# Patient Record
Sex: FEMALE | Race: OTHER | HISPANIC OR LATINO | URBAN - METROPOLITAN AREA
[De-identification: names, ages, dates, MRNs, and addresses within clinical notes are randomized per-mention and may not be internally consistent; named-entity substitution may affect disease eponyms.]

---

## 2017-01-08 ENCOUNTER — EMERGENCY (EMERGENCY)
Facility: HOSPITAL | Age: 72
LOS: 1 days | Discharge: HOME | End: 2017-01-08
Admitting: INTERNAL MEDICINE
Payer: MEDICARE

## 2017-01-08 PROCEDURE — 70450 CT HEAD/BRAIN W/O DYE: CPT | Mod: 26

## 2017-01-08 PROCEDURE — 93010 ELECTROCARDIOGRAM REPORT: CPT

## 2017-01-08 PROCEDURE — 71250 CT THORAX DX C-: CPT | Mod: 26

## 2017-01-08 PROCEDURE — 99285 EMERGENCY DEPT VISIT HI MDM: CPT

## 2017-01-08 PROCEDURE — 71010: CPT | Mod: 26

## 2017-01-09 ENCOUNTER — INPATIENT (INPATIENT)
Facility: HOSPITAL | Age: 72
LOS: 5 days | Discharge: LEFT BEFORE DISCHARGE | DRG: 811 | End: 2017-01-15
Attending: FAMILY MEDICINE | Admitting: FAMILY MEDICINE
Payer: MEDICARE

## 2017-01-09 ENCOUNTER — OUTPATIENT (OUTPATIENT)
Dept: OUTPATIENT SERVICES | Facility: HOSPITAL | Age: 72
LOS: 1 days | End: 2017-01-09

## 2017-01-09 ENCOUNTER — APPOINTMENT (OUTPATIENT)
Dept: FAMILY MEDICINE | Facility: HOSPITAL | Age: 72
End: 2017-01-09

## 2017-01-09 VITALS
SYSTOLIC BLOOD PRESSURE: 142 MMHG | WEIGHT: 113 LBS | RESPIRATION RATE: 14 BRPM | DIASTOLIC BLOOD PRESSURE: 50 MMHG | BODY MASS INDEX: 34.48 KG/M2 | TEMPERATURE: 206.96 F | HEART RATE: 52 BPM | OXYGEN SATURATION: 98 %

## 2017-01-09 DIAGNOSIS — D64.89 OTHER SPECIFIED ANEMIAS: ICD-10-CM

## 2017-01-09 DIAGNOSIS — F05 DELIRIUM DUE TO KNOWN PHYSIOLOGICAL CONDITION: ICD-10-CM

## 2017-01-09 DIAGNOSIS — E87.6 HYPOKALEMIA: ICD-10-CM

## 2017-01-09 DIAGNOSIS — E03.9 HYPOTHYROIDISM, UNSPECIFIED: ICD-10-CM

## 2017-01-09 DIAGNOSIS — R19.7 DIARRHEA, UNSPECIFIED: ICD-10-CM

## 2017-01-09 DIAGNOSIS — Z79.82 LONG TERM (CURRENT) USE OF ASPIRIN: ICD-10-CM

## 2017-01-09 DIAGNOSIS — I48.91 UNSPECIFIED ATRIAL FIBRILLATION: ICD-10-CM

## 2017-01-09 DIAGNOSIS — G93.41 METABOLIC ENCEPHALOPATHY: ICD-10-CM

## 2017-01-09 DIAGNOSIS — I25.10 ATHEROSCLEROTIC HEART DISEASE OF NATIVE CORONARY ARTERY WITHOUT ANGINA PECTORIS: ICD-10-CM

## 2017-01-09 DIAGNOSIS — E78.5 HYPERLIPIDEMIA, UNSPECIFIED: ICD-10-CM

## 2017-01-09 DIAGNOSIS — I50.32 CHRONIC DIASTOLIC (CONGESTIVE) HEART FAILURE: ICD-10-CM

## 2017-01-09 DIAGNOSIS — I13.2 HYPERTENSIVE HEART AND CHRONIC KIDNEY DISEASE WITH HEART FAILURE AND WITH STAGE 5 CHRONIC KIDNEY DISEASE, OR END STAGE RENAL DISEASE: ICD-10-CM

## 2017-01-09 DIAGNOSIS — E11.22 TYPE 2 DIABETES MELLITUS WITH DIABETIC CHRONIC KIDNEY DISEASE: ICD-10-CM

## 2017-01-09 DIAGNOSIS — D72.829 ELEVATED WHITE BLOOD CELL COUNT, UNSPECIFIED: ICD-10-CM

## 2017-01-09 DIAGNOSIS — F32.9 MAJOR DEPRESSIVE DISORDER, SINGLE EPISODE, UNSPECIFIED: ICD-10-CM

## 2017-01-09 DIAGNOSIS — N18.6 END STAGE RENAL DISEASE: ICD-10-CM

## 2017-01-09 DIAGNOSIS — D72.825 BANDEMIA: ICD-10-CM

## 2017-01-09 DIAGNOSIS — F41.9 ANXIETY DISORDER, UNSPECIFIED: ICD-10-CM

## 2017-01-09 DIAGNOSIS — Z91.013 ALLERGY TO SEAFOOD: ICD-10-CM

## 2017-01-09 DIAGNOSIS — Z79.01 LONG TERM (CURRENT) USE OF ANTICOAGULANTS: ICD-10-CM

## 2017-01-09 DIAGNOSIS — Z79.4 LONG TERM (CURRENT) USE OF INSULIN: ICD-10-CM

## 2017-01-09 DIAGNOSIS — R53.1 WEAKNESS: ICD-10-CM

## 2017-01-09 DIAGNOSIS — Z99.2 DEPENDENCE ON RENAL DIALYSIS: ICD-10-CM

## 2017-01-09 PROCEDURE — 99223 1ST HOSP IP/OBS HIGH 75: CPT | Mod: GC

## 2017-01-09 PROCEDURE — 93010 ELECTROCARDIOGRAM REPORT: CPT

## 2017-01-09 PROCEDURE — 74176 CT ABD & PELVIS W/O CONTRAST: CPT | Mod: 26

## 2017-01-10 PROCEDURE — 93931 UPPER EXTREMITY STUDY: CPT | Mod: 26,LT

## 2017-01-10 PROCEDURE — 99232 SBSQ HOSP IP/OBS MODERATE 35: CPT | Mod: GC

## 2017-01-10 PROCEDURE — 71010: CPT | Mod: 26

## 2017-01-11 ENCOUNTER — APPOINTMENT (OUTPATIENT)
Dept: OPHTHALMOLOGY | Facility: CLINIC | Age: 72
End: 2017-01-11

## 2017-01-11 PROCEDURE — 99232 SBSQ HOSP IP/OBS MODERATE 35: CPT | Mod: GC

## 2017-01-11 PROCEDURE — 49083 ABD PARACENTESIS W/IMAGING: CPT

## 2017-01-12 PROCEDURE — 99232 SBSQ HOSP IP/OBS MODERATE 35: CPT

## 2017-01-12 PROCEDURE — 93010 ELECTROCARDIOGRAM REPORT: CPT

## 2017-01-13 PROCEDURE — 99232 SBSQ HOSP IP/OBS MODERATE 35: CPT | Mod: GC

## 2017-01-13 PROCEDURE — 78226 HEPATOBILIARY SYSTEM IMAGING: CPT | Mod: 26

## 2017-01-13 PROCEDURE — 99232 SBSQ HOSP IP/OBS MODERATE 35: CPT

## 2017-01-13 PROCEDURE — 93306 TTE W/DOPPLER COMPLETE: CPT | Mod: 26

## 2017-01-15 ENCOUNTER — RX RENEWAL (OUTPATIENT)
Age: 72
End: 2017-01-15

## 2017-01-15 PROCEDURE — 80048 BASIC METABOLIC PNL TOTAL CA: CPT

## 2017-01-15 PROCEDURE — 93005 ELECTROCARDIOGRAM TRACING: CPT

## 2017-01-15 PROCEDURE — 84165 PROTEIN E-PHORESIS SERUM: CPT

## 2017-01-15 PROCEDURE — 99284 EMERGENCY DEPT VISIT MOD MDM: CPT | Mod: 25

## 2017-01-15 PROCEDURE — 80202 ASSAY OF VANCOMYCIN: CPT

## 2017-01-15 PROCEDURE — 83690 ASSAY OF LIPASE: CPT

## 2017-01-15 PROCEDURE — 85730 THROMBOPLASTIN TIME PARTIAL: CPT

## 2017-01-15 PROCEDURE — 87633 RESP VIRUS 12-25 TARGETS: CPT

## 2017-01-15 PROCEDURE — 87207 SMEAR SPECIAL STAIN: CPT

## 2017-01-15 PROCEDURE — 87798 DETECT AGENT NOS DNA AMP: CPT

## 2017-01-15 PROCEDURE — 83605 ASSAY OF LACTIC ACID: CPT

## 2017-01-15 PROCEDURE — P9016: CPT

## 2017-01-15 PROCEDURE — 36600 WITHDRAWAL OF ARTERIAL BLOOD: CPT

## 2017-01-15 PROCEDURE — 86850 RBC ANTIBODY SCREEN: CPT

## 2017-01-15 PROCEDURE — 87400 INFLUENZA A/B EACH AG IA: CPT

## 2017-01-15 PROCEDURE — 86900 BLOOD TYPING SEROLOGIC ABO: CPT

## 2017-01-15 PROCEDURE — 83735 ASSAY OF MAGNESIUM: CPT

## 2017-01-15 PROCEDURE — 87040 BLOOD CULTURE FOR BACTERIA: CPT

## 2017-01-15 PROCEDURE — 84443 ASSAY THYROID STIM HORMONE: CPT

## 2017-01-15 PROCEDURE — 82803 BLOOD GASES ANY COMBINATION: CPT

## 2017-01-15 PROCEDURE — 80069 RENAL FUNCTION PANEL: CPT

## 2017-01-15 PROCEDURE — 87581 M.PNEUMON DNA AMP PROBE: CPT

## 2017-01-15 PROCEDURE — 70450 CT HEAD/BRAIN W/O DYE: CPT

## 2017-01-15 PROCEDURE — 87486 CHLMYD PNEUM DNA AMP PROBE: CPT

## 2017-01-15 PROCEDURE — 82977 ASSAY OF GGT: CPT

## 2017-01-15 PROCEDURE — 83540 ASSAY OF IRON: CPT

## 2017-01-15 PROCEDURE — 85610 PROTHROMBIN TIME: CPT

## 2017-01-15 PROCEDURE — 99285 EMERGENCY DEPT VISIT HI MDM: CPT | Mod: 25

## 2017-01-15 PROCEDURE — 49083 ABD PARACENTESIS W/IMAGING: CPT

## 2017-01-15 PROCEDURE — 84100 ASSAY OF PHOSPHORUS: CPT

## 2017-01-15 PROCEDURE — 74176 CT ABD & PELVIS W/O CONTRAST: CPT

## 2017-01-15 PROCEDURE — 99261: CPT

## 2017-01-15 PROCEDURE — 85018 HEMOGLOBIN: CPT

## 2017-01-15 PROCEDURE — 93306 TTE W/DOPPLER COMPLETE: CPT

## 2017-01-15 PROCEDURE — 96365 THER/PROPH/DIAG IV INF INIT: CPT

## 2017-01-15 PROCEDURE — 93931 UPPER EXTREMITY STUDY: CPT

## 2017-01-15 PROCEDURE — 80053 COMPREHEN METABOLIC PANEL: CPT

## 2017-01-15 PROCEDURE — 82150 ASSAY OF AMYLASE: CPT

## 2017-01-15 PROCEDURE — 78226 HEPATOBILIARY SYSTEM IMAGING: CPT

## 2017-01-15 PROCEDURE — 82550 ASSAY OF CK (CPK): CPT

## 2017-01-15 PROCEDURE — 82553 CREATINE MB FRACTION: CPT

## 2017-01-15 PROCEDURE — 96374 THER/PROPH/DIAG INJ IV PUSH: CPT

## 2017-01-15 PROCEDURE — 83550 IRON BINDING TEST: CPT

## 2017-01-15 PROCEDURE — 84484 ASSAY OF TROPONIN QUANT: CPT

## 2017-01-15 PROCEDURE — 87493 C DIFF AMPLIFIED PROBE: CPT

## 2017-01-15 PROCEDURE — 87177 OVA AND PARASITES SMEARS: CPT

## 2017-01-15 PROCEDURE — 36430 TRANSFUSION BLD/BLD COMPNT: CPT

## 2017-01-15 PROCEDURE — 71250 CT THORAX DX C-: CPT

## 2017-01-15 PROCEDURE — 85014 HEMATOCRIT: CPT

## 2017-01-15 PROCEDURE — 71045 X-RAY EXAM CHEST 1 VIEW: CPT

## 2017-01-15 PROCEDURE — 85027 COMPLETE CBC AUTOMATED: CPT

## 2017-01-15 PROCEDURE — 86922 COMPATIBILITY TEST ANTIGLOB: CPT

## 2017-01-15 PROCEDURE — 82607 VITAMIN B-12: CPT

## 2017-01-15 PROCEDURE — 85025 COMPLETE CBC W/AUTO DIFF WBC: CPT

## 2017-01-15 PROCEDURE — 82728 ASSAY OF FERRITIN: CPT

## 2017-01-26 ENCOUNTER — FORM ENCOUNTER (OUTPATIENT)
Age: 72
End: 2017-01-26

## 2017-01-27 ENCOUNTER — OUTPATIENT (OUTPATIENT)
Dept: OUTPATIENT SERVICES | Facility: HOSPITAL | Age: 72
LOS: 1 days | End: 2017-01-27
Payer: MEDICARE

## 2017-01-27 ENCOUNTER — APPOINTMENT (OUTPATIENT)
Dept: FAMILY MEDICINE | Facility: HOSPITAL | Age: 72
End: 2017-01-27

## 2017-01-27 VITALS
DIASTOLIC BLOOD PRESSURE: 52 MMHG | SYSTOLIC BLOOD PRESSURE: 149 MMHG | HEART RATE: 57 BPM | RESPIRATION RATE: 16 BRPM | TEMPERATURE: 206.78 F | WEIGHT: 106 LBS | OXYGEN SATURATION: 100 % | BODY MASS INDEX: 32.35 KG/M2

## 2017-01-27 DIAGNOSIS — M79.89 OTHER SPECIFIED SOFT TISSUE DISORDERS: ICD-10-CM

## 2017-01-27 PROCEDURE — 93970 EXTREMITY STUDY: CPT

## 2017-01-27 PROCEDURE — G0463: CPT

## 2017-02-01 ENCOUNTER — LABORATORY RESULT (OUTPATIENT)
Age: 72
End: 2017-02-01

## 2017-02-01 ENCOUNTER — OUTPATIENT (OUTPATIENT)
Dept: OUTPATIENT SERVICES | Facility: HOSPITAL | Age: 72
LOS: 1 days | End: 2017-02-01
Payer: COMMERCIAL

## 2017-02-01 ENCOUNTER — APPOINTMENT (OUTPATIENT)
Dept: NEPHROLOGY | Facility: CLINIC | Age: 72
End: 2017-02-01

## 2017-02-01 ENCOUNTER — APPOINTMENT (OUTPATIENT)
Dept: TRANSPLANT | Facility: CLINIC | Age: 72
End: 2017-02-01

## 2017-02-01 ENCOUNTER — OUTPATIENT (OUTPATIENT)
Dept: OUTPATIENT SERVICES | Facility: HOSPITAL | Age: 72
LOS: 1 days | End: 2017-02-01

## 2017-02-01 VITALS
OXYGEN SATURATION: 99 % | BODY MASS INDEX: 22 KG/M2 | SYSTOLIC BLOOD PRESSURE: 132 MMHG | HEIGHT: 58.75 IN | DIASTOLIC BLOOD PRESSURE: 60 MMHG | TEMPERATURE: 208.4 F | WEIGHT: 107.7 LBS | RESPIRATION RATE: 16 BRPM | HEART RATE: 62 BPM

## 2017-02-01 DIAGNOSIS — N18.6 END STAGE RENAL DISEASE: ICD-10-CM

## 2017-02-01 LAB
ALBUMIN SERPL ELPH-MCNC: 3 G/DL — LOW (ref 3.3–5)
ALP SERPL-CCNC: 344 U/L — HIGH (ref 40–120)
ALT FLD-CCNC: 21 U/L — SIGNIFICANT CHANGE UP (ref 10–45)
ANION GAP SERPL CALC-SCNC: 12 MMOL/L — SIGNIFICANT CHANGE UP (ref 5–17)
ANISOCYTOSIS BLD QL: SIGNIFICANT CHANGE UP
AST SERPL-CCNC: 36 U/L — SIGNIFICANT CHANGE UP (ref 10–40)
BASOPHILS # BLD AUTO: 0.01 K/UL — SIGNIFICANT CHANGE UP (ref 0–0.2)
BASOPHILS NFR BLD AUTO: 0.2 % — SIGNIFICANT CHANGE UP (ref 0–2)
BILIRUB SERPL-MCNC: 0.6 MG/DL — SIGNIFICANT CHANGE UP (ref 0.2–1.2)
BLD GP AB SCN SERPL QL: NEGATIVE — SIGNIFICANT CHANGE UP
BUN SERPL-MCNC: 18 MG/DL — SIGNIFICANT CHANGE UP (ref 7–23)
CALCIUM SERPL-MCNC: 8.4 MG/DL — SIGNIFICANT CHANGE UP (ref 8.4–10.5)
CHLORIDE SERPL-SCNC: 99 MMOL/L — SIGNIFICANT CHANGE UP (ref 96–108)
CO2 SERPL-SCNC: 29 MMOL/L — SIGNIFICANT CHANGE UP (ref 22–31)
CREAT SERPL-MCNC: 2.38 MG/DL — HIGH (ref 0.5–1.3)
ELLIPTOCYTES BLD QL SMEAR: SLIGHT — SIGNIFICANT CHANGE UP
EOSINOPHIL # BLD AUTO: 0.15 K/UL — SIGNIFICANT CHANGE UP (ref 0–0.5)
EOSINOPHIL NFR BLD AUTO: 2.4 % — SIGNIFICANT CHANGE UP (ref 0–6)
GLUCOSE SERPL-MCNC: 164 MG/DL — HIGH (ref 70–99)
HAV IGG+IGM SER QL: REACTIVE
HBA1C BLD-MCNC: 6.4 % — HIGH (ref 4–5.6)
HBV CORE AB SER-ACNC: SIGNIFICANT CHANGE UP
HCT VFR BLD CALC: 33.9 % — LOW (ref 34.5–45)
HCV AB S/CO SERPL IA: 0.21 S/CO — SIGNIFICANT CHANGE UP
HCV AB SERPL-IMP: SIGNIFICANT CHANGE UP
HGB BLD-MCNC: 10 G/DL — LOW (ref 11.5–15.5)
HIV 1+2 AB+HIV1 P24 AG SERPL QL IA: SIGNIFICANT CHANGE UP
HYPOCHROMIA BLD QL: SLIGHT — SIGNIFICANT CHANGE UP
IMM GRANULOCYTES NFR BLD AUTO: 0.5 % — SIGNIFICANT CHANGE UP (ref 0–1.5)
LDH SERPL L TO P-CCNC: 308 U/L — HIGH (ref 50–242)
LYMPHOCYTES # BLD AUTO: 0.66 K/UL — LOW (ref 1–3.3)
LYMPHOCYTES # BLD AUTO: 10.4 % — LOW (ref 13–44)
MAGNESIUM SERPL-MCNC: 1.7 MG/DL — SIGNIFICANT CHANGE UP (ref 1.6–2.6)
MANUAL SMEAR VERIFICATION: SIGNIFICANT CHANGE UP
MCHC RBC-ENTMCNC: 26.6 PG — LOW (ref 27–34)
MCHC RBC-ENTMCNC: 29.5 GM/DL — LOW (ref 32–36)
MCV RBC AUTO: 90.2 FL — SIGNIFICANT CHANGE UP (ref 80–100)
MONOCYTES # BLD AUTO: 0.44 K/UL — SIGNIFICANT CHANGE UP (ref 0–0.9)
MONOCYTES NFR BLD AUTO: 6.9 % — SIGNIFICANT CHANGE UP (ref 2–14)
NEUTROPHILS # BLD AUTO: 5.08 K/UL — SIGNIFICANT CHANGE UP (ref 1.8–7.4)
NEUTROPHILS NFR BLD AUTO: 79.6 % — HIGH (ref 43–77)
PHOSPHATE SERPL-MCNC: 1.6 MG/DL — LOW (ref 2.5–4.5)
PLAT MORPH BLD: NORMAL — SIGNIFICANT CHANGE UP
PLATELET # BLD AUTO: 327 K/UL — SIGNIFICANT CHANGE UP (ref 150–400)
POIKILOCYTOSIS BLD QL AUTO: SLIGHT — SIGNIFICANT CHANGE UP
POTASSIUM SERPL-MCNC: 4.1 MMOL/L — SIGNIFICANT CHANGE UP (ref 3.5–5.3)
POTASSIUM SERPL-SCNC: 4.1 MMOL/L — SIGNIFICANT CHANGE UP (ref 3.5–5.3)
PROT SERPL-MCNC: 6.2 G/DL — SIGNIFICANT CHANGE UP (ref 6–8.3)
RBC # BLD: 3.76 M/UL — LOW (ref 3.8–5.2)
RBC # FLD: 22.5 % — HIGH (ref 10.3–14.5)
RBC BLD AUTO: ABNORMAL
RH IG SCN BLD-IMP: POSITIVE — SIGNIFICANT CHANGE UP
RPR SERPL-ACNC: SIGNIFICANT CHANGE UP
SCHISTOCYTES BLD QL AUTO: SLIGHT — SIGNIFICANT CHANGE UP
SODIUM SERPL-SCNC: 140 MMOL/L — SIGNIFICANT CHANGE UP (ref 135–145)
TARGETS BLD QL SMEAR: SLIGHT — SIGNIFICANT CHANGE UP
WBC # BLD: 6.37 K/UL — SIGNIFICANT CHANGE UP (ref 3.8–10.5)
WBC # FLD AUTO: 6.37 K/UL — SIGNIFICANT CHANGE UP (ref 3.8–10.5)

## 2017-02-01 PROCEDURE — 86762 RUBELLA ANTIBODY: CPT

## 2017-02-01 PROCEDURE — 86803 HEPATITIS C AB TEST: CPT

## 2017-02-01 PROCEDURE — 86706 HEP B SURFACE ANTIBODY: CPT

## 2017-02-01 PROCEDURE — 80053 COMPREHEN METABOLIC PANEL: CPT

## 2017-02-01 PROCEDURE — 86480 TB TEST CELL IMMUN MEASURE: CPT

## 2017-02-01 PROCEDURE — 86665 EPSTEIN-BARR CAPSID VCA: CPT

## 2017-02-01 PROCEDURE — 86696 HERPES SIMPLEX TYPE 2 TEST: CPT

## 2017-02-01 PROCEDURE — 86663 EPSTEIN-BARR ANTIBODY: CPT

## 2017-02-01 PROCEDURE — 86901 BLOOD TYPING SEROLOGIC RH(D): CPT

## 2017-02-01 PROCEDURE — 87389 HIV-1 AG W/HIV-1&-2 AB AG IA: CPT

## 2017-02-01 PROCEDURE — 84100 ASSAY OF PHOSPHORUS: CPT

## 2017-02-01 PROCEDURE — 86644 CMV ANTIBODY: CPT

## 2017-02-01 PROCEDURE — 85027 COMPLETE CBC AUTOMATED: CPT

## 2017-02-01 PROCEDURE — 86777 TOXOPLASMA ANTIBODY: CPT

## 2017-02-01 PROCEDURE — 86592 SYPHILIS TEST NON-TREP QUAL: CPT

## 2017-02-01 PROCEDURE — 86900 BLOOD TYPING SEROLOGIC ABO: CPT

## 2017-02-01 PROCEDURE — 86787 VARICELLA-ZOSTER ANTIBODY: CPT

## 2017-02-01 PROCEDURE — 86704 HEP B CORE ANTIBODY TOTAL: CPT

## 2017-02-01 PROCEDURE — 87340 HEPATITIS B SURFACE AG IA: CPT

## 2017-02-01 PROCEDURE — 86708 HEPATITIS A ANTIBODY: CPT

## 2017-02-01 PROCEDURE — 83036 HEMOGLOBIN GLYCOSYLATED A1C: CPT

## 2017-02-01 PROCEDURE — 86850 RBC ANTIBODY SCREEN: CPT

## 2017-02-01 PROCEDURE — 83615 LACTATE (LD) (LDH) ENZYME: CPT

## 2017-02-01 PROCEDURE — 86664 EPSTEIN-BARR NUCLEAR ANTIGEN: CPT

## 2017-02-01 PROCEDURE — 83735 ASSAY OF MAGNESIUM: CPT

## 2017-02-01 PROCEDURE — 86695 HERPES SIMPLEX TYPE 1 TEST: CPT

## 2017-02-01 RX ORDER — POLYETHYLENE GLYCOL 3350 17 G/17G
17 POWDER, FOR SOLUTION ORAL DAILY
Qty: 1 | Refills: 3 | Status: DISCONTINUED | COMMUNITY
Start: 2017-01-27 | End: 2017-02-01

## 2017-02-02 LAB
CMV IGG FLD QL: 8.8 U/ML — HIGH
CMV IGG SERPL-IMP: POSITIVE
EBV EA AB SER IA-ACNC: <5 U/ML — SIGNIFICANT CHANGE UP
EBV EA AB TITR SER IF: POSITIVE
EBV EA IGG SER-ACNC: NEGATIVE — SIGNIFICANT CHANGE UP
EBV NA IGG SER IA-ACNC: 237 U/ML — HIGH
EBV PATRN SPEC IB-IMP: SIGNIFICANT CHANGE UP
EBV VCA IGG AVIDITY SER QL IA: POSITIVE
EBV VCA IGM SER IA-ACNC: <10 U/ML — SIGNIFICANT CHANGE UP
EBV VCA IGM SER IA-ACNC: >750 U/ML — HIGH
EBV VCA IGM TITR FLD: NEGATIVE — SIGNIFICANT CHANGE UP
HBV SURFACE AB SER-ACNC: <3 MIU/ML — LOW
HBV SURFACE AB SER-ACNC: SIGNIFICANT CHANGE UP
HBV SURFACE AG SER-ACNC: SIGNIFICANT CHANGE UP
HSV1 IGG SER-ACNC: 5.02 INDEX — HIGH
HSV1 IGG SERPL QL IA: POSITIVE
HSV2 IGG FLD-ACNC: 0.53 INDEX — SIGNIFICANT CHANGE UP
HSV2 IGG SERPL QL IA: NEGATIVE — SIGNIFICANT CHANGE UP
RUBV IGG SER-ACNC: 19.6 INDEX — SIGNIFICANT CHANGE UP
RUBV IGG SER-IMP: POSITIVE — SIGNIFICANT CHANGE UP
T GONDII IGG SER QL: 5.3 IU/ML — SIGNIFICANT CHANGE UP
T GONDII IGG SER QL: NEGATIVE — SIGNIFICANT CHANGE UP
VZV IGG FLD QL IA: 963.7 INDEX — SIGNIFICANT CHANGE UP
VZV IGG FLD QL IA: POSITIVE — SIGNIFICANT CHANGE UP

## 2017-02-03 LAB
M TB TUBERC IFN-G BLD QL: 0 IU/ML — SIGNIFICANT CHANGE UP
M TB TUBERC IFN-G BLD QL: 0.04 IU/ML — SIGNIFICANT CHANGE UP
M TB TUBERC IFN-G BLD QL: ABNORMAL
MITOGEN IGNF BCKGRD COR BLD-ACNC: 0.06 IU/ML — SIGNIFICANT CHANGE UP

## 2017-02-09 ENCOUNTER — APPOINTMENT (OUTPATIENT)
Dept: FAMILY MEDICINE | Facility: HOSPITAL | Age: 72
End: 2017-02-09

## 2017-02-09 ENCOUNTER — FORM ENCOUNTER (OUTPATIENT)
Age: 72
End: 2017-02-09

## 2017-02-10 ENCOUNTER — APPOINTMENT (OUTPATIENT)
Dept: ULTRASOUND IMAGING | Facility: HOSPITAL | Age: 72
End: 2017-02-10

## 2017-02-10 ENCOUNTER — OUTPATIENT (OUTPATIENT)
Dept: OUTPATIENT SERVICES | Facility: HOSPITAL | Age: 72
LOS: 1 days | End: 2017-02-10
Payer: COMMERCIAL

## 2017-02-10 DIAGNOSIS — Z01.818 ENCOUNTER FOR OTHER PREPROCEDURAL EXAMINATION: ICD-10-CM

## 2017-02-10 PROCEDURE — 76700 US EXAM ABDOM COMPLETE: CPT

## 2017-02-10 PROCEDURE — 71250 CT THORAX DX C-: CPT

## 2017-02-10 PROCEDURE — 93975 VASCULAR STUDY: CPT

## 2017-02-10 PROCEDURE — 93880 EXTRACRANIAL BILAT STUDY: CPT

## 2017-02-22 ENCOUNTER — APPOINTMENT (OUTPATIENT)
Dept: CARDIOLOGY | Facility: CLINIC | Age: 72
End: 2017-02-22

## 2017-02-22 VITALS
HEIGHT: 59 IN | RESPIRATION RATE: 16 BRPM | WEIGHT: 112 LBS | SYSTOLIC BLOOD PRESSURE: 187 MMHG | BODY MASS INDEX: 22.58 KG/M2 | OXYGEN SATURATION: 98 % | HEART RATE: 77 BPM | DIASTOLIC BLOOD PRESSURE: 66 MMHG

## 2017-02-22 DIAGNOSIS — J18.9 PNEUMONIA, UNSPECIFIED ORGANISM: ICD-10-CM

## 2017-02-22 DIAGNOSIS — Z87.01 PERSONAL HISTORY OF PNEUMONIA (RECURRENT): ICD-10-CM

## 2017-02-22 DIAGNOSIS — R09.02 HYPOXEMIA: ICD-10-CM

## 2017-02-22 DIAGNOSIS — Z01.818 ENCOUNTER FOR OTHER PREPROCEDURAL EXAMINATION: ICD-10-CM

## 2017-02-22 DIAGNOSIS — Z87.09 PERSONAL HISTORY OF OTHER DISEASES OF THE RESPIRATORY SYSTEM: ICD-10-CM

## 2017-02-22 DIAGNOSIS — K30 FUNCTIONAL DYSPEPSIA: ICD-10-CM

## 2017-02-22 DIAGNOSIS — I34.0 NONRHEUMATIC MITRAL (VALVE) INSUFFICIENCY: ICD-10-CM

## 2017-03-02 ENCOUNTER — NON-APPOINTMENT (OUTPATIENT)
Age: 72
End: 2017-03-02

## 2017-03-02 PROBLEM — K30 INDIGESTION: Status: RESOLVED | Noted: 2017-01-15 | Resolved: 2017-03-02

## 2017-03-03 ENCOUNTER — APPOINTMENT (OUTPATIENT)
Dept: OBGYN | Facility: CLINIC | Age: 72
End: 2017-03-03

## 2017-03-03 VITALS
SYSTOLIC BLOOD PRESSURE: 195 MMHG | WEIGHT: 104 LBS | BODY MASS INDEX: 20.96 KG/M2 | HEIGHT: 59 IN | DIASTOLIC BLOOD PRESSURE: 75 MMHG

## 2017-03-03 DIAGNOSIS — Z01.818 ENCOUNTER FOR OTHER PREPROCEDURAL EXAMINATION: ICD-10-CM

## 2017-03-09 LAB — CYTOLOGY CVX/VAG DOC THIN PREP: NORMAL

## 2017-03-13 ENCOUNTER — RX RENEWAL (OUTPATIENT)
Age: 72
End: 2017-03-13

## 2017-03-20 ENCOUNTER — APPOINTMENT (OUTPATIENT)
Dept: MAMMOGRAPHY | Facility: HOSPITAL | Age: 72
End: 2017-03-20

## 2017-03-20 ENCOUNTER — OUTPATIENT (OUTPATIENT)
Dept: OUTPATIENT SERVICES | Facility: HOSPITAL | Age: 72
LOS: 1 days | End: 2017-03-20
Payer: MEDICARE

## 2017-03-20 DIAGNOSIS — Z12.31 ENCOUNTER FOR SCREENING MAMMOGRAM FOR MALIGNANT NEOPLASM OF BREAST: ICD-10-CM

## 2017-03-20 DIAGNOSIS — R92.8 OTHER ABNORMAL AND INCONCLUSIVE FINDINGS ON DIAGNOSTIC IMAGING OF BREAST: ICD-10-CM

## 2017-03-20 PROCEDURE — 77067 SCR MAMMO BI INCL CAD: CPT

## 2017-03-20 PROCEDURE — 77065 DX MAMMO INCL CAD UNI: CPT

## 2017-03-20 PROCEDURE — 77063 BREAST TOMOSYNTHESIS BI: CPT

## 2017-03-27 ENCOUNTER — APPOINTMENT (OUTPATIENT)
Dept: CARDIOLOGY | Facility: CLINIC | Age: 72
End: 2017-03-27

## 2017-03-27 ENCOUNTER — APPOINTMENT (OUTPATIENT)
Dept: CV DIAGNOSTICS | Facility: HOSPITAL | Age: 72
End: 2017-03-27

## 2017-03-28 ENCOUNTER — APPOINTMENT (OUTPATIENT)
Dept: FAMILY MEDICINE | Facility: HOSPITAL | Age: 72
End: 2017-03-28

## 2017-03-28 ENCOUNTER — OUTPATIENT (OUTPATIENT)
Dept: OUTPATIENT SERVICES | Facility: HOSPITAL | Age: 72
LOS: 1 days | End: 2017-03-28
Payer: MEDICARE

## 2017-03-28 VITALS
DIASTOLIC BLOOD PRESSURE: 60 MMHG | RESPIRATION RATE: 14 BRPM | WEIGHT: 110 LBS | OXYGEN SATURATION: 98 % | TEMPERATURE: 206.6 F | BODY MASS INDEX: 22.22 KG/M2 | HEART RATE: 73 BPM | SYSTOLIC BLOOD PRESSURE: 176 MMHG

## 2017-03-28 DIAGNOSIS — E13.65 OTHER SPECIFIED DIABETES MELLITUS WITH HYPERGLYCEMIA: ICD-10-CM

## 2017-03-28 PROCEDURE — G0463: CPT

## 2017-03-30 DIAGNOSIS — R94.39 ABNORMAL RESULT OF OTHER CARDIOVASCULAR FUNCTION STUDY: ICD-10-CM

## 2017-04-05 ENCOUNTER — OUTPATIENT (OUTPATIENT)
Dept: OUTPATIENT SERVICES | Facility: HOSPITAL | Age: 72
LOS: 1 days | Discharge: HOME | End: 2017-04-05
Payer: MEDICARE

## 2017-04-05 VITALS
RESPIRATION RATE: 16 BRPM | SYSTOLIC BLOOD PRESSURE: 167 MMHG | TEMPERATURE: 99 F | HEART RATE: 67 BPM | OXYGEN SATURATION: 100 % | DIASTOLIC BLOOD PRESSURE: 47 MMHG

## 2017-04-05 DIAGNOSIS — R94.39 ABNORMAL RESULT OF OTHER CARDIOVASCULAR FUNCTION STUDY: ICD-10-CM

## 2017-04-05 LAB
BUN SERPL-MCNC: 23 MG/DL — SIGNIFICANT CHANGE UP (ref 7–23)
CALCIUM SERPL-MCNC: 8.5 MG/DL — SIGNIFICANT CHANGE UP (ref 8.4–10.5)
CHLORIDE SERPL-SCNC: 97 MMOL/L — LOW (ref 98–107)
CO2 SERPL-SCNC: 27 MMOL/L — SIGNIFICANT CHANGE UP (ref 22–31)
CREAT SERPL-MCNC: 2.73 MG/DL — HIGH (ref 0.5–1.3)
GLUCOSE SERPL-MCNC: 96 MG/DL — SIGNIFICANT CHANGE UP (ref 70–99)
HCT VFR BLD CALC: 29.4 % — LOW (ref 34.5–45)
HGB BLD-MCNC: 8.8 G/DL — LOW (ref 11.5–15.5)
MCHC RBC-ENTMCNC: 26.3 PG — LOW (ref 27–34)
MCHC RBC-ENTMCNC: 29.9 % — LOW (ref 32–36)
MCV RBC AUTO: 87.8 FL — SIGNIFICANT CHANGE UP (ref 80–100)
PLATELET # BLD AUTO: 374 K/UL — SIGNIFICANT CHANGE UP (ref 150–400)
PMV BLD: 9.4 FL — SIGNIFICANT CHANGE UP (ref 7–13)
POTASSIUM SERPL-MCNC: 4.6 MMOL/L — SIGNIFICANT CHANGE UP (ref 3.5–5.3)
POTASSIUM SERPL-SCNC: 4.6 MMOL/L — SIGNIFICANT CHANGE UP (ref 3.5–5.3)
RBC # BLD: 3.35 M/UL — LOW (ref 3.8–5.2)
RBC # FLD: 19.4 % — HIGH (ref 10.3–14.5)
SODIUM SERPL-SCNC: 137 MMOL/L — SIGNIFICANT CHANGE UP (ref 135–145)
WBC # BLD: 7.39 K/UL — SIGNIFICANT CHANGE UP (ref 3.8–10.5)
WBC # FLD AUTO: 7.39 K/UL — SIGNIFICANT CHANGE UP (ref 3.8–10.5)

## 2017-04-05 PROCEDURE — 93010 ELECTROCARDIOGRAM REPORT: CPT

## 2017-04-05 PROCEDURE — 93458 L HRT ARTERY/VENTRICLE ANGIO: CPT | Mod: 26

## 2017-04-05 RX ORDER — HYDRALAZINE HCL 50 MG
10 TABLET ORAL ONCE
Qty: 0 | Refills: 0 | Status: COMPLETED | OUTPATIENT
Start: 2017-04-05 | End: 2017-04-05

## 2017-04-05 RX ORDER — LABETALOL HCL 100 MG
200 TABLET ORAL ONCE
Qty: 0 | Refills: 0 | Status: COMPLETED | OUTPATIENT
Start: 2017-04-05 | End: 2017-04-05

## 2017-04-05 RX ORDER — SODIUM CHLORIDE 9 MG/ML
3 INJECTION INTRAMUSCULAR; INTRAVENOUS; SUBCUTANEOUS EVERY 8 HOURS
Qty: 0 | Refills: 0 | Status: ACTIVE | OUTPATIENT
Start: 2017-04-05 | End: 2018-03-04

## 2017-04-05 RX ORDER — AMLODIPINE BESYLATE 2.5 MG/1
10 TABLET ORAL ONCE
Qty: 0 | Refills: 0 | Status: COMPLETED | OUTPATIENT
Start: 2017-04-05 | End: 2017-04-05

## 2017-04-05 RX ORDER — HYDRALAZINE HCL 50 MG
50 TABLET ORAL ONCE
Qty: 0 | Refills: 0 | Status: COMPLETED | OUTPATIENT
Start: 2017-04-05 | End: 2017-04-05

## 2017-04-05 RX ADMIN — Medication 10 MILLIGRAM(S): at 12:04

## 2017-04-05 RX ADMIN — Medication 200 MILLIGRAM(S): at 13:12

## 2017-04-05 RX ADMIN — Medication 50 MILLIGRAM(S): at 14:34

## 2017-04-05 RX ADMIN — AMLODIPINE BESYLATE 10 MILLIGRAM(S): 2.5 TABLET ORAL at 14:33

## 2017-04-05 NOTE — H&P CARDIOLOGY - PSH
AV fistula  left upper 8-2014 attempted 12-16-14  S/P myomectomy  abdominal age 52  Thoracotomy scar of left chest  4-2014 ? pneumonia/ scarring

## 2017-04-05 NOTE — H&P CARDIOLOGY - EXTREMITIES COMMENTS
b/l lower extremities edema +1, pitting. Increased warmth and erythema b/l pretibial area. No calf tenderness, Negative Erica's sign. NO ulcers.

## 2017-04-05 NOTE — H&P CARDIOLOGY - REVIEW OF SYSTEMS
NO chest pain, palpitations, diaphoresis, lightheadedness, dizziness, syncope,  fever chills, malaise, myalgias, anorexia, weight changes ( loss or gain), night sweats, generalized fatigue abdominal pain, N/V/C/D BRBPR, melena, urinary symptoms, cough, and wheezing.

## 2017-04-05 NOTE — H&P CARDIOLOGY - HISTORY OF PRESENT ILLNESS
71 y.o female presents today for elective cardiac catheterization. The patient is s/p fall x 2 in 1/2017 (2 weeks apart), after micturition. Denies LOC Denies chest pain. Admits to SOB with exertion (walking 3 blocks). The patient was found to have abnormal Stress test. The patient was recommended to have cardiac cath. 71 y.o female presents today for elective cardiac catheterization. The patient needs medical clearance for renal transplant list. The patient is s/p fall x 2 in 1/2017 (2 weeks apart), after micturition. Denies LOC. Last episode, hit her head and still has resolving ecchymosis L side of the face. The patient claims that was evaluated by her physician, had normal CT of the head.  Denies chest pain. Admits to SOB with exertion (walking 3 blocks). Denies palpitations, dizziness, fever. Admits to b/l lower extremities edema. As per patient, she was found to have abnormal Stress test - report requested. Due to patient's symptoms and abnormal non invasive tests, the patient was recommended to have cardiac cath. The patient denies any complaints at present.     Echo 1/2016 Conclusions:  1. Mildly dilated left atrium.  LA volume index = 37 cc/m2.  2. Moderate concentric left ventricular hypertrophy.  3. Normal left ventricular systolic function. No segmental  wall motion abnormalities.  4. Moderate diastolic dysfunction (Stage II).  5. Right ventricular enlargement with normal right  ventricular systolic function.  6. Estimated pulmonary artery systolic pressure equals 60  mm Hg, assuming right atrial pressure equals 8 mm Hg,  consistent with moderate pulmonary pressures.  7. Normal pericardium with trace pericardial effusion.    PS: the patient was noted to have b/l lower extremities edema, increased 71 y.o female presents today for elective cardiac catheterization. The patient needs medical clearance for renal transplant list. The patient is s/p fall x 2 in 1/2017 (2 weeks apart), after micturition. Denies LOC. Last episode, hit her head and now has resolving ecchymosis L side of the face. The patient claims that was evaluated by her physician, had normal CT of the head.  Denies chest pain. Admits to SOB with exertion (walking 3 blocks). Denies palpitations, dizziness, fever. Admits to b/l lower extremities edema. As per patient, she was found to have abnormal Stress test - report requested. Due to patient's symptoms and abnormal non invasive tests, the patient was recommended to have cardiac cath. The patient denies any complaints at present.     Echo 1/2016 Conclusions:  1. Mildly dilated left atrium.  LA volume index = 37 cc/m2.  2. Moderate concentric left ventricular hypertrophy.  3. Normal left ventricular systolic function. No segmental  wall motion abnormalities.  4. Moderate diastolic dysfunction (Stage II).  5. Right ventricular enlargement with normal right  ventricular systolic function.  6. Estimated pulmonary artery systolic pressure equals 60  mm Hg, assuming right atrial pressure equals 8 mm Hg,  consistent with moderate pulmonary pressures.  7. Normal pericardium with trace pericardial effusion.    PS: the patient was noted to have b/l lower extremities edema, increased warmth and erythema. The patient claims that has swelling in both legs for some time, was evaluated in [facility], had ? injections - unsure of diagnosis. Dr. Andrea was made aware, recommended to f/u with CBC for further decision.

## 2017-04-05 NOTE — H&P CARDIOLOGY - PMH
Anemia    Anxiety    Cataracts, bilateral  left worse than right  CHF (congestive heart failure)  diagnosed 2-2014  CHF (congestive heart failure)    CKD (chronic kidney disease)    Diabetes  diagnosed 2004  no medications in 5 months since hemodialysis  Diabetes  DM 2  ESRD (end stage renal disease)    Hip pain  left  [place] (hard of hearing)    HTN (hypertension)    HTN (hypertension)    Hyperlipidemia    Hypothyroid    Hypothyroidism    LBP radiating to left leg    MI (myocardial infarction)  2-2014   cardiac cath done [facility]  MI (myocardial infarction)  may 2016  Pleural effusion  Left side - several times since March 2014  Pleural thickening  s/p pleurodisis left vats 2014

## 2017-04-19 ENCOUNTER — OUTPATIENT (OUTPATIENT)
Dept: OUTPATIENT SERVICES | Facility: HOSPITAL | Age: 72
LOS: 1 days | End: 2017-04-19
Payer: MEDICARE

## 2017-04-19 ENCOUNTER — APPOINTMENT (OUTPATIENT)
Dept: FAMILY MEDICINE | Facility: HOSPITAL | Age: 72
End: 2017-04-19

## 2017-04-19 VITALS
WEIGHT: 112 LBS | OXYGEN SATURATION: 99 % | TEMPERATURE: 208.04 F | HEIGHT: 59 IN | HEART RATE: 64 BPM | DIASTOLIC BLOOD PRESSURE: 78 MMHG | BODY MASS INDEX: 22.58 KG/M2 | SYSTOLIC BLOOD PRESSURE: 189 MMHG | RESPIRATION RATE: 14 BRPM

## 2017-04-19 DIAGNOSIS — M75.110 INCOMPLETE ROTATOR CUFF TEAR OR RUPTURE OF UNSPECIFIED SHOULDER, NOT SPECIFIED AS TRAUMATIC: ICD-10-CM

## 2017-04-19 DIAGNOSIS — I79.0 ANEURYSM OF AORTA IN DISEASES CLASSIFIED ELSEWHERE: ICD-10-CM

## 2017-04-19 PROCEDURE — G0463: CPT

## 2017-05-01 ENCOUNTER — RX RENEWAL (OUTPATIENT)
Age: 72
End: 2017-05-01

## 2017-05-08 ENCOUNTER — RX RENEWAL (OUTPATIENT)
Age: 72
End: 2017-05-08

## 2017-05-26 ENCOUNTER — APPOINTMENT (OUTPATIENT)
Dept: FAMILY MEDICINE | Facility: HOSPITAL | Age: 72
End: 2017-05-26

## 2017-05-26 ENCOUNTER — OUTPATIENT (OUTPATIENT)
Dept: OUTPATIENT SERVICES | Facility: HOSPITAL | Age: 72
LOS: 1 days | End: 2017-05-26
Payer: MEDICARE

## 2017-05-26 VITALS
RESPIRATION RATE: 16 BRPM | TEMPERATURE: 97.6 F | HEART RATE: 62 BPM | OXYGEN SATURATION: 98 % | DIASTOLIC BLOOD PRESSURE: 63 MMHG | BODY MASS INDEX: 22.62 KG/M2 | WEIGHT: 112 LBS | SYSTOLIC BLOOD PRESSURE: 177 MMHG

## 2017-05-26 DIAGNOSIS — E11.65 TYPE 2 DIABETES MELLITUS WITH HYPERGLYCEMIA: ICD-10-CM

## 2017-05-26 PROCEDURE — G0463: CPT

## 2017-05-26 RX ORDER — DOCUSATE SODIUM 100 MG/1
100 CAPSULE ORAL
Qty: 1 | Refills: 2 | Status: DISCONTINUED | COMMUNITY
Start: 2017-04-19 | End: 2017-05-26

## 2017-05-26 RX ORDER — PANTOPRAZOLE 40 MG/1
40 TABLET, DELAYED RELEASE ORAL DAILY
Qty: 30 | Refills: 0 | Status: DISCONTINUED | COMMUNITY
Start: 2017-01-15 | End: 2017-05-26

## 2017-05-26 RX ORDER — ASPIRIN 81 MG
6.5 TABLET, DELAYED RELEASE (ENTERIC COATED) ORAL
Qty: 1 | Refills: 2 | Status: DISCONTINUED | COMMUNITY
Start: 2017-04-19 | End: 2017-05-26

## 2017-06-07 ENCOUNTER — OUTPATIENT (OUTPATIENT)
Dept: OUTPATIENT SERVICES | Facility: HOSPITAL | Age: 72
LOS: 1 days | End: 2017-06-07
Payer: MEDICARE

## 2017-06-07 DIAGNOSIS — N18.6 END STAGE RENAL DISEASE: ICD-10-CM

## 2017-06-07 DIAGNOSIS — E11.65 TYPE 2 DIABETES MELLITUS WITH HYPERGLYCEMIA: ICD-10-CM

## 2017-06-07 PROCEDURE — 80061 LIPID PANEL: CPT

## 2017-06-07 PROCEDURE — 80048 BASIC METABOLIC PNL TOTAL CA: CPT

## 2017-06-07 PROCEDURE — 83036 HEMOGLOBIN GLYCOSYLATED A1C: CPT

## 2017-06-07 PROCEDURE — 36415 COLL VENOUS BLD VENIPUNCTURE: CPT

## 2017-06-07 PROCEDURE — 84100 ASSAY OF PHOSPHORUS: CPT

## 2017-06-08 LAB — HBA1C MFR BLD HPLC: 8.5

## 2017-06-28 ENCOUNTER — APPOINTMENT (OUTPATIENT)
Dept: FAMILY MEDICINE | Facility: HOSPITAL | Age: 72
End: 2017-06-28

## 2017-06-28 ENCOUNTER — OUTPATIENT (OUTPATIENT)
Dept: OUTPATIENT SERVICES | Facility: HOSPITAL | Age: 72
LOS: 1 days | End: 2017-06-28
Payer: MEDICARE

## 2017-06-28 VITALS
HEIGHT: 59 IN | HEART RATE: 66 BPM | SYSTOLIC BLOOD PRESSURE: 190 MMHG | OXYGEN SATURATION: 95 % | RESPIRATION RATE: 14 BRPM | TEMPERATURE: 97.9 F | BODY MASS INDEX: 23.39 KG/M2 | DIASTOLIC BLOOD PRESSURE: 68 MMHG | WEIGHT: 116 LBS
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
## 2017-06-28 DIAGNOSIS — Z87.898 PERSONAL HISTORY OF OTHER SPECIFIED CONDITIONS: ICD-10-CM

## 2017-06-28 DIAGNOSIS — K59.00 CONSTIPATION, UNSPECIFIED: ICD-10-CM

## 2017-06-28 DIAGNOSIS — M25.572 PAIN IN LEFT ANKLE AND JOINTS OF LEFT FOOT: ICD-10-CM

## 2017-06-28 DIAGNOSIS — Z87.2 PERSONAL HISTORY OF DISEASES OF THE SKIN AND SUBCUTANEOUS TISSUE: ICD-10-CM

## 2017-06-28 DIAGNOSIS — Z86.69 PERSONAL HISTORY OF OTHER DISEASES OF THE NERVOUS SYSTEM AND SENSE ORGANS: ICD-10-CM

## 2017-06-28 DIAGNOSIS — Z87.441 PERSONAL HISTORY OF NEPHROTIC SYNDROME: ICD-10-CM

## 2017-06-28 DIAGNOSIS — Z86.79 PERSONAL HISTORY OF OTHER DISEASES OF THE CIRCULATORY SYSTEM: ICD-10-CM

## 2017-06-28 DIAGNOSIS — Z01.818 ENCOUNTER FOR OTHER PREPROCEDURAL EXAMINATION: ICD-10-CM

## 2017-06-28 DIAGNOSIS — M79.89 OTHER SPECIFIED SOFT TISSUE DISORDERS: ICD-10-CM
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
## 2017-06-28 PROCEDURE — G0463: CPT

## 2017-07-29 ENCOUNTER — RX RENEWAL (OUTPATIENT)
Age: 72
End: 2017-07-29

## 2017-07-31 ENCOUNTER — APPOINTMENT (OUTPATIENT)
Dept: VASCULAR SURGERY | Facility: CLINIC | Age: 72
End: 2017-07-31
Payer: MEDICARE

## 2017-07-31 DIAGNOSIS — T82.898A OTHER SPECIFIED COMPLICATION OF VASCULAR PROSTHETIC DEVICES, IMPLANTS AND GRAFTS, INITIAL ENCOUNTER: ICD-10-CM

## 2017-07-31 PROCEDURE — 99214 OFFICE O/P EST MOD 30 MIN: CPT | Mod: 25

## 2017-07-31 PROCEDURE — 93990 DOPPLER FLOW TESTING: CPT

## 2017-08-11 ENCOUNTER — OUTPATIENT (OUTPATIENT)
Dept: OUTPATIENT SERVICES | Facility: HOSPITAL | Age: 72
LOS: 1 days | End: 2017-08-11
Payer: MEDICARE

## 2017-08-11 ENCOUNTER — APPOINTMENT (OUTPATIENT)
Dept: FAMILY MEDICINE | Facility: HOSPITAL | Age: 72
End: 2017-08-11

## 2017-08-11 VITALS
HEART RATE: 63 BPM | HEIGHT: 59 IN | BODY MASS INDEX: 22.98 KG/M2 | WEIGHT: 114 LBS | DIASTOLIC BLOOD PRESSURE: 62 MMHG | RESPIRATION RATE: 14 BRPM | OXYGEN SATURATION: 98 % | TEMPERATURE: 98.1 F | SYSTOLIC BLOOD PRESSURE: 181 MMHG

## 2017-08-11 DIAGNOSIS — F41.9 ANXIETY DISORDER, UNSPECIFIED: ICD-10-CM

## 2017-08-11 DIAGNOSIS — H54.0 BLINDNESS, BOTH EYES: ICD-10-CM

## 2017-08-11 DIAGNOSIS — I10 ESSENTIAL (PRIMARY) HYPERTENSION: ICD-10-CM

## 2017-08-11 DIAGNOSIS — M25.551 PAIN IN RIGHT HIP: ICD-10-CM

## 2017-08-11 PROCEDURE — G0463: CPT

## 2017-08-15 ENCOUNTER — FORM ENCOUNTER (OUTPATIENT)
Age: 72
End: 2017-08-15

## 2017-08-16 ENCOUNTER — APPOINTMENT (OUTPATIENT)
Age: 72
End: 2017-08-16
Payer: MEDICARE

## 2017-08-16 PROCEDURE — 36902Z: CUSTOM

## 2017-08-19 ENCOUNTER — CHART COPY (OUTPATIENT)
Age: 72
End: 2017-08-19

## 2017-08-20 ENCOUNTER — FORM ENCOUNTER (OUTPATIENT)
Age: 72
End: 2017-08-20

## 2017-08-21 ENCOUNTER — OUTPATIENT (OUTPATIENT)
Dept: OUTPATIENT SERVICES | Facility: HOSPITAL | Age: 72
LOS: 1 days | End: 2017-08-21
Payer: MEDICARE
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
## 2017-08-21 PROCEDURE — 73502 X-RAY EXAM HIP UNI 2-3 VIEWS: CPT

## 2017-08-21 PROCEDURE — 73502 X-RAY EXAM HIP UNI 2-3 VIEWS: CPT | Mod: 26,RT

## 2017-08-23 ENCOUNTER — APPOINTMENT (OUTPATIENT)
Dept: FAMILY MEDICINE | Facility: HOSPITAL | Age: 72
End: 2017-08-23

## 2017-09-06 ENCOUNTER — APPOINTMENT (OUTPATIENT)
Dept: FAMILY MEDICINE | Facility: HOSPITAL | Age: 72
End: 2017-09-06

## 2017-09-20 ENCOUNTER — APPOINTMENT (OUTPATIENT)
Dept: FAMILY MEDICINE | Facility: HOSPITAL | Age: 72
End: 2017-09-20

## 2017-09-27 ENCOUNTER — APPOINTMENT (OUTPATIENT)
Dept: FAMILY MEDICINE | Facility: HOSPITAL | Age: 72
End: 2017-09-27

## 2017-09-27 ENCOUNTER — OUTPATIENT (OUTPATIENT)
Dept: OUTPATIENT SERVICES | Facility: HOSPITAL | Age: 72
LOS: 1 days | End: 2017-09-27
Payer: MEDICARE

## 2017-09-27 VITALS
SYSTOLIC BLOOD PRESSURE: 225 MMHG | DIASTOLIC BLOOD PRESSURE: 70 MMHG | OXYGEN SATURATION: 96 % | HEART RATE: 65 BPM | TEMPERATURE: 98.8 F | RESPIRATION RATE: 16 BRPM | WEIGHT: 114 LBS | BODY MASS INDEX: 23.03 KG/M2

## 2017-09-27 VITALS — DIASTOLIC BLOOD PRESSURE: 70 MMHG | SYSTOLIC BLOOD PRESSURE: 220 MMHG

## 2017-09-27 DIAGNOSIS — I10 ESSENTIAL (PRIMARY) HYPERTENSION: ICD-10-CM

## 2017-09-27 DIAGNOSIS — N18.6 END STAGE RENAL DISEASE: ICD-10-CM

## 2017-09-27 DIAGNOSIS — Z99.2 END STAGE RENAL DISEASE: ICD-10-CM

## 2017-09-27 DIAGNOSIS — H66.93 UNSPECIFIED PERFORATION OF TYMPANIC MEMBRANE, BILATERAL: ICD-10-CM

## 2017-09-27 DIAGNOSIS — H72.93 UNSPECIFIED PERFORATION OF TYMPANIC MEMBRANE, BILATERAL: ICD-10-CM

## 2017-09-27 DIAGNOSIS — E11.65 TYPE 2 DIABETES MELLITUS WITH HYPERGLYCEMIA: ICD-10-CM

## 2017-09-29 PROCEDURE — 36415 COLL VENOUS BLD VENIPUNCTURE: CPT

## 2017-09-29 PROCEDURE — 83036 HEMOGLOBIN GLYCOSYLATED A1C: CPT

## 2017-09-29 PROCEDURE — 80053 COMPREHEN METABOLIC PANEL: CPT

## 2017-09-29 PROCEDURE — G0463: CPT

## 2017-09-29 PROCEDURE — 85025 COMPLETE CBC W/AUTO DIFF WBC: CPT

## 2017-10-04 ENCOUNTER — APPOINTMENT (OUTPATIENT)
Dept: OPHTHALMOLOGY | Facility: CLINIC | Age: 72
End: 2017-10-04

## 2017-10-10 ENCOUNTER — FORM ENCOUNTER (OUTPATIENT)
Age: 72
End: 2017-10-10

## 2017-10-10 ENCOUNTER — APPOINTMENT (OUTPATIENT)
Dept: OPHTHALMOLOGY | Facility: CLINIC | Age: 72
End: 2017-10-10

## 2017-10-11 ENCOUNTER — APPOINTMENT (OUTPATIENT)
Dept: FAMILY MEDICINE | Facility: HOSPITAL | Age: 72
End: 2017-10-11

## 2017-10-11 ENCOUNTER — OUTPATIENT (OUTPATIENT)
Dept: OUTPATIENT SERVICES | Facility: HOSPITAL | Age: 72
LOS: 1 days | End: 2017-10-11
Payer: MEDICARE

## 2017-10-11 DIAGNOSIS — N18.6 END STAGE RENAL DISEASE: ICD-10-CM

## 2017-10-11 PROCEDURE — 76775 US EXAM ABDO BACK WALL LIM: CPT

## 2017-10-11 PROCEDURE — 76775 US EXAM ABDO BACK WALL LIM: CPT | Mod: 26

## 2017-10-16 ENCOUNTER — APPOINTMENT (OUTPATIENT)
Dept: VASCULAR SURGERY | Facility: CLINIC | Age: 72
End: 2017-10-16
Payer: MEDICARE

## 2017-10-16 VITALS
RESPIRATION RATE: 17 BRPM | DIASTOLIC BLOOD PRESSURE: 62 MMHG | TEMPERATURE: 98.6 F | HEART RATE: 51 BPM | SYSTOLIC BLOOD PRESSURE: 161 MMHG

## 2017-10-16 PROCEDURE — 99214 OFFICE O/P EST MOD 30 MIN: CPT

## 2017-10-16 PROCEDURE — 93990 DOPPLER FLOW TESTING: CPT

## 2017-11-02 LAB — HBA1C MFR BLD HPLC: 8.8

## 2017-11-06 ENCOUNTER — OUTPATIENT (OUTPATIENT)
Dept: OUTPATIENT SERVICES | Facility: HOSPITAL | Age: 72
LOS: 1 days | End: 2017-11-06
Payer: MEDICARE

## 2017-11-06 PROCEDURE — 36415 COLL VENOUS BLD VENIPUNCTURE: CPT

## 2017-11-06 PROCEDURE — 93010 ELECTROCARDIOGRAM REPORT: CPT

## 2017-11-06 PROCEDURE — 93005 ELECTROCARDIOGRAM TRACING: CPT

## 2017-11-06 PROCEDURE — 82962 GLUCOSE BLOOD TEST: CPT

## 2017-11-06 PROCEDURE — 80048 BASIC METABOLIC PNL TOTAL CA: CPT

## 2017-11-07 ENCOUNTER — MEDICATION RENEWAL (OUTPATIENT)
Age: 72
End: 2017-11-07

## 2017-11-08 ENCOUNTER — APPOINTMENT (OUTPATIENT)
Dept: FAMILY MEDICINE | Facility: HOSPITAL | Age: 72
End: 2017-11-08

## 2017-11-08 ENCOUNTER — OUTPATIENT (OUTPATIENT)
Dept: OUTPATIENT SERVICES | Facility: HOSPITAL | Age: 72
LOS: 1 days | End: 2017-11-08
Payer: MEDICARE

## 2017-11-08 VITALS
OXYGEN SATURATION: 98 % | HEART RATE: 61 BPM | DIASTOLIC BLOOD PRESSURE: 70 MMHG | TEMPERATURE: 98.5 F | BODY MASS INDEX: 21.81 KG/M2 | WEIGHT: 108 LBS | SYSTOLIC BLOOD PRESSURE: 197 MMHG | RESPIRATION RATE: 16 BRPM

## 2017-11-08 DIAGNOSIS — N18.9 CHRONIC KIDNEY DISEASE, UNSPECIFIED: ICD-10-CM

## 2017-11-08 PROCEDURE — G0463: CPT

## 2017-11-14 ENCOUNTER — APPOINTMENT (OUTPATIENT)
Dept: RADIOLOGY | Facility: HOSPITAL | Age: 72
End: 2017-11-14
Payer: MEDICARE

## 2017-11-14 ENCOUNTER — OUTPATIENT (OUTPATIENT)
Dept: OUTPATIENT SERVICES | Facility: HOSPITAL | Age: 72
LOS: 1 days | End: 2017-11-14
Payer: MEDICARE
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
## 2017-11-14 DIAGNOSIS — Z00.8 ENCOUNTER FOR OTHER GENERAL EXAMINATION: ICD-10-CM

## 2017-11-14 DIAGNOSIS — R10.2 PELVIC AND PERINEAL PAIN: ICD-10-CM

## 2017-11-14 DIAGNOSIS — S39.92XA UNSPECIFIED INJURY OF LOWER BACK, INITIAL ENCOUNTER: ICD-10-CM
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
## 2017-11-14 DIAGNOSIS — S79.911A UNSPECIFIED INJURY OF RIGHT HIP, INITIAL ENCOUNTER: ICD-10-CM

## 2017-11-14 PROCEDURE — 72220 X-RAY EXAM SACRUM TAILBONE: CPT

## 2017-11-14 PROCEDURE — 72220 X-RAY EXAM SACRUM TAILBONE: CPT | Mod: 26

## 2017-11-14 PROCEDURE — 73502 X-RAY EXAM HIP UNI 2-3 VIEWS: CPT | Mod: 26,RT

## 2017-11-14 PROCEDURE — 73502 X-RAY EXAM HIP UNI 2-3 VIEWS: CPT

## 2017-11-14 PROCEDURE — 72170 X-RAY EXAM OF PELVIS: CPT

## 2017-11-16 ENCOUNTER — OUTPATIENT (OUTPATIENT)
Dept: OUTPATIENT SERVICES | Facility: HOSPITAL | Age: 72
LOS: 1 days | End: 2017-11-16
Payer: MEDICARE

## 2017-11-16 ENCOUNTER — APPOINTMENT (OUTPATIENT)
Dept: MRI IMAGING | Facility: HOSPITAL | Age: 72
End: 2017-11-16
Payer: MEDICARE

## 2017-11-16 DIAGNOSIS — M47.816 SPONDYLOSIS WITHOUT MYELOPATHY OR RADICULOPATHY, LUMBAR REGION: ICD-10-CM

## 2017-11-16 DIAGNOSIS — M48.061 SPINAL STENOSIS, LUMBAR REGION WITHOUT NEUROGENIC CLAUDICATION: ICD-10-CM

## 2017-11-16 PROCEDURE — 72148 MRI LUMBAR SPINE W/O DYE: CPT | Mod: 26

## 2017-11-16 PROCEDURE — 72148 MRI LUMBAR SPINE W/O DYE: CPT

## 2017-11-20 DIAGNOSIS — H25.812 COMBINED FORMS OF AGE-RELATED CATARACT, LEFT EYE: ICD-10-CM

## 2017-11-29 DIAGNOSIS — Z00.8 ENCOUNTER FOR OTHER GENERAL EXAMINATION: ICD-10-CM

## 2017-12-09 ENCOUNTER — CHART COPY (OUTPATIENT)
Age: 72
End: 2017-12-09

## 2017-12-13 ENCOUNTER — OUTPATIENT (OUTPATIENT)
Dept: OUTPATIENT SERVICES | Facility: HOSPITAL | Age: 72
LOS: 1 days | End: 2017-12-13
Payer: MEDICARE

## 2017-12-13 ENCOUNTER — APPOINTMENT (OUTPATIENT)
Dept: FAMILY MEDICINE | Facility: HOSPITAL | Age: 72
End: 2017-12-13

## 2017-12-13 VITALS
OXYGEN SATURATION: 97 % | DIASTOLIC BLOOD PRESSURE: 63 MMHG | HEART RATE: 56 BPM | HEIGHT: 59 IN | TEMPERATURE: 97.2 F | SYSTOLIC BLOOD PRESSURE: 175 MMHG | WEIGHT: 106 LBS | BODY MASS INDEX: 21.37 KG/M2 | RESPIRATION RATE: 14 BRPM
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
## 2017-12-13 DIAGNOSIS — Z00.00 ENCOUNTER FOR GENERAL ADULT MEDICAL EXAMINATION WITHOUT ABNORMAL FINDINGS: ICD-10-CM

## 2017-12-13 DIAGNOSIS — M25.551 PAIN IN RIGHT HIP: ICD-10-CM
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
## 2017-12-13 PROCEDURE — G0463: CPT
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
## 2017-12-15 ENCOUNTER — CHART COPY (OUTPATIENT)
Age: 72
End: 2017-12-15

## 2017-12-15 ENCOUNTER — MOBILE ON CALL (OUTPATIENT)
Age: 72
End: 2017-12-15

## 2018-01-03 ENCOUNTER — APPOINTMENT (OUTPATIENT)
Age: 73
End: 2018-01-03

## 2018-01-13 ENCOUNTER — INPATIENT (INPATIENT)
Facility: HOSPITAL | Age: 73
LOS: 2 days | Discharge: HOME | DRG: 391 | End: 2018-01-16
Attending: FAMILY MEDICINE | Admitting: INTERNAL MEDICINE
Payer: MEDICARE

## 2018-01-13 DIAGNOSIS — I13.2 HYPERTENSIVE HEART AND CHRONIC KIDNEY DISEASE WITH HEART FAILURE AND WITH STAGE 5 CHRONIC KIDNEY DISEASE, OR END STAGE RENAL DISEASE: ICD-10-CM

## 2018-01-13 DIAGNOSIS — J10.1 INFLUENZA DUE TO OTHER IDENTIFIED INFLUENZA VIRUS WITH OTHER RESPIRATORY MANIFESTATIONS: ICD-10-CM

## 2018-01-13 DIAGNOSIS — I25.10 ATHEROSCLEROTIC HEART DISEASE OF NATIVE CORONARY ARTERY WITHOUT ANGINA PECTORIS: ICD-10-CM

## 2018-01-14 DIAGNOSIS — R11.2 NAUSEA WITH VOMITING, UNSPECIFIED: ICD-10-CM

## 2018-01-14 PROCEDURE — 74176 CT ABD & PELVIS W/O CONTRAST: CPT | Mod: 26

## 2018-01-14 PROCEDURE — 93010 ELECTROCARDIOGRAM REPORT: CPT

## 2018-01-14 PROCEDURE — 71045 X-RAY EXAM CHEST 1 VIEW: CPT | Mod: 26

## 2018-01-15 PROCEDURE — 99232 SBSQ HOSP IP/OBS MODERATE 35: CPT | Mod: GC

## 2018-01-16 ENCOUNTER — MEDICATION RENEWAL (OUTPATIENT)
Age: 73
End: 2018-01-16

## 2018-01-16 ENCOUNTER — MOBILE ON CALL (OUTPATIENT)
Age: 73
End: 2018-01-16

## 2018-01-16 PROCEDURE — 82607 VITAMIN B-12: CPT

## 2018-01-16 PROCEDURE — 82962 GLUCOSE BLOOD TEST: CPT

## 2018-01-16 PROCEDURE — 80048 BASIC METABOLIC PNL TOTAL CA: CPT

## 2018-01-16 PROCEDURE — 87633 RESP VIRUS 12-25 TARGETS: CPT

## 2018-01-16 PROCEDURE — 87581 M.PNEUMON DNA AMP PROBE: CPT

## 2018-01-16 PROCEDURE — 84100 ASSAY OF PHOSPHORUS: CPT

## 2018-01-16 PROCEDURE — 96366 THER/PROPH/DIAG IV INF ADDON: CPT

## 2018-01-16 PROCEDURE — 93005 ELECTROCARDIOGRAM TRACING: CPT

## 2018-01-16 PROCEDURE — 83690 ASSAY OF LIPASE: CPT

## 2018-01-16 PROCEDURE — 82150 ASSAY OF AMYLASE: CPT

## 2018-01-16 PROCEDURE — 99285 EMERGENCY DEPT VISIT HI MDM: CPT | Mod: 25

## 2018-01-16 PROCEDURE — 74176 CT ABD & PELVIS W/O CONTRAST: CPT

## 2018-01-16 PROCEDURE — 83036 HEMOGLOBIN GLYCOSYLATED A1C: CPT

## 2018-01-16 PROCEDURE — 85027 COMPLETE CBC AUTOMATED: CPT

## 2018-01-16 PROCEDURE — 84132 ASSAY OF SERUM POTASSIUM: CPT

## 2018-01-16 PROCEDURE — 87400 INFLUENZA A/B EACH AG IA: CPT

## 2018-01-16 PROCEDURE — 94640 AIRWAY INHALATION TREATMENT: CPT

## 2018-01-16 PROCEDURE — 71045 X-RAY EXAM CHEST 1 VIEW: CPT

## 2018-01-16 PROCEDURE — 96367 TX/PROPH/DG ADDL SEQ IV INF: CPT

## 2018-01-16 PROCEDURE — 93010 ELECTROCARDIOGRAM REPORT: CPT

## 2018-01-16 PROCEDURE — 87486 CHLMYD PNEUM DNA AMP PROBE: CPT

## 2018-01-16 PROCEDURE — 96375 TX/PRO/DX INJ NEW DRUG ADDON: CPT

## 2018-01-16 PROCEDURE — 80053 COMPREHEN METABOLIC PANEL: CPT

## 2018-01-16 PROCEDURE — 99261: CPT

## 2018-01-16 PROCEDURE — 85045 AUTOMATED RETICULOCYTE COUNT: CPT

## 2018-01-16 PROCEDURE — 83735 ASSAY OF MAGNESIUM: CPT

## 2018-01-16 PROCEDURE — 99238 HOSP IP/OBS DSCHRG MGMT 30/<: CPT

## 2018-01-16 PROCEDURE — 96365 THER/PROPH/DIAG IV INF INIT: CPT

## 2018-01-17 ENCOUNTER — OUTPATIENT (OUTPATIENT)
Dept: OUTPATIENT SERVICES | Facility: HOSPITAL | Age: 73
LOS: 1 days | End: 2018-01-17
Payer: MEDICARE

## 2018-01-17 ENCOUNTER — APPOINTMENT (OUTPATIENT)
Dept: FAMILY MEDICINE | Facility: HOSPITAL | Age: 73
End: 2018-01-17

## 2018-01-17 VITALS
SYSTOLIC BLOOD PRESSURE: 179 MMHG | OXYGEN SATURATION: 97 % | RESPIRATION RATE: 14 BRPM | DIASTOLIC BLOOD PRESSURE: 76 MMHG | TEMPERATURE: 98 F | HEART RATE: 50 BPM

## 2018-01-17 DIAGNOSIS — R19.7 DIARRHEA, UNSPECIFIED: ICD-10-CM

## 2018-01-17 DIAGNOSIS — Z00.00 ENCOUNTER FOR GENERAL ADULT MEDICAL EXAMINATION WITHOUT ABNORMAL FINDINGS: ICD-10-CM

## 2018-01-17 DIAGNOSIS — F41.9 ANXIETY DISORDER, UNSPECIFIED: ICD-10-CM

## 2018-01-17 DIAGNOSIS — L22 DIAPER DERMATITIS: ICD-10-CM

## 2018-01-17 LAB — HBA1C MFR BLD HPLC: 6.9

## 2018-01-17 PROCEDURE — G0463: CPT

## 2018-01-18 DIAGNOSIS — F41.9 ANXIETY DISORDER, UNSPECIFIED: ICD-10-CM

## 2018-01-18 DIAGNOSIS — R19.7 DIARRHEA, UNSPECIFIED: ICD-10-CM

## 2018-01-18 DIAGNOSIS — L22 DIAPER DERMATITIS: ICD-10-CM

## 2018-01-19 ENCOUNTER — OUTPATIENT (OUTPATIENT)
Dept: OUTPATIENT SERVICES | Facility: HOSPITAL | Age: 73
LOS: 1 days | End: 2018-01-19
Payer: MEDICARE

## 2018-01-19 ENCOUNTER — APPOINTMENT (OUTPATIENT)
Dept: FAMILY MEDICINE | Facility: HOSPITAL | Age: 73
End: 2018-01-19

## 2018-01-19 VITALS
RESPIRATION RATE: 14 BRPM | OXYGEN SATURATION: 98 % | TEMPERATURE: 97.9 F | BODY MASS INDEX: 20.16 KG/M2 | DIASTOLIC BLOOD PRESSURE: 71 MMHG | HEART RATE: 57 BPM | HEIGHT: 59 IN | SYSTOLIC BLOOD PRESSURE: 189 MMHG | WEIGHT: 100 LBS

## 2018-01-19 DIAGNOSIS — Z87.09 PERSONAL HISTORY OF OTHER DISEASES OF THE RESPIRATORY SYSTEM: ICD-10-CM

## 2018-01-19 DIAGNOSIS — Z00.00 ENCOUNTER FOR GENERAL ADULT MEDICAL EXAMINATION WITHOUT ABNORMAL FINDINGS: ICD-10-CM

## 2018-01-19 DIAGNOSIS — J11.1 INFLUENZA DUE TO UNIDENTIFIED INFLUENZA VIRUS WITH OTHER RESPIRATORY MANIFESTATIONS: ICD-10-CM

## 2018-01-19 PROCEDURE — G0463: CPT

## 2018-01-19 RX ORDER — METRONIDAZOLE 500 MG/1
500 TABLET ORAL 3 TIMES DAILY
Qty: 30 | Refills: 0 | Status: DISCONTINUED | COMMUNITY
Start: 2018-01-17 | End: 2018-01-19

## 2018-01-22 DIAGNOSIS — J11.1 INFLUENZA DUE TO UNIDENTIFIED INFLUENZA VIRUS WITH OTHER RESPIRATORY MANIFESTATIONS: ICD-10-CM

## 2018-01-25 DIAGNOSIS — I13.2 HYPERTENSIVE HEART AND CHRONIC KIDNEY DISEASE WITH HEART FAILURE AND WITH STAGE 5 CHRONIC KIDNEY DISEASE, OR END STAGE RENAL DISEASE: ICD-10-CM

## 2018-01-25 DIAGNOSIS — E03.9 HYPOTHYROIDISM, UNSPECIFIED: ICD-10-CM

## 2018-01-25 DIAGNOSIS — Z79.4 LONG TERM (CURRENT) USE OF INSULIN: ICD-10-CM

## 2018-01-25 DIAGNOSIS — I50.9 HEART FAILURE, UNSPECIFIED: ICD-10-CM

## 2018-01-25 DIAGNOSIS — E86.0 DEHYDRATION: ICD-10-CM

## 2018-01-25 DIAGNOSIS — Z91.013 ALLERGY TO SEAFOOD: ICD-10-CM

## 2018-01-25 DIAGNOSIS — E78.5 HYPERLIPIDEMIA, UNSPECIFIED: ICD-10-CM

## 2018-01-25 DIAGNOSIS — J10.1 INFLUENZA DUE TO OTHER IDENTIFIED INFLUENZA VIRUS WITH OTHER RESPIRATORY MANIFESTATIONS: ICD-10-CM

## 2018-01-25 DIAGNOSIS — Z79.82 LONG TERM (CURRENT) USE OF ASPIRIN: ICD-10-CM

## 2018-01-25 DIAGNOSIS — E87.6 HYPOKALEMIA: ICD-10-CM

## 2018-01-25 DIAGNOSIS — D64.9 ANEMIA, UNSPECIFIED: ICD-10-CM

## 2018-01-25 DIAGNOSIS — I25.2 OLD MYOCARDIAL INFARCTION: ICD-10-CM

## 2018-01-25 DIAGNOSIS — I25.10 ATHEROSCLEROTIC HEART DISEASE OF NATIVE CORONARY ARTERY WITHOUT ANGINA PECTORIS: ICD-10-CM

## 2018-01-25 DIAGNOSIS — F41.9 ANXIETY DISORDER, UNSPECIFIED: ICD-10-CM

## 2018-01-25 DIAGNOSIS — K52.9 NONINFECTIVE GASTROENTERITIS AND COLITIS, UNSPECIFIED: ICD-10-CM

## 2018-01-25 DIAGNOSIS — Z99.2 DEPENDENCE ON RENAL DIALYSIS: ICD-10-CM

## 2018-01-25 DIAGNOSIS — N18.6 END STAGE RENAL DISEASE: ICD-10-CM

## 2018-01-25 DIAGNOSIS — E11.22 TYPE 2 DIABETES MELLITUS WITH DIABETIC CHRONIC KIDNEY DISEASE: ICD-10-CM

## 2018-01-25 DIAGNOSIS — I48.91 UNSPECIFIED ATRIAL FIBRILLATION: ICD-10-CM

## 2018-02-16 ENCOUNTER — APPOINTMENT (OUTPATIENT)
Dept: FAMILY MEDICINE | Facility: HOSPITAL | Age: 73
End: 2018-02-16

## 2018-02-16 ENCOUNTER — OUTPATIENT (OUTPATIENT)
Dept: OUTPATIENT SERVICES | Facility: HOSPITAL | Age: 73
LOS: 1 days | End: 2018-02-16
Payer: MEDICARE

## 2018-02-16 VITALS
RESPIRATION RATE: 14 BRPM | WEIGHT: 104 LBS | SYSTOLIC BLOOD PRESSURE: 166 MMHG | OXYGEN SATURATION: 98 % | BODY MASS INDEX: 21.01 KG/M2 | TEMPERATURE: 97.6 F | HEART RATE: 58 BPM | DIASTOLIC BLOOD PRESSURE: 61 MMHG

## 2018-02-16 DIAGNOSIS — Z00.00 ENCOUNTER FOR GENERAL ADULT MEDICAL EXAMINATION WITHOUT ABNORMAL FINDINGS: ICD-10-CM

## 2018-02-16 DIAGNOSIS — M54.5 LOW BACK PAIN: ICD-10-CM

## 2018-02-16 DIAGNOSIS — K62.5 HEMORRHAGE OF ANUS AND RECTUM: ICD-10-CM

## 2018-02-16 PROCEDURE — G0463: CPT

## 2018-02-16 RX ORDER — OXYCODONE AND ACETAMINOPHEN 2.5; 325 MG/1; MG/1
2.5-325 TABLET ORAL
Qty: 12 | Refills: 0 | Status: DISCONTINUED | COMMUNITY
Start: 2017-08-11 | End: 2018-02-16

## 2018-02-16 RX ORDER — OSELTAMIVIR PHOSPHATE 30 MG/1
30 CAPSULE ORAL ONCE
Qty: 1 | Refills: 0 | Status: DISCONTINUED | COMMUNITY
Start: 2018-01-16 | End: 2018-02-16

## 2018-02-21 DIAGNOSIS — M54.5 LOW BACK PAIN: ICD-10-CM

## 2018-02-22 ENCOUNTER — MEDICATION RENEWAL (OUTPATIENT)
Age: 73
End: 2018-02-22

## 2018-03-02 LAB — LDLC SERPL DIRECT ASSAY-MCNC: 39

## 2018-03-28 ENCOUNTER — CHART COPY (OUTPATIENT)
Age: 73
End: 2018-03-28

## 2018-03-29 ENCOUNTER — CHART COPY (OUTPATIENT)
Age: 73
End: 2018-03-29

## 2018-04-03 ENCOUNTER — RX RENEWAL (OUTPATIENT)
Age: 73
End: 2018-04-03

## 2018-04-03 ENCOUNTER — APPOINTMENT (OUTPATIENT)
Dept: GASTROENTEROLOGY | Facility: HOSPITAL | Age: 73
End: 2018-04-03

## 2018-04-06 ENCOUNTER — APPOINTMENT (OUTPATIENT)
Dept: CARDIOLOGY | Facility: CLINIC | Age: 73
End: 2018-04-06
Payer: MEDICARE

## 2018-04-06 ENCOUNTER — NON-APPOINTMENT (OUTPATIENT)
Age: 73
End: 2018-04-06

## 2018-04-06 VITALS
WEIGHT: 106 LBS | OXYGEN SATURATION: 98 % | HEART RATE: 60 BPM | HEIGHT: 59 IN | RESPIRATION RATE: 15 BRPM | SYSTOLIC BLOOD PRESSURE: 126 MMHG | DIASTOLIC BLOOD PRESSURE: 68 MMHG | BODY MASS INDEX: 21.37 KG/M2

## 2018-04-06 DIAGNOSIS — I25.10 ATHEROSCLEROTIC HEART DISEASE OF NATIVE CORONARY ARTERY W/OUT ANGINA PECTORIS: ICD-10-CM

## 2018-04-06 DIAGNOSIS — R09.89 OTHER SPECIFIED SYMPTOMS AND SIGNS INVOLVING THE CIRCULATORY AND RESPIRATORY SYSTEMS: ICD-10-CM

## 2018-04-06 PROCEDURE — 93000 ELECTROCARDIOGRAM COMPLETE: CPT

## 2018-04-06 PROCEDURE — 93306 TTE W/DOPPLER COMPLETE: CPT

## 2018-04-06 PROCEDURE — 99215 OFFICE O/P EST HI 40 MIN: CPT

## 2018-04-16 ENCOUNTER — OUTPATIENT (OUTPATIENT)
Dept: OUTPATIENT SERVICES | Facility: HOSPITAL | Age: 73
LOS: 1 days | End: 2018-04-16

## 2018-04-16 ENCOUNTER — EMERGENCY (EMERGENCY)
Facility: HOSPITAL | Age: 73
LOS: 1 days | Discharge: HOME | End: 2018-04-16
Admitting: EMERGENCY MEDICINE
Payer: MEDICARE

## 2018-04-16 ENCOUNTER — APPOINTMENT (OUTPATIENT)
Dept: FAMILY MEDICINE | Facility: HOSPITAL | Age: 73
End: 2018-04-16

## 2018-04-16 VITALS
OXYGEN SATURATION: 98 % | SYSTOLIC BLOOD PRESSURE: 164 MMHG | WEIGHT: 109 LBS | TEMPERATURE: 98.4 F | HEART RATE: 60 BPM | RESPIRATION RATE: 15 BRPM | BODY MASS INDEX: 22.02 KG/M2 | DIASTOLIC BLOOD PRESSURE: 66 MMHG

## 2018-04-16 DIAGNOSIS — I25.2 OLD MYOCARDIAL INFARCTION: ICD-10-CM

## 2018-04-16 DIAGNOSIS — Z99.2 DEPENDENCE ON RENAL DIALYSIS: ICD-10-CM
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
## 2018-04-16 DIAGNOSIS — R29.6 REPEATED FALLS: ICD-10-CM

## 2018-04-16 DIAGNOSIS — M54.9 DORSALGIA, UNSPECIFIED: ICD-10-CM

## 2018-04-16 DIAGNOSIS — M79.606 PAIN IN LEG, UNSPECIFIED: ICD-10-CM

## 2018-04-16 DIAGNOSIS — Z09 ENCOUNTER FOR FOLLOW-UP EXAMINATION AFTER COMPLETED TREATMENT FOR CONDITIONS OTHER THAN MALIGNANT NEOPLASM: ICD-10-CM

## 2018-04-16 DIAGNOSIS — N18.6 END STAGE RENAL DISEASE: ICD-10-CM
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
## 2018-04-16 DIAGNOSIS — M54.5 LOW BACK PAIN: ICD-10-CM

## 2018-04-16 DIAGNOSIS — I13.2 HYPERTENSIVE HEART AND CHRONIC KIDNEY DISEASE WITH HEART FAILURE AND WITH STAGE 5 CHRONIC KIDNEY DISEASE, OR END STAGE RENAL DISEASE: ICD-10-CM

## 2018-04-16 DIAGNOSIS — S09.90XA UNSPECIFIED INJURY OF HEAD, INITIAL ENCOUNTER: ICD-10-CM
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
## 2018-04-16 DIAGNOSIS — I50.9 HEART FAILURE, UNSPECIFIED: ICD-10-CM

## 2018-04-16 DIAGNOSIS — R53.1 WEAKNESS: ICD-10-CM

## 2018-04-16 DIAGNOSIS — S00.03XA CONTUSION OF SCALP, INITIAL ENCOUNTER: ICD-10-CM

## 2018-04-16 DIAGNOSIS — Z00.00 ENCOUNTER FOR GENERAL ADULT MEDICAL EXAMINATION WITHOUT ABNORMAL FINDINGS: ICD-10-CM

## 2018-04-16 PROCEDURE — 73502 X-RAY EXAM HIP UNI 2-3 VIEWS: CPT | Mod: 26,LT

## 2018-04-16 PROCEDURE — 99285 EMERGENCY DEPT VISIT HI MDM: CPT

## 2018-04-16 PROCEDURE — 85610 PROTHROMBIN TIME: CPT

## 2018-04-16 PROCEDURE — 82553 CREATINE MB FRACTION: CPT

## 2018-04-16 PROCEDURE — 85730 THROMBOPLASTIN TIME PARTIAL: CPT

## 2018-04-16 PROCEDURE — 82009 KETONE BODYS QUAL: CPT

## 2018-04-16 PROCEDURE — G0463: CPT

## 2018-04-16 PROCEDURE — 82962 GLUCOSE BLOOD TEST: CPT

## 2018-04-16 PROCEDURE — 70450 CT HEAD/BRAIN W/O DYE: CPT

## 2018-04-16 PROCEDURE — 71045 X-RAY EXAM CHEST 1 VIEW: CPT

## 2018-04-16 PROCEDURE — 93005 ELECTROCARDIOGRAM TRACING: CPT

## 2018-04-16 PROCEDURE — 82550 ASSAY OF CK (CPK): CPT

## 2018-04-16 PROCEDURE — 71045 X-RAY EXAM CHEST 1 VIEW: CPT | Mod: 26

## 2018-04-16 PROCEDURE — 80053 COMPREHEN METABOLIC PANEL: CPT

## 2018-04-16 PROCEDURE — 85027 COMPLETE CBC AUTOMATED: CPT

## 2018-04-16 PROCEDURE — 84484 ASSAY OF TROPONIN QUANT: CPT

## 2018-04-16 PROCEDURE — 73502 X-RAY EXAM HIP UNI 2-3 VIEWS: CPT

## 2018-04-16 PROCEDURE — 70450 CT HEAD/BRAIN W/O DYE: CPT | Mod: 26

## 2018-04-16 PROCEDURE — 99284 EMERGENCY DEPT VISIT MOD MDM: CPT | Mod: 25

## 2018-04-16 PROCEDURE — 96374 THER/PROPH/DIAG INJ IV PUSH: CPT

## 2018-04-16 PROCEDURE — 93010 ELECTROCARDIOGRAM REPORT: CPT

## 2018-04-16 PROCEDURE — 36600 WITHDRAWAL OF ARTERIAL BLOOD: CPT

## 2018-04-17 DIAGNOSIS — M54.9 DORSALGIA, UNSPECIFIED: ICD-10-CM

## 2018-04-17 DIAGNOSIS — R29.6 REPEATED FALLS: ICD-10-CM

## 2018-05-01 ENCOUNTER — RX RENEWAL (OUTPATIENT)
Age: 73
End: 2018-05-01

## 2018-05-12 ENCOUNTER — RX RENEWAL (OUTPATIENT)
Age: 73
End: 2018-05-12

## 2018-05-12 RX ORDER — LISINOPRIL 10 MG/1
10 TABLET ORAL DAILY
Qty: 1 | Refills: 0 | Status: ACTIVE | COMMUNITY
Start: 2017-05-26 | End: 1900-01-01

## 2018-05-15 ENCOUNTER — APPOINTMENT (OUTPATIENT)
Dept: GASTROENTEROLOGY | Facility: HOSPITAL | Age: 73
End: 2018-05-15

## 2018-05-21 ENCOUNTER — APPOINTMENT (OUTPATIENT)
Dept: CARDIOLOGY | Facility: CLINIC | Age: 73
End: 2018-05-21
Payer: MEDICARE

## 2018-05-21 PROCEDURE — A9500: CPT

## 2018-05-21 PROCEDURE — 93015 CV STRESS TEST SUPVJ I&R: CPT

## 2018-05-21 PROCEDURE — 78452 HT MUSCLE IMAGE SPECT MULT: CPT

## 2018-06-05 ENCOUNTER — EMERGENCY (EMERGENCY)
Facility: HOSPITAL | Age: 73
LOS: 1 days | Discharge: HOME | End: 2018-06-05
Admitting: EMERGENCY MEDICINE
Payer: MEDICARE

## 2018-06-05 DIAGNOSIS — I25.2 OLD MYOCARDIAL INFARCTION: ICD-10-CM

## 2018-06-05 DIAGNOSIS — S09.90XA UNSPECIFIED INJURY OF HEAD, INITIAL ENCOUNTER: ICD-10-CM

## 2018-06-05 DIAGNOSIS — E03.9 HYPOTHYROIDISM, UNSPECIFIED: ICD-10-CM
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
## 2018-06-05 DIAGNOSIS — S01.01XA LACERATION WITHOUT FOREIGN BODY OF SCALP, INITIAL ENCOUNTER: ICD-10-CM

## 2018-06-05 DIAGNOSIS — Z99.2 DEPENDENCE ON RENAL DIALYSIS: ICD-10-CM
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
## 2018-06-05 DIAGNOSIS — I13.2 HYPERTENSIVE HEART AND CHRONIC KIDNEY DISEASE WITH HEART FAILURE AND WITH STAGE 5 CHRONIC KIDNEY DISEASE, OR END STAGE RENAL DISEASE: ICD-10-CM
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
## 2018-06-05 DIAGNOSIS — I50.9 HEART FAILURE, UNSPECIFIED: ICD-10-CM

## 2018-06-05 DIAGNOSIS — N18.6 END STAGE RENAL DISEASE: ICD-10-CM
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
## 2018-06-05 PROCEDURE — 85730 THROMBOPLASTIN TIME PARTIAL: CPT

## 2018-06-05 PROCEDURE — 70450 CT HEAD/BRAIN W/O DYE: CPT

## 2018-06-05 PROCEDURE — 70450 CT HEAD/BRAIN W/O DYE: CPT | Mod: 26

## 2018-06-05 PROCEDURE — 12001 RPR S/N/AX/GEN/TRNK 2.5CM/<: CPT

## 2018-06-05 PROCEDURE — 80048 BASIC METABOLIC PNL TOTAL CA: CPT

## 2018-06-05 PROCEDURE — 96374 THER/PROPH/DIAG INJ IV PUSH: CPT | Mod: XU

## 2018-06-05 PROCEDURE — 85027 COMPLETE CBC AUTOMATED: CPT

## 2018-06-05 PROCEDURE — 99285 EMERGENCY DEPT VISIT HI MDM: CPT | Mod: 25

## 2018-06-05 PROCEDURE — 85610 PROTHROMBIN TIME: CPT

## 2018-06-05 PROCEDURE — 82962 GLUCOSE BLOOD TEST: CPT

## 2018-06-05 PROCEDURE — 99284 EMERGENCY DEPT VISIT MOD MDM: CPT | Mod: 25

## 2018-06-06 ENCOUNTER — OUTPATIENT (OUTPATIENT)
Dept: OUTPATIENT SERVICES | Facility: HOSPITAL | Age: 73
LOS: 1 days | End: 2018-06-06
Payer: MEDICARE

## 2018-06-06 ENCOUNTER — MEDICATION RENEWAL (OUTPATIENT)
Age: 73
End: 2018-06-06

## 2018-06-06 ENCOUNTER — APPOINTMENT (OUTPATIENT)
Dept: FAMILY MEDICINE | Facility: HOSPITAL | Age: 73
End: 2018-06-06

## 2018-06-06 VITALS
HEIGHT: 59 IN | RESPIRATION RATE: 14 BRPM | WEIGHT: 104 LBS | TEMPERATURE: 97.6 F | BODY MASS INDEX: 20.96 KG/M2 | SYSTOLIC BLOOD PRESSURE: 219 MMHG | HEART RATE: 59 BPM | DIASTOLIC BLOOD PRESSURE: 70 MMHG | OXYGEN SATURATION: 100 %

## 2018-06-06 DIAGNOSIS — G44.309 POST-TRAUMATIC HEADACHE, UNSPECIFIED, NOT INTRACTABLE: ICD-10-CM

## 2018-06-06 DIAGNOSIS — Z00.00 ENCOUNTER FOR GENERAL ADULT MEDICAL EXAMINATION WITHOUT ABNORMAL FINDINGS: ICD-10-CM

## 2018-06-06 PROCEDURE — G0463: CPT

## 2018-06-06 RX ORDER — BISACODYL 5 MG/1
5 TABLET ORAL
Qty: 30 | Refills: 2 | Status: COMPLETED | COMMUNITY
Start: 2017-06-28 | End: 2018-06-06

## 2018-06-06 RX ORDER — CLOTRIMAZOLE 10 MG/G
1 CREAM TOPICAL TWICE DAILY
Qty: 1 | Refills: 0 | Status: COMPLETED | COMMUNITY
Start: 2018-01-17 | End: 2018-06-06

## 2018-06-06 RX ORDER — TRAMADOL HYDROCHLORIDE 50 MG/1
50 TABLET, COATED ORAL TWICE DAILY
Qty: 30 | Refills: 0 | Status: ACTIVE | COMMUNITY
Start: 2018-06-06 | End: 1900-01-01

## 2018-06-06 RX ORDER — DOCUSATE SODIUM 100 MG/1
100 CAPSULE ORAL
Qty: 1 | Refills: 2 | Status: COMPLETED | COMMUNITY
Start: 2017-06-28 | End: 2018-06-06

## 2018-06-06 RX ORDER — LIDOCAINE 50 MG/G
5 PATCH CUTANEOUS
Qty: 1 | Refills: 2 | Status: COMPLETED | COMMUNITY
Start: 2017-09-29 | End: 2018-06-06

## 2018-06-07 DIAGNOSIS — G44.309 POST-TRAUMATIC HEADACHE, UNSPECIFIED, NOT INTRACTABLE: ICD-10-CM

## 2018-06-11 PROBLEM — Z09 HOSPITAL DISCHARGE FOLLOW-UP: Status: ACTIVE | Noted: 2018-06-11

## 2018-06-11 NOTE — HEALTH RISK ASSESSMENT
[Two or more falls in past year] : Patient reported two or more falls in the past year [0] : 2) Feeling down, depressed, or hopeless: Not at all (0) [[id number]] : 0

## 2018-06-11 NOTE — PLAN
[FreeTextEntry1] : 72 year old female with history of ESRD on HD (thu/ thurs/sat), Anemia of Chronic Disease, Paroxysmal Atrial Fibrillation not on anticoagulation due to history of nose bleeds and recurrent falls, CAD, Depression, HTN, DM, HFpEF, Hypothyroidism, decreased vision in both eyes, recurrent falls is here s/p fall yesterday 6/5/2018 and ED visit with negative CT of the head and 5 sutures placed on posterior head. Pt is here for f/u ED visit.\par \par #headache s/p traumatic event at home\par - ED visit: CT of the head: negative, no acute findings.\par - 24 hours later visit - no focal neurological deficits.\par - take tramadol 50mg BID for 15 days for pain\par - f/u in clinic for suture removal (posterior head) 5 sutures\par \par #recurrent falls and f/u from ED Visit\par - f/u physical therapy consult\par \par f/u appointment 6/15/18\par \par case discussed with Dr Sutton

## 2018-06-11 NOTE — PHYSICAL EXAM
[No Acute Distress] : no acute distress [Well Nourished] : well nourished [Well Developed] : well developed [Normal Sclera/Conjunctiva] : normal sclera/conjunctiva [Normal Outer Ear/Nose] : the outer ears and nose were normal in appearance [No JVD] : no jugular venous distention [Supple] : supple [No Lymphadenopathy] : no lymphadenopathy [No Respiratory Distress] : no respiratory distress  [Clear to Auscultation] : lungs were clear to auscultation bilaterally [No Accessory Muscle Use] : no accessory muscle use [Normal Rate] : normal rate  [Regular Rhythm] : with a regular rhythm [Normal S1, S2] : normal S1 and S2 [Soft] : abdomen soft [Non Tender] : non-tender [Normal Bowel Sounds] : normal bowel sounds [No CVA Tenderness] : no CVA  tenderness [Grossly Normal Strength/Tone] : grossly normal strength/tone [No Rash] : no rash [No Focal Deficits] : no focal deficits [Normal Affect] : the affect was normal [Alert and Oriented x3] : oriented to person, place, and time [de-identified] : decreased vision in both eyes, [de-identified] : posterior head  linear laceration 3cm in length with 5 sutures - minor tenderness on palpation, + healing well, little sanguineous discharge and edema [de-identified] : gait not tested due to patient refusing to walk, due to history of gait instability

## 2018-06-11 NOTE — COUNSELING
[Weight management counseling provided] : Weight management [Healthy eating counseling provided] : healthy eating [de-identified] : physical therapy consult recommended for recurrent falls and instability

## 2018-06-11 NOTE — HISTORY OF PRESENT ILLNESS
[Spouse] : spouse [FreeTextEntry8] : 72 year old female with history of ESRD on HD (thu/ thurs/sat), Anemia of Chronic Disease, Paroxysmal Atrial Fibrillation not on anticoagulation due to history of nose bleeds and recurrent falls, CAD, Depression, HTN, DM, HFpEF, Hypothyroidism, decreased vision in both eyes, recurrent falls is here s/p fall yesterday 6/5/2018 at her home where she was walking without her walker in her kitchen and fell and hit her head. Pt was taken to [facility] ED where she was found to have a negative CT of the head and a laceration located on her posterior head with 5 sutures placed. Pt is here for f/u ED appointment and  c/o of dull intermittent 4/5 headache located at the site of where she had 5 sutures placed, which is not aggravated or alleviated by anything.  Pt  speaks English and reports patient is not using her walker at home and at times is found walking at home without her walker and has falls due to her instability.  Pt denies any loss of consciousness, headache, neck pain, vision changes, weakness, fever, body aches, chills, sob, wheezing, chest pain, palpitations, nausea, vomiting, abdominal pain, diarrhea or constipation. Pt has no other complaints. \par \par

## 2018-06-11 NOTE — REVIEW OF SYSTEMS
[Headache] : headache [Negative] : Psychiatric [Dizziness] : no dizziness [Fainting] : no fainting [Confusion] : no confusion [Memory Loss] : no memory loss [FreeTextEntry3] : history of vision problems in b/l eyes [de-identified] : dull intermittent headache located posterior of head s/p laceration on 6/5/18 with 5 sutures

## 2018-06-14 ENCOUNTER — INPATIENT (INPATIENT)
Facility: HOSPITAL | Age: 73
LOS: 10 days | Discharge: HOME | DRG: 260 | End: 2018-06-25
Attending: INTERNAL MEDICINE | Admitting: INTERNAL MEDICINE
Payer: MEDICARE

## 2018-06-14 VITALS
HEART RATE: 56 BPM | DIASTOLIC BLOOD PRESSURE: 67 MMHG | SYSTOLIC BLOOD PRESSURE: 181 MMHG | OXYGEN SATURATION: 99 % | RESPIRATION RATE: 18 BRPM

## 2018-06-14 DIAGNOSIS — R55 SYNCOPE AND COLLAPSE: ICD-10-CM

## 2018-06-14 DIAGNOSIS — R29.6 REPEATED FALLS: ICD-10-CM

## 2018-06-14 DIAGNOSIS — E11.9 TYPE 2 DIABETES MELLITUS WITHOUT COMPLICATIONS: ICD-10-CM

## 2018-06-14 DIAGNOSIS — I10 ESSENTIAL (PRIMARY) HYPERTENSION: ICD-10-CM

## 2018-06-14 DIAGNOSIS — N18.6 END STAGE RENAL DISEASE: ICD-10-CM

## 2018-06-14 LAB
ALBUMIN SERPL ELPH-MCNC: 3.6 G/DL — SIGNIFICANT CHANGE UP (ref 3.3–5)
ALP SERPL-CCNC: 160 U/L — HIGH (ref 40–120)
ALT FLD-CCNC: 16 U/L — SIGNIFICANT CHANGE UP (ref 10–45)
ANION GAP SERPL CALC-SCNC: 21 MMOL/L — HIGH (ref 5–17)
APTT BLD: 30.8 SEC — SIGNIFICANT CHANGE UP (ref 27.5–37.4)
AST SERPL-CCNC: 24 U/L — SIGNIFICANT CHANGE UP (ref 10–40)
BASOPHILS # BLD AUTO: 0 K/UL — SIGNIFICANT CHANGE UP (ref 0–0.2)
BASOPHILS NFR BLD AUTO: 0.3 % — SIGNIFICANT CHANGE UP (ref 0–2)
BILIRUB SERPL-MCNC: 0.2 MG/DL — SIGNIFICANT CHANGE UP (ref 0.2–1.2)
BUN SERPL-MCNC: 61 MG/DL — HIGH (ref 7–23)
CALCIUM SERPL-MCNC: 8.7 MG/DL — SIGNIFICANT CHANGE UP (ref 8.4–10.5)
CHLORIDE SERPL-SCNC: 92 MMOL/L — LOW (ref 96–108)
CK MB BLD-MCNC: 9.2 % — HIGH (ref 0–3.5)
CK MB CFR SERPL CALC: 14.6 NG/ML — HIGH (ref 0–3.8)
CK SERPL-CCNC: 158 U/L — SIGNIFICANT CHANGE UP (ref 25–170)
CO2 SERPL-SCNC: 21 MMOL/L — LOW (ref 22–31)
CREAT SERPL-MCNC: 5.03 MG/DL — HIGH (ref 0.5–1.3)
EOSINOPHIL # BLD AUTO: 0.2 K/UL — SIGNIFICANT CHANGE UP (ref 0–0.5)
EOSINOPHIL NFR BLD AUTO: 3 % — SIGNIFICANT CHANGE UP (ref 0–6)
GLUCOSE BLDC GLUCOMTR-MCNC: 117 MG/DL — HIGH (ref 70–99)
GLUCOSE BLDC GLUCOMTR-MCNC: 77 MG/DL — SIGNIFICANT CHANGE UP (ref 70–99)
GLUCOSE BLDC GLUCOMTR-MCNC: 99 MG/DL — SIGNIFICANT CHANGE UP (ref 70–99)
GLUCOSE SERPL-MCNC: 101 MG/DL — HIGH (ref 70–99)
HCT VFR BLD CALC: 32.4 % — LOW (ref 34.5–45)
HGB BLD-MCNC: 10.3 G/DL — LOW (ref 11.5–15.5)
INR BLD: 0.98 RATIO — SIGNIFICANT CHANGE UP (ref 0.88–1.16)
LYMPHOCYTES # BLD AUTO: 0.7 K/UL — LOW (ref 1–3.3)
LYMPHOCYTES # BLD AUTO: 8.2 % — LOW (ref 13–44)
MCHC RBC-ENTMCNC: 30.5 PG — SIGNIFICANT CHANGE UP (ref 27–34)
MCHC RBC-ENTMCNC: 31.6 GM/DL — LOW (ref 32–36)
MCV RBC AUTO: 96.4 FL — SIGNIFICANT CHANGE UP (ref 80–100)
MONOCYTES # BLD AUTO: 0.9 K/UL — SIGNIFICANT CHANGE UP (ref 0–0.9)
MONOCYTES NFR BLD AUTO: 10.7 % — SIGNIFICANT CHANGE UP (ref 2–14)
NEUTROPHILS # BLD AUTO: 6.6 K/UL — SIGNIFICANT CHANGE UP (ref 1.8–7.4)
NEUTROPHILS NFR BLD AUTO: 77.7 % — HIGH (ref 43–77)
PLATELET # BLD AUTO: 278 K/UL — SIGNIFICANT CHANGE UP (ref 150–400)
POTASSIUM SERPL-MCNC: 4.9 MMOL/L — SIGNIFICANT CHANGE UP (ref 3.5–5.3)
POTASSIUM SERPL-SCNC: 4.9 MMOL/L — SIGNIFICANT CHANGE UP (ref 3.5–5.3)
PROT SERPL-MCNC: 6.4 G/DL — SIGNIFICANT CHANGE UP (ref 6–8.3)
PROTHROM AB SERPL-ACNC: 10.6 SEC — SIGNIFICANT CHANGE UP (ref 9.8–12.7)
RBC # BLD: 3.36 M/UL — LOW (ref 3.8–5.2)
RBC # FLD: 17.9 % — HIGH (ref 10.3–14.5)
SODIUM SERPL-SCNC: 134 MMOL/L — LOW (ref 135–145)
TROPONIN T, HIGH SENSITIVITY RESULT: 516 NG/L — HIGH (ref 0–51)
TROPONIN T, HIGH SENSITIVITY RESULT: 517 NG/L — HIGH (ref 0–51)
WBC # BLD: 8.5 K/UL — SIGNIFICANT CHANGE UP (ref 3.8–10.5)
WBC # FLD AUTO: 8.5 K/UL — SIGNIFICANT CHANGE UP (ref 3.8–10.5)

## 2018-06-14 PROCEDURE — 71045 X-RAY EXAM CHEST 1 VIEW: CPT | Mod: 26

## 2018-06-14 PROCEDURE — 93010 ELECTROCARDIOGRAM REPORT: CPT

## 2018-06-14 PROCEDURE — 72125 CT NECK SPINE W/O DYE: CPT | Mod: 26

## 2018-06-14 PROCEDURE — 70450 CT HEAD/BRAIN W/O DYE: CPT | Mod: 26

## 2018-06-14 PROCEDURE — 99221 1ST HOSP IP/OBS SF/LOW 40: CPT

## 2018-06-14 PROCEDURE — 73523 X-RAY EXAM HIPS BI 5/> VIEWS: CPT | Mod: 26

## 2018-06-14 PROCEDURE — 70486 CT MAXILLOFACIAL W/O DYE: CPT | Mod: 26

## 2018-06-14 PROCEDURE — 73030 X-RAY EXAM OF SHOULDER: CPT | Mod: 26,RT

## 2018-06-14 PROCEDURE — 99285 EMERGENCY DEPT VISIT HI MDM: CPT | Mod: 25,GC

## 2018-06-14 RX ORDER — LIDOCAINE 4 G/100G
1 CREAM TOPICAL ONCE
Qty: 0 | Refills: 0 | Status: COMPLETED | OUTPATIENT
Start: 2018-06-14 | End: 2018-06-14

## 2018-06-14 RX ORDER — CALCIUM ACETATE 667 MG
667 TABLET ORAL
Qty: 0 | Refills: 0 | Status: DISCONTINUED | OUTPATIENT
Start: 2018-06-14 | End: 2018-06-25

## 2018-06-14 RX ORDER — POLYETHYLENE GLYCOL 3350 17 G/17G
0 POWDER, FOR SOLUTION ORAL
Qty: 0 | Refills: 0 | COMMUNITY

## 2018-06-14 RX ORDER — GLUCAGON INJECTION, SOLUTION 0.5 MG/.1ML
1 INJECTION, SOLUTION SUBCUTANEOUS ONCE
Qty: 0 | Refills: 0 | Status: DISCONTINUED | OUTPATIENT
Start: 2018-06-14 | End: 2018-06-25

## 2018-06-14 RX ORDER — SODIUM CHLORIDE 9 MG/ML
1000 INJECTION, SOLUTION INTRAVENOUS
Qty: 0 | Refills: 0 | Status: DISCONTINUED | OUTPATIENT
Start: 2018-06-14 | End: 2018-06-25

## 2018-06-14 RX ORDER — INSULIN ASPART 100 [IU]/ML
5 INJECTION, SOLUTION SUBCUTANEOUS
Qty: 0 | Refills: 0 | COMMUNITY

## 2018-06-14 RX ORDER — ASPIRIN/CALCIUM CARB/MAGNESIUM 324 MG
81 TABLET ORAL DAILY
Qty: 0 | Refills: 0 | Status: DISCONTINUED | OUTPATIENT
Start: 2018-06-14 | End: 2018-06-25

## 2018-06-14 RX ORDER — DEXTROSE 50 % IN WATER 50 %
25 SYRINGE (ML) INTRAVENOUS ONCE
Qty: 0 | Refills: 0 | Status: DISCONTINUED | OUTPATIENT
Start: 2018-06-14 | End: 2018-06-25

## 2018-06-14 RX ORDER — LABETALOL HCL 100 MG
200 TABLET ORAL
Qty: 0 | Refills: 0 | Status: DISCONTINUED | OUTPATIENT
Start: 2018-06-14 | End: 2018-06-20

## 2018-06-14 RX ORDER — TRAMADOL HYDROCHLORIDE 50 MG/1
50 TABLET ORAL DAILY
Qty: 0 | Refills: 0 | Status: DISCONTINUED | OUTPATIENT
Start: 2018-06-14 | End: 2018-06-21

## 2018-06-14 RX ORDER — ACETAMINOPHEN 500 MG
500 TABLET ORAL ONCE
Qty: 0 | Refills: 0 | Status: DISCONTINUED | OUTPATIENT
Start: 2018-06-14 | End: 2018-06-25

## 2018-06-14 RX ORDER — AMLODIPINE BESYLATE 2.5 MG/1
10 TABLET ORAL DAILY
Qty: 0 | Refills: 0 | Status: DISCONTINUED | OUTPATIENT
Start: 2018-06-14 | End: 2018-06-25

## 2018-06-14 RX ORDER — DEXTROSE 50 % IN WATER 50 %
15 SYRINGE (ML) INTRAVENOUS ONCE
Qty: 0 | Refills: 0 | Status: DISCONTINUED | OUTPATIENT
Start: 2018-06-14 | End: 2018-06-25

## 2018-06-14 RX ORDER — HYDRALAZINE HCL 50 MG
50 TABLET ORAL THREE TIMES A DAY
Qty: 0 | Refills: 0 | Status: DISCONTINUED | OUTPATIENT
Start: 2018-06-14 | End: 2018-06-15

## 2018-06-14 RX ORDER — ACETAMINOPHEN 500 MG
500 TABLET ORAL EVERY 6 HOURS
Qty: 0 | Refills: 0 | Status: DISCONTINUED | OUTPATIENT
Start: 2018-06-14 | End: 2018-06-25

## 2018-06-14 RX ORDER — INSULIN LISPRO 100/ML
VIAL (ML) SUBCUTANEOUS
Qty: 0 | Refills: 0 | Status: DISCONTINUED | OUTPATIENT
Start: 2018-06-14 | End: 2018-06-25

## 2018-06-14 RX ORDER — SERTRALINE 25 MG/1
50 TABLET, FILM COATED ORAL DAILY
Qty: 0 | Refills: 0 | Status: DISCONTINUED | OUTPATIENT
Start: 2018-06-14 | End: 2018-06-25

## 2018-06-14 RX ORDER — HEPARIN SODIUM 5000 [USP'U]/ML
5000 INJECTION INTRAVENOUS; SUBCUTANEOUS EVERY 12 HOURS
Qty: 0 | Refills: 0 | Status: DISCONTINUED | OUTPATIENT
Start: 2018-06-14 | End: 2018-06-25

## 2018-06-14 RX ORDER — LISINOPRIL 2.5 MG/1
10 TABLET ORAL DAILY
Qty: 0 | Refills: 0 | Status: DISCONTINUED | OUTPATIENT
Start: 2018-06-14 | End: 2018-06-14

## 2018-06-14 RX ORDER — GABAPENTIN 400 MG/1
100 CAPSULE ORAL THREE TIMES A DAY
Qty: 0 | Refills: 0 | Status: DISCONTINUED | OUTPATIENT
Start: 2018-06-14 | End: 2018-06-25

## 2018-06-14 RX ORDER — LEVOTHYROXINE SODIUM 125 MCG
100 TABLET ORAL DAILY
Qty: 0 | Refills: 0 | Status: DISCONTINUED | OUTPATIENT
Start: 2018-06-14 | End: 2018-06-25

## 2018-06-14 RX ORDER — SERTRALINE 25 MG/1
50 TABLET, FILM COATED ORAL DAILY
Qty: 0 | Refills: 0 | Status: DISCONTINUED | OUTPATIENT
Start: 2018-06-14 | End: 2018-06-14

## 2018-06-14 RX ORDER — ATORVASTATIN CALCIUM 80 MG/1
20 TABLET, FILM COATED ORAL AT BEDTIME
Qty: 0 | Refills: 0 | Status: DISCONTINUED | OUTPATIENT
Start: 2018-06-14 | End: 2018-06-25

## 2018-06-14 RX ORDER — CLONAZEPAM 1 MG
0.5 TABLET ORAL AT BEDTIME
Qty: 0 | Refills: 0 | Status: DISCONTINUED | OUTPATIENT
Start: 2018-06-14 | End: 2018-06-21

## 2018-06-14 RX ORDER — INSULIN LISPRO 100/ML
VIAL (ML) SUBCUTANEOUS AT BEDTIME
Qty: 0 | Refills: 0 | Status: DISCONTINUED | OUTPATIENT
Start: 2018-06-14 | End: 2018-06-25

## 2018-06-14 RX ORDER — PANTOPRAZOLE SODIUM 20 MG/1
1 TABLET, DELAYED RELEASE ORAL
Qty: 0 | Refills: 0 | COMMUNITY

## 2018-06-14 RX ORDER — ACETAMINOPHEN 500 MG
650 TABLET ORAL ONCE
Qty: 0 | Refills: 0 | Status: COMPLETED | OUTPATIENT
Start: 2018-06-14 | End: 2018-06-14

## 2018-06-14 RX ORDER — DEXTROSE 50 % IN WATER 50 %
12.5 SYRINGE (ML) INTRAVENOUS ONCE
Qty: 0 | Refills: 0 | Status: DISCONTINUED | OUTPATIENT
Start: 2018-06-14 | End: 2018-06-25

## 2018-06-14 RX ADMIN — AMLODIPINE BESYLATE 10 MILLIGRAM(S): 2.5 TABLET ORAL at 19:36

## 2018-06-14 RX ADMIN — Medication 0.2 MILLIGRAM(S): at 21:02

## 2018-06-14 RX ADMIN — Medication 500 MILLIGRAM(S): at 20:30

## 2018-06-14 RX ADMIN — ATORVASTATIN CALCIUM 20 MILLIGRAM(S): 80 TABLET, FILM COATED ORAL at 21:15

## 2018-06-14 RX ADMIN — Medication 50 MILLIGRAM(S): at 21:02

## 2018-06-14 RX ADMIN — Medication 200 MILLIGRAM(S): at 19:34

## 2018-06-14 RX ADMIN — LIDOCAINE 1 PATCH: 4 CREAM TOPICAL at 22:43

## 2018-06-14 RX ADMIN — Medication 0.5 MILLIGRAM(S): at 21:15

## 2018-06-14 RX ADMIN — HEPARIN SODIUM 5000 UNIT(S): 5000 INJECTION INTRAVENOUS; SUBCUTANEOUS at 22:05

## 2018-06-14 RX ADMIN — TRAMADOL HYDROCHLORIDE 50 MILLIGRAM(S): 50 TABLET ORAL at 21:39

## 2018-06-14 RX ADMIN — Medication 650 MILLIGRAM(S): at 15:50

## 2018-06-14 RX ADMIN — SERTRALINE 50 MILLIGRAM(S): 25 TABLET, FILM COATED ORAL at 22:27

## 2018-06-14 RX ADMIN — GABAPENTIN 100 MILLIGRAM(S): 400 CAPSULE ORAL at 21:15

## 2018-06-14 RX ADMIN — Medication 500 MILLIGRAM(S): at 20:00

## 2018-06-14 RX ADMIN — TRAMADOL HYDROCHLORIDE 50 MILLIGRAM(S): 50 TABLET ORAL at 22:40

## 2018-06-14 NOTE — H&P ADULT - PMH
Anemia    Anxiety    Cataracts, bilateral  left worse than right  CHF (congestive heart failure)    CHF (congestive heart failure)  diagnosed 2-2014  CKD (chronic kidney disease)    Diabetes  DM 2  Diabetes  diagnosed 2004  no medications in 5 months since hemodialysis  ESRD (end stage renal disease)    Hip pain  left  [place] (hard of hearing)    HTN (hypertension)    HTN (hypertension)    Hyperlipidemia    Hypothyroid    Hypothyroidism    LBP radiating to left leg    MI (myocardial infarction)  may 2016  MI (myocardial infarction)  2-2014   cardiac cath done [facility]  Pleural effusion  Left side - several times since March 2014  Pleural thickening  s/p pleurodisis left vats 2014

## 2018-06-14 NOTE — CONSULT NOTE ADULT - ATTENDING COMMENTS
I have interviewed and examined the patient and reviewed the residents note including the history, exam, assessment, and plan.  I agree with the residents assessment and plan.    72 yof w/ L eye trauma s/p fall  -No clinical signs of entrapment on exam  -No signs of step off w/ palpation OS, neg for V2 hypoesthesia  -Poor vision OU likely 2/2 proliferative diabetic retinopathy w/ possible macular edema. Also pt has significant CAT OS  -Erythromycin ointment to facial lacs TID  -Ice packs to eye for first 48 hours  - ENT to address nasal fractures  - RD warnings given  - findings and plan discussed with primary team  - no blood thinners unless medically indicated    Follow-Up:  Patient should follow up his/her ophthalmologist or in the [facility] within 1 week of discharge, sooner if symptoms worsen or change.    Kenya Padilla MD

## 2018-06-14 NOTE — ED PROVIDER NOTE - MEDICAL DECISION MAKING DETAILS
Patient with mechanical trip and fall yesterday, facial bruising and concern for facial/nasal fractures, conjunctival hematomas but no evidence of globe rupture or massive occular trauma.  Will obtain imaging for traumatic injuries.  Also will require dialysis today - labs, EKG - discuss with her nephrologist RE outpatient versus inpatient.  Will also likely require facial trauma and ophthalmology consultations.

## 2018-06-14 NOTE — H&P ADULT - ASSESSMENT
72 f with  s/p fall- telemetry, cardiac enzymes, cardiology evaluation Dr. Tomas called  Echo  Hypothyroidism-follow TSH  PT  ESRD- HD, Nephrology evaluation Dr. Fernandez  Diabetes control  Nasal fracture- Plastic surgery evaluation called by ER  Periorbital ecchymosis, Hematoma- Opthalmology evaluation called  Further action as per clinical course   Rajendra Carter MD pager [phone]

## 2018-06-14 NOTE — ED PROVIDER NOTE - EYES, MLM
Clear bilaterally, pupils equal, round and reactive to light.  EOMI,  L eye conjuctival hemorrhage, no hyphema.  Patient unable to open eye but can be opened for exam

## 2018-06-14 NOTE — H&P ADULT - NSHPPHYSICALEXAM_GEN_ALL_CORE
PHYSICAL EXAMINATION:  Vital Signs Last 24 Hrs  T(C): 35 (14 Jun 2018 07:26), Max: 35 (14 Jun 2018 07:26)  T(F): 95 (14 Jun 2018 07:26), Max: 95 (14 Jun 2018 07:26)  HR: 56 (14 Jun 2018 10:13) (56 - 57)  BP: 174/69 (14 Jun 2018 10:13) (166/54 - 181/67)  BP(mean): --  RR: 12 (14 Jun 2018 10:13) (12 - 20)  SpO2: 96% (14 Jun 2018 10:13) (96% - 100%)  CAPILLARY BLOOD GLUCOSE      POCT Blood Glucose.: 101 mg/dL (14 Jun 2018 07:22)      GENERAL: NAD,   HEAD:  L periorbital echymosis, nasal edema,, normocephalic  EYES: sclera anicteric  ENMT: mucous membranes moist  NECK: supple, No JVD  CHEST/LUNG: clear to auscultation bilaterally; no rales, rhonchi, or wheezing b/l  HEART: normal S1, S2  ABDOMEN: BS+, soft, ND, NT   EXTREMITIES:  pulses palpable; no clubbing, cyanosis, or edema b/l LEs  NEURO: awake, alert, interactive; moves all extremities  SKIN: no rashes or lesions

## 2018-06-14 NOTE — ED ADULT NURSE NOTE - OBJECTIVE STATEMENT
72 y f came to the ed after a mechanical fall yesterday. states having weakness in her right leg which made her trip and fall. states hitting her head but denies loc. patient has hematoma over her left eye and ecchymosis over her left hip. fall was yesterday. patient went to dialysis today which refused to see/treat her and sent her to the ed for exam. patient is a/ox3. c/o some eye pain when she moves her left eye. denies any pain at rest. denies any fevers, chills, chest pain, sob. perrl. skin is warm and dry.

## 2018-06-14 NOTE — H&P ADULT - NSHPLABSRESULTS_GEN_ALL_CORE
10.3   8.5   )-----------( 278      ( 14 Jun 2018 07:38 )             32.4       06-14    134<L>  |  92<L>  |  61<H>  ----------------------------<  101<H>  4.9   |  21<L>  |  5.03<H>    Ca    8.7      14 Jun 2018 07:38    TPro  6.4  /  Alb  3.6  /  TBili  0.2  /  DBili  x   /  AST  24  /  ALT  16  /  AlkPhos  160<H>  06-14                  PT/INR - ( 14 Jun 2018 07:38 )   PT: 10.6 sec;   INR: 0.98 ratio         PTT - ( 14 Jun 2018 07:38 )  PTT:30.8 sec    Lactate Trend            CAPILLARY BLOOD GLUCOSE      POCT Blood Glucose.: 101 mg/dL (14 Jun 2018 07:22)      < from: CT Maxillofacial No Cont (06.14.18 @ 10:00) >    IMPRESSION:    1. On the head CT, there is no evidence for calvarial fracture or acute   intracranial hemorrhage. If symptoms persist, consider short interval   follow-up head CT or brain MRI follow-up if there are no MRI   contraindications.  2. On the cervical spine CT, there is no evidence for acute cervical   spine fracture. Degenerative changes and grade 1 anterior subluxation of   C3 on C4 is present with associated moderate to high-grade spinal canal   stenosis.   3. On the maxillofacial CT, age-indeterminate bilateral nasal fractures   are present. A left periorbital preseptal hematoma is noted without   associated orbital fracture.        < end of copied text >    EKG SB NSST/T

## 2018-06-14 NOTE — ED PROVIDER NOTE - PHYSICAL EXAMINATION
MSK:  tenderness to palpation R shoulder/R hip but no visible deformity, normal ROM, bruising to L hip but normal ROM  Skin:  Healing hematoma to R occipital scalp with sutures present

## 2018-06-14 NOTE — ED PROVIDER NOTE - PMH
Anemia    Anxiety    Cataracts, bilateral  left worse than right  CHF (congestive heart failure)    CHF (congestive heart failure)  diagnosed 2-2014  CKD (chronic kidney disease)    Diabetes  DM 2  Diabetes  diagnosed 2004  no medications in 5 months since hemodialysis  ESRD (end stage renal disease)    Hip pain  left  [place] (hard of hearing)    HTN (hypertension)    HTN (hypertension)    Hyperlipidemia    Hypothyroid    Hypothyroidism    LBP radiating to left leg    MI (myocardial infarction)  may 2016  MI (myocardial infarction)  2-2014   cardiac cath done [facility]  Pleural effusion  Left side - several times since March 2014  Pleural thickening  s/p pleurodisis left vats 2014

## 2018-06-14 NOTE — ED PROVIDER NOTE - ATTENDING CONTRIBUTION TO CARE
Patient seen and evaluated with resident/NP/PA, however HPI, ROS, PE and MDM as documented authored by myself - Stepan Parsons MD

## 2018-06-14 NOTE — CONSULT NOTE ADULT - SUBJECTIVE AND OBJECTIVE BOX
72 yof PMHx DM, CKD on HD, HTN, CAD p/w L eye trauma s/p trip and fall yesterday, no LOC. Pt denies acute change in vision OU, reports vision OS poor at baseline 2/2 cataract. Denies flashes, floaters, diplopia, N/V.    PMH: as above  Meds: see med rec  POcHx (including surgeries/lasers/trauma):  CAT sx OD  Drops: None  FamHx: None  Social Hx: None  Allergies: fish    ROS:  General (neg), Vision (per HPI), Head and Neck (neg), Pulm (neg), CV (neg), GI (neg),  (neg), Musculoskeletal (neg), Skin/Integ (neg), Neuro (neg), Endocrine (neg), Heme (neg), All/Immuno (neg)    Mood and Affect Appropriate ( x ),  Oriented to Time, Place, and Person x 3 ( x )    Ophthalmology Exam    Visual acuity (cc): 20/400 OU PHNI  Pupils: PERRL OU, no APD  Ttono: 16, 19  Extraocular movements (EOMs): Full OU, no pain, no diplopia   Confrontational Visual Field (CVF):  Full OU  Color Plates: 8/12. 6/12    Pen Light Exam (PLE)  External:  Flat OD, OS periorbital ecchymosis w/ moderate hematoma above L brow. Mod periorbital edema OS.  Lids/Lashes/Lacrimal Ducts: Flat OD, OS upper and lower lid edema w/ ecchymosis, lacs above upper lid  Sclera/Conjunctiva:  W+Q OD, OS focal subconj heme inf, nasal, and temporal  Cornea: Cl OU, neg epi defects, neg shadia  Anterior Chamber: D+F OU, neg hyphema OU  Iris:  Flat OU  Lens:  PCIOL OD, OS 3+ NS w/ moderate PSC    Fundus Exam: dilated with 1% tropicamide and 2.5% phenylephrine  Approval obtained from primary team for dilation  Patient aware that pupils can remained dilated for at least 4-6 hours  Exam performed with 20D lens    Vitreous: wnl OU  Disc, cup/disc: sharp and pink, 0.4 OU  Macula:  scattered MAs w/ exudates OU. OD foveal pigmentary changes.  Vessels:  wnl OU  Periphery: laser 360 OU    Diagnostic Testing: CT head: A left periorbital preseptal hematoma is present without associated orbital   fracture or globe rupture. There is no retrobulbar hematoma. Bilateral nasal fractures are noted, age indeterminate.     >>72 yof w/ L eye trauma s/p fall  -No clinical signs of entrapment on exam  -No signs of step off w/ palpation OS, neg for V2 hypoesthesia  -Poor vision OU likely 2/2 proliferative diabetic retinopathy w/ possible macular edema. Also pt has significant CAT OS  -Erythromycin ointment to facial lacs TID  -Ice packs to eye for 24 hrs    Follow-Up:  Patient should follow up his/her ophthalmologist or in the [facility] Op[facility] Practice within 1 week of discharge.  [address] [place] [address]  [phone]    S/D/W Dr Padilla (attending) 72 yof PMHx DM, CKD on HD, HTN, CAD p/w L eye trauma s/p trip and fall yesterday, no LOC. Pt denies acute change in vision OU, reports vision OS poor at baseline 2/2 cataract. Denies flashes, floaters, diplopia, N/V.    PMH: as above  Meds: see med rec  POcHx (including surgeries/lasers/trauma):  CAT sx OD  Drops: None  FamHx: None  Social Hx: None  Allergies: fish    ROS:  General (neg), Vision (per HPI), Head and Neck (neg), Pulm (neg), CV (neg), GI (neg),  (neg), Musculoskeletal (neg), Skin/Integ (neg), Neuro (neg), Endocrine (neg), Heme (neg), All/Immuno (neg)    Mood and Affect Appropriate ( x ),  Oriented to Time, Place, and Person x 3 ( x )    Ophthalmology Exam    Visual acuity (cc): 20/400 OU PHNI  Pupils: PERRL OU, no APD  Ttono: 16, 19  Extraocular movements (EOMs): Full OU, no pain, no diplopia   Confrontational Visual Field (CVF):  Full OU  Color Plates: 8/12. 6/12    Pen Light Exam (PLE)  External:  Flat OD, OS periorbital ecchymosis w/ moderate hematoma above L brow. Mod periorbital edema OS.  Lids/Lashes/Lacrimal Ducts: Flat OD, OS upper and lower lid edema w/ ecchymosis, lacs above upper lid  Sclera/Conjunctiva:  W+Q OD, OS focal subconj heme inf, nasal, and temporal  Cornea: Cl OU, neg epi defects, neg shadia  Anterior Chamber: D+F OU, neg hyphema OU  Iris:  Flat OU  Lens:  PCIOL OD, OS 3+ NS w/ moderate PSC    Fundus Exam: dilated with 1% tropicamide and 2.5% phenylephrine  Approval obtained from primary team for dilation  Patient aware that pupils can remained dilated for at least 4-6 hours  Exam performed with 20D lens    Vitreous: wnl OU  Disc, cup/disc: sharp and pink, 0.4 OU  Macula:  scattered MAs w/ exudates OU. OD foveal pigmentary changes.  Vessels:  wnl OU  Periphery: laser 360 OU    Diagnostic Testing: CT head: A left periorbital preseptal hematoma is present without associated orbital   fracture or globe rupture. There is no retrobulbar hematoma. Bilateral nasal fractures are noted, age indeterminate.     >>72 yof w/ L eye trauma s/p fall  -No clinical signs of entrapment on exam  -No signs of step off w/ palpation OS, neg for V2 hypoesthesia  -Poor vision OU likely 2/2 proliferative diabetic retinopathy w/ possible macular edema. Also pt has significant CAT OS  -Erythromycin ointment to facial lacs TID  -Ice packs to eye for first 48 hours  - ENT to address nasal fractures  - RD warnings given  - findings and plan discussed with primary team  - no blood thinners unless medically indicated    Follow-Up:  Patient should follow up his/her ophthalmologist or in the [facility] Practice within 1 week of discharge, sooner if symptoms worsen or change.  [address] [place] [address].  [place] [address] [phone]    S/D/W Dr Padilla (attending)

## 2018-06-14 NOTE — CONSULT NOTE ADULT - SUBJECTIVE AND OBJECTIVE BOX
Patient is 71yo W w/DM, HTN, ASHD, ESRD on HD TTS, s/p trip and fall yesterday at home (mechanical), landed on R side and struck her face. Denies LOC. Significant swelling to L eye, ecchymosis.  On ASA, no other blood thinners.  Seen recently at [facility] also for fall. Denies CP, SOB, N/V, D/C, F/C. Due for HD today.	     Review of Systems:  as above	      Allergies and Intolerances:   	fish: Food, Hives, Rash  	Avelox: Drug, Unknown, pt denies allergy to any medications  	shellfish: Food, Unknown, Originally Entered as [Unknown] reaction to [SHELLFISH]    Home Medications:   * Patient Currently Takes Medications as of 05-Apr-2017 09:45 documented in Structured Notes  · 	hydrALAZINE 25 mg oral tablet: 1.5 tab(s) orally 3 times a day  · 	isosorbide dinitrate 20 mg oral tablet: 1 tab(s) orally 3 times a day Home  · 	gabapentin 100 mg oral capsule: 1 cap(s) orally 3 times a day Home  · 	aspirin 81 mg oral delayed release tablet: 1 tab(s) orally once a day Home/hosp  · 	Synthroid 125 mcg (0.125 mg) oral tablet: 1 tab(s) orally once a day  · 	amlodipine 10 mg oral tablet: 1 tab(s) orally once a day [facility]/hosp  · 	clonazePAM 0.5 mg oral tablet: 1 tab(s) orally once a day (at bedtime) Home  · 	PhosLo 667 mg oral capsule: 1 cap(s) orally 3 times a day  · 	cloNIDine 0.2 mg oral tablet: 1 tab(s) orally 3 times a day  · 	sertraline 25 mg oral tablet: 1 tab(s) orally once a day (at bedtime), As Needed  · 	labetalol 200 mg oral tablet: 1 tab(s) orally 2 times a day  · 	pantoprazole 40 mg oral delayed release tablet: 1 tab(s) orally once a day  · 	atorvastatin 20 mg oral tablet: 1 tab(s) orally once a day  · 	polyethylene glycol 3350 oral powder for reconstitution:  orally   · 	NovoLOG FlexPen 100 units/mL subcutaneous solution: 5 unit(s) subcutaneous once a day (at bedtime)     Past Medical History:  Anemia    Anxiety    Cataracts, bilateral  left worse than right  CHF (congestive heart failure)  diagnosed 2-2014   Diabetes  DM 2  ESRD (end stage renal disease)    [place] (hard of hearing)    HTN (hypertension)    Hyperlipidemia    Hypothyroidism    LBP radiating to left leg    MI (myocardial infarction)  may 2016  MI (myocardial infarction)  2-2014   cardiac cath done [facility]  Pleural effusion  Left side - several times since March 2014  Pleural thickening  s/p pleurodisis left vats 2014.     Past Surgical History:  AV fistula  left upper   S/P myomectomy  abdominal age 52  Thoracotomy scar of left chest  4-2014      Social History:  negative    Family History:  NC     Physical Exam:  Vital Signs Last 24 Hrs T(C): 36.4 (06-14-18 @ 15:05), Max: 36.5 (06-14-18 @ 12:10) T(F): 97.5 (06-14-18 @ 15:05), Max: 97.7 (06-14-18 @ 12:10) HR: 60 (06-14-18 @ 15:05) (56 - 60) BP: 188/77 (06-14-18 @ 15:05) (162/95 - 195/63) RR: 18 (06-14-18 @ 15:05) (12 - 20) SpO2: 100% (06-14-18 @ 15:05) (96% - 100%)   GENERAL: NAD,   HEAD:  L periorbital ecchymosis   EYES: sclera anicteric  NECK: supple, No JVD  CHEST/LUNG: clear to auscultation bilaterally; no rales, rhonchi, or wheezing b/l  HEART: normal S1, S2  ABDOMEN: BS+, soft, ND, NT   EXTREMITIES: no clubbing, cyanosis, or edema b/l LEs, LUE AVF + thrill, bruit  NEURO: awake, alert	       Labs and Results:                       10.3  8.5   )-----------( 278      ( 14 Jun 2018 07:38 )            32.4   14 Jun 2018 07:38  134    |  92     |  61    ----------------------------<  101   4.9     |  21     |  5.03   Ca    8.7        14 Jun 2018 07:38  TPro  6.4    /  Alb  3.6    /  TBili  0.2    /  DBili  x      /  AST  24     /  ALT  16     /  AlkPhos  160    14 Jun 2018 07:38  LIVER FUNCTIONS - ( 14 Jun 2018 07:38 ) Alb: 3.6 g/dL / Pro: 6.4 g/dL / ALK PHOS: 160 U/L / ALT: 16 U/L / AST: 24 U/L / GGT: x         PT/INR - ( 14 Jun 2018 07:38 )   PT: 10.6 sec;   INR: 0.98 ratio      PTT - ( 14 Jun 2018 07:38 )  PTT:30.8 sec CAPILLARY BLOOD GLUCOSE   POCT Blood Glucose.: 117 mg/dL (14 Jun 2018 19:44) POCT Blood Glucose.: 77 mg/dL (14 Jun 2018 12:57) POCT Blood Glucose.: 101 mg/dL (14 Jun 2018 07:22)  CARDIAC MARKERS ( 14 Jun 2018 13:51 ) x     / x     / 158 U/L / x     / 14.6 ng/mL     < from: CT Maxillofacial No Cont (06.14.18 @ 10:00) >   IMPRESSION:   1. On the head CT, there is no evidence for calvarial fracture or acute   intracranial hemorrhage. If symptoms persist, consider short interval   follow-up head CT or brain MRI follow-up if there are no MRI   contraindications.  2. On the cervical spine CT, there is no evidence for acute cervical   spine fracture. Degenerative changes and grade 1 anterior subluxation of   C3 on C4 is present with associated moderate to high-grade spinal canal   stenosis.   3. On the maxillofacial CT, age-indeterminate bilateral nasal fractures   are present. A left periorbital preseptal hematoma is noted without   associated orbital fracture.   < end of copied text >   EKG SB NSST/T	    acetaminophen   Tablet. 500 milliGRAM(s) Oral every 6 hours PRN  amLODIPine   Tablet 10 milliGRAM(s) Oral daily  aspirin enteric coated 81 milliGRAM(s) Oral daily  atorvastatin 20 milliGRAM(s) Oral at bedtime  calcium acetate 667 milliGRAM(s) Oral three times a day with meals  clonazePAM Tablet 0.5 milliGRAM(s) Oral at bedtime  cloNIDine 0.2 milliGRAM(s) Oral three times a day  dextrose 40% Gel 15 Gram(s) Oral once PRN  dextrose 5%. 1000 milliLiter(s) IV Continuous <Continuous>  dextrose 50% Injectable 12.5 Gram(s) IV Push once  dextrose 50% Injectable 25 Gram(s) IV Push once  dextrose 50% Injectable 25 Gram(s) IV Push once  gabapentin 100 milliGRAM(s) Oral three times a day  glucagon  Injectable 1 milliGRAM(s) IntraMuscular once PRN  heparin  Injectable 5000 Unit(s) SubCutaneous every 12 hours  hydrALAZINE 50 milliGRAM(s) Oral three times a day  insulin lispro (HumaLOG) corrective regimen sliding scale   SubCutaneous three times a day before meals  insulin lispro (HumaLOG) corrective regimen sliding scale   SubCutaneous at bedtime  labetalol 200 milliGRAM(s) Oral two times a day  levothyroxine 100 MICROGram(s) Oral daily  lisinopril 10 milliGRAM(s) Oral daily  sertraline 50 milliGRAM(s) Oral daily PRN  traMADol 50 milliGRAM(s) Oral daily PRN    Assessment and Plan:     Patient is 71yo W w/DM, HTN, ASHD, ESRD on HD TTS, s/p trip and fall yesterday at home  ESRD, HD today  Heparin free  TMP as able  Diabetes control  Nasal fracture, Plastic surgery eval  Periorbital ecchymosis, Opthalmology eval  Renal diet  D/w

## 2018-06-14 NOTE — CONSULT NOTE ADULT - SUBJECTIVE AND OBJECTIVE BOX
CHIEF COMPLAINT:    HISTORY OF PRESENT ILLNESS:    PAST MEDICAL & SURGICAL HISTORY:  [place] (hard of hearing)  Cataracts, bilateral: left worse than right  Pleural thickening: s/p pleurodisis left vats 2014  Hip pain: left  LBP radiating to left leg  CHF (congestive heart failure): diagnosed 2-2014  MI (myocardial infarction): 2-2014   cardiac cath done [facility]  Diabetes: diagnosed 2004  no medications in 5 months since hemodialysis  Hypothyroidism  ESRD (end stage renal disease)  HTN (hypertension)  MI (myocardial infarction): may 2016  Pleural effusion: Left side - several times since March 2014  Hypothyroid  Anxiety  HTN (hypertension)  Hyperlipidemia  CHF (congestive heart failure)  CKD (chronic kidney disease)  Anemia  Diabetes: DM 2  Thoracotomy scar of left chest: 4-2014 ? pneumonia/ scarring  S/P myomectomy: abdominal age 52  AV fistula: left upper 8-2014 attempted 12-16-14          MEDICATIONS:                  FAMILY HISTORY:  No significant family history      SOCIAL HISTORY:    [ ] Non-smoker  [ ] Smoker  [ ] Alcohol    Allergies    Avelox (Unknown)  fish (Hives; Rash)  shellfish (Unknown)    Intolerances    	    REVIEW OF SYSTEMS:  CONSTITUTIONAL: No fever, weight loss, or fatigue  EYES: No eye pain, visual disturbances, or discharge  ENMT:  No difficulty hearing, tinnitus, vertigo; No sinus or throat pain  NECK: No pain or stiffness  RESPIRATORY: No cough, wheezing, chills or hemoptysis; No Shortness of Breath  CARDIOVASCULAR: No chest pain, palpitations, passing out, dizziness, or leg swelling  GASTROINTESTINAL: No abdominal or epigastric pain. No nausea, vomiting, or hematemesis; No diarrhea or constipation. No melena or hematochezia.  GENITOURINARY: No dysuria, frequency, hematuria, or incontinence  NEUROLOGICAL: No headaches, memory loss, loss of strength, numbness, or tremors  SKIN: No itching, burning, rashes, or lesions   LYMPH Nodes: No enlarged glands  ENDOCRINE: No heat or cold intolerance; No hair loss  MUSCULOSKELETAL: No joint pain or swelling; No muscle, back, or extremity pain  PSYCHIATRIC: No depression, anxiety, mood swings, or difficulty sleeping  HEME/LYMPH: No easy bruising, or bleeding gums  ALLERY AND IMMUNOLOGIC: No hives or eczema	    [ ] All others negative	  [ ] Unable to obtain    PHYSICAL EXAM:  T(C): 36.5 (06-14-18 @ 12:10), Max: 36.5 (06-14-18 @ 12:10)  HR: 58 (06-14-18 @ 12:10) (56 - 58)  BP: 162/95 (06-14-18 @ 12:10) (162/95 - 181/67)  RR: 18 (06-14-18 @ 12:10) (12 - 20)  SpO2: 100% (06-14-18 @ 12:10) (96% - 100%)  Wt(kg): --  I&O's Summary      Appearance: Normal	  HEENT:   Normal oral mucosa, PERRL, EOMI	  Lymphatic: No lymphadenopathy  Cardiovascular: Normal S1 S2, No JVD, No murmurs, No edema  Respiratory: Lungs clear to auscultation	  Psychiatry: A & O x 3, Mood & affect appropriate  Gastrointestinal:  Soft, Non-tender, + BS	  Skin: No rashes, No ecchymoses, No cyanosis	  Neurologic: Non-focal  Extremities: Normal range of motion, No clubbing, cyanosis or edema  Vascular: Peripheral pulses palpable 2+ bilaterally    TELEMETRY: 	    ECG:  	  RADIOLOGY:  < from: Xray Chest 1 View AP/PA (06.14.18 @ 08:52) >    EXAM:  XR CHEST AP OR PA 1V                            PROCEDURE DATE:  06/14/2018            INTERPRETATION:  A single chest x-ray was obtained on June 14, 2018.    Indication: Trauma.    Impression:    The heart is enlarged. Increased pulmonary vascularity. No acute   fractures could be identified. However if clinically warranted dedicated   rib study should be obtained. A metallic Wallstent is seen in the left   subclavian.                    EDWIN YEAGER M.D., ATTENDING RADIOLOGIST  This document has been electronically signed. Jun 14 2018 10:51AM                < end of copied text >    OTHER: 	  	  LABS:	 	    CARDIAC MARKERS:                                  10.3   8.5   )-----------( 278      ( 14 Jun 2018 07:38 )             32.4     06-14    134<L>  |  92<L>  |  61<H>  ----------------------------<  101<H>  4.9   |  21<L>  |  5.03<H>    Ca    8.7      14 Jun 2018 07:38    TPro  6.4  /  Alb  3.6  /  TBili  0.2  /  DBili  x   /  AST  24  /  ALT  16  /  AlkPhos  160<H>  06-14    proBNP:   Lipid Profile:   HgA1c:   TSH: CHIEF COMPLAINT: Syncope     HISTORY OF PRESENT ILLNESS: The patient is a 72y Female admitted with apparent syncopal episode. It was previously charted that the patient had a mechanical fall but now tells me that she passed out. She does not recall what happened. This is the second such episode in the past month.   Denies chest pain or shortness of breath. Complains of Left hip pain       PAST MEDICAL & SURGICAL HISTORY:  [place] (hard of hearing)  Cataracts, bilateral: left worse than right  Pleural thickening: s/p pleurodisis left vats 2014  Hip pain: left  LBP radiating to left leg  CHF (congestive heart failure): diagnosed 2-2014  MI (myocardial infarction): 2-2014   cardiac cath done [facility]  Diabetes: diagnosed 2004  no medications in 5 months since hemodialysis  Hypothyroidism  ESRD (end stage renal disease)  HTN (hypertension)  MI (myocardial infarction): may 2016  Pleural effusion: Left side - several times since March 2014  Hypothyroid  Anxiety  HTN (hypertension)  Hyperlipidemia  CHF (congestive heart failure)  CKD (chronic kidney disease)  Anemia  Diabetes: DM 2  Thoracotomy scar of left chest: 4-2014 ? pneumonia/ scarring  S/P myomectomy: abdominal age 52  AV fistula: left upper 8-2014 attempted 12-16-14          MEDICATIONS:                  FAMILY HISTORY:  No significant family history      SOCIAL HISTORY:    [ ] Non-smoker  [ ] Smoker  [ ] Alcohol    Allergies    Avelox (Unknown)  fish (Hives; Rash)  shellfish (Unknown)    Intolerances    	    REVIEW OF SYSTEMS:  CONSTITUTIONAL: No fever, weight loss, + fatigue  EYES: No eye pain, visual disturbances, or discharge  ENMT:  No difficulty hearing, tinnitus, vertigo; No sinus or throat pain  NECK: No pain or stiffness  RESPIRATORY: No cough, wheezing, chills or hemoptysis; No Shortness of Breath  CARDIOVASCULAR: No chest pain, palpitations, passing out, dizziness, or leg swelling  GASTROINTESTINAL: No abdominal or epigastric pain. No nausea, vomiting, or hematemesis; No diarrhea or constipation. No melena or hematochezia.  GENITOURINARY: No dysuria, frequency, hematuria, or incontinence  NEUROLOGICAL: No headaches, memory loss, loss of strength, numbness, or tremors  SKIN: No itching, burning, rashes, or lesions   LYMPH Nodes: No enlarged glands  ENDOCRINE: No heat or cold intolerance; No hair loss  MUSCULOSKELETAL: + joint pain or swelling; No muscle, back, or extremity pain  PSYCHIATRIC: No depression, anxiety, mood swings, or difficulty sleeping  HEME/LYMPH: No easy bruising, or bleeding gums  ALLERGY AND IMMUNOLOGIC: No hives or eczema	    [ ] All others negative	  [ ] Unable to obtain    PHYSICAL EXAM:  T(C): 36.5 (06-14-18 @ 12:10), Max: 36.5 (06-14-18 @ 12:10)  HR: 58 (06-14-18 @ 12:10) (56 - 58)  BP: 162/95 (06-14-18 @ 12:10) (162/95 - 181/67)  RR: 18 (06-14-18 @ 12:10) (12 - 20)  SpO2: 100% (06-14-18 @ 12:10) (96% - 100%)  Wt(kg): --  I&O's Summary      Appearance: NAD	  HEENT:   Normal oral mucosa, Left eye boni-orbital ecchymosis	  Lymphatic: No lymphadenopathy  Cardiovascular: Normal S1 S2, No JVD, No murmurs, No edema  Respiratory: Lungs clear to auscultation	  Psychiatry: A & O x 3, Mood & affect appropriate  Gastrointestinal:  Soft, Non-tender, + BS	  Skin: No rashes, No ecchymoses, No cyanosis	  Neurologic: Non-focal  Extremities: decreased  range of motion+ Left hip ecchymosis   Vascular: Peripheral pulses palpable 2+ bilaterally    TELEMETRY: 	    ECG:  	  RADIOLOGY:      < from: CT Maxillofacial No Cont (06.14.18 @ 10:00) >    EXAM:  CT MAXILLOFACIAL                          EXAM:  CT BRAIN                          EXAM:  CT CERVICAL SPINE                            PROCEDURE DATE:  06/14/2018            INTERPRETATION:  HISTORY: Status post fall. Head trauma, facial trauma,   spine trauma. Left eye hematoma. Facial fractures. Head, facial, neck   pain..    Description: A noncontrast head CT, noncontrast maxillofacial CT, and a   noncontrast cervical spine CT were performed. The head CT was performed   with 5 mm axial images. The cervical spine CT study was performed with   axial thin section images with sagittal and coronal reformatted series.   The maxillofacial CT was performed axial thin section images with coronal   reformatted series.    Head CT:    A rightparietal scalp hematoma and left frontal scalp hematoma are   noted. A left periorbital preseptal hematoma is present. Bilateral nasal   fractures are noted. Please see separate maxillofacial CT report. There   is no associated calvarial fracture or acute intracranial hemorrhage.    There is no evidence for calvarial fracture, acute hemorrhage, acute   cortical infarct, mass effect, or hydrocephalus. Mild chronic white   matter changes and cerebral volume loss are noted.    The gray matter white matter junction is preserved.     The visualized portions of the paranasal sinuses and mastoid air cells   are clear.    Cervical spine CT:    Grade 1 anterior subluxation of C3 on C4 is present, secondary to   advanced facet degenerative changes. Moderate disc space narrowing with   cystic endplate degenerative changes are also noted at this level. The   disc bulge, subluxation, facet degenerative changes, and ligamentum   flavum is perched result in moderate to severe central spinal canal   stenosis at the C3-C4 level and high-grade bilateral neural foraminal   narrowing.     Disc bulges, facet degenerative changes, and ligamentum flavum   hypertrophy are noted throughout the remainder of the cervical spine,   with less extensive spinal canal stenosis compared to the C3-C4 level.    There is no evidence for acute fracture or prevertebral hematoma.    Maxillofacial CT:    A left periorbital preseptal hematoma is present without associated   orbital fracture or globe rupture. There is no retrobulbar hematoma.    Bilateral nasal fractures are noted, age indeterminate.    A retention cyst or polyp involves the floor of the right maxillary   sinus. Mild mucosal thickening involves paranasal sinuses without   associated air-fluid levels.    The left mastoid air cells are partially opacified. Soft tissue   thickening involves the left external auditory canal. Correlate with   history and physical exam findings. No displaced fracture is appreciated   through the temporal bone.    C3-C4 grade 1 subluxation is noted. Please see separate cervical spine CT   report.    IMPRESSION:    1. On the head CT, there is no evidence for calvarial fracture or acute   intracranial hemorrhage. If symptoms persist, consider short interval   follow-up head CT or brain MRI follow-up if there are no MRI   contraindications.  2. On the cervical spine CT, there is no evidence for acute cervical   spine fracture. Degenerative changes and grade 1 anterior subluxation of   C3 on C4 is present with associated moderate to high-grade spinal canal   stenosis.   3. On the maxillofacial CT, age-indeterminate bilateral nasal fractures   are present. A left periorbital preseptal hematoma is noted without   associated orbital fracture.                    ANANDA CORTES M.D., ATTENDING RADIOLOGIST  This document has been electronically signed. Jun 14 2018  9:38AM              < from: Xray Chest 1 View AP/PA (06.14.18 @ 08:52) >    EXAM:  XR CHEST AP OR PA 1V                            PROCEDURE DATE:  06/14/2018            INTERPRETATION:  A single chest x-ray was obtained on June 14, 2018.    Indication: Trauma.    Impression:    The heart is enlarged. Increased pulmonary vascularity. No acute   fractures could be identified. However if clinically warranted dedicated   rib study should be obtained. A metallic Wallstent is seen in the left   subclavian.                    EDWIN YEAGER M.D., ATTENDING RADIOLOGIST  This document has been electronically signed. Jun 14 2018 10:51AM                < end of copied text >    OTHER: 	  	  LABS:	 	    CARDIAC MARKERS:                                  10.3   8.5   )-----------( 278      ( 14 Jun 2018 07:38 )             32.4     06-14    134<L>  |  92<L>  |  61<H>  ----------------------------<  101<H>  4.9   |  21<L>  |  5.03<H>    Ca    8.7      14 Jun 2018 07:38    TPro  6.4  /  Alb  3.6  /  TBili  0.2  /  DBili  x   /  AST  24  /  ALT  16  /  AlkPhos  160<H>  06-14    proBNP:   Lipid Profile:   HgA1c:   TSH:

## 2018-06-14 NOTE — ED PROVIDER NOTE - ENMT, MLM
Airway patent, L eye periorbital hematoma, bruising over nasal bridge with some deformity, no nasal septal hematoma.

## 2018-06-14 NOTE — ED PROVIDER NOTE - PROGRESS NOTE DETAILS
Ganesh SALAMANCA - per , patient had mechanical fall, second one since last week. no other injuries, she was ambulatory this morning but sent from dialysis clinic as they would not take her with the eye trauma. Discussed case with JAMIN Fernandez who would like patient admitted given frequent falls as well as to arrange dialysis today, discussed with Dr. Carter who will accept patient.

## 2018-06-14 NOTE — H&P ADULT - HISTORY OF PRESENT ILLNESS
The patient is a 72y Female complaining of fall.	  Patient with multiple medical problems, trip and fall yesterday at home (mechanical), landed on R side and struck her face.  No LOC.  Significant swelling to L eye (but can still see out of it reportedly).  On ASA, no other blood thinners.  Seen recently at [facility] also for fall.  T/R/S dialysis, presented there for dialysis this morning and was sent to the Emergency Department for dialysis.

## 2018-06-14 NOTE — ED PROVIDER NOTE - OBJECTIVE STATEMENT
Patient with multiple medical problems, trip and fall yesterday at home (mechanical), landed on R side and struck her face.  No LOC.  Significant swelling to L eye (but can still see out of it reportedly).  On ASA, no other blood thinners.  Seen recently at [facility] also for fall.  T/R/S dialysis, presented there for dialysis this morning and was sent to the Emergency Department for dialysis. Patient with multiple medical problems, trip and fall yesterday at home (mechanical), landed on R side and struck her face.  No LOC.  Significant swelling to L eye (but can still see out of it reportedly).  On ASA, no other blood thinners.  Seen recently at [facility] also for fall.  T/R/S dialysis, presented there for dialysis this morning and was sent to the Emergency Department for dialysis.    Dr. Mari Fernandez, nephrology [place]

## 2018-06-14 NOTE — H&P ADULT - NSHPSOCIALHISTORY_GEN_ALL_CORE
Social History:    Marital Status:  ( x  )    (   ) Single    (   )    (  )   Occupation:   Lives with: (  ) alone  (  ) children   ( x ) spouse   (  ) parents  (  ) other    Substance Use (street drugs): ( x ) never used  (  ) other:  Tobacco Usage:  (  x ) never smoked   (   ) former smoker   (   ) current smoker  (     ) pack years  (        ) last cigarette date  Alcohol Usage: denies    (     ) Advanced Directives: (     ) None    (      ) DNR    (     ) DNI    (     ) Health Care Proxy:

## 2018-06-14 NOTE — PROGRESS NOTE ADULT - SUBJECTIVE AND OBJECTIVE BOX
Pt seen  Chart reviewed  Full consult dictation to follow    Closed nasal fx's  ? old vs new  No current indication for acute surgical intervention  f/u in office post d/c

## 2018-06-15 LAB
ANION GAP SERPL CALC-SCNC: 14 MMOL/L — SIGNIFICANT CHANGE UP (ref 5–17)
BUN SERPL-MCNC: 28 MG/DL — HIGH (ref 7–23)
CALCIUM SERPL-MCNC: 8.5 MG/DL — SIGNIFICANT CHANGE UP (ref 8.4–10.5)
CHLORIDE SERPL-SCNC: 96 MMOL/L — SIGNIFICANT CHANGE UP (ref 96–108)
CO2 SERPL-SCNC: 27 MMOL/L — SIGNIFICANT CHANGE UP (ref 22–31)
CREAT SERPL-MCNC: 3.15 MG/DL — HIGH (ref 0.5–1.3)
GLUCOSE BLDC GLUCOMTR-MCNC: 133 MG/DL — HIGH (ref 70–99)
GLUCOSE BLDC GLUCOMTR-MCNC: 183 MG/DL — HIGH (ref 70–99)
GLUCOSE BLDC GLUCOMTR-MCNC: 339 MG/DL — HIGH (ref 70–99)
GLUCOSE BLDC GLUCOMTR-MCNC: 88 MG/DL — SIGNIFICANT CHANGE UP (ref 70–99)
GLUCOSE SERPL-MCNC: 89 MG/DL — SIGNIFICANT CHANGE UP (ref 70–99)
HBA1C BLD-MCNC: 8.9 % — HIGH (ref 4–5.6)
HBV CORE AB SER-ACNC: SIGNIFICANT CHANGE UP
HBV SURFACE AB SER-ACNC: 7.5 MIU/ML — LOW
HBV SURFACE AG SER-ACNC: SIGNIFICANT CHANGE UP
HCT VFR BLD CALC: 31.1 % — LOW (ref 34.5–45)
HCV AB S/CO SERPL IA: 0.1 S/CO — SIGNIFICANT CHANGE UP
HCV AB SERPL-IMP: SIGNIFICANT CHANGE UP
HGB BLD-MCNC: 9.8 G/DL — LOW (ref 11.5–15.5)
MCHC RBC-ENTMCNC: 29.6 PG — SIGNIFICANT CHANGE UP (ref 27–34)
MCHC RBC-ENTMCNC: 31.5 GM/DL — LOW (ref 32–36)
MCV RBC AUTO: 94 FL — SIGNIFICANT CHANGE UP (ref 80–100)
PLATELET # BLD AUTO: 312 K/UL — SIGNIFICANT CHANGE UP (ref 150–400)
POTASSIUM SERPL-MCNC: 4.3 MMOL/L — SIGNIFICANT CHANGE UP (ref 3.5–5.3)
POTASSIUM SERPL-SCNC: 4.3 MMOL/L — SIGNIFICANT CHANGE UP (ref 3.5–5.3)
RBC # BLD: 3.31 M/UL — LOW (ref 3.8–5.2)
RBC # FLD: 18.3 % — HIGH (ref 10.3–14.5)
SODIUM SERPL-SCNC: 137 MMOL/L — SIGNIFICANT CHANGE UP (ref 135–145)
TSH SERPL-MCNC: 4.44 UIU/ML — HIGH (ref 0.27–4.2)
WBC # BLD: 6.28 K/UL — SIGNIFICANT CHANGE UP (ref 3.8–10.5)
WBC # FLD AUTO: 6.28 K/UL — SIGNIFICANT CHANGE UP (ref 3.8–10.5)

## 2018-06-15 PROCEDURE — 93306 TTE W/DOPPLER COMPLETE: CPT | Mod: 26

## 2018-06-15 RX ORDER — BACITRACIN ZINC 500 UNIT/G
1 OINTMENT IN PACKET (EA) TOPICAL
Qty: 0 | Refills: 0 | Status: DISCONTINUED | OUTPATIENT
Start: 2018-06-15 | End: 2018-06-25

## 2018-06-15 RX ORDER — ONDANSETRON 8 MG/1
4 TABLET, FILM COATED ORAL ONCE
Qty: 0 | Refills: 0 | Status: COMPLETED | OUTPATIENT
Start: 2018-06-15 | End: 2018-06-15

## 2018-06-15 RX ORDER — HYDRALAZINE HCL 50 MG
100 TABLET ORAL EVERY 8 HOURS
Qty: 0 | Refills: 0 | Status: DISCONTINUED | OUTPATIENT
Start: 2018-06-15 | End: 2018-06-25

## 2018-06-15 RX ADMIN — Medication 667 MILLIGRAM(S): at 08:21

## 2018-06-15 RX ADMIN — Medication 667 MILLIGRAM(S): at 13:21

## 2018-06-15 RX ADMIN — Medication 667 MILLIGRAM(S): at 19:10

## 2018-06-15 RX ADMIN — Medication 100 MILLIGRAM(S): at 21:03

## 2018-06-15 RX ADMIN — HEPARIN SODIUM 5000 UNIT(S): 5000 INJECTION INTRAVENOUS; SUBCUTANEOUS at 18:55

## 2018-06-15 RX ADMIN — Medication 200 MILLIGRAM(S): at 21:03

## 2018-06-15 RX ADMIN — TRAMADOL HYDROCHLORIDE 50 MILLIGRAM(S): 50 TABLET ORAL at 13:50

## 2018-06-15 RX ADMIN — GABAPENTIN 100 MILLIGRAM(S): 400 CAPSULE ORAL at 05:17

## 2018-06-15 RX ADMIN — GABAPENTIN 100 MILLIGRAM(S): 400 CAPSULE ORAL at 21:03

## 2018-06-15 RX ADMIN — LIDOCAINE 1 PATCH: 4 CREAM TOPICAL at 19:30

## 2018-06-15 RX ADMIN — Medication 0.2 MILLIGRAM(S): at 05:17

## 2018-06-15 RX ADMIN — ATORVASTATIN CALCIUM 20 MILLIGRAM(S): 80 TABLET, FILM COATED ORAL at 21:03

## 2018-06-15 RX ADMIN — Medication 50 MILLIGRAM(S): at 05:17

## 2018-06-15 RX ADMIN — Medication 2: at 12:00

## 2018-06-15 RX ADMIN — Medication 4: at 21:30

## 2018-06-15 RX ADMIN — Medication 81 MILLIGRAM(S): at 13:21

## 2018-06-15 RX ADMIN — SERTRALINE 50 MILLIGRAM(S): 25 TABLET, FILM COATED ORAL at 13:22

## 2018-06-15 RX ADMIN — Medication 200 MILLIGRAM(S): at 05:17

## 2018-06-15 RX ADMIN — ONDANSETRON 4 MILLIGRAM(S): 8 TABLET, FILM COATED ORAL at 14:35

## 2018-06-15 RX ADMIN — AMLODIPINE BESYLATE 10 MILLIGRAM(S): 2.5 TABLET ORAL at 05:17

## 2018-06-15 RX ADMIN — HEPARIN SODIUM 5000 UNIT(S): 5000 INJECTION INTRAVENOUS; SUBCUTANEOUS at 05:18

## 2018-06-15 RX ADMIN — Medication 100 MICROGRAM(S): at 05:17

## 2018-06-15 RX ADMIN — Medication 1 APPLICATION(S): at 18:55

## 2018-06-15 RX ADMIN — Medication 0.2 MILLIGRAM(S): at 21:03

## 2018-06-15 RX ADMIN — Medication 0.5 MILLIGRAM(S): at 21:03

## 2018-06-15 RX ADMIN — Medication 100 MILLIGRAM(S): at 13:22

## 2018-06-15 RX ADMIN — TRAMADOL HYDROCHLORIDE 50 MILLIGRAM(S): 50 TABLET ORAL at 13:21

## 2018-06-15 RX ADMIN — Medication 0.2 MILLIGRAM(S): at 13:21

## 2018-06-15 RX ADMIN — GABAPENTIN 100 MILLIGRAM(S): 400 CAPSULE ORAL at 13:22

## 2018-06-15 NOTE — PROGRESS NOTE ADULT - SUBJECTIVE AND OBJECTIVE BOX
No CP, SOB    Vital Signs Last 24 Hrs T(C): 36.5 (06-15-18 @ 11:28), Max: 36.8 (06-15-18 @ 04:11) T(F): 97.7 (06-15-18 @ 11:28), Max: 98.2 (06-15-18 @ 04:11) HR: 60 (06-15-18 @ 11:28) (57 - 68) BP: 165/68 (06-15-18 @ 11:28) (163/71 - 219/88) RR: 18 (06-15-18 @ 11:28) (18 - 18) SpO2: 100% (06-15-18 @ 11:28) (96% - 100%)   GENERAL: NAD,   HEAD:  L periorbital ecchymosis   EYES: sclera anicteric  NECK: supple, No JVD  CHEST/LUNG: clear to auscultation bilaterally; no rales, rhonchi, or wheezing b/l  HEART: normal S1, S2  ABDOMEN: BS+, soft, ND, NT   EXTREMITIES: no clubbing, cyanosis, or edema b/l LEs, LUE AVF + thrill, bruit  NEURO: awake, alert	       Labs and Results:                             9.8   6.28  )-----------( 312      ( 15 Alan 2018 07:56 )            31.1   15 Alan 2018 05:54  137    |  96     |  28    ----------------------------<  89    4.3     |  27     |  3.15   Ca    8.5        15 Alan 2018 05:54  TPro  6.4    /  Alb  3.6    /  TBili  0.2    /  DBili  x      /  AST  24     /  ALT  16     /  AlkPhos  160    14 Jun 2018 07:38  LIVER FUNCTIONS - ( 14 Jun 2018 07:38 ) Alb: 3.6 g/dL / Pro: 6.4 g/dL / ALK PHOS: 160 U/L / ALT: 16 U/L / AST: 24 U/L / GGT: x         PT/INR - ( 14 Jun 2018 07:38 )   PT: 10.6 sec;   INR: 0.98 ratio  	    acetaminophen   Tablet. 500 milliGRAM(s) Oral every 6 hours PRN  acetaminophen  IVPB. 500 milliGRAM(s) IV Intermittent once  amLODIPine   Tablet 10 milliGRAM(s) Oral daily  aspirin enteric coated 81 milliGRAM(s) Oral daily  atorvastatin 20 milliGRAM(s) Oral at bedtime  BACItracin   Ointment 1 Application(s) Topical two times a day  calcium acetate 667 milliGRAM(s) Oral three times a day with meals  clonazePAM Tablet 0.5 milliGRAM(s) Oral at bedtime  cloNIDine 0.2 milliGRAM(s) Oral three times a day  dextrose 40% Gel 15 Gram(s) Oral once PRN  dextrose 5%. 1000 milliLiter(s) IV Continuous <Continuous>  dextrose 50% Injectable 12.5 Gram(s) IV Push once  dextrose 50% Injectable 25 Gram(s) IV Push once  dextrose 50% Injectable 25 Gram(s) IV Push once  gabapentin 100 milliGRAM(s) Oral three times a day  glucagon  Injectable 1 milliGRAM(s) IntraMuscular once PRN  heparin  Injectable 5000 Unit(s) SubCutaneous every 12 hours  hydrALAZINE 100 milliGRAM(s) Oral every 8 hours  insulin lispro (HumaLOG) corrective regimen sliding scale   SubCutaneous three times a day before meals  insulin lispro (HumaLOG) corrective regimen sliding scale   SubCutaneous at bedtime  labetalol 200 milliGRAM(s) Oral two times a day  levothyroxine 100 MICROGram(s) Oral daily  sertraline 50 milliGRAM(s) Oral daily  traMADol 50 milliGRAM(s) Oral daily PRN    Assessment and Plan:     Patient is 71yo W w/DM, HTN, ASHD, ESRD on HD TTS, s/p trip and fall yesterday at home  ESRD, HD TTS  Heparin free  TMP as able  Diabetes control  Renal diet  Will f/u Echo

## 2018-06-15 NOTE — PROVIDER CONTACT NOTE (OTHER) - BACKGROUND
Pt. admitted to hospital S/P Fall- with repeated falls x 5 within the last month. PT. pmh of MI, HTN, ESRD, DM2 on HD T/R/S schedule Left AV Fistula.

## 2018-06-15 NOTE — PROGRESS NOTE ADULT - SUBJECTIVE AND OBJECTIVE BOX
Subjective: Patient seen and examined. No new events except as noted.   Resting comfortably in bed   no cp or sob     REVIEW OF SYSTEMS:    CONSTITUTIONAL: + weakness, fevers or chills  EYES/ENT: No visual changes;  No vertigo or throat pain   NECK: No pain or stiffness  RESPIRATORY: No cough, wheezing, hemoptysis; No shortness of breath  CARDIOVASCULAR: No chest pain or palpitations  GASTROINTESTINAL: No abdominal or epigastric pain. No nausea, vomiting, or hematemesis; No diarrhea or constipation. No melena or hematochezia.  GENITOURINARY: No dysuria, frequency or hematuria  NEUROLOGICAL: No numbness or weakness  SKIN: No itching, burning, rashes, or lesions   All other review of systems is negative unless indicated above.    MEDICATIONS:  MEDICATIONS  (STANDING):  acetaminophen  IVPB. 500 milliGRAM(s) IV Intermittent once  amLODIPine   Tablet 10 milliGRAM(s) Oral daily  aspirin enteric coated 81 milliGRAM(s) Oral daily  atorvastatin 20 milliGRAM(s) Oral at bedtime  BACItracin   Ointment 1 Application(s) Topical two times a day  calcium acetate 667 milliGRAM(s) Oral three times a day with meals  clonazePAM Tablet 0.5 milliGRAM(s) Oral at bedtime  cloNIDine 0.2 milliGRAM(s) Oral three times a day  dextrose 5%. 1000 milliLiter(s) (50 mL/Hr) IV Continuous <Continuous>  dextrose 50% Injectable 12.5 Gram(s) IV Push once  dextrose 50% Injectable 25 Gram(s) IV Push once  dextrose 50% Injectable 25 Gram(s) IV Push once  gabapentin 100 milliGRAM(s) Oral three times a day  heparin  Injectable 5000 Unit(s) SubCutaneous every 12 hours  hydrALAZINE 100 milliGRAM(s) Oral every 8 hours  insulin lispro (HumaLOG) corrective regimen sliding scale   SubCutaneous three times a day before meals  insulin lispro (HumaLOG) corrective regimen sliding scale   SubCutaneous at bedtime  labetalol 200 milliGRAM(s) Oral two times a day  levothyroxine 100 MICROGram(s) Oral daily  sertraline 50 milliGRAM(s) Oral daily      PHYSICAL EXAM:  T(C): 36.5 (06-15-18 @ 11:28), Max: 36.8 (06-15-18 @ 04:11)  HR: 60 (06-15-18 @ 11:28) (57 - 68)  BP: 165/68 (06-15-18 @ 11:28) (163/71 - 219/88)  RR: 18 (06-15-18 @ 11:28) (18 - 18)  SpO2: 100% (06-15-18 @ 11:28) (96% - 100%)  Wt(kg): --  I&O's Summary    14 Jun 2018 07:01  -  15 Alan 2018 07:00  --------------------------------------------------------  IN: 940 mL / OUT: 2900 mL / NET: -1960 mL    15 Alan 2018 07:01  -  15 Alan 2018 13:00  --------------------------------------------------------  IN: 360 mL / OUT: 0 mL / NET: 360 mL      Height (cm): 160.02 (06-14 @ 21:02)  Weight (kg): 44.8 (06-14 @ 21:02)  BMI (kg/m2): 17.5 (06-14 @ 21:02)  BSA (m2): 1.43 (06-14 @ 21:02)    Appearance: Normal	  HEENT:   Normal oral mucosa, Left periorbital ecchymosis	  Lymphatic: No lymphadenopathy , no edema  Cardiovascular: Normal S1 S2, No JVD, No murmurs , Peripheral pulses palpable 2+ bilaterally  Respiratory: Lungs clear to auscultation, normal effort 	  Gastrointestinal:  Soft, Non-tender, + BS	  Skin: No rashes, + ecchymoses, No cyanosis, warm to touch  Musculoskeletal:  decreased  range of motion and  strength  Psychiatry:  Mood & affect appropriate  Ext: No edema      LABS:    CARDIAC MARKERS:  CARDIAC MARKERS ( 14 Jun 2018 13:51 )  x     / x     / 158 U/L / x     / 14.6 ng/mL                                9.8    6.28  )-----------( 312      ( 15 Alan 2018 07:56 )             31.1     06-15    137  |  96  |  28<H>  ----------------------------<  89  4.3   |  27  |  3.15<H>    Ca    8.5      15 Alan 2018 05:54    TPro  6.4  /  Alb  3.6  /  TBili  0.2  /  DBili  x   /  AST  24  /  ALT  16  /  AlkPhos  160<H>  06-14    proBNP:   Lipid Profile:   HgA1c: Hemoglobin A1C, Whole Blood: 8.9 % (06-15 @ 07:56)    TSH: Thyroid Stimulating Hormone, Serum: 4.44 uIU/mL (06-15 @ 09:33)              TELEMETRY: SR	    ECG:  	  RADIOLOGY:   DIAGNOSTIC TESTING:  [ ] Echocardiogram:  [ ]  Catheterization:  [ ] Stress Test:    OTHER:

## 2018-06-15 NOTE — PROGRESS NOTE ADULT - ASSESSMENT
72 f with  s/p fall- telemetry, cardiac enzymes, cardiology evaluation noted  Hypothyroidism-follow TSH  PT  ESRD- HD, Nephrology evaluation Dr. Fernandez  Diabetes control  Nasal fracture- Plastic surgery evaluation noted. No intervention  Periorbital ecchymosis, Hematoma- Opthalmology evaluation noted. Local care.  Nausea/ Diarrhea- GI evaluation Dr. Watson.   Rajendra Carter MD pager [phone]

## 2018-06-15 NOTE — PROVIDER CONTACT NOTE (OTHER) - ACTION/TREATMENT ORDERED:
As per NP administer standing BP meds now. Pt. received Clonodine 0.2 mg and Hydralazine 50 mg. Pt. given tramadol 50 mg for pain and Zoloft 50 mg for anxiety.

## 2018-06-15 NOTE — PROVIDER CONTACT NOTE (OTHER) - SITUATION
Pt. received from HD RN Trevino following 3:30 hours of HD at which 2.2L were removed. Upon admission to unit, pt. hypertensive /78- asymptomatic of any chest pain, palpitations or resp. distress.

## 2018-06-15 NOTE — PROVIDER CONTACT NOTE (OTHER) - ASSESSMENT
Pt. denies any chest pain or respiratory distress. Pt. has expressed pain in her Left LE and appears to be anxious. /78 manually, HR 65, RR 18. O2 98%

## 2018-06-15 NOTE — PROGRESS NOTE ADULT - SUBJECTIVE AND OBJECTIVE BOX
Patient is a 72y old  Female who presents with a chief complaint of falls (14 Jun 2018 12:00)      SUBJECTIVE / OVERNIGHT EVENTS: c/o nausea, diarrhea.  Review of Systems  chest pain no  palpitations no  sob no  nausea no  headache no    MEDICATIONS  (STANDING):  acetaminophen  IVPB. 500 milliGRAM(s) IV Intermittent once  amLODIPine   Tablet 10 milliGRAM(s) Oral daily  aspirin enteric coated 81 milliGRAM(s) Oral daily  atorvastatin 20 milliGRAM(s) Oral at bedtime  BACItracin   Ointment 1 Application(s) Topical two times a day  calcium acetate 667 milliGRAM(s) Oral three times a day with meals  clonazePAM Tablet 0.5 milliGRAM(s) Oral at bedtime  cloNIDine 0.2 milliGRAM(s) Oral three times a day  dextrose 5%. 1000 milliLiter(s) (50 mL/Hr) IV Continuous <Continuous>  dextrose 50% Injectable 12.5 Gram(s) IV Push once  dextrose 50% Injectable 25 Gram(s) IV Push once  dextrose 50% Injectable 25 Gram(s) IV Push once  gabapentin 100 milliGRAM(s) Oral three times a day  heparin  Injectable 5000 Unit(s) SubCutaneous every 12 hours  hydrALAZINE 100 milliGRAM(s) Oral every 8 hours  insulin lispro (HumaLOG) corrective regimen sliding scale   SubCutaneous three times a day before meals  insulin lispro (HumaLOG) corrective regimen sliding scale   SubCutaneous at bedtime  labetalol 200 milliGRAM(s) Oral two times a day  levothyroxine 100 MICROGram(s) Oral daily  sertraline 50 milliGRAM(s) Oral daily    MEDICATIONS  (PRN):  acetaminophen   Tablet. 500 milliGRAM(s) Oral every 6 hours PRN Mild Pain (1 - 3)  dextrose 40% Gel 15 Gram(s) Oral once PRN Blood Glucose LESS THAN 70 milliGRAM(s)/deciliter  glucagon  Injectable 1 milliGRAM(s) IntraMuscular once PRN Glucose LESS THAN 70 milligrams/deciliter  traMADol 50 milliGRAM(s) Oral daily PRN Moderate Pain (4 - 6)          PHYSICAL EXAM:  GENERAL: NAD, well-developed  HEAD:  Atraumatic, Normocephalic  EYES: EOMI, PERRLA, conjunctiva and sclera clear  NECK: Supple, No JVD  CHEST/LUNG: Clear to auscultation bilaterally; No wheeze  HEART: Regular rate and rhythm; No murmurs, rubs, or gallops  ABDOMEN: Soft, Nontender, Nondistended; Bowel sounds present  EXTREMITIES:  2+ Peripheral Pulses, No clubbing, cyanosis, or edema  PSYCH: AAOx3  NEUROLOGY: non-focal  SKIN: No rashes or lesions    LABS:                        9.8    6.28  )-----------( 312      ( 15 Alan 2018 07:56 )             31.1     06-15    137  |  96  |  28<H>  ----------------------------<  89  4.3   |  27  |  3.15<H>    Ca    8.5      15 Alan 2018 05:54    TPro  6.4  /  Alb  3.6  /  TBili  0.2  /  DBili  x   /  AST  24  /  ALT  16  /  AlkPhos  160<H>  06-14    PT/INR - ( 14 Jun 2018 07:38 )   PT: 10.6 sec;   INR: 0.98 ratio         PTT - ( 14 Jun 2018 07:38 )  PTT:30.8 sec  CARDIAC MARKERS ( 14 Jun 2018 13:51 )  x     / x     / 158 U/L / x     / 14.6 ng/mL      Telemetry SR 60    RADIOLOGY & ADDITIONAL TESTS:    Imaging Personally Reviewed:    Consultant(s) Notes Reviewed:      Care Discussed with Consultants/Other Providers:

## 2018-06-16 LAB
ANION GAP SERPL CALC-SCNC: 15 MMOL/L — SIGNIFICANT CHANGE UP (ref 5–17)
BUN SERPL-MCNC: 48 MG/DL — HIGH (ref 7–23)
CALCIUM SERPL-MCNC: 8.5 MG/DL — SIGNIFICANT CHANGE UP (ref 8.4–10.5)
CHLORIDE SERPL-SCNC: 92 MMOL/L — LOW (ref 96–108)
CO2 SERPL-SCNC: 26 MMOL/L — SIGNIFICANT CHANGE UP (ref 22–31)
CREAT SERPL-MCNC: 4.65 MG/DL — HIGH (ref 0.5–1.3)
GLUCOSE BLDC GLUCOMTR-MCNC: 113 MG/DL — HIGH (ref 70–99)
GLUCOSE BLDC GLUCOMTR-MCNC: 152 MG/DL — HIGH (ref 70–99)
GLUCOSE BLDC GLUCOMTR-MCNC: 181 MG/DL — HIGH (ref 70–99)
GLUCOSE BLDC GLUCOMTR-MCNC: 344 MG/DL — HIGH (ref 70–99)
GLUCOSE SERPL-MCNC: 138 MG/DL — HIGH (ref 70–99)
POTASSIUM SERPL-MCNC: 5.7 MMOL/L — HIGH (ref 3.5–5.3)
POTASSIUM SERPL-SCNC: 5.7 MMOL/L — HIGH (ref 3.5–5.3)
SODIUM SERPL-SCNC: 133 MMOL/L — LOW (ref 135–145)

## 2018-06-16 RX ORDER — LACTOBACILLUS ACIDOPHILUS 100MM CELL
1 CAPSULE ORAL
Qty: 0 | Refills: 0 | Status: DISCONTINUED | OUTPATIENT
Start: 2018-06-16 | End: 2018-06-25

## 2018-06-16 RX ORDER — SODIUM POLYSTYRENE SULFONATE 4.1 MEQ/G
30 POWDER, FOR SUSPENSION ORAL ONCE
Qty: 0 | Refills: 0 | Status: DISCONTINUED | OUTPATIENT
Start: 2018-06-16 | End: 2018-06-16

## 2018-06-16 RX ADMIN — Medication 81 MILLIGRAM(S): at 11:57

## 2018-06-16 RX ADMIN — TRAMADOL HYDROCHLORIDE 50 MILLIGRAM(S): 50 TABLET ORAL at 22:38

## 2018-06-16 RX ADMIN — Medication 100 MILLIGRAM(S): at 18:05

## 2018-06-16 RX ADMIN — Medication 1 TABLET(S): at 18:05

## 2018-06-16 RX ADMIN — Medication 2: at 08:08

## 2018-06-16 RX ADMIN — Medication 500 MILLIGRAM(S): at 22:10

## 2018-06-16 RX ADMIN — Medication 4: at 22:09

## 2018-06-16 RX ADMIN — Medication 0.2 MILLIGRAM(S): at 21:17

## 2018-06-16 RX ADMIN — Medication 100 MILLIGRAM(S): at 06:01

## 2018-06-16 RX ADMIN — Medication 500 MILLIGRAM(S): at 20:47

## 2018-06-16 RX ADMIN — Medication 0.5 MILLIGRAM(S): at 21:17

## 2018-06-16 RX ADMIN — Medication 200 MILLIGRAM(S): at 06:00

## 2018-06-16 RX ADMIN — Medication 667 MILLIGRAM(S): at 08:07

## 2018-06-16 RX ADMIN — GABAPENTIN 100 MILLIGRAM(S): 400 CAPSULE ORAL at 21:17

## 2018-06-16 RX ADMIN — Medication 1 APPLICATION(S): at 18:06

## 2018-06-16 RX ADMIN — Medication 0.2 MILLIGRAM(S): at 06:00

## 2018-06-16 RX ADMIN — Medication 200 MILLIGRAM(S): at 18:05

## 2018-06-16 RX ADMIN — Medication 667 MILLIGRAM(S): at 18:05

## 2018-06-16 RX ADMIN — ATORVASTATIN CALCIUM 20 MILLIGRAM(S): 80 TABLET, FILM COATED ORAL at 21:17

## 2018-06-16 RX ADMIN — Medication 667 MILLIGRAM(S): at 11:57

## 2018-06-16 RX ADMIN — AMLODIPINE BESYLATE 10 MILLIGRAM(S): 2.5 TABLET ORAL at 06:00

## 2018-06-16 RX ADMIN — Medication 2: at 11:57

## 2018-06-16 RX ADMIN — TRAMADOL HYDROCHLORIDE 50 MILLIGRAM(S): 50 TABLET ORAL at 06:51

## 2018-06-16 RX ADMIN — Medication 100 MICROGRAM(S): at 06:00

## 2018-06-16 RX ADMIN — Medication 0.2 MILLIGRAM(S): at 16:41

## 2018-06-16 RX ADMIN — GABAPENTIN 100 MILLIGRAM(S): 400 CAPSULE ORAL at 06:00

## 2018-06-16 RX ADMIN — HEPARIN SODIUM 5000 UNIT(S): 5000 INJECTION INTRAVENOUS; SUBCUTANEOUS at 06:00

## 2018-06-16 RX ADMIN — GABAPENTIN 100 MILLIGRAM(S): 400 CAPSULE ORAL at 14:07

## 2018-06-16 RX ADMIN — SERTRALINE 50 MILLIGRAM(S): 25 TABLET, FILM COATED ORAL at 11:57

## 2018-06-16 RX ADMIN — Medication 500 MILLIGRAM(S): at 08:06

## 2018-06-16 RX ADMIN — Medication 500 MILLIGRAM(S): at 09:05

## 2018-06-16 RX ADMIN — Medication 100 MILLIGRAM(S): at 21:17

## 2018-06-16 RX ADMIN — HEPARIN SODIUM 5000 UNIT(S): 5000 INJECTION INTRAVENOUS; SUBCUTANEOUS at 18:05

## 2018-06-16 RX ADMIN — TRAMADOL HYDROCHLORIDE 50 MILLIGRAM(S): 50 TABLET ORAL at 08:05

## 2018-06-16 NOTE — PHYSICAL THERAPY INITIAL EVALUATION ADULT - CRITERIA FOR SKILLED THERAPEUTIC INTERVENTIONS
impairments found/therapy frequency/anticipated equipment needs at discharge/anticipated discharge recommendation/functional limitations in following categories/predicted duration of therapy intervention/risk reduction/prevention/rehab potential

## 2018-06-16 NOTE — PHYSICAL THERAPY INITIAL EVALUATION ADULT - IMPAIRMENTS FOUND, PT EVAL
posture/aerobic capacity/endurance/gait, locomotion, and balance/joint integrity and mobility/muscle strength

## 2018-06-16 NOTE — PHYSICAL THERAPY INITIAL EVALUATION ADULT - PRECAUTIONS/LIMITATIONS, REHAB EVAL
head ct - 1. On the head CT, there is no evidence for calvarial fracture or acute ntracranial hemorrhage. If symptoms persist, consider short interval On the maxillofacial CT, age-indeterminate bilateral nasal fractures are present. A left periorbital preseptal hematoma is noted without associated orbital fracture., all x rays of hip's and pelvis negative for Fx/fall precautions

## 2018-06-16 NOTE — PHYSICAL THERAPY INITIAL EVALUATION ADULT - PERTINENT HX OF CURRENT PROBLEM, REHAB EVAL
72 yrs old female , trip and fall yesterday at home (mechanical), landed on R side and struck her face.  No LOC.  Significant swelling to L eye (but can still see out of it reportedly).  On ASA, no other blood thinners.  Seen recently at [facility] also for fall.

## 2018-06-16 NOTE — PHYSICAL THERAPY INITIAL EVALUATION ADULT - GENERAL OBSERVATIONS, REHAB EVAL
Pt encountered supine in bed, + Tele, + O2 via NC, + IV lock Citizen of [place] speaking. L AV fistula for HD, + Face swelling and ecchymosis

## 2018-06-16 NOTE — PHYSICAL THERAPY INITIAL EVALUATION ADULT - ADDITIONAL COMMENTS
72 year old female who PTA was living in an apartment with no stairs with , ( works during the day)./ PTA pt was ambulating with a walker and also owns a wheelchair,. pt states she was able to get up on her own and walk household distances with walker. Pt has an aide that comes by 6 hours 2 days a weeks with IADL's.

## 2018-06-16 NOTE — PROGRESS NOTE ADULT - SUBJECTIVE AND OBJECTIVE BOX
Subjective: Patient seen and examined. No new events except as noted.   resting comfortably in bed   No cp or sob     REVIEW OF SYSTEMS:    CONSTITUTIONAL: + weakness, fevers or chills  EYES/ENT: No visual changes;  No vertigo or throat pain   NECK: No pain or stiffness  RESPIRATORY: No cough, wheezing, hemoptysis; No shortness of breath  CARDIOVASCULAR: No chest pain or palpitations  GASTROINTESTINAL: No abdominal or epigastric pain. No nausea, vomiting, or hematemesis; No diarrhea or constipation. No melena or hematochezia.  GENITOURINARY: No dysuria, frequency or hematuria  NEUROLOGICAL: No numbness or weakness  SKIN: No itching, burning, rashes, or lesions   All other review of systems is negative unless indicated above.    MEDICATIONS:  MEDICATIONS  (STANDING):  acetaminophen  IVPB. 500 milliGRAM(s) IV Intermittent once  amLODIPine   Tablet 10 milliGRAM(s) Oral daily  aspirin enteric coated 81 milliGRAM(s) Oral daily  atorvastatin 20 milliGRAM(s) Oral at bedtime  BACItracin   Ointment 1 Application(s) Topical two times a day  calcium acetate 667 milliGRAM(s) Oral three times a day with meals  clonazePAM Tablet 0.5 milliGRAM(s) Oral at bedtime  cloNIDine 0.2 milliGRAM(s) Oral three times a day  dextrose 5%. 1000 milliLiter(s) (50 mL/Hr) IV Continuous <Continuous>  dextrose 50% Injectable 12.5 Gram(s) IV Push once  dextrose 50% Injectable 25 Gram(s) IV Push once  dextrose 50% Injectable 25 Gram(s) IV Push once  gabapentin 100 milliGRAM(s) Oral three times a day  heparin  Injectable 5000 Unit(s) SubCutaneous every 12 hours  hydrALAZINE 100 milliGRAM(s) Oral every 8 hours  insulin lispro (HumaLOG) corrective regimen sliding scale   SubCutaneous three times a day before meals  insulin lispro (HumaLOG) corrective regimen sliding scale   SubCutaneous at bedtime  labetalol 200 milliGRAM(s) Oral two times a day  lactobacillus acidophilus 1 Tablet(s) Oral two times a day with meals  levothyroxine 100 MICROGram(s) Oral daily  sertraline 50 milliGRAM(s) Oral daily      PHYSICAL EXAM:  T(C): 36.8 (06-16-18 @ 20:46), Max: 36.9 (06-16-18 @ 14:25)  HR: 67 (06-16-18 @ 22:12) (55 - 68)  BP: 181/68 (06-16-18 @ 22:12) (159/74 - 194/61)  RR: 18 (06-16-18 @ 20:46) (18 - 18)  SpO2: 100% (06-16-18 @ 20:46) (97% - 100%)  Wt(kg): --  I&O's Summary    15 Alan 2018 07:01  -  16 Jun 2018 07:00  --------------------------------------------------------  IN: 600 mL / OUT: 100 mL / NET: 500 mL    16 Jun 2018 07:01  -  16 Jun 2018 23:17  --------------------------------------------------------  IN: 585 mL / OUT: 2000 mL / NET: -1415 mL          Appearance: NAD  HEENT:   Normal oral mucosa, left periorbital ecchymosis   Lymphatic: No lymphadenopathy , no edema  Cardiovascular: Normal S1 S2, No JVD, No murmurs , Peripheral pulses palpable 2+ bilaterally  Respiratory: Lungs clear to auscultation, normal effort 	  Gastrointestinal:  Soft, Non-tender, + BS	  Skin: No rashes, No ecchymoses, No cyanosis, warm to touch  Musculoskeletal: decreased  range of motion and  strength, left hip ecchymosis   Psychiatry:  lethargic   Ext: No edema      LABS:    CARDIAC MARKERS:  CARDIAC MARKERS ( 14 Jun 2018 13:51 )  x     / x     / 158 U/L / x     / 14.6 ng/mL                                9.8    6.28  )-----------( 312      ( 15 Alan 2018 07:56 )             31.1     06-16    133<L>  |  92<L>  |  48<H>  ----------------------------<  138<H>  5.7<H>   |  26  |  4.65<H>    Ca    8.5      16 Jun 2018 06:07      proBNP:   Lipid Profile:   HgA1c:   TSH:             TELEMETRY: SR	    ECG:  	  RADIOLOGY:   DIAGNOSTIC TESTING:  [ ] Echocardiogram:  [ ]  Catheterization:  [ ] Stress Test:    OTHER:

## 2018-06-16 NOTE — PHYSICAL THERAPY INITIAL EVALUATION ADULT - ASR WT BEARING STATUS EVAL
6/15pt c/o n/v/d, zofran x1 given, GI consult called per Dr. Carter. Per attending SBP<180 acceptable as long as pt asymptomatic, 6/16 K 5.7, plan for HD today, seen by GI, check stool studies if recurrent diarrhea

## 2018-06-16 NOTE — PROGRESS NOTE ADULT - SUBJECTIVE AND OBJECTIVE BOX
patient with no distress;     MEDICATIONS  (STANDING):  acetaminophen  IVPB. 500 milliGRAM(s) IV Intermittent once  amLODIPine   Tablet 10 milliGRAM(s) Oral daily  aspirin enteric coated 81 milliGRAM(s) Oral daily  atorvastatin 20 milliGRAM(s) Oral at bedtime  BACItracin   Ointment 1 Application(s) Topical two times a day  calcium acetate 667 milliGRAM(s) Oral three times a day with meals  clonazePAM Tablet 0.5 milliGRAM(s) Oral at bedtime  cloNIDine 0.2 milliGRAM(s) Oral three times a day  dextrose 5%. 1000 milliLiter(s) (50 mL/Hr) IV Continuous <Continuous>  dextrose 50% Injectable 12.5 Gram(s) IV Push once  dextrose 50% Injectable 25 Gram(s) IV Push once  dextrose 50% Injectable 25 Gram(s) IV Push once  gabapentin 100 milliGRAM(s) Oral three times a day  heparin  Injectable 5000 Unit(s) SubCutaneous every 12 hours  hydrALAZINE 100 milliGRAM(s) Oral every 8 hours  insulin lispro (HumaLOG) corrective regimen sliding scale   SubCutaneous three times a day before meals  insulin lispro (HumaLOG) corrective regimen sliding scale   SubCutaneous at bedtime  labetalol 200 milliGRAM(s) Oral two times a day  lactobacillus acidophilus 1 Tablet(s) Oral two times a day with meals  levothyroxine 100 MICROGram(s) Oral daily  sertraline 50 milliGRAM(s) Oral daily    MEDICATIONS  (PRN):  acetaminophen   Tablet. 500 milliGRAM(s) Oral every 6 hours PRN Mild Pain (1 - 3)  dextrose 40% Gel 15 Gram(s) Oral once PRN Blood Glucose LESS THAN 70 milliGRAM(s)/deciliter  glucagon  Injectable 1 milliGRAM(s) IntraMuscular once PRN Glucose LESS THAN 70 milligrams/deciliter  traMADol 50 milliGRAM(s) Oral daily PRN Moderate Pain (4 - 6)      06-15-18 @ 07:01  -  06-16-18 @ 07:00  --------------------------------------------------------  IN: 600 mL / OUT: 100 mL / NET: 500 mL    06-16-18 @ 07:01  -  06-16-18 @ 12:29  --------------------------------------------------------  IN: 285 mL / OUT: 0 mL / NET: 285 mL      PHYSICAL EXAM:      T(C): 36.7 (06-16-18 @ 11:31), Max: 36.8 (06-16-18 @ 04:13)  HR: 58 (06-16-18 @ 11:31) (58 - 62)  BP: 172/60 (06-16-18 @ 11:31) (159/74 - 186/71)  RR: 18 (06-16-18 @ 11:31) (18 - 18)  SpO2: 100% (06-16-18 @ 11:31) (97% - 100%)  Wt(kg): --  Respiratory: clear anteriorly, decreased BS at bases  Cardiovascular: S1 S2  Gastrointestinal:  NT ND + BS  Extremities:  tr edema                                    9.8    6.28  )-----------( 312      ( 15 Alan 2018 07:56 )             31.1     06-16    133<L>  |  92<L>  |  48<H>  ----------------------------<  138<H>  5.7<H>   |  26  |  4.65<H>    Ca    8.5      16 Jun 2018 06:07            Assessment and Plan:  Maintenance HD today  Hyperkalemia will resolve with treatment, defer kayexelate;   will follow.

## 2018-06-16 NOTE — PROGRESS NOTE ADULT - SUBJECTIVE AND OBJECTIVE BOX
Patient is a 72y old  Female who presents with a chief complaint of falls (14 Jun 2018 12:00)      SUBJECTIVE / OVERNIGHT EVENTS: Comfortable without new complaints. Less nausea. No diarrhea.  Review of Systems  chest pain no  palpitations no  sob no  nausea no  headache no    MEDICATIONS  (STANDING):  acetaminophen  IVPB. 500 milliGRAM(s) IV Intermittent once  amLODIPine   Tablet 10 milliGRAM(s) Oral daily  aspirin enteric coated 81 milliGRAM(s) Oral daily  atorvastatin 20 milliGRAM(s) Oral at bedtime  BACItracin   Ointment 1 Application(s) Topical two times a day  calcium acetate 667 milliGRAM(s) Oral three times a day with meals  clonazePAM Tablet 0.5 milliGRAM(s) Oral at bedtime  cloNIDine 0.2 milliGRAM(s) Oral three times a day  dextrose 5%. 1000 milliLiter(s) (50 mL/Hr) IV Continuous <Continuous>  dextrose 50% Injectable 12.5 Gram(s) IV Push once  dextrose 50% Injectable 25 Gram(s) IV Push once  dextrose 50% Injectable 25 Gram(s) IV Push once  gabapentin 100 milliGRAM(s) Oral three times a day  heparin  Injectable 5000 Unit(s) SubCutaneous every 12 hours  hydrALAZINE 100 milliGRAM(s) Oral every 8 hours  insulin lispro (HumaLOG) corrective regimen sliding scale   SubCutaneous three times a day before meals  insulin lispro (HumaLOG) corrective regimen sliding scale   SubCutaneous at bedtime  labetalol 200 milliGRAM(s) Oral two times a day  lactobacillus acidophilus 1 Tablet(s) Oral two times a day with meals  levothyroxine 100 MICROGram(s) Oral daily  sertraline 50 milliGRAM(s) Oral daily    MEDICATIONS  (PRN):  acetaminophen   Tablet. 500 milliGRAM(s) Oral every 6 hours PRN Mild Pain (1 - 3)  dextrose 40% Gel 15 Gram(s) Oral once PRN Blood Glucose LESS THAN 70 milliGRAM(s)/deciliter  glucagon  Injectable 1 milliGRAM(s) IntraMuscular once PRN Glucose LESS THAN 70 milligrams/deciliter  traMADol 50 milliGRAM(s) Oral daily PRN Moderate Pain (4 - 6)          PHYSICAL EXAM:  GENERAL: NAD, well-developed  HEAD:  L periorbital echymosis, Normocephalic  EYES: EOMI, PERRLA, conjunctiva and sclera clear  NECK: Supple, No JVD  CHEST/LUNG: Clear to auscultation bilaterally; No wheeze  HEART: Regular rate and rhythm; No murmurs, rubs, or gallops  ABDOMEN: Soft, Nontender, Nondistended; Bowel sounds present  EXTREMITIES:  2+ Peripheral Pulses, No clubbing, cyanosis, or edema  PSYCH: AAOx3  NEUROLOGY: non-focal  SKIN: No rashes or lesions    LABS:                        9.8    6.28  )-----------( 312      ( 15 Alan 2018 07:56 )             31.1     06-16    133<L>  |  92<L>  |  48<H>  ----------------------------<  138<H>  5.7<H>   |  26  |  4.65<H>    Ca    8.5      16 Jun 2018 06:07        CARDIAC MARKERS ( 14 Jun 2018 13:51 )  x     / x     / 158 U/L / x     / 14.6 ng/mL          RADIOLOGY & ADDITIONAL TESTS:    Imaging Personally Reviewed:    Consultant(s) Notes Reviewed:      Care Discussed with Consultants/Other Providers:

## 2018-06-16 NOTE — CONSULT NOTE ADULT - SUBJECTIVE AND OBJECTIVE BOX
Patient is a 72y old  Female who presents with a chief complaint of falls (14 Jun 2018 12:00)      HPI:  The patient is a 72y Female complaining of fall.	  Patient with multiple medical problems, trip and fall yesterday at home (mechanical), landed on R side and struck her face.  No LOC.  Significant swelling to L eye (but can still see out of it reportedly).  On ASA, no other blood thinners.  Seen recently at [facility] also for fall.  T/R/S dialysis, presented there for dialysis this morning and was sent to the Emergency Department for dialysis. (14 Jun 2018 12:00).  Pt noted to have nausea and vomiting, diarrhea yesterday.  None since.  No melena or BRBPR.  Pt tolerating Po.      PAST MEDICAL & SURGICAL HISTORY:  [place] (hard of hearing)  Cataracts, bilateral: left worse than right  Pleural thickening: s/p pleurodisis left vats 2014  Hip pain: left  LBP radiating to left leg  CHF (congestive heart failure): diagnosed 2-2014  MI (myocardial infarction): 2-2014   cardiac cath done [facility]  Diabetes: diagnosed 2004  no medications in 5 months since hemodialysis  Hypothyroidism  ESRD (end stage renal disease)  HTN (hypertension)  MI (myocardial infarction): may 2016  Pleural effusion: Left side - several times since March 2014  Hypothyroid  Anxiety  HTN (hypertension)  Hyperlipidemia  CHF (congestive heart failure)  CKD (chronic kidney disease)  Anemia  Diabetes: DM 2  Thoracotomy scar of left chest: 4-2014 ? pneumonia/ scarring  S/P myomectomy: abdominal age 52  AV fistula: left upper 8-2014 attempted 12-16-14      Allergies    Avelox (Unknown)  fish (Hives; Rash)  shellfish (Unknown)    Intolerances        MEDICATIONS  (STANDING):  acetaminophen  IVPB. 500 milliGRAM(s) IV Intermittent once  amLODIPine   Tablet 10 milliGRAM(s) Oral daily  aspirin enteric coated 81 milliGRAM(s) Oral daily  atorvastatin 20 milliGRAM(s) Oral at bedtime  BACItracin   Ointment 1 Application(s) Topical two times a day  calcium acetate 667 milliGRAM(s) Oral three times a day with meals  clonazePAM Tablet 0.5 milliGRAM(s) Oral at bedtime  cloNIDine 0.2 milliGRAM(s) Oral three times a day  dextrose 5%. 1000 milliLiter(s) (50 mL/Hr) IV Continuous <Continuous>  dextrose 50% Injectable 12.5 Gram(s) IV Push once  dextrose 50% Injectable 25 Gram(s) IV Push once  dextrose 50% Injectable 25 Gram(s) IV Push once  gabapentin 100 milliGRAM(s) Oral three times a day  heparin  Injectable 5000 Unit(s) SubCutaneous every 12 hours  hydrALAZINE 100 milliGRAM(s) Oral every 8 hours  insulin lispro (HumaLOG) corrective regimen sliding scale   SubCutaneous three times a day before meals  insulin lispro (HumaLOG) corrective regimen sliding scale   SubCutaneous at bedtime  labetalol 200 milliGRAM(s) Oral two times a day  levothyroxine 100 MICROGram(s) Oral daily  sertraline 50 milliGRAM(s) Oral daily    MEDICATIONS  (PRN):  acetaminophen   Tablet. 500 milliGRAM(s) Oral every 6 hours PRN Mild Pain (1 - 3)  dextrose 40% Gel 15 Gram(s) Oral once PRN Blood Glucose LESS THAN 70 milliGRAM(s)/deciliter  glucagon  Injectable 1 milliGRAM(s) IntraMuscular once PRN Glucose LESS THAN 70 milligrams/deciliter  traMADol 50 milliGRAM(s) Oral daily PRN Moderate Pain (4 - 6)      SOCIAL HX:  Substance Use (street drugs): (  ) never used  (  ) other:  Tobacco Usage:  (   ) never smoked   (   ) former smoker   (   ) current smoker    Alcohol Usage:    FAMILY HISTORY:  No significant family history      Review of Systems:    General:  No wt loss, fevers, chills, night sweats,fatigue,   CV:  No pain, palpitatioins, hypo/hypertension  Resp:  No dyspnea, cough, tachypnea, wheezing  GI:  No pain, No nausea, No vomiting, No diarrhea, No constipatiion, No weight loss, No fever, No pruritis, No rectal bleeding, No tarry stools, No dysphagia,  :  No pain, bleeding, incontinence, nocturia  Muscle:  No pain, weakness  Neuro:  No weakness, tingling, memory problems  Heme:  No petechiae, ecchymosis, easy bruisability  Skin:  No rash, tattoos, scars, edema    PHYSICAL EXAM:  Vital Signs Last 24 Hrs  T(C): 36.7 (16 Jun 2018 08:37), Max: 36.8 (16 Jun 2018 04:13)  T(F): 98 (16 Jun 2018 08:37), Max: 98.2 (16 Jun 2018 04:13)  HR: 60 (16 Jun 2018 08:37) (58 - 62)  BP: 159/74 (16 Jun 2018 08:37) (159/74 - 186/71)  BP(mean): --  RR: 18 (16 Jun 2018 08:37) (18 - 18)  SpO2: 100% (16 Jun 2018 08:37) (97% - 100%)    Constitutional: NAD, well-developed  Neck: No LAD, supple  Respiratory: CTA and P  Cardiovascular: S1 and S2, RRR, no M  Gastrointestinal: BS+, soft, NT/ND, neg HSM,  Extremities: No peripheral edema, neg clubing, cyanosis  Psychiatric: Normal mood, normal affect  Skin: No rashes      LABS:                        9.8    6.28  )-----------( 312      ( 15 Alan 2018 07:56 )             31.1     06-16    133<L>  |  92<L>  |  48<H>  ----------------------------<  138<H>  5.7<H>   |  26  |  4.65<H>    Ca    8.5      16 Jun 2018 06:07          LIVER FUNCTIONS - ( 14 Jun 2018 07:38 )  Alb: 3.6 g/dL / Pro: 6.4 g/dL / ALK PHOS: 160 U/L / ALT: 16 U/L / AST: 24 U/L / GGT: x             RADIOLOGY & ADDITIONAL TESTS:

## 2018-06-16 NOTE — PROGRESS NOTE ADULT - ASSESSMENT
72 f with  s/p fall- telemetry, cardiology follow  Hypothyroidism-follow TSH  PT  ESRD- HD, Nephrology evaluation Dr. Fernandez  Hyperkalemia- follow.  Diabetes control  Nasal fracture- Plastic surgery evaluation noted. No intervention  Periorbital ecchymosis, Hematoma- Opthalmology evaluation noted. Local care.  Nausea/ Diarrhea- GI evaluation noted. Probable gastroenteritis resolved.   Rajendra Carter MD pager [phone]

## 2018-06-17 LAB
ANION GAP SERPL CALC-SCNC: 11 MMOL/L — SIGNIFICANT CHANGE UP (ref 5–17)
BUN SERPL-MCNC: 25 MG/DL — HIGH (ref 7–23)
CALCIUM SERPL-MCNC: 8.6 MG/DL — SIGNIFICANT CHANGE UP (ref 8.4–10.5)
CHLORIDE SERPL-SCNC: 95 MMOL/L — LOW (ref 96–108)
CO2 SERPL-SCNC: 29 MMOL/L — SIGNIFICANT CHANGE UP (ref 22–31)
CREAT SERPL-MCNC: 2.99 MG/DL — HIGH (ref 0.5–1.3)
GLUCOSE BLDC GLUCOMTR-MCNC: 148 MG/DL — HIGH (ref 70–99)
GLUCOSE BLDC GLUCOMTR-MCNC: 188 MG/DL — HIGH (ref 70–99)
GLUCOSE BLDC GLUCOMTR-MCNC: 199 MG/DL — HIGH (ref 70–99)
GLUCOSE BLDC GLUCOMTR-MCNC: 312 MG/DL — HIGH (ref 70–99)
GLUCOSE SERPL-MCNC: 175 MG/DL — HIGH (ref 70–99)
HCT VFR BLD CALC: 28.4 % — LOW (ref 34.5–45)
HGB BLD-MCNC: 8.5 G/DL — LOW (ref 11.5–15.5)
MAGNESIUM SERPL-MCNC: 2 MG/DL — SIGNIFICANT CHANGE UP (ref 1.6–2.6)
MCHC RBC-ENTMCNC: 28.5 PG — SIGNIFICANT CHANGE UP (ref 27–34)
MCHC RBC-ENTMCNC: 29.9 GM/DL — LOW (ref 32–36)
MCV RBC AUTO: 95.3 FL — SIGNIFICANT CHANGE UP (ref 80–100)
PHOSPHATE SERPL-MCNC: 3.2 MG/DL — SIGNIFICANT CHANGE UP (ref 2.5–4.5)
PLATELET # BLD AUTO: 273 K/UL — SIGNIFICANT CHANGE UP (ref 150–400)
POTASSIUM SERPL-MCNC: 5 MMOL/L — SIGNIFICANT CHANGE UP (ref 3.5–5.3)
POTASSIUM SERPL-SCNC: 5 MMOL/L — SIGNIFICANT CHANGE UP (ref 3.5–5.3)
RBC # BLD: 2.98 M/UL — LOW (ref 3.8–5.2)
RBC # FLD: 18.1 % — HIGH (ref 10.3–14.5)
SODIUM SERPL-SCNC: 135 MMOL/L — SIGNIFICANT CHANGE UP (ref 135–145)
WBC # BLD: 5.66 K/UL — SIGNIFICANT CHANGE UP (ref 3.8–10.5)
WBC # FLD AUTO: 5.66 K/UL — SIGNIFICANT CHANGE UP (ref 3.8–10.5)

## 2018-06-17 RX ORDER — POLYETHYLENE GLYCOL 3350 17 G/17G
17 POWDER, FOR SOLUTION ORAL DAILY
Qty: 0 | Refills: 0 | Status: DISCONTINUED | OUTPATIENT
Start: 2018-06-17 | End: 2018-06-17

## 2018-06-17 RX ORDER — DOCUSATE SODIUM 100 MG
100 CAPSULE ORAL THREE TIMES A DAY
Qty: 0 | Refills: 0 | Status: DISCONTINUED | OUTPATIENT
Start: 2018-06-17 | End: 2018-06-25

## 2018-06-17 RX ORDER — SENNA PLUS 8.6 MG/1
1 TABLET ORAL AT BEDTIME
Qty: 0 | Refills: 0 | Status: DISCONTINUED | OUTPATIENT
Start: 2018-06-17 | End: 2018-06-19

## 2018-06-17 RX ADMIN — Medication 500 MILLIGRAM(S): at 06:28

## 2018-06-17 RX ADMIN — TRAMADOL HYDROCHLORIDE 50 MILLIGRAM(S): 50 TABLET ORAL at 13:13

## 2018-06-17 RX ADMIN — Medication 200 MILLIGRAM(S): at 05:05

## 2018-06-17 RX ADMIN — Medication 0.2 MILLIGRAM(S): at 21:29

## 2018-06-17 RX ADMIN — Medication 2: at 16:50

## 2018-06-17 RX ADMIN — Medication 0.2 MILLIGRAM(S): at 13:13

## 2018-06-17 RX ADMIN — Medication 667 MILLIGRAM(S): at 08:28

## 2018-06-17 RX ADMIN — TRAMADOL HYDROCHLORIDE 50 MILLIGRAM(S): 50 TABLET ORAL at 00:00

## 2018-06-17 RX ADMIN — HEPARIN SODIUM 5000 UNIT(S): 5000 INJECTION INTRAVENOUS; SUBCUTANEOUS at 16:50

## 2018-06-17 RX ADMIN — Medication 100 MILLIGRAM(S): at 21:29

## 2018-06-17 RX ADMIN — Medication 500 MILLIGRAM(S): at 21:28

## 2018-06-17 RX ADMIN — TRAMADOL HYDROCHLORIDE 50 MILLIGRAM(S): 50 TABLET ORAL at 14:10

## 2018-06-17 RX ADMIN — Medication 100 MILLIGRAM(S): at 13:13

## 2018-06-17 RX ADMIN — Medication 0.2 MILLIGRAM(S): at 05:05

## 2018-06-17 RX ADMIN — Medication 100 MICROGRAM(S): at 05:05

## 2018-06-17 RX ADMIN — Medication 0.5 MILLIGRAM(S): at 21:28

## 2018-06-17 RX ADMIN — ATORVASTATIN CALCIUM 20 MILLIGRAM(S): 80 TABLET, FILM COATED ORAL at 21:29

## 2018-06-17 RX ADMIN — GABAPENTIN 100 MILLIGRAM(S): 400 CAPSULE ORAL at 05:05

## 2018-06-17 RX ADMIN — Medication 1 TABLET(S): at 16:50

## 2018-06-17 RX ADMIN — Medication 500 MILLIGRAM(S): at 12:45

## 2018-06-17 RX ADMIN — Medication 667 MILLIGRAM(S): at 16:50

## 2018-06-17 RX ADMIN — Medication 500 MILLIGRAM(S): at 05:05

## 2018-06-17 RX ADMIN — Medication 8: at 11:43

## 2018-06-17 RX ADMIN — GABAPENTIN 100 MILLIGRAM(S): 400 CAPSULE ORAL at 13:13

## 2018-06-17 RX ADMIN — Medication 1 APPLICATION(S): at 05:06

## 2018-06-17 RX ADMIN — Medication 500 MILLIGRAM(S): at 11:44

## 2018-06-17 RX ADMIN — Medication 500 MILLIGRAM(S): at 22:15

## 2018-06-17 RX ADMIN — Medication 200 MILLIGRAM(S): at 18:16

## 2018-06-17 RX ADMIN — Medication 100 MILLIGRAM(S): at 05:05

## 2018-06-17 RX ADMIN — AMLODIPINE BESYLATE 10 MILLIGRAM(S): 2.5 TABLET ORAL at 05:05

## 2018-06-17 RX ADMIN — Medication 100 MILLIGRAM(S): at 21:31

## 2018-06-17 RX ADMIN — GABAPENTIN 100 MILLIGRAM(S): 400 CAPSULE ORAL at 21:29

## 2018-06-17 RX ADMIN — Medication 1 APPLICATION(S): at 16:50

## 2018-06-17 RX ADMIN — Medication 1 TABLET(S): at 08:27

## 2018-06-17 RX ADMIN — SENNA PLUS 1 TABLET(S): 8.6 TABLET ORAL at 21:30

## 2018-06-17 RX ADMIN — Medication 81 MILLIGRAM(S): at 11:44

## 2018-06-17 RX ADMIN — Medication 667 MILLIGRAM(S): at 11:44

## 2018-06-17 RX ADMIN — HEPARIN SODIUM 5000 UNIT(S): 5000 INJECTION INTRAVENOUS; SUBCUTANEOUS at 05:06

## 2018-06-17 RX ADMIN — SERTRALINE 50 MILLIGRAM(S): 25 TABLET, FILM COATED ORAL at 11:46

## 2018-06-17 NOTE — PROGRESS NOTE ADULT - SUBJECTIVE AND OBJECTIVE BOX
Subjective: Patient seen and examined. No new events except as noted.   feels weak   Hip and arm pain   Son at bedside     REVIEW OF SYSTEMS:    CONSTITUTIONAL: + weakness, fevers or chills  EYES/ENT: No visual changes;  No vertigo or throat pain   NECK: No pain or stiffness  RESPIRATORY: No cough, wheezing, hemoptysis; No shortness of breath  CARDIOVASCULAR: No chest pain or palpitations  GASTROINTESTINAL: No abdominal or epigastric pain. No nausea, vomiting, or hematemesis; No diarrhea or constipation. No melena or hematochezia.  GENITOURINARY: No dysuria, frequency or hematuria  NEUROLOGICAL: No numbness or weakness  SKIN: No itching, burning, rashes, or lesions   All other review of systems is negative unless indicated above.    MEDICATIONS:  MEDICATIONS  (STANDING):  acetaminophen  IVPB. 500 milliGRAM(s) IV Intermittent once  amLODIPine   Tablet 10 milliGRAM(s) Oral daily  aspirin enteric coated 81 milliGRAM(s) Oral daily  atorvastatin 20 milliGRAM(s) Oral at bedtime  BACItracin   Ointment 1 Application(s) Topical two times a day  calcium acetate 667 milliGRAM(s) Oral three times a day with meals  clonazePAM Tablet 0.5 milliGRAM(s) Oral at bedtime  cloNIDine 0.2 milliGRAM(s) Oral three times a day  dextrose 5%. 1000 milliLiter(s) (50 mL/Hr) IV Continuous <Continuous>  dextrose 50% Injectable 12.5 Gram(s) IV Push once  dextrose 50% Injectable 25 Gram(s) IV Push once  dextrose 50% Injectable 25 Gram(s) IV Push once  gabapentin 100 milliGRAM(s) Oral three times a day  heparin  Injectable 5000 Unit(s) SubCutaneous every 12 hours  hydrALAZINE 100 milliGRAM(s) Oral every 8 hours  insulin lispro (HumaLOG) corrective regimen sliding scale   SubCutaneous three times a day before meals  insulin lispro (HumaLOG) corrective regimen sliding scale   SubCutaneous at bedtime  labetalol 200 milliGRAM(s) Oral two times a day  lactobacillus acidophilus 1 Tablet(s) Oral two times a day with meals  levothyroxine 100 MICROGram(s) Oral daily  sertraline 50 milliGRAM(s) Oral daily      PHYSICAL EXAM:  T(C): 36.7 (06-17-18 @ 11:27), Max: 37.3 (06-17-18 @ 04:00)  HR: 62 (06-17-18 @ 11:27) (55 - 69)  BP: 170/66 (06-17-18 @ 11:27) (160/69 - 194/61)  RR: 18 (06-17-18 @ 11:27) (18 - 18)  SpO2: 93% (06-17-18 @ 11:27) (93% - 100%)  Wt(kg): --  I&O's Summary    16 Jun 2018 07:01  -  17 Jun 2018 07:00  --------------------------------------------------------  IN: 1065 mL / OUT: 2000 mL / NET: -935 mL    17 Jun 2018 07:01  -  17 Jun 2018 12:43  --------------------------------------------------------  IN: 580 mL / OUT: 300 mL / NET: 280 mL          Appearance: NAD  HEENT:   Normal oral mucosa, left periorbital ecchymosis 	  Lymphatic: No lymphadenopathy , no edema  Cardiovascular: Normal S1 S2, No JVD, No murmurs , Peripheral pulses palpable 2+ bilaterally  Respiratory: Lungs clear to auscultation, normal effort 	  Gastrointestinal:  Soft, Non-tender, + BS	  Skin: No rashes, No ecchymoses, No cyanosis, warm to touch  Musculoskeletal: Decreased range of motion and strength, left hip ecchymosis   Psychiatry:  Mood & affect appropriate  Ext: No edema      LABS:    CARDIAC MARKERS:  CARDIAC MARKERS ( 14 Jun 2018 13:51 )  x     / x     / 158 U/L / x     / 14.6 ng/mL                                8.5    5.66  )-----------( 273      ( 17 Jun 2018 10:06 )             28.4     06-17    135  |  95<L>  |  25<H>  ----------------------------<  175<H>  5.0   |  29  |  2.99<H>    Ca    8.6      17 Jun 2018 07:33  Phos  3.2     06-17  Mg     2.0     06-17      proBNP:   Lipid Profile:   HgA1c:   TSH:             TELEMETRY: SR	    ECG:  	  RADIOLOGY:   DIAGNOSTIC TESTING:  [ ] Echocardiogram:  [ ]  Catheterization:  [ ] Stress Test:    OTHER:

## 2018-06-17 NOTE — PROGRESS NOTE ADULT - ASSESSMENT
72 f with  s/p fall- telemetry, cardiology follow For ILR insertion.   Hypothyroidism-follow TSH  PT  ESRD- HD, Nephrology evaluation Dr. Fernandez  Hyperkalemia- follow.  Diabetes control  Nasal fracture- Plastic surgery evaluation noted. No intervention  Periorbital ecchymosis, Hematoma- Opthalmology evaluation noted. Local care.  Nausea/ Diarrhea- GI evaluation noted. Probable gastroenteritis resolved.   Rajendra Carter MD pager [phone]

## 2018-06-17 NOTE — PROGRESS NOTE ADULT - SUBJECTIVE AND OBJECTIVE BOX
Patient is a 72y old  Female who presents with a chief complaint of falls (14 Jun 2018 12:00)      SUBJECTIVE / OVERNIGHT EVENTS: Comfortable without new complaints. Family at bedside.   Review of Systems  chest pain no  palpitations no  sob no  nausea no  headache no    MEDICATIONS  (STANDING):  acetaminophen  IVPB. 500 milliGRAM(s) IV Intermittent once  amLODIPine   Tablet 10 milliGRAM(s) Oral daily  aspirin enteric coated 81 milliGRAM(s) Oral daily  atorvastatin 20 milliGRAM(s) Oral at bedtime  BACItracin   Ointment 1 Application(s) Topical two times a day  calcium acetate 667 milliGRAM(s) Oral three times a day with meals  clonazePAM Tablet 0.5 milliGRAM(s) Oral at bedtime  cloNIDine 0.2 milliGRAM(s) Oral three times a day  dextrose 5%. 1000 milliLiter(s) (50 mL/Hr) IV Continuous <Continuous>  dextrose 50% Injectable 12.5 Gram(s) IV Push once  dextrose 50% Injectable 25 Gram(s) IV Push once  dextrose 50% Injectable 25 Gram(s) IV Push once  gabapentin 100 milliGRAM(s) Oral three times a day  heparin  Injectable 5000 Unit(s) SubCutaneous every 12 hours  hydrALAZINE 100 milliGRAM(s) Oral every 8 hours  insulin lispro (HumaLOG) corrective regimen sliding scale   SubCutaneous three times a day before meals  insulin lispro (HumaLOG) corrective regimen sliding scale   SubCutaneous at bedtime  labetalol 200 milliGRAM(s) Oral two times a day  lactobacillus acidophilus 1 Tablet(s) Oral two times a day with meals  levothyroxine 100 MICROGram(s) Oral daily  sertraline 50 milliGRAM(s) Oral daily    MEDICATIONS  (PRN):  acetaminophen   Tablet. 500 milliGRAM(s) Oral every 6 hours PRN Mild Pain (1 - 3)  dextrose 40% Gel 15 Gram(s) Oral once PRN Blood Glucose LESS THAN 70 milliGRAM(s)/deciliter  glucagon  Injectable 1 milliGRAM(s) IntraMuscular once PRN Glucose LESS THAN 70 milligrams/deciliter  traMADol 50 milliGRAM(s) Oral daily PRN Moderate Pain (4 - 6)          PHYSICAL EXAM:  GENERAL: NAD, well-developed  HEAD:  L periorbital echymosis improving   EYES: EOMI, PERRLA, conjunctiva and sclera clear  NECK: Supple, No JVD  CHEST/LUNG: Clear to auscultation bilaterally; No wheeze  HEART: Regular rate and rhythm; No murmurs, rubs, or gallops  ABDOMEN: Soft, Nontender, Nondistended; Bowel sounds present  EXTREMITIES:  2+ Peripheral Pulses, No clubbing, cyanosis, or edema  PSYCH: AAOx3  NEUROLOGY: non-focal  SKIN: No rashes or lesions    LABS:                        8.5    5.66  )-----------( 273      ( 17 Jun 2018 10:06 )             28.4     06-17    135  |  95<L>  |  25<H>  ----------------------------<  175<H>  5.0   |  29  |  2.99<H>    Ca    8.6      17 Jun 2018 07:33  Phos  3.2     06-17  Mg     2.0     06-17                RADIOLOGY & ADDITIONAL TESTS:    Imaging Personally Reviewed:    Consultant(s) Notes Reviewed:      Care Discussed with Consultants/Other Providers:

## 2018-06-18 DIAGNOSIS — R29.6 REPEATED FALLS: ICD-10-CM

## 2018-06-18 LAB
ANION GAP SERPL CALC-SCNC: 17 MMOL/L — SIGNIFICANT CHANGE UP (ref 5–17)
BUN SERPL-MCNC: 43 MG/DL — HIGH (ref 7–23)
CALCIUM SERPL-MCNC: 8.8 MG/DL — SIGNIFICANT CHANGE UP (ref 8.4–10.5)
CHLORIDE SERPL-SCNC: 91 MMOL/L — LOW (ref 96–108)
CO2 SERPL-SCNC: 24 MMOL/L — SIGNIFICANT CHANGE UP (ref 22–31)
CREAT SERPL-MCNC: 4.24 MG/DL — HIGH (ref 0.5–1.3)
GLUCOSE BLDC GLUCOMTR-MCNC: 127 MG/DL — HIGH (ref 70–99)
GLUCOSE BLDC GLUCOMTR-MCNC: 153 MG/DL — HIGH (ref 70–99)
GLUCOSE BLDC GLUCOMTR-MCNC: 180 MG/DL — HIGH (ref 70–99)
GLUCOSE BLDC GLUCOMTR-MCNC: 182 MG/DL — HIGH (ref 70–99)
GLUCOSE BLDC GLUCOMTR-MCNC: 196 MG/DL — HIGH (ref 70–99)
GLUCOSE BLDC GLUCOMTR-MCNC: 258 MG/DL — HIGH (ref 70–99)
GLUCOSE BLDC GLUCOMTR-MCNC: 529 MG/DL — CRITICAL HIGH (ref 70–99)
GLUCOSE SERPL-MCNC: 151 MG/DL — HIGH (ref 70–99)
HCT VFR BLD CALC: 28.8 % — LOW (ref 34.5–45)
HGB BLD-MCNC: 8.7 G/DL — LOW (ref 11.5–15.5)
MCHC RBC-ENTMCNC: 28.7 PG — SIGNIFICANT CHANGE UP (ref 27–34)
MCHC RBC-ENTMCNC: 30.2 GM/DL — LOW (ref 32–36)
MCV RBC AUTO: 95 FL — SIGNIFICANT CHANGE UP (ref 80–100)
PLATELET # BLD AUTO: 246 K/UL — SIGNIFICANT CHANGE UP (ref 150–400)
POTASSIUM SERPL-MCNC: 5.9 MMOL/L — HIGH (ref 3.5–5.3)
POTASSIUM SERPL-SCNC: 5.9 MMOL/L — HIGH (ref 3.5–5.3)
RBC # BLD: 3.03 M/UL — LOW (ref 3.8–5.2)
RBC # FLD: 17.8 % — HIGH (ref 10.3–14.5)
SODIUM SERPL-SCNC: 132 MMOL/L — LOW (ref 135–145)
WBC # BLD: 5.05 K/UL — SIGNIFICANT CHANGE UP (ref 3.8–10.5)
WBC # FLD AUTO: 5.05 K/UL — SIGNIFICANT CHANGE UP (ref 3.8–10.5)

## 2018-06-18 PROCEDURE — 33282: CPT

## 2018-06-18 PROCEDURE — 99231 SBSQ HOSP IP/OBS SF/LOW 25: CPT

## 2018-06-18 RX ORDER — SODIUM POLYSTYRENE SULFONATE 4.1 MEQ/G
30 POWDER, FOR SUSPENSION ORAL ONCE
Qty: 0 | Refills: 0 | Status: COMPLETED | OUTPATIENT
Start: 2018-06-18 | End: 2018-06-18

## 2018-06-18 RX ORDER — ACETAMINOPHEN 500 MG
1000 TABLET ORAL ONCE
Qty: 0 | Refills: 0 | Status: DISCONTINUED | OUTPATIENT
Start: 2018-06-18 | End: 2018-06-18

## 2018-06-18 RX ORDER — ACETAMINOPHEN 500 MG
750 TABLET ORAL ONCE
Qty: 0 | Refills: 0 | Status: COMPLETED | OUTPATIENT
Start: 2018-06-18 | End: 2018-06-18

## 2018-06-18 RX ADMIN — Medication 6: at 11:57

## 2018-06-18 RX ADMIN — Medication 81 MILLIGRAM(S): at 11:53

## 2018-06-18 RX ADMIN — Medication 200 MILLIGRAM(S): at 05:02

## 2018-06-18 RX ADMIN — Medication 100 MILLIGRAM(S): at 05:02

## 2018-06-18 RX ADMIN — Medication 1 APPLICATION(S): at 17:41

## 2018-06-18 RX ADMIN — TRAMADOL HYDROCHLORIDE 50 MILLIGRAM(S): 50 TABLET ORAL at 08:01

## 2018-06-18 RX ADMIN — SODIUM POLYSTYRENE SULFONATE 30 GRAM(S): 4.1 POWDER, FOR SUSPENSION ORAL at 14:11

## 2018-06-18 RX ADMIN — TRAMADOL HYDROCHLORIDE 50 MILLIGRAM(S): 50 TABLET ORAL at 09:00

## 2018-06-18 RX ADMIN — Medication 200 MILLIGRAM(S): at 17:41

## 2018-06-18 RX ADMIN — Medication 0.2 MILLIGRAM(S): at 14:11

## 2018-06-18 RX ADMIN — Medication 500 MILLIGRAM(S): at 11:53

## 2018-06-18 RX ADMIN — HEPARIN SODIUM 5000 UNIT(S): 5000 INJECTION INTRAVENOUS; SUBCUTANEOUS at 17:42

## 2018-06-18 RX ADMIN — Medication 500 MILLIGRAM(S): at 12:50

## 2018-06-18 RX ADMIN — AMLODIPINE BESYLATE 10 MILLIGRAM(S): 2.5 TABLET ORAL at 05:02

## 2018-06-18 RX ADMIN — Medication 0.2 MILLIGRAM(S): at 21:43

## 2018-06-18 RX ADMIN — Medication 667 MILLIGRAM(S): at 08:01

## 2018-06-18 RX ADMIN — GABAPENTIN 100 MILLIGRAM(S): 400 CAPSULE ORAL at 21:43

## 2018-06-18 RX ADMIN — Medication 1 TABLET(S): at 17:41

## 2018-06-18 RX ADMIN — Medication 750 MILLIGRAM(S): at 19:10

## 2018-06-18 RX ADMIN — HEPARIN SODIUM 5000 UNIT(S): 5000 INJECTION INTRAVENOUS; SUBCUTANEOUS at 05:02

## 2018-06-18 RX ADMIN — Medication 1 TABLET(S): at 08:01

## 2018-06-18 RX ADMIN — Medication 667 MILLIGRAM(S): at 17:41

## 2018-06-18 RX ADMIN — Medication 0.2 MILLIGRAM(S): at 05:02

## 2018-06-18 RX ADMIN — ATORVASTATIN CALCIUM 20 MILLIGRAM(S): 80 TABLET, FILM COATED ORAL at 21:43

## 2018-06-18 RX ADMIN — SERTRALINE 50 MILLIGRAM(S): 25 TABLET, FILM COATED ORAL at 11:52

## 2018-06-18 RX ADMIN — Medication 667 MILLIGRAM(S): at 11:53

## 2018-06-18 RX ADMIN — Medication 1 APPLICATION(S): at 05:02

## 2018-06-18 RX ADMIN — Medication 100 MILLIGRAM(S): at 14:11

## 2018-06-18 RX ADMIN — GABAPENTIN 100 MILLIGRAM(S): 400 CAPSULE ORAL at 05:02

## 2018-06-18 RX ADMIN — Medication 300 MILLIGRAM(S): at 18:56

## 2018-06-18 RX ADMIN — Medication 2: at 17:41

## 2018-06-18 RX ADMIN — Medication 100 MICROGRAM(S): at 05:02

## 2018-06-18 RX ADMIN — Medication 100 MILLIGRAM(S): at 20:53

## 2018-06-18 RX ADMIN — Medication 0.5 MILLIGRAM(S): at 21:53

## 2018-06-18 RX ADMIN — Medication 2: at 08:01

## 2018-06-18 RX ADMIN — GABAPENTIN 100 MILLIGRAM(S): 400 CAPSULE ORAL at 14:11

## 2018-06-18 NOTE — PROVIDER CONTACT NOTE (OTHER) - SITUATION
Patient with bloody, bright-red, small amount of formed stool. Large protrusion of the rectal wall noted.

## 2018-06-18 NOTE — PROGRESS NOTE ADULT - SUBJECTIVE AND OBJECTIVE BOX
Patient is a 72y old  Female who presents with a chief complaint of falls (14 Jun 2018 12:00)      SUBJECTIVE / OVERNIGHT EVENTS: Comfortable without new complaints.   Review of Systems  chest pain no  palpitations no  sob no  nausea no  headache no    MEDICATIONS  (STANDING):  acetaminophen  IVPB. 500 milliGRAM(s) IV Intermittent once  acetaminophen  IVPB. 750 milliGRAM(s) IV Intermittent once  amLODIPine   Tablet 10 milliGRAM(s) Oral daily  aspirin enteric coated 81 milliGRAM(s) Oral daily  atorvastatin 20 milliGRAM(s) Oral at bedtime  BACItracin   Ointment 1 Application(s) Topical two times a day  calcium acetate 667 milliGRAM(s) Oral three times a day with meals  clonazePAM Tablet 0.5 milliGRAM(s) Oral at bedtime  cloNIDine 0.2 milliGRAM(s) Oral three times a day  dextrose 5%. 1000 milliLiter(s) (50 mL/Hr) IV Continuous <Continuous>  dextrose 50% Injectable 12.5 Gram(s) IV Push once  dextrose 50% Injectable 25 Gram(s) IV Push once  dextrose 50% Injectable 25 Gram(s) IV Push once  docusate sodium 100 milliGRAM(s) Oral three times a day  gabapentin 100 milliGRAM(s) Oral three times a day  heparin  Injectable 5000 Unit(s) SubCutaneous every 12 hours  hydrALAZINE 100 milliGRAM(s) Oral every 8 hours  insulin lispro (HumaLOG) corrective regimen sliding scale   SubCutaneous three times a day before meals  insulin lispro (HumaLOG) corrective regimen sliding scale   SubCutaneous at bedtime  labetalol 200 milliGRAM(s) Oral two times a day  lactobacillus acidophilus 1 Tablet(s) Oral two times a day with meals  levothyroxine 100 MICROGram(s) Oral daily  senna 1 Tablet(s) Oral at bedtime  sertraline 50 milliGRAM(s) Oral daily    MEDICATIONS  (PRN):  acetaminophen   Tablet. 500 milliGRAM(s) Oral every 6 hours PRN Mild Pain (1 - 3)  dextrose 40% Gel 15 Gram(s) Oral once PRN Blood Glucose LESS THAN 70 milliGRAM(s)/deciliter  glucagon  Injectable 1 milliGRAM(s) IntraMuscular once PRN Glucose LESS THAN 70 milligrams/deciliter  traMADol 50 milliGRAM(s) Oral daily PRN Moderate Pain (4 - 6)          PHYSICAL EXAM:  GENERAL: NAD, well-developed  HEAD: L periorbital echymosis improving , Normocephalic  EYES: EOMI, PERRLA, conjunctiva and sclera clear  NECK: Supple, No JVD  CHEST/LUNG: Clear to auscultation bilaterally; No wheeze  HEART: Regular rate and rhythm; No murmurs, rubs, or gallops  ABDOMEN: Soft, Nontender, Nondistended; Bowel sounds present  EXTREMITIES:  2+ Peripheral Pulses, No clubbing, cyanosis, or edema  PSYCH: AAOx3  NEUROLOGY: non-focal  SKIN: No rashes or lesions    LABS:                        8.7    5.05  )-----------( 246      ( 18 Jun 2018 07:54 )             28.8     06-18    132<L>  |  91<L>  |  43<H>  ----------------------------<  151<H>  5.9<H>   |  24  |  4.24<H>    Ca    8.8      18 Jun 2018 06:06  Phos  3.2     06-17  Mg     2.0     06-17                RADIOLOGY & ADDITIONAL TESTS:    Imaging Personally Reviewed:    Consultant(s) Notes Reviewed:      Care Discussed with Consultants/Other Providers:

## 2018-06-18 NOTE — CONSULT NOTE ADULT - SUBJECTIVE AND OBJECTIVE BOX
p ([id number])     HPI:  The patient is a 72y Female complaining of fall.	  Patient with multiple medical problems, trip and fall yesterday at home after synocpal fall, landed on R side and struck her face.  No LOC.  Significant swelling to L eye (but can still see out of it reportedly).  On ASA, no other blood thinners.  Seen recently at [facility] also for fall.  T/R/S dialysis, presented there for dialysis this morning and was sent to the Emergency Department for dialysis. CT c spine showedgrade 1 anterolisthesis C3 on C4 with stenosis.    PAST MEDICAL HISTORY   [place] hearing)  Cataracts, bilateral  Pleural thickening  Hip pain  LBP radiating to left leg  CHF (congestive heart failure)  MI (myocardial infarction)  Diabetes  Hypothyroidism  ESRD (end stage renal disease)  HTN (hypertension)  MI (myocardial infarction)  Pleural effusion  Hypothyroid  Anxiety  HTN (hypertension)  Hyperlipidemia  CHF (congestive heart failure)  CKD (chronic kidney disease)  Anemia  Diabetes    PAST SURGICAL HISTORY   Thoracotomy scar of left chest  S/P myomectomy  AV fistula    Avelox (Unknown)  fish (Hives; Rash)  shellfish (Unknown)      MEDICATIONS:  Antibiotics:    Neuro:  acetaminophen   Tablet. 500 milliGRAM(s) Oral every 6 hours PRN  acetaminophen  IVPB. 500 milliGRAM(s) IV Intermittent once  acetaminophen  IVPB. 1000 milliGRAM(s) IV Intermittent once  clonazePAM Tablet 0.5 milliGRAM(s) Oral at bedtime  gabapentin 100 milliGRAM(s) Oral three times a day  sertraline 50 milliGRAM(s) Oral daily  traMADol 50 milliGRAM(s) Oral daily PRN    Anticoagulation:  aspirin enteric coated 81 milliGRAM(s) Oral daily  heparin  Injectable 5000 Unit(s) SubCutaneous every 12 hours    Other:  amLODIPine   Tablet 10 milliGRAM(s) Oral daily  atorvastatin 20 milliGRAM(s) Oral at bedtime  calcium acetate 667 milliGRAM(s) Oral three times a day with meals  cloNIDine 0.2 milliGRAM(s) Oral three times a day  dextrose 40% Gel 15 Gram(s) Oral once PRN  dextrose 5%. 1000 milliLiter(s) IV Continuous <Continuous>  dextrose 50% Injectable 12.5 Gram(s) IV Push once  dextrose 50% Injectable 25 Gram(s) IV Push once  dextrose 50% Injectable 25 Gram(s) IV Push once  docusate sodium 100 milliGRAM(s) Oral three times a day  glucagon  Injectable 1 milliGRAM(s) IntraMuscular once PRN  hydrALAZINE 100 milliGRAM(s) Oral every 8 hours  insulin lispro (HumaLOG) corrective regimen sliding scale   SubCutaneous three times a day before meals  insulin lispro (HumaLOG) corrective regimen sliding scale   SubCutaneous at bedtime  labetalol 200 milliGRAM(s) Oral two times a day  levothyroxine 100 MICROGram(s) Oral daily  senna 1 Tablet(s) Oral at bedtime      SOCIAL HISTORY:   Occupation:   Marital Status:     FAMILY HISTORY:  No significant family history      REVIEW OF SYSTEMS:  Check here if all are normal other than Neurological/HPI [x]  General:  Eyes:  ENT:  Cardiac:  Respiratory:  GI:  Musculoskeletal:   Skin:  Neurologic:   Psychiatric:     PHYSICAL EXAMINATION:   T(C): 37 (06-18-18 @ 13:23), Max: 37 (06-18-18 @ 00:00)  HR: 64 (06-18-18 @ 13:23) (57 - 64)  BP: 155/63 (06-18-18 @ 13:23) (155/63 - 185/56)  RR: 18 (06-18-18 @ 13:23) (18 - 19)  SpO2: 98% (06-18-18 @ 13:23) (96% - 99%)  Wt(kg): --    General Examination:     Neurologic Examination:           PHYSICAL EXAM:    Constitutional: No Acute Distress     Neurological: AOx3, Following Commands    Motor exam:          Upper extremity                         Delt     Bicep     Tricep    HG                                                 R         5/5        5/5        5/5       5/5                                               L          5/5        5/5        5/5       5/5          Lower extremity                        HF         KF        KE       DF         PF                                                  R        5/5        5/5        5/5       5/5         5/5                                               L         5/5        5/5       5/5       5/5          5/5                                                 Sensation: [x] intact to light touch  [] decreased:     No clonus    Incision:     LABS:                        8.7    5.05  )-----------( 246      ( 18 Jun 2018 07:54 )             28.8     06-18    132<L>  |  91<L>  |  43<H>  ----------------------------<  151<H>  5.9<H>   |  24  |  4.24<H>    Ca    8.8      18 Jun 2018 06:06  Phos  3.2     06-17  Mg     2.0     06-17            RADIOLOGY & ADDITIONAL STUDIES:

## 2018-06-18 NOTE — PROGRESS NOTE ADULT - ASSESSMENT
71 yo female s/p fall noted to have a self limited episode of nausea, vomiting and diarrhea.  ?gastroenteritis vs food poisoning.  No further symptoms.   Tolerating Po.    - Continue to monitor symptoms.  If develops recurrent diarrhea, will check stool studies  - Continue current regimen  - Add probiotic    Further management as per primary team  Matteo Fraga PA-C    [facility]  [phone]

## 2018-06-18 NOTE — PROGRESS NOTE ADULT - ASSESSMENT
72 f with  s/p fall- telemetry, cardiology follow For ILR insertion.   Neurology evaluation Dr. Hernandez group called  Hypothyroidism-follow TSH  Cervical spine subluxation- Neurosurgery evaluation - MRI C Spine.  PT  ESRD- HD, Nephrology evaluation Dr. Fernandez  Hyperkalemia- follow.  Diabetes control  Nasal fracture- Plastic surgery evaluation noted. No intervention  Periorbital ecchymosis, Hematoma- Opthalmology evaluation noted. Local care.  Nausea/ Diarrhea- GI evaluation noted. Probable gastroenteritis resolved.   Rajendra Carter MD pager [phone]

## 2018-06-18 NOTE — PROGRESS NOTE ADULT - SUBJECTIVE AND OBJECTIVE BOX
Patient is a 72y old  Female who presented with a chief complaint of falls (14 Jun 2018 12:00)      INTERVAL HPI/OVERNIGHT EVENTS:  no reported nausea or vomiting  no abdominal pain  denies CP  has been tolerating PO  no diarrhea    MEDICATIONS  (STANDING):  acetaminophen  IVPB. 500 milliGRAM(s) IV Intermittent once  amLODIPine   Tablet 10 milliGRAM(s) Oral daily  aspirin enteric coated 81 milliGRAM(s) Oral daily  atorvastatin 20 milliGRAM(s) Oral at bedtime  BACItracin   Ointment 1 Application(s) Topical two times a day  calcium acetate 667 milliGRAM(s) Oral three times a day with meals  clonazePAM Tablet 0.5 milliGRAM(s) Oral at bedtime  cloNIDine 0.2 milliGRAM(s) Oral three times a day  dextrose 5%. 1000 milliLiter(s) (50 mL/Hr) IV Continuous <Continuous>  dextrose 50% Injectable 12.5 Gram(s) IV Push once  dextrose 50% Injectable 25 Gram(s) IV Push once  dextrose 50% Injectable 25 Gram(s) IV Push once  docusate sodium 100 milliGRAM(s) Oral three times a day  gabapentin 100 milliGRAM(s) Oral three times a day  heparin  Injectable 5000 Unit(s) SubCutaneous every 12 hours  hydrALAZINE 100 milliGRAM(s) Oral every 8 hours  insulin lispro (HumaLOG) corrective regimen sliding scale   SubCutaneous three times a day before meals  insulin lispro (HumaLOG) corrective regimen sliding scale   SubCutaneous at bedtime  labetalol 200 milliGRAM(s) Oral two times a day  lactobacillus acidophilus 1 Tablet(s) Oral two times a day with meals  levothyroxine 100 MICROGram(s) Oral daily  senna 1 Tablet(s) Oral at bedtime  sertraline 50 milliGRAM(s) Oral daily    MEDICATIONS  (PRN):  acetaminophen   Tablet. 500 milliGRAM(s) Oral every 6 hours PRN Mild Pain (1 - 3)  dextrose 40% Gel 15 Gram(s) Oral once PRN Blood Glucose LESS THAN 70 milliGRAM(s)/deciliter  glucagon  Injectable 1 milliGRAM(s) IntraMuscular once PRN Glucose LESS THAN 70 milligrams/deciliter  traMADol 50 milliGRAM(s) Oral daily PRN Moderate Pain (4 - 6)      Allergies  Avelox (Unknown)  fish (Hives; Rash)  shellfish (Unknown)      Review of Systems:  General:  No wt loss, fevers, chills, night sweats,fatigue,   CV:  No pain, palpitatioins, hypo/hypertension  Resp:  No dyspnea, cough, tachypnea, wheezing  :  No pain, bleeding, incontinence, nocturia  Muscle:  +LE weakness  Heme:  No petechiae, easy bruisability      Vital Signs Last 24 Hrs  T(C): 36.6 (18 Jun 2018 08:47), Max: 37 (18 Jun 2018 00:00)  T(F): 97.8 (18 Jun 2018 08:47), Max: 98.6 (18 Jun 2018 00:00)  HR: 60 (18 Jun 2018 08:47) (57 - 62)  BP: 165/60 (18 Jun 2018 08:47) (161/66 - 185/56)  BP(mean): --  RR: 18 (18 Jun 2018 08:47) (18 - 19)  SpO2: 96% (18 Jun 2018 08:47) (93% - 99%)    PHYSICAL EXAM:  Constitutional: NAD, sitting up in bed +laceration above left eye with boni-orbital healing ecchymosis and some  left subconjunctival hemorrhage  Neck: No LAD, supple, no JVD  Respiratory: decreased BS bases  Cardiovascular: S1 and S2 RRR  Gastrointestinal: BS+, soft, NT/ND, neg HSM,  Extremities: No peripheral edema, neg clubbing, cyanosis +LUE AV fistula  Skin: anicteric, see above    LABS:                        8.7    5.05  )-----------( 246      ( 18 Jun 2018 07:54 )             28.8     06-18    132<L>  |  91<L>  |  43<H>  ----------------------------<  151<H>  5.9<H>   |  24  |  4.24<H>    Ca    8.8      18 Jun 2018 06:06  Phos  3.2     06-17  Mg     2.0     06-17            RADIOLOGY & ADDITIONAL TESTS:

## 2018-06-18 NOTE — PROVIDER CONTACT NOTE (OTHER) - BACKGROUND
Patient admitted with repeated falls. Hx of diarrhea on admission. PMH: ESRD, diabetes, HTN, CHF. Patient endorses history of hemorrhoids.

## 2018-06-18 NOTE — PROVIDER CONTACT NOTE (OTHER) - ASSESSMENT
Patient A&O x4. No c/o pain or discomfort. Reports being constipated for two days. As per patient, has had episodes of rectal prolapse in the past, and patient had manually resolved the issue by herself during these occurrences.

## 2018-06-18 NOTE — PROVIDER CONTACT NOTE (OTHER) - ASSESSMENT
Patient is A&Ox4, Kyrgyz speaking. Son at bedside able to communicate in english. Patient c/o severe headache and generalized pain 10 out of 10 on pain scale. Denies chest pain, palpitations, SOB. Patient Temp: 98.4F oral, HR 58, RR 18, O2 100% on 2L NC, orthostatic  laying, 182 sitting, 192 standing.

## 2018-06-18 NOTE — PROGRESS NOTE ADULT - SUBJECTIVE AND OBJECTIVE BOX
No CP, SOB,     Vital Signs Last 24 Hrs T(C): 37 (06-18-18 @ 13:23), Max: 37 (06-18-18 @ 00:00) T(F): 98.6 (06-18-18 @ 13:23), Max: 98.6 (06-18-18 @ 00:00) HR: 64 (06-18-18 @ 13:23) (57 - 64) BP: 155/63 (06-18-18 @ 13:23) (155/63 - 185/56) RR: 18 (06-18-18 @ 13:23) (18 - 19) SpO2: 98% (06-18-18 @ 13:23) (96% - 99%)   GENERAL: NAD,   HEAD:  L periorbital ecchymosis   EYES: sclera anicteric  NECK: supple, No JVD  CHEST/LUNG: clear to auscultation bilaterally; no rales, rhonchi, or wheezing b/l  HEART: normal S1, S2  ABDOMEN: BS+, soft, ND, NT   EXTREMITIES: no clubbing, cyanosis, or edema b/l LEs, LUE AVF + thrill, bruit  NEURO: awake, alert	       Labs and Results:                       8.7   5.05  )-----------( 246      ( 18 Jun 2018 07:54 )            28.8   18 Jun 2018 06:06  132    |  91     |  43    ----------------------------<  151   5.9     |  24     |  4.24   Ca    8.8        18 Jun 2018 06:06 Phos  3.2       17 Jun 2018 07:33 Mg     2.0       17 Jun 2018 07:33	    acetaminophen   Tablet. 500 milliGRAM(s) Oral every 6 hours PRN  acetaminophen  IVPB. 500 milliGRAM(s) IV Intermittent once  acetaminophen  IVPB. 1000 milliGRAM(s) IV Intermittent once  amLODIPine   Tablet 10 milliGRAM(s) Oral daily  aspirin enteric coated 81 milliGRAM(s) Oral daily  atorvastatin 20 milliGRAM(s) Oral at bedtime  BACItracin   Ointment 1 Application(s) Topical two times a day  calcium acetate 667 milliGRAM(s) Oral three times a day with meals  clonazePAM Tablet 0.5 milliGRAM(s) Oral at bedtime  cloNIDine 0.2 milliGRAM(s) Oral three times a day  dextrose 40% Gel 15 Gram(s) Oral once PRN  dextrose 5%. 1000 milliLiter(s) IV Continuous <Continuous>  dextrose 50% Injectable 12.5 Gram(s) IV Push once  dextrose 50% Injectable 25 Gram(s) IV Push once  dextrose 50% Injectable 25 Gram(s) IV Push once  docusate sodium 100 milliGRAM(s) Oral three times a day  gabapentin 100 milliGRAM(s) Oral three times a day  glucagon  Injectable 1 milliGRAM(s) IntraMuscular once PRN  heparin  Injectable 5000 Unit(s) SubCutaneous every 12 hours  hydrALAZINE 100 milliGRAM(s) Oral every 8 hours  insulin lispro (HumaLOG) corrective regimen sliding scale   SubCutaneous three times a day before meals  insulin lispro (HumaLOG) corrective regimen sliding scale   SubCutaneous at bedtime  labetalol 200 milliGRAM(s) Oral two times a day  lactobacillus acidophilus 1 Tablet(s) Oral two times a day with meals  levothyroxine 100 MICROGram(s) Oral daily  senna 1 Tablet(s) Oral at bedtime  sertraline 50 milliGRAM(s) Oral daily  traMADol 50 milliGRAM(s) Oral daily PRN    Assessment and Plan:     Patient is 71yo W w/DM, HTN, ASHD, ESRD on HD TTS, adm s/p trip and fall at home  ESRD, HD TTS  Heparin free  TMP as able  Diabetes control  Renal diet  Chronic LBP, frequent falls, consider neuro input  Will f/u BP

## 2018-06-18 NOTE — PROVIDER CONTACT NOTE (OTHER) - ACTION/TREATMENT ORDERED:
Assessed by KATH Mccann and KATH Nicole at bedside. Rectal tissue manually pushed back up the anus, gauze and saline utilized. Started on colace and senna. Will continue to monitor.

## 2018-06-18 NOTE — PROVIDER CONTACT NOTE (OTHER) - SITUATION
Patient c/o severe headache and generalized pain 10 out of 10 on pain scale. Patient given Tylenol 750mg IVPB at 18:56pm.

## 2018-06-18 NOTE — PROVIDER CONTACT NOTE (OTHER) - ASSESSMENT
Patient states that she is feeling better after she vomited, see vitals flow sheet for details on vital signs

## 2018-06-18 NOTE — PROGRESS NOTE ADULT - SUBJECTIVE AND OBJECTIVE BOX
Subjective: Patient seen and examined. No new events except as noted.   +hip pain   +weakness   no cp or sob     REVIEW OF SYSTEMS:    CONSTITUTIONAL: + weakness, fevers or chills  EYES/ENT: No visual changes;  No vertigo or throat pain   NECK: No pain or stiffness  RESPIRATORY: No cough, wheezing, hemoptysis; No shortness of breath  CARDIOVASCULAR: No chest pain or palpitations  GASTROINTESTINAL: No abdominal or epigastric pain. No nausea, vomiting, or hematemesis; No diarrhea or constipation. No melena or hematochezia.  GENITOURINARY: No dysuria, frequency or hematuria  NEUROLOGICAL: No numbness or weakness  SKIN: No itching, burning, rashes, or lesions   All other review of systems is negative unless indicated above.    MEDICATIONS:  MEDICATIONS  (STANDING):  acetaminophen  IVPB. 500 milliGRAM(s) IV Intermittent once  amLODIPine   Tablet 10 milliGRAM(s) Oral daily  aspirin enteric coated 81 milliGRAM(s) Oral daily  atorvastatin 20 milliGRAM(s) Oral at bedtime  BACItracin   Ointment 1 Application(s) Topical two times a day  calcium acetate 667 milliGRAM(s) Oral three times a day with meals  clonazePAM Tablet 0.5 milliGRAM(s) Oral at bedtime  cloNIDine 0.2 milliGRAM(s) Oral three times a day  dextrose 5%. 1000 milliLiter(s) (50 mL/Hr) IV Continuous <Continuous>  dextrose 50% Injectable 12.5 Gram(s) IV Push once  dextrose 50% Injectable 25 Gram(s) IV Push once  dextrose 50% Injectable 25 Gram(s) IV Push once  docusate sodium 100 milliGRAM(s) Oral three times a day  gabapentin 100 milliGRAM(s) Oral three times a day  heparin  Injectable 5000 Unit(s) SubCutaneous every 12 hours  hydrALAZINE 100 milliGRAM(s) Oral every 8 hours  insulin lispro (HumaLOG) corrective regimen sliding scale   SubCutaneous three times a day before meals  insulin lispro (HumaLOG) corrective regimen sliding scale   SubCutaneous at bedtime  labetalol 200 milliGRAM(s) Oral two times a day  lactobacillus acidophilus 1 Tablet(s) Oral two times a day with meals  levothyroxine 100 MICROGram(s) Oral daily  senna 1 Tablet(s) Oral at bedtime  sertraline 50 milliGRAM(s) Oral daily      PHYSICAL EXAM:  T(C): 36.6 (06-18-18 @ 08:47), Max: 37 (06-18-18 @ 00:00)  HR: 60 (06-18-18 @ 08:47) (57 - 62)  BP: 165/60 (06-18-18 @ 08:47) (161/66 - 185/56)  RR: 18 (06-18-18 @ 08:47) (18 - 19)  SpO2: 96% (06-18-18 @ 08:47) (93% - 99%)  Wt(kg): --  I&O's Summary    17 Jun 2018 07:01  -  18 Jun 2018 07:00  --------------------------------------------------------  IN: 1300 mL / OUT: 300 mL / NET: 1000 mL          Appearance: NAD	  HEENT:   Normal oral mucosa, left periorbital ecchymosis   Lymphatic: No lymphadenopathy , no edema  Cardiovascular: Normal S1 S2, No JVD, No murmurs , Peripheral pulses palpable 2+ bilaterally  Respiratory: Lungs clear to auscultation, normal effort 	  Gastrointestinal:  Soft, Non-tender, + BS	  Skin: No rashes, + ecchymoses,  Musculoskeletal: decreased range of motion and strength, L hip ecchymosis   Psychiatry:  Mood & affect appropriate  Ext: No edema      LABS:    CARDIAC MARKERS:                                8.7    5.05  )-----------( 246      ( 18 Jun 2018 07:54 )             28.8     06-18    132<L>  |  91<L>  |  43<H>  ----------------------------<  151<H>  5.9<H>   |  24  |  4.24<H>    Ca    8.8      18 Jun 2018 06:06  Phos  3.2     06-17  Mg     2.0     06-17      proBNP:   Lipid Profile:   HgA1c:   TSH:             TELEMETRY: Sr	    ECG:  	  RADIOLOGY:   DIAGNOSTIC TESTING:  [ ] Echocardiogram:  [ ]  Catheterization:  [ ] Stress Test:    OTHER:

## 2018-06-18 NOTE — PROVIDER CONTACT NOTE (OTHER) - ACTION/TREATMENT ORDERED:
Wanda PERES aware and acknowledged, give hydralazine 100mg PO now. Will continue to monitor and maintain safety.

## 2018-06-18 NOTE — CONSULT NOTE ADULT - SUBJECTIVE AND OBJECTIVE BOX
CHIEF COMPLAINT:  s/p Mechanical Fall, for loop recorder implant    HISTORY OF PRESENT ILLNESS:  This is a 71 y/o female with PMH of Anemia secondary to CKD, HFpEF, DM Type 2, ESRD on HD 3x/week, HTN, HLD, Hypothyroidism, MI 2014/2016, Left pericardial effusion, s/p Pleurodysis 2014, Anxiety, B/L Cataracts, Myomectomy, presented to ER s/p tripping with mechanical fall. Had CT without ICH or fracture, left periorbital preseptal hematoma. EPS consulted for loop recorder implant to r/o arrhythmic etiology.       Allergies    Avelox (Unknown)  fish (Hives; Rash)  shellfish (Unknown)    Intolerances    	    MEDICATIONS:  amLODIPine   Tablet 10 milliGRAM(s) Oral daily  aspirin enteric coated 81 milliGRAM(s) Oral daily  cloNIDine 0.2 milliGRAM(s) Oral three times a day  heparin  Injectable 5000 Unit(s) SubCutaneous every 12 hours  hydrALAZINE 100 milliGRAM(s) Oral every 8 hours  labetalol 200 milliGRAM(s) Oral two times a day  acetaminophen   Tablet. 500 milliGRAM(s) Oral every 6 hours PRN  acetaminophen  IVPB. 500 milliGRAM(s) IV Intermittent once  clonazePAM Tablet 0.5 milliGRAM(s) Oral at bedtime  gabapentin 100 milliGRAM(s) Oral three times a day  sertraline 50 milliGRAM(s) Oral daily  traMADol 50 milliGRAM(s) Oral daily PRN  docusate sodium 100 milliGRAM(s) Oral three times a day  senna 1 Tablet(s) Oral at bedtime  atorvastatin 20 milliGRAM(s) Oral at bedtime  dextrose 40% Gel 15 Gram(s) Oral once PRN  dextrose 50% Injectable 12.5 Gram(s) IV Push once  dextrose 50% Injectable 25 Gram(s) IV Push once  dextrose 50% Injectable 25 Gram(s) IV Push once  glucagon  Injectable 1 milliGRAM(s) IntraMuscular once PRN  insulin lispro (HumaLOG) corrective regimen sliding scale   SubCutaneous three times a day before meals  insulin lispro (HumaLOG) corrective regimen sliding scale   SubCutaneous at bedtime  levothyroxine 100 MICROGram(s) Oral daily  BACItracin   Ointment 1 Application(s) Topical two times a day  calcium acetate 667 milliGRAM(s) Oral three times a day with meals  dextrose 5%. 1000 milliLiter(s) IV Continuous <Continuous>      PAST MEDICAL & SURGICAL HISTORY:  [place] (hard of hearing)  Cataracts, bilateral: left worse than right  Pleural thickening: s/p pleurodisis left vats 2014  Hip pain: left  LBP radiating to left leg  CHF (congestive heart failure): diagnosed 2-2014  MI (myocardial infarction): 2-2014   cardiac cath done [facility]  Diabetes: diagnosed 2004  no medications in 5 months since hemodialysis  Hypothyroidism  ESRD (end stage renal disease)  HTN (hypertension)  MI (myocardial infarction): may 2016  Pleural effusion: Left side - several times since March 2014  Hypothyroid  Anxiety  HTN (hypertension)  Hyperlipidemia  CHF (congestive heart failure)  CKD (chronic kidney disease)  Anemia  Diabetes: DM 2  Thoracotomy scar of left chest: 4-2014 ? pneumonia/ scarring  S/P myomectomy: abdominal age 52  AV fistula: left upper 8-2014 attempted 12-16-14      FAMILY HISTORY:  No significant family history      SOCIAL HISTORY:    [ ] Non-smoker  [ ] Smoker  [ ] Alcohol      REVIEW OF SYSTEMS:  General: in NAD  Neuro: c/o of occipital headache; left vision decreased 2/2 periorbital edema  Respiratory: denies sob or cough  Cardiac: denies chest pain or palpitations  GI: denies N/V/abdominal pain  : denies dysuria or hematoma  Ext:  denies tingling or numbness    PHYSICAL EXAM:  T(C): 36.6 (06-18-18 @ 08:47), Max: 37 (06-18-18 @ 00:00)  HR: 60 (06-18-18 @ 08:47) (57 - 62)  BP: 165/60 (06-18-18 @ 08:47) (161/66 - 185/56)  RR: 18 (06-18-18 @ 08:47) (18 - 19)  SpO2: 96% (06-18-18 @ 08:47) (93% - 99%)  Wt(kg): --  I&O's Summary    17 Jun 2018 07:01  -  18 Jun 2018 07:00  --------------------------------------------------------  IN: 1300 mL / OUT: 300 mL / NET: 1000 mL        Appearance: Normal	  HEENT:  Normocephalic, atraumatic, normal oral mucosa	  Lymphatic: No lymphadenopathy  Cardiovascular: Normal S1 S2, No JVD, No murmurs, No edema  Respiratory: Lungs clear to auscultation	  Psychiatry: A & O x 3, Mood & affect appropriate  Gastrointestinal:  Soft, Non-tender, + BS	  Skin: No rashes, no cyanosis; left facial and left periorbital ecchymosis and swelling	  Neurologic: Non-focal  Extremities: Normal range of motion, No clubbing, cyanosis or edema; left AV fistula with (+) bruit and thrill  Vascular: Peripheral pulses palpable 2+ bilaterally        LABS:	 	    CBC Full  -  ( 18 Jun 2018 07:54 )  WBC Count : 5.05 K/uL  Hemoglobin : 8.7 g/dL  Hematocrit : 28.8 %  Platelet Count - Automated : 246 K/uL  Mean Cell Volume : 95.0 fl  Mean Cell Hemoglobin : 28.7 pg  Mean Cell Hemoglobin Concentration : 30.2 gm/dL  Auto Neutrophil # : x  Auto Lymphocyte # : x  Auto Monocyte # : x  Auto Eosinophil # : x  Auto Basophil # : x  Auto Neutrophil % : x  Auto Lymphocyte % : x  Auto Monocyte % : x  Auto Eosinophil % : x  Auto Basophil % : x    06-18    132<L>  |  91<L>  |  43<H>  ----------------------------<  151<H>  5.9<H>   |  24  |  4.24<H>  06-17    135  |  95<L>  |  25<H>  ----------------------------<  175<H>  5.0   |  29  |  2.99<H>    Ca    8.8      18 Jun 2018 06:06  Ca    8.6      17 Jun 2018 07:33  Phos  3.2     06-17  Mg     2.0     06-17      TELEMETRY: Sinus bradycardia/NSR/1st degree AV Block 50-60's, PAT @ 140 for 3 secs last night    ECG:  Sinus Bradycardia/1st Degree AV Block @ 56 bpm, LAD    Study Date: 6/15/2018    PROCEDURE: Transthoracic echocardiogram with 2-D, M-Mode  and complete spectral and color flow Doppler.  INDICATION: Syncope and collapse (R55)  ------------------------------------------------------------------------  Dimensions:    Normal Values:  LA:     3.8    2.0 - 4.0 cm  Ao:     2.5    2.0 - 3.8 cm  SEPTUM: 1.0    0.6 - 1.2 cm  PWT:    1.2    0.6 - 1.1 cm  LVIDd:  4.0    3.0 - 5.6 cm  LVIDs:  2.8    1.8 - 4.0 cm  Derived variables:  LVMI: 101 g/m2  RWT: 0.60  Fractional short: 30 %  EF (Teicholtz): 58 %  Doppler Peak Velocity (m/sec): AoV=1.8  ------------------------------------------------------------------------  Observations:  Mitral Valve: Thickened mitral valve. Mild mitral  regurgitation.  Aortic Valve/Aorta: Calcified trileaflet aortic valve with  decreased opening. Peak transaortic valve gradient equals  13 mm Hg, mean transaortic valve gradient equals 7 mm Hg,  estimated aortic valve area equals 1.9 sqcm (by continuity  equation), aortic valve velocity time integral equals 47  cm, consistent with mild aortic stenosis. Mild aortic  regurgitation.  Peak left ventricular outflow tract  gradient equals 3 mm Hg, mean gradient is equal to 2 mm Hg,  LVOT velocity time integral equals 24 cm.  Aortic Root: 2.5 cm.  LVOT diameter: 1.9 cm.  Left Atrium: Moderately dilated left atrium.  LA volume  index = 47 cc/m2.  Left Ventricle: Normal left ventricular systolic function.  Septal motion consistent with right ventricular overload.  Mild concentric left ventricular hypertrophy. Moderate  diastolic dysfunction (Stage II).  Right Heart: Moderate right atrial enlargement. Right  ventricular enlargement with normal right ventricular  systolic function. Tricuspid valve prolapse. Moderate  tricuspid regurgitation. Normal pulmonic valve. Mild  pulmonic regurgitation.  Pericardium/Pleura: Normal pericardium with no pericardial  effusion.  Hemodynamic: Estimated right atrial pressure is 8 mm Hg.  Estimated right ventricular systolic pressure equals 60 mm  Hg, assuming right atrial pressure equals 8 mm Hg,  consistent with moderate pulmonary hypertension.  ------------------------------------------------------------------------  Conclusions:  1. Calcified trileaflet aortic valve with decreased  opening. Peak transaortic valve gradient equals 13 mm Hg,  mean transaortic valve gradient equals 7 mm Hg, estimated  aortic valve area equals 1.9 sqcm (by continuity equation),  aortic valve velocity time integral equals 47 cm,  consistent with mild aortic stenosis.  2. Moderately dilated left atrium.  LA volume index = 47  cc/m2.  3. Mild concentric left ventricular hypertrophy.  4. Normal left ventricular systolic function. Septal motion  consistent with right ventricular overload.  5. Moderate diastolic dysfunction (Stage II).  6. Moderate right atrial enlargement.  7. Right ventricular enlargement with normal right  ventricular systolic function.  8. Tricuspid valve prolapse. Moderate tricuspid  regurgitation.  9. Estimated pulmonary artery systolic pressure equals 60  mm Hg, assuming right atrial pressure equals 8 mm Hg,  consistent with moderate pulmonary pressures.  *** Compared with echocardiogram of 3/21/2016, no  significant changes noted.  ------------------------------------------------------------------------  Confirmed on  6/15/2018 - 10:08:17 by Ezra Nicholas M.D.  ------------------------------------------------------------------------      EXAM:  CT MAXILLOFACIAL                          EXAM:  CT BRAIN                          EXAM:  CT CERVICAL SPINE                            PROCEDURE DATE:  06/14/2018            INTERPRETATION:  HISTORY: Status post fall. Head trauma, facial trauma,   spine trauma. Left eye hematoma. Facial fractures. Head, facial, neck   pain..    Description: A noncontrast head CT, noncontrast maxillofacial CT, and a   noncontrast cervical spine CT were performed. The head CT was performed   with 5 mm axial images. The cervical spine CT study was performed with   axial thin section images with sagittal and coronal reformatted series.   The maxillofacial CT was performed axial thin section images with coronal   reformatted series.    Head CT:    A rightparietal scalp hematoma and left frontal scalp hematoma are   noted. A left periorbital preseptal hematoma is present. Bilateral nasal   fractures are noted. Please see separate maxillofacial CT report. There   is no associated calvarial fracture or acute intracranial hemorrhage.    There is no evidence for calvarial fracture, acute hemorrhage, acute   cortical infarct, mass effect, or hydrocephalus. Mild chronic white   matter changes and cerebral volume loss are noted.    The gray matter white matter junction is preserved.     The visualized portions of the paranasal sinuses and mastoid air cells   are clear.    Cervical spine CT:    Grade 1 anterior subluxation of C3 on C4 is present, secondary to   advanced facet degenerative changes. Moderate disc space narrowing with   cystic endplate degenerative changes are also noted at this level. The   disc bulge, subluxation, facet degenerative changes, and ligamentum   flavum is perched result in moderate to severe central spinal canal   stenosis at the C3-C4 level and high-grade bilateral neural foraminal   narrowing.     Disc bulges, facet degenerative changes, and ligamentum flavum   hypertrophy are noted throughout the remainder of the cervical spine,   with less extensive spinal canal stenosis compared to the C3-C4 level.    There is no evidence for acute fracture or prevertebral hematoma.    Maxillofacial CT:    A left periorbital preseptal hematoma is present without associated   orbital fracture or globe rupture. There is no retrobulbar hematoma.    Bilateral nasal fractures are noted, age indeterminate.    A retention cyst or polyp involves the floor of the right maxillary   sinus. Mild mucosal thickening involves paranasal sinuses without   associated air-fluid levels.    The left mastoid air cells are partially opacified. Soft tissue   thickening involves the left external auditory canal. Correlate with   history and physical exam findings. No displaced fracture is appreciated   through the temporal bone.    C3-C4 grade 1 subluxation is noted. Please see separate cervical spine CT   report.    IMPRESSION:    1. On the head CT, there is no evidence for calvarial fracture or acute   intracranial hemorrhage. If symptoms persist, consider short interval   follow-up head CT or brain MRI follow-up if there are no MRI   contraindications.  2. On the cervical spine CT, there is no evidence for acute cervical   spine fracture. Degenerative changes and grade 1 anterior subluxation of   C3 on C4 is present with associated moderate to high-grade spinal canal   stenosis.   3. On the maxillofacial CT, age-indeterminate bilateral nasal fractures   are present. A left periorbital preseptal hematoma is noted without   associated orbital fracture.

## 2018-06-18 NOTE — CONSULT NOTE ADULT - PROBLEM SELECTOR RECOMMENDATION 9
For loop recorder implant this afternoon  Patient can have lunch  Procedure d/w patient, questions answered  Continue current medications  Continue telemetry monitoring  Monitor BMP levels, supplement as needed
Unclear etiology   Admit to Tele  Check orthostatics and TTE   Thyroid panel

## 2018-06-19 LAB
ANION GAP SERPL CALC-SCNC: 20 MMOL/L — HIGH (ref 5–17)
BUN SERPL-MCNC: 62 MG/DL — HIGH (ref 7–23)
CALCIUM SERPL-MCNC: 8 MG/DL — LOW (ref 8.4–10.5)
CHLORIDE SERPL-SCNC: 86 MMOL/L — LOW (ref 96–108)
CO2 SERPL-SCNC: 24 MMOL/L — SIGNIFICANT CHANGE UP (ref 22–31)
CREAT SERPL-MCNC: 5.17 MG/DL — HIGH (ref 0.5–1.3)
CRP SERPL-MCNC: 6.4 MG/DL — HIGH (ref 0–0.4)
ERYTHROCYTE [SEDIMENTATION RATE] IN BLOOD: 51 MM/HR — HIGH (ref 0–20)
FOLATE SERPL-MCNC: 8.9 NG/ML — SIGNIFICANT CHANGE UP
GLUCOSE BLDC GLUCOMTR-MCNC: 101 MG/DL — HIGH (ref 70–99)
GLUCOSE BLDC GLUCOMTR-MCNC: 121 MG/DL — HIGH (ref 70–99)
GLUCOSE BLDC GLUCOMTR-MCNC: 151 MG/DL — HIGH (ref 70–99)
GLUCOSE BLDC GLUCOMTR-MCNC: 264 MG/DL — HIGH (ref 70–99)
GLUCOSE SERPL-MCNC: 218 MG/DL — HIGH (ref 70–99)
HCT VFR BLD CALC: 24.9 % — LOW (ref 34.5–45)
HGB BLD-MCNC: 7.8 G/DL — LOW (ref 11.5–15.5)
MCHC RBC-ENTMCNC: 29.2 PG — SIGNIFICANT CHANGE UP (ref 27–34)
MCHC RBC-ENTMCNC: 31.3 GM/DL — LOW (ref 32–36)
MCV RBC AUTO: 93.3 FL — SIGNIFICANT CHANGE UP (ref 80–100)
PLATELET # BLD AUTO: 216 K/UL — SIGNIFICANT CHANGE UP (ref 150–400)
POTASSIUM SERPL-MCNC: 6.2 MMOL/L — CRITICAL HIGH (ref 3.5–5.3)
POTASSIUM SERPL-SCNC: 6.2 MMOL/L — CRITICAL HIGH (ref 3.5–5.3)
RBC # BLD: 2.67 M/UL — LOW (ref 3.8–5.2)
RBC # FLD: 17.5 % — HIGH (ref 10.3–14.5)
SODIUM SERPL-SCNC: 130 MMOL/L — LOW (ref 135–145)
VIT B12 SERPL-MCNC: 490 PG/ML — SIGNIFICANT CHANGE UP (ref 232–1245)
WBC # BLD: 8.22 K/UL — SIGNIFICANT CHANGE UP (ref 3.8–10.5)
WBC # FLD AUTO: 8.22 K/UL — SIGNIFICANT CHANGE UP (ref 3.8–10.5)

## 2018-06-19 PROCEDURE — 70547 MR ANGIOGRAPHY NECK W/O DYE: CPT | Mod: 26

## 2018-06-19 PROCEDURE — 70551 MRI BRAIN STEM W/O DYE: CPT | Mod: 26

## 2018-06-19 PROCEDURE — 72141 MRI NECK SPINE W/O DYE: CPT | Mod: 26

## 2018-06-19 PROCEDURE — 70450 CT HEAD/BRAIN W/O DYE: CPT | Mod: 26

## 2018-06-19 RX ORDER — AMLODIPINE BESYLATE 2.5 MG/1
5 TABLET ORAL ONCE
Qty: 0 | Refills: 0 | Status: COMPLETED | OUTPATIENT
Start: 2018-06-19 | End: 2018-06-19

## 2018-06-19 RX ORDER — HYDROMORPHONE HYDROCHLORIDE 2 MG/ML
0.25 INJECTION INTRAMUSCULAR; INTRAVENOUS; SUBCUTANEOUS ONCE
Qty: 0 | Refills: 0 | Status: DISCONTINUED | OUTPATIENT
Start: 2018-06-19 | End: 2018-06-19

## 2018-06-19 RX ORDER — ACETAMINOPHEN 500 MG
750 TABLET ORAL ONCE
Qty: 0 | Refills: 0 | Status: COMPLETED | OUTPATIENT
Start: 2018-06-19 | End: 2018-06-19

## 2018-06-19 RX ORDER — ONDANSETRON 8 MG/1
8 TABLET, FILM COATED ORAL EVERY 8 HOURS
Qty: 0 | Refills: 0 | Status: DISCONTINUED | OUTPATIENT
Start: 2018-06-19 | End: 2018-06-19

## 2018-06-19 RX ORDER — ONDANSETRON 8 MG/1
8 TABLET, FILM COATED ORAL EVERY 8 HOURS
Qty: 0 | Refills: 0 | Status: DISCONTINUED | OUTPATIENT
Start: 2018-06-19 | End: 2018-06-25

## 2018-06-19 RX ORDER — LIDOCAINE 4 G/100G
1 CREAM TOPICAL DAILY
Qty: 0 | Refills: 0 | Status: DISCONTINUED | OUTPATIENT
Start: 2018-06-19 | End: 2018-06-25

## 2018-06-19 RX ADMIN — HEPARIN SODIUM 5000 UNIT(S): 5000 INJECTION INTRAVENOUS; SUBCUTANEOUS at 18:32

## 2018-06-19 RX ADMIN — Medication 100 MILLIGRAM(S): at 21:28

## 2018-06-19 RX ADMIN — TRAMADOL HYDROCHLORIDE 50 MILLIGRAM(S): 50 TABLET ORAL at 00:30

## 2018-06-19 RX ADMIN — Medication 500 MILLIGRAM(S): at 23:30

## 2018-06-19 RX ADMIN — Medication 2: at 07:54

## 2018-06-19 RX ADMIN — Medication 500 MILLIGRAM(S): at 16:55

## 2018-06-19 RX ADMIN — Medication 500 MILLIGRAM(S): at 07:00

## 2018-06-19 RX ADMIN — TRAMADOL HYDROCHLORIDE 50 MILLIGRAM(S): 50 TABLET ORAL at 00:00

## 2018-06-19 RX ADMIN — Medication 1 TABLET(S): at 07:54

## 2018-06-19 RX ADMIN — Medication 667 MILLIGRAM(S): at 13:05

## 2018-06-19 RX ADMIN — Medication 1 APPLICATION(S): at 05:18

## 2018-06-19 RX ADMIN — ATORVASTATIN CALCIUM 20 MILLIGRAM(S): 80 TABLET, FILM COATED ORAL at 21:28

## 2018-06-19 RX ADMIN — Medication 1 APPLICATION(S): at 18:32

## 2018-06-19 RX ADMIN — Medication 100 MILLIGRAM(S): at 16:09

## 2018-06-19 RX ADMIN — HYDROMORPHONE HYDROCHLORIDE 0.25 MILLIGRAM(S): 2 INJECTION INTRAMUSCULAR; INTRAVENOUS; SUBCUTANEOUS at 08:25

## 2018-06-19 RX ADMIN — Medication 0.2 MILLIGRAM(S): at 11:54

## 2018-06-19 RX ADMIN — Medication 200 MILLIGRAM(S): at 07:53

## 2018-06-19 RX ADMIN — Medication 667 MILLIGRAM(S): at 17:01

## 2018-06-19 RX ADMIN — HYDROMORPHONE HYDROCHLORIDE 0.25 MILLIGRAM(S): 2 INJECTION INTRAMUSCULAR; INTRAVENOUS; SUBCUTANEOUS at 08:45

## 2018-06-19 RX ADMIN — Medication 500 MILLIGRAM(S): at 16:09

## 2018-06-19 RX ADMIN — AMLODIPINE BESYLATE 10 MILLIGRAM(S): 2.5 TABLET ORAL at 07:54

## 2018-06-19 RX ADMIN — Medication 500 MILLIGRAM(S): at 22:42

## 2018-06-19 RX ADMIN — Medication 0.3 MILLIGRAM(S): at 21:28

## 2018-06-19 RX ADMIN — AMLODIPINE BESYLATE 5 MILLIGRAM(S): 2.5 TABLET ORAL at 11:07

## 2018-06-19 RX ADMIN — Medication 0.5 MILLIGRAM(S): at 21:30

## 2018-06-19 RX ADMIN — GABAPENTIN 100 MILLIGRAM(S): 400 CAPSULE ORAL at 21:28

## 2018-06-19 RX ADMIN — SERTRALINE 50 MILLIGRAM(S): 25 TABLET, FILM COATED ORAL at 13:05

## 2018-06-19 RX ADMIN — HEPARIN SODIUM 5000 UNIT(S): 5000 INJECTION INTRAVENOUS; SUBCUTANEOUS at 05:19

## 2018-06-19 RX ADMIN — Medication 300 MILLIGRAM(S): at 00:32

## 2018-06-19 RX ADMIN — Medication 1 TABLET(S): at 17:01

## 2018-06-19 RX ADMIN — Medication 100 MICROGRAM(S): at 05:18

## 2018-06-19 RX ADMIN — Medication 100 MILLIGRAM(S): at 10:51

## 2018-06-19 RX ADMIN — Medication 0.3 MILLIGRAM(S): at 16:09

## 2018-06-19 RX ADMIN — Medication 81 MILLIGRAM(S): at 13:05

## 2018-06-19 RX ADMIN — Medication 750 MILLIGRAM(S): at 01:15

## 2018-06-19 RX ADMIN — Medication 100 MILLIGRAM(S): at 16:08

## 2018-06-19 RX ADMIN — Medication 200 MILLIGRAM(S): at 21:28

## 2018-06-19 RX ADMIN — GABAPENTIN 100 MILLIGRAM(S): 400 CAPSULE ORAL at 05:18

## 2018-06-19 RX ADMIN — Medication 6: at 17:01

## 2018-06-19 RX ADMIN — Medication 667 MILLIGRAM(S): at 07:54

## 2018-06-19 RX ADMIN — Medication 0.2 MILLIGRAM(S): at 05:18

## 2018-06-19 RX ADMIN — Medication 100 MILLIGRAM(S): at 05:18

## 2018-06-19 RX ADMIN — Medication 500 MILLIGRAM(S): at 05:18

## 2018-06-19 RX ADMIN — GABAPENTIN 100 MILLIGRAM(S): 400 CAPSULE ORAL at 16:09

## 2018-06-19 NOTE — CONSULT NOTE ADULT - SUBJECTIVE AND OBJECTIVE BOX
Patient is a 72y old  Female who presents with a chief complaint of falls and ha    HPI:  71 yo F presenst with falls. she states that her first fall occured 2 weeks ago where she tripped, the last fall occured one week ago, again tripping ( she does not use her walker as per family). She denies any LOC with these episodes. she has been complaining of a ha since her last fall last week, it is holocephalic, throbbing, severe, constant and progressing since yesterday. pain is not relieved with tramadol, and not exacebrated with any particular activity. No associated visual loss. no fever    PAST MEDICAL & SURGICAL HISTORY:  [place] (hard of hearing)  Cataracts, bilateral: left worse than right  Pleural thickening: s/p pleurodisis left vats 2014  Hip pain: left  LBP radiating to left leg  CHF (congestive heart failure): diagnosed 2-2014  MI (myocardial infarction): 2-2014   cardiac cath done [facility]  Diabetes: diagnosed 2004  no medications in 5 months since hemodialysis  Hypothyroidism  ESRD (end stage renal disease)  HTN (hypertension)  MI (myocardial infarction): may 2016  Pleural effusion: Left side - several times since March 2014  Hypothyroid  Anxiety  HTN (hypertension)  Hyperlipidemia  CHF (congestive heart failure)  CKD (chronic kidney disease)  Anemia  Diabetes: DM 2  Thoracotomy scar of left chest: 4-2014 ? pneumonia/ scarring  S/P myomectomy: abdominal age 52  AV fistula: left upper 8-2014 attempted 12-16-14    FAMILY HISTORY:  No hx aneursym in family history    Social Hx:  Nonsmoker, no drug or alcohol use  MEDICATIONS  (STANDING):  acetaminophen  IVPB. 500 milliGRAM(s) IV Intermittent once  amLODIPine   Tablet 10 milliGRAM(s) Oral daily  aspirin enteric coated 81 milliGRAM(s) Oral daily  atorvastatin 20 milliGRAM(s) Oral at bedtime  BACItracin   Ointment 1 Application(s) Topical two times a day  calcium acetate 667 milliGRAM(s) Oral three times a day with meals  clonazePAM Tablet 0.5 milliGRAM(s) Oral at bedtime  cloNIDine 0.2 milliGRAM(s) Oral three times a day  dextrose 5%. 1000 milliLiter(s) (50 mL/Hr) IV Continuous <Continuous>  dextrose 50% Injectable 12.5 Gram(s) IV Push once  dextrose 50% Injectable 25 Gram(s) IV Push once  dextrose 50% Injectable 25 Gram(s) IV Push once  docusate sodium 100 milliGRAM(s) Oral three times a day  gabapentin 100 milliGRAM(s) Oral three times a day  heparin  Injectable 5000 Unit(s) SubCutaneous every 12 hours  hydrALAZINE 100 milliGRAM(s) Oral every 8 hours  insulin lispro (HumaLOG) corrective regimen sliding scale   SubCutaneous three times a day before meals  insulin lispro (HumaLOG) corrective regimen sliding scale   SubCutaneous at bedtime  labetalol 200 milliGRAM(s) Oral two times a day  lactobacillus acidophilus 1 Tablet(s) Oral two times a day with meals  levothyroxine 100 MICROGram(s) Oral daily  senna 1 Tablet(s) Oral at bedtime  sertraline 50 milliGRAM(s) Oral daily    MEDICATIONS  (PRN):  acetaminophen   Tablet. 500 milliGRAM(s) Oral every 6 hours PRN Mild Pain (1 - 3)  dextrose 40% Gel 15 Gram(s) Oral once PRN Blood Glucose LESS THAN 70 milliGRAM(s)/deciliter  glucagon  Injectable 1 milliGRAM(s) IntraMuscular once PRN Glucose LESS THAN 70 milligrams/deciliter  traMADol 50 milliGRAM(s) Oral daily PRN Moderate Pain (4 - 6)    Allergies    Avelox (Unknown)  fish (Hives; Rash)  shellfish (Unknown)    Intolerances      ROS: all other ROS were reviewed and are negative.    Vital Signs Last 24 Hrs  T(C): 36.8 (19 Jun 2018 07:49), Max: 37 (18 Jun 2018 13:23)  T(F): 98.2 (19 Jun 2018 07:49), Max: 98.6 (18 Jun 2018 13:23)  HR: 62 (19 Jun 2018 08:24) (61 - 65)  BP: 174/65 (19 Jun 2018 08:24) (155/63 - 200/80)  BP(mean): --  RR: 18 (19 Jun 2018 07:49) (18 - 20)  SpO2: 97% (19 Jun 2018 07:49) (93% - 100%)  GENERAL EXAM:  Constitutional: awake and alert. NAD  HEENT: echymosis L periorbiatl region  Neck: Supple  Respiratory: Breath sounds are clear bilaterally  Cardiovascular: S1 and S2, regular  rhythm  Gastrointestinal: soft, nontender  Extremities: no edema, no cyanosis  Vascular: no carotid bruits  Musculoskeletal: no joint swelling/tenderness, no abnormal movements  Skin: no rashes  NEUROLOGICAL EXAM:  MS: AAOX3, speech fluent, no dysarthriaattends b/l; recent and remote memory intact; normal concnentration, attention, language and fund of knowledge.   CN: VFF, EOMI, Pupil surgical b/L,   V1-3 intact, no facial asymmetry,hearing intact, tongue/palate midline, SCM/trap intact.  Fundi: poorly visualized optic disk  Motor: Strength: 5/5 4x. Tone: normal. Bulk: normal. DTR 1+ symm.  Plantar flex b/l. Sensation: intact to LT/PP/Vibration/Position/Temperature 4x.   Coordination: intact 4x.   Gait:  unsteady    Labs:                         8.7    5.05  )-----------( 246      ( 18 Jun 2018 07:54 )             28.8     06-18    132<L>  |  91<L>  |  43<H>  ----------------------------<  151<H>  5.9<H>   |  24  |  4.24<H>    Ca    8.8      18 Jun 2018 06:06          06-15 [id number] 8.9        CAPILLARY BLOOD GLUCOSE      POCT Blood Glucose.: 151 mg/dL (19 Jun 2018 07:18)        Radiology:  -CT Head:- no acute pathology, I also personally reviewed the film  -MRI brain  -MRA brain/Carotids  -EEG

## 2018-06-19 NOTE — PROGRESS NOTE ADULT - SUBJECTIVE AND OBJECTIVE BOX
Patient is a 72y old  Female who presented with a chief complaint of falls (14 Jun 2018 12:00)      INTERVAL HPI/OVERNIGHT EVENTS:  no nausea or vomiting  received KAyexelate yesterday followed by 3 loose stools    MEDICATIONS  (STANDING):  acetaminophen  IVPB. 500 milliGRAM(s) IV Intermittent once  amLODIPine   Tablet 10 milliGRAM(s) Oral daily  aspirin enteric coated 81 milliGRAM(s) Oral daily  atorvastatin 20 milliGRAM(s) Oral at bedtime  BACItracin   Ointment 1 Application(s) Topical two times a day  calcium acetate 667 milliGRAM(s) Oral three times a day with meals  clonazePAM Tablet 0.5 milliGRAM(s) Oral at bedtime  cloNIDine 0.2 milliGRAM(s) Oral three times a day  dextrose 5%. 1000 milliLiter(s) (50 mL/Hr) IV Continuous <Continuous>  dextrose 50% Injectable 12.5 Gram(s) IV Push once  dextrose 50% Injectable 25 Gram(s) IV Push once  dextrose 50% Injectable 25 Gram(s) IV Push once  docusate sodium 100 milliGRAM(s) Oral three times a day  gabapentin 100 milliGRAM(s) Oral three times a day  heparin  Injectable 5000 Unit(s) SubCutaneous every 12 hours  hydrALAZINE 100 milliGRAM(s) Oral every 8 hours  insulin lispro (HumaLOG) corrective regimen sliding scale   SubCutaneous three times a day before meals  insulin lispro (HumaLOG) corrective regimen sliding scale   SubCutaneous at bedtime  labetalol 200 milliGRAM(s) Oral two times a day  lactobacillus acidophilus 1 Tablet(s) Oral two times a day with meals  levothyroxine 100 MICROGram(s) Oral daily  senna 1 Tablet(s) Oral at bedtime  sertraline 50 milliGRAM(s) Oral daily    MEDICATIONS  (PRN):  acetaminophen   Tablet. 500 milliGRAM(s) Oral every 6 hours PRN Mild Pain (1 - 3)  dextrose 40% Gel 15 Gram(s) Oral once PRN Blood Glucose LESS THAN 70 milliGRAM(s)/deciliter  glucagon  Injectable 1 milliGRAM(s) IntraMuscular once PRN Glucose LESS THAN 70 milligrams/deciliter  traMADol 50 milliGRAM(s) Oral daily PRN Moderate Pain (4 - 6)      Allergies    Avelox (Unknown)  fish (Hives; Rash)  shellfish (Unknown)      Review of Systems:  General:  No wt loss, fevers, chills, night sweats,fatigue,   CV:  No pain, palpitatioins, hypo/hypertension  Resp:  No dyspnea, cough, tachypnea, wheezing  :  No pain, bleeding, incontinence, nocturia  Muscle:  +LE weakness  Heme:  No petechiae, easy bruisability    Vital Signs Last 24 Hrs  T(C): 36.8 (19 Jun 2018 07:49), Max: 37 (18 Jun 2018 13:23)  T(F): 98.2 (19 Jun 2018 07:49), Max: 98.6 (18 Jun 2018 13:23)  HR: 62 (19 Jun 2018 08:24) (61 - 65)  BP: 174/65 (19 Jun 2018 08:24) (155/63 - 200/80)  BP(mean): --  RR: 18 (19 Jun 2018 07:49) (18 - 20)  SpO2: 97% (19 Jun 2018 07:49) (93% - 100%)    PHYSICAL EXAM:  Constitutional: NAD, sitting up in bed +laceration above left eye with boni-orbital healing ecchymosis and some  left subconjunctival hemorrhage  Neck: No LAD, supple, no JVD  Respiratory: decreased BS bases  Cardiovascular: S1 and S2 RRR  Gastrointestinal: BS+, soft, NT/ND, neg HSM,  Extremities: No peripheral edema, neg clubbing, cyanosis +LUE AV fistula  Skin: anicteric, see above    LABS:                        8.7    5.05  )-----------( 246      ( 18 Jun 2018 07:54 )             28.8     06-18    132<L>  |  91<L>  |  43<H>  ----------------------------<  151<H>  5.9<H>   |  24  |  4.24<H>    Ca    8.8      18 Jun 2018 06:06      RADIOLOGY & ADDITIONAL TESTS: Patient is a 72y old  Female who presented with a chief complaint of falls (14 Jun 2018 12:00)    INTERVAL HPI/OVERNIGHT EVENTS:  no nausea or vomiting  received KAyexelate yesterday followed by 3 loose stools    MEDICATIONS  (STANDING):  acetaminophen  IVPB. 500 milliGRAM(s) IV Intermittent once  amLODIPine   Tablet 10 milliGRAM(s) Oral daily  aspirin enteric coated 81 milliGRAM(s) Oral daily  atorvastatin 20 milliGRAM(s) Oral at bedtime  BACItracin   Ointment 1 Application(s) Topical two times a day  calcium acetate 667 milliGRAM(s) Oral three times a day with meals  clonazePAM Tablet 0.5 milliGRAM(s) Oral at bedtime  cloNIDine 0.2 milliGRAM(s) Oral three times a day  dextrose 5%. 1000 milliLiter(s) (50 mL/Hr) IV Continuous <Continuous>  dextrose 50% Injectable 12.5 Gram(s) IV Push once  dextrose 50% Injectable 25 Gram(s) IV Push once  dextrose 50% Injectable 25 Gram(s) IV Push once  docusate sodium 100 milliGRAM(s) Oral three times a day  gabapentin 100 milliGRAM(s) Oral three times a day  heparin  Injectable 5000 Unit(s) SubCutaneous every 12 hours  hydrALAZINE 100 milliGRAM(s) Oral every 8 hours  insulin lispro (HumaLOG) corrective regimen sliding scale   SubCutaneous three times a day before meals  insulin lispro (HumaLOG) corrective regimen sliding scale   SubCutaneous at bedtime  labetalol 200 milliGRAM(s) Oral two times a day  lactobacillus acidophilus 1 Tablet(s) Oral two times a day with meals  levothyroxine 100 MICROGram(s) Oral daily  senna 1 Tablet(s) Oral at bedtime  sertraline 50 milliGRAM(s) Oral daily    MEDICATIONS  (PRN):  acetaminophen   Tablet. 500 milliGRAM(s) Oral every 6 hours PRN Mild Pain (1 - 3)  dextrose 40% Gel 15 Gram(s) Oral once PRN Blood Glucose LESS THAN 70 milliGRAM(s)/deciliter  glucagon  Injectable 1 milliGRAM(s) IntraMuscular once PRN Glucose LESS THAN 70 milligrams/deciliter  traMADol 50 milliGRAM(s) Oral daily PRN Moderate Pain (4 - 6)    Allergies  Avelox (Unknown)  fish (Hives; Rash)  shellfish (Unknown)    Review of Systems:  General:  No wt loss, fevers, chills, night sweats,fatigue,   CV:  No pain, palpitatioins, hypo/hypertension  Resp:  No dyspnea, cough, tachypnea, wheezing  :  No pain, bleeding, incontinence, nocturia  Muscle:  +LE weakness  Heme:  No petechiae, easy bruisability    Vital Signs Last 24 Hrs  T(C): 36.8 (19 Jun 2018 07:49), Max: 37 (18 Jun 2018 13:23)  T(F): 98.2 (19 Jun 2018 07:49), Max: 98.6 (18 Jun 2018 13:23)  HR: 62 (19 Jun 2018 08:24) (61 - 65)  BP: 174/65 (19 Jun 2018 08:24) (155/63 - 200/80)  BP(mean): --  RR: 18 (19 Jun 2018 07:49) (18 - 20)  SpO2: 97% (19 Jun 2018 07:49) (93% - 100%)    PHYSICAL EXAM:  Constitutional: NAD, sitting up in bed +laceration above left eye with boni-orbital healing ecchymosis and some  left subconjunctival hemorrhage  Neck: No LAD, supple, no JVD  Respiratory: decreased BS bases  Cardiovascular: S1 and S2 RRR  Gastrointestinal: BS+, soft, NT/ND, neg HSM,  Extremities: No peripheral edema, neg clubbing, cyanosis +LUE AV fistula  Skin: anicteric, see above    LABS:                        8.7    5.05  )-----------( 246      ( 18 Jun 2018 07:54 )             28.8     06-18    132<L>  |  91<L>  |  43<H>  ----------------------------<  151<H>  5.9<H>   |  24  |  4.24<H>    Ca    8.8      18 Jun 2018 06:06    RADIOLOGY & ADDITIONAL TESTS:

## 2018-06-19 NOTE — PROGRESS NOTE ADULT - SUBJECTIVE AND OBJECTIVE BOX
Patient is a 72y old  Female who presents with a chief complaint of falls (14 Jun 2018 12:00)      SUBJECTIVE / OVERNIGHT EVENTS: c/o neck pain.  Review of Systems  chest pain no  palpitations no  sob no  nausea no  headache no    MEDICATIONS  (STANDING):  acetaminophen  IVPB. 500 milliGRAM(s) IV Intermittent once  amLODIPine   Tablet 10 milliGRAM(s) Oral daily  aspirin enteric coated 81 milliGRAM(s) Oral daily  atorvastatin 20 milliGRAM(s) Oral at bedtime  BACItracin   Ointment 1 Application(s) Topical two times a day  calcium acetate 667 milliGRAM(s) Oral three times a day with meals  clonazePAM Tablet 0.5 milliGRAM(s) Oral at bedtime  cloNIDine 0.3 milliGRAM(s) Oral every 8 hours  dextrose 5%. 1000 milliLiter(s) (50 mL/Hr) IV Continuous <Continuous>  dextrose 50% Injectable 12.5 Gram(s) IV Push once  dextrose 50% Injectable 25 Gram(s) IV Push once  dextrose 50% Injectable 25 Gram(s) IV Push once  docusate sodium 100 milliGRAM(s) Oral three times a day  gabapentin 100 milliGRAM(s) Oral three times a day  heparin  Injectable 5000 Unit(s) SubCutaneous every 12 hours  hydrALAZINE 100 milliGRAM(s) Oral every 8 hours  insulin lispro (HumaLOG) corrective regimen sliding scale   SubCutaneous three times a day before meals  insulin lispro (HumaLOG) corrective regimen sliding scale   SubCutaneous at bedtime  labetalol 200 milliGRAM(s) Oral two times a day  lactobacillus acidophilus 1 Tablet(s) Oral two times a day with meals  levothyroxine 100 MICROGram(s) Oral daily  sertraline 50 milliGRAM(s) Oral daily    MEDICATIONS  (PRN):  acetaminophen   Tablet. 500 milliGRAM(s) Oral every 6 hours PRN Mild Pain (1 - 3)  dextrose 40% Gel 15 Gram(s) Oral once PRN Blood Glucose LESS THAN 70 milliGRAM(s)/deciliter  glucagon  Injectable 1 milliGRAM(s) IntraMuscular once PRN Glucose LESS THAN 70 milligrams/deciliter  traMADol 50 milliGRAM(s) Oral daily PRN Moderate Pain (4 - 6)          PHYSICAL EXAM:  GENERAL: NAD, well-developed  HEAD:  L periorbital echymosis improving , Normocephalic  EYES: EOMI, PERRLA, conjunctiva and sclera clear  NECK: Supple, No JVD  CHEST/LUNG: Clear to auscultation bilaterally; No wheeze  HEART: Regular rate and rhythm; No murmurs, rubs, or gallops  ABDOMEN: Soft, Nontender, Nondistended; Bowel sounds present  EXTREMITIES:  2+ Peripheral Pulses, No clubbing, cyanosis, or edema  PSYCH: AAOx3  NEUROLOGY: non-focal  SKIN: No rashes or lesions    LABS:                        7.8    8.22  )-----------( 216      ( 19 Jun 2018 11:13 )             24.9     06-19    130<L>  |  86<L>  |  62<H>  ----------------------------<  218<H>  6.2<HH>   |  24  |  5.17<H>    Ca    8.0<L>      19 Jun 2018 09:55                RADIOLOGY & ADDITIONAL TESTS:    Imaging Personally Reviewed:    Consultant(s) Notes Reviewed:      Care Discussed with Consultants/Other Providers:

## 2018-06-19 NOTE — PROGRESS NOTE ADULT - ASSESSMENT
71 yo female s/p fall noted to have a self limited episode of nausea, vomiting and diarrhea.  ?gastroenteritis vs food poisoning.  No further symptoms.   Tolerating Po.    - Continue to monitor symptoms.  Loose stools yesterday likely due to PO Kayexelate  - will discontinue Senna, continue Colace  - Continue probiotic  - HD per renal    Further management as per primary team    Matteo Fraga PA-C    [facility]  [phone] 71 yo female s/p fall noted to have a self limited episode of nausea, vomiting and diarrhea.  ?gastroenteritis vs food poisoning.  No further symptoms.   Tolerating Po.    Rec  - Continue to monitor symptoms.  Loose stools yesterday likely due to PO Kayexelate  - will discontinue Senna, continue Colace  - Continue probiotic  - HD per renal    Further management as per primary team    Matteo Fraga PA-C    [facility]  [phone]

## 2018-06-19 NOTE — PROGRESS NOTE ADULT - SUBJECTIVE AND OBJECTIVE BOX
No CP, SOB, HA, high BP during HD, now improved    Vital Signs Last 24 Hrs T(C): 36.8 (06-19-18 @ 12:45), Max: 37.2 (06-19-18 @ 08:45) T(F): 98.2 (06-19-18 @ 12:45), Max: 99 (06-19-18 @ 08:45) HR: 62 (06-19-18 @ 12:45) (61 - 65) BP: 162/85 (06-19-18 @ 12:45) (162/85 - 200/80) RR: 18 (06-19-18 @ 12:45) (18 - 20) SpO2: 98% (06-19-18 @ 12:45) (93% - 100%)   GENERAL: NAD  HEAD:  L periorbital ecchymosis   EYES: sclera anicteric  NECK: supple, No JVD  CHEST/LUNG: clear to auscultation bilaterally; no rales, rhonchi, or wheezing b/l  HEART: normal S1, S2  ABDOMEN: BS+, soft, ND, NT   EXTREMITIES: no clubbing, cyanosis, or edema b/l LEs, LUE AVF + thrill, bruit  NEURO: awake, alert	       Labs and Results:                               7.8   8.22  )-----------( 216      ( 19 Jun 2018 11:13 )            24.9   19 Jun 2018 09:55  130    |  86     |  62    ----------------------------<  218   6.2     |  24     |  5.17   Ca    8.0        19 Jun 2018 09:55 	    Assessment and Plan:     Patient is 73yo W w/DM, HTN, ASHD, ESRD on HD TTS, adm s/p trip and fall at home  ESRD, HD TTS  Heparin free  2 kg removed w/HD today  Diabetes control  Renal, DM diet  Will f/u K in am  Chronic LBP, frequent falls, HA  For MRI   Increased Clonidine to 0.3 mg TID  Will f/u BP

## 2018-06-19 NOTE — PROVIDER CONTACT NOTE (CRITICAL VALUE NOTIFICATION) - ACTION/TREATMENT ORDERED:
Dialysis notified of K 6.2 and stated patient already on appropriate bath to lower K+. Will continue to monitor.

## 2018-06-19 NOTE — PROGRESS NOTE ADULT - SUBJECTIVE AND OBJECTIVE BOX
Subjective: Patient seen and examined. No new events except as noted.   s/p ILR yesterday   feels weak   REVIEW OF SYSTEMS:    CONSTITUTIONAL: + weakness, fevers or chills  EYES/ENT: No visual changes;  No vertigo or throat pain   NECK: No pain or stiffness  RESPIRATORY: No cough, wheezing, hemoptysis; No shortness of breath  CARDIOVASCULAR: No chest pain or palpitations  GASTROINTESTINAL: No abdominal or epigastric pain. No nausea, vomiting, or hematemesis; No diarrhea or constipation. No melena or hematochezia.  GENITOURINARY: No dysuria, frequency or hematuria  NEUROLOGICAL: No numbness or weakness  SKIN: No itching, burning, rashes, or lesions   All other review of systems is negative unless indicated above.    MEDICATIONS:  MEDICATIONS  (STANDING):  acetaminophen  IVPB. 500 milliGRAM(s) IV Intermittent once  amLODIPine   Tablet 10 milliGRAM(s) Oral daily  aspirin enteric coated 81 milliGRAM(s) Oral daily  atorvastatin 20 milliGRAM(s) Oral at bedtime  BACItracin   Ointment 1 Application(s) Topical two times a day  calcium acetate 667 milliGRAM(s) Oral three times a day with meals  clonazePAM Tablet 0.5 milliGRAM(s) Oral at bedtime  cloNIDine 0.3 milliGRAM(s) Oral every 8 hours  dextrose 5%. 1000 milliLiter(s) (50 mL/Hr) IV Continuous <Continuous>  dextrose 50% Injectable 12.5 Gram(s) IV Push once  dextrose 50% Injectable 25 Gram(s) IV Push once  dextrose 50% Injectable 25 Gram(s) IV Push once  docusate sodium 100 milliGRAM(s) Oral three times a day  gabapentin 100 milliGRAM(s) Oral three times a day  heparin  Injectable 5000 Unit(s) SubCutaneous every 12 hours  hydrALAZINE 100 milliGRAM(s) Oral every 8 hours  insulin lispro (HumaLOG) corrective regimen sliding scale   SubCutaneous three times a day before meals  insulin lispro (HumaLOG) corrective regimen sliding scale   SubCutaneous at bedtime  labetalol 200 milliGRAM(s) Oral two times a day  lactobacillus acidophilus 1 Tablet(s) Oral two times a day with meals  levothyroxine 100 MICROGram(s) Oral daily  lidocaine   Patch 1 Patch Transdermal daily  sertraline 50 milliGRAM(s) Oral daily      PHYSICAL EXAM:  T(C): 36.8 (06-19-18 @ 12:45), Max: 37.2 (06-19-18 @ 08:45)  HR: 71 (06-19-18 @ 16:06) (61 - 71)  BP: 176/60 (06-19-18 @ 16:06) (162/85 - 200/80)  RR: 18 (06-19-18 @ 16:06) (18 - 20)  SpO2: 99% (06-19-18 @ 16:06) (93% - 100%)  Wt(kg): --  I&O's Summary    18 Jun 2018 07:01  -  19 Jun 2018 07:00  --------------------------------------------------------  IN: 460 mL / OUT: 200 mL / NET: 260 mL    19 Jun 2018 07:01  -  19 Jun 2018 19:56  --------------------------------------------------------  IN: 725 mL / OUT: 2250 mL / NET: -1525 mL          Appearance: Normal	  HEENT:   Normal oral mucosa, Left boni-orbital ecchymosis 	  Lymphatic: No lymphadenopathy , no edema  Cardiovascular: Normal S1 S2, No JVD, No murmurs , Peripheral pulses palpable 2+ bilaterally, ILR +ecchymosis   Respiratory: Lungs clear to auscultation, normal effort 	  Gastrointestinal:  Soft, Non-tender, + BS	  Skin: No rashes, No ecchymoses, No cyanosis, warm to touch  Musculoskeletal: decreased  range of motion and  strength  Psychiatry:  Mood & affect appropriate  Ext: No edema      LABS:    CARDIAC MARKERS:                                7.8    8.22  )-----------( 216      ( 19 Jun 2018 11:13 )             24.9     06-19    130<L>  |  86<L>  |  62<H>  ----------------------------<  218<H>  6.2<HH>   |  24  |  5.17<H>    Ca    8.0<L>      19 Jun 2018 09:55      proBNP:   Lipid Profile:   HgA1c:   TSH:             TELEMETRY: 	    ECG:  	  RADIOLOGY:   DIAGNOSTIC TESTING:  [ ] Echocardiogram:  < from: Transthoracic Echocardiogram (06.15.18 @ 08:54) >    Patient name: ANGEL LECHUGA  YOB: 1945   Age: 72 (F)   MR#: [id number]  Study Date: 6/15/2018  [facility]  Study quality: Technically good  Referring Physician: Rajendra Carter MD  Blood Pressure: 178/66 mmHg  Height: 152 cm  Weight: 49 kg  BSA: 1.4 m2  ------------------------------------------------------------------------  PROCEDURE: Transthoracic echocardiogram with 2-D, M-Mode  and complete spectral and color flow Doppler.  INDICATION: Syncope and collapse (R55)  ------------------------------------------------------------------------  Dimensions:    Normal Values:  LA:     3.8    2.0 - 4.0 cm  Ao:     2.5    2.0 - 3.8 cm  SEPTUM: 1.0    0.6 - 1.2 cm  PWT:    1.2    0.6 - 1.1 cm  LVIDd:  4.0    3.0 - 5.6 cm  LVIDs:  2.8    1.8 - 4.0 cm  Derived variables:  LVMI: 101 g/m2  RWT: 0.60  Fractional short: 30 %  EF (Edisonicholtz): 58 %  Doppler Peak Velocity (m/sec): AoV=1.8  ------------------------------------------------------------------------  Observations:  Mitral Valve: Thickened mitral valve. Mild mitral  regurgitation.  Aortic Valve/Aorta: Calcified trileaflet aortic valve with  decreased opening. Peak transaortic valve gradient equals  13 mm Hg, mean transaortic valve gradient equals 7 mm Hg,  estimated aortic valve area equals 1.9 sqcm (by continuity  equation), aortic valve velocity time integral equals 47  cm, consistent with mild aortic stenosis. Mild aortic  regurgitation.  Peak left ventricular outflow tract  gradient equals 3 mm Hg, mean gradient is equal to 2 mm Hg,  LVOT velocity time integral equals 24 cm.  Aortic Root: 2.5 cm.  LVOT diameter: 1.9 cm.  Left Atrium: Moderately dilated left atrium.  LA volume  index = 47 cc/m2.  Left Ventricle: Normal left ventricular systolic function.  Septal motion consistent with right ventricular overload.  Mild concentric left ventricular hypertrophy. Moderate  diastolic dysfunction (Stage II).  Right Heart: Moderate right atrial enlargement. Right  ventricular enlargement with normal right ventricular  systolic function. Tricuspid valve prolapse. Moderate  tricuspid regurgitation. Normal pulmonic valve. Mild  pulmonic regurgitation.  Pericardium/Pleura: Normal pericardium with no pericardial  effusion.  Hemodynamic: Estimated right atrial pressure is 8 mm Hg.  Estimated right ventricular systolic pressure equals 60 mm  Hg, assuming right atrial pressure equals 8 mm Hg,  consistent with moderate pulmonary hypertension.  ------------------------------------------------------------------------  Conclusions:  1. Calcified trileaflet aortic valve with decreased  opening. Peak transaortic valve gradient equals 13 mm Hg,  mean transaortic valve gradient equals 7 mm Hg, estimated  aortic valve area equals 1.9 sqcm (by continuity equation),  aortic valve velocity time integral equals 47 cm,  consistent with mild aortic stenosis.  2. Moderately dilated left atrium.  LA volume index = 47  cc/m2.  3. Mild concentric left ventricular hypertrophy.  4. Normal left ventricular systolic function. Septal motion  consistent with right ventricular overload.  5. Moderate diastolic dysfunction (Stage II).  6. Moderate right atrial enlargement.  7. Right ventricular enlargement with normal right  ventricular systolic function.  8. Tricuspid valve prolapse. Moderate tricuspid  regurgitation.  9. Estimated pulmonary artery systolic pressure equals 60  mm Hg, assuming right atrial pressure equals 8 mm Hg,  consistent with moderate pulmonary pressures.  *** Compared with echocardiogram of 3/21/2016, no  significant changes noted.  ------------------------------------------------------------------------  Confirmed on  6/15/2018 - 10:08:17 by Ezra Nicholas M.D.  ------------------------------------------------------------------------    < end of copied text >    [ ]  Catheterization:  [ ] Stress Test:    OTHER:

## 2018-06-19 NOTE — PROGRESS NOTE ADULT - ASSESSMENT
72 f with  s/p fall- telemetry, cardiology follow s/p ILR insertion.   Neurology evaluation noted. MRI brain pending   Hypothyroidism-follow TSH  Cervical spine subluxation- Neurosurgery evaluation - MRI C Spine pending .  PT  ESRD- HD, Nephrology evaluation Dr. Fernandez  Hyperkalemia- follow.  Diabetes control  Nasal fracture- Plastic surgery evaluation noted. No intervention  Periorbital ecchymosis, Hematoma- Opthalmology evaluation noted. Local care.  Nausea/ Diarrhea- GI evaluation noted. Probable gastroenteritis resolved.   Anemia- follow  Rajendra Carter MD pager [phone]

## 2018-06-20 ENCOUNTER — TRANSCRIPTION ENCOUNTER (OUTPATIENT)
Age: 73
End: 2018-06-20

## 2018-06-20 ENCOUNTER — RX RENEWAL (OUTPATIENT)
Age: 73
End: 2018-06-20

## 2018-06-20 ENCOUNTER — APPOINTMENT (OUTPATIENT)
Dept: FAMILY MEDICINE | Facility: HOSPITAL | Age: 73
End: 2018-06-20

## 2018-06-20 LAB
ANION GAP SERPL CALC-SCNC: 15 MMOL/L — SIGNIFICANT CHANGE UP (ref 5–17)
BUN SERPL-MCNC: 22 MG/DL — SIGNIFICANT CHANGE UP (ref 7–23)
CALCIUM SERPL-MCNC: 8.6 MG/DL — SIGNIFICANT CHANGE UP (ref 8.4–10.5)
CHLORIDE SERPL-SCNC: 91 MMOL/L — LOW (ref 96–108)
CO2 SERPL-SCNC: 29 MMOL/L — SIGNIFICANT CHANGE UP (ref 22–31)
CREAT SERPL-MCNC: 3.06 MG/DL — HIGH (ref 0.5–1.3)
GLUCOSE BLDC GLUCOMTR-MCNC: 134 MG/DL — HIGH (ref 70–99)
GLUCOSE BLDC GLUCOMTR-MCNC: 182 MG/DL — HIGH (ref 70–99)
GLUCOSE BLDC GLUCOMTR-MCNC: 247 MG/DL — HIGH (ref 70–99)
GLUCOSE BLDC GLUCOMTR-MCNC: 280 MG/DL — HIGH (ref 70–99)
GLUCOSE SERPL-MCNC: 128 MG/DL — HIGH (ref 70–99)
HCT VFR BLD CALC: 27.4 % — LOW (ref 34.5–45)
HGB BLD-MCNC: 8.4 G/DL — LOW (ref 11.5–15.5)
MCHC RBC-ENTMCNC: 29 PG — SIGNIFICANT CHANGE UP (ref 27–34)
MCHC RBC-ENTMCNC: 30.7 GM/DL — LOW (ref 32–36)
MCV RBC AUTO: 94.5 FL — SIGNIFICANT CHANGE UP (ref 80–100)
PLATELET # BLD AUTO: 224 K/UL — SIGNIFICANT CHANGE UP (ref 150–400)
POTASSIUM SERPL-MCNC: 4.1 MMOL/L — SIGNIFICANT CHANGE UP (ref 3.5–5.3)
POTASSIUM SERPL-SCNC: 4.1 MMOL/L — SIGNIFICANT CHANGE UP (ref 3.5–5.3)
RBC # BLD: 2.9 M/UL — LOW (ref 3.8–5.2)
RBC # FLD: 17.5 % — HIGH (ref 10.3–14.5)
SODIUM SERPL-SCNC: 135 MMOL/L — SIGNIFICANT CHANGE UP (ref 135–145)
WBC # BLD: 5.13 K/UL — SIGNIFICANT CHANGE UP (ref 3.8–10.5)
WBC # FLD AUTO: 5.13 K/UL — SIGNIFICANT CHANGE UP (ref 3.8–10.5)

## 2018-06-20 RX ORDER — LABETALOL HCL 100 MG
300 TABLET ORAL EVERY 8 HOURS
Qty: 0 | Refills: 0 | Status: DISCONTINUED | OUTPATIENT
Start: 2018-06-20 | End: 2018-06-25

## 2018-06-20 RX ORDER — LIDOCAINE 4 G/100G
1 CREAM TOPICAL
Qty: 0 | Refills: 0 | COMMUNITY
Start: 2018-06-20

## 2018-06-20 RX ORDER — BACITRACIN ZINC 500 UNIT/G
1 OINTMENT IN PACKET (EA) TOPICAL
Qty: 0 | Refills: 0 | COMMUNITY
Start: 2018-06-20

## 2018-06-20 RX ORDER — CALCIUM ACETATE 667 MG
1 TABLET ORAL
Qty: 0 | Refills: 0 | COMMUNITY
Start: 2018-06-20

## 2018-06-20 RX ORDER — SERTRALINE 25 MG/1
1 TABLET, FILM COATED ORAL
Qty: 0 | Refills: 0 | COMMUNITY
Start: 2018-06-20

## 2018-06-20 RX ORDER — HYDRALAZINE HCL 50 MG
1 TABLET ORAL
Qty: 0 | Refills: 0 | COMMUNITY
Start: 2018-06-20

## 2018-06-20 RX ORDER — LEVOTHYROXINE SODIUM 125 MCG
1 TABLET ORAL
Qty: 0 | Refills: 0 | COMMUNITY
Start: 2018-06-20

## 2018-06-20 RX ORDER — LACTOBACILLUS ACIDOPHILUS 100MM CELL
1 CAPSULE ORAL
Qty: 0 | Refills: 0 | COMMUNITY
Start: 2018-06-20

## 2018-06-20 RX ORDER — DOCUSATE SODIUM 100 MG
1 CAPSULE ORAL
Qty: 0 | Refills: 0 | COMMUNITY
Start: 2018-06-20

## 2018-06-20 RX ADMIN — Medication 2: at 21:11

## 2018-06-20 RX ADMIN — AMLODIPINE BESYLATE 10 MILLIGRAM(S): 2.5 TABLET ORAL at 05:01

## 2018-06-20 RX ADMIN — Medication 0.3 MILLIGRAM(S): at 05:01

## 2018-06-20 RX ADMIN — SERTRALINE 50 MILLIGRAM(S): 25 TABLET, FILM COATED ORAL at 11:56

## 2018-06-20 RX ADMIN — Medication 0.3 MILLIGRAM(S): at 21:10

## 2018-06-20 RX ADMIN — Medication 667 MILLIGRAM(S): at 11:55

## 2018-06-20 RX ADMIN — GABAPENTIN 100 MILLIGRAM(S): 400 CAPSULE ORAL at 05:01

## 2018-06-20 RX ADMIN — ATORVASTATIN CALCIUM 20 MILLIGRAM(S): 80 TABLET, FILM COATED ORAL at 21:10

## 2018-06-20 RX ADMIN — HEPARIN SODIUM 5000 UNIT(S): 5000 INJECTION INTRAVENOUS; SUBCUTANEOUS at 17:24

## 2018-06-20 RX ADMIN — Medication 1 APPLICATION(S): at 05:02

## 2018-06-20 RX ADMIN — Medication 1 TABLET(S): at 07:59

## 2018-06-20 RX ADMIN — Medication 100 MILLIGRAM(S): at 21:10

## 2018-06-20 RX ADMIN — Medication 100 MILLIGRAM(S): at 13:51

## 2018-06-20 RX ADMIN — GABAPENTIN 100 MILLIGRAM(S): 400 CAPSULE ORAL at 13:50

## 2018-06-20 RX ADMIN — Medication 200 MILLIGRAM(S): at 05:01

## 2018-06-20 RX ADMIN — Medication 100 MILLIGRAM(S): at 05:02

## 2018-06-20 RX ADMIN — Medication 500 MILLIGRAM(S): at 21:10

## 2018-06-20 RX ADMIN — Medication 667 MILLIGRAM(S): at 17:24

## 2018-06-20 RX ADMIN — Medication 0.3 MILLIGRAM(S): at 13:50

## 2018-06-20 RX ADMIN — Medication 667 MILLIGRAM(S): at 07:59

## 2018-06-20 RX ADMIN — GABAPENTIN 100 MILLIGRAM(S): 400 CAPSULE ORAL at 21:10

## 2018-06-20 RX ADMIN — Medication 100 MILLIGRAM(S): at 13:50

## 2018-06-20 RX ADMIN — Medication 1 TABLET(S): at 17:25

## 2018-06-20 RX ADMIN — Medication 100 MILLIGRAM(S): at 21:11

## 2018-06-20 RX ADMIN — Medication 2: at 07:56

## 2018-06-20 RX ADMIN — Medication 81 MILLIGRAM(S): at 11:55

## 2018-06-20 RX ADMIN — Medication 500 MILLIGRAM(S): at 23:31

## 2018-06-20 RX ADMIN — Medication 0.5 MILLIGRAM(S): at 21:13

## 2018-06-20 RX ADMIN — Medication 4: at 11:57

## 2018-06-20 RX ADMIN — Medication 1 APPLICATION(S): at 17:46

## 2018-06-20 RX ADMIN — Medication 300 MILLIGRAM(S): at 21:11

## 2018-06-20 RX ADMIN — Medication 300 MILLIGRAM(S): at 13:50

## 2018-06-20 RX ADMIN — Medication 100 MICROGRAM(S): at 05:01

## 2018-06-20 NOTE — PROGRESS NOTE ADULT - SUBJECTIVE AND OBJECTIVE BOX
Feels better today    Vital Signs Last 24 Hrs T(C): 37 (06-20-18 @ 12:24), Max: 37 (06-20-18 @ 12:24) T(F): 98.6 (06-20-18 @ 12:24), Max: 98.6 (06-20-18 @ 12:24) HR: 60 (06-20-18 @ 13:45) (57 - 71) BP: 171/62 (06-20-18 @ 13:45) (170/58 - 200/62) RR: 18 (06-20-18 @ 13:45) (18 - 18) SpO2: 98% (06-20-18 @ 13:45) (97% - 100%)   GENERAL: NAD  HEAD:  L periorbital ecchymosis   EYES: sclera anicteric  NECK: supple, No JVD  CHEST/LUNG: clear to auscultation bilaterally; no rales, rhonchi, or wheezing b/l  HEART: normal S1, S2  ABDOMEN: BS+, soft, ND, NT   EXTREMITIES: no edema b/l LEs, LUE AVF + thrill, bruit  NEURO: awake, alert	       Labs and Results:                                         8.4   5.13  )-----------( 224      ( 20 Jun 2018 08:21 )            27.4   20 Jun 2018 05:34  135    |  91     |  22    ----------------------------<  128   4.1     |  29     |  3.06   Ca    8.6        20 Jun 2018 05:34  	  acetaminophen   Tablet. 500 milliGRAM(s) Oral every 6 hours PRN  acetaminophen  IVPB. 500 milliGRAM(s) IV Intermittent once  amLODIPine   Tablet 10 milliGRAM(s) Oral daily  aspirin enteric coated 81 milliGRAM(s) Oral daily  atorvastatin 20 milliGRAM(s) Oral at bedtime  BACItracin   Ointment 1 Application(s) Topical two times a day  calcium acetate 667 milliGRAM(s) Oral three times a day with meals  clonazePAM Tablet 0.5 milliGRAM(s) Oral at bedtime  cloNIDine 0.3 milliGRAM(s) Oral every 8 hours  dextrose 40% Gel 15 Gram(s) Oral once PRN  dextrose 5%. 1000 milliLiter(s) IV Continuous <Continuous>  dextrose 50% Injectable 12.5 Gram(s) IV Push once  dextrose 50% Injectable 25 Gram(s) IV Push once  dextrose 50% Injectable 25 Gram(s) IV Push once  docusate sodium 100 milliGRAM(s) Oral three times a day  gabapentin 100 milliGRAM(s) Oral three times a day  glucagon  Injectable 1 milliGRAM(s) IntraMuscular once PRN  heparin  Injectable 5000 Unit(s) SubCutaneous every 12 hours  hydrALAZINE 100 milliGRAM(s) Oral every 8 hours  insulin lispro (HumaLOG) corrective regimen sliding scale   SubCutaneous three times a day before meals  insulin lispro (HumaLOG) corrective regimen sliding scale   SubCutaneous at bedtime  labetalol 300 milliGRAM(s) Oral every 8 hours  lactobacillus acidophilus 1 Tablet(s) Oral two times a day with meals  levothyroxine 100 MICROGram(s) Oral daily  lidocaine   Patch 1 Patch Transdermal daily  ondansetron Injectable 8 milliGRAM(s) IV Push every 8 hours PRN  sertraline 50 milliGRAM(s) Oral daily  traMADol 50 milliGRAM(s) Oral daily PRN      Assessment and Plan:     Patient is 71yo W w/DM, HTN, ASHD, ESRD adm s/p trip and fall at home  ESRD, HD TTS  Heparin free  Diabetes control  Renal, DM diet  Chronic LBP, frequent falls, HA  S/p MRI, neuro following  Increased Clonidine to 0.3 mg TID  Will f/u BP  HD tomorrow

## 2018-06-20 NOTE — DIETITIAN INITIAL EVALUATION ADULT. - ORAL INTAKE PTA
Pt's  reports pt was eating well at home PTA. Typical intake: cereal with milk or a little yogurt sometimes for breakfast; chicken or meat with rice and vegetables for lunch and something similar for dinner. No micronutrient supplementation PTA. Pt was taking Phoslo PTA./good

## 2018-06-20 NOTE — PROGRESS NOTE ADULT - SUBJECTIVE AND OBJECTIVE BOX
Patient is a 72y old  Female who presents with a chief complaint of falls (20 Jun 2018 08:25)      SUBJECTIVE / OVERNIGHT EVENTS: No new complaints.   Review of Systems  chest pain no  palpitations no  sob no  nausea no  headache no    MEDICATIONS  (STANDING):  acetaminophen  IVPB. 500 milliGRAM(s) IV Intermittent once  amLODIPine   Tablet 10 milliGRAM(s) Oral daily  aspirin enteric coated 81 milliGRAM(s) Oral daily  atorvastatin 20 milliGRAM(s) Oral at bedtime  BACItracin   Ointment 1 Application(s) Topical two times a day  calcium acetate 667 milliGRAM(s) Oral three times a day with meals  clonazePAM Tablet 0.5 milliGRAM(s) Oral at bedtime  cloNIDine 0.3 milliGRAM(s) Oral every 8 hours  dextrose 5%. 1000 milliLiter(s) (50 mL/Hr) IV Continuous <Continuous>  dextrose 50% Injectable 12.5 Gram(s) IV Push once  dextrose 50% Injectable 25 Gram(s) IV Push once  dextrose 50% Injectable 25 Gram(s) IV Push once  docusate sodium 100 milliGRAM(s) Oral three times a day  gabapentin 100 milliGRAM(s) Oral three times a day  heparin  Injectable 5000 Unit(s) SubCutaneous every 12 hours  hydrALAZINE 100 milliGRAM(s) Oral every 8 hours  insulin lispro (HumaLOG) corrective regimen sliding scale   SubCutaneous three times a day before meals  insulin lispro (HumaLOG) corrective regimen sliding scale   SubCutaneous at bedtime  labetalol 300 milliGRAM(s) Oral every 8 hours  lactobacillus acidophilus 1 Tablet(s) Oral two times a day with meals  levothyroxine 100 MICROGram(s) Oral daily  lidocaine   Patch 1 Patch Transdermal daily  sertraline 50 milliGRAM(s) Oral daily    MEDICATIONS  (PRN):  acetaminophen   Tablet. 500 milliGRAM(s) Oral every 6 hours PRN Mild Pain (1 - 3)  dextrose 40% Gel 15 Gram(s) Oral once PRN Blood Glucose LESS THAN 70 milliGRAM(s)/deciliter  glucagon  Injectable 1 milliGRAM(s) IntraMuscular once PRN Glucose LESS THAN 70 milligrams/deciliter  ondansetron Injectable 8 milliGRAM(s) IV Push every 8 hours PRN Nausea  traMADol 50 milliGRAM(s) Oral daily PRN Moderate Pain (4 - 6)          PHYSICAL EXAM:  GENERAL: NAD, well-developed  HEAD:  L periorbital echymosis, Normocephalic  EYES: EOMI, PERRLA, conjunctiva and sclera clear  NECK: Supple, No JVD  CHEST/LUNG: Clear to auscultation bilaterally; No wheeze  HEART: Regular rate and rhythm; No murmurs, rubs, or gallops  ABDOMEN: Soft, Nontender, Nondistended; Bowel sounds present  EXTREMITIES:  2+ Peripheral Pulses, No clubbing, cyanosis, or edema  PSYCH: AAOx3  NEUROLOGY: non-focal  SKIN: No rashes or lesions    LABS:                        8.4    5.13  )-----------( 224      ( 20 Jun 2018 08:21 )             27.4     06-20    135  |  91<L>  |  22  ----------------------------<  128<H>  4.1   |  29  |  3.06<H>    Ca    8.6      20 Jun 2018 05:34                RADIOLOGY & ADDITIONAL TESTS:    Imaging Personally Reviewed:  < from: MR Cervical Spine No Cont (06.19.18 @ 20:54) >  IMPRESSION:    MRI BRAIN:    Multiple nasal fractures, with evolving hematomas in the left frontal   periorbital preseptal and right parietal regions.    Volume loss and white matter T2 hyperintensities likely  microvascular   disease without territorial infarction, hemorrhage or midline shift shift.    MRA BRAIN AND NECK:    ICA cavernous and super clinoid stenosis with calcification and stenoses   of the anterior, middle, and posterior cerebral arteries and intradural   left vertebral artery.    Motion limited study, with approximately 50% origin stenosis of the   bilateral ICAs.    Dominant patent right vertebral artery, and hypoplastic poorly delineated   left vertebral artery this may reflect motion and hypoplasia, dissection   not excluded.    MRI CERVICAL SPINE:    There is 4-mm anterior subluxation of C3 on C4, prevertebral softtissue   swelling from C3 - C5 with disruption of the anterior longitudinal   ligament, and high STIR signal concerning for fracture deformities at the   C3-4  endplates and facets.    < end of copied text >    Consultant(s) Notes Reviewed:      Care Discussed with Consultants/Other Providers:

## 2018-06-20 NOTE — DISCHARGE NOTE ADULT - CARE PROVIDER_API CALL
Geraldo Tilley (DO), Plastic Surgery  [address] [place] [address]  Phone: [phone]  Fax: [phone]    Kenya Padilla (MD), Ophthalmology  [address] [facility] [address] [facility]  [address]  [place] [address]  Phone: [phone]  Fax: [phone]    Valorie Fernandez), Nephrology  [address] [facility] [address]  [place], [place] [id number]  Phone: [phone]  Fax: [phone]    Anoop Tomas (DO), Cardiology; Internal Medicine  [address] [facility]  [address]  [place] [address]  Phone: [phone]  Fax: [phone]

## 2018-06-20 NOTE — PROGRESS NOTE ADULT - SUBJECTIVE AND OBJECTIVE BOX
Patient is a 72y old  Female who presented with a chief complaint of falls (20 Jun 2018 08:25)      INTERVAL HPI/OVERNIGHT EVENTS:  no abdominal pain, nausea or vomiting  1 BM yesterday  appetite good    MEDICATIONS  (STANDING):  acetaminophen  IVPB. 500 milliGRAM(s) IV Intermittent once  amLODIPine   Tablet 10 milliGRAM(s) Oral daily  aspirin enteric coated 81 milliGRAM(s) Oral daily  atorvastatin 20 milliGRAM(s) Oral at bedtime  BACItracin   Ointment 1 Application(s) Topical two times a day  calcium acetate 667 milliGRAM(s) Oral three times a day with meals  clonazePAM Tablet 0.5 milliGRAM(s) Oral at bedtime  cloNIDine 0.3 milliGRAM(s) Oral every 8 hours  dextrose 5%. 1000 milliLiter(s) (50 mL/Hr) IV Continuous <Continuous>  dextrose 50% Injectable 12.5 Gram(s) IV Push once  dextrose 50% Injectable 25 Gram(s) IV Push once  dextrose 50% Injectable 25 Gram(s) IV Push once  docusate sodium 100 milliGRAM(s) Oral three times a day  gabapentin 100 milliGRAM(s) Oral three times a day  heparin  Injectable 5000 Unit(s) SubCutaneous every 12 hours  hydrALAZINE 100 milliGRAM(s) Oral every 8 hours  insulin lispro (HumaLOG) corrective regimen sliding scale   SubCutaneous three times a day before meals  insulin lispro (HumaLOG) corrective regimen sliding scale   SubCutaneous at bedtime  labetalol 300 milliGRAM(s) Oral every 8 hours  lactobacillus acidophilus 1 Tablet(s) Oral two times a day with meals  levothyroxine 100 MICROGram(s) Oral daily  lidocaine   Patch 1 Patch Transdermal daily  sertraline 50 milliGRAM(s) Oral daily    MEDICATIONS  (PRN):  acetaminophen   Tablet. 500 milliGRAM(s) Oral every 6 hours PRN Mild Pain (1 - 3)  dextrose 40% Gel 15 Gram(s) Oral once PRN Blood Glucose LESS THAN 70 milliGRAM(s)/deciliter  glucagon  Injectable 1 milliGRAM(s) IntraMuscular once PRN Glucose LESS THAN 70 milligrams/deciliter  ondansetron Injectable 8 milliGRAM(s) IV Push every 8 hours PRN Nausea  traMADol 50 milliGRAM(s) Oral daily PRN Moderate Pain (4 - 6)      Allergies  Avelox (Unknown)  fish (Hives; Rash)  shellfish (Unknown)      Review of Systems:  General:  No wt loss, fevers, chills, night sweats,fatigue,   CV:  No pain, palpitatioins, hypo/hypertension  Resp:  No dyspnea, cough, tachypnea, wheezing  :  No pain, bleeding, incontinence, nocturia  Muscle:  +LE weakness  Heme:  No petechiae, easy bruisability    Vital Signs Last 24 Hrs  T(C): 36.9 (20 Jun 2018 08:27), Max: 36.9 (19 Jun 2018 21:21)  T(F): 98.4 (20 Jun 2018 08:27), Max: 98.4 (19 Jun 2018 21:21)  HR: 59 (20 Jun 2018 08:27) (59 - 71)  BP: 173/51 (20 Jun 2018 08:27) (162/85 - 200/62)  BP(mean): --  RR: 18 (20 Jun 2018 08:27) (18 - 18)  SpO2: 97% (20 Jun 2018 08:27) (97% - 100%)    PHYSICAL EXAM:  Constitutional: NAD, sitting up in bed +laceration above left eye with boni-orbital healing ecchymosis and some  left subconjunctival hemorrhage  Neck: No LAD, supple, no JVD  Respiratory: decreased BS bases  Cardiovascular: S1 and S2 RRR  Gastrointestinal: BS+, soft, NT/ND, neg HSM,  Extremities: No peripheral edema, neg clubbing, cyanosis +LUE AV fistula  Skin: anicteric, see above    LABS:                        8.4    5.13  )-----------( 224      ( 20 Jun 2018 08:21 )             27.4     06-20    135  |  91<L>  |  22  ----------------------------<  128<H>  4.1   |  29  |  3.06<H>    Ca    8.6      20 Jun 2018 05:34        RADIOLOGY & ADDITIONAL TESTS:

## 2018-06-20 NOTE — PROGRESS NOTE ADULT - SUBJECTIVE AND OBJECTIVE BOX
Patient is a 72y old  Female who presents with a chief complaint of ha    HPI:  pt reports ha have improved, only mildy present, no neck pain at present, no change in vision    PAST MEDICAL & SURGICAL HISTORY:  [place] (hard of hearing)  Cataracts, bilateral: left worse than right  Pleural thickening: s/p pleurodisis left vats 2014  Hip pain: left  LBP radiating to left leg  CHF (congestive heart failure): diagnosed 2-2014  MI (myocardial infarction): 2-2014   cardiac cath done [facility]  Diabetes: diagnosed 2004  no medications in 5 months since hemodialysis  Hypothyroidism  ESRD (end stage renal disease)  HTN (hypertension)  MI (myocardial infarction): may 2016  Pleural effusion: Left side - several times since March 2014  Hypothyroid  Anxiety  HTN (hypertension)  Hyperlipidemia  CHF (congestive heart failure)  CKD (chronic kidney disease)  Anemia  Diabetes: DM 2  Thoracotomy scar of left chest: 4-2014 ? pneumonia/ scarring  S/P myomectomy: abdominal age 52  AV fistula: left upper 8-2014 attempted 12-16-14    FAMILY HISTORY:  No significant  family history    Social Hx:  Nonsmoker, no drug or alcohol use  MEDICATIONS  (STANDING):  acetaminophen  IVPB. 500 milliGRAM(s) IV Intermittent once  amLODIPine   Tablet 10 milliGRAM(s) Oral daily  aspirin enteric coated 81 milliGRAM(s) Oral daily  atorvastatin 20 milliGRAM(s) Oral at bedtime  BACItracin   Ointment 1 Application(s) Topical two times a day  calcium acetate 667 milliGRAM(s) Oral three times a day with meals  clonazePAM Tablet 0.5 milliGRAM(s) Oral at bedtime  cloNIDine 0.3 milliGRAM(s) Oral every 8 hours  dextrose 5%. 1000 milliLiter(s) (50 mL/Hr) IV Continuous <Continuous>  dextrose 50% Injectable 12.5 Gram(s) IV Push once  dextrose 50% Injectable 25 Gram(s) IV Push once  dextrose 50% Injectable 25 Gram(s) IV Push once  docusate sodium 100 milliGRAM(s) Oral three times a day  gabapentin 100 milliGRAM(s) Oral three times a day  heparin  Injectable 5000 Unit(s) SubCutaneous every 12 hours  hydrALAZINE 100 milliGRAM(s) Oral every 8 hours  insulin lispro (HumaLOG) corrective regimen sliding scale   SubCutaneous three times a day before meals  insulin lispro (HumaLOG) corrective regimen sliding scale   SubCutaneous at bedtime  labetalol 200 milliGRAM(s) Oral two times a day  lactobacillus acidophilus 1 Tablet(s) Oral two times a day with meals  levothyroxine 100 MICROGram(s) Oral daily  lidocaine   Patch 1 Patch Transdermal daily  sertraline 50 milliGRAM(s) Oral daily    MEDICATIONS  (PRN):  acetaminophen   Tablet. 500 milliGRAM(s) Oral every 6 hours PRN Mild Pain (1 - 3)  dextrose 40% Gel 15 Gram(s) Oral once PRN Blood Glucose LESS THAN 70 milliGRAM(s)/deciliter  glucagon  Injectable 1 milliGRAM(s) IntraMuscular once PRN Glucose LESS THAN 70 milligrams/deciliter  ondansetron Injectable 8 milliGRAM(s) IV Push every 8 hours PRN Nausea  traMADol 50 milliGRAM(s) Oral daily PRN Moderate Pain (4 - 6)    Allergies    Avelox (Unknown)  fish (Hives; Rash)  shellfish (Unknown)    Intolerances      ROS:  all other ROS were reviewed and are negative.    Vital Signs Last 24 Hrs  T(C): 36.9 (20 Jun 2018 08:27), Max: 36.9 (19 Jun 2018 21:21)  T(F): 98.4 (20 Jun 2018 08:27), Max: 98.4 (19 Jun 2018 21:21)  HR: 59 (20 Jun 2018 08:27) (59 - 71)  BP: 173/51 (20 Jun 2018 08:27) (162/85 - 200/62)  BP(mean): --  RR: 18 (20 Jun 2018 08:27) (18 - 18)  SpO2: 97% (20 Jun 2018 08:27) (97% - 100%)  GENERAL EXAM:  Constitutional: awake and alert. NAD  HEENT: echymosis L periorbital  Neck: Supple  Respiratory: Breath sounds are clear bilaterally    NEUROLOGICAL EXAM:  MS: AAOX3, fluent, attends b/l; recent and remote memory intact; normal attention, language and fund of knowledge.   CN: VFF, EOMI, PERRL, no ALONDRA, no APD,  V1-3 intact, Right facial asymmetry, t/p midline, SCM/trap intact.  Fundi: no papilledema.  Motor: Strength: 5/5 4x. Tone: normal. Bulk: normal. DTR 1+ symm.  Plantar flex b/l. Sensation: intact to LT/PP/Vibration/Position/Temperature 4x.   Coordination: intact 4x.     Labs:              esr-51, crp- 6.4      CAPILLARY BLOOD GLUCOSE      POCT Blood Glucose.: 182 mg/dL (20 Jun 2018 07:42)        Radiology:  MRI brain- I reviewed images, no acute pathology to my eye, official results pending

## 2018-06-20 NOTE — DISCHARGE NOTE ADULT - PLAN OF CARE
to remain without reoccurring falls, loop recorder placed 6/18/18 HOME CARE INSTRUCTIONS  Have someone stay with you until you feel stable.  Do not drive, operate machinery, or play sports until your caregiver says it is okay.  Keep all follow-up appointments as directed by your caregiver.   Lie down right away if you start feeling like you might faint. Breathe deeply and steadily. Wait until all the symptoms have passed.Drink enough fluids to keep your urine clear or pale yellow.  If you are taking blood pressure or heart medicine, get up slowly, taking several minutes to sit and then stand. This can reduce dizziness.  SEEK IMMEDIATE MEDICAL CARE IF:  You have a severe headache.  You have unusual pain in the chest, abdomen, or back.  You are bleeding from the mouth or rectum, or you have black or tarry stool.  You have an irregular or very fast heartbeat.  You have pain with breathing.  You have repeated fainting or seizure-like jerking during an episode.  You faint when sitting or lying down.  You have confusion.  You have difficulty walking.  You have severe weakness.  You have vision problems.  Follow up with cardiology for management and assessment of loop recorder information no indication for surgery.    Patient should follow up his/her ophthalmologist or in the [facility] within 1 week of discharge, sooner if symptoms worsen or change.  [address] [place] [address] [phone] Avoid taking (NSAIDs) - (ex: Ibuprofen, Advil, Celebrex, Naprosyn)  Avoid taking any nephrotoxic agents (can harm kidneys) - Intravenous contrast for diagnostic testing, combination cold medications.  Have all medications adjusted for your renal function by your Health Care Provider.  Blood pressure control is important.  Take all medication as prescribed. Weigh yourself daily.  If you gain 3lbs in 3 days, or 5lbs in a week call your Health Care Provider.  Do not eat or drink foods containing more than 2000mg of salt (sodium) in your diet every day.  Call your Health Care Provider if you have any swelling or increased swelling in your feet, ankles, and/or stomach.  Take all of your medication as directed.  If you become dizzy call your Health Care Provider. HgA1C this admission.  Make sure you get your HgA1c checked every three months.  If you take oral diabetes medications, check your blood glucose two times a day.  If you take insulin, check your blood glucose before meals and at bedtime.  It's important not to skip any meals.  Keep a log of your blood glucose results and always take it with you to your doctor appointments.  Keep a list of your current medications including injectables and over the counter medications and bring this medication list with you to all your doctor appointments.  If you have not seen your ophthalmologist this year call for appointment.  Check your feet daily for redness, sores, or openings. Do not self treat. If no improvement in two days call your primary care physician for an appointment.  Low blood sugar (hypoglycemia) is a blood sugar below 70mg/dl. Check your blood sugar if you feel signs/symptoms of hypoglycemia. If your blood sugar is below 70 take 15 grams of carbohydrates (ex 4 oz of apple juice, 3-4 glucose tablets, or 4-6 oz of regular soda) wait 15 minutes and repeat blood sugar to make sure it comes up above 70.  If your blood sugar is above 70 and you are due for a meal, have a meal.  If you are not due for a meal have a snack.  This snack helps keeps your blood sugar at a safe range. Follow up with neurology and neurosurgery for management Follow up with pMD and nephrology HgA1C this admission was 8.9.  Make sure you get your HgA1c checked every three months.  If you take oral diabetes medications, check your blood glucose two times a day.  If you take insulin, check your blood glucose before meals and at bedtime.  It's important not to skip any meals.  Keep a log of your blood glucose results and always take it with you to your doctor appointments.  Keep a list of your current medications including injectables and over the counter medications and bring this medication list with you to all your doctor appointments.  If you have not seen your ophthalmologist this year call for appointment.  Check your feet daily for redness, sores, or openings. Do not self treat. If no improvement in two days call your primary care physician for an appointment.  Low blood sugar (hypoglycemia) is a blood sugar below 70mg/dl. Check your blood sugar if you feel signs/symptoms of hypoglycemia. If your blood sugar is below 70 take 15 grams of carbohydrates (ex 4 oz of apple juice, 3-4 glucose tablets, or 4-6 oz of regular soda) wait 15 minutes and repeat blood sugar to make sure it comes up above 70.  If your blood sugar is above 70 and you are due for a meal, have a meal.  If you are not due for a meal have a snack.  This snack helps keeps your blood sugar at a safe range. Avoid taking (NSAIDs) - (ex: Ibuprofen, Advil, Celebrex, Naprosyn)  Avoid taking any nephrotoxic agents (can harm kidneys) - Intravenous contrast for diagnostic testing, combination cold medications.  Have all medications adjusted for your renal function by your Health Care Provider.  Blood pressure control is important.  Take all medication as prescribed.  HD is schedule on Tues/Thurs/Sat Follow up with PMD and nephrology  continue with Epogen - to be scheduled by nephrology

## 2018-06-20 NOTE — CONSULT NOTE ADULT - SUBJECTIVE AND OBJECTIVE BOX
ANGEL CALDERONR  [id number]    HISTORY OF PRESENT ILLNESS:    History obtained via [employer] service - pt speaks Mexican only  71 yo F with hx of CHF, ESRD on HD now admitted for mechanical fall - sustaining nasal fracture, L periorbital swelling with conjunctival hematoma.  Pt developed HA after the fall and part of labwork sent for pt - inflammatory markers were elevated   thus rheumatology consulted for autoimmune etiology of HA.   Pt states her HA started after her fall - prior to her fall she never had HA, jaw claudication, vision change (except caratract), proximal muscle pain or weakness, or inflammatory arthritis.  States the HA to be occpital- non radiating with no other areas involved (ie temporal area)  Pt currently has no HA, vision change - denies any chest pain, SOB, abd pain, n/v, or any other complaints.  States since being hospitalized and being bed bound, she has been feeling overall weaker     PAST MEDICAL & SURGICAL HISTORY:  [place] (hard of hearing)  Cataracts, bilateral: left worse than right  Pleural thickening: s/p pleurodisis left vats   Hip pain: left  LBP radiating to left leg  CHF (congestive heart failure): diagnosed   MI (myocardial infarction):    cardiac cath done [facility]  Diabetes: diagnosed   no medications in 5 months since hemodialysis  Hypothyroidism  ESRD (end stage renal disease)  HTN (hypertension)  MI (myocardial infarction): may 2016  Pleural effusion: Left side - several times since 2014  Hypothyroid  Anxiety  HTN (hypertension)  Hyperlipidemia  CHF (congestive heart failure)  CKD (chronic kidney disease)  Anemia  Diabetes: DM 2  Thoracotomy scar of left chest:  ? pneumonia/ scarring  S/P myomectomy: abdominal age 52  AV fistula: left upper  attempted 16      Review of Systems: as above  Gen:  No fevers/chills, weight loss  HEENT: No blurry vision, no difficulty swallowing  CVS: No chest pain/palpitations  Resp: No SOB/wheezing  GI: No N/V/C/D/abdominal pain  MSK: no joint pain, swelling or am stiffness  Skin: No new rashes  Neuro: No headaches    MEDICATIONS  (STANDING):  acetaminophen  IVPB. 500 milliGRAM(s) IV Intermittent once  amLODIPine   Tablet 10 milliGRAM(s) Oral daily  aspirin enteric coated 81 milliGRAM(s) Oral daily  atorvastatin 20 milliGRAM(s) Oral at bedtime  BACItracin   Ointment 1 Application(s) Topical two times a day  calcium acetate 667 milliGRAM(s) Oral three times a day with meals  clonazePAM Tablet 0.5 milliGRAM(s) Oral at bedtime  cloNIDine 0.3 milliGRAM(s) Oral every 8 hours  dextrose 5%. 1000 milliLiter(s) (50 mL/Hr) IV Continuous <Continuous>  dextrose 50% Injectable 12.5 Gram(s) IV Push once  dextrose 50% Injectable 25 Gram(s) IV Push once  dextrose 50% Injectable 25 Gram(s) IV Push once  docusate sodium 100 milliGRAM(s) Oral three times a day  gabapentin 100 milliGRAM(s) Oral three times a day  heparin  Injectable 5000 Unit(s) SubCutaneous every 12 hours  hydrALAZINE 100 milliGRAM(s) Oral every 8 hours  insulin lispro (HumaLOG) corrective regimen sliding scale   SubCutaneous three times a day before meals  insulin lispro (HumaLOG) corrective regimen sliding scale   SubCutaneous at bedtime  labetalol 300 milliGRAM(s) Oral every 8 hours  lactobacillus acidophilus 1 Tablet(s) Oral two times a day with meals  levothyroxine 100 MICROGram(s) Oral daily  lidocaine   Patch 1 Patch Transdermal daily  sertraline 50 milliGRAM(s) Oral daily    MEDICATIONS  (PRN):  acetaminophen   Tablet. 500 milliGRAM(s) Oral every 6 hours PRN Mild Pain (1 - 3)  dextrose 40% Gel 15 Gram(s) Oral once PRN Blood Glucose LESS THAN 70 milliGRAM(s)/deciliter  glucagon  Injectable 1 milliGRAM(s) IntraMuscular once PRN Glucose LESS THAN 70 milligrams/deciliter  ondansetron Injectable 8 milliGRAM(s) IV Push every 8 hours PRN Nausea  traMADol 50 milliGRAM(s) Oral daily PRN Moderate Pain (4 - 6)      Allergies    Avelox (Unknown)  fish (Hives; Rash)  shellfish (Unknown)    Intolerances      FAMILY HISTORY:  No significant family history      Vital Signs Last 24 Hrs  T(C): 37 (2018 12:24), Max: 37 (2018 12:24)  T(F): 98.6 (2018 12:24), Max: 98.6 (2018 12:24)  HR: 60 (2018 13:45) (57 - 71)  BP: 171/62 (2018 13:45) (170/58 - 200/62)  BP(mean): --  RR: 18 (2018 13:45) (18 - 18)  SpO2: 98% (2018 13:45) (97% - 100%)    Daily Height in cm: 152.4 (2018 12:38)    Daily Weight in k.8 (2018 12:39)    Physical Exam:  General: No apparent distress  HEENT: EOMI - conjunctival hemorrhage appreciable, MMM  CVS: +S1/S2, RRR   Resp: CTA b/l   GI: Soft, NT/ND +BS  MSK: no synovitis, FROM in all joints in UE and LE b/l  MS 4/5 in UE b/l; 3/5 LE b/l proximally  4+/5 in UE and 3/5 LE b/l distally  Neuro: AAOx3  Skin: visible L periorbital bruising with swelling    LABS:                        8.4    5.13  )-----------( 224      ( 2018 08:21 )             27.4     06-20    135  |  91<L>  |  22  ----------------------------<  128<H>  4.1   |  29  |  3.06<H>    Ca    8.6      2018 05:34    Sedimentation Rate, Erythrocyte (18 @ 11:12)    Sedimentation Rate, Erythrocyte: 51 mm/hr    C-Reactive Protein, Serum (18 @ 11:12)    C-Reactive Protein, Serum: 6.40 mg/dL        RADIOLOGY & ADDITIONAL STUDIES:    < from: MR Cervical Spine No Cont (18 @ 20:54) >    INTERPRETATION:  HISTORY: Status post fall, hematoma to left eye    Technique: Noncontrast brain and cervical MRI, MRA and neck MRA was   performed.   Through the brain, sagittal and axial T1, axial T2, FLAIR, diffusion   weighted images and an ADC map were obtained. Through the cervical spine,   sagittal and axial T1 and T2-weighted images were obtained.    MR angiography of intracranial and extracranial circulation was performed   with time of flight imaging technique. Maximal intensity projection   images were reviewed in multiple planes.    COMPARISON: Head CT dated 2018, head CT, cervical spine and   maxillofacial CT dated 2018, 2017, and 11/15/2016, duplex   Doppler ultrasound dated 2/10/2017    FINDINGS:  Brain MRI:    Study is limited by motion.    Redemonstration of evolving left periorbital, preseptal, left frontal   scalp and right parietal scalp hematomas unchanged. There is absence of   the right orbital lens with previous cataract surgery the left globe   remains unchanged with a preseptal hematoma, there are multiple nasal   fracture deformities unchanged from prior.    There is volume loss with  mild/moderate enlargement of the ventricles.   There is faint hyperintense T2 and FLAIR signal adjacent to the occipital   horns of thelateral ventricles and nonspecific white matter T2 and FLAIR   hyperintensities throughout the white matter and yolande likely    microvascular disease, other etiologies are not excluded. There is no   large territorial restricted diffusion to suggest new ischemia, motion   causes artifactual DWI signal change in the right thalamus, not   appreciated on the T2 or FLAIR sequences.    There are   3 mm bifrontal subdural hygromas, there is no new large   extra-axial hemorrhage or midline shift. Basal cisterns remain patent and   unchanged.    There is a partially empty sella, and with middle cranial fossa arachnoid   granulations and bilateral petrous apex cephaloceles, adjacent to the   Meckel's caves axial T2 image 9-10. There is decreased fatty T1 marrow   signal in the osseous structures consistent with marrow replacement this   may be seen with anemias and or drug therapy with other etiologies not   excluded.    There  is mucosal thickening in the paranasal sinuses with a right   maxillaryretention cyst/polyp, there is opacification in the left   mastoid air cells.    Brain MRA:    Motion limited MRA demonstrates tortuosity of the bilateral distal ICAs   which may be seen with hypertension, the vessels are patent throughout   their petrous portions, there is fusiform narrowing with stenosis and   post stomach dilatation throughout the cavernous and super clinoid   segments with corresponding atherosclerotic vascular opacification on CT.   The bifurcation segments are unremarkable    There are stenoses of the proximal left M1 and bilateral distal A1   segments, the intramedullary and proximal A2 segments are unremarkable.   There are stenoses of the distal right M1 segment,, at left M1 M2   junction and distal anterior and middle cerebral artery branches.    A dominant intradural right vertebral artery supplies the basilar artery.   There is a 4 mm stenosis of the vertebral artery  at the left PICA   origin, after which the vessel becomes faint and poorly delineated   extending to the vertebrobasilar junction.    Basilar artery is patent, there is a stenosis at the right P1 segment,   there is a fetal origin of the left PCA with nonvisualized left P1   segment distal PCA stenoses.     Neck MRA:    Study limited by motion, flow is in the proximal right common carotid   artery with an approximate 50% at the right ICA origin with a patent   slightly tortuous right internal carotid artery. The proximal left common   carotid artery is poorly delineated possibly from motion, there is an   approximate 50% stenosis of at the left ICA origin, the left ICA is   tortuous and poorly delineated secondary to motion.    The right vertebral artery is dominant and patent, the left vertebral   artery is hypoplastic and poorly seen, a dissection at the C3-4 level is   not excluded, repeat imaging may be obtained when patient is clinically   able.    Cervical spine:    There is  4 mm grade 1 anterior subluxation of C3 on C4, there is   prevertebral soft tissue swelling  hyperintense T2 and STIR signal from   C2-C5 level measuring  5 m  AP  at the superior C4 endplate. There is   left greater than right paravertebral soft tissue swelling with   hyperintense T2 and STIR signal possibly edema and/or hemorrhage adjacent   to the left C3-4 facets.    There there is loss of signal concerning for anterior longitudinal   ligament  disruption from C3-C5, sagittal image #7, and hyperintense STIR   signal at the inferior C3, superior L4 and L5 endplates, and left C3-4   facet, fractures are not excluded, assessment is limited by motion.    There is cervical spine stenosis at C3-4 of 3.7 mm AP with ligamentum   flavum flavum redundancy,, disc osteophyte ridges, and left and right   facets with subluxation and possible adjacent areas of hemorrhage and/or   edema. The distorted cord demonstrates faint hyperintense T2 and STIR   signal extending from the C3-C4 level this may reflect underlying edema   and/or myelomalacia.    Findings at specific levels are as follows: At C2-3 there is no canal or   foraminal narrowing.    At C3-4  a disc osteophyte complex, severe left and right facet   hypertrophy with possible  fractures severely narrow the AP canal to 3.7   mm and cause severe left lateral recess and foraminal narrowinglikely   affecting the exiting left-sided nerve root with mild to moderate right   foraminal narrowing.    At C4-5  a disc osteophyte ridge and central midline disc deformity with   left  uncovertebral and facet hypertrophy cause severe left foraminal   narrowing, canal AP diameter measures 8.2 mm without right foraminal   narrowing.    At C5-6- a disc osteophyte ridge with far right lateral extension,   uncovertebral and facet hypertrophy cause moderate to marked bilateral   foraminal narrowing canal AP diameter measures 8.3 mm.    At C6-7-1 disc osteophyte ridge with lateral extension right paramedian   disc deformity possible herniation with marked uncovertebral facet   hypertrophy cause severe bilateral foraminal narrowing likely affecting   the exiting nerve roots and narrow the canal AP diameter to approximately   6.8 mm    At C7-T1 there is no significant canal or foraminal narrowing.    IMPRESSION:    MRI BRAIN:    Multiple nasal fractures, with evolving hematomas in the left frontal   periorbital preseptal and right parietal regions.    Volume loss and white matter T2 hyperintensities likely  microvascular   disease without territorial infarction, hemorrhage or midline shift shift.    MRA BRAIN AND NECK:    ICA cavernous and super clinoid stenosis with calcification and stenoses   of the anterior, middle, and posterior cerebral arteries and intradural   left vertebral artery.    Motion limited study, with approximately 50% origin stenosis of the   bilateral ICAs.    Dominant patent right vertebral artery, and hypoplastic poorly delineated   left vertebral artery this may reflect motion and hypoplasia, dissection   not excluded.    MRI CERVICAL SPINE:    There is 4-mm anterior subluxation of C3 on C4, prevertebral softtissue   swelling from C3 - C5 with disruption of the anterior longitudinal   ligament, and high STIR signal concerning for fracture deformities at the   C3-4  endplates and facets.

## 2018-06-20 NOTE — DISCHARGE NOTE ADULT - HOSPITAL COURSE
72 yrs old female ,with pmh of ESRD, HTN, CAD/MI, CHF, DMII and anxiety admitted 6/14/18 s/p trip and fall yesterday at home (mechanical), landed on R side and struck her face.  No LOC.  Significant swelling to L eye (but can still see out of it reportedly).  On ASA, no other blood thinners.  Seen recently at [facility] also for fall.  T/R/S dialysis.                                Dx:s/p Fall/questionable syncope with left eye trama and closed nasal fracture s/p loop recorder 6/18/18                                   ESRD/HD                                   HTN                                   Cervical spine subluxation- 73yo female w/ pmhx of type 2 DM, HTN, ASHD, ESRD on HD TTS, presents s/p trip and fall at home (mechanical), landed on R side and struck her face, sustaining multiple injuries. 71yo female w/ pmhx of type 2 DM, HTN, ASHD, ESRD on HD TTS, presents s/p trip and fall at home (mechanical), landed on R side and struck her face, sustaining multiple injuries. Work-up revealed nasal fracture- Plastic surgery evaluation noted-no intervention. Periorbital ecchymosis with hematoma- Opthalmology evaluation noted recommendations noted. MRI C spine demonstrated findings suggestive of a cervical fx andthe possibility of a dissection could not be excluded on MRA. Repeat MRI was discussed but patient refused, even offered the possibility of doing a TCD and she continued to refuse. C-collar remains in place, plan for disposition to [facility] and ongoing HD outpatient, f/u with specialties as directed.

## 2018-06-20 NOTE — PROGRESS NOTE ADULT - ATTENDING COMMENTS
Venkata Littlejohn MD, FACG, [facility]  [phone]
Ani Watson MD, FACP, FACG, AGAF  [facility]  [phone]

## 2018-06-20 NOTE — DIETITIAN INITIAL EVALUATION ADULT. - NS FNS REASON FOR WEIGHT CHANG
Pt's  is unaware of the reason for the wt change, question fluid shifts and increased nutrient on HD.

## 2018-06-20 NOTE — DISCHARGE NOTE ADULT - MEDICATION SUMMARY - MEDICATIONS TO TAKE
I will START or STAY ON the medications listed below when I get home from the hospital:    traMADol 50 mg oral tablet  -- 1 tab(s) by mouth once a day  -- Indication: For pain medication     Tylenol 500 mg oral tablet  -- 2 tab(s) by mouth , As Needed  -- Indication: For pain medication     aspirin 81 mg oral delayed release tablet  -- 1 tab(s) by mouth once a day Home/hosp  -- Indication: For prevention of Coronary artery disease     cloNIDine 0.3 mg oral tablet  -- 1 tab(s) by mouth every 8 hours  -- Indication: For HTN (hypertension)    gabapentin 100 mg oral capsule  -- 1 cap(s) by mouth 3 times a day Home  -- Indication: For pain medication    sertraline 50 mg oral tablet  -- 1 tab(s) by mouth once a day  -- Indication: For Anxiety     insulin lispro 100 units/mL subcutaneous solution  -- 2 Unit(s) if Glucose 151 - 200  4 Unit(s) if Glucose 201 - 250  6 Unit(s) if Glucose 251 - 300  8 Unit(s) if Glucose 301 - 350  10 Unit(s) if Glucose 351 - 400  12 Unit(s) if Glucose Greater Than 400es a day  -- Indication: For Type 2 diabetes mellitus with hyperglycemia, with long-term current use of insulin    insulin lispro 100 units/mL subcutaneous solution  -- 0 Unit(s) if Glucose 61 - 250  2 Unit(s) if Glucose 251 - 300  4 Unit(s) if Glucose 301 - 350  6 Unit(s) if Glucose 351 - 400  8 Unit(s) if Glucose Greater Than 400us once a day (at bedtime)  -- Indication: For Type 2 diabetes mellitus with hyperglycemia, with long-term current use of insulin    insulin glargine  -- 10 unit(s) subcutaneous once a day (at bedtime)  -- Indication: For Type 2 diabetes mellitus with hyperglycemia, with long-term current use of insulin    HumaLOG 100 units/mL subcutaneous solution  -- 5 unit(s) subcutaneous 3 times a day (before meals)  -- Indication: For Type 2 diabetes mellitus with hyperglycemia, with long-term current use of insulin    atorvastatin 20 mg oral tablet  -- 1 tab(s) by mouth once a day  -- Indication: For Elevatecd cholesteroll    labetalol 300 mg oral tablet  -- orally 3 times a day  -- Indication: For HTN (hypertension)    amlodipine 10 mg oral tablet  -- 1 tab(s) by mouth once a day   -- Indication: For HTN (hypertension)    lidocaine 5% topical film  -- Apply on skin to affected area once a day  -- Indication: For lpain patch     bacitracin 500 units/g topical ointment  -- 1 application on skin 2 times a day to facial lacerations  -- Indication: For Antibiotic oinment to face -prevent infection     docusate sodium 100 mg oral capsule  -- 1 cap(s) by mouth 3 times a day  -- Indication: For Stool softner     calcium acetate 667 mg oral tablet  -- 1 cap(s) by mouth 3 times a day  -- Indication: For phosphate binder     lactobacillus acidophilus oral capsule  -- 1 tab(s) by mouth 2 times a day  -- Indication: For GI protective     levothyroxine 100 mcg (0.1 mg) oral tablet  -- 1 tab(s) by mouth once a day  -- Indication: For Hypothyroid     hydrALAZINE 100 mg oral tablet  -- 1 tab(s) by mouth every 8 hours  -- Indication: For Elevated Blood pressure

## 2018-06-20 NOTE — PROGRESS NOTE ADULT - ASSESSMENT
73 yo female s/p fall noted to have a self limited episode of nausea, vomiting and diarrhea.  ?gastroenteritis vs food poisoning.  No further symptoms.   Tolerating Po.    Rec  - Continue to monitor symptoms.    - Continue probiotic  - HD per renal    Stable GI issues, Will Sign off care. Please recall prn for GI concerns.  Thank You.    Matteo Fraga PA-C    [facility]  [phone]

## 2018-06-20 NOTE — PROGRESS NOTE ADULT - SUBJECTIVE AND OBJECTIVE BOX
Subjective: Patient seen and examined. No new events except as noted.   no cp or sob   wants to go home     REVIEW OF SYSTEMS:    CONSTITUTIONAL: + weakness, fevers or chills  EYES/ENT: No visual changes;  No vertigo or throat pain   NECK: No pain or stiffness  RESPIRATORY: No cough, wheezing, hemoptysis; No shortness of breath  CARDIOVASCULAR: No chest pain or palpitations  GASTROINTESTINAL: No abdominal or epigastric pain. No nausea, vomiting, or hematemesis; No diarrhea or constipation. No melena or hematochezia.  GENITOURINARY: No dysuria, frequency or hematuria  NEUROLOGICAL: No numbness or weakness  SKIN: No itching, burning, rashes, or lesions   All other review of systems is negative unless indicated above.    MEDICATIONS:  MEDICATIONS  (STANDING):  acetaminophen  IVPB. 500 milliGRAM(s) IV Intermittent once  amLODIPine   Tablet 10 milliGRAM(s) Oral daily  aspirin enteric coated 81 milliGRAM(s) Oral daily  atorvastatin 20 milliGRAM(s) Oral at bedtime  BACItracin   Ointment 1 Application(s) Topical two times a day  calcium acetate 667 milliGRAM(s) Oral three times a day with meals  clonazePAM Tablet 0.5 milliGRAM(s) Oral at bedtime  cloNIDine 0.3 milliGRAM(s) Oral every 8 hours  dextrose 5%. 1000 milliLiter(s) (50 mL/Hr) IV Continuous <Continuous>  dextrose 50% Injectable 12.5 Gram(s) IV Push once  dextrose 50% Injectable 25 Gram(s) IV Push once  dextrose 50% Injectable 25 Gram(s) IV Push once  docusate sodium 100 milliGRAM(s) Oral three times a day  gabapentin 100 milliGRAM(s) Oral three times a day  heparin  Injectable 5000 Unit(s) SubCutaneous every 12 hours  hydrALAZINE 100 milliGRAM(s) Oral every 8 hours  insulin lispro (HumaLOG) corrective regimen sliding scale   SubCutaneous three times a day before meals  insulin lispro (HumaLOG) corrective regimen sliding scale   SubCutaneous at bedtime  labetalol 300 milliGRAM(s) Oral every 8 hours  lactobacillus acidophilus 1 Tablet(s) Oral two times a day with meals  levothyroxine 100 MICROGram(s) Oral daily  lidocaine   Patch 1 Patch Transdermal daily  sertraline 50 milliGRAM(s) Oral daily      PHYSICAL EXAM:  T(C): 36.9 (06-20-18 @ 08:27), Max: 36.9 (06-19-18 @ 21:21)  HR: 59 (06-20-18 @ 08:27) (59 - 71)  BP: 173/51 (06-20-18 @ 08:27) (162/85 - 200/62)  RR: 18 (06-20-18 @ 08:27) (18 - 18)  SpO2: 97% (06-20-18 @ 08:27) (97% - 100%)  Wt(kg): --  I&O's Summary    19 Jun 2018 07:01  -  20 Jun 2018 07:00  --------------------------------------------------------  IN: 725 mL / OUT: 2250 mL / NET: -1525 mL    20 Jun 2018 07:01  -  20 Jun 2018 12:07  --------------------------------------------------------  IN: 240 mL / OUT: 0 mL / NET: 240 mL          Appearance: Normal	  HEENT:   Normal oral mucosa, l;eft periorbital ecchymosis 	  Lymphatic: No lymphadenopathy , no edema  Cardiovascular: Normal S1 S2, No JVD, No murmurs , Peripheral pulses palpable 2+ bilaterally  Respiratory: Lungs clear to auscultation, normal effort 	  Gastrointestinal:  Soft, Non-tender, + BS	  Skin: No rashes, + ecchymoses  Musculoskeletal: decreased  range of motion and  strength  Psychiatry:  lethargic   Ext: No edema      LABS:    CARDIAC MARKERS:                                8.4    5.13  )-----------( 224      ( 20 Jun 2018 08:21 )             27.4     06-20    135  |  91<L>  |  22  ----------------------------<  128<H>  4.1   |  29  |  3.06<H>    Ca    8.6      20 Jun 2018 05:34      proBNP:   Lipid Profile:   HgA1c:   TSH:             TELEMETRY: 	    ECG:  	  RADIOLOGY:   DIAGNOSTIC TESTING:  [ ] Echocardiogram:  [ ]  Catheterization:  [ ] Stress Test:    OTHER:

## 2018-06-20 NOTE — DISCHARGE NOTE ADULT - ADDITIONAL INSTRUCTIONS
1. please call to schedule an appointment with your PMD within 1-2 weeks after discharge from rehab   2. Please call to schedule an appointment with your Cardiologist within 1 week after discharge from rehab

## 2018-06-20 NOTE — PROGRESS NOTE ADULT - ASSESSMENT
73yo F who presents with multiple falls,with headache. The possibility of a small space occupying lesion, vascular malformation as well as cervical stenosis should be excluded.    Headache  1. MRI brain,mra head and neck- fu official results  -neuro checks  -analgesic prn, may need pain mgt    Ataxia  -MRI Cervical spine to evaluate for cervical stenosis- official results pend  PT, rehab  B12,fol level within normal limits    Elev ESR/CRP  - unclear if this is related to ESRD, GCA seems less likely, however I would recommend a rheum eval to determine if any further aguila warranted    case dw NP covering floor

## 2018-06-20 NOTE — DIETITIAN INITIAL EVALUATION ADULT. - SOURCE
Pt's preferred language is [place], pt requested RD speak with  to translate prn, Comprehensive review of medical records/family/significant other/patient

## 2018-06-20 NOTE — DIETITIAN INITIAL EVALUATION ADULT. - PHYSICAL APPEARANCE
debilitated/bruised on face, in neck collar, unable to perform NFPE at this time, visually pt appears thin.

## 2018-06-20 NOTE — DISCHARGE NOTE ADULT - SECONDARY DIAGNOSIS.
Nasal fracture ESRD (end stage renal disease) CHF (congestive heart failure) Type 2 diabetes mellitus with hyperglycemia, with long-term current use of insulin Cervical spine finding Anemia

## 2018-06-20 NOTE — DISCHARGE NOTE ADULT - MEDICATION SUMMARY - MEDICATIONS TO STOP TAKING
I will STOP taking the medications listed below when I get home from the hospital:    clonazePAM 0.5 mg oral tablet  -- 1 tab(s) by mouth once a day (at bedtime) Home    cloNIDine 0.2 mg oral tablet  -- 1 tab(s) by mouth 3 times a day    labetalol 200 mg oral tablet  -- 1 tab(s) by mouth 2 times a day    hydrALAZINE 50 mg oral tablet  -- 1 tab(s) by mouth 3 times a day    NovoLOG FlexPen 100 units/mL subcutaneous solution  -- 5 unit(s) subcutaneous 2 times a day (before lunch and dinner)    lisinopril 10 mg oral tablet  -- 1 tab(s) by mouth once a day    Lantus 100 units/mL subcutaneous solution  -- 15 unit(s) subcutaneous once a day (at bedtime)

## 2018-06-20 NOTE — DIETITIAN INITIAL EVALUATION ADULT. - ADHERENCE
Pt's  reports pt avoids sugar and salt and they monitor her K and Phos at HD. Pt was taking Novolog and Lantus PTA. Pt's  reports he checks her BG and it is typically 116-145mg/dL in the morning and 124-250mg/dL at dinner.

## 2018-06-20 NOTE — DISCHARGE NOTE ADULT - INSTRUCTIONS
Consistent carbohydrate diet no snack  No protein restriction, no concentrated potassium or phosphorus, low sodium diet   Supplemental feeding: Nepro 2 cans per day  Fluid restriction

## 2018-06-20 NOTE — CONSULT NOTE ADULT - ASSESSMENT
71 yo female s/p fall noted to have a self limited episode of nausea, vomiting and diarrhea.  ?gastroenteritis vs food poisoning.  No further symptoms.   Tolerating Po.    1) Continue to monitor symptoms.  If develops recurrent diarrhea, will check stool studies  2) Continue current regimen  3) Add probiotic
71yo F who presents with multiple falls, now complains of increasing headache. The possibility of a small space occupying lesion, vascular malformation as well as cervical stenosis should be excluded.    Headache  1. MRI brain,mra head and neck  -neuro checks  -esr/crp to r/o GCA  -analgesic prn, may need pain mgt    Ataxia  -MRI Cervical spine to evaluate for cervical stenosis  PT  B12,fol level    case dw dr rolon
73 y/o female with multiple medical problems, presented to ER after tripping and falling, landed on her right side and struck face. Patient with swelling and ecchymosis to left eye/left face. EPS consulted for loop recorder implant to r/o arrhythmic etiology.
73 yo F s/p mechanical fall resulting in nasal fracture, L periorbital swelling, conjunctival hematoma, C3 on C4 ant subluxation,   C3-C5 disruption of anterior longitudinal ligament, with possible fx with headache postfall which has now resolved.  Elevated inflammatory markers, while it can be seen in many CTD disease, it can also be seen post trauma as in this patient,   sarah with the extensive hematoma particularly in a patient who is ESRD on HD  Currently, no clinical stigmata to support diagnosis of autoimmune disease (ie GCA) for patient's HA   Pt HA may be from the trauma vs C3 on C4 ant subluxation vs ant longitudinal ligament disruption  Moreover, patient's uncontrolled HTN (SBP has been in 170s) may also be contributing to the HA    Would cont care per primary/nephro/neuro  Would optimize control of BP    Please recall PRN    Dr. Menchaca  Rheumatology Fellow  [phone]
Patient pmh ESRD on HD on asa81 sp syncopal fall. On ASA, no other blood thinners.  CT c spine showed grade 1 anterolisthesis C3 on C4 with stenosis. Patient states that should prefer to fu outpatient given recent events (syncopal fall, requirement of loop recorder).  Rec MRI cervical. Rec flex ext of cervical spine, if unstable on official read then c collar when oob. If stable, then no c collar required. May reconsult nsg as needed.
73 yo male admitted with recurrent syncope

## 2018-06-20 NOTE — DISCHARGE NOTE ADULT - CARE PLAN
Principal Discharge DX:	Syncope  Goal:	to remain without reoccurring falls, loop recorder placed 6/18/18  Assessment and plan of treatment:	HOME CARE INSTRUCTIONS  Have someone stay with you until you feel stable.  Do not drive, operate machinery, or play sports until your caregiver says it is okay.  Keep all follow-up appointments as directed by your caregiver.   Lie down right away if you start feeling like you might faint. Breathe deeply and steadily. Wait until all the symptoms have passed.Drink enough fluids to keep your urine clear or pale yellow.  If you are taking blood pressure or heart medicine, get up slowly, taking several minutes to sit and then stand. This can reduce dizziness.  SEEK IMMEDIATE MEDICAL CARE IF:  You have a severe headache.  You have unusual pain in the chest, abdomen, or back.  You are bleeding from the mouth or rectum, or you have black or tarry stool.  You have an irregular or very fast heartbeat.  You have pain with breathing.  You have repeated fainting or seizure-like jerking during an episode.  You faint when sitting or lying down.  You have confusion.  You have difficulty walking.  You have severe weakness.  You have vision problems.  Follow up with cardiology for management and assessment of loop recorder information  Secondary Diagnosis:	Nasal fracture  Assessment and plan of treatment:	no indication for surgery.    Patient should follow up his/her ophthalmologist or in the [facility] within 1 week of discharge, sooner if symptoms worsen or change.  [address] [place] [address]  [phone]  Secondary Diagnosis:	ESRD (end stage renal disease)  Assessment and plan of treatment:	Avoid taking (NSAIDs) - (ex: Ibuprofen, Advil, Celebrex, Naprosyn)  Avoid taking any nephrotoxic agents (can harm kidneys) - Intravenous contrast for diagnostic testing, combination cold medications.  Have all medications adjusted for your renal function by your Health Care Provider.  Blood pressure control is important.  Take all medication as prescribed.  Secondary Diagnosis:	CHF (congestive heart failure)  Assessment and plan of treatment:	Weigh yourself daily.  If you gain 3lbs in 3 days, or 5lbs in a week call your Health Care Provider.  Do not eat or drink foods containing more than 2000mg of salt (sodium) in your diet every day.  Call your Health Care Provider if you have any swelling or increased swelling in your feet, ankles, and/or stomach.  Take all of your medication as directed.  If you become dizzy call your Health Care Provider.  Secondary Diagnosis:	Type 2 diabetes mellitus with hyperglycemia, with long-term current use of insulin  Assessment and plan of treatment:	HgA1C this admission.  Make sure you get your HgA1c checked every three months.  If you take oral diabetes medications, check your blood glucose two times a day.  If you take insulin, check your blood glucose before meals and at bedtime.  It's important not to skip any meals.  Keep a log of your blood glucose results and always take it with you to your doctor appointments.  Keep a list of your current medications including injectables and over the counter medications and bring this medication list with you to all your doctor appointments.  If you have not seen your ophthalmologist this year call for appointment.  Check your feet daily for redness, sores, or openings. Do not self treat. If no improvement in two days call your primary care physician for an appointment.  Low blood sugar (hypoglycemia) is a blood sugar below 70mg/dl. Check your blood sugar if you feel signs/symptoms of hypoglycemia. If your blood sugar is below 70 take 15 grams of carbohydrates (ex 4 oz of apple juice, 3-4 glucose tablets, or 4-6 oz of regular soda) wait 15 minutes and repeat blood sugar to make sure it comes up above 70.  If your blood sugar is above 70 and you are due for a meal, have a meal.  If you are not due for a meal have a snack.  This snack helps keeps your blood sugar at a safe range.  Secondary Diagnosis:	Cervical spine finding  Assessment and plan of treatment:	Follow up with neurology and neurosurgery for management  Secondary Diagnosis:	Anemia  Assessment and plan of treatment:	Follow up with pMD and nephrology Principal Discharge DX:	Syncope  Goal:	to remain without reoccurring falls, loop recorder placed 6/18/18  Assessment and plan of treatment:	HOME CARE INSTRUCTIONS  Have someone stay with you until you feel stable.  Do not drive, operate machinery, or play sports until your caregiver says it is okay.  Keep all follow-up appointments as directed by your caregiver.   Lie down right away if you start feeling like you might faint. Breathe deeply and steadily. Wait until all the symptoms have passed.Drink enough fluids to keep your urine clear or pale yellow.  If you are taking blood pressure or heart medicine, get up slowly, taking several minutes to sit and then stand. This can reduce dizziness.  SEEK IMMEDIATE MEDICAL CARE IF:  You have a severe headache.  You have unusual pain in the chest, abdomen, or back.  You are bleeding from the mouth or rectum, or you have black or tarry stool.  You have an irregular or very fast heartbeat.  You have pain with breathing.  You have repeated fainting or seizure-like jerking during an episode.  You faint when sitting or lying down.  You have confusion.  You have difficulty walking.  You have severe weakness.  You have vision problems.  Follow up with cardiology for management and assessment of loop recorder information  Secondary Diagnosis:	Nasal fracture  Assessment and plan of treatment:	no indication for surgery.    Patient should follow up his/her ophthalmologist or in the [facility] within 1 week of discharge, sooner if symptoms worsen or change.  [address] [place] [address]  [phone]  Secondary Diagnosis:	ESRD (end stage renal disease)  Assessment and plan of treatment:	Avoid taking (NSAIDs) - (ex: Ibuprofen, Advil, Celebrex, Naprosyn)  Avoid taking any nephrotoxic agents (can harm kidneys) - Intravenous contrast for diagnostic testing, combination cold medications.  Have all medications adjusted for your renal function by your Health Care Provider.  Blood pressure control is important.  Take all medication as prescribed.  Secondary Diagnosis:	CHF (congestive heart failure)  Assessment and plan of treatment:	Weigh yourself daily.  If you gain 3lbs in 3 days, or 5lbs in a week call your Health Care Provider.  Do not eat or drink foods containing more than 2000mg of salt (sodium) in your diet every day.  Call your Health Care Provider if you have any swelling or increased swelling in your feet, ankles, and/or stomach.  Take all of your medication as directed.  If you become dizzy call your Health Care Provider.  Secondary Diagnosis:	Type 2 diabetes mellitus with hyperglycemia, with long-term current use of insulin  Assessment and plan of treatment:	HgA1C this admission was 8.9.  Make sure you get your HgA1c checked every three months.  If you take oral diabetes medications, check your blood glucose two times a day.  If you take insulin, check your blood glucose before meals and at bedtime.  It's important not to skip any meals.  Keep a log of your blood glucose results and always take it with you to your doctor appointments.  Keep a list of your current medications including injectables and over the counter medications and bring this medication list with you to all your doctor appointments.  If you have not seen your ophthalmologist this year call for appointment.  Check your feet daily for redness, sores, or openings. Do not self treat. If no improvement in two days call your primary care physician for an appointment.  Low blood sugar (hypoglycemia) is a blood sugar below 70mg/dl. Check your blood sugar if you feel signs/symptoms of hypoglycemia. If your blood sugar is below 70 take 15 grams of carbohydrates (ex 4 oz of apple juice, 3-4 glucose tablets, or 4-6 oz of regular soda) wait 15 minutes and repeat blood sugar to make sure it comes up above 70.  If your blood sugar is above 70 and you are due for a meal, have a meal.  If you are not due for a meal have a snack.  This snack helps keeps your blood sugar at a safe range.  Secondary Diagnosis:	Cervical spine finding  Assessment and plan of treatment:	Follow up with neurology and neurosurgery for management  Secondary Diagnosis:	Anemia  Assessment and plan of treatment:	Follow up with pMD and nephrology Principal Discharge DX:	Syncope  Goal:	to remain without reoccurring falls, loop recorder placed 6/18/18  Assessment and plan of treatment:	HOME CARE INSTRUCTIONS  Have someone stay with you until you feel stable.  Do not drive, operate machinery, or play sports until your caregiver says it is okay.  Keep all follow-up appointments as directed by your caregiver.   Lie down right away if you start feeling like you might faint. Breathe deeply and steadily. Wait until all the symptoms have passed.Drink enough fluids to keep your urine clear or pale yellow.  If you are taking blood pressure or heart medicine, get up slowly, taking several minutes to sit and then stand. This can reduce dizziness.  SEEK IMMEDIATE MEDICAL CARE IF:  You have a severe headache.  You have unusual pain in the chest, abdomen, or back.  You are bleeding from the mouth or rectum, or you have black or tarry stool.  You have an irregular or very fast heartbeat.  You have pain with breathing.  You have repeated fainting or seizure-like jerking during an episode.  You faint when sitting or lying down.  You have confusion.  You have difficulty walking.  You have severe weakness.  You have vision problems.  Follow up with cardiology for management and assessment of loop recorder information  Secondary Diagnosis:	Nasal fracture  Assessment and plan of treatment:	no indication for surgery.    Patient should follow up his/her ophthalmologist or in the [facility] within 1 week of discharge, sooner if symptoms worsen or change.  [address] [place] [address]  [phone]  Secondary Diagnosis:	ESRD (end stage renal disease)  Assessment and plan of treatment:	Avoid taking (NSAIDs) - (ex: Ibuprofen, Advil, Celebrex, Naprosyn)  Avoid taking any nephrotoxic agents (can harm kidneys) - Intravenous contrast for diagnostic testing, combination cold medications.  Have all medications adjusted for your renal function by your Health Care Provider.  Blood pressure control is important.  Take all medication as prescribed.  HD is schedule on Tues/Thurs/Sat  Secondary Diagnosis:	CHF (congestive heart failure)  Assessment and plan of treatment:	Weigh yourself daily.  If you gain 3lbs in 3 days, or 5lbs in a week call your Health Care Provider.  Do not eat or drink foods containing more than 2000mg of salt (sodium) in your diet every day.  Call your Health Care Provider if you have any swelling or increased swelling in your feet, ankles, and/or stomach.  Take all of your medication as directed.  If you become dizzy call your Health Care Provider.  Secondary Diagnosis:	Type 2 diabetes mellitus with hyperglycemia, with long-term current use of insulin  Assessment and plan of treatment:	HgA1C this admission was 8.9.  Make sure you get your HgA1c checked every three months.  If you take oral diabetes medications, check your blood glucose two times a day.  If you take insulin, check your blood glucose before meals and at bedtime.  It's important not to skip any meals.  Keep a log of your blood glucose results and always take it with you to your doctor appointments.  Keep a list of your current medications including injectables and over the counter medications and bring this medication list with you to all your doctor appointments.  If you have not seen your ophthalmologist this year call for appointment.  Check your feet daily for redness, sores, or openings. Do not self treat. If no improvement in two days call your primary care physician for an appointment.  Low blood sugar (hypoglycemia) is a blood sugar below 70mg/dl. Check your blood sugar if you feel signs/symptoms of hypoglycemia. If your blood sugar is below 70 take 15 grams of carbohydrates (ex 4 oz of apple juice, 3-4 glucose tablets, or 4-6 oz of regular soda) wait 15 minutes and repeat blood sugar to make sure it comes up above 70.  If your blood sugar is above 70 and you are due for a meal, have a meal.  If you are not due for a meal have a snack.  This snack helps keeps your blood sugar at a safe range.  Secondary Diagnosis:	Cervical spine finding  Assessment and plan of treatment:	Follow up with neurology and neurosurgery for management  Secondary Diagnosis:	Anemia  Assessment and plan of treatment:	Follow up with PMD and nephrology  continue with Epogen - to be scheduled by nephrology

## 2018-06-20 NOTE — DISCHARGE NOTE ADULT - CARE PROVIDERS DIRECT ADDRESSES
,DirectAddress_Unknown,DirectAddress_Unknown,[email or web address],DirectAddress_Unknown

## 2018-06-20 NOTE — DISCHARGE NOTE ADULT - PATIENT PORTAL LINK FT
You can access the [facility], offered by [facility], by registering with the following website: [email or web address]

## 2018-06-20 NOTE — DIETITIAN INITIAL EVALUATION ADULT. - NS FNS WEIGHT CHANGE REASON
Pt's  reports pt wt at HD was 50-52kg about 6 months or so ago and now is between 46-48kg. Pt's  reports pt wt at HD was 50-52kg (110-114 pounds) about 6 months or so ago and now is between 46-48kg (101-105 pounds). Pt's standing weight range during admission fairly consistent (101.1-104.4 pounds), lower bed weights noted.  ~9 pound wt loss noted.

## 2018-06-20 NOTE — DIETITIAN INITIAL EVALUATION ADULT. - OTHER INFO
Nutrition assessment for length of stay. Nutrition assessment for length of stay. Pt's  reports pt continues with good po intake at the hospital. Flow sheets and RN also report good po intake. Pt's  unsure of why pt has lost wt- question due to increased nutrient needs for HD vs. fluid shifts. Pt's  reports pt would like to try Nepro to provide extra protein and calories. Pt denies N+V today, last BM was 6/19. No chewing or swallowing difficulties at this time. Pt's  confirmed allergy to fish.

## 2018-06-20 NOTE — DIETITIAN INITIAL EVALUATION ADULT. - ENERGY NEEDS
Ht: 60“-stated per , Wt: 101.1 lbs, BMI: 19.7 kg/m2, IBW: 100 lbs (+/-10%)  Pertinent Information: Pt admitted S/P fall- S/P ILR insertion.   Neurology evaluation-MRI brain pending, Cervical spine subluxation- Neurosurgery evaluation - MRI C Spine pending, ESRD- HD, T2DM, Nasal fracture- Plastic surgery eval- No intervention; Periorbital ecchymosis, Hematoma- Opthalmology eval- Local care. Nausea/ Diarrhea- seen by GI- resolved.   1+ face, left hip and left eye edema, no pressure injury

## 2018-06-20 NOTE — PROGRESS NOTE ADULT - ASSESSMENT
72 f with  s/p fall- telemetry, cardiology follow s/p ILR insertion.   Neurology evaluation noted.  Hypothyroidism-follow TSH  Cervical spine subluxation- Neurosurgery evaluation noted. Cervical collar.   PT  ESRD- HD, Nephrology evaluation Dr. Fernandez  Hyperkalemia- follow.  Diabetes control  Nasal fracture- Plastic surgery evaluation noted. No intervention  Periorbital ecchymosis, Hematoma- Opthalmology evaluation noted. Local care.  Nausea/ Diarrhea- GI evaluation noted. Probable gastroenteritis resolved.   Anemia- follow  Rajendra Carter MD pager [phone]

## 2018-06-21 LAB
ANION GAP SERPL CALC-SCNC: 16 MMOL/L — SIGNIFICANT CHANGE UP (ref 5–17)
BUN SERPL-MCNC: 38 MG/DL — HIGH (ref 7–23)
CALCIUM SERPL-MCNC: 8.1 MG/DL — LOW (ref 8.4–10.5)
CHLORIDE SERPL-SCNC: 89 MMOL/L — LOW (ref 96–108)
CO2 SERPL-SCNC: 27 MMOL/L — SIGNIFICANT CHANGE UP (ref 22–31)
CREAT SERPL-MCNC: 3.9 MG/DL — HIGH (ref 0.5–1.3)
GLUCOSE BLDC GLUCOMTR-MCNC: 148 MG/DL — HIGH (ref 70–99)
GLUCOSE BLDC GLUCOMTR-MCNC: 162 MG/DL — HIGH (ref 70–99)
GLUCOSE BLDC GLUCOMTR-MCNC: 226 MG/DL — HIGH (ref 70–99)
GLUCOSE BLDC GLUCOMTR-MCNC: 264 MG/DL — HIGH (ref 70–99)
GLUCOSE SERPL-MCNC: 187 MG/DL — HIGH (ref 70–99)
HCT VFR BLD CALC: 23.3 % — LOW (ref 34.5–45)
HGB BLD-MCNC: 7.8 G/DL — LOW (ref 11.5–15.5)
MCHC RBC-ENTMCNC: 31.8 PG — SIGNIFICANT CHANGE UP (ref 27–34)
MCHC RBC-ENTMCNC: 33.2 GM/DL — SIGNIFICANT CHANGE UP (ref 32–36)
MCV RBC AUTO: 95.8 FL — SIGNIFICANT CHANGE UP (ref 80–100)
PLATELET # BLD AUTO: 219 K/UL — SIGNIFICANT CHANGE UP (ref 150–400)
POTASSIUM SERPL-MCNC: 4.9 MMOL/L — SIGNIFICANT CHANGE UP (ref 3.5–5.3)
POTASSIUM SERPL-SCNC: 4.9 MMOL/L — SIGNIFICANT CHANGE UP (ref 3.5–5.3)
RBC # BLD: 2.43 M/UL — LOW (ref 3.8–5.2)
RBC # FLD: 16.4 % — HIGH (ref 10.3–14.5)
SODIUM SERPL-SCNC: 132 MMOL/L — LOW (ref 135–145)
WBC # BLD: 7.7 K/UL — SIGNIFICANT CHANGE UP (ref 3.8–10.5)
WBC # FLD AUTO: 7.7 K/UL — SIGNIFICANT CHANGE UP (ref 3.8–10.5)

## 2018-06-21 PROCEDURE — 99222 1ST HOSP IP/OBS MODERATE 55: CPT

## 2018-06-21 PROCEDURE — 99222 1ST HOSP IP/OBS MODERATE 55: CPT | Mod: GC

## 2018-06-21 RX ADMIN — Medication 667 MILLIGRAM(S): at 07:52

## 2018-06-21 RX ADMIN — Medication 1 TABLET(S): at 07:52

## 2018-06-21 RX ADMIN — Medication 500 MILLIGRAM(S): at 05:00

## 2018-06-21 RX ADMIN — Medication 100 MILLIGRAM(S): at 21:06

## 2018-06-21 RX ADMIN — AMLODIPINE BESYLATE 10 MILLIGRAM(S): 2.5 TABLET ORAL at 11:07

## 2018-06-21 RX ADMIN — Medication 0.3 MILLIGRAM(S): at 14:09

## 2018-06-21 RX ADMIN — Medication 100 MICROGRAM(S): at 06:08

## 2018-06-21 RX ADMIN — Medication 500 MILLIGRAM(S): at 21:00

## 2018-06-21 RX ADMIN — Medication 0.5 MILLIGRAM(S): at 21:06

## 2018-06-21 RX ADMIN — Medication 300 MILLIGRAM(S): at 06:08

## 2018-06-21 RX ADMIN — Medication 500 MILLIGRAM(S): at 12:06

## 2018-06-21 RX ADMIN — Medication 1 APPLICATION(S): at 17:16

## 2018-06-21 RX ADMIN — Medication 500 MILLIGRAM(S): at 19:53

## 2018-06-21 RX ADMIN — Medication 500 MILLIGRAM(S): at 11:36

## 2018-06-21 RX ADMIN — Medication 4: at 17:04

## 2018-06-21 RX ADMIN — GABAPENTIN 100 MILLIGRAM(S): 400 CAPSULE ORAL at 21:05

## 2018-06-21 RX ADMIN — Medication 0.3 MILLIGRAM(S): at 06:08

## 2018-06-21 RX ADMIN — LIDOCAINE 1 PATCH: 4 CREAM TOPICAL at 14:09

## 2018-06-21 RX ADMIN — HEPARIN SODIUM 5000 UNIT(S): 5000 INJECTION INTRAVENOUS; SUBCUTANEOUS at 17:06

## 2018-06-21 RX ADMIN — TRAMADOL HYDROCHLORIDE 50 MILLIGRAM(S): 50 TABLET ORAL at 16:00

## 2018-06-21 RX ADMIN — ATORVASTATIN CALCIUM 20 MILLIGRAM(S): 80 TABLET, FILM COATED ORAL at 21:05

## 2018-06-21 RX ADMIN — Medication 300 MILLIGRAM(S): at 21:05

## 2018-06-21 RX ADMIN — Medication 100 MILLIGRAM(S): at 06:08

## 2018-06-21 RX ADMIN — Medication 500 MILLIGRAM(S): at 04:20

## 2018-06-21 RX ADMIN — Medication 1 TABLET(S): at 17:06

## 2018-06-21 RX ADMIN — GABAPENTIN 100 MILLIGRAM(S): 400 CAPSULE ORAL at 06:08

## 2018-06-21 RX ADMIN — Medication 2: at 21:54

## 2018-06-21 RX ADMIN — Medication 100 MILLIGRAM(S): at 14:09

## 2018-06-21 RX ADMIN — Medication 2: at 08:01

## 2018-06-21 RX ADMIN — Medication 81 MILLIGRAM(S): at 14:09

## 2018-06-21 RX ADMIN — Medication 100 MILLIGRAM(S): at 11:07

## 2018-06-21 RX ADMIN — Medication 667 MILLIGRAM(S): at 17:06

## 2018-06-21 RX ADMIN — TRAMADOL HYDROCHLORIDE 50 MILLIGRAM(S): 50 TABLET ORAL at 15:54

## 2018-06-21 RX ADMIN — Medication 100 MILLIGRAM(S): at 17:06

## 2018-06-21 RX ADMIN — HEPARIN SODIUM 5000 UNIT(S): 5000 INJECTION INTRAVENOUS; SUBCUTANEOUS at 06:08

## 2018-06-21 RX ADMIN — GABAPENTIN 100 MILLIGRAM(S): 400 CAPSULE ORAL at 14:09

## 2018-06-21 RX ADMIN — Medication 667 MILLIGRAM(S): at 14:09

## 2018-06-21 RX ADMIN — SERTRALINE 50 MILLIGRAM(S): 25 TABLET, FILM COATED ORAL at 14:09

## 2018-06-21 RX ADMIN — Medication 300 MILLIGRAM(S): at 14:09

## 2018-06-21 RX ADMIN — Medication 1 APPLICATION(S): at 06:08

## 2018-06-21 RX ADMIN — Medication 0.3 MILLIGRAM(S): at 21:05

## 2018-06-21 NOTE — PROGRESS NOTE ADULT - SUBJECTIVE AND OBJECTIVE BOX
Subjective: Patient seen and examined. No new events except as noted.   no cp ro sob     REVIEW OF SYSTEMS:    CONSTITUTIONAL: + weakness, fevers or chills  EYES/ENT: No visual changes;  No vertigo or throat pain   NECK: No pain or stiffness  RESPIRATORY: No cough, wheezing, hemoptysis; No shortness of breath  CARDIOVASCULAR: No chest pain or palpitations  GASTROINTESTINAL: No abdominal or epigastric pain. No nausea, vomiting, or hematemesis; No diarrhea or constipation. No melena or hematochezia.  GENITOURINARY: No dysuria, frequency or hematuria  NEUROLOGICAL: No numbness or weakness  SKIN: No itching, burning, rashes, or lesions   All other review of systems is negative unless indicated above.    MEDICATIONS:  MEDICATIONS  (STANDING):  acetaminophen  IVPB. 500 milliGRAM(s) IV Intermittent once  amLODIPine   Tablet 10 milliGRAM(s) Oral daily  aspirin enteric coated 81 milliGRAM(s) Oral daily  atorvastatin 20 milliGRAM(s) Oral at bedtime  BACItracin   Ointment 1 Application(s) Topical two times a day  calcium acetate 667 milliGRAM(s) Oral three times a day with meals  clonazePAM Tablet 0.5 milliGRAM(s) Oral at bedtime  cloNIDine 0.3 milliGRAM(s) Oral every 8 hours  dextrose 5%. 1000 milliLiter(s) (50 mL/Hr) IV Continuous <Continuous>  dextrose 50% Injectable 12.5 Gram(s) IV Push once  dextrose 50% Injectable 25 Gram(s) IV Push once  dextrose 50% Injectable 25 Gram(s) IV Push once  docusate sodium 100 milliGRAM(s) Oral three times a day  gabapentin 100 milliGRAM(s) Oral three times a day  heparin  Injectable 5000 Unit(s) SubCutaneous every 12 hours  hydrALAZINE 100 milliGRAM(s) Oral every 8 hours  insulin lispro (HumaLOG) corrective regimen sliding scale   SubCutaneous three times a day before meals  insulin lispro (HumaLOG) corrective regimen sliding scale   SubCutaneous at bedtime  labetalol 300 milliGRAM(s) Oral every 8 hours  lactobacillus acidophilus 1 Tablet(s) Oral two times a day with meals  levothyroxine 100 MICROGram(s) Oral daily  lidocaine   Patch 1 Patch Transdermal daily  sertraline 50 milliGRAM(s) Oral daily      PHYSICAL EXAM:  T(C): 37.3 (06-21-18 @ 08:50), Max: 37.3 (06-21-18 @ 08:50)  HR: 65 (06-21-18 @ 08:50) (58 - 65)  BP: 174/63 (06-21-18 @ 08:50) (171/51 - 190/64)  RR: 18 (06-21-18 @ 08:50) (18 - 18)  SpO2: 100% (06-21-18 @ 08:50) (94% - 100%)  Wt(kg): --  I&O's Summary    20 Jun 2018 07:01  -  21 Jun 2018 07:00  --------------------------------------------------------  IN: 480 mL / OUT: 0 mL / NET: 480 mL          Appearance: NAD  HEENT:   Normal oral mucosa, left boni-orbital ecchymosis 	  Lymphatic: No lymphadenopathy , no edema  Cardiovascular: Normal S1 S2, No JVD, No murmurs , Peripheral pulses palpable 2+ bilaterally  Respiratory: Lungs clear to auscultation, normal effort 	  Gastrointestinal:  Soft, Non-tender, + BS	  Skin: No rashes, No ecchymoses, No cyanosis, warm to touch  Musculoskeletal: decreased range of motion and  strength  Psychiatry:  lethargic   Ext: No edema      LABS:    CARDIAC MARKERS:                                7.8    7.7   )-----------( 219      ( 21 Jun 2018 09:24 )             23.3     06-21    132<L>  |  89<L>  |  38<H>  ----------------------------<  187<H>  4.9   |  27  |  3.90<H>    Ca    8.1<L>      21 Jun 2018 09:24      proBNP:   Lipid Profile:   HgA1c:   TSH:             TELEMETRY: 	    ECG:  	  RADIOLOGY:   DIAGNOSTIC TESTING:  [ ] Echocardiogram:  [ ]  Catheterization:  [ ] Stress Test:    OTHER:

## 2018-06-21 NOTE — PROGRESS NOTE ADULT - ASSESSMENT
71yo F who presents with multiple falls,with headache and neck pain, improving. MRI C spine demonstrated findings suggestive of a cervical fx which may explain her symptoms. The possibilty of a dissection could not be excluded on MRA    Headache  1. Recommend rpt MRA neck to ensure no dissection, unable to have CTA given ESRD  -neuro checks  -analgesic prn, may need pain mgt    2. Cervical fx  - neurosx fu regarding MRI results  -on c collar    3. Ataxia  PT, rehab    4. Elev ESR/CRP  - rheum eval noted, no suggestion of autoimmune process    case dw NP covering floor

## 2018-06-21 NOTE — PROGRESS NOTE ADULT - ASSESSMENT
72 female with a history of HTN, CAD,C HF, ESRD on HD with anemia now admitted with a history of fall. Neurology work up in progress. Had stable dialysis this AM. Continue the present anti HTN medications. Discharge planning once neuro work up is done. Will follow.

## 2018-06-21 NOTE — PROGRESS NOTE ADULT - SUBJECTIVE AND OBJECTIVE BOX
ANGEL LECHUGA  72y  Female    Patient is a 72y old  Female who presents with a chief complaint of falls (20 Jun 2018 08:25)      HPI:  The patient is a 72y Female complaining of fall.Patient with multiple medical problems, trip and fall yesterday at home (mechanical), landed on R side and struck her face.  No LOC.  Significant swelling to L eye (but can still see out of it reportedly).  On ASA, no other blood thinners.  Seen recently at [facility] [place] also for fall.  T/R/S dialysis, presented there for dialysis this morning and was sent to the Emergency Department for dialysis.     had stable dialysis this morning.   out of bed to chair.   complains of some head ache      PAST MEDICAL & SURGICAL HISTORY:  [place] (hard of hearing)  Cataracts, bilateral: left worse than right  Pleural thickening: s/p pleurodisis left vats 2014  Hip pain: left  LBP radiating to left leg  CHF (congestive heart failure): diagnosed 2-2014  MI (myocardial infarction): 2-2014   cardiac cath done [facility]  Diabetes: diagnosed 2004  no medications in 5 months since hemodialysis  Hypothyroidism  ESRD (end stage renal disease)  HTN (hypertension)  MI (myocardial infarction): may 2016  Pleural effusion: Left side - several times since March 2014  Hypothyroid  Anxiety  HTN (hypertension)  Hyperlipidemia  CHF (congestive heart failure)  CKD (chronic kidney disease)  Anemia  Diabetes: DM 2  Thoracotomy scar of left chest: 4-2014 ? pneumonia/ scarring  S/P myomectomy: abdominal age 52  AV fistula: left upper 8-2014 attempted 12-16-14          PHYSICAL EXAM:    T(C): 37 (06-21-18 @ 12:20), Max: 37.3 (06-21-18 @ 08:50)  HR: 62 (06-21-18 @ 13:31) (58 - 65)  BP: 168/72 (06-21-18 @ 13:31) (168/72 - 190/64)  RR: 18 (06-21-18 @ 13:31) (18 - 20)  SpO2: 99% (06-21-18 @ 13:31) (94% - 100%)  Wt(kg): --    I&O's Detail    20 Jun 2018 07:01  -  21 Jun 2018 07:00  --------------------------------------------------------  IN:    Oral Fluid: 480 mL  Total IN: 480 mL    OUT:  Total OUT: 0 mL    Total NET: 480 mL      21 Jun 2018 07:01  -  21 Jun 2018 16:04  --------------------------------------------------------  IN:    Oral Fluid: 120 mL  Total IN: 120 mL    OUT:    Other: 2100 mL  Total OUT: 2100 mL    Total NET: -1980 mL    Respiratory: clear anteriorly, decreased BS at bases  Cardiovascular: S1 S2  Gastrointestinal: soft NT ND +BS  Extremities: edema   Neuro: Awake and alert    MEDICATIONS  (STANDING):  acetaminophen  IVPB. 500 milliGRAM(s) IV Intermittent once  amLODIPine   Tablet 10 milliGRAM(s) Oral daily  aspirin enteric coated 81 milliGRAM(s) Oral daily  atorvastatin 20 milliGRAM(s) Oral at bedtime  BACItracin   Ointment 1 Application(s) Topical two times a day  calcium acetate 667 milliGRAM(s) Oral three times a day with meals  clonazePAM Tablet 0.5 milliGRAM(s) Oral at bedtime  cloNIDine 0.3 milliGRAM(s) Oral every 8 hours  dextrose 5%. 1000 milliLiter(s) (50 mL/Hr) IV Continuous <Continuous>  dextrose 50% Injectable 12.5 Gram(s) IV Push once  dextrose 50% Injectable 25 Gram(s) IV Push once  dextrose 50% Injectable 25 Gram(s) IV Push once  docusate sodium 100 milliGRAM(s) Oral three times a day  gabapentin 100 milliGRAM(s) Oral three times a day  heparin  Injectable 5000 Unit(s) SubCutaneous every 12 hours  hydrALAZINE 100 milliGRAM(s) Oral every 8 hours  insulin lispro (HumaLOG) corrective regimen sliding scale   SubCutaneous three times a day before meals  insulin lispro (HumaLOG) corrective regimen sliding scale   SubCutaneous at bedtime  labetalol 300 milliGRAM(s) Oral every 8 hours  lactobacillus acidophilus 1 Tablet(s) Oral two times a day with meals  levothyroxine 100 MICROGram(s) Oral daily  lidocaine   Patch 1 Patch Transdermal daily  sertraline 50 milliGRAM(s) Oral daily    MEDICATIONS  (PRN):  acetaminophen   Tablet. 500 milliGRAM(s) Oral every 6 hours PRN Mild Pain (1 - 3)  dextrose 40% Gel 15 Gram(s) Oral once PRN Blood Glucose LESS THAN 70 milliGRAM(s)/deciliter  glucagon  Injectable 1 milliGRAM(s) IntraMuscular once PRN Glucose LESS THAN 70 milligrams/deciliter  ondansetron Injectable 8 milliGRAM(s) IV Push every 8 hours PRN Nausea                            7.8    7.7   )-----------( 219      ( 21 Jun 2018 09:24 )             23.3       06-21    132<L>  |  89<L>  |  38<H>  ----------------------------<  187<H>  4.9   |  27  |  3.90<H>    Ca    8.1<L>      21 Jun 2018 09:24

## 2018-06-21 NOTE — CONSULT NOTE ADULT - SUBJECTIVE AND OBJECTIVE BOX
Cc: Difficulty walking.      HPI:  The patient is a 72y Female with multiple medical problems. Had a trip and fall on 6/15  at home (mechanical), landed on R side and struck her face.  No LOC.  Seen recently at [facility] also for fall. Presented for dialysis and was sent to the Emergency Department.  Patient found to have nasal fracture. CTH negative for bleed or CVA. Had c/o of headaches- MRI of brain negative.     REVIEW OF SYSTEMS: No chest pain, shortness of breath, nausea, vomiting or diarhea; other ROS neg     PAST MEDICAL & SURGICAL HISTORY  [place] (hard of hearing)  Cataracts, bilateral  Pleural thickening  Hip pain  LBP radiating to left leg  CHF (congestive heart failure)  MI (myocardial infarction)  Diabetes  Hypothyroidism  ESRD (end stage renal disease)  HTN (hypertension)  MI (myocardial infarction)  Pleural effusion  Hypothyroid  Anxiety  HTN (hypertension)  Hyperlipidemia  CHF (congestive heart failure)  CKD (chronic kidney disease)  Anemia  Diabetes  Thoracotomy scar of left chest  S/P myomectomy  AV fistula    FUNCTIONAL HISTORY:   Lives in apartment with no stairs with spouse  Ambulates with walker. Has HHA 6h for 2d/wk that helps with some ADLs    CURRENT FUNCTIONAL STATUS:  Min A transfers and gait    FAMILY HISTORY   No significant family history      RECENT LABS/IMAGING  CBC Full  -  ( 21 Jun 2018 09:24 )  WBC Count : 7.7 K/uL  Hemoglobin : 7.8 g/dL  Hematocrit : 23.3 %  Platelet Count - Automated : 219 K/uL  Mean Cell Volume : 95.8 fl  Mean Cell Hemoglobin : 31.8 pg  Mean Cell Hemoglobin Concentration : 33.2 gm/dL  Auto Neutrophil # : x  Auto Lymphocyte # : x  Auto Monocyte # : x  Auto Eosinophil # : x  Auto Basophil # : x  Auto Neutrophil % : x  Auto Lymphocyte % : x  Auto Monocyte % : x  Auto Eosinophil % : x  Auto Basophil % : x    06-21    132<L>  |  89<L>  |  38<H>  ----------------------------<  187<H>  4.9   |  27  |  3.90<H>    Ca    8.1<L>      21 Jun 2018 09:24    VITALS  T(C): 37.3 (06-21-18 @ 08:50), Max: 37.3 (06-21-18 @ 08:50)  HR: 65 (06-21-18 @ 08:50) (57 - 65)  BP: 174/63 (06-21-18 @ 08:50) (171/51 - 190/64)  RR: 18 (06-21-18 @ 08:50) (18 - 18)  SpO2: 100% (06-21-18 @ 08:50) (94% - 100%)  Wt(kg): --    ALLERGIES  Avelox (Unknown)  fish (Hives; Rash)  shellfish (Unknown)      MEDICATIONS   acetaminophen   Tablet. 500 milliGRAM(s) Oral every 6 hours PRN  acetaminophen  IVPB. 500 milliGRAM(s) IV Intermittent once  amLODIPine   Tablet 10 milliGRAM(s) Oral daily  aspirin enteric coated 81 milliGRAM(s) Oral daily  atorvastatin 20 milliGRAM(s) Oral at bedtime  BACItracin   Ointment 1 Application(s) Topical two times a day  calcium acetate 667 milliGRAM(s) Oral three times a day with meals  clonazePAM Tablet 0.5 milliGRAM(s) Oral at bedtime  cloNIDine 0.3 milliGRAM(s) Oral every 8 hours  dextrose 40% Gel 15 Gram(s) Oral once PRN  dextrose 5%. 1000 milliLiter(s) IV Continuous <Continuous>  dextrose 50% Injectable 12.5 Gram(s) IV Push once  dextrose 50% Injectable 25 Gram(s) IV Push once  dextrose 50% Injectable 25 Gram(s) IV Push once  docusate sodium 100 milliGRAM(s) Oral three times a day  gabapentin 100 milliGRAM(s) Oral three times a day  glucagon  Injectable 1 milliGRAM(s) IntraMuscular once PRN  heparin  Injectable 5000 Unit(s) SubCutaneous every 12 hours  hydrALAZINE 100 milliGRAM(s) Oral every 8 hours  insulin lispro (HumaLOG) corrective regimen sliding scale   SubCutaneous three times a day before meals  insulin lispro (HumaLOG) corrective regimen sliding scale   SubCutaneous at bedtime  labetalol 300 milliGRAM(s) Oral every 8 hours  lactobacillus acidophilus 1 Tablet(s) Oral two times a day with meals  levothyroxine 100 MICROGram(s) Oral daily  lidocaine   Patch 1 Patch Transdermal daily  ondansetron Injectable 8 milliGRAM(s) IV Push every 8 hours PRN  sertraline 50 milliGRAM(s) Oral daily  traMADol 50 milliGRAM(s) Oral daily PRN      ----------------------------------------------------------------------------------------  PHYSICAL EXAM  Constitutional - NAD, Comfortable  HEENT - NCAT, EOMI  Neck - Supple, No limited ROM  Chest - CTA bilaterally, No wheeze, No rhonchi, No crackles  Cardiovascular - RRR, S1S2, No murmurs  Abdomen - BS+, Soft, NTND  Extremities - No C/C/E, No calf tenderness   Neurologic Exam -                    Cognitive - Awake, Alert, AAO to self, place, date, year, situation     Communication - Fluent, No dysarthria, no aphasia     Cranial Nerves - CN 2-12 intact     Motor - No focal deficits                       Sensory - Intact to LT     Reflexes - DTR Intact, No primitive reflexive     Balance - WNL Static  Psychiatric - Mood stable, Affect WNL      Impression:  71 yo with gait dysfunction      Plan:  PT- ROM, Bed Mob, Transfers, Amb w AD   OT- ADLs  Prec- Falls, Cardiac, Pulm  DVT Prophylaxis  Skin- Turn q2 h  Dispo- Cc: Difficulty walking.    HPI:  The patient is a 72y Female with multiple medical problems. Had a trip and fall on 6/15  at home (mechanical), landed on R side and struck her face.  No LOC.  Seen recently at [facility] also for fall. Presented for dialysis and was sent to the Emergency Department.  Patient found to have nasal fracture. CTH negative for bleed or CVA. Had c/o of headaches- MRI of brain negative. Imaging of cervical spine notes DDD and possible fx at C3-4- treated with soft collar.     REVIEW OF SYSTEMS: Denies any pain, + difficulty walking, No chest pain, shortness of breath, nausea, vomiting or diarhea; other ROS neg     PAST MEDICAL & SURGICAL HISTORY  [place] (hard of hearing)  Cataracts, bilateral  Pleural thickening  Hip pain  LBP radiating to left leg  CHF (congestive heart failure)  MI (myocardial infarction)  Diabetes  Hypothyroidism  ESRD (end stage renal disease)  HTN (hypertension)  MI (myocardial infarction)  Pleural effusion  Hypothyroid  Anxiety  HTN (hypertension)  Hyperlipidemia  CHF (congestive heart failure)  CKD (chronic kidney disease)  Anemia  Diabetes  Thoracotomy scar of left chest  S/P myomectomy  AV fistula    FUNCTIONAL HISTORY:   Lives in apartment with no stairs with spouse  Ambulates with walker. Has HHA 6h for 2d/wk that helps with some ADLs    CURRENT FUNCTIONAL STATUS:  Min A transfers and gait    FAMILY HISTORY   No significant family history      RECENT LABS/IMAGING  CBC Full  -  ( 21 Jun 2018 09:24 )  WBC Count : 7.7 K/uL  Hemoglobin : 7.8 g/dL  Hematocrit : 23.3 %  Platelet Count - Automated : 219 K/uL  Mean Cell Volume : 95.8 fl  Mean Cell Hemoglobin : 31.8 pg  Mean Cell Hemoglobin Concentration : 33.2 gm/dL  Auto Neutrophil # : x  Auto Lymphocyte # : x  Auto Monocyte # : x  Auto Eosinophil # : x  Auto Basophil # : x  Auto Neutrophil % : x  Auto Lymphocyte % : x  Auto Monocyte % : x  Auto Eosinophil % : x  Auto Basophil % : x    06-21    132<L>  |  89<L>  |  38<H>  ----------------------------<  187<H>  4.9   |  27  |  3.90<H>    Ca    8.1<L>      21 Jun 2018 09:24    VITALS  T(C): 37.3 (06-21-18 @ 08:50), Max: 37.3 (06-21-18 @ 08:50)  HR: 65 (06-21-18 @ 08:50) (57 - 65)  BP: 174/63 (06-21-18 @ 08:50) (171/51 - 190/64)  RR: 18 (06-21-18 @ 08:50) (18 - 18)  SpO2: 100% (06-21-18 @ 08:50) (94% - 100%)  Wt(kg): --    ALLERGIES  Avelox (Unknown)  fish (Hives; Rash)  shellfish (Unknown)      MEDICATIONS   acetaminophen   Tablet. 500 milliGRAM(s) Oral every 6 hours PRN  acetaminophen  IVPB. 500 milliGRAM(s) IV Intermittent once  amLODIPine   Tablet 10 milliGRAM(s) Oral daily  aspirin enteric coated 81 milliGRAM(s) Oral daily  atorvastatin 20 milliGRAM(s) Oral at bedtime  BACItracin   Ointment 1 Application(s) Topical two times a day  calcium acetate 667 milliGRAM(s) Oral three times a day with meals  clonazePAM Tablet 0.5 milliGRAM(s) Oral at bedtime  cloNIDine 0.3 milliGRAM(s) Oral every 8 hours  dextrose 40% Gel 15 Gram(s) Oral once PRN  dextrose 5%. 1000 milliLiter(s) IV Continuous <Continuous>  dextrose 50% Injectable 12.5 Gram(s) IV Push once  dextrose 50% Injectable 25 Gram(s) IV Push once  dextrose 50% Injectable 25 Gram(s) IV Push once  docusate sodium 100 milliGRAM(s) Oral three times a day  gabapentin 100 milliGRAM(s) Oral three times a day  glucagon  Injectable 1 milliGRAM(s) IntraMuscular once PRN  heparin  Injectable 5000 Unit(s) SubCutaneous every 12 hours  hydrALAZINE 100 milliGRAM(s) Oral every 8 hours  insulin lispro (HumaLOG) corrective regimen sliding scale   SubCutaneous three times a day before meals  insulin lispro (HumaLOG) corrective regimen sliding scale   SubCutaneous at bedtime  labetalol 300 milliGRAM(s) Oral every 8 hours  lactobacillus acidophilus 1 Tablet(s) Oral two times a day with meals  levothyroxine 100 MICROGram(s) Oral daily  lidocaine   Patch 1 Patch Transdermal daily  ondansetron Injectable 8 milliGRAM(s) IV Push every 8 hours PRN  sertraline 50 milliGRAM(s) Oral daily  traMADol 50 milliGRAM(s) Oral daily PRN      ----------------------------------------------------------------------------------------  PHYSICAL EXAM  Constitutional - NAD, Comfortable  HEENT - + eccymoses L eye  Neck - soft collar in place  Chest - CTA bilaterally, No wheeze, No rhonchi, No crackles  Cardiovascular - RRR, S1S2, No murmurs  Abdomen - BS+, Soft, NTND  Extremities - No C/C/E, No calf tenderness   Neurologic Exam -                   AAO x 3    Motor - 4/5 bl UE and LEs                     Sensory - Intact to LT    Psychiatric - Mood stable, Affect WNL      Impression:  71 yo with gait dysfunction, nose fx, undisplaced cervical fx    Plan:  PT- ROM, Bed Mob, Transfers, Amb w AD   OT- ADLs  Prec- Falls, Cardiac, Pulm, collar  DVT Prophylaxis- SCDs  Skin- Turn q2 h  Dispo- SU[facility]

## 2018-06-21 NOTE — CONSULT NOTE ADULT - CONSULT REASON
ESRD
Evaluate Rehabilitation Needs
Loop Recorder Implant s/p Mechanical Fall
Nausea, vomiting, diarrhea
Syncope
elevated ESR
ha
syncopal fall
L eye trauma

## 2018-06-21 NOTE — CONSULT NOTE ADULT - PROVIDER SPECIALTY LIST ADULT
Cardiology
Electrophysiology
Gastroenterology
Nephrology
Neurology
Neurosurgery
Physiatry
Rheumatology
Ophthalmology

## 2018-06-21 NOTE — PROVIDER CONTACT NOTE (OTHER) - ACTION/TREATMENT ORDERED:
Siobhan Fischer NP aware and acknowledged. As per NP, administer Clonidine and Labetalol PO as scheduled. Will continue to monitor.

## 2018-06-21 NOTE — PROGRESS NOTE ADULT - ASSESSMENT
72 f with  s/p fall- telemetry, cardiology follow s/p ILR insertion.   Neurology evaluation noted. Repeat MRA neck to r/o possible arterial dissection   Hypothyroidism-follow TSH  Cervical spine subluxation/ possible vertebral fracture- Neurosurgery evaluation noted. Cervical collar.   PT  ESRD- HD, Nephrology evaluation Dr. Fernandez  Hyperkalemia- follow.  Diabetes control  Nasal fracture- Plastic surgery evaluation noted. No intervention  Periorbital ecchymosis, Hematoma- Opthalmology evaluation noted. Local care.  Nausea/ Diarrhea- GI evaluation noted. Probable gastroenteritis resolved.   Anemia- follow   DCP rehab.  Rajendra Carter MD pager [phone]

## 2018-06-21 NOTE — CONSULT NOTE ADULT - CONSULT REQUESTED DATE/TIME
14-Jun-2018 12:17
14-Jun-2018 13:08
16-Jun-2018 09:56
18-Jun-2018 09:48
18-Jun-2018 15:12
19-Jun-2018 07:00
20-Jun-2018 14:31
21-Jun-2018 10:27
14-Jun-2018 20:52

## 2018-06-21 NOTE — PROGRESS NOTE ADULT - SUBJECTIVE AND OBJECTIVE BOX
Patient is a 72y old  Female who presents with a chief complaint of falls (20 Jun 2018 08:25)      SUBJECTIVE / OVERNIGHT EVENTS: No new complaints.   Review of Systems  chest pain no  palpitations no  sob no  nausea no  headache no    MEDICATIONS  (STANDING):  acetaminophen  IVPB. 500 milliGRAM(s) IV Intermittent once  amLODIPine   Tablet 10 milliGRAM(s) Oral daily  aspirin enteric coated 81 milliGRAM(s) Oral daily  atorvastatin 20 milliGRAM(s) Oral at bedtime  BACItracin   Ointment 1 Application(s) Topical two times a day  calcium acetate 667 milliGRAM(s) Oral three times a day with meals  clonazePAM Tablet 0.5 milliGRAM(s) Oral at bedtime  cloNIDine 0.3 milliGRAM(s) Oral every 8 hours  dextrose 5%. 1000 milliLiter(s) (50 mL/Hr) IV Continuous <Continuous>  dextrose 50% Injectable 12.5 Gram(s) IV Push once  dextrose 50% Injectable 25 Gram(s) IV Push once  dextrose 50% Injectable 25 Gram(s) IV Push once  docusate sodium 100 milliGRAM(s) Oral three times a day  gabapentin 100 milliGRAM(s) Oral three times a day  heparin  Injectable 5000 Unit(s) SubCutaneous every 12 hours  hydrALAZINE 100 milliGRAM(s) Oral every 8 hours  insulin lispro (HumaLOG) corrective regimen sliding scale   SubCutaneous three times a day before meals  insulin lispro (HumaLOG) corrective regimen sliding scale   SubCutaneous at bedtime  labetalol 300 milliGRAM(s) Oral every 8 hours  lactobacillus acidophilus 1 Tablet(s) Oral two times a day with meals  levothyroxine 100 MICROGram(s) Oral daily  lidocaine   Patch 1 Patch Transdermal daily  sertraline 50 milliGRAM(s) Oral daily    MEDICATIONS  (PRN):  acetaminophen   Tablet. 500 milliGRAM(s) Oral every 6 hours PRN Mild Pain (1 - 3)  dextrose 40% Gel 15 Gram(s) Oral once PRN Blood Glucose LESS THAN 70 milliGRAM(s)/deciliter  glucagon  Injectable 1 milliGRAM(s) IntraMuscular once PRN Glucose LESS THAN 70 milligrams/deciliter  ondansetron Injectable 8 milliGRAM(s) IV Push every 8 hours PRN Nausea  traMADol 50 milliGRAM(s) Oral daily PRN Moderate Pain (4 - 6)          PHYSICAL EXAM:  GENERAL: NAD, well-developed  HEAD:  L periorbital echymosis improving , Normocephalic  EYES: EOMI, PERRLA, conjunctiva and sclera clear  NECK: Supple, No JVD  CHEST/LUNG: Clear to auscultation bilaterally; No wheeze  HEART: Regular rate and rhythm; No murmurs, rubs, or gallops  ABDOMEN: Soft, Nontender, Nondistended; Bowel sounds present  EXTREMITIES:  2+ Peripheral Pulses, No clubbing, cyanosis, or edema  NEUROLOGY: non-focal  SKIN: No rashes or lesions    LABS:                        7.8    7.7   )-----------( 219      ( 21 Jun 2018 09:24 )             23.3     06-21    132<L>  |  89<L>  |  38<H>  ----------------------------<  187<H>  4.9   |  27  |  3.90<H>    Ca    8.1<L>      21 Jun 2018 09:24                RADIOLOGY & ADDITIONAL TESTS:    Imaging Personally Reviewed:    Consultant(s) Notes Reviewed:      Care Discussed with Consultants/Other Providers:

## 2018-06-21 NOTE — PROGRESS NOTE ADULT - SUBJECTIVE AND OBJECTIVE BOX
Patient is a 72y old  Female who presents with a chief complaint of falls,ha    HPI:  pt seen and examined, no significant neck pain at present, ha improved, no bowel/bladder incont    PAST MEDICAL & SURGICAL HISTORY:  [place] (hard of hearing)  Cataracts, bilateral: left worse than right  Pleural thickening: s/p pleurodisis left vats 2014  Hip pain: left  LBP radiating to left leg  CHF (congestive heart failure): diagnosed 2-2014  MI (myocardial infarction): 2-2014   cardiac cath done [facility]  Diabetes: diagnosed 2004  no medications in 5 months since hemodialysis  Hypothyroidism  ESRD (end stage renal disease)  HTN (hypertension)  MI (myocardial infarction): may 2016  Pleural effusion: Left side - several times since March 2014  Hypothyroid  Anxiety  HTN (hypertension)  Hyperlipidemia  CHF (congestive heart failure)  CKD (chronic kidney disease)  Anemia  Diabetes: DM 2  Thoracotomy scar of left chest: 4-2014 ? pneumonia/ scarring  S/P myomectomy: abdominal age 52  AV fistula: left upper 8-2014 attempted 12-16-14    FAMILY HISTORY:  No significant family history    Social Hx:  Nonsmoker, no drug or alcohol use  MEDICATIONS  (STANDING):  acetaminophen  IVPB. 500 milliGRAM(s) IV Intermittent once  amLODIPine   Tablet 10 milliGRAM(s) Oral daily  aspirin enteric coated 81 milliGRAM(s) Oral daily  atorvastatin 20 milliGRAM(s) Oral at bedtime  BACItracin   Ointment 1 Application(s) Topical two times a day  calcium acetate 667 milliGRAM(s) Oral three times a day with meals  clonazePAM Tablet 0.5 milliGRAM(s) Oral at bedtime  cloNIDine 0.3 milliGRAM(s) Oral every 8 hours  dextrose 5%. 1000 milliLiter(s) (50 mL/Hr) IV Continuous <Continuous>  dextrose 50% Injectable 12.5 Gram(s) IV Push once  dextrose 50% Injectable 25 Gram(s) IV Push once  dextrose 50% Injectable 25 Gram(s) IV Push once  docusate sodium 100 milliGRAM(s) Oral three times a day  gabapentin 100 milliGRAM(s) Oral three times a day  heparin  Injectable 5000 Unit(s) SubCutaneous every 12 hours  hydrALAZINE 100 milliGRAM(s) Oral every 8 hours  insulin lispro (HumaLOG) corrective regimen sliding scale   SubCutaneous three times a day before meals  insulin lispro (HumaLOG) corrective regimen sliding scale   SubCutaneous at bedtime  labetalol 300 milliGRAM(s) Oral every 8 hours  lactobacillus acidophilus 1 Tablet(s) Oral two times a day with meals  levothyroxine 100 MICROGram(s) Oral daily  lidocaine   Patch 1 Patch Transdermal daily  sertraline 50 milliGRAM(s) Oral daily    MEDICATIONS  (PRN):  acetaminophen   Tablet. 500 milliGRAM(s) Oral every 6 hours PRN Mild Pain (1 - 3)  dextrose 40% Gel 15 Gram(s) Oral once PRN Blood Glucose LESS THAN 70 milliGRAM(s)/deciliter  glucagon  Injectable 1 milliGRAM(s) IntraMuscular once PRN Glucose LESS THAN 70 milligrams/deciliter  ondansetron Injectable 8 milliGRAM(s) IV Push every 8 hours PRN Nausea  traMADol 50 milliGRAM(s) Oral daily PRN Moderate Pain (4 - 6)    Allergies    Avelox (Unknown)  fish (Hives; Rash)  shellfish (Unknown)    Intolerances      ROS: Pertinent positives in HPI, all other ROS were reviewed and are negative.    Vital Signs Last 24 Hrs  T(C): 37.3 (21 Jun 2018 08:50), Max: 37.3 (21 Jun 2018 08:50)  T(F): 99.2 (21 Jun 2018 08:50), Max: 99.2 (21 Jun 2018 08:50)  HR: 65 (21 Jun 2018 08:50) (57 - 65)  BP: 174/63 (21 Jun 2018 08:50) (171/51 - 190/64)  BP(mean): --  RR: 18 (21 Jun 2018 08:50) (18 - 18)  SpO2: 100% (21 Jun 2018 08:50) (94% - 100%)  GENERAL EXAM:  Constitutional: awake and alert. NAD  HEENT: echymosis L eye  Neck: On C collar  Respiratory: Breath sounds are clear bilaterally    NEUROLOGICAL EXAM:  MS: AAOX3, fluent, attends b/l; recent and remote memory intact; normal attention, language and fund of knowledge.   CN: VFF, EOMI, PERRL, no ALONDRA, no APD,  V1-3 intact, no facial asymmetry, t/p midline, SCM/trap intact.  Motor: Strength: 5/5 4x. Tone: normal. Bulk: normal. DTR 1+ symm.  Plantar flex b/l. Sensation: intact to LT/PP/Vibration/Position/Temperature 4x.   Coordination: intact 4x.       Labs:                         7.8    7.7   )-----------( 219      ( 21 Jun 2018 09:24 )             23.3     06-20    135  |  91<L>  |  22  ----------------------------<  128<H>  4.1   |  29  |  3.06<H>    Ca    8.6      20 Jun 2018 05:34          06-15 [id number] 8.9        CAPILLARY BLOOD GLUCOSE      POCT Blood Glucose.: 162 mg/dL (21 Jun 2018 07:56)        Radiology:  MRI Brain- poorly visualized L vert, dissection not excluded  MRI C spine- C3-4 fx  I reviewed the images with neuroradiology

## 2018-06-22 DIAGNOSIS — R29.91 UNSPECIFIED SYMPTOMS AND SIGNS INVOLVING THE MUSCULOSKELETAL SYSTEM: ICD-10-CM

## 2018-06-22 DIAGNOSIS — R51 HEADACHE: ICD-10-CM

## 2018-06-22 LAB
ANION GAP SERPL CALC-SCNC: 13 MMOL/L — SIGNIFICANT CHANGE UP (ref 5–17)
BUN SERPL-MCNC: 23 MG/DL — SIGNIFICANT CHANGE UP (ref 7–23)
CALCIUM SERPL-MCNC: 8.8 MG/DL — SIGNIFICANT CHANGE UP (ref 8.4–10.5)
CHLORIDE SERPL-SCNC: 91 MMOL/L — LOW (ref 96–108)
CHOLEST SERPL-MCNC: 149 MG/DL — SIGNIFICANT CHANGE UP (ref 10–199)
CO2 SERPL-SCNC: 29 MMOL/L — SIGNIFICANT CHANGE UP (ref 22–31)
CREAT SERPL-MCNC: 2.75 MG/DL — HIGH (ref 0.5–1.3)
GLUCOSE BLDC GLUCOMTR-MCNC: 165 MG/DL — HIGH (ref 70–99)
GLUCOSE BLDC GLUCOMTR-MCNC: 192 MG/DL — HIGH (ref 70–99)
GLUCOSE BLDC GLUCOMTR-MCNC: 207 MG/DL — HIGH (ref 70–99)
GLUCOSE BLDC GLUCOMTR-MCNC: 346 MG/DL — HIGH (ref 70–99)
GLUCOSE SERPL-MCNC: 156 MG/DL — HIGH (ref 70–99)
HCT VFR BLD CALC: 26.6 % — LOW (ref 34.5–45)
HDLC SERPL-MCNC: 46 MG/DL — SIGNIFICANT CHANGE UP (ref 40–125)
HGB BLD-MCNC: 8.3 G/DL — LOW (ref 11.5–15.5)
LIPID PNL WITH DIRECT LDL SERPL: 67 MG/DL — SIGNIFICANT CHANGE UP
MCHC RBC-ENTMCNC: 29.1 PG — SIGNIFICANT CHANGE UP (ref 27–34)
MCHC RBC-ENTMCNC: 31.2 GM/DL — LOW (ref 32–36)
MCV RBC AUTO: 93.3 FL — SIGNIFICANT CHANGE UP (ref 80–100)
PLATELET # BLD AUTO: 272 K/UL — SIGNIFICANT CHANGE UP (ref 150–400)
POTASSIUM SERPL-MCNC: 5.1 MMOL/L — SIGNIFICANT CHANGE UP (ref 3.5–5.3)
POTASSIUM SERPL-SCNC: 5.1 MMOL/L — SIGNIFICANT CHANGE UP (ref 3.5–5.3)
RBC # BLD: 2.85 M/UL — LOW (ref 3.8–5.2)
RBC # FLD: 17.3 % — HIGH (ref 10.3–14.5)
SODIUM SERPL-SCNC: 133 MMOL/L — LOW (ref 135–145)
TOTAL CHOLESTEROL/HDL RATIO MEASUREMENT: 3.2 RATIO — LOW (ref 3.3–7.1)
TRIGL SERPL-MCNC: 178 MG/DL — HIGH (ref 10–149)
WBC # BLD: 7.2 K/UL — SIGNIFICANT CHANGE UP (ref 3.8–10.5)
WBC # FLD AUTO: 7.2 K/UL — SIGNIFICANT CHANGE UP (ref 3.8–10.5)

## 2018-06-22 RX ORDER — CLONAZEPAM 1 MG
0.5 TABLET ORAL ONCE
Qty: 0 | Refills: 0 | Status: DISCONTINUED | OUTPATIENT
Start: 2018-06-22 | End: 2018-06-22

## 2018-06-22 RX ORDER — TRAMADOL HYDROCHLORIDE 50 MG/1
50 TABLET ORAL DAILY
Qty: 0 | Refills: 0 | Status: DISCONTINUED | OUTPATIENT
Start: 2018-06-22 | End: 2018-06-25

## 2018-06-22 RX ADMIN — Medication 2: at 16:52

## 2018-06-22 RX ADMIN — Medication 667 MILLIGRAM(S): at 16:52

## 2018-06-22 RX ADMIN — Medication 81 MILLIGRAM(S): at 11:58

## 2018-06-22 RX ADMIN — AMLODIPINE BESYLATE 10 MILLIGRAM(S): 2.5 TABLET ORAL at 05:20

## 2018-06-22 RX ADMIN — Medication 0.3 MILLIGRAM(S): at 14:02

## 2018-06-22 RX ADMIN — GABAPENTIN 100 MILLIGRAM(S): 400 CAPSULE ORAL at 05:20

## 2018-06-22 RX ADMIN — ONDANSETRON 8 MILLIGRAM(S): 8 TABLET, FILM COATED ORAL at 17:20

## 2018-06-22 RX ADMIN — Medication 500 MILLIGRAM(S): at 16:52

## 2018-06-22 RX ADMIN — Medication 667 MILLIGRAM(S): at 08:06

## 2018-06-22 RX ADMIN — Medication 300 MILLIGRAM(S): at 21:07

## 2018-06-22 RX ADMIN — Medication 0.5 MILLIGRAM(S): at 21:10

## 2018-06-22 RX ADMIN — Medication 100 MILLIGRAM(S): at 00:26

## 2018-06-22 RX ADMIN — Medication 4: at 08:06

## 2018-06-22 RX ADMIN — LIDOCAINE 1 PATCH: 4 CREAM TOPICAL at 02:00

## 2018-06-22 RX ADMIN — Medication 500 MILLIGRAM(S): at 17:52

## 2018-06-22 RX ADMIN — Medication 667 MILLIGRAM(S): at 11:58

## 2018-06-22 RX ADMIN — Medication 1 APPLICATION(S): at 16:59

## 2018-06-22 RX ADMIN — Medication 100 MILLIGRAM(S): at 14:03

## 2018-06-22 RX ADMIN — Medication 100 MILLIGRAM(S): at 16:52

## 2018-06-22 RX ADMIN — TRAMADOL HYDROCHLORIDE 50 MILLIGRAM(S): 50 TABLET ORAL at 04:07

## 2018-06-22 RX ADMIN — Medication 100 MILLIGRAM(S): at 08:09

## 2018-06-22 RX ADMIN — Medication 2: at 11:55

## 2018-06-22 RX ADMIN — GABAPENTIN 100 MILLIGRAM(S): 400 CAPSULE ORAL at 21:06

## 2018-06-22 RX ADMIN — HEPARIN SODIUM 5000 UNIT(S): 5000 INJECTION INTRAVENOUS; SUBCUTANEOUS at 16:59

## 2018-06-22 RX ADMIN — Medication 300 MILLIGRAM(S): at 14:02

## 2018-06-22 RX ADMIN — Medication 1 APPLICATION(S): at 05:21

## 2018-06-22 RX ADMIN — Medication 100 MICROGRAM(S): at 05:20

## 2018-06-22 RX ADMIN — Medication 100 MILLIGRAM(S): at 05:20

## 2018-06-22 RX ADMIN — HEPARIN SODIUM 5000 UNIT(S): 5000 INJECTION INTRAVENOUS; SUBCUTANEOUS at 05:21

## 2018-06-22 RX ADMIN — ATORVASTATIN CALCIUM 20 MILLIGRAM(S): 80 TABLET, FILM COATED ORAL at 21:06

## 2018-06-22 RX ADMIN — Medication 1 TABLET(S): at 16:53

## 2018-06-22 RX ADMIN — Medication 300 MILLIGRAM(S): at 05:20

## 2018-06-22 RX ADMIN — TRAMADOL HYDROCHLORIDE 50 MILLIGRAM(S): 50 TABLET ORAL at 05:10

## 2018-06-22 RX ADMIN — Medication 4: at 21:12

## 2018-06-22 RX ADMIN — Medication 1 TABLET(S): at 08:07

## 2018-06-22 RX ADMIN — SERTRALINE 50 MILLIGRAM(S): 25 TABLET, FILM COATED ORAL at 11:58

## 2018-06-22 RX ADMIN — Medication 0.3 MILLIGRAM(S): at 21:07

## 2018-06-22 RX ADMIN — GABAPENTIN 100 MILLIGRAM(S): 400 CAPSULE ORAL at 14:02

## 2018-06-22 RX ADMIN — Medication 100 MILLIGRAM(S): at 21:07

## 2018-06-22 RX ADMIN — Medication 0.3 MILLIGRAM(S): at 05:20

## 2018-06-22 NOTE — PROGRESS NOTE ADULT - SUBJECTIVE AND OBJECTIVE BOX
ANGEL LECHUGA  72y  Female    Patient is a 72y old  Female who presents with a chief complaint of falls (20 Jun 2018 08:25)    seen at bed side.   denies any head ache today.   ate better.     PAST MEDICAL & SURGICAL HISTORY:  [place] (hard of hearing)  Cataracts, bilateral: left worse than right  Pleural thickening: s/p pleurodisis left vats 2014  Hip pain: left  LBP radiating to left leg  CHF (congestive heart failure): diagnosed 2-2014  MI (myocardial infarction): 2-2014   cardiac cath done [facility]  Diabetes: diagnosed 2004  no medications in 5 months since hemodialysis  Hypothyroidism  ESRD (end stage renal disease)  HTN (hypertension)  MI (myocardial infarction): may 2016  Pleural effusion: Left side - several times since March 2014  Hypothyroid  Anxiety  HTN (hypertension)  Hyperlipidemia  CHF (congestive heart failure)  CKD (chronic kidney disease)  Anemia  Diabetes: DM 2  Thoracotomy scar of left chest: 4-2014 ? pneumonia/ scarring  S/P myomectomy: abdominal age 52  AV fistula: left upper 8-2014 attempted 12-16-14    PHYSICAL EXAM:    T(C): 37 (06-22-18 @ 08:08), Max: 37 (06-21-18 @ 12:20)  HR: 61 (06-22-18 @ 08:08) (60 - 67)  BP: 178/63 (06-22-18 @ 08:08) (168/72 - 190/49)  RR: 18 (06-22-18 @ 08:08) (16 - 20)  SpO2: 93% (06-22-18 @ 08:08) (93% - 100%)  Wt(kg): --    I&O's Detail    21 Jun 2018 07:01  -  22 Jun 2018 07:00  --------------------------------------------------------  IN:    Oral Fluid: 1000 mL  Total IN: 1000 mL    OUT:    Other: 2100 mL  Total OUT: 2100 mL    Total NET: -1100 mL      22 Jun 2018 07:01  -  22 Jun 2018 11:10  --------------------------------------------------------  IN:  Total IN: 0 mL    OUT:    Voided: 50 mL  Total OUT: 50 mL    Total NET: -50 mL          Respiratory: clear anteriorly, decreased BS at bases  Cardiovascular: S1 S2  Gastrointestinal: soft NT ND +BS  Extremities: edema   Neuro: Awake and alert    MEDICATIONS  (STANDING):  acetaminophen  IVPB. 500 milliGRAM(s) IV Intermittent once  amLODIPine   Tablet 10 milliGRAM(s) Oral daily  aspirin enteric coated 81 milliGRAM(s) Oral daily  atorvastatin 20 milliGRAM(s) Oral at bedtime  BACItracin   Ointment 1 Application(s) Topical two times a day  calcium acetate 667 milliGRAM(s) Oral three times a day with meals  cloNIDine 0.3 milliGRAM(s) Oral every 8 hours  dextrose 5%. 1000 milliLiter(s) (50 mL/Hr) IV Continuous <Continuous>  dextrose 50% Injectable 12.5 Gram(s) IV Push once  dextrose 50% Injectable 25 Gram(s) IV Push once  dextrose 50% Injectable 25 Gram(s) IV Push once  docusate sodium 100 milliGRAM(s) Oral three times a day  gabapentin 100 milliGRAM(s) Oral three times a day  heparin  Injectable 5000 Unit(s) SubCutaneous every 12 hours  hydrALAZINE 100 milliGRAM(s) Oral every 8 hours  insulin lispro (HumaLOG) corrective regimen sliding scale   SubCutaneous three times a day before meals  insulin lispro (HumaLOG) corrective regimen sliding scale   SubCutaneous at bedtime  labetalol 300 milliGRAM(s) Oral every 8 hours  lactobacillus acidophilus 1 Tablet(s) Oral two times a day with meals  levothyroxine 100 MICROGram(s) Oral daily  lidocaine   Patch 1 Patch Transdermal daily  sertraline 50 milliGRAM(s) Oral daily    MEDICATIONS  (PRN):  acetaminophen   Tablet. 500 milliGRAM(s) Oral every 6 hours PRN Mild Pain (1 - 3)  dextrose 40% Gel 15 Gram(s) Oral once PRN Blood Glucose LESS THAN 70 milliGRAM(s)/deciliter  glucagon  Injectable 1 milliGRAM(s) IntraMuscular once PRN Glucose LESS THAN 70 milligrams/deciliter  ondansetron Injectable 8 milliGRAM(s) IV Push every 8 hours PRN Nausea  traMADol 50 milliGRAM(s) Oral daily PRN Moderate Pain (4 - 6)                            8.3    7.20  )-----------( 272      ( 22 Jun 2018 08:05 )             26.6       06-22    133<L>  |  91<L>  |  23  ----------------------------<  156<H>  5.1   |  29  |  2.75<H>    Ca    8.8      22 Jun 2018 07:03

## 2018-06-22 NOTE — PROGRESS NOTE ADULT - ASSESSMENT
72 female with a history of HTN, CAD,C HF, ESRD on HD with anemia now admitted with a history of fall. Neurology work up in progress, patient is refusing MRI. Now on cervical collar. For dialysis in AM. Continue the present anti HTN medications. Discharge planning once neuro work up is done. Will follow.

## 2018-06-22 NOTE — PROGRESS NOTE ADULT - SUBJECTIVE AND OBJECTIVE BOX
HPI:  The patient is a 72y Female complaining of fall.	  Patient with multiple medical problems, trip and fall yesterday at home (mechanical), landed on R side and struck her face.  No LOC.  Significant swelling to L eye (but can still see out of it reportedly).  On ASA, no other blood thinners.  Seen recently at [facility] also for fall.  T/R/S dialysis, presented there for dialysis this morning and was sent to the Emergency Department for dialysis. (14 Jun 2018 12:00)    Overnight there have been no issues. The patient has had less pain. No new weakness and no falls      PAST MEDICAL & SURGICAL HISTORY:  [place] (hard of hearing)  Cataracts, bilateral: left worse than right  Pleural thickening: s/p pleurodisis left vats 2014  Hip pain: left  LBP radiating to left leg  CHF (congestive heart failure): diagnosed 2-2014  MI (myocardial infarction): 2-2014   cardiac cath done [facility]  Diabetes: diagnosed 2004  no medications in 5 months since hemodialysis  Hypothyroidism  ESRD (end stage renal disease)  HTN (hypertension)  MI (myocardial infarction): may 2016  Pleural effusion: Left side - several times since March 2014  Hypothyroid  Anxiety  HTN (hypertension)  Hyperlipidemia  CHF (congestive heart failure)  CKD (chronic kidney disease)  Anemia  Diabetes: DM 2  Thoracotomy scar of left chest: 4-2014 ? pneumonia/ scarring  S/P myomectomy: abdominal age 52  AV fistula: left upper 8-2014 attempted 12-16-14      REVIEW OF SYSTEMS:  As per HPI, otherwise negative for Constitutional, Eyes, Ears/Nose/Mouth/Throat, Neck, Cardiovascular, Respiratory, Gastrointestinal, Genitourinary, Skin, Endocrine, Musculoskeletal, Psychiatric, and Hematologic/Lymphatic.    MEDICATIONS  (STANDING):  acetaminophen  IVPB. 500 milliGRAM(s) IV Intermittent once  amLODIPine   Tablet 10 milliGRAM(s) Oral daily  aspirin enteric coated 81 milliGRAM(s) Oral daily  atorvastatin 20 milliGRAM(s) Oral at bedtime  BACItracin   Ointment 1 Application(s) Topical two times a day  calcium acetate 667 milliGRAM(s) Oral three times a day with meals  cloNIDine 0.3 milliGRAM(s) Oral every 8 hours  dextrose 5%. 1000 milliLiter(s) (50 mL/Hr) IV Continuous <Continuous>  dextrose 50% Injectable 12.5 Gram(s) IV Push once  dextrose 50% Injectable 25 Gram(s) IV Push once  dextrose 50% Injectable 25 Gram(s) IV Push once  docusate sodium 100 milliGRAM(s) Oral three times a day  gabapentin 100 milliGRAM(s) Oral three times a day  heparin  Injectable 5000 Unit(s) SubCutaneous every 12 hours  hydrALAZINE 100 milliGRAM(s) Oral every 8 hours  insulin lispro (HumaLOG) corrective regimen sliding scale   SubCutaneous three times a day before meals  insulin lispro (HumaLOG) corrective regimen sliding scale   SubCutaneous at bedtime  labetalol 300 milliGRAM(s) Oral every 8 hours  lactobacillus acidophilus 1 Tablet(s) Oral two times a day with meals  levothyroxine 100 MICROGram(s) Oral daily  lidocaine   Patch 1 Patch Transdermal daily  sertraline 50 milliGRAM(s) Oral daily    MEDICATIONS  (PRN):  acetaminophen   Tablet. 500 milliGRAM(s) Oral every 6 hours PRN Mild Pain (1 - 3)  dextrose 40% Gel 15 Gram(s) Oral once PRN Blood Glucose LESS THAN 70 milliGRAM(s)/deciliter  glucagon  Injectable 1 milliGRAM(s) IntraMuscular once PRN Glucose LESS THAN 70 milligrams/deciliter  ondansetron Injectable 8 milliGRAM(s) IV Push every 8 hours PRN Nausea  traMADol 50 milliGRAM(s) Oral daily PRN Moderate Pain (4 - 6)      Allergies    Avelox (Unknown)  fish (Hives; Rash)  shellfish (Unknown)    Intolerances        FAMILY HISTORY:  No significant family history      SOCIAL HISTORY:  Living Situation:  Occupation:  Tobacco: None  Alcohol: None  Drug use:  None    VITAL SIGNS:  Vital Signs Last 24 Hrs  T(C): 37 (22 Jun 2018 04:49), Max: 37.3 (21 Jun 2018 08:50)  T(F): 98.6 (22 Jun 2018 04:49), Max: 99.2 (21 Jun 2018 08:50)  HR: 60 (22 Jun 2018 04:49) (60 - 67)  BP: 183/50 (22 Jun 2018 06:20) (168/72 - 190/49)  BP(mean): --  RR: 17 (22 Jun 2018 04:49) (16 - 20)  SpO2: 94% (22 Jun 2018 04:49) (94% - 100%)    PHYSICAL EXAMINATION:  General: Well-developed, well nourished, in no acute distress.  Eyes: Conjunctiva and sclera clear.  Cardiovascular: Regular rate and rhythm; S1 and S2 Normal; No murmurs, gallops or rubs.  Neurologic:  - Mental Status:  Alert, awake, oriented to person, place, and time; Speech is fluent with intact naming, repetition, and comprehension; Immediate recall is 3/3 words and delayed recall is 3/3 words at 5 minutes; Able to spell WORLD backwards and perform serial 7 subtraction; Able to read and write a sentence; Able to copy a cube; Good overall fund of knowledge.  - Cranial Nerves II-XII:    II:  Visual acuity is 20/20 bilaterally; Visual fields are full to confrontation; Fundoscopic exam is normal with sharp discs; Pupils are equal, round, and reactive to light.  III, IV, VI:  Extraocular movements are intact without nystagmus.  V:  Facial sensation is intact in the V1-V3 distribution bilaterally.  VII:  Face is symmetric with normal eye closure and smile  VIII:  Hearing is intact to finger rub.  IX, X:  Uvula is midline and soft palate rises symmetrically  XI:  Head turning and shoulder shrug are intact.  XII:  Tongue protrudes in the midline.  - Motor:  Strength is 5/5 throughout.  There is no pronator drift.  Normal muscle bulk and tone throughout.  - Reflexes:  2+ and symmetric at the biceps, triceps, brachioradialis, knees, and ankles.  Plantar responses flexor.  - Sensory:  Intact to light touch, pin prick, vibration, and joint-position sense throughout.  - Coordination:  Finger-nose-finger and heel-knee-shin intact without dysmetria.  Rapid alternating hand movements intact.  - Gait:   Normal steps, base, arm swing, and turning.  Heel and toe walking are normal.  Tandem gait is normal.  Romberg testing is negative.    LABS:                        7.8    7.7   )-----------( 219      ( 21 Jun 2018 09:24 )             23.3     06-21    132<L>  |  89<L>  |  38<H>  ----------------------------<  187<H>  4.9   |  27  |  3.90<H>    Ca    8.1<L>      21 Jun 2018 09:24            RADIOLOGY & ADDITIONAL STUDIES:      IMPRESSION & PLAN:

## 2018-06-22 NOTE — PROVIDER CONTACT NOTE (OTHER) - ASSESSMENT
Patient A&O x4. No c/o pain or discomfort. Patient asymptomatic, no c/o headaches or dizziness. In no respiratory distress. Nighttime BP medications for 6/21 administered, hydralazine 100mg PO administered at 00:26.

## 2018-06-22 NOTE — PROVIDER CONTACT NOTE (OTHER) - ACTION/TREATMENT ORDERED:
NP notified. F/u with AM vital signs, with possibility of administering 0600 BP medication earlier as necessary. Will continue to monitor.

## 2018-06-22 NOTE — PROVIDER CONTACT NOTE (OTHER) - SITUATION
Patient hypertensive, BP = 190/49, manual BP = 187/59. Patient with persistent HTN episodes, asymptomatic.

## 2018-06-22 NOTE — PROGRESS NOTE ADULT - ASSESSMENT
72 f with  s/p fall- telemetry, cardiology follow s/p ILR insertion.   Neurology evaluation noted. Refused repeat MRA neck to r/o possible arterial dissection   Hypothyroidism-follow TSH  Cervical spine subluxation/ possible vertebral fracture- Neurosurgery evaluation noted. Cervical collar.   PT  ESRD- HD, Nephrology evaluation Dr. Fernandez  Hyperkalemia- follow.  Diabetes control  Nasal fracture- Plastic surgery evaluation noted. No intervention  Periorbital ecchymosis, Hematoma- Opthalmology evaluation noted. Local care.  Nausea/ Diarrhea- GI evaluation noted. Probable gastroenteritis resolved.   Anemia- follow   DCP rehab.  Rajendra Carter MD pager [phone]

## 2018-06-22 NOTE — PROGRESS NOTE ADULT - SUBJECTIVE AND OBJECTIVE BOX
Subjective: Patient seen and examined. No new events except as noted.   resting in bed   no cp or sob     REVIEW OF SYSTEMS:    CONSTITUTIONAL: + weakness, fevers or chills  EYES/ENT: No visual changes;  No vertigo or throat pain   NECK: No pain or stiffness  RESPIRATORY: No cough, wheezing, hemoptysis; No shortness of breath  CARDIOVASCULAR: No chest pain or palpitations  GASTROINTESTINAL: No abdominal or epigastric pain. No nausea, vomiting, or hematemesis; No diarrhea or constipation. No melena or hematochezia.  GENITOURINARY: No dysuria, frequency or hematuria  NEUROLOGICAL: No numbness or weakness  SKIN: No itching, burning, rashes, or lesions   All other review of systems is negative unless indicated above.    MEDICATIONS:  MEDICATIONS  (STANDING):  acetaminophen  IVPB. 500 milliGRAM(s) IV Intermittent once  amLODIPine   Tablet 10 milliGRAM(s) Oral daily  aspirin enteric coated 81 milliGRAM(s) Oral daily  atorvastatin 20 milliGRAM(s) Oral at bedtime  BACItracin   Ointment 1 Application(s) Topical two times a day  calcium acetate 667 milliGRAM(s) Oral three times a day with meals  cloNIDine 0.3 milliGRAM(s) Oral every 8 hours  dextrose 5%. 1000 milliLiter(s) (50 mL/Hr) IV Continuous <Continuous>  dextrose 50% Injectable 12.5 Gram(s) IV Push once  dextrose 50% Injectable 25 Gram(s) IV Push once  dextrose 50% Injectable 25 Gram(s) IV Push once  docusate sodium 100 milliGRAM(s) Oral three times a day  gabapentin 100 milliGRAM(s) Oral three times a day  heparin  Injectable 5000 Unit(s) SubCutaneous every 12 hours  hydrALAZINE 100 milliGRAM(s) Oral every 8 hours  insulin lispro (HumaLOG) corrective regimen sliding scale   SubCutaneous three times a day before meals  insulin lispro (HumaLOG) corrective regimen sliding scale   SubCutaneous at bedtime  labetalol 300 milliGRAM(s) Oral every 8 hours  lactobacillus acidophilus 1 Tablet(s) Oral two times a day with meals  levothyroxine 100 MICROGram(s) Oral daily  lidocaine   Patch 1 Patch Transdermal daily  sertraline 50 milliGRAM(s) Oral daily      PHYSICAL EXAM:  T(C): 37.1 (06-22-18 @ 12:13), Max: 37.1 (06-22-18 @ 12:13)  HR: 61 (06-22-18 @ 12:13) (60 - 67)  BP: 187/68 (06-22-18 @ 12:13) (174/70 - 190/49)  RR: 18 (06-22-18 @ 12:13) (16 - 18)  SpO2: 92% (06-22-18 @ 12:13) (92% - 97%)  Wt(kg): --  I&O's Summary    21 Jun 2018 07:01  -  22 Jun 2018 07:00  --------------------------------------------------------  IN: 1000 mL / OUT: 2100 mL / NET: -1100 mL    22 Jun 2018 07:01  -  22 Jun 2018 13:55  --------------------------------------------------------  IN: 240 mL / OUT: 50 mL / NET: 190 mL          Appearance: NAD,   HEENT:   Normal oral mucosa, left boni-orbital ecchymosis   Lymphatic: No lymphadenopathy , no edema  Cardiovascular: Normal S1 S2, No JVD, No murmurs , Peripheral pulses palpable 2+ bilaterally  Respiratory: Lungs clear to auscultation, normal effort 	  Gastrointestinal:  Soft, Non-tender, + BS	  Skin: No rashes, No ecchymoses, No cyanosis, warm to touch  Musculoskeletal: Decreased  range of motion and strength  Psychiatry:  lethargic   Ext: No edema      LABS:    CARDIAC MARKERS:                                8.3    7.20  )-----------( 272      ( 22 Jun 2018 08:05 )             26.6     06-22    133<L>  |  91<L>  |  23  ----------------------------<  156<H>  5.1   |  29  |  2.75<H>    Ca    8.8      22 Jun 2018 07:03      proBNP:   Lipid Profile:   HgA1c:   TSH:             TELEMETRY: 	    ECG:  	  RADIOLOGY:   DIAGNOSTIC TESTING:  [ ] Echocardiogram:  [ ]  Catheterization:  [ ] Stress Test:    OTHER:

## 2018-06-22 NOTE — PROGRESS NOTE ADULT - SUBJECTIVE AND OBJECTIVE BOX
Patient is a 72y old  Female who presents with a chief complaint of falls (20 Jun 2018 08:25)      SUBJECTIVE / OVERNIGHT EVENTS: No new complaints.   Review of Systems  chest pain no  palpitations no  sob no  nausea no  headache no    MEDICATIONS  (STANDING):  acetaminophen  IVPB. 500 milliGRAM(s) IV Intermittent once  amLODIPine   Tablet 10 milliGRAM(s) Oral daily  aspirin enteric coated 81 milliGRAM(s) Oral daily  atorvastatin 20 milliGRAM(s) Oral at bedtime  BACItracin   Ointment 1 Application(s) Topical two times a day  calcium acetate 667 milliGRAM(s) Oral three times a day with meals  cloNIDine 0.3 milliGRAM(s) Oral every 8 hours  dextrose 5%. 1000 milliLiter(s) (50 mL/Hr) IV Continuous <Continuous>  dextrose 50% Injectable 12.5 Gram(s) IV Push once  dextrose 50% Injectable 25 Gram(s) IV Push once  dextrose 50% Injectable 25 Gram(s) IV Push once  docusate sodium 100 milliGRAM(s) Oral three times a day  gabapentin 100 milliGRAM(s) Oral three times a day  heparin  Injectable 5000 Unit(s) SubCutaneous every 12 hours  hydrALAZINE 100 milliGRAM(s) Oral every 8 hours  insulin lispro (HumaLOG) corrective regimen sliding scale   SubCutaneous three times a day before meals  insulin lispro (HumaLOG) corrective regimen sliding scale   SubCutaneous at bedtime  labetalol 300 milliGRAM(s) Oral every 8 hours  lactobacillus acidophilus 1 Tablet(s) Oral two times a day with meals  levothyroxine 100 MICROGram(s) Oral daily  lidocaine   Patch 1 Patch Transdermal daily  sertraline 50 milliGRAM(s) Oral daily    MEDICATIONS  (PRN):  acetaminophen   Tablet. 500 milliGRAM(s) Oral every 6 hours PRN Mild Pain (1 - 3)  dextrose 40% Gel 15 Gram(s) Oral once PRN Blood Glucose LESS THAN 70 milliGRAM(s)/deciliter  glucagon  Injectable 1 milliGRAM(s) IntraMuscular once PRN Glucose LESS THAN 70 milligrams/deciliter  ondansetron Injectable 8 milliGRAM(s) IV Push every 8 hours PRN Nausea  traMADol 50 milliGRAM(s) Oral daily PRN Moderate Pain (4 - 6)          PHYSICAL EXAM:  GENERAL: NAD, well-developed  HEAD: L periorbital echymosis, Normocephalic  EYES: EOMI, PERRLA, conjunctiva and sclera clear  NECK: Supple, No JVD  CHEST/LUNG: Clear to auscultation bilaterally; No wheeze  HEART: Regular rate and rhythm; No murmurs, rubs, or gallops  ABDOMEN: Soft, Nontender, Nondistended; Bowel sounds present  EXTREMITIES:  2+ Peripheral Pulses, No clubbing, cyanosis, or edema  PSYCH: AAOx3  NEUROLOGY: non-focal  SKIN: No rashes or lesions    LABS:                        8.3    7.20  )-----------( 272      ( 22 Jun 2018 08:05 )             26.6     06-22    133<L>  |  91<L>  |  23  ----------------------------<  156<H>  5.1   |  29  |  2.75<H>    Ca    8.8      22 Jun 2018 07:03                RADIOLOGY & ADDITIONAL TESTS:    Imaging Personally Reviewed:    Consultant(s) Notes Reviewed:      Care Discussed with Consultants/Other Providers:

## 2018-06-23 LAB
ANION GAP SERPL CALC-SCNC: 17 MMOL/L — SIGNIFICANT CHANGE UP (ref 5–17)
BUN SERPL-MCNC: 39 MG/DL — HIGH (ref 7–23)
CALCIUM SERPL-MCNC: 8.6 MG/DL — SIGNIFICANT CHANGE UP (ref 8.4–10.5)
CHLORIDE SERPL-SCNC: 88 MMOL/L — LOW (ref 96–108)
CO2 SERPL-SCNC: 25 MMOL/L — SIGNIFICANT CHANGE UP (ref 22–31)
CREAT SERPL-MCNC: 3.92 MG/DL — HIGH (ref 0.5–1.3)
GLUCOSE BLDC GLUCOMTR-MCNC: 150 MG/DL — HIGH (ref 70–99)
GLUCOSE BLDC GLUCOMTR-MCNC: 173 MG/DL — HIGH (ref 70–99)
GLUCOSE BLDC GLUCOMTR-MCNC: 244 MG/DL — HIGH (ref 70–99)
GLUCOSE BLDC GLUCOMTR-MCNC: 315 MG/DL — HIGH (ref 70–99)
GLUCOSE SERPL-MCNC: 150 MG/DL — HIGH (ref 70–99)
HCT VFR BLD CALC: 24.4 % — LOW (ref 34.5–45)
HGB BLD-MCNC: 7.7 G/DL — LOW (ref 11.5–15.5)
MCHC RBC-ENTMCNC: 29.5 PG — SIGNIFICANT CHANGE UP (ref 27–34)
MCHC RBC-ENTMCNC: 31.6 GM/DL — LOW (ref 32–36)
MCV RBC AUTO: 93.5 FL — SIGNIFICANT CHANGE UP (ref 80–100)
PLATELET # BLD AUTO: 265 K/UL — SIGNIFICANT CHANGE UP (ref 150–400)
POTASSIUM SERPL-MCNC: 6.2 MMOL/L — CRITICAL HIGH (ref 3.5–5.3)
POTASSIUM SERPL-SCNC: 6.2 MMOL/L — CRITICAL HIGH (ref 3.5–5.3)
RBC # BLD: 2.61 M/UL — LOW (ref 3.8–5.2)
RBC # FLD: 17 % — HIGH (ref 10.3–14.5)
SODIUM SERPL-SCNC: 130 MMOL/L — LOW (ref 135–145)
WBC # BLD: 7.07 K/UL — SIGNIFICANT CHANGE UP (ref 3.8–10.5)
WBC # FLD AUTO: 7.07 K/UL — SIGNIFICANT CHANGE UP (ref 3.8–10.5)

## 2018-06-23 RX ADMIN — GABAPENTIN 100 MILLIGRAM(S): 400 CAPSULE ORAL at 05:00

## 2018-06-23 RX ADMIN — Medication 667 MILLIGRAM(S): at 07:56

## 2018-06-23 RX ADMIN — HEPARIN SODIUM 5000 UNIT(S): 5000 INJECTION INTRAVENOUS; SUBCUTANEOUS at 05:01

## 2018-06-23 RX ADMIN — Medication 500 MILLIGRAM(S): at 13:00

## 2018-06-23 RX ADMIN — Medication 0.3 MILLIGRAM(S): at 21:18

## 2018-06-23 RX ADMIN — Medication 667 MILLIGRAM(S): at 13:14

## 2018-06-23 RX ADMIN — Medication 0.3 MILLIGRAM(S): at 05:01

## 2018-06-23 RX ADMIN — ATORVASTATIN CALCIUM 20 MILLIGRAM(S): 80 TABLET, FILM COATED ORAL at 21:18

## 2018-06-23 RX ADMIN — Medication 1 TABLET(S): at 16:39

## 2018-06-23 RX ADMIN — Medication 100 MILLIGRAM(S): at 21:18

## 2018-06-23 RX ADMIN — Medication 100 MILLIGRAM(S): at 05:00

## 2018-06-23 RX ADMIN — Medication 81 MILLIGRAM(S): at 13:14

## 2018-06-23 RX ADMIN — Medication 300 MILLIGRAM(S): at 05:00

## 2018-06-23 RX ADMIN — Medication 100 MILLIGRAM(S): at 16:39

## 2018-06-23 RX ADMIN — Medication 8: at 16:38

## 2018-06-23 RX ADMIN — Medication 100 MILLIGRAM(S): at 00:21

## 2018-06-23 RX ADMIN — Medication 100 MICROGRAM(S): at 05:00

## 2018-06-23 RX ADMIN — Medication 500 MILLIGRAM(S): at 11:39

## 2018-06-23 RX ADMIN — Medication 500 MILLIGRAM(S): at 21:18

## 2018-06-23 RX ADMIN — TRAMADOL HYDROCHLORIDE 50 MILLIGRAM(S): 50 TABLET ORAL at 03:30

## 2018-06-23 RX ADMIN — Medication 1 TABLET(S): at 07:56

## 2018-06-23 RX ADMIN — Medication 1 APPLICATION(S): at 05:01

## 2018-06-23 RX ADMIN — Medication 0.3 MILLIGRAM(S): at 13:14

## 2018-06-23 RX ADMIN — Medication 300 MILLIGRAM(S): at 21:18

## 2018-06-23 RX ADMIN — Medication 500 MILLIGRAM(S): at 06:00

## 2018-06-23 RX ADMIN — Medication 100 MILLIGRAM(S): at 07:57

## 2018-06-23 RX ADMIN — LIDOCAINE 1 PATCH: 4 CREAM TOPICAL at 13:15

## 2018-06-23 RX ADMIN — Medication 2: at 07:56

## 2018-06-23 RX ADMIN — SERTRALINE 50 MILLIGRAM(S): 25 TABLET, FILM COATED ORAL at 13:14

## 2018-06-23 RX ADMIN — GABAPENTIN 100 MILLIGRAM(S): 400 CAPSULE ORAL at 21:19

## 2018-06-23 RX ADMIN — Medication 500 MILLIGRAM(S): at 04:52

## 2018-06-23 RX ADMIN — Medication 1 APPLICATION(S): at 16:39

## 2018-06-23 RX ADMIN — Medication 500 MILLIGRAM(S): at 22:30

## 2018-06-23 RX ADMIN — TRAMADOL HYDROCHLORIDE 50 MILLIGRAM(S): 50 TABLET ORAL at 02:35

## 2018-06-23 RX ADMIN — Medication 667 MILLIGRAM(S): at 16:39

## 2018-06-23 RX ADMIN — AMLODIPINE BESYLATE 10 MILLIGRAM(S): 2.5 TABLET ORAL at 05:01

## 2018-06-23 RX ADMIN — Medication 300 MILLIGRAM(S): at 13:14

## 2018-06-23 RX ADMIN — HEPARIN SODIUM 5000 UNIT(S): 5000 INJECTION INTRAVENOUS; SUBCUTANEOUS at 16:39

## 2018-06-23 RX ADMIN — GABAPENTIN 100 MILLIGRAM(S): 400 CAPSULE ORAL at 13:15

## 2018-06-23 NOTE — PROVIDER CONTACT NOTE (CRITICAL VALUE NOTIFICATION) - SITUATION
K 6.2 not hemolyzed
Pts BMP resulted with a critical potassium serum 6.2. Pt going for first shift hemodialysis.

## 2018-06-23 NOTE — PROGRESS NOTE ADULT - ASSESSMENT
72 f with  s/p fall- telemetry, cardiology follow s/p ILR insertion.   Neurology evaluation noted. Refused repeat MRA neck to r/o possible arterial dissection   Hypothyroidism-follow TSH  Cervical spine subluxation/ possible vertebral fracture- Neurosurgery evaluation noted. Cervical collar.   PT  ESRD- HD, Nephrology evaluation Dr. Fernandez  Hyperkalemia- follow.  Diabetes control  Nasal fracture- Plastic surgery evaluation noted. No intervention  Periorbital ecchymosis, Hematoma- Opthalmology evaluation noted. Local care.  Nausea/ Diarrhea- GI evaluation noted. Probable gastroenteritis resolved.   Anemia- follow   DCP rehab in progress.  Rajendra Carter MD pager [phone]

## 2018-06-23 NOTE — PROGRESS NOTE ADULT - SUBJECTIVE AND OBJECTIVE BOX
Subjective: Patient seen and examined. No new events except as noted.   resting in bed   no cp or sob     REVIEW OF SYSTEMS:    CONSTITUTIONAL: + weakness, fevers or chills  EYES/ENT: No visual changes;  No vertigo or throat pain   NECK: No pain or stiffness  RESPIRATORY: No cough, wheezing, hemoptysis; No shortness of breath  CARDIOVASCULAR: No chest pain or palpitations  GASTROINTESTINAL: No abdominal or epigastric pain. No nausea, vomiting, or hematemesis; No diarrhea or constipation. No melena or hematochezia.  GENITOURINARY: No dysuria, frequency or hematuria  NEUROLOGICAL: No numbness or weakness  SKIN: No itching, burning, rashes, or lesions   All other review of systems is negative unless indicated above.    MEDICATIONS:  MEDICATIONS  (STANDING):  acetaminophen  IVPB. 500 milliGRAM(s) IV Intermittent once  amLODIPine   Tablet 10 milliGRAM(s) Oral daily  aspirin enteric coated 81 milliGRAM(s) Oral daily  atorvastatin 20 milliGRAM(s) Oral at bedtime  BACItracin   Ointment 1 Application(s) Topical two times a day  calcium acetate 667 milliGRAM(s) Oral three times a day with meals  cloNIDine 0.3 milliGRAM(s) Oral every 8 hours  dextrose 5%. 1000 milliLiter(s) (50 mL/Hr) IV Continuous <Continuous>  dextrose 50% Injectable 12.5 Gram(s) IV Push once  dextrose 50% Injectable 25 Gram(s) IV Push once  dextrose 50% Injectable 25 Gram(s) IV Push once  docusate sodium 100 milliGRAM(s) Oral three times a day  gabapentin 100 milliGRAM(s) Oral three times a day  heparin  Injectable 5000 Unit(s) SubCutaneous every 12 hours  hydrALAZINE 100 milliGRAM(s) Oral every 8 hours  insulin lispro (HumaLOG) corrective regimen sliding scale   SubCutaneous three times a day before meals  insulin lispro (HumaLOG) corrective regimen sliding scale   SubCutaneous at bedtime  labetalol 300 milliGRAM(s) Oral every 8 hours  lactobacillus acidophilus 1 Tablet(s) Oral two times a day with meals  levothyroxine 100 MICROGram(s) Oral daily  lidocaine   Patch 1 Patch Transdermal daily  sertraline 50 milliGRAM(s) Oral daily      PHYSICAL EXAM:  T(C): 36.7 (06-23-18 @ 20:27), Max: 37.1 (06-23-18 @ 08:50)  HR: 58 (06-23-18 @ 21:16) (54 - 63)  BP: 190/66 (06-23-18 @ 21:16) (140/51 - 190/66)  RR: 18 (06-23-18 @ 20:27) (18 - 20)  SpO2: 100% (06-23-18 @ 20:27) (93% - 100%)  Wt(kg): --  I&O's Summary    22 Jun 2018 07:01  -  23 Jun 2018 07:00  --------------------------------------------------------  IN: 1105 mL / OUT: 50 mL / NET: 1055 mL    23 Jun 2018 07:01  -  23 Jun 2018 22:59  --------------------------------------------------------  IN: 1315 mL / OUT: 2900 mL / NET: -1585 mL          Appearance: NAD	  HEENT:   Normal oral mucosa, left boni-orbital ecchymosis   Lymphatic: No lymphadenopathy , no edema  Cardiovascular: Normal S1 S2, No JVD, No murmurs , Peripheral pulses palpable 2+ bilaterally  Respiratory: Lungs clear to auscultation, normal effort 	  Gastrointestinal:  Soft, Non-tender, + BS	  Skin: No rashes, No ecchymoses, No cyanosis, warm to touch  Musculoskeletal:decreased range of motion, and strength  Psychiatry:  lethargic   Ext: No edema      LABS:    CARDIAC MARKERS:                                7.7    7.07  )-----------( 265      ( 23 Jun 2018 08:11 )             24.4     06-23    130<L>  |  88<L>  |  39<H>  ----------------------------<  150<H>  6.2<HH>   |  25  |  3.92<H>    Ca    8.6      23 Jun 2018 06:57      proBNP:   Lipid Profile:   HgA1c:   TSH:             TELEMETRY: 	    ECG:  	  RADIOLOGY:   DIAGNOSTIC TESTING:  [ ] Echocardiogram:  [ ]  Catheterization:  [ ] Stress Test:    OTHER:

## 2018-06-23 NOTE — PROGRESS NOTE ADULT - ASSESSMENT
72 female with a history of HTN, CAD,C HF, ESRD on HD with anemia now admitted with a history of fall. Neurology work up in progress, patient is refusing MRI. Now on cervical collar. Had stable dialysis today and had one hour K free dialysis in the end.  Continue the present anti HTN medications. Discharge planning once neuro work up is done. Will follow.

## 2018-06-23 NOTE — PROGRESS NOTE ADULT - SUBJECTIVE AND OBJECTIVE BOX
Patient is a 72y old  Female who presents with a chief complaint of falls (20 Jun 2018 08:25)      SUBJECTIVE / OVERNIGHT EVENTS: No new complaints.   Review of Systems  chest pain no  palpitations no  sob no  nausea no  headache no    MEDICATIONS  (STANDING):  acetaminophen  IVPB. 500 milliGRAM(s) IV Intermittent once  amLODIPine   Tablet 10 milliGRAM(s) Oral daily  aspirin enteric coated 81 milliGRAM(s) Oral daily  atorvastatin 20 milliGRAM(s) Oral at bedtime  BACItracin   Ointment 1 Application(s) Topical two times a day  calcium acetate 667 milliGRAM(s) Oral three times a day with meals  cloNIDine 0.3 milliGRAM(s) Oral every 8 hours  dextrose 5%. 1000 milliLiter(s) (50 mL/Hr) IV Continuous <Continuous>  dextrose 50% Injectable 12.5 Gram(s) IV Push once  dextrose 50% Injectable 25 Gram(s) IV Push once  dextrose 50% Injectable 25 Gram(s) IV Push once  docusate sodium 100 milliGRAM(s) Oral three times a day  gabapentin 100 milliGRAM(s) Oral three times a day  heparin  Injectable 5000 Unit(s) SubCutaneous every 12 hours  hydrALAZINE 100 milliGRAM(s) Oral every 8 hours  insulin lispro (HumaLOG) corrective regimen sliding scale   SubCutaneous three times a day before meals  insulin lispro (HumaLOG) corrective regimen sliding scale   SubCutaneous at bedtime  labetalol 300 milliGRAM(s) Oral every 8 hours  lactobacillus acidophilus 1 Tablet(s) Oral two times a day with meals  levothyroxine 100 MICROGram(s) Oral daily  lidocaine   Patch 1 Patch Transdermal daily  sertraline 50 milliGRAM(s) Oral daily    MEDICATIONS  (PRN):  acetaminophen   Tablet. 500 milliGRAM(s) Oral every 6 hours PRN Mild Pain (1 - 3)  dextrose 40% Gel 15 Gram(s) Oral once PRN Blood Glucose LESS THAN 70 milliGRAM(s)/deciliter  glucagon  Injectable 1 milliGRAM(s) IntraMuscular once PRN Glucose LESS THAN 70 milligrams/deciliter  ondansetron Injectable 8 milliGRAM(s) IV Push every 8 hours PRN Nausea  traMADol 50 milliGRAM(s) Oral daily PRN Moderate Pain (4 - 6)          PHYSICAL EXAM:  GENERAL: NAD, well-developed  HEAD:  improving L periorbital echymosis, Normocephalic  EYES: EOMI, PERRLA, conjunctiva and sclera clear  NECK: Supple, No JVD  CHEST/LUNG: Clear to auscultation bilaterally; No wheeze  HEART: Regular rate and rhythm; No murmurs, rubs, or gallops  ABDOMEN: Soft, Nontender, Nondistended; Bowel sounds present  EXTREMITIES:  2+ Peripheral Pulses, No clubbing, cyanosis, or edema  PSYCH: AAOx3  NEUROLOGY: non-focal  SKIN: No rashes or lesions    LABS:                        7.7    7.07  )-----------( 265      ( 23 Jun 2018 08:11 )             24.4     06-23    130<L>  |  88<L>  |  39<H>  ----------------------------<  150<H>  6.2<HH>   |  25  |  3.92<H>    Ca    8.6      23 Jun 2018 06:57                RADIOLOGY & ADDITIONAL TESTS:    Imaging Personally Reviewed:    Consultant(s) Notes Reviewed:      Care Discussed with Consultants/Other Providers:

## 2018-06-23 NOTE — PROGRESS NOTE ADULT - SUBJECTIVE AND OBJECTIVE BOX
ANGEL LECHUGA  72y  Female    Patient is a 72y old  Female who presents with a chief complaint of falls (20 Jun 2018 08:25)      feels much better today.  had stable dialysis today.    PAST MEDICAL & SURGICAL HISTORY:  [place] (hard of hearing)  Cataracts, bilateral: left worse than right  Pleural thickening: s/p pleurodisis left vats 2014  Hip pain: left  LBP radiating to left leg  CHF (congestive heart failure): diagnosed 2-2014  MI (myocardial infarction): 2-2014   cardiac cath done [facility]  Diabetes: diagnosed 2004  no medications in 5 months since hemodialysis  Hypothyroidism  ESRD (end stage renal disease)  HTN (hypertension)  MI (myocardial infarction): may 2016  Pleural effusion: Left side - several times since March 2014  Hypothyroid  Anxiety  HTN (hypertension)  Hyperlipidemia  CHF (congestive heart failure)  CKD (chronic kidney disease)  Anemia  Diabetes: DM 2  Thoracotomy scar of left chest: 4-2014 ? pneumonia/ scarring  S/P myomectomy: abdominal age 52  AV fistula: left upper 8-2014 attempted 12-16-14          PHYSICAL EXAM:    T(C): 36.6 (06-23-18 @ 13:03), Max: 37.1 (06-23-18 @ 08:50)  HR: 63 (06-23-18 @ 13:03) (54 - 63)  BP: 180/61 (06-23-18 @ 13:03) (153/60 - 191/64)  RR: 18 (06-23-18 @ 13:03) (17 - 20)  SpO2: 100% (06-23-18 @ 13:03) (91% - 100%)  Wt(kg): --    I&O's Detail    22 Jun 2018 07:01  -  23 Jun 2018 07:00  --------------------------------------------------------  IN:    Oral Fluid: 1105 mL  Total IN: 1105 mL    OUT:    Voided: 50 mL  Total OUT: 50 mL    Total NET: 1055 mL          Respiratory: clear anteriorly, decreased BS at bases  Cardiovascular: S1 S2  Gastrointestinal: soft NT ND +BS  Extremities: edema   Neuro: Awake and alert    MEDICATIONS  (STANDING):  acetaminophen  IVPB. 500 milliGRAM(s) IV Intermittent once  amLODIPine   Tablet 10 milliGRAM(s) Oral daily  aspirin enteric coated 81 milliGRAM(s) Oral daily  atorvastatin 20 milliGRAM(s) Oral at bedtime  BACItracin   Ointment 1 Application(s) Topical two times a day  calcium acetate 667 milliGRAM(s) Oral three times a day with meals  cloNIDine 0.3 milliGRAM(s) Oral every 8 hours  dextrose 5%. 1000 milliLiter(s) (50 mL/Hr) IV Continuous <Continuous>  dextrose 50% Injectable 12.5 Gram(s) IV Push once  dextrose 50% Injectable 25 Gram(s) IV Push once  dextrose 50% Injectable 25 Gram(s) IV Push once  docusate sodium 100 milliGRAM(s) Oral three times a day  gabapentin 100 milliGRAM(s) Oral three times a day  heparin  Injectable 5000 Unit(s) SubCutaneous every 12 hours  hydrALAZINE 100 milliGRAM(s) Oral every 8 hours  insulin lispro (HumaLOG) corrective regimen sliding scale   SubCutaneous three times a day before meals  insulin lispro (HumaLOG) corrective regimen sliding scale   SubCutaneous at bedtime  labetalol 300 milliGRAM(s) Oral every 8 hours  lactobacillus acidophilus 1 Tablet(s) Oral two times a day with meals  levothyroxine 100 MICROGram(s) Oral daily  lidocaine   Patch 1 Patch Transdermal daily  sertraline 50 milliGRAM(s) Oral daily    MEDICATIONS  (PRN):  acetaminophen   Tablet. 500 milliGRAM(s) Oral every 6 hours PRN Mild Pain (1 - 3)  dextrose 40% Gel 15 Gram(s) Oral once PRN Blood Glucose LESS THAN 70 milliGRAM(s)/deciliter  glucagon  Injectable 1 milliGRAM(s) IntraMuscular once PRN Glucose LESS THAN 70 milligrams/deciliter  ondansetron Injectable 8 milliGRAM(s) IV Push every 8 hours PRN Nausea  traMADol 50 milliGRAM(s) Oral daily PRN Moderate Pain (4 - 6)                            7.7    7.07  )-----------( 265      ( 23 Jun 2018 08:11 )             24.4       06-23    130<L>  |  88<L>  |  39<H>  ----------------------------<  150<H>  6.2<HH>   |  25  |  3.92<H>    Ca    8.6      23 Jun 2018 06:57

## 2018-06-24 LAB
ANION GAP SERPL CALC-SCNC: 13 MMOL/L — SIGNIFICANT CHANGE UP (ref 5–17)
BUN SERPL-MCNC: 24 MG/DL — HIGH (ref 7–23)
CALCIUM SERPL-MCNC: 8.8 MG/DL — SIGNIFICANT CHANGE UP (ref 8.4–10.5)
CHLORIDE SERPL-SCNC: 92 MMOL/L — LOW (ref 96–108)
CO2 SERPL-SCNC: 29 MMOL/L — SIGNIFICANT CHANGE UP (ref 22–31)
CREAT SERPL-MCNC: 2.56 MG/DL — HIGH (ref 0.5–1.3)
GLUCOSE BLDC GLUCOMTR-MCNC: 201 MG/DL — HIGH (ref 70–99)
GLUCOSE BLDC GLUCOMTR-MCNC: 218 MG/DL — HIGH (ref 70–99)
GLUCOSE BLDC GLUCOMTR-MCNC: 317 MG/DL — HIGH (ref 70–99)
GLUCOSE BLDC GLUCOMTR-MCNC: 341 MG/DL — HIGH (ref 70–99)
GLUCOSE SERPL-MCNC: 165 MG/DL — HIGH (ref 70–99)
HCT VFR BLD CALC: 25.8 % — LOW (ref 34.5–45)
HGB BLD-MCNC: 8 G/DL — LOW (ref 11.5–15.5)
MCHC RBC-ENTMCNC: 29.2 PG — SIGNIFICANT CHANGE UP (ref 27–34)
MCHC RBC-ENTMCNC: 31 GM/DL — LOW (ref 32–36)
MCV RBC AUTO: 94.2 FL — SIGNIFICANT CHANGE UP (ref 80–100)
PLATELET # BLD AUTO: 280 K/UL — SIGNIFICANT CHANGE UP (ref 150–400)
POTASSIUM SERPL-MCNC: 4.9 MMOL/L — SIGNIFICANT CHANGE UP (ref 3.5–5.3)
POTASSIUM SERPL-SCNC: 4.9 MMOL/L — SIGNIFICANT CHANGE UP (ref 3.5–5.3)
RBC # BLD: 2.74 M/UL — LOW (ref 3.8–5.2)
RBC # FLD: 17.1 % — HIGH (ref 10.3–14.5)
SODIUM SERPL-SCNC: 134 MMOL/L — LOW (ref 135–145)
WBC # BLD: 5.71 K/UL — SIGNIFICANT CHANGE UP (ref 3.8–10.5)
WBC # FLD AUTO: 5.71 K/UL — SIGNIFICANT CHANGE UP (ref 3.8–10.5)

## 2018-06-24 RX ORDER — INSULIN GLARGINE 100 [IU]/ML
10 INJECTION, SOLUTION SUBCUTANEOUS AT BEDTIME
Qty: 0 | Refills: 0 | Status: DISCONTINUED | OUTPATIENT
Start: 2018-06-24 | End: 2018-06-25

## 2018-06-24 RX ORDER — INSULIN LISPRO 100/ML
5 VIAL (ML) SUBCUTANEOUS
Qty: 0 | Refills: 0 | Status: DISCONTINUED | OUTPATIENT
Start: 2018-06-24 | End: 2018-06-25

## 2018-06-24 RX ADMIN — GABAPENTIN 100 MILLIGRAM(S): 400 CAPSULE ORAL at 13:14

## 2018-06-24 RX ADMIN — TRAMADOL HYDROCHLORIDE 50 MILLIGRAM(S): 50 TABLET ORAL at 02:47

## 2018-06-24 RX ADMIN — Medication 500 MILLIGRAM(S): at 21:29

## 2018-06-24 RX ADMIN — Medication 100 MILLIGRAM(S): at 08:14

## 2018-06-24 RX ADMIN — Medication 1 APPLICATION(S): at 05:07

## 2018-06-24 RX ADMIN — HEPARIN SODIUM 5000 UNIT(S): 5000 INJECTION INTRAVENOUS; SUBCUTANEOUS at 17:00

## 2018-06-24 RX ADMIN — SERTRALINE 50 MILLIGRAM(S): 25 TABLET, FILM COATED ORAL at 12:07

## 2018-06-24 RX ADMIN — Medication 8: at 12:07

## 2018-06-24 RX ADMIN — Medication 1 TABLET(S): at 08:14

## 2018-06-24 RX ADMIN — Medication 300 MILLIGRAM(S): at 13:14

## 2018-06-24 RX ADMIN — Medication 667 MILLIGRAM(S): at 08:14

## 2018-06-24 RX ADMIN — Medication 81 MILLIGRAM(S): at 12:07

## 2018-06-24 RX ADMIN — AMLODIPINE BESYLATE 10 MILLIGRAM(S): 2.5 TABLET ORAL at 05:09

## 2018-06-24 RX ADMIN — Medication 667 MILLIGRAM(S): at 17:00

## 2018-06-24 RX ADMIN — Medication 5 UNIT(S): at 17:00

## 2018-06-24 RX ADMIN — Medication 0.3 MILLIGRAM(S): at 13:14

## 2018-06-24 RX ADMIN — Medication 500 MILLIGRAM(S): at 22:15

## 2018-06-24 RX ADMIN — HEPARIN SODIUM 5000 UNIT(S): 5000 INJECTION INTRAVENOUS; SUBCUTANEOUS at 05:07

## 2018-06-24 RX ADMIN — Medication 100 MICROGRAM(S): at 05:07

## 2018-06-24 RX ADMIN — GABAPENTIN 100 MILLIGRAM(S): 400 CAPSULE ORAL at 21:27

## 2018-06-24 RX ADMIN — Medication 1 APPLICATION(S): at 17:00

## 2018-06-24 RX ADMIN — Medication 100 MILLIGRAM(S): at 00:20

## 2018-06-24 RX ADMIN — Medication 4: at 08:13

## 2018-06-24 RX ADMIN — GABAPENTIN 100 MILLIGRAM(S): 400 CAPSULE ORAL at 05:07

## 2018-06-24 RX ADMIN — LIDOCAINE 1 PATCH: 4 CREAM TOPICAL at 00:24

## 2018-06-24 RX ADMIN — TRAMADOL HYDROCHLORIDE 50 MILLIGRAM(S): 50 TABLET ORAL at 03:59

## 2018-06-24 RX ADMIN — Medication 4: at 21:27

## 2018-06-24 RX ADMIN — Medication 0.3 MILLIGRAM(S): at 21:26

## 2018-06-24 RX ADMIN — Medication 100 MILLIGRAM(S): at 17:00

## 2018-06-24 RX ADMIN — Medication 100 MILLIGRAM(S): at 05:07

## 2018-06-24 RX ADMIN — Medication 0.3 MILLIGRAM(S): at 05:07

## 2018-06-24 RX ADMIN — INSULIN GLARGINE 10 UNIT(S): 100 INJECTION, SOLUTION SUBCUTANEOUS at 21:28

## 2018-06-24 RX ADMIN — Medication 100 MILLIGRAM(S): at 21:27

## 2018-06-24 RX ADMIN — Medication 4: at 17:00

## 2018-06-24 RX ADMIN — ONDANSETRON 8 MILLIGRAM(S): 8 TABLET, FILM COATED ORAL at 14:46

## 2018-06-24 RX ADMIN — Medication 1 TABLET(S): at 17:00

## 2018-06-24 RX ADMIN — Medication 300 MILLIGRAM(S): at 21:28

## 2018-06-24 RX ADMIN — Medication 667 MILLIGRAM(S): at 12:07

## 2018-06-24 RX ADMIN — ATORVASTATIN CALCIUM 20 MILLIGRAM(S): 80 TABLET, FILM COATED ORAL at 21:26

## 2018-06-24 RX ADMIN — Medication 300 MILLIGRAM(S): at 05:07

## 2018-06-24 RX ADMIN — LIDOCAINE 1 PATCH: 4 CREAM TOPICAL at 12:08

## 2018-06-24 NOTE — PROGRESS NOTE ADULT - ASSESSMENT
72 female with a history of HTN, CAD,C HF, ESRD on HD with anemia now admitted with a history of fall. Neurology work up in progress, patient is refusing MRI. Now on cervical collar. Had stable dialysis yesterday and had one hour K free dialysis in the end.  Continue the present anti HTN medications. Discharge planning once neuro work up is done. Will follow.

## 2018-06-24 NOTE — PROGRESS NOTE ADULT - SUBJECTIVE AND OBJECTIVE BOX
ANGEL LECHUGA  72y  Female    Patient is a 72y old  Female who presents with a chief complaint of falls (20 Jun 2018 08:25)      feels better.       PAST MEDICAL & SURGICAL HISTORY:  [place] (hard of hearing)  Cataracts, bilateral: left worse than right  Pleural thickening: s/p pleurodisis left vats 2014  Hip pain: left  LBP radiating to left leg  CHF (congestive heart failure): diagnosed 2-2014  MI (myocardial infarction): 2-2014   cardiac cath done [facility]  Diabetes: diagnosed 2004  no medications in 5 months since hemodialysis  Hypothyroidism  ESRD (end stage renal disease)  HTN (hypertension)  MI (myocardial infarction): may 2016  Pleural effusion: Left side - several times since March 2014  Hypothyroid  Anxiety  HTN (hypertension)  Hyperlipidemia  CHF (congestive heart failure)  CKD (chronic kidney disease)  Anemia  Diabetes: DM 2  Thoracotomy scar of left chest: 4-2014 ? pneumonia/ scarring  S/P myomectomy: abdominal age 52  AV fistula: left upper 8-2014 attempted 12-16-14          PHYSICAL EXAM:    T(C): 36.8 (06-24-18 @ 12:04), Max: 36.8 (06-24-18 @ 00:15)  HR: 60 (06-24-18 @ 12:04) (57 - 73)  BP: 189/65 (06-24-18 @ 12:04) (179/73 - 191/65)  RR: 18 (06-24-18 @ 12:04) (18 - 18)  SpO2: 99% (06-24-18 @ 12:04) (99% - 100%)  Wt(kg): --    I&O's Detail    23 Jun 2018 07:01  -  24 Jun 2018 07:00  --------------------------------------------------------  IN:    Oral Fluid: 515 mL    Other: 800 mL  Total IN: 1315 mL    OUT:    Other: 2900 mL  Total OUT: 2900 mL    Total NET: -1585 mL          Respiratory: clear anteriorly, decreased BS at bases  Cardiovascular: S1 S2  Gastrointestinal: soft NT ND +BS  Extremities: edema   Neuro: Awake and alert    MEDICATIONS  (STANDING):  acetaminophen  IVPB. 500 milliGRAM(s) IV Intermittent once  amLODIPine   Tablet 10 milliGRAM(s) Oral daily  aspirin enteric coated 81 milliGRAM(s) Oral daily  atorvastatin 20 milliGRAM(s) Oral at bedtime  BACItracin   Ointment 1 Application(s) Topical two times a day  calcium acetate 667 milliGRAM(s) Oral three times a day with meals  cloNIDine 0.3 milliGRAM(s) Oral every 8 hours  dextrose 5%. 1000 milliLiter(s) (50 mL/Hr) IV Continuous <Continuous>  dextrose 50% Injectable 12.5 Gram(s) IV Push once  dextrose 50% Injectable 25 Gram(s) IV Push once  dextrose 50% Injectable 25 Gram(s) IV Push once  docusate sodium 100 milliGRAM(s) Oral three times a day  gabapentin 100 milliGRAM(s) Oral three times a day  heparin  Injectable 5000 Unit(s) SubCutaneous every 12 hours  hydrALAZINE 100 milliGRAM(s) Oral every 8 hours  insulin glargine Injectable (LANTUS) 10 Unit(s) SubCutaneous at bedtime  insulin lispro (HumaLOG) corrective regimen sliding scale   SubCutaneous three times a day before meals  insulin lispro (HumaLOG) corrective regimen sliding scale   SubCutaneous at bedtime  insulin lispro Injectable (HumaLOG) 5 Unit(s) SubCutaneous three times a day before meals  labetalol 300 milliGRAM(s) Oral every 8 hours  lactobacillus acidophilus 1 Tablet(s) Oral two times a day with meals  levothyroxine 100 MICROGram(s) Oral daily  lidocaine   Patch 1 Patch Transdermal daily  sertraline 50 milliGRAM(s) Oral daily    MEDICATIONS  (PRN):  acetaminophen   Tablet. 500 milliGRAM(s) Oral every 6 hours PRN Mild Pain (1 - 3)  dextrose 40% Gel 15 Gram(s) Oral once PRN Blood Glucose LESS THAN 70 milliGRAM(s)/deciliter  glucagon  Injectable 1 milliGRAM(s) IntraMuscular once PRN Glucose LESS THAN 70 milligrams/deciliter  ondansetron Injectable 8 milliGRAM(s) IV Push every 8 hours PRN Nausea  traMADol 50 milliGRAM(s) Oral daily PRN Moderate Pain (4 - 6)                            8.0    5.71  )-----------( 280      ( 24 Jun 2018 08:12 )             25.8       06-24    134<L>  |  92<L>  |  24<H>  ----------------------------<  165<H>  4.9   |  29  |  2.56<H>    Ca    8.8      24 Jun 2018 06:20

## 2018-06-24 NOTE — PROGRESS NOTE ADULT - SUBJECTIVE AND OBJECTIVE BOX
Patient is a 72y old  Female who presents with a chief complaint of falls (20 Jun 2018 08:25)      SUBJECTIVE / OVERNIGHT EVENTS: Comfortable without new complaints.   Review of Systems  chest pain no  palpitations no  sob no  nausea no  headache no    MEDICATIONS  (STANDING):  acetaminophen  IVPB. 500 milliGRAM(s) IV Intermittent once  amLODIPine   Tablet 10 milliGRAM(s) Oral daily  aspirin enteric coated 81 milliGRAM(s) Oral daily  atorvastatin 20 milliGRAM(s) Oral at bedtime  BACItracin   Ointment 1 Application(s) Topical two times a day  calcium acetate 667 milliGRAM(s) Oral three times a day with meals  cloNIDine 0.3 milliGRAM(s) Oral every 8 hours  dextrose 5%. 1000 milliLiter(s) (50 mL/Hr) IV Continuous <Continuous>  dextrose 50% Injectable 12.5 Gram(s) IV Push once  dextrose 50% Injectable 25 Gram(s) IV Push once  dextrose 50% Injectable 25 Gram(s) IV Push once  docusate sodium 100 milliGRAM(s) Oral three times a day  gabapentin 100 milliGRAM(s) Oral three times a day  heparin  Injectable 5000 Unit(s) SubCutaneous every 12 hours  hydrALAZINE 100 milliGRAM(s) Oral every 8 hours  insulin glargine Injectable (LANTUS) 10 Unit(s) SubCutaneous at bedtime  insulin lispro (HumaLOG) corrective regimen sliding scale   SubCutaneous three times a day before meals  insulin lispro (HumaLOG) corrective regimen sliding scale   SubCutaneous at bedtime  insulin lispro Injectable (HumaLOG) 5 Unit(s) SubCutaneous three times a day before meals  labetalol 300 milliGRAM(s) Oral every 8 hours  lactobacillus acidophilus 1 Tablet(s) Oral two times a day with meals  levothyroxine 100 MICROGram(s) Oral daily  lidocaine   Patch 1 Patch Transdermal daily  sertraline 50 milliGRAM(s) Oral daily    MEDICATIONS  (PRN):  acetaminophen   Tablet. 500 milliGRAM(s) Oral every 6 hours PRN Mild Pain (1 - 3)  dextrose 40% Gel 15 Gram(s) Oral once PRN Blood Glucose LESS THAN 70 milliGRAM(s)/deciliter  glucagon  Injectable 1 milliGRAM(s) IntraMuscular once PRN Glucose LESS THAN 70 milligrams/deciliter  ondansetron Injectable 8 milliGRAM(s) IV Push every 8 hours PRN Nausea  traMADol 50 milliGRAM(s) Oral daily PRN Moderate Pain (4 - 6)            PHYSICAL EXAM:  GENERAL: NAD, well-developed  HEAD:  improving L periorbital echymosis, Normocephalic  EYES: EOMI, PERRLA, conjunctiva and sclera clear  NECK: Supple, No JVD  CHEST/LUNG: Clear to auscultation bilaterally; No wheeze  HEART: Regular rate and rhythm; No murmurs, rubs, or gallops  ABDOMEN: Soft, Nontender, Nondistended; Bowel sounds present  EXTREMITIES:  2+ Peripheral Pulses, No clubbing, cyanosis, or edema  PSYCH: AAOx3  NEUROLOGY: non-focal  SKIN: No rashes or lesions    LABS:                        8.0    5.71  )-----------( 280      ( 24 Jun 2018 08:12 )             25.8     06-24    134<L>  |  92<L>  |  24<H>  ----------------------------<  165<H>  4.9   |  29  |  2.56<H>    Ca    8.8      24 Jun 2018 06:20                RADIOLOGY & ADDITIONAL TESTS:    Imaging Personally Reviewed:    Consultant(s) Notes Reviewed:      Care Discussed with Consultants/Other Providers:

## 2018-06-24 NOTE — PROGRESS NOTE ADULT - SUBJECTIVE AND OBJECTIVE BOX
Subjective: Patient seen and examined. No new events except as noted.   no cp or sob   wants to go home     REVIEW OF SYSTEMS:    CONSTITUTIONAL: + weakness, fevers or chills  EYES/ENT: No visual changes;  No vertigo or throat pain   NECK: No pain or stiffness  RESPIRATORY: No cough, wheezing, hemoptysis; No shortness of breath  CARDIOVASCULAR: No chest pain or palpitations  GASTROINTESTINAL: No abdominal or epigastric pain. No nausea, vomiting, or hematemesis; No diarrhea or constipation. No melena or hematochezia.  GENITOURINARY: No dysuria, frequency or hematuria  NEUROLOGICAL: No numbness or weakness  SKIN: No itching, burning, rashes, or lesions   All other review of systems is negative unless indicated above.    MEDICATIONS:  MEDICATIONS  (STANDING):  acetaminophen  IVPB. 500 milliGRAM(s) IV Intermittent once  amLODIPine   Tablet 10 milliGRAM(s) Oral daily  aspirin enteric coated 81 milliGRAM(s) Oral daily  atorvastatin 20 milliGRAM(s) Oral at bedtime  BACItracin   Ointment 1 Application(s) Topical two times a day  calcium acetate 667 milliGRAM(s) Oral three times a day with meals  cloNIDine 0.3 milliGRAM(s) Oral every 8 hours  dextrose 5%. 1000 milliLiter(s) (50 mL/Hr) IV Continuous <Continuous>  dextrose 50% Injectable 12.5 Gram(s) IV Push once  dextrose 50% Injectable 25 Gram(s) IV Push once  dextrose 50% Injectable 25 Gram(s) IV Push once  docusate sodium 100 milliGRAM(s) Oral three times a day  gabapentin 100 milliGRAM(s) Oral three times a day  heparin  Injectable 5000 Unit(s) SubCutaneous every 12 hours  hydrALAZINE 100 milliGRAM(s) Oral every 8 hours  insulin glargine Injectable (LANTUS) 10 Unit(s) SubCutaneous at bedtime  insulin lispro (HumaLOG) corrective regimen sliding scale   SubCutaneous three times a day before meals  insulin lispro (HumaLOG) corrective regimen sliding scale   SubCutaneous at bedtime  insulin lispro Injectable (HumaLOG) 5 Unit(s) SubCutaneous three times a day before meals  labetalol 300 milliGRAM(s) Oral every 8 hours  lactobacillus acidophilus 1 Tablet(s) Oral two times a day with meals  levothyroxine 100 MICROGram(s) Oral daily  lidocaine   Patch 1 Patch Transdermal daily  sertraline 50 milliGRAM(s) Oral daily      PHYSICAL EXAM:  T(C): 36.8 (06-24-18 @ 12:04), Max: 36.8 (06-24-18 @ 00:15)  HR: 60 (06-24-18 @ 12:04) (57 - 73)  BP: 189/65 (06-24-18 @ 12:04) (179/73 - 191/65)  RR: 18 (06-24-18 @ 12:04) (18 - 18)  SpO2: 99% (06-24-18 @ 12:04) (99% - 100%)  Wt(kg): --  I&O's Summary    23 Jun 2018 07:01  -  24 Jun 2018 07:00  --------------------------------------------------------  IN: 1315 mL / OUT: 2900 mL / NET: -1585 mL          Appearance: NAD	  HEENT:   Normal oral mucosa, PERRL, EOMI, left boni-orbital ecchymosis 	  Lymphatic: No lymphadenopathy , no edema  Cardiovascular: Normal S1 S2, No JVD, No murmurs , Peripheral pulses palpable 2+ bilaterally  Respiratory: Lungs clear to auscultation, normal effort 	  Gastrointestinal:  Soft, Non-tender, + BS	  Skin: No rashes, No ecchymoses, No cyanosis, warm to touch  Musculoskeletal: decreased  range of motion and  strength  Psychiatry:  Mood & affect appropriate  Ext: No edema      LABS:    CARDIAC MARKERS:                                8.0    5.71  )-----------( 280      ( 24 Jun 2018 08:12 )             25.8     06-24    134<L>  |  92<L>  |  24<H>  ----------------------------<  165<H>  4.9   |  29  |  2.56<H>    Ca    8.8      24 Jun 2018 06:20      proBNP:   Lipid Profile:   HgA1c:   TSH:             TELEMETRY: 	    ECG:  	  RADIOLOGY:   DIAGNOSTIC TESTING:  [ ] Echocardiogram:  [ ]  Catheterization:  [ ] Stress Test:    OTHER:

## 2018-06-24 NOTE — PROGRESS NOTE ADULT - ASSESSMENT
72 f with  s/p fall- telemetry, cardiology follow s/p ILR insertion.   Neurology evaluation noted. Refused repeat MRA neck to r/o possible arterial dissection   Hypothyroidism-follow TSH  Cervical spine subluxation/ possible vertebral fracture- Neurosurgery evaluation noted. Cervical collar.   PT  ESRD- HD, Nephrology follow Dr. Fernandez  Hyperkalemia- follow.  Diabetes control  Nasal fracture- Plastic surgery evaluation noted. No intervention  Periorbital ecchymosis, Hematoma- Opthalmology evaluation noted. Local care.  Nausea/ Diarrhea- GI evaluation noted. Probable gastroenteritis resolved.   Anemia- follow   DCP rehab in progress.  Rajendra Carter MD pager [phone]

## 2018-06-25 VITALS
SYSTOLIC BLOOD PRESSURE: 173 MMHG | TEMPERATURE: 98 F | DIASTOLIC BLOOD PRESSURE: 64 MMHG | RESPIRATION RATE: 18 BRPM | OXYGEN SATURATION: 97 % | HEART RATE: 59 BPM

## 2018-06-25 LAB
ANION GAP SERPL CALC-SCNC: 18 MMOL/L — HIGH (ref 5–17)
BUN SERPL-MCNC: 45 MG/DL — HIGH (ref 7–23)
CALCIUM SERPL-MCNC: 8.9 MG/DL — SIGNIFICANT CHANGE UP (ref 8.4–10.5)
CHLORIDE SERPL-SCNC: 86 MMOL/L — LOW (ref 96–108)
CO2 SERPL-SCNC: 28 MMOL/L — SIGNIFICANT CHANGE UP (ref 22–31)
CREAT SERPL-MCNC: 3.54 MG/DL — HIGH (ref 0.5–1.3)
GLUCOSE BLDC GLUCOMTR-MCNC: 179 MG/DL — HIGH (ref 70–99)
GLUCOSE BLDC GLUCOMTR-MCNC: 186 MG/DL — HIGH (ref 70–99)
GLUCOSE SERPL-MCNC: 131 MG/DL — HIGH (ref 70–99)
HCT VFR BLD CALC: 26.8 % — LOW (ref 34.5–45)
HGB BLD-MCNC: 8.1 G/DL — LOW (ref 11.5–15.5)
MCHC RBC-ENTMCNC: 28.4 PG — SIGNIFICANT CHANGE UP (ref 27–34)
MCHC RBC-ENTMCNC: 30.2 GM/DL — LOW (ref 32–36)
MCV RBC AUTO: 94 FL — SIGNIFICANT CHANGE UP (ref 80–100)
PLATELET # BLD AUTO: 301 K/UL — SIGNIFICANT CHANGE UP (ref 150–400)
POTASSIUM SERPL-MCNC: 5.8 MMOL/L — HIGH (ref 3.5–5.3)
POTASSIUM SERPL-SCNC: 5.8 MMOL/L — HIGH (ref 3.5–5.3)
RBC # BLD: 2.85 M/UL — LOW (ref 3.8–5.2)
RBC # FLD: 16.5 % — HIGH (ref 10.3–14.5)
SODIUM SERPL-SCNC: 132 MMOL/L — LOW (ref 135–145)
WBC # BLD: 5.85 K/UL — SIGNIFICANT CHANGE UP (ref 3.8–10.5)
WBC # FLD AUTO: 5.85 K/UL — SIGNIFICANT CHANGE UP (ref 3.8–10.5)

## 2018-06-25 PROCEDURE — 84443 ASSAY THYROID STIM HORMONE: CPT

## 2018-06-25 PROCEDURE — 86706 HEP B SURFACE ANTIBODY: CPT

## 2018-06-25 PROCEDURE — 93005 ELECTROCARDIOGRAM TRACING: CPT

## 2018-06-25 PROCEDURE — 85652 RBC SED RATE AUTOMATED: CPT

## 2018-06-25 PROCEDURE — 86803 HEPATITIS C AB TEST: CPT

## 2018-06-25 PROCEDURE — 82607 VITAMIN B-12: CPT

## 2018-06-25 PROCEDURE — 93306 TTE W/DOPPLER COMPLETE: CPT

## 2018-06-25 PROCEDURE — 82746 ASSAY OF FOLIC ACID SERUM: CPT

## 2018-06-25 PROCEDURE — 70544 MR ANGIOGRAPHY HEAD W/O DYE: CPT

## 2018-06-25 PROCEDURE — 82962 GLUCOSE BLOOD TEST: CPT

## 2018-06-25 PROCEDURE — 80053 COMPREHEN METABOLIC PANEL: CPT

## 2018-06-25 PROCEDURE — 33282: CPT

## 2018-06-25 PROCEDURE — 82550 ASSAY OF CK (CPK): CPT

## 2018-06-25 PROCEDURE — 84100 ASSAY OF PHOSPHORUS: CPT

## 2018-06-25 PROCEDURE — 72141 MRI NECK SPINE W/O DYE: CPT

## 2018-06-25 PROCEDURE — 86140 C-REACTIVE PROTEIN: CPT

## 2018-06-25 PROCEDURE — 70551 MRI BRAIN STEM W/O DYE: CPT

## 2018-06-25 PROCEDURE — 70450 CT HEAD/BRAIN W/O DYE: CPT

## 2018-06-25 PROCEDURE — 73030 X-RAY EXAM OF SHOULDER: CPT

## 2018-06-25 PROCEDURE — 84484 ASSAY OF TROPONIN QUANT: CPT

## 2018-06-25 PROCEDURE — 72170 X-RAY EXAM OF PELVIS: CPT

## 2018-06-25 PROCEDURE — 97110 THERAPEUTIC EXERCISES: CPT

## 2018-06-25 PROCEDURE — 80048 BASIC METABOLIC PNL TOTAL CA: CPT

## 2018-06-25 PROCEDURE — 97162 PT EVAL MOD COMPLEX 30 MIN: CPT

## 2018-06-25 PROCEDURE — 72125 CT NECK SPINE W/O DYE: CPT

## 2018-06-25 PROCEDURE — 85027 COMPLETE CBC AUTOMATED: CPT

## 2018-06-25 PROCEDURE — 97116 GAIT TRAINING THERAPY: CPT

## 2018-06-25 PROCEDURE — 73502 X-RAY EXAM HIP UNI 2-3 VIEWS: CPT

## 2018-06-25 PROCEDURE — 85730 THROMBOPLASTIN TIME PARTIAL: CPT

## 2018-06-25 PROCEDURE — 80061 LIPID PANEL: CPT

## 2018-06-25 PROCEDURE — 70486 CT MAXILLOFACIAL W/O DYE: CPT

## 2018-06-25 PROCEDURE — 99285 EMERGENCY DEPT VISIT HI MDM: CPT

## 2018-06-25 PROCEDURE — 82553 CREATINE MB FRACTION: CPT

## 2018-06-25 PROCEDURE — C1764: CPT

## 2018-06-25 PROCEDURE — 83036 HEMOGLOBIN GLYCOSYLATED A1C: CPT

## 2018-06-25 PROCEDURE — 71045 X-RAY EXAM CHEST 1 VIEW: CPT

## 2018-06-25 PROCEDURE — 83735 ASSAY OF MAGNESIUM: CPT

## 2018-06-25 PROCEDURE — 70547 MR ANGIOGRAPHY NECK W/O DYE: CPT

## 2018-06-25 PROCEDURE — 87340 HEPATITIS B SURFACE AG IA: CPT

## 2018-06-25 PROCEDURE — 85610 PROTHROMBIN TIME: CPT

## 2018-06-25 PROCEDURE — 99261: CPT

## 2018-06-25 PROCEDURE — 86704 HEP B CORE ANTIBODY TOTAL: CPT

## 2018-06-25 RX ORDER — LISINOPRIL 2.5 MG/1
1 TABLET ORAL
Qty: 0 | Refills: 0 | COMMUNITY

## 2018-06-25 RX ORDER — SODIUM POLYSTYRENE SULFONATE 4.1 MEQ/G
15 POWDER, FOR SUSPENSION ORAL ONCE
Qty: 0 | Refills: 0 | Status: DISCONTINUED | OUTPATIENT
Start: 2018-06-25 | End: 2018-06-25

## 2018-06-25 RX ORDER — INSULIN ASPART 100 [IU]/ML
5 INJECTION, SOLUTION SUBCUTANEOUS
Qty: 0 | Refills: 0 | COMMUNITY

## 2018-06-25 RX ORDER — SERTRALINE 25 MG/1
1 TABLET, FILM COATED ORAL
Qty: 0 | Refills: 0 | COMMUNITY

## 2018-06-25 RX ORDER — HYDRALAZINE HCL 50 MG
1 TABLET ORAL
Qty: 0 | Refills: 0 | COMMUNITY

## 2018-06-25 RX ORDER — INSULIN GLARGINE 100 [IU]/ML
15 INJECTION, SOLUTION SUBCUTANEOUS
Qty: 0 | Refills: 0 | COMMUNITY

## 2018-06-25 RX ORDER — LEVOTHYROXINE SODIUM 125 MCG
1 TABLET ORAL
Qty: 0 | Refills: 0 | COMMUNITY

## 2018-06-25 RX ORDER — INSULIN GLARGINE 100 [IU]/ML
10 INJECTION, SOLUTION SUBCUTANEOUS
Qty: 0 | Refills: 0 | COMMUNITY
Start: 2018-06-25

## 2018-06-25 RX ORDER — LABETALOL HCL 100 MG
1 TABLET ORAL
Qty: 0 | Refills: 0 | COMMUNITY

## 2018-06-25 RX ADMIN — Medication 100 MILLIGRAM(S): at 08:17

## 2018-06-25 RX ADMIN — Medication 1 TABLET(S): at 17:18

## 2018-06-25 RX ADMIN — Medication 5 UNIT(S): at 08:17

## 2018-06-25 RX ADMIN — Medication 0.3 MILLIGRAM(S): at 15:15

## 2018-06-25 RX ADMIN — HEPARIN SODIUM 5000 UNIT(S): 5000 INJECTION INTRAVENOUS; SUBCUTANEOUS at 05:37

## 2018-06-25 RX ADMIN — AMLODIPINE BESYLATE 10 MILLIGRAM(S): 2.5 TABLET ORAL at 05:37

## 2018-06-25 RX ADMIN — Medication 5 UNIT(S): at 12:33

## 2018-06-25 RX ADMIN — Medication 667 MILLIGRAM(S): at 12:34

## 2018-06-25 RX ADMIN — SERTRALINE 50 MILLIGRAM(S): 25 TABLET, FILM COATED ORAL at 12:34

## 2018-06-25 RX ADMIN — Medication 2: at 12:33

## 2018-06-25 RX ADMIN — Medication 300 MILLIGRAM(S): at 15:15

## 2018-06-25 RX ADMIN — Medication 100 MILLIGRAM(S): at 17:18

## 2018-06-25 RX ADMIN — Medication 1 APPLICATION(S): at 05:38

## 2018-06-25 RX ADMIN — Medication 100 MILLIGRAM(S): at 05:37

## 2018-06-25 RX ADMIN — GABAPENTIN 100 MILLIGRAM(S): 400 CAPSULE ORAL at 05:37

## 2018-06-25 RX ADMIN — Medication 100 MICROGRAM(S): at 05:37

## 2018-06-25 RX ADMIN — Medication 2: at 08:16

## 2018-06-25 RX ADMIN — LIDOCAINE 1 PATCH: 4 CREAM TOPICAL at 00:18

## 2018-06-25 RX ADMIN — Medication 300 MILLIGRAM(S): at 05:37

## 2018-06-25 RX ADMIN — Medication 667 MILLIGRAM(S): at 17:18

## 2018-06-25 RX ADMIN — Medication 100 MILLIGRAM(S): at 00:15

## 2018-06-25 RX ADMIN — Medication 0.3 MILLIGRAM(S): at 05:37

## 2018-06-25 RX ADMIN — Medication 100 MILLIGRAM(S): at 15:15

## 2018-06-25 RX ADMIN — Medication 81 MILLIGRAM(S): at 12:33

## 2018-06-25 RX ADMIN — TRAMADOL HYDROCHLORIDE 50 MILLIGRAM(S): 50 TABLET ORAL at 05:40

## 2018-06-25 RX ADMIN — GABAPENTIN 100 MILLIGRAM(S): 400 CAPSULE ORAL at 15:14

## 2018-06-25 RX ADMIN — Medication 667 MILLIGRAM(S): at 08:16

## 2018-06-25 RX ADMIN — TRAMADOL HYDROCHLORIDE 50 MILLIGRAM(S): 50 TABLET ORAL at 04:05

## 2018-06-25 RX ADMIN — Medication 1 TABLET(S): at 08:16

## 2018-06-25 RX ADMIN — LIDOCAINE 1 PATCH: 4 CREAM TOPICAL at 12:33

## 2018-06-25 NOTE — PROGRESS NOTE ADULT - PROBLEM SELECTOR PROBLEM 3
Headache
Diabetes

## 2018-06-25 NOTE — PROGRESS NOTE ADULT - PROBLEM SELECTOR PLAN 4
Now better   Cont Catapres and Hydralazine   Monitor closely  Low salt diet
Remains elevated   Started on Catapres and Hydralazine   Monitor closely  Low salt diet
Remains elevated   Started on Catapres and Hydralazine   Monitor closely  Low salt diet

## 2018-06-25 NOTE — PROGRESS NOTE ADULT - SUBJECTIVE AND OBJECTIVE BOX
ANGEL LECHUGA  72y  Female    Patient is a 72y old  Female who presents with a chief complaint of falls (20 Jun 2018 08:25)    No new issues. For HD tomorrow.       PAST MEDICAL & SURGICAL HISTORY:  [place] (hard of hearing)  Cataracts, bilateral: left worse than right  Pleural thickening: s/p pleurodisis left vats 2014  Hip pain: left  LBP radiating to left leg  CHF (congestive heart failure): diagnosed 2-2014  MI (myocardial infarction): 2-2014   cardiac cath done [facility]  Diabetes: diagnosed 2004  no medications in 5 months since hemodialysis  Hypothyroidism  ESRD (end stage renal disease)  HTN (hypertension)  MI (myocardial infarction): may 2016  Pleural effusion: Left side - several times since March 2014  Hypothyroid  Anxiety  HTN (hypertension)  Hyperlipidemia  CHF (congestive heart failure)  CKD (chronic kidney disease)  Anemia  Diabetes: DM 2  Thoracotomy scar of left chest: 4-2014 ? pneumonia/ scarring  S/P myomectomy: abdominal age 52  AV fistula: left upper 8-2014 attempted 12-16-14    MEDICATIONS  (STANDING):  acetaminophen  IVPB. 500 milliGRAM(s) IV Intermittent once  amLODIPine   Tablet 10 milliGRAM(s) Oral daily  aspirin enteric coated 81 milliGRAM(s) Oral daily  atorvastatin 20 milliGRAM(s) Oral at bedtime  BACItracin   Ointment 1 Application(s) Topical two times a day  calcium acetate 667 milliGRAM(s) Oral three times a day with meals  cloNIDine 0.3 milliGRAM(s) Oral every 8 hours  dextrose 5%. 1000 milliLiter(s) (50 mL/Hr) IV Continuous <Continuous>  dextrose 50% Injectable 12.5 Gram(s) IV Push once  dextrose 50% Injectable 25 Gram(s) IV Push once  dextrose 50% Injectable 25 Gram(s) IV Push once  docusate sodium 100 milliGRAM(s) Oral three times a day  gabapentin 100 milliGRAM(s) Oral three times a day  heparin  Injectable 5000 Unit(s) SubCutaneous every 12 hours  hydrALAZINE 100 milliGRAM(s) Oral every 8 hours  insulin glargine Injectable (LANTUS) 10 Unit(s) SubCutaneous at bedtime  insulin lispro (HumaLOG) corrective regimen sliding scale   SubCutaneous three times a day before meals  insulin lispro (HumaLOG) corrective regimen sliding scale   SubCutaneous at bedtime  insulin lispro Injectable (HumaLOG) 5 Unit(s) SubCutaneous three times a day before meals  labetalol 300 milliGRAM(s) Oral every 8 hours  lactobacillus acidophilus 1 Tablet(s) Oral two times a day with meals  levothyroxine 100 MICROGram(s) Oral daily  lidocaine   Patch 1 Patch Transdermal daily  sertraline 50 milliGRAM(s) Oral daily    MEDICATIONS  (PRN):  acetaminophen   Tablet. 500 milliGRAM(s) Oral every 6 hours PRN Mild Pain (1 - 3)  dextrose 40% Gel 15 Gram(s) Oral once PRN Blood Glucose LESS THAN 70 milliGRAM(s)/deciliter  glucagon  Injectable 1 milliGRAM(s) IntraMuscular once PRN Glucose LESS THAN 70 milligrams/deciliter  ondansetron Injectable 8 milliGRAM(s) IV Push every 8 hours PRN Nausea  traMADol 50 milliGRAM(s) Oral daily PRN Moderate Pain (4 - 6)    T(C): 36.3 (06-25-18 @ 13:43), Max: 36.9 (06-24-18 @ 20:00)  HR: 60 (06-25-18 @ 15:06) (55 - 73)  BP: 171/65 (06-25-18 @ 15:06) (171/65 - 191/65)  RR: 18 (06-25-18 @ 13:43)  SpO2: 98% (06-25-18 @ 13:43)  Wt(kg): --  I&O's Detail    24 Jun 2018 07:01  -  25 Jun 2018 07:00  --------------------------------------------------------  IN:    Oral Fluid: 240 mL  Total IN: 240 mL    OUT:  Total OUT: 0 mL    Total NET: 240 mL      25 Jun 2018 07:01  -  25 Jun 2018 15:09  --------------------------------------------------------  IN:    Oral Fluid: 560 mL  Total IN: 560 mL    OUT:  Total OUT: 0 mL    Total NET: 560 mL            PHYSICAL EXAM:    NAD  Respiratory: clear anteriorly, decreased BS at bases  Cardiovascular: S1 S2  Gastrointestinal: soft NT ND +BS  Extremities: edema   Neuro: Awake and alert         LABORATORY:                        8.1    5.85  )-----------( 301      ( 25 Jun 2018 08:03 )             26.8     06-25    132<L>  |  86<L>  |  45<H>  ----------------------------<  131<H>  5.8<H>   |  28  |  3.54<H>    Ca    8.9      25 Jun 2018 06:39      Sodium, Serum: 132 mmol/L (06-25 @ 06:39)  Sodium, Serum: 134 mmol/L (06-24 @ 06:20)    Potassium, Serum: 5.8 mmol/L (06-25 @ 06:39)  Potassium, Serum: 4.9 mmol/L (06-24 @ 06:20)    Hemoglobin: 8.1 g/dL (06-25 @ 08:03)  Hemoglobin: 8.0 g/dL (06-24 @ 08:12)  Hemoglobin: 7.7 g/dL (06-23 @ 08:11)    Creatinine, Serum 3.54 (06-25 @ 06:39)  Creatinine, Serum 2.56 (06-24 @ 06:20)  Creatinine, Serum 3.92 (06-23 @ 06:57) ANGEL LECHUGA  72y  Female    Patient is a 72y old  Female who presents with a chief complaint of falls (20 Jun 2018 08:25)    No new issues. For HD tomorrow. Elevated potassium. Pt's niece at bedside.       PAST MEDICAL & SURGICAL HISTORY:  [place] (hard of hearing)  Cataracts, bilateral: left worse than right  Pleural thickening: s/p pleurodisis left vats 2014  Hip pain: left  LBP radiating to left leg  CHF (congestive heart failure): diagnosed 2-2014  MI (myocardial infarction): 2-2014   cardiac cath done [facility]  Diabetes: diagnosed 2004  no medications in 5 months since hemodialysis  Hypothyroidism  ESRD (end stage renal disease)  HTN (hypertension)  MI (myocardial infarction): may 2016  Pleural effusion: Left side - several times since March 2014  Hypothyroid  Anxiety  HTN (hypertension)  Hyperlipidemia  CHF (congestive heart failure)  CKD (chronic kidney disease)  Anemia  Diabetes: DM 2  Thoracotomy scar of left chest: 4-2014 ? pneumonia/ scarring  S/P myomectomy: abdominal age 52  AV fistula: left upper 8-2014 attempted 12-16-14    MEDICATIONS  (STANDING):  acetaminophen  IVPB. 500 milliGRAM(s) IV Intermittent once  amLODIPine   Tablet 10 milliGRAM(s) Oral daily  aspirin enteric coated 81 milliGRAM(s) Oral daily  atorvastatin 20 milliGRAM(s) Oral at bedtime  BACItracin   Ointment 1 Application(s) Topical two times a day  calcium acetate 667 milliGRAM(s) Oral three times a day with meals  cloNIDine 0.3 milliGRAM(s) Oral every 8 hours  dextrose 5%. 1000 milliLiter(s) (50 mL/Hr) IV Continuous <Continuous>  dextrose 50% Injectable 12.5 Gram(s) IV Push once  dextrose 50% Injectable 25 Gram(s) IV Push once  dextrose 50% Injectable 25 Gram(s) IV Push once  docusate sodium 100 milliGRAM(s) Oral three times a day  gabapentin 100 milliGRAM(s) Oral three times a day  heparin  Injectable 5000 Unit(s) SubCutaneous every 12 hours  hydrALAZINE 100 milliGRAM(s) Oral every 8 hours  insulin glargine Injectable (LANTUS) 10 Unit(s) SubCutaneous at bedtime  insulin lispro (HumaLOG) corrective regimen sliding scale   SubCutaneous three times a day before meals  insulin lispro (HumaLOG) corrective regimen sliding scale   SubCutaneous at bedtime  insulin lispro Injectable (HumaLOG) 5 Unit(s) SubCutaneous three times a day before meals  labetalol 300 milliGRAM(s) Oral every 8 hours  lactobacillus acidophilus 1 Tablet(s) Oral two times a day with meals  levothyroxine 100 MICROGram(s) Oral daily  lidocaine   Patch 1 Patch Transdermal daily  sertraline 50 milliGRAM(s) Oral daily    MEDICATIONS  (PRN):  acetaminophen   Tablet. 500 milliGRAM(s) Oral every 6 hours PRN Mild Pain (1 - 3)  dextrose 40% Gel 15 Gram(s) Oral once PRN Blood Glucose LESS THAN 70 milliGRAM(s)/deciliter  glucagon  Injectable 1 milliGRAM(s) IntraMuscular once PRN Glucose LESS THAN 70 milligrams/deciliter  ondansetron Injectable 8 milliGRAM(s) IV Push every 8 hours PRN Nausea  traMADol 50 milliGRAM(s) Oral daily PRN Moderate Pain (4 - 6)    T(C): 36.3 (06-25-18 @ 13:43), Max: 36.9 (06-24-18 @ 20:00)  HR: 60 (06-25-18 @ 15:06) (55 - 73)  BP: 171/65 (06-25-18 @ 15:06) (171/65 - 191/65)  RR: 18 (06-25-18 @ 13:43)  SpO2: 98% (06-25-18 @ 13:43)  Wt(kg): --  I&O's Detail    24 Jun 2018 07:01  -  25 Jun 2018 07:00  --------------------------------------------------------  IN:    Oral Fluid: 240 mL  Total IN: 240 mL    OUT:  Total OUT: 0 mL    Total NET: 240 mL      25 Jun 2018 07:01  -  25 Jun 2018 15:09  --------------------------------------------------------  IN:    Oral Fluid: 560 mL  Total IN: 560 mL    OUT:  Total OUT: 0 mL    Total NET: 560 mL            PHYSICAL EXAM:    NAD, neck collar  Respiratory: clear anteriorly, decreased BS at bases  Cardiovascular: S1 S2  Gastrointestinal: soft NT ND +BS  Extremities: edema, AVF patent  Neuro: Awake and alert         LABORATORY:                        8.1    5.85  )-----------( 301      ( 25 Jun 2018 08:03 )             26.8     06-25    132<L>  |  86<L>  |  45<H>  ----------------------------<  131<H>  5.8<H>   |  28  |  3.54<H>    Ca    8.9      25 Jun 2018 06:39      Sodium, Serum: 132 mmol/L (06-25 @ 06:39)  Sodium, Serum: 134 mmol/L (06-24 @ 06:20)    Potassium, Serum: 5.8 mmol/L (06-25 @ 06:39)  Potassium, Serum: 4.9 mmol/L (06-24 @ 06:20)    Hemoglobin: 8.1 g/dL (06-25 @ 08:03)  Hemoglobin: 8.0 g/dL (06-24 @ 08:12)  Hemoglobin: 7.7 g/dL (06-23 @ 08:11)    Creatinine, Serum 3.54 (06-25 @ 06:39)  Creatinine, Serum 2.56 (06-24 @ 06:20)  Creatinine, Serum 3.92 (06-23 @ 06:57)

## 2018-06-25 NOTE — PROGRESS NOTE ADULT - PROBLEM SELECTOR PLAN 1
Unclear etiology   Admit to Tele  Check orthostatics and TTE   Thyroid panel.
Unclear etiology   No events on Tele thus far   Check  TTE. given recurrent episodes of syncope with significant trauma, will plan for ILR to rule out arrhythmic/cardiac etiology. Discussed with patient and son.
Unclear etiology   No events on Tele thus far   Check orthostatics and TTE   Thyroid panel.
Unclear etiology   No events on Tele thus far   Check orthostatics and TTE. given recurrent episodes of syncope with significant trauma, will plan for ILR to rule out arrhythmic/cardiac etiology. Discussed with patient and son.
Unclear etiology   No events on Tele thus far   Reviewed TTE  s/p ILR
Unclear etiology   No events on Tele thus far   Reviewed TTE  s/p ILR  Neurology following
Continue [facility]  Pain Management
Unclear etiology   No events on Tele thus far   Reviewed TTE  s/p ILR  Neurology following

## 2018-06-25 NOTE — PROGRESS NOTE ADULT - SUBJECTIVE AND OBJECTIVE BOX
Patient is a 72y old  Female who presents with a chief complaint of falls (20 Jun 2018 08:25)      SUBJECTIVE / OVERNIGHT EVENTS: Comfortable without new complaints.   Review of Systems  chest pain no  palpitations no  sob no  nausea no  headache no    MEDICATIONS  (STANDING):  acetaminophen  IVPB. 500 milliGRAM(s) IV Intermittent once  amLODIPine   Tablet 10 milliGRAM(s) Oral daily  aspirin enteric coated 81 milliGRAM(s) Oral daily  atorvastatin 20 milliGRAM(s) Oral at bedtime  BACItracin   Ointment 1 Application(s) Topical two times a day  calcium acetate 667 milliGRAM(s) Oral three times a day with meals  cloNIDine 0.3 milliGRAM(s) Oral every 8 hours  dextrose 5%. 1000 milliLiter(s) (50 mL/Hr) IV Continuous <Continuous>  dextrose 50% Injectable 12.5 Gram(s) IV Push once  dextrose 50% Injectable 25 Gram(s) IV Push once  dextrose 50% Injectable 25 Gram(s) IV Push once  docusate sodium 100 milliGRAM(s) Oral three times a day  gabapentin 100 milliGRAM(s) Oral three times a day  heparin  Injectable 5000 Unit(s) SubCutaneous every 12 hours  hydrALAZINE 100 milliGRAM(s) Oral every 8 hours  insulin glargine Injectable (LANTUS) 10 Unit(s) SubCutaneous at bedtime  insulin lispro (HumaLOG) corrective regimen sliding scale   SubCutaneous three times a day before meals  insulin lispro (HumaLOG) corrective regimen sliding scale   SubCutaneous at bedtime  insulin lispro Injectable (HumaLOG) 5 Unit(s) SubCutaneous three times a day before meals  labetalol 300 milliGRAM(s) Oral every 8 hours  lactobacillus acidophilus 1 Tablet(s) Oral two times a day with meals  levothyroxine 100 MICROGram(s) Oral daily  lidocaine   Patch 1 Patch Transdermal daily  sertraline 50 milliGRAM(s) Oral daily  sodium polystyrene sulfonate Suspension 15 Gram(s) Oral once    MEDICATIONS  (PRN):  acetaminophen   Tablet. 500 milliGRAM(s) Oral every 6 hours PRN Mild Pain (1 - 3)  dextrose 40% Gel 15 Gram(s) Oral once PRN Blood Glucose LESS THAN 70 milliGRAM(s)/deciliter  glucagon  Injectable 1 milliGRAM(s) IntraMuscular once PRN Glucose LESS THAN 70 milligrams/deciliter  ondansetron Injectable 8 milliGRAM(s) IV Push every 8 hours PRN Nausea  traMADol 50 milliGRAM(s) Oral daily PRN Moderate Pain (4 - 6)          PHYSICAL EXAM:  GENERAL: NAD, well-developed  HEAD:  L periorbital ecchymosis Normocephalic  EYES: EOMI, PERRLA, conjunctiva and sclera clear  NECK: Supple, No JVD Cervical collar in place.  CHEST/LUNG: Clear to auscultation bilaterally; No wheeze  HEART: Regular rate and rhythm; No murmurs, rubs, or gallops  ABDOMEN: Soft, Nontender, Nondistended; Bowel sounds present  EXTREMITIES:  2+ Peripheral Pulses, No clubbing, cyanosis, or edema  PSYCH: AAOx3  NEUROLOGY: non-focal  SKIN: No rashes or lesions    LABS:                        8.1    5.85  )-----------( 301      ( 25 Jun 2018 08:03 )             26.8     06-25    132<L>  |  86<L>  |  45<H>  ----------------------------<  131<H>  5.8<H>   |  28  |  3.54<H>    Ca    8.9      25 Jun 2018 06:39                RADIOLOGY & ADDITIONAL TESTS:    Imaging Personally Reviewed:    Consultant(s) Notes Reviewed:      Care Discussed with Consultants/Other Providers:

## 2018-06-25 NOTE — PROGRESS NOTE ADULT - PROVIDER SPECIALTY LIST ADULT
Cardiology
Gastroenterology
Internal Medicine
Nephrology
Neurology
Plastic Surgery
Nephrology
Neurology
Neurology
Cardiology

## 2018-06-25 NOTE — PROGRESS NOTE ADULT - ASSESSMENT
72 f with  s/p fall- telemetry, cardiology follow s/p ILR insertion.   Neurology evaluation noted. Refused repeat MRA neck to r/o possible arterial dissection   Hypothyroidism-follow TSH  Cervical spine subluxation/ possible vertebral fracture- Neurosurgery evaluation noted. Cervical collar.   PT  ESRD- HD, Nephrology follow Dr. Fernandez  Hyperkalemia- follow.  Diabetes control  Nasal fracture- Plastic surgery evaluation noted. No intervention  Periorbital ecchymosis, Hematoma- Opthalmology evaluation noted. Local care.  Nausea/ Diarrhea- GI evaluation noted. Probable gastroenteritis resolved.   Anemia- follow   DC rehab.  Rajendra Carter MD pager [phone]

## 2018-06-25 NOTE — PROGRESS NOTE ADULT - ASSESSMENT
72 female with a history of HTN, CAD,C HF, ESRD on HD with anemia now admitted with a history of fall. Neurology work up in progress, patient is refusing MRI. Now on cervical collar.     Low potassium diet. Kayexalate x 1 dose. HD in am. Continue the present anti HTN medications. To continue HD TIW as scheduled. Fluid removal as tolerated by BP.   Dietary and PO fluid restriction. Epogen as needed for anemia. 72 female with a history of HTN, CAD,C HF, ESRD on HD with anemia now admitted with a history of fall. Neurology work up in progress, patient is refusing MRI. Now on cervical collar.     Low potassium diet. Kayexalate x 1 dose. HD in am. Continue the present anti HTN medications. To continue HD TIW as scheduled. Fluid removal as tolerated by BP. Pt advised on complaince with dietary restriction.   Dietary and PO fluid restriction. Epogen as needed for anemia.  D/w family at bedside.

## 2018-06-25 NOTE — CHART NOTE - NSCHARTNOTEFT_GEN_A_CORE
Chief complaint: Worsening headache  Pt seen & examined @ 23:30  Informed by RN of pt having uncontrolled BP & c/o worsening pain Rt side of head at site of injury post syncopal episode for which pt. was admitted for.  Pt interviewed using  phone, & reported severe pain in the back of Rt side of head, & less so Lt orbit. She denied feeling dizzy, lightheaded, n/v or palpitations, & no visual abnormalities or diplopia.  She had no cp or sob.   Pt had received Tylenol earlier, & Tramadol, & given anti-hypertensive meds, with no relief of pain or lowered BP.   Pt continued to have severe pain Rt side of head, & was uncomfortable & crying.      HPI:  The patient is a 72y Female complaining of fall.	  Patient with multiple medical problems, trip and fall yesterday at home (mechanical), landed on R side and struck her face.  No LOC.  Significant swelling to L eye (but can still see out of it reportedly).  On ASA, no other blood thinners.  Seen recently at [facility] also for fall.  T/R/S dialysis, presented there for dialysis this morning and was sent to the Emergency Department for dialysis. (14 Jun 2018 12:00)    Review of Systems: REVIEW OF SYSTEMS:    	CONSTITUTIONAL: + headache  	EYES/ENT: No visual changes;  No vertigo   	RESPIRATORY:  No shortness of breath  	CARDIOVASCULAR: No chest pain or palpitations  	GASTROINTESTINAL: No abdominal or epigastric pain. No nausea, vomiting  	NEUROLOGICAL: No numbness or weakness    Physical Exam:  General: WN/WD NAD  Neurology: A&Ox3, nonfocal, PECK x 4  Head:  Normocephalic, atraumatic            + scalp hematoma tender Rt side of head;   Eyes: + eccymosis & pain surrounding Lt eye  Respiratory: CTA B/L  CV: RRR, S1S2, no murmur  Abdominal: Soft, non tender, non distended, + BS  MSK: No edema, + peripheral pulses, FROM all 4 extremity    	  Vital Signs Last 24 Hrs  T(C): 36.9 (18 Jun 2018 20:35), Max: 37 (18 Jun 2018 04:00)  T(F): 98.4 (18 Jun 2018 20:35), Max: 98.6 (18 Jun 2018 04:00)  HR: 63 (19 Jun 2018 01:50) (57 - 64)  BP: 178/66 (19 Jun 2018 01:50) (155/63 - 200/80)  BP(mean): --  RR: 18 (19 Jun 2018 01:50) (18 - 18)  SpO2: 99% (19 Jun 2018 01:50) (96% - 100%)      Labs:                          8.7    5.05  )-----------( 246      ( 18 Jun 2018 07:54 )             28.8     06-18    132<L>  |  91<L>  |  43<H>  ----------------------------<  151<H>  5.9<H>   |  24  |  4.24<H>    Ca    8.8      18 Jun 2018 06:06  Phos  3.2     06-17  Mg     2.0     06-17      Radiology:       Assessment & Plan:  Pt evaluated for worsening headache. No focal deficits noted. Headache likely related to presence of Rt scalp hematoma due to injury post syncopal episode.   PLAN:  >Will continue to monitor  >Will obtain an urgent CT scan of head to r/o ICH - obtained, d/w Dr. Nj, prelim. results reveal no interval change from 6/14/18  >Continue Tylenol & Tramadol for pain control  >Will endorse to day team to follow results of CT scan      Follow up with Attending in AM.  Wanda Briones PA-C  #[id number]
Interval events    Notified by RN that patient with /78 mm of hg. Seen and examined the patient, patient asymptomatic. c/o generalized pain and on left lower leg.        Patient is 71yo W w/DM, HTN, ASHD, ESRD on HD TTS, s/p trip and fall yesterday at home (mechanical), landed on R side and struck her face. Denies LOC. Significant swelling to L eye, ecchymosis.  On ASA, no other blood thinners.  Seen recently at [facility] also for fall. S/P HD today, patient with uncontrolled Bp today in the Ed and in the HD,  received labetalol Po in HD, now with SBP> 200 mm of hg. Patient on multiple antihypertensives at home, did not receive PO meds during day    Uncontrolled HTN    Administer clonidine 0.2mg PO, hydralazine 50mg Po due dose now  Continue Labetalol 200mg  PO BID  Patient on   Pain control with Tylenol, tramadol  F/U am chemistry  Vitals [id number]  F/U with cardiology and Nephrology in am  Continue to monitor  will update with primary team    Sanjuanita Valenzuela P BC  [id number]
MR reviewed. Please keep in c collar when oob (*elijah paredes provides c collar if primary team does not have any: [phone]). Dr. Rivas discussed cervical stenosis, anterolistesis, prevertebral heme possible, some ligamentous injury. Also states that vert may be hypoplastic vs dissection, cannot differentiate but compensated with contralateral vert. May fu outpatient 1-2 weeks after discharge.
Nutrition Follow-up  Chart reviewed, events noted.     Source: Patient [x]    Family [ ]     other [x] Comprehensive review of medical records   Pt's preferred language is Faroese, pt able to communicate in English and denied need for translation services.     Diet : Consistent Carbohydrate, Renal, Nepro x1/day.       Patient reports she has a good breakfast and for lunch and dinner she usually just consumes soup (noted pt with food/soup from outside of the hospital at bedside), pt reports she also drinks Nepro x1/day. RD discussed increased calorie and protein needs for HD and reviewed nutrition recommendation. RD encouraged pt to have more protein at lunch and dinner, pt willing to increase Nepro to 2/day to increase protein-energy intake. No N+V. Pt reports having a small BM yesterday.          Enteral /Parenteral Nutrition: n/a       Current Weight: noted dosing wt of 98.7 pounds (bed) on 6/14 and standing wt of 104.4 pounds on 6/18, bed wt of 92.1 pounds on 6/23- question accuracy vs. fluid shifts due to large fluctuations noted during admission.   % Weight Change    Pertinent Medications: MEDICATIONS  (STANDING):  acetaminophen  IVPB. 500 milliGRAM(s) IV Intermittent once  amLODIPine   Tablet 10 milliGRAM(s) Oral daily  aspirin enteric coated 81 milliGRAM(s) Oral daily  atorvastatin 20 milliGRAM(s) Oral at bedtime  BACItracin   Ointment 1 Application(s) Topical two times a day  calcium acetate 667 milliGRAM(s) Oral three times a day with meals  cloNIDine 0.3 milliGRAM(s) Oral every 8 hours  dextrose 5%. 1000 milliLiter(s) (50 mL/Hr) IV Continuous <Continuous>  dextrose 50% Injectable 12.5 Gram(s) IV Push once  dextrose 50% Injectable 25 Gram(s) IV Push once  dextrose 50% Injectable 25 Gram(s) IV Push once  docusate sodium 100 milliGRAM(s) Oral three times a day  gabapentin 100 milliGRAM(s) Oral three times a day  heparin  Injectable 5000 Unit(s) SubCutaneous every 12 hours  hydrALAZINE 100 milliGRAM(s) Oral every 8 hours  insulin glargine Injectable (LANTUS) 10 Unit(s) SubCutaneous at bedtime  insulin lispro (HumaLOG) corrective regimen sliding scale   SubCutaneous three times a day before meals  insulin lispro (HumaLOG) corrective regimen sliding scale   SubCutaneous at bedtime  insulin lispro Injectable (HumaLOG) 5 Unit(s) SubCutaneous three times a day before meals  labetalol 300 milliGRAM(s) Oral every 8 hours  lactobacillus acidophilus 1 Tablet(s) Oral two times a day with meals  levothyroxine 100 MICROGram(s) Oral daily  lidocaine   Patch 1 Patch Transdermal daily  sertraline 50 milliGRAM(s) Oral daily    MEDICATIONS  (PRN):  acetaminophen   Tablet. 500 milliGRAM(s) Oral every 6 hours PRN Mild Pain (1 - 3)  dextrose 40% Gel 15 Gram(s) Oral once PRN Blood Glucose LESS THAN 70 milliGRAM(s)/deciliter  glucagon  Injectable 1 milliGRAM(s) IntraMuscular once PRN Glucose LESS THAN 70 milligrams/deciliter  ondansetron Injectable 8 milliGRAM(s) IV Push every 8 hours PRN Nausea  traMADol 50 milliGRAM(s) Oral daily PRN Moderate Pain (4 - 6)    Pertinent Labs:  06-25 Na132 mmol/L<L> Glu 131 mg/dL<H> K+ 5.8 mmol/L<H> Cr  3.54 mg/dL<H> BUN 45 mg/dL<H> 06-15 [id number] 8.9 %<H> 06-22 Chol 149 mg/dL LDL 67 mg/dL HDL 46 mg/dL Trig 178 mg/dL<H>      Skin: 2+ left hip and eye edema, no pressure injury     Estimated Needs:   [x] no change since previous assessment  [ ] recalculated:       Previous Nutrition Diagnosis:     [x] Increased Nutrient Needs          Nutrition Diagnosis is [x] ongoing  - being addressed with nepro and nutrition education.          New Nutrition Diagnosis: [x] not applicable     Interventions:     Recommend  1) Continue Consistent Carbohydrate, Renal, and increase Nepro to 2/day.   2) Encourage po intake with nutrient dense foods.   3) Provide food preferences as able.   4) Monitor weight, lab values, skin, po intake and GI tolerance.   5) Continue to provide nutrition education/reinforcement as able.      Monitoring and Evaluation:   follow up per protocol  RD to remain available for further nutritional interventions as indicated.   Laxmi Woodruff, MS, RD, CDN #[id number]
Called by Dr. Patel, radiology regarding MR results. Reported several findings including Dominant patent right vertebral artery, and hypoplastic poorly delineated   left vertebral artery this may reflect motion and hypoplasia, dissection not excluded. There is 4-mm anterior subluxation of C3 on C4, prevertebral soft tissue   swelling from C3 - C5 with disruption of the anterior longitudinal ligament, and high STIR signal concerning for fracture deformities at the   C3-4  endplates and facets. Call placed to Dr. Jones. No neurosurgical indication. C collar recommended and call placed to arrange. Will follow closely . Plan for Eventual Rehab

## 2018-06-25 NOTE — PROGRESS NOTE ADULT - PROBLEM SELECTOR PLAN 3
MRA - Not performed as patient is refusing MRI. I have discussed with patient and explained the reason for a repeat MRI and she is still refusing.. I also offered the possibility of doing a TCD and She continues to refuse
RISS.

## 2018-06-25 NOTE — PROGRESS NOTE ADULT - SUBJECTIVE AND OBJECTIVE BOX
Subjective: Patient seen and examined. No new events except as noted.   no cp or sob   feels weak   wants to go home   REVIEW OF SYSTEMS:    CONSTITUTIONAL: + weakness, fevers or chills  EYES/ENT: No visual changes;  No vertigo or throat pain   NECK: No pain or stiffness  RESPIRATORY: No cough, wheezing, hemoptysis; No shortness of breath  CARDIOVASCULAR: No chest pain or palpitations  GASTROINTESTINAL: No abdominal or epigastric pain. No nausea, vomiting, or hematemesis; No diarrhea or constipation. No melena or hematochezia.  GENITOURINARY: No dysuria, frequency or hematuria  NEUROLOGICAL: No numbness or weakness  SKIN: No itching, burning, rashes, or lesions   All other review of systems is negative unless indicated above.    MEDICATIONS:  MEDICATIONS  (STANDING):  acetaminophen  IVPB. 500 milliGRAM(s) IV Intermittent once  amLODIPine   Tablet 10 milliGRAM(s) Oral daily  aspirin enteric coated 81 milliGRAM(s) Oral daily  atorvastatin 20 milliGRAM(s) Oral at bedtime  BACItracin   Ointment 1 Application(s) Topical two times a day  calcium acetate 667 milliGRAM(s) Oral three times a day with meals  cloNIDine 0.3 milliGRAM(s) Oral every 8 hours  dextrose 5%. 1000 milliLiter(s) (50 mL/Hr) IV Continuous <Continuous>  dextrose 50% Injectable 12.5 Gram(s) IV Push once  dextrose 50% Injectable 25 Gram(s) IV Push once  dextrose 50% Injectable 25 Gram(s) IV Push once  docusate sodium 100 milliGRAM(s) Oral three times a day  gabapentin 100 milliGRAM(s) Oral three times a day  heparin  Injectable 5000 Unit(s) SubCutaneous every 12 hours  hydrALAZINE 100 milliGRAM(s) Oral every 8 hours  insulin glargine Injectable (LANTUS) 10 Unit(s) SubCutaneous at bedtime  insulin lispro (HumaLOG) corrective regimen sliding scale   SubCutaneous three times a day before meals  insulin lispro (HumaLOG) corrective regimen sliding scale   SubCutaneous at bedtime  insulin lispro Injectable (HumaLOG) 5 Unit(s) SubCutaneous three times a day before meals  labetalol 300 milliGRAM(s) Oral every 8 hours  lactobacillus acidophilus 1 Tablet(s) Oral two times a day with meals  levothyroxine 100 MICROGram(s) Oral daily  lidocaine   Patch 1 Patch Transdermal daily  sertraline 50 milliGRAM(s) Oral daily      PHYSICAL EXAM:  T(C): 36.7 (06-25-18 @ 08:30), Max: 36.9 (06-24-18 @ 20:00)  HR: 59 (06-25-18 @ 08:30) (55 - 61)  BP: 174/61 (06-25-18 @ 08:30) (174/61 - 189/65)  RR: 18 (06-25-18 @ 08:30) (18 - 18)  SpO2: 95% (06-25-18 @ 08:30) (94% - 100%)  Wt(kg): --  I&O's Summary    24 Jun 2018 07:01  -  25 Jun 2018 07:00  --------------------------------------------------------  IN: 240 mL / OUT: 0 mL / NET: 240 mL          Appearance: Normal	  HEENT:   Normal oral mucosa, left boni-orbital ecchymosis 	  Lymphatic: No lymphadenopathy , no edema  Cardiovascular: Normal S1 S2, No JVD, No murmurs , Peripheral pulses palpable 2+ bilaterally  Respiratory: Lungs clear to auscultation, normal effort 	  Gastrointestinal:  Soft, Non-tender, + BS	  Skin: No rashes, No ecchymoses, No cyanosis, warm to touch  Musculoskeletal: decreased range of motion and  strength  Psychiatry:  Mood & affect appropriate  Ext: No edema      LABS:    CARDIAC MARKERS:                                8.1    5.85  )-----------( 301      ( 25 Jun 2018 08:03 )             26.8     06-25    132<L>  |  86<L>  |  45<H>  ----------------------------<  131<H>  5.8<H>   |  28  |  3.54<H>    Ca    8.9      25 Jun 2018 06:39      proBNP:   Lipid Profile:   HgA1c:   TSH:             TELEMETRY: 	    ECG:  	  RADIOLOGY:   DIAGNOSTIC TESTING:  [ ] Echocardiogram:  [ ]  Catheterization:  [ ] Stress Test:    OTHER:

## 2018-06-25 NOTE — PROGRESS NOTE ADULT - PROBLEM SELECTOR PLAN 2
Continue Medical Work Up  Fall precautions  PT for gait on discharge
On HD.

## 2018-06-25 NOTE — PROGRESS NOTE ADULT - PROBLEM SELECTOR PROBLEM 1
Syncope
Cervical spine finding
Syncope

## 2018-07-02 ENCOUNTER — APPOINTMENT (OUTPATIENT)
Dept: ELECTROPHYSIOLOGY | Facility: CLINIC | Age: 73
End: 2018-07-02

## 2018-07-21 ENCOUNTER — INPATIENT (INPATIENT)
Facility: HOSPITAL | Age: 73
LOS: 6 days | Discharge: HOME | DRG: 919 | End: 2018-07-28
Attending: INTERNAL MEDICINE | Admitting: INTERNAL MEDICINE
Payer: MEDICARE

## 2018-07-21 VITALS
RESPIRATION RATE: 20 BRPM | DIASTOLIC BLOOD PRESSURE: 53 MMHG | SYSTOLIC BLOOD PRESSURE: 160 MMHG | HEART RATE: 59 BPM | OXYGEN SATURATION: 96 %

## 2018-07-21 DIAGNOSIS — R00.1 BRADYCARDIA, UNSPECIFIED: ICD-10-CM

## 2018-07-21 DIAGNOSIS — I10 ESSENTIAL (PRIMARY) HYPERTENSION: ICD-10-CM

## 2018-07-21 DIAGNOSIS — E11.65 TYPE 2 DIABETES MELLITUS WITH HYPERGLYCEMIA: ICD-10-CM

## 2018-07-21 DIAGNOSIS — I25.10 ATHEROSCLEROTIC HEART DISEASE OF NATIVE CORONARY ARTERY WITHOUT ANGINA PECTORIS: ICD-10-CM

## 2018-07-21 DIAGNOSIS — N18.6 END STAGE RENAL DISEASE: ICD-10-CM

## 2018-07-21 DIAGNOSIS — Z29.9 ENCOUNTER FOR PROPHYLACTIC MEASURES, UNSPECIFIED: ICD-10-CM

## 2018-07-21 DIAGNOSIS — S01.01XD LACERATION WITHOUT FOREIGN BODY OF SCALP, SUBSEQUENT ENCOUNTER: ICD-10-CM

## 2018-07-21 DIAGNOSIS — G89.29 OTHER CHRONIC PAIN: ICD-10-CM

## 2018-07-21 DIAGNOSIS — Z79.899 OTHER LONG TERM (CURRENT) DRUG THERAPY: ICD-10-CM

## 2018-07-21 LAB
ALBUMIN SERPL ELPH-MCNC: 3.7 G/DL — SIGNIFICANT CHANGE UP (ref 3.3–5)
ALP SERPL-CCNC: 188 U/L — HIGH (ref 40–120)
ALT FLD-CCNC: 12 U/L — SIGNIFICANT CHANGE UP (ref 10–45)
ANION GAP SERPL CALC-SCNC: 12 MMOL/L — SIGNIFICANT CHANGE UP (ref 5–17)
APTT BLD: 33.8 SEC — SIGNIFICANT CHANGE UP (ref 27.5–37.4)
AST SERPL-CCNC: 21 U/L — SIGNIFICANT CHANGE UP (ref 10–40)
BASE EXCESS BLDV CALC-SCNC: 5.9 MMOL/L — HIGH (ref -2–2)
BASOPHILS # BLD AUTO: 0 K/UL — SIGNIFICANT CHANGE UP (ref 0–0.2)
BASOPHILS NFR BLD AUTO: 1 % — SIGNIFICANT CHANGE UP (ref 0–2)
BILIRUB SERPL-MCNC: 0.4 MG/DL — SIGNIFICANT CHANGE UP (ref 0.2–1.2)
BLD GP AB SCN SERPL QL: NEGATIVE — SIGNIFICANT CHANGE UP
BUN SERPL-MCNC: 17 MG/DL — SIGNIFICANT CHANGE UP (ref 7–23)
CA-I SERPL-SCNC: 1.53 MMOL/L — HIGH (ref 1.12–1.3)
CALCIUM SERPL-MCNC: 9.5 MG/DL — SIGNIFICANT CHANGE UP (ref 8.4–10.5)
CHLORIDE BLDV-SCNC: 101 MMOL/L — SIGNIFICANT CHANGE UP (ref 96–108)
CHLORIDE SERPL-SCNC: 96 MMOL/L — SIGNIFICANT CHANGE UP (ref 96–108)
CO2 BLDV-SCNC: 32 MMOL/L — HIGH (ref 22–30)
CO2 SERPL-SCNC: 31 MMOL/L — SIGNIFICANT CHANGE UP (ref 22–31)
CREAT SERPL-MCNC: 1.62 MG/DL — HIGH (ref 0.5–1.3)
EOSINOPHIL # BLD AUTO: 0.2 K/UL — SIGNIFICANT CHANGE UP (ref 0–0.5)
EOSINOPHIL NFR BLD AUTO: 5 % — SIGNIFICANT CHANGE UP (ref 0–6)
GAS PNL BLDV: 134 MMOL/L — LOW (ref 136–145)
GAS PNL BLDV: SIGNIFICANT CHANGE UP
GAS PNL BLDV: SIGNIFICANT CHANGE UP
GLUCOSE BLDV-MCNC: 154 MG/DL — HIGH (ref 70–99)
GLUCOSE SERPL-MCNC: 147 MG/DL — HIGH (ref 70–99)
HCO3 BLDV-SCNC: 31 MMOL/L — HIGH (ref 21–29)
HCT VFR BLD CALC: 28.7 % — LOW (ref 34.5–45)
HCT VFR BLDA CALC: 23 % — LOW (ref 39–50)
HGB BLD CALC-MCNC: 7.3 G/DL — LOW (ref 11.5–15.5)
HGB BLD-MCNC: 9.3 G/DL — LOW (ref 11.5–15.5)
INR BLD: 1.04 RATIO — SIGNIFICANT CHANGE UP (ref 0.88–1.16)
LACTATE BLDV-MCNC: 2.3 MMOL/L — HIGH (ref 0.7–2)
LYMPHOCYTES # BLD AUTO: 0.3 K/UL — LOW (ref 1–3.3)
LYMPHOCYTES # BLD AUTO: 9.2 % — LOW (ref 13–44)
MAGNESIUM SERPL-MCNC: 2 MG/DL — SIGNIFICANT CHANGE UP (ref 1.6–2.6)
MCHC RBC-ENTMCNC: 31.9 PG — SIGNIFICANT CHANGE UP (ref 27–34)
MCHC RBC-ENTMCNC: 32.5 GM/DL — SIGNIFICANT CHANGE UP (ref 32–36)
MCV RBC AUTO: 98 FL — SIGNIFICANT CHANGE UP (ref 80–100)
MONOCYTES # BLD AUTO: 0.2 K/UL — SIGNIFICANT CHANGE UP (ref 0–0.9)
MONOCYTES NFR BLD AUTO: 4.1 % — SIGNIFICANT CHANGE UP (ref 2–14)
NEUTROPHILS # BLD AUTO: 3 K/UL — SIGNIFICANT CHANGE UP (ref 1.8–7.4)
NEUTROPHILS NFR BLD AUTO: 80.7 % — HIGH (ref 43–77)
PCO2 BLDV: 50 MMHG — SIGNIFICANT CHANGE UP (ref 35–50)
PH BLDV: 7.41 — SIGNIFICANT CHANGE UP (ref 7.35–7.45)
PHOSPHATE SERPL-MCNC: 2 MG/DL — LOW (ref 2.5–4.5)
PLATELET # BLD AUTO: 189 K/UL — SIGNIFICANT CHANGE UP (ref 150–400)
PO2 BLDV: 39 MMHG — SIGNIFICANT CHANGE UP (ref 25–45)
POTASSIUM BLDV-SCNC: 4.1 MMOL/L — SIGNIFICANT CHANGE UP (ref 3.5–5.3)
POTASSIUM SERPL-MCNC: 3.5 MMOL/L — SIGNIFICANT CHANGE UP (ref 3.5–5.3)
POTASSIUM SERPL-SCNC: 3.5 MMOL/L — SIGNIFICANT CHANGE UP (ref 3.5–5.3)
PROT SERPL-MCNC: 6.9 G/DL — SIGNIFICANT CHANGE UP (ref 6–8.3)
PROTHROM AB SERPL-ACNC: 11.2 SEC — SIGNIFICANT CHANGE UP (ref 9.8–12.7)
RBC # BLD: 2.93 M/UL — LOW (ref 3.8–5.2)
RBC # FLD: 18.4 % — HIGH (ref 10.3–14.5)
RH IG SCN BLD-IMP: POSITIVE — SIGNIFICANT CHANGE UP
SAO2 % BLDV: 71 % — SIGNIFICANT CHANGE UP (ref 67–88)
SODIUM SERPL-SCNC: 139 MMOL/L — SIGNIFICANT CHANGE UP (ref 135–145)
WBC # BLD: 3.8 K/UL — SIGNIFICANT CHANGE UP (ref 3.8–10.5)
WBC # FLD AUTO: 3.8 K/UL — SIGNIFICANT CHANGE UP (ref 3.8–10.5)

## 2018-07-21 PROCEDURE — 72125 CT NECK SPINE W/O DYE: CPT | Mod: 26

## 2018-07-21 PROCEDURE — 71045 X-RAY EXAM CHEST 1 VIEW: CPT | Mod: 26

## 2018-07-21 PROCEDURE — 72170 X-RAY EXAM OF PELVIS: CPT | Mod: 26,59

## 2018-07-21 PROCEDURE — 73502 X-RAY EXAM HIP UNI 2-3 VIEWS: CPT | Mod: 26,LT

## 2018-07-21 PROCEDURE — 99291 CRITICAL CARE FIRST HOUR: CPT | Mod: GC

## 2018-07-21 PROCEDURE — 70450 CT HEAD/BRAIN W/O DYE: CPT | Mod: 26

## 2018-07-21 PROCEDURE — 99223 1ST HOSP IP/OBS HIGH 75: CPT

## 2018-07-21 RX ORDER — DEXTROSE 50 % IN WATER 50 %
12.5 SYRINGE (ML) INTRAVENOUS ONCE
Qty: 0 | Refills: 0 | Status: DISCONTINUED | OUTPATIENT
Start: 2018-07-21 | End: 2018-07-28

## 2018-07-21 RX ORDER — DEXTROSE 50 % IN WATER 50 %
25 SYRINGE (ML) INTRAVENOUS ONCE
Qty: 0 | Refills: 0 | Status: DISCONTINUED | OUTPATIENT
Start: 2018-07-21 | End: 2018-07-28

## 2018-07-21 RX ORDER — ATORVASTATIN CALCIUM 80 MG/1
20 TABLET, FILM COATED ORAL AT BEDTIME
Qty: 0 | Refills: 0 | Status: DISCONTINUED | OUTPATIENT
Start: 2018-07-21 | End: 2018-07-28

## 2018-07-21 RX ORDER — SODIUM CHLORIDE 9 MG/ML
1000 INJECTION, SOLUTION INTRAVENOUS
Qty: 0 | Refills: 0 | Status: DISCONTINUED | OUTPATIENT
Start: 2018-07-21 | End: 2018-07-28

## 2018-07-21 RX ORDER — CALCIUM CHLORIDE
1000 POWDER (GRAM) MISCELLANEOUS ONCE
Qty: 0 | Refills: 0 | Status: COMPLETED | OUTPATIENT
Start: 2018-07-21 | End: 2018-07-21

## 2018-07-21 RX ORDER — DOCUSATE SODIUM 100 MG
100 CAPSULE ORAL
Qty: 0 | Refills: 0 | Status: DISCONTINUED | OUTPATIENT
Start: 2018-07-21 | End: 2018-07-28

## 2018-07-21 RX ORDER — SERTRALINE 25 MG/1
50 TABLET, FILM COATED ORAL DAILY
Qty: 0 | Refills: 0 | Status: DISCONTINUED | OUTPATIENT
Start: 2018-07-21 | End: 2018-07-28

## 2018-07-21 RX ORDER — LIDOCAINE 4 G/100G
1 CREAM TOPICAL DAILY
Qty: 0 | Refills: 0 | Status: DISCONTINUED | OUTPATIENT
Start: 2018-07-21 | End: 2018-07-28

## 2018-07-21 RX ORDER — ACETAMINOPHEN 500 MG
1000 TABLET ORAL ONCE
Qty: 0 | Refills: 0 | Status: COMPLETED | OUTPATIENT
Start: 2018-07-21 | End: 2018-07-21

## 2018-07-21 RX ORDER — MORPHINE SULFATE 50 MG/1
2 CAPSULE, EXTENDED RELEASE ORAL ONCE
Qty: 0 | Refills: 0 | Status: DISCONTINUED | OUTPATIENT
Start: 2018-07-21 | End: 2018-07-21

## 2018-07-21 RX ORDER — HYDROMORPHONE HYDROCHLORIDE 2 MG/ML
0.5 INJECTION INTRAMUSCULAR; INTRAVENOUS; SUBCUTANEOUS EVERY 6 HOURS
Qty: 0 | Refills: 0 | Status: DISCONTINUED | OUTPATIENT
Start: 2018-07-21 | End: 2018-07-28

## 2018-07-21 RX ORDER — INSULIN GLARGINE 100 [IU]/ML
10 INJECTION, SOLUTION SUBCUTANEOUS AT BEDTIME
Qty: 0 | Refills: 0 | Status: DISCONTINUED | OUTPATIENT
Start: 2018-07-21 | End: 2018-07-24

## 2018-07-21 RX ORDER — INSULIN LISPRO 100/ML
VIAL (ML) SUBCUTANEOUS AT BEDTIME
Qty: 0 | Refills: 0 | Status: DISCONTINUED | OUTPATIENT
Start: 2018-07-21 | End: 2018-07-28

## 2018-07-21 RX ORDER — INSULIN LISPRO 100/ML
5 VIAL (ML) SUBCUTANEOUS
Qty: 0 | Refills: 0 | Status: DISCONTINUED | OUTPATIENT
Start: 2018-07-21 | End: 2018-07-24

## 2018-07-21 RX ORDER — HYDRALAZINE HCL 50 MG
100 TABLET ORAL EVERY 8 HOURS
Qty: 0 | Refills: 0 | Status: DISCONTINUED | OUTPATIENT
Start: 2018-07-21 | End: 2018-07-28

## 2018-07-21 RX ORDER — LEVOTHYROXINE SODIUM 125 MCG
100 TABLET ORAL DAILY
Qty: 0 | Refills: 0 | Status: DISCONTINUED | OUTPATIENT
Start: 2018-07-21 | End: 2018-07-28

## 2018-07-21 RX ORDER — INSULIN LISPRO 100/ML
VIAL (ML) SUBCUTANEOUS
Qty: 0 | Refills: 0 | Status: DISCONTINUED | OUTPATIENT
Start: 2018-07-21 | End: 2018-07-28

## 2018-07-21 RX ORDER — GABAPENTIN 400 MG/1
100 CAPSULE ORAL THREE TIMES A DAY
Qty: 0 | Refills: 0 | Status: DISCONTINUED | OUTPATIENT
Start: 2018-07-21 | End: 2018-07-28

## 2018-07-21 RX ORDER — GLUCAGON INJECTION, SOLUTION 0.5 MG/.1ML
1 INJECTION, SOLUTION SUBCUTANEOUS ONCE
Qty: 0 | Refills: 0 | Status: DISCONTINUED | OUTPATIENT
Start: 2018-07-21 | End: 2018-07-28

## 2018-07-21 RX ORDER — ATROPINE SULFATE 0.1 MG/ML
1 SYRINGE (ML) INJECTION ONCE
Qty: 0 | Refills: 0 | Status: COMPLETED | OUTPATIENT
Start: 2018-07-21 | End: 2018-07-21

## 2018-07-21 RX ORDER — LACTOBACILLUS ACIDOPHILUS 100MM CELL
1 CAPSULE ORAL
Qty: 0 | Refills: 0 | Status: DISCONTINUED | OUTPATIENT
Start: 2018-07-21 | End: 2018-07-28

## 2018-07-21 RX ORDER — ACETAMINOPHEN 500 MG
650 TABLET ORAL EVERY 6 HOURS
Qty: 0 | Refills: 0 | Status: DISCONTINUED | OUTPATIENT
Start: 2018-07-21 | End: 2018-07-28

## 2018-07-21 RX ORDER — ASPIRIN/CALCIUM CARB/MAGNESIUM 324 MG
81 TABLET ORAL DAILY
Qty: 0 | Refills: 0 | Status: DISCONTINUED | OUTPATIENT
Start: 2018-07-22 | End: 2018-07-22

## 2018-07-21 RX ORDER — DEXTROSE 50 % IN WATER 50 %
15 SYRINGE (ML) INTRAVENOUS ONCE
Qty: 0 | Refills: 0 | Status: DISCONTINUED | OUTPATIENT
Start: 2018-07-21 | End: 2018-07-28

## 2018-07-21 RX ORDER — AMLODIPINE BESYLATE 2.5 MG/1
10 TABLET ORAL DAILY
Qty: 0 | Refills: 0 | Status: DISCONTINUED | OUTPATIENT
Start: 2018-07-21 | End: 2018-07-28

## 2018-07-21 RX ADMIN — Medication 1 MILLIGRAM(S): at 21:14

## 2018-07-21 RX ADMIN — Medication 1000 MILLIGRAM(S): at 21:10

## 2018-07-21 RX ADMIN — ONDANSETRON 4 MILLIGRAM(S): 8 TABLET, FILM COATED ORAL at 23:45

## 2018-07-21 RX ADMIN — MORPHINE SULFATE 2 MILLIGRAM(S): 50 CAPSULE, EXTENDED RELEASE ORAL at 23:28

## 2018-07-21 RX ADMIN — Medication 1000 MILLIGRAM(S): at 21:30

## 2018-07-21 RX ADMIN — Medication 400 MILLIGRAM(S): at 20:27

## 2018-07-21 NOTE — ED PROVIDER NOTE - CRITICAL CARE PROVIDED
additional history taking/consultation with other physicians/direct patient care (not related to procedure)/documentation/consult w/ pt's family directly relating to pts condition/interpretation of diagnostic studies

## 2018-07-21 NOTE — H&P ADULT - PROBLEM SELECTOR PLAN 5
-pt unsure of meds  -hold reina blockers  -clarify meds in am  -can resume hydralazine, clonidine, labetalol, norvasc  -cards f/u -c/w prior basal/bolus dosing from last admission in June, clarify home meds

## 2018-07-21 NOTE — ED PROVIDER NOTE - PROGRESS NOTE DETAILS
Attending Samia Hardin: pt became more bradycardic complaining of pain to legs and head. per son this pain is worse then her baseline. pt bradycardic to low 30's. ekg shows type 1 AV block. pt vomiting and complaining of dizziness. pacer pad applied. no blood work back as of yet. narrow complex bradycardia, did have dialysis however with concern for bradycardia given calcium chloride. no sig improvement and atropine given. cards fellow called and ct head expedited as pt did have a closed head injuyr. reviewed medication list. pt is on labetolol. pt to CT head. cards fellow called Attending Samia Hardin: d/w dr annmarie golden who saw pt last time will see. HR stable in 60's. jana dougherty

## 2018-07-21 NOTE — ED PROVIDER NOTE - PHYSICAL EXAMINATION
GENERAL: GAUZE PLACED ON HEAD IS BLOODY, BRACE AROUND NECK BLOODY; Well appearing, well nourished, awake, alert and in MILD DISTRESS   ENMT: Airway patent, Nasal mucosa clear. Mouth with MMM. Throat has no vesicles, no oropharyngeal exudates and uvula is midline.  neck: TTP at midline C-spine with no stepoffs, soft C-collar in place   EYES: conjunctiva clear, sclera anicteric, PERRL, ecchymosis around bilat eyes appear old   CARDIAC: Regular rate, regular rhythm.  Heart sounds S1, S2, no S3, S4. No murmurs, rubs or gallops.  RESPIRATORY: Breath sounds clear and equal in bilateral anterior lung fields, no wheezes/ronchi/stridor; pt breathing and speaking comfortably with no increased WOB, no accessory mm. use, no intercostal retractions, no nasal flaring  GI: no ecchymosis, erythema, or lacerations, abdomen soft, non-distended, non-tender, no rebound or guarding.   extremities: TTP of bilat hips

## 2018-07-21 NOTE — H&P ADULT - ASSESSMENT
72 F PMH type 2 DM, HTN, nonobstructive CAD, ESRD on HD TTS, syncope s/p ILR, frequent falls c/b prior nasal fracture and cervical spine fracture, Afib no a/c 2/2 recent fall with scalp laceration/hematoma, now p/w repeat fall at HD center with resultant dehiscence of scalp wound c/b transient symptomatic bradycardia.

## 2018-07-21 NOTE — H&P ADULT - PMH
Anemia    Anxiety    Cataracts, bilateral  left worse than right  CHF (congestive heart failure)    CHF (congestive heart failure)  diagnosed 2-2014  CKD (chronic kidney disease)    Diabetes  DM 2  Diabetes  diagnosed 2004  no medications in 5 months since hemodialysis  ESRD (end stage renal disease)    Hip pain  left  [place] (hard of hearing)    HTN (hypertension)    HTN (hypertension)    Hyperlipidemia    Hypothyroid    Hypothyroidism    LBP radiating to left leg    MI (myocardial infarction)  may 2016  MI (myocardial infarction)  2-2014   cardiac cath done [facility]  Pleural effusion  Left side - several times since March 2014  Pleural thickening  s/p pleurodisis left vats 2014

## 2018-07-21 NOTE — CHART NOTE - NSCHARTNOTEFT_GEN_A_CORE
Pt is a 71 yo woman w/ hx of ESRD on HD, s/p ILR at [facility],  Afib (not on AC due to recent scalp hematoma and fall), HTN, non-obstructive CAD who presented after hitting her head while receiving HD.  Pt had a recent fall a few weeks ago, and sustained a mechanical fall w/ scalp hematoma.  Had stiches placed at [facility] and was recently discharged.  While at the HD center today, had a fall, hit the back of her head and had subsequent bleeding w/ dishincense of scalp sutures.  She was transferred to our ED for further evaluation.  She was in extreme pain and found to have bradycardia to the 30s (sinus chelsea w/ episodes of wenckeback and transient junction V escape).  This resolved after pain meds and IV atropine.      on exam, VSS, bleeding noted in the back of the head, re-sutured.  Does have a II/VI systolic ejection murmur c/w mild AS.  No other new physical exam findings; ECG w/ sinus rhythm, HR 70s      A:  Transient bradyarrhythmias (sinus chelsea, wenckebach, junctional V escape) likely due to high vagal tone in the setting of pain and re-bleed from scalp hematoma    RECS  -  cont current meds  - treat w/ pain control  - CTH neg  - obtain TTE as she hasn't had one in 1 year      Pt seen by Dr. Tomas on last admission.  Consult his service in the morning     A cristian SALAMANCA   [facility] [id number]

## 2018-07-21 NOTE — H&P ADULT - PROBLEM SELECTOR PROBLEM 4
Type 2 diabetes mellitus with hyperglycemia, with long-term current use of insulin ESRD on hemodialysis

## 2018-07-21 NOTE — ED PROVIDER NOTE - PMH
Anemia    Anxiety    Cataracts, bilateral  left worse than right  CHF (congestive heart failure)    CHF (congestive heart failure)  diagnosed 2-2014  CKD (chronic kidney disease)    Diabetes  DM 2  Diabetes  diagnosed 2004  no medications in 5 months since hemodialysis  ESRD (end stage renal disease)    Hip pain  left  [place] (hard of hearing)    HTN (hypertension)    HTN (hypertension)    Hyperlipidemia    Hypothyroid    Hypothyroidism    LBP radiating to left leg    MI (myocardial infarction)  may 2016  MI (myocardial infarction)  2-2014   cardiac cath done [facility]  Pleural effusion  Left side - several times since March 2014  Pleural thickening  s/p pleurodisis left vats 2014

## 2018-07-21 NOTE — H&P ADULT - NSHPREVIEWOFSYSTEMS_GEN_ALL_CORE
REVIEW OF SYSTEMS:  CONSTITUTIONAL: +weakness. No fevers. No chills. No weight loss. Good appetite.  EYES: No visual changes. No eye pain.  ENT: No hearing difficulty. No vertigo. No dysphagia. No Sinusitis/rhinorrhea.  NECK: +pain. No stiffness/rigidity.  CARDIAC: No chest pain. No palpitations.  RESPIRATORY: No cough. No SOB. No hemoptysis.  GASTROINTESTINAL: No abdominal pain. No nausea. No vomiting. No hematemesis. No diarrhea. No constipation. No melena. No hematochezia.  GENITOURINARY: No dysuria. No frequency. No hesitancy. No hematuria.  NEUROLOGICAL: No numbness. No focal weakness. No incontinence. No headache.  BACK: +back pain.  EXTREMITIES: No lower extremity edema. dec ROM.  SKIN: No rashes. No itching. No other lesions.  PSYCHIATRIC: No depression. No anxiety. No SI/HI.  ALLERGIC: No lip swelling. No hives.  All other review of systems is negative unless indicated above. REVIEW OF SYSTEMS:  CONSTITUTIONAL: +weakness. No fevers. No chills. No weight loss. Good appetite.  EYES: No visual changes. No eye pain.  ENT: No hearing difficulty. No vertigo. No dysphagia. No Sinusitis/rhinorrhea.  NECK: +pain. No stiffness/rigidity.  CARDIAC: No chest pain. No palpitations.  RESPIRATORY: No cough. No SOB. No hemoptysis.  GASTROINTESTINAL: Int c/o abdominal pain. No nausea. No vomiting. No hematemesis. No diarrhea. No constipation. No melena. No hematochezia.  GENITOURINARY: No dysuria. No frequency. No hesitancy. No hematuria.  NEUROLOGICAL: No numbness. No focal weakness. No incontinence. No headache.  BACK: +back pain.  EXTREMITIES: No lower extremity edema. dec ROM.  SKIN: No rashes. No itching. No other lesions.  PSYCHIATRIC: No depression. No anxiety. No SI/HI.  ALLERGIC: No lip swelling. No hives.  All other review of systems is negative unless indicated above.

## 2018-07-21 NOTE — H&P ADULT - NSHPLABSRESULTS_GEN_ALL_CORE
Labs personally reviewed : no leukocytosis, chronic stable anemia, rest of cbc unrevealing, coags wnl, cmp c/w ESRD, elevated alkp 188, mild hyperglycemia, HSTnT 352. cpk 45 lactate 2.3.    Imaging personally reviewed CTH no acute infarct/bleed, +scalp hematomas L periorbital/R parietal. CT c spine no significant change from prior noted similar C3-C4 anterolisthesis with canal narrowing.  XR trauma imaging of pelvis/femur pending.   EKG personally reviewed: admission NSR 90s, then several with sinus chelsea to 30s/wenchebach/junctional v escape, then resolution to sinus 70s.

## 2018-07-21 NOTE — H&P ADULT - NSHPPHYSICALEXAM_GEN_ALL_CORE
PHYSICAL EXAM:  GENERAL: distress 2/2 pain, chronically ill appearing  HEAD:  Large scalp hematoma with dried external blood, sutures in place. TTP.   EYES: EOMI, PERRLA, conjunctiva and sclera clear  ENMT: No tonsillar erythema, exudates, or enlargement; Moist mucous membranes, Good dentition, No lesions  NECK: Supple, No JVD, Normal thyroid  CHEST/LUNG: Clear to percussion bilaterally dec bs at bases; No rales, rhonchi, wheezing, or rubs no resp distress or accessory musc usage.   HEART: Regular rate and rhythm;  +NANI c/w AS. ; rate on my exam is 60 bpm.   ABDOMEN: Soft, Nontender, Nondistended; Bowel sounds present no rebound/guarding.   EXTREMITIES:  2+ Peripheral Pulses, No clubbing, cyanosis. +LUE AVF with thrill  LYMPH: No lymphadenopathy noted  SKIN: No rashes or lesions, see head exam for +scalp findings  NERVOUS SYSTEM:  Alert & Oriented X3, fair concentration; Motor Strength 5/5 B/L upper and lower extremities

## 2018-07-21 NOTE — CONSULT NOTE ADULT - SUBJECTIVE AND OBJECTIVE BOX
CHIEF COMPLAINT:    HISTORY OF PRESENT ILLNESS:    PAST MEDICAL & SURGICAL HISTORY:  [place]  Cataracts, bilateral: left worse than right  Pleural thickening: s/p pleurodisis left vats 2014  Hip pain: left  LBP radiating to left leg  CHF (congestive heart failure): diagnosed 2-2014  MI (myocardial infarction): 2-2014   cardiac cath done [facility]  Diabetes: diagnosed 2004  no medications in 5 months since hemodialysis  Hypothyroidism  ESRD (end stage renal disease)  HTN (hypertension)  MI (myocardial infarction): may 2016  Pleural effusion: Left side - several times since March 2014  Hypothyroid  Anxiety  HTN (hypertension)  Hyperlipidemia  CHF (congestive heart failure)  CKD (chronic kidney disease)  Anemia  Diabetes: DM 2  Thoracotomy scar of left chest: 4-2014 ? pneumonia/ scarring  S/P myomectomy: abdominal age 52  AV fistula: left upper 8-2014 attempted 12-16-14          MEDICATIONS:        morphine  - Injectable 2 milliGRAM(s) IV Push Once            FAMILY HISTORY:  No significant family history      SOCIAL HISTORY:    [ ] Non-smoker  [ ] Smoker  [ ] Alcohol    Allergies    Avelox (Unknown)  fish (Hives; Rash)  shellfish (Unknown)    Intolerances    	    REVIEW OF SYSTEMS:  CONSTITUTIONAL: No fever, weight loss, or fatigue  EYES: No eye pain, visual disturbances, or discharge  ENMT:  No difficulty hearing, tinnitus, vertigo; No sinus or throat pain  NECK: No pain or stiffness  RESPIRATORY: No cough, wheezing, chills or hemoptysis; No Shortness of Breath  CARDIOVASCULAR: No chest pain, palpitations, passing out, dizziness, or leg swelling  GASTROINTESTINAL: No abdominal or epigastric pain. No nausea, vomiting, or hematemesis; No diarrhea or constipation. No melena or hematochezia.  GENITOURINARY: No dysuria, frequency, hematuria, or incontinence  NEUROLOGICAL: No headaches, memory loss, loss of strength, numbness, or tremors  SKIN: No itching, burning, rashes, or lesions   LYMPH Nodes: No enlarged glands  ENDOCRINE: No heat or cold intolerance; No hair loss  MUSCULOSKELETAL: No joint pain or swelling; No muscle, back, or extremity pain  PSYCHIATRIC: No depression, anxiety, mood swings, or difficulty sleeping  HEME/LYMPH: No easy bruising, or bleeding gums  ALLERY AND IMMUNOLOGIC: No hives or eczema	    [ ] All others negative	  [ ] Unable to obtain    PHYSICAL EXAM:  T(C): 36.1 (07-21-18 @ 21:45), Max: 36.1 (07-21-18 @ 21:45)  HR: 69 (07-21-18 @ 21:45) (38 - 73)  BP: 106/48 (07-21-18 @ 21:45) (106/48 - 190/67)  RR: 19 (07-21-18 @ 21:45) (15 - 20)  SpO2: 100% (07-21-18 @ 21:45) (96% - 100%)  Wt(kg): --  I&O's Summary      Appearance: Normal	  HEENT:   Normal oral mucosa, PERRL, EOMI	  Lymphatic: No lymphadenopathy  Cardiovascular: Normal S1 S2, No JVD, No murmurs, No edema  Respiratory: Lungs clear to auscultation	  Psychiatry: A & O x 3, Mood & affect appropriate  Gastrointestinal:  Soft, Non-tender, + BS	  Skin: No rashes, No ecchymoses, No cyanosis	  Neurologic: Non-focal  Extremities: Normal range of motion, No clubbing, cyanosis or edema  Vascular: Peripheral pulses palpable 2+ bilaterally    TELEMETRY: 	    ECG:  	  RADIOLOGY:  OTHER: 	  	  LABS:	 	    CARDIAC MARKERS:                                  9.3    3.8   )-----------( 189      ( 21 Jul 2018 20:31 )             28.7     07-21    139  |  96  |  17  ----------------------------<  147<H>  3.5   |  31  |  1.62<H>    Ca    9.5      21 Jul 2018 20:31  Phos  2.0     07-21  Mg     2.0     07-21    TPro  6.9  /  Alb  3.7  /  TBili  0.4  /  DBili  x   /  AST  21  /  ALT  12  /  AlkPhos  188<H>  07-21    proBNP:   Lipid Profile:   HgA1c:   TSH: CHIEF COMPLAINT: Bradycardia     HISTORY OF PRESENT ILLNESS:  72 F known to me from recent prolonged hospitalization for syncope s/p ILR,  now p/w repeat fall at HD center with resultant dehiscence of scalp wound.  Pt repeatedly complaining of whole body pain on interview.  Pt had initial fall few weeks ago where she had scalp wound/hematoma, seen at [facility] for sutures. Today she was being moved at HD and hit her head and opened her existing wound. Brought in to our ER, where she was sutured at bedside in ER. Incidentally found to be bradycardic down to 30s with lightheadedness/dizziness, unclear if vagal response to pain but pt was persistently chelsea and symptomatic while in ER. Given empiric calcium chloride, and placed on pacer pads and given atropine, with improvement in HR.  Pt currently c/o of scalp pain, neck pain, back pain, leg pain. Denies cp/sob/f/c, denies n/v/d/dysuria/cough/uri sx.  Had 1.5 L out at HD today.     PAST MEDICAL & SURGICAL HISTORY:  [place] (hard of hearing)  Cataracts, bilateral: left worse than right  Pleural thickening: s/p pleurodisis left vats 2014  Hip pain: left  LBP radiating to left leg  CHF (congestive heart failure): diagnosed 2-2014  MI (myocardial infarction): 2-2014   cardiac cath done [facility]  Diabetes: diagnosed 2004  no medications in 5 months since hemodialysis  Hypothyroidism  ESRD (end stage renal disease)  HTN (hypertension)  MI (myocardial infarction): may 2016  Pleural effusion: Left side - several times since March 2014  Hypothyroid  Anxiety  HTN (hypertension)  Hyperlipidemia  CHF (congestive heart failure)  CKD (chronic kidney disease)  Anemia  Diabetes: DM 2  Thoracotomy scar of left chest: 4-2014 ? pneumonia/ scarring  S/P myomectomy: abdominal age 52  AV fistula: left upper 8-2014 attempted 12-16-14          MEDICATIONS:        morphine  - Injectable 2 milliGRAM(s) IV Push Once            FAMILY HISTORY:  No significant family history      SOCIAL HISTORY:    [ ] Non-smoker  [ ] Smoker  [ ] Alcohol    Allergies    Avelox (Unknown)  fish (Hives; Rash)  shellfish (Unknown)    Intolerances    	    REVIEW OF SYSTEMS:  CONSTITUTIONAL: No fever, weight loss, + fatigue  EYES: No eye pain, visual disturbances, or discharge  ENMT:  No difficulty hearing, tinnitus, vertigo; No sinus or throat pain  NECK: No pain or stiffness  RESPIRATORY: No cough, wheezing, chills or hemoptysis; No Shortness of Breath  CARDIOVASCULAR: No chest pain, palpitations, passing out, dizziness, or leg swelling  GASTROINTESTINAL: No abdominal or epigastric pain. No nausea, vomiting, or hematemesis; No diarrhea or constipation. No melena or hematochezia.  GENITOURINARY: No dysuria, frequency, hematuria, or incontinence  NEUROLOGICAL: No headaches, memory loss, loss of strength, numbness, or tremors  SKIN: No itching, burning, rashes, or lesions   LYMPH Nodes: No enlarged glands  ENDOCRINE: No heat or cold intolerance; No hair loss  MUSCULOSKELETAL: + joint pain or swelling; No muscle, back, or extremity pain  PSYCHIATRIC: No depression, anxiety, mood swings, or difficulty sleeping  HEME/LYMPH: No easy bruising, or bleeding gums  ALLERY AND IMMUNOLOGIC: No hives or eczema	    [ ] All others negative	  [ ] Unable to obtain    PHYSICAL EXAM:  T(C): 36.1 (07-21-18 @ 21:45), Max: 36.1 (07-21-18 @ 21:45)  HR: 69 (07-21-18 @ 21:45) (38 - 73)  BP: 106/48 (07-21-18 @ 21:45) (106/48 - 190/67)  RR: 19 (07-21-18 @ 21:45) (15 - 20)  SpO2: 100% (07-21-18 @ 21:45) (96% - 100%)  Wt(kg): --  I&O's Summary      Appearance: Normal	  HEENT:   Normal oral mucosa, PERRL, EOMI	  Lymphatic: No lymphadenopathy  Cardiovascular: Normal S1 S2, No JVD, No murmurs, No edema  Respiratory: Lungs clear to auscultation	  Psychiatry: A & O x 3, Mood & affect appropriate  Gastrointestinal:  Soft, Non-tender, + BS	  Skin: No rashes, No ecchymoses, No cyanosis	  Neurologic: Non-focal  Extremities: Normal range of motion, No clubbing, cyanosis or edema  Vascular: Peripheral pulses palpable 2+ bilaterally    TELEMETRY: 	    ECG:  	Sinus bradycardia @ 58, LVH  RADIOLOGY:  < from: Xray Chest 1 View- PORTABLE-Urgent (07.21.18 @ 23:37) >    EXAM:  XR CHEST PORTABLE URGENT 1V                            PROCEDURE DATE:  07/21/2018            INTERPRETATION:  CLINICAL INDICATION: Bradycardia, possible trauma.    EXAM: Frontal view of the chest with comparison made to chest radiograph   on 6/14/2018    FINDINGS:   Loop recorder overlies the heart. Vascular stent overlies the left axilla.  The lungs are clear. There is no pleural effusion or pneumothorax.  The heart size is normal.   No acute bony pathology.    IMPRESSION:   Clear lungs.                MICHELLE LAMBERT M.D., RADIOLOGY RESIDENT  This document has been electronically signed.  MONIKA ESPANA M.D., ATTENDING RADIOLOGIST  This document has been electronically signed. Jul 22 2018  7:39AM                < end of copied text >    OTHER: 	  	  LABS:	 	    CARDIAC MARKERS:                                  9.3    3.8   )-----------( 189      ( 21 Jul 2018 20:31 )             28.7     07-21    139  |  96  |  17  ----------------------------<  147<H>  3.5   |  31  |  1.62<H>    Ca    9.5      21 Jul 2018 20:31  Phos  2.0     07-21  Mg     2.0     07-21    TPro  6.9  /  Alb  3.7  /  TBili  0.4  /  DBili  x   /  AST  21  /  ALT  12  /  AlkPhos  188<H>  07-21    proBNP:   Lipid Profile:   HgA1c:   TSH:

## 2018-07-21 NOTE — H&P ADULT - PROBLEM SELECTOR PROBLEM 7
Medication management Coronary artery disease without angina pectoris, unspecified vessel or lesion type, unspecified whether native or transplanted heart

## 2018-07-21 NOTE — H&P ADULT - PROBLEM SELECTOR PROBLEM 6
Coronary artery disease without angina pectoris, unspecified vessel or lesion type, unspecified whether native or transplanted heart Essential hypertension

## 2018-07-21 NOTE — ED PROVIDER NOTE - MEDICAL DECISION MAKING DETAILS
71 yo F c PMH of CHF, MI (s/p cath), HTN, HLD, ESRD (T/Th/Sat) last HD today (was completed) BIBEMS for bleeding from a prior head laceration that occurred after pt's HD today while she was being transferred from a bed to a stretcher. Bleeding is from laceration from prior fall. On exam, VS wnl, pt bleeding from posterior R scalp, midline spinal TTP, old bruises around eyes, bilat hip TTP. CTH and neck pending, Xray bilat hips and pelvis pending, labs pending, tylenol and morphine for pain, will reassess. 73 yo F c PMH of CHF, MI (s/p cath), HTN, HLD, ESRD (T/Th/Sat) last HD today (was completed) BIBEMS for bleeding from a prior head laceration that occurred after pt's HD today while she was being transferred from a bed to a stretcher. Bleeding is from laceration from prior fall. On exam, VS wnl, pt bleeding from posterior R scalp, midline spinal TTP, old bruises around eyes, bilat hip TTP. CTH and neck pending, Xray bilat hips and pelvis pending, labs pending, tylenol and morphine for pain, will reassess.  Attending Samia Hardin: 73 y/o female with multiple medical issues bibems from dialysis with bleeding to posterior skull. pt with recent admission for syncopal evaluation which was nonfocal. per report pt bumped her head when transferring out of dialysis chair, did complete full course, and began bleeding. history of anemia in the past. upon arrival pt awake and moving all extremities. while in the ed pt became nauseaus and starting vomiting. at same time became very bradycardic. unclear which was the causitive event. pt given atropine with improvement in heart rate and resolution of vomiting. cardiology consulted. head ct showed no evidence of traumatic injury. pt will need admission, tele and close monitroing. will continue to trend h/h although hemostasis achieved.

## 2018-07-21 NOTE — H&P ADULT - PROBLEM SELECTOR PLAN 3
-completed HD today 1.5 L out  -defer to day attending to call renal consult for maintenance HD next due Tuesday  -c/w calcium acetate -f/u remaining XR trauma imaging of femur/hip  -f/u CTAP  -start dilaudid 0.5 q6 prn for severe pain  -tylenol for mild/mod pain

## 2018-07-21 NOTE — H&P ADULT - NSHPSOCIALHISTORY_GEN_ALL_CORE
Social History:    Marital Status:  (  x )    (   ) Single    (   )    (  )   Occupation:   Lives with: (  ) alone  (  ) children   ( x ) spouse   (  ) parents  (  ) other    Substance Use (street drugs): (x  ) never used  (  ) other:  Tobacco Usage:  (  x ) never smoked   (   ) former smoker   (   ) current smoker  (     ) pack year  (        ) last cigarette date  Alcohol Usage: denies

## 2018-07-21 NOTE — H&P ADULT - ATTENDING COMMENTS
Care to be assumed by Dr. Carter at 8 am.  This patient was assigned to me by the hospitalist in charge; my involvement in this case has consisted of the initial history, physical, chart review, and management plan.  This patient was previously unknown to me.  Case endorsed to NP ______ at ____. Care to be assumed by Dr. Carter at 8 am.  This patient was assigned to me by the hospitalist in charge; my involvement in this case has consisted of the initial history, physical, chart review, and management plan.  This patient was previously unknown to me.  Case endorsed to NP in ER [id number] [id number] at ____.

## 2018-07-21 NOTE — H&P ADULT - PROBLEM SELECTOR PLAN 1
-resutured in ER  -holding a/c given recent hematoma/dehiscence, reportedly has hx of pAF would eventually need a/c given high CHADSVASC  -Repeat CTH negative for acute changes; CT spine stable, c/w c-collar for anterolisthesis  -f/u remaining XR trauma imaging of pelvis/femur  -wound care cs

## 2018-07-21 NOTE — H&P ADULT - PROBLEM SELECTOR PLAN 6
-no CP  -first enzyme elevated in setting of ESRD, CPK wnl. no chest pain or ischemia noted, check second cardiac enzyme  -c/w statin, resume aspirin in am if counts stable. -pt unsure of meds  -hold reina blockers  -clarify meds in am  -can resume hydralazine, clonidine, labetalol, norvasc  -cards f/u -pt unsure of meds  -hold reina blockers  -clarify meds in am  -can resume hydralazine, clonidine, norvasc  -holding labetalol in setting of bradycardia  -cards f/u

## 2018-07-21 NOTE — H&P ADULT - PROBLEM SELECTOR PLAN 7
-needs full med rec  -emailed pharm tech to assist -no CP  -first enzyme elevated in setting of ESRD, CPK wnl. no chest pain or ischemia noted, check second cardiac enzyme  -c/w statin, resume aspirin in am if counts stable.

## 2018-07-21 NOTE — H&P ADULT - PROBLEM SELECTOR PLAN 2
-transient, responded to atropine  -admit to tele  -cards fellow recs appreciated; ?vagal response  -avoid reina blockade  -cards attending Dr. Tomas to evaluate in am, f/u recs.   -f/u with cards regarding ILR interrogation  -check TTE  -pacer pads on at all times, atropine at bedside

## 2018-07-21 NOTE — ED ADULT NURSE NOTE - OBJECTIVE STATEMENT
72y female presents to ED complaining of head bleed. Pt is a/ox3 brought in by EMS states that she was being transferred from bed to bed at the dialysis center and had a recent surgical site on occipital scalp re-open. Pt presents in head wrap, soaked gauze actively bleeding BRB from back of head, new guaze and pressure applied. Pt complaining of head pain and neck pain, and nausea. Pt has bruising on L side of face from previous fall in June. Pt breathing spontaneous and unlabored with lungs clear to auscultation bilaterally. Pt skin is warm, dry with no edema present. Pt PERRL, with equal strength bilaterally in upper and lower extremities with full sensation. Pt is a dialysis pt on Tuesday Thursday Saturday, received full treat ment today, do not use L arm. Pt denies chest pain and SOB, denies v/d, denies fever/chills and cough, denies dysuria, denies numbness/tingling and weakness.

## 2018-07-21 NOTE — H&P ADULT - HISTORY OF PRESENT ILLNESS
72 F PMH type 2 DM, HTN, nonobstructive CAD, ESRD on HD TTS, syncope s/p ILR, frequent falls c/b prior nasal fracture and cervical spine fracture, Afib no a/c 2/2 recent fall with scalp laceration/hematoma, now p/w repeat fall at HD center with resultant dehiscence of scalp wound.     VS: 97.0, 59 -->38 --> 73, 135/48, 15, 100% RA.  Labs: no leukocytosis, chronic stable anemia, rest of cbc unrevealing, coags wnl, cmp c/w ESRD, elevated alkp 188, mild hyperglycemia, HSTnT 352. cpk 45 lactate 2.3.  CTH no acute infarct/bleed, +scalp hematomas L periorbital/R parietal. CT c spine no significant change from prior noted similar C3-C4 anterolisthesis with canal narrowing. In ER pt received tylenol, atropine, calcium chloride, morphine prior to medicine team involvement. 72 F PMH type 2 DM, HTN, nonobstructive CAD, ESRD on HD TTS, syncope s/p ILR, frequent falls c/b prior nasal fracture and cervical spine fracture, Afib no a/c 2/2 recent fall with scalp laceration/hematoma, now p/w repeat fall at HD center with resultant dehiscence of scalp wound.  Pt repeatedly complaining of whole body pain on interview.  Pt had initial fall few weeks ago where she had scalp wound/hematoma, seen at [facility] for sutures. Today she was being moved at HD and hit her head and opened her existing wound. Brought in to our ER, where she was sutured at bedside in ER. Incidentally found to be bradycardic down to 30s with lightheadedness/dizziness, unclear if vagal response to pain but pt was persistently chel[place] and symptomatic while in ER. Given empiric calcium chloride, and placed on pacer pads and given atropine, with improvement in HR.  Pt currently c/o of scalp pain, neck pain, back pain, leg pain. Denies cp/sob/f/c, denies n/v/d/dysuria/cough/uri sx.  Had 1.5 L out at HD today.     VS: 97.0, 59 -->38 --> 73, 135/48, 15, 100% RA.  Labs: no leukocytosis, chronic stable anemia, rest of cbc unrevealing, coags wnl, cmp c/w ESRD, elevated alkp 188, mild hyperglycemia, HSTnT 352. cpk 45 lactate 2.3.  CTH no acute infarct/bleed, +scalp hematomas L periorbital/R parietal. CT c spine no significant change from prior noted similar C3-C4 anterolisthesis with canal narrowing. In ER pt received tylenol, atropine, calcium chloride, morphine prior to medicine team involvement.

## 2018-07-21 NOTE — H&P ADULT - PROBLEM SELECTOR PLAN 4
-c/w prior basal/bolus dosing from last admission in June, clarify home meds -completed HD today 1.5 L out  -defer to day attending to call renal consult for maintenance HD next due Tuesday  -c/w calcium acetate

## 2018-07-22 DIAGNOSIS — R00.1 BRADYCARDIA, UNSPECIFIED: ICD-10-CM

## 2018-07-22 DIAGNOSIS — D50.0 IRON DEFICIENCY ANEMIA SECONDARY TO BLOOD LOSS (CHRONIC): ICD-10-CM
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
## 2018-07-22 PROBLEM — H91.90 UNSPECIFIED HEARING LOSS, UNSPECIFIED EAR: Chronic | Status: ACTIVE | Noted: 2017-04-05

## 2018-07-22 LAB
ALBUMIN SERPL ELPH-MCNC: 3.5 G/DL — SIGNIFICANT CHANGE UP (ref 3.3–5)
ALP SERPL-CCNC: 148 U/L — HIGH (ref 40–120)
ALT FLD-CCNC: 11 U/L — SIGNIFICANT CHANGE UP (ref 10–45)
ANION GAP SERPL CALC-SCNC: 12 MMOL/L — SIGNIFICANT CHANGE UP (ref 5–17)
AST SERPL-CCNC: 21 U/L — SIGNIFICANT CHANGE UP (ref 10–40)
BASOPHILS # BLD AUTO: 0.05 K/UL — SIGNIFICANT CHANGE UP (ref 0–0.2)
BASOPHILS NFR BLD AUTO: 1 % — SIGNIFICANT CHANGE UP (ref 0–2)
BILIRUB SERPL-MCNC: 0.3 MG/DL — SIGNIFICANT CHANGE UP (ref 0.2–1.2)
BUN SERPL-MCNC: 24 MG/DL — HIGH (ref 7–23)
CALCIUM SERPL-MCNC: 9.3 MG/DL — SIGNIFICANT CHANGE UP (ref 8.4–10.5)
CHLORIDE SERPL-SCNC: 96 MMOL/L — SIGNIFICANT CHANGE UP (ref 96–108)
CK MB CFR SERPL CALC: 4.9 NG/ML — HIGH (ref 0–3.8)
CK SERPL-CCNC: 44 U/L — SIGNIFICANT CHANGE UP (ref 25–170)
CO2 SERPL-SCNC: 28 MMOL/L — SIGNIFICANT CHANGE UP (ref 22–31)
CREAT SERPL-MCNC: 2.07 MG/DL — HIGH (ref 0.5–1.3)
EOSINOPHIL # BLD AUTO: 0.23 K/UL — SIGNIFICANT CHANGE UP (ref 0–0.5)
EOSINOPHIL NFR BLD AUTO: 5 % — SIGNIFICANT CHANGE UP (ref 0–6)
GLUCOSE BLDC GLUCOMTR-MCNC: 151 MG/DL — HIGH (ref 70–99)
GLUCOSE BLDC GLUCOMTR-MCNC: 201 MG/DL — HIGH (ref 70–99)
GLUCOSE BLDC GLUCOMTR-MCNC: 329 MG/DL — HIGH (ref 70–99)
GLUCOSE BLDC GLUCOMTR-MCNC: 88 MG/DL — SIGNIFICANT CHANGE UP (ref 70–99)
GLUCOSE SERPL-MCNC: 245 MG/DL — HIGH (ref 70–99)
HCT VFR BLD CALC: 20.6 % — CRITICAL LOW (ref 34.5–45)
HCT VFR BLD CALC: 22 % — LOW (ref 34.5–45)
HCT VFR BLD CALC: 25.8 % — LOW (ref 34.5–45)
HGB BLD-MCNC: 6.6 G/DL — CRITICAL LOW (ref 11.5–15.5)
HGB BLD-MCNC: 6.7 G/DL — CRITICAL LOW (ref 11.5–15.5)
HGB BLD-MCNC: 8.5 G/DL — LOW (ref 11.5–15.5)
LYMPHOCYTES # BLD AUTO: 0.36 K/UL — LOW (ref 1–3.3)
LYMPHOCYTES # BLD AUTO: 8 % — LOW (ref 13–44)
MAGNESIUM SERPL-MCNC: 2 MG/DL — SIGNIFICANT CHANGE UP (ref 1.6–2.6)
MCHC RBC-ENTMCNC: 28.8 PG — SIGNIFICANT CHANGE UP (ref 27–34)
MCHC RBC-ENTMCNC: 30 PG — SIGNIFICANT CHANGE UP (ref 27–34)
MCHC RBC-ENTMCNC: 30.5 GM/DL — LOW (ref 32–36)
MCHC RBC-ENTMCNC: 31.8 PG — SIGNIFICANT CHANGE UP (ref 27–34)
MCHC RBC-ENTMCNC: 32.2 GM/DL — SIGNIFICANT CHANGE UP (ref 32–36)
MCHC RBC-ENTMCNC: 32.8 GM/DL — SIGNIFICANT CHANGE UP (ref 32–36)
MCV RBC AUTO: 91.5 FL — SIGNIFICANT CHANGE UP (ref 80–100)
MCV RBC AUTO: 94.4 FL — SIGNIFICANT CHANGE UP (ref 80–100)
MCV RBC AUTO: 98.8 FL — SIGNIFICANT CHANGE UP (ref 80–100)
MONOCYTES # BLD AUTO: 0.32 K/UL — SIGNIFICANT CHANGE UP (ref 0–0.9)
MONOCYTES NFR BLD AUTO: 7 % — SIGNIFICANT CHANGE UP (ref 2–14)
NEUTROPHILS # BLD AUTO: 3.59 K/UL — SIGNIFICANT CHANGE UP (ref 1.8–7.4)
NEUTROPHILS NFR BLD AUTO: 79 % — HIGH (ref 43–77)
PHOSPHATE SERPL-MCNC: 3.3 MG/DL — SIGNIFICANT CHANGE UP (ref 2.5–4.5)
PLATELET # BLD AUTO: 187 K/UL — SIGNIFICANT CHANGE UP (ref 150–400)
PLATELET # BLD AUTO: 197 K/UL — SIGNIFICANT CHANGE UP (ref 150–400)
PLATELET # BLD AUTO: 214 K/UL — SIGNIFICANT CHANGE UP (ref 150–400)
POTASSIUM SERPL-MCNC: 4.6 MMOL/L — SIGNIFICANT CHANGE UP (ref 3.5–5.3)
POTASSIUM SERPL-SCNC: 4.6 MMOL/L — SIGNIFICANT CHANGE UP (ref 3.5–5.3)
PROT SERPL-MCNC: 5.9 G/DL — LOW (ref 6–8.3)
RBC # BLD: 2.08 M/UL — LOW (ref 3.8–5.2)
RBC # BLD: 2.33 M/UL — LOW (ref 3.8–5.2)
RBC # BLD: 2.83 M/UL — LOW (ref 3.8–5.2)
RBC # FLD: 18.6 % — HIGH (ref 10.3–14.5)
RBC # FLD: 19 % — HIGH (ref 10.3–14.5)
RBC # FLD: 19.8 % — HIGH (ref 10.3–14.5)
SODIUM SERPL-SCNC: 136 MMOL/L — SIGNIFICANT CHANGE UP (ref 135–145)
TROPONIN T, HIGH SENSITIVITY RESULT: 357 NG/L — HIGH (ref 0–51)
WBC # BLD: 4.54 K/UL — SIGNIFICANT CHANGE UP (ref 3.8–10.5)
WBC # BLD: 5.1 K/UL — SIGNIFICANT CHANGE UP (ref 3.8–10.5)
WBC # BLD: 6.4 K/UL — SIGNIFICANT CHANGE UP (ref 3.8–10.5)
WBC # FLD AUTO: 4.54 K/UL — SIGNIFICANT CHANGE UP (ref 3.8–10.5)
WBC # FLD AUTO: 5.1 K/UL — SIGNIFICANT CHANGE UP (ref 3.8–10.5)
WBC # FLD AUTO: 6.4 K/UL — SIGNIFICANT CHANGE UP (ref 3.8–10.5)

## 2018-07-22 PROCEDURE — 99221 1ST HOSP IP/OBS SF/LOW 40: CPT

## 2018-07-22 PROCEDURE — 74176 CT ABD & PELVIS W/O CONTRAST: CPT | Mod: 26

## 2018-07-22 PROCEDURE — 93308 TTE F-UP OR LMTD: CPT | Mod: 26

## 2018-07-22 RX ORDER — INSULIN LISPRO 100/ML
5 VIAL (ML) SUBCUTANEOUS
Qty: 0 | Refills: 0 | COMMUNITY

## 2018-07-22 RX ORDER — INSULIN LISPRO 100/ML
0 VIAL (ML) SUBCUTANEOUS
Qty: 0 | Refills: 0 | COMMUNITY

## 2018-07-22 RX ORDER — AMLODIPINE BESYLATE 2.5 MG/1
1 TABLET ORAL
Qty: 0 | Refills: 0 | COMMUNITY

## 2018-07-22 RX ORDER — CALCIUM ACETATE 667 MG
667 TABLET ORAL
Qty: 0 | Refills: 0 | Status: DISCONTINUED | OUTPATIENT
Start: 2018-07-21 | End: 2018-07-22

## 2018-07-22 RX ORDER — CLONAZEPAM 1 MG
0.5 TABLET ORAL AT BEDTIME
Qty: 0 | Refills: 0 | Status: DISCONTINUED | OUTPATIENT
Start: 2018-07-22 | End: 2018-07-28

## 2018-07-22 RX ORDER — CALCIUM ACETATE 667 MG
667 TABLET ORAL
Qty: 0 | Refills: 0 | Status: DISCONTINUED | OUTPATIENT
Start: 2018-07-22 | End: 2018-07-28

## 2018-07-22 RX ORDER — LABETALOL HCL 100 MG
0 TABLET ORAL
Qty: 0 | Refills: 0 | COMMUNITY

## 2018-07-22 RX ORDER — ONDANSETRON 8 MG/1
4 TABLET, FILM COATED ORAL ONCE
Qty: 0 | Refills: 0 | Status: COMPLETED | OUTPATIENT
Start: 2018-07-22 | End: 2018-07-21

## 2018-07-22 RX ADMIN — Medication 650 MILLIGRAM(S): at 10:50

## 2018-07-22 RX ADMIN — Medication 1 TABLET(S): at 12:11

## 2018-07-22 RX ADMIN — Medication 1 TABLET(S): at 18:19

## 2018-07-22 RX ADMIN — SERTRALINE 50 MILLIGRAM(S): 25 TABLET, FILM COATED ORAL at 12:12

## 2018-07-22 RX ADMIN — Medication 5 UNIT(S): at 12:12

## 2018-07-22 RX ADMIN — HYDROMORPHONE HYDROCHLORIDE 0.5 MILLIGRAM(S): 2 INJECTION INTRAMUSCULAR; INTRAVENOUS; SUBCUTANEOUS at 21:58

## 2018-07-22 RX ADMIN — ATORVASTATIN CALCIUM 20 MILLIGRAM(S): 80 TABLET, FILM COATED ORAL at 21:57

## 2018-07-22 RX ADMIN — Medication 81 MILLIGRAM(S): at 12:12

## 2018-07-22 RX ADMIN — Medication 0.2 MILLIGRAM(S): at 21:57

## 2018-07-22 RX ADMIN — LIDOCAINE 1 PATCH: 4 CREAM TOPICAL at 23:42

## 2018-07-22 RX ADMIN — Medication 4: at 12:11

## 2018-07-22 RX ADMIN — Medication 650 MILLIGRAM(S): at 03:25

## 2018-07-22 RX ADMIN — Medication 650 MILLIGRAM(S): at 11:20

## 2018-07-22 RX ADMIN — Medication 100 MILLIGRAM(S): at 05:44

## 2018-07-22 RX ADMIN — INSULIN GLARGINE 10 UNIT(S): 100 INJECTION, SOLUTION SUBCUTANEOUS at 22:56

## 2018-07-22 RX ADMIN — Medication 650 MILLIGRAM(S): at 02:55

## 2018-07-22 RX ADMIN — Medication 100 MICROGRAM(S): at 05:44

## 2018-07-22 RX ADMIN — Medication 0.5 MILLIGRAM(S): at 21:58

## 2018-07-22 RX ADMIN — GABAPENTIN 100 MILLIGRAM(S): 400 CAPSULE ORAL at 21:58

## 2018-07-22 RX ADMIN — Medication 650 MILLIGRAM(S): at 18:20

## 2018-07-22 RX ADMIN — Medication 100 MILLIGRAM(S): at 12:13

## 2018-07-22 RX ADMIN — Medication 100 MILLIGRAM(S): at 21:57

## 2018-07-22 RX ADMIN — MORPHINE SULFATE 2 MILLIGRAM(S): 50 CAPSULE, EXTENDED RELEASE ORAL at 00:29

## 2018-07-22 RX ADMIN — GABAPENTIN 100 MILLIGRAM(S): 400 CAPSULE ORAL at 12:13

## 2018-07-22 RX ADMIN — Medication 650 MILLIGRAM(S): at 18:50

## 2018-07-22 RX ADMIN — HYDROMORPHONE HYDROCHLORIDE 0.5 MILLIGRAM(S): 2 INJECTION INTRAMUSCULAR; INTRAVENOUS; SUBCUTANEOUS at 22:30

## 2018-07-22 RX ADMIN — Medication 0.2 MILLIGRAM(S): at 12:13

## 2018-07-22 RX ADMIN — Medication 2: at 17:47

## 2018-07-22 RX ADMIN — AMLODIPINE BESYLATE 10 MILLIGRAM(S): 2.5 TABLET ORAL at 05:44

## 2018-07-22 RX ADMIN — Medication 667 MILLIGRAM(S): at 18:19

## 2018-07-22 RX ADMIN — GABAPENTIN 100 MILLIGRAM(S): 400 CAPSULE ORAL at 05:44

## 2018-07-22 RX ADMIN — Medication 5 UNIT(S): at 17:48

## 2018-07-22 RX ADMIN — LIDOCAINE 1 PATCH: 4 CREAM TOPICAL at 12:13

## 2018-07-22 RX ADMIN — Medication 100 MILLIGRAM(S): at 18:19

## 2018-07-22 RX ADMIN — Medication 0.3 MILLIGRAM(S): at 05:44

## 2018-07-22 RX ADMIN — HYDROMORPHONE HYDROCHLORIDE 0.5 MILLIGRAM(S): 2 INJECTION INTRAMUSCULAR; INTRAVENOUS; SUBCUTANEOUS at 12:10

## 2018-07-22 NOTE — PROVIDER CONTACT NOTE (CRITICAL VALUE NOTIFICATION) - BACKGROUND
hx of CHF, ESRD, HD - T R Sa, admitted with symptomatic bradycardia s/p atropine and recent head abscess lancing which was reopened s/p fall. CT head shows scalp hematoma, CXR clear lungs

## 2018-07-22 NOTE — CONSULT NOTE ADULT - ASSESSMENT
72 F PMH type 2 DM, HTN, nonobstructive CAD, ESRD on HD TTS, syncope s/p ILR, frequent falls c/b prior nasal fracture and cervical spine / Lumbar spine fractures, Afib no a/c 2/2 recent fall with scalp laceration/hematoma, now p/w repeat fall at HD center with resultant dehiscence of scalp wound.   - Cervical spine precautions with hard collar  - MRI wo L spine to evaluate L spine fracture
71 yo female with recurrent syncope s/p ILR admitted with fall and found to be bradycardic

## 2018-07-22 NOTE — PROGRESS NOTE ADULT - SUBJECTIVE AND OBJECTIVE BOX
Patient is a 72y old  Female who presents with a chief complaint of wound dehiscence, bradycardia (21 Jul 2018 22:36)      SUBJECTIVE / OVERNIGHT EVENTS: weak.  Review of Systems  chest pain no  palpitations no  sob no  nausea no  headache no    MEDICATIONS  (STANDING):  amLODIPine   Tablet 10 milliGRAM(s) Oral daily  aspirin enteric coated 81 milliGRAM(s) Oral daily  atorvastatin 20 milliGRAM(s) Oral at bedtime  calcium acetate 667 milliGRAM(s) Oral two times a day with meals  clonazePAM Tablet 0.5 milliGRAM(s) Oral at bedtime  cloNIDine 0.2 milliGRAM(s) Oral every 8 hours  dextrose 5%. 1000 milliLiter(s) (50 mL/Hr) IV Continuous <Continuous>  dextrose 50% Injectable 12.5 Gram(s) IV Push once  dextrose 50% Injectable 25 Gram(s) IV Push once  dextrose 50% Injectable 25 Gram(s) IV Push once  docusate sodium 100 milliGRAM(s) Oral two times a day  gabapentin 100 milliGRAM(s) Oral three times a day  hydrALAZINE 100 milliGRAM(s) Oral every 8 hours  insulin glargine Injectable (LANTUS) 10 Unit(s) SubCutaneous at bedtime  insulin lispro (HumaLOG) corrective regimen sliding scale   SubCutaneous three times a day before meals  insulin lispro (HumaLOG) corrective regimen sliding scale   SubCutaneous at bedtime  insulin lispro Injectable (HumaLOG) 5 Unit(s) SubCutaneous three times a day before meals  lactobacillus acidophilus 1 Tablet(s) Oral two times a day with meals  levothyroxine 100 MICROGram(s) Oral daily  lidocaine   Patch 1 Patch Transdermal daily  sertraline 50 milliGRAM(s) Oral daily    MEDICATIONS  (PRN):  acetaminophen   Tablet. 650 milliGRAM(s) Oral every 6 hours PRN mild or moderate pain  dextrose 40% Gel 15 Gram(s) Oral once PRN Blood Glucose LESS THAN 70 milliGRAM(s)/deciliter  glucagon  Injectable 1 milliGRAM(s) IntraMuscular once PRN Glucose LESS THAN 70 milligrams/deciliter  HYDROmorphone  Injectable 0.5 milliGRAM(s) IV Push every 6 hours PRN Severe Pain (7 - 10)          PHYSICAL EXAM:  GENERAL: NAD  HEAD:  Hematoma , Normocephalic  EYES: EOMI, PERRLA, conjunctiva and sclera clear  NECK: Supple, No JVD  CHEST/LUNG: Clear to auscultation bilaterally; No wheeze  HEART: Regular rate and rhythm; No murmurs, rubs, or gallops  ABDOMEN: Soft, Nontender, Nondistended; Bowel sounds present  EXTREMITIES:  2+ Peripheral Pulses, No clubbing, cyanosis, or edema  PSYCH: Anxious  NEUROLOGY: non-focal  SKIN: No rashes or lesions    LABS:                        6.6    5.1   )-----------( 187      ( 22 Jul 2018 10:15 )             20.6     07-22    136  |  96  |  24<H>  ----------------------------<  245<H>  4.6   |  28  |  2.07<H>    Ca    9.3      22 Jul 2018 06:16  Phos  3.3     07-22  Mg     2.0     07-22    TPro  5.9<L>  /  Alb  3.5  /  TBili  0.3  /  DBili  x   /  AST  21  /  ALT  11  /  AlkPhos  148<H>  07-22    PT/INR - ( 21 Jul 2018 20:31 )   PT: 11.2 sec;   INR: 1.04 ratio         PTT - ( 21 Jul 2018 20:31 )  PTT:33.8 sec  CARDIAC MARKERS ( 22 Jul 2018 00:31 )  x     / x     / 44 U/L / x     / 4.9 ng/mL  CARDIAC MARKERS ( 21 Jul 2018 20:31 )  x     / x     / 45 U/L / x     / 4.8 ng/mL          RADIOLOGY & ADDITIONAL TESTS:    Imaging Personally Reviewed:    Consultant(s) Notes Reviewed:      Care Discussed with Consultants/Other Providers:

## 2018-07-22 NOTE — CONSULT NOTE ADULT - ATTENDING COMMENTS
I saw and evaluated patient.  I agree with residents note  patient had recent abdominal CT.  suspect bleeding from scalp which has now stopped  getting blood transfusion  hemodynamics are stable

## 2018-07-22 NOTE — CONSULT NOTE ADULT - SUBJECTIVE AND OBJECTIVE BOX
GENERAL SURGERY CONSULT NOTE  --------------------------------------------------------------------------------------------    HPI: 71 yo female with ESRD and multiple recent falls who suffered a fall from standing yesterday and rebled into a previous hematoma, s/p re-suturing of right posterior scalp lac in ED, who was noted to be obtunded this morning and found to have a significant drop in her hematocrit from yesterday. Surgery called to evaluate scalp lacerations.  Patient is complaining of head and back pain. Denies abdominal and lower extremity pain. Denies SOB.     PAST MEDICAL & SURGICAL HISTORY:  [place] (hard of hearing)  Cataracts, bilateral: left worse than right  Pleural thickening: s/p pleurodisis left vats 2014  Hip pain: left  LBP radiating to left leg  CHF (congestive heart failure): diagnosed 2-2014  MI (myocardial infarction): 2-2014   cardiac cath done [facility]  Diabetes: diagnosed 2004  no medications in 5 months since hemodialysis  Hypothyroidism  ESRD (end stage renal disease)  HTN (hypertension)  MI (myocardial infarction): may 2016  Pleural effusion: Left side - several times since March 2014  Hypothyroid  Anxiety  HTN (hypertension)  Hyperlipidemia  CHF (congestive heart failure)  CKD (chronic kidney disease)  Anemia  Diabetes: DM 2  Thoracotomy scar of left chest: 4-2014 ? pneumonia/ scarring  S/P myomectomy: abdominal age 52  AV fistula: left upper 8-2014 attempted 12-16-14    FAMILY HISTORY:  No significant family history    ALLERGIES: Avelox (Unknown)  fish (Hives; Rash)  shellfish (Unknown)    CURRENT MEDICATIONS  MEDICATIONS (STANDING): amLODIPine   Tablet 10 milliGRAM(s) Oral daily  aspirin enteric coated 81 milliGRAM(s) Oral daily  atorvastatin 20 milliGRAM(s) Oral at bedtime  calcium acetate 667 milliGRAM(s) Oral two times a day with meals  clonazePAM Tablet 0.5 milliGRAM(s) Oral at bedtime  cloNIDine 0.2 milliGRAM(s) Oral every 8 hours  dextrose 5%. 1000 milliLiter(s) IV Continuous <Continuous>  dextrose 50% Injectable 12.5 Gram(s) IV Push once  dextrose 50% Injectable 25 Gram(s) IV Push once  dextrose 50% Injectable 25 Gram(s) IV Push once  docusate sodium 100 milliGRAM(s) Oral two times a day  gabapentin 100 milliGRAM(s) Oral three times a day  hydrALAZINE 100 milliGRAM(s) Oral every 8 hours  insulin glargine Injectable (LANTUS) 10 Unit(s) SubCutaneous at bedtime  insulin lispro (HumaLOG) corrective regimen sliding scale   SubCutaneous three times a day before meals  insulin lispro (HumaLOG) corrective regimen sliding scale   SubCutaneous at bedtime  insulin lispro Injectable (HumaLOG) 5 Unit(s) SubCutaneous three times a day before meals  lactobacillus acidophilus 1 Tablet(s) Oral two times a day with meals  levothyroxine 100 MICROGram(s) Oral daily  sertraline 50 milliGRAM(s) Oral daily    MEDICATIONS (PRN):acetaminophen   Tablet. 650 milliGRAM(s) Oral every 6 hours PRN mild or moderate pain  dextrose 40% Gel 15 Gram(s) Oral once PRN Blood Glucose LESS THAN 70 milliGRAM(s)/deciliter  glucagon  Injectable 1 milliGRAM(s) IntraMuscular once PRN Glucose LESS THAN 70 milligrams/deciliter  HYDROmorphone  Injectable 0.5 milliGRAM(s) IV Push every 6 hours PRN Severe Pain (7 - 10)    --------------------------------------------------------------------------------------------    Vitals:   T(C): 36.8 (07-22-18 @ 08:00), Max: 36.8 (07-22-18 @ 08:00)  HR: 55 (07-22-18 @ 08:00) (38 - 73)  BP: 170/62 (07-22-18 @ 08:00) (106/48 - 190/67)  RR: 14 (07-22-18 @ 08:00) (14 - 20)  SpO2: 99% (07-22-18 @ 08:00) (96% - 100%)  CAPILLARY BLOOD GLUCOSE      POCT Blood Glucose.: 329 mg/dL (22 Jul 2018 12:11)    CAPILLARY BLOOD GLUCOSE      POCT Blood Glucose.: 329 mg/dL (22 Jul 2018 12:11)      07-22 @ 07:01  -  07-22 @ 14:01  --------------------------------------------------------  IN:    Oral Fluid: 440 mL  Total IN: 440 mL    OUT:  Total OUT: 0 mL    Total NET: 440 mL    PHYSICAL EXAM:   NAD, A+Ox3  Two scalp lacs, on left parietal and one right occipital, both closed, neither actively bleeding. Size looks comparable to what was seen on CT scan.  Non labored respirations  Extremities well perfused  Abdomen soft, non-tender, non-distended  --------------------------------------------------------------------------------------------    LABS  CBC (07-22 @ 10:15)                              6.6<LL>                         5.1     )----------------(  187        --    % Neutrophils, --    % Lymphocytes, ANC: --                                  20.6<LL>  CBC (07-22 @ 08:02)                              6.7<LL>                         4.54    )----------------(  214        79.0<H>% Neutrophils, 8.0<L>% Lymphocytes, ANC: 3.59                                22.0<L>    BMP (07-22 @ 06:16)             136     |  96      |  24<H> 		Ca++ --      Ca 9.3                ---------------------------------( 245<H>		Mg 2.0                4.6     |  28      |  2.07<H>			Ph 3.3     BMP (07-21 @ 20:31)             139     |  96      |  17    		Ca++ --      Ca 9.5                ---------------------------------( 147<H>		Mg 2.0                3.5     |  31      |  1.62<H>			Ph 2.0<L>    LFTs (07-22 @ 06:16)      TPro 5.9<L> / Alb 3.5 / TBili 0.3 / DBili -- / AST 21 / ALT 11 / AlkPhos 148<H>  LFTs (07-21 @ 20:31)      TPro 6.9 / Alb 3.7 / TBili 0.4 / DBili -- / AST 21 / ALT 12 / AlkPhos 188<H>    Coags (07-21 @ 20:31)  aPTT 33.8 / INR 1.04 / PT 11.2    Cardiac Markers (07-22 @ 00:31)     HSTrop: -- / CKMB: -- / CK: 44  Cardiac Markers (07-21 @ 20:31)     HSTrop: -- / CKMB: -- / CK: 45      VBG (07-21 @ 21:13)     7.41 / 50 / 39 / 31<H> / 5.9<H> / 71%     Lactate: 2.3<H>    --------------------------------------------------------------------------------------------    IMAGING  < from: CT Head No Cont (07.21.18 @ 21:30) >  No acute hemorrhage or mass effect  Scalp hematomas including along the left periorbital and right parietal   regions.  < end of copied text >    < from: CT Abdomen and Pelvis No Cont (07.22.18 @ 00:19) >  Mild thickening of the rectal wall which may be seen in proctitis.   Otherwise, no acute pathology on this noncontrast examination.  Endplate erosions at L1 and L2 which may be degenerative, although   discitis should also be considered in the right clinical setting. This   appears slightly worse than on previous exam.  < end of copied text >      ASSESSMENT: Patient is a 72y old f with ESRD admitted after fall with rebleed into scalp hematoma noted to be obtunded and acutely anemic this morning, now at baseline mental status with no active bleeding from lacerations and no significant expansion of hematomas noted. Given patient's ESRD and inevitable platelet dysfunction, would not disturb hematomas with exploration given that they seem to have tamponaded the bleed.   Trend CBC and transfuse as needed. If patient does not respond to current transfusion or initially responds and then drops Hct again without changes in scalp hematomas, would consider CT scan of abdomen to assess for RP hemorrhage. Would not do scan at this point as change in Hct likely lagging equilibration from yesterday hemorrhagic event.    Thank you for the courtesy of this consult.  D/w Attending.    Johnna, PGY4

## 2018-07-22 NOTE — CHART NOTE - NSCHARTNOTEFT_GEN_A_CORE
Repeat hs Troponin 357 with minimal increase from 352 about 4 hours prior. Pt admitted with repeat fall at HD center with resulting dehiscence of scalp wound. Pt incidentally found on bradycardiac in the 30s when placed on tele, seen by House Cardiologist Dr. Saulo Loving for c/o of dizziness at the time.  As per Cards consult note, pt in transient bradycardia questionable to vagal response in setting of generalized pain and injury s/p fall.     Pt seen at bedside appears slightly in pain, but with no other complains of dizziness, CP, SOB. Recalled House Cardiologist of minimal elevation in hs Troponin, informs likely d/t ischemic demand in present issues. Informed Trop T 32 in 2016, pt ESRD on HD, present level within baseline.  Pt to be seen by her Cardiologist Dr. Tomas tomorrow to follow up.    Anay San, KATH  [id number]

## 2018-07-22 NOTE — CONSULT NOTE ADULT - SUBJECTIVE AND OBJECTIVE BOX
p ([id number])     HPI:  72 F PMH type 2 DM, HTN, nonobstructive CAD, ESRD on HD TTS, syncope s/p ILR, frequent falls c/b prior nasal fracture and cervical spine fracture, Afib no a/c 2/2 recent fall with scalp laceration/hematoma, now p/w repeat fall at HD center with resultant dehiscence of scalp wound.  Patient is poor historian but it seems that she fell the first week in june and since then has had progressively more difficulty walking.    CT C spine shows known C3 on C4 anterior listhesis with MRI showing significant spinal stenosis but stable compared to previous exams  CT abdo shows known L2-L3 fracture with possible worsening compared to previous exams    PAST MEDICAL HISTORY   [place] (hard of hearing)  Cataracts, bilateral  Pleural thickening  Hip pain  LBP radiating to left leg  CHF (congestive heart failure)  MI (myocardial infarction)  Diabetes  Hypothyroidism  ESRD (end stage renal disease)  HTN (hypertension)  MI (myocardial infarction)  Pleural effusion  Hypothyroid  Anxiety  HTN (hypertension)  Hyperlipidemia  CHF (congestive heart failure)  CKD (chronic kidney disease)  Anemia  Diabetes    PAST SURGICAL HISTORY   Thoracotomy scar of left chest  S/P myomectomy  AV fistula    Avelox (Unknown)  fish (Hives; Rash)  shellfish (Unknown)      MEDICATIONS:  Antibiotics:    Neuro:  acetaminophen   Tablet. 650 milliGRAM(s) Oral every 6 hours PRN  clonazePAM Tablet 0.5 milliGRAM(s) Oral at bedtime  gabapentin 100 milliGRAM(s) Oral three times a day  HYDROmorphone  Injectable 0.5 milliGRAM(s) IV Push every 6 hours PRN  sertraline 50 milliGRAM(s) Oral daily    Anticoagulation:    Other:  amLODIPine   Tablet 10 milliGRAM(s) Oral daily  atorvastatin 20 milliGRAM(s) Oral at bedtime  calcium acetate 667 milliGRAM(s) Oral two times a day with meals  cloNIDine 0.2 milliGRAM(s) Oral every 8 hours  dextrose 40% Gel 15 Gram(s) Oral once PRN  dextrose 5%. 1000 milliLiter(s) IV Continuous <Continuous>  dextrose 50% Injectable 12.5 Gram(s) IV Push once  dextrose 50% Injectable 25 Gram(s) IV Push once  dextrose 50% Injectable 25 Gram(s) IV Push once  docusate sodium 100 milliGRAM(s) Oral two times a day  glucagon  Injectable 1 milliGRAM(s) IntraMuscular once PRN  hydrALAZINE 100 milliGRAM(s) Oral every 8 hours  insulin glargine Injectable (LANTUS) 10 Unit(s) SubCutaneous at bedtime  insulin lispro (HumaLOG) corrective regimen sliding scale   SubCutaneous three times a day before meals  insulin lispro (HumaLOG) corrective regimen sliding scale   SubCutaneous at bedtime  insulin lispro Injectable (HumaLOG) 5 Unit(s) SubCutaneous three times a day before meals  levothyroxine 100 MICROGram(s) Oral daily      SOCIAL HISTORY:   Occupation:   Marital Status:     FAMILY HISTORY:  No significant family history      ROS: Negative except per HPI    NEURO PHYSICAL EXAM  PHYSICAL EXAM:    Constitutional: No Acute Distress     Neurological: AOx3, Following Commands    Motor exam:  Pt does endorse some component of pain limited weakness.          Upper extremity                        Delt     Bicep     Tricep    HG                                                 R         4+/5        4+/5        4+/5       4+/5                                               L          4+/5        4+/5        4+/5       4+/5          Lower extremity                         HF         KF          KE         DF          PF                                                  R        3/5        3/5        4/5       4/5         4/5                                               L         4/5        5/5       4/5       4/5          4/5                                                 Sensation: [X] intact to light touch  [] decreased:     R ankle plantar flexed, no clonus, + ricks b/l      LABS:  PT/INR - ( 21 Jul 2018 20:31 )   PT: 11.2 sec;   INR: 1.04 ratio         PTT - ( 21 Jul 2018 20:31 )  PTT:33.8 sec                        6.6    5.1   )-----------( 187      ( 22 Jul 2018 10:15 )             20.6     07-22    136  |  96  |  24<H>  ----------------------------<  245<H>  4.6   |  28  |  2.07<H>    Ca    9.3      22 Jul 2018 06:16  Phos  3.3     07-22  Mg     2.0     07-22    TPro  5.9<L>  /  Alb  3.5  /  TBili  0.3  /  DBili  x   /  AST  21  /  ALT  11  /  AlkPhos  148<H>  07-22

## 2018-07-22 NOTE — CONSULT NOTE ADULT - SUBJECTIVE AND OBJECTIVE BOX
NEPHROLOGY CONSULTATION    CHIEF COMPLAINT:  ESRD      HPI:  She completed dialysis yesterday at [facility] and had a fall with caused her scalp wound to dehisce and bleed.  She is very anemic today and is getting 1 unit blood.  Her usual dialysis schedule is TTS.  She was also found to be bradycardic so meds are being adjusted.      ROS:  denies SOB at rest      PAST MEDICAL & SURGICAL HISTORY:  [place] (hard of hearing)  Cataracts, bilateral: left worse than right  Pleural thickening: s/p pleurodisis left vats 2014  Hip pain: left  LBP radiating to left leg  CHF (congestive heart failure): diagnosed 2-2014  MI (myocardial infarction): 2-2014   cardiac cath done [facility]  Diabetes: diagnosed 2004  no medications in 5 months since hemodialysis  Hypothyroidism  ESRD (end stage renal disease)  HTN (hypertension)  MI (myocardial infarction): may 2016  Pleural effusion: Left side - several times since March 2014  Hypothyroid  Anxiety  HTN (hypertension)  Hyperlipidemia  CHF (congestive heart failure)  CKD (chronic kidney disease)  Anemia  Diabetes: DM 2  Thoracotomy scar of left chest: 4-2014 ? pneumonia/ scarring  S/P myomectomy: abdominal age 52  AV fistula: left upper 8-2014 attempted 12-16-14      SOCIAL HISTORY:  non smoker    FAMILY HISTORY:  no CKD    MEDICATIONS  (STANDING):  amLODIPine   Tablet 10 milliGRAM(s) Oral daily  atorvastatin 20 milliGRAM(s) Oral at bedtime  calcium acetate 667 milliGRAM(s) Oral two times a day with meals  clonazePAM Tablet 0.5 milliGRAM(s) Oral at bedtime  cloNIDine 0.2 milliGRAM(s) Oral every 8 hours  dextrose 5%. 1000 milliLiter(s) (50 mL/Hr) IV Continuous <Continuous>  dextrose 50% Injectable 12.5 Gram(s) IV Push once  dextrose 50% Injectable 25 Gram(s) IV Push once  dextrose 50% Injectable 25 Gram(s) IV Push once  docusate sodium 100 milliGRAM(s) Oral two times a day  gabapentin 100 milliGRAM(s) Oral three times a day  hydrALAZINE 100 milliGRAM(s) Oral every 8 hours  insulin glargine Injectable (LANTUS) 10 Unit(s) SubCutaneous at bedtime  insulin lispro (HumaLOG) corrective regimen sliding scale   SubCutaneous three times a day before meals  insulin lispro (HumaLOG) corrective regimen sliding scale   SubCutaneous at bedtime  insulin lispro Injectable (HumaLOG) 5 Unit(s) SubCutaneous three times a day before meals  lactobacillus acidophilus 1 Tablet(s) Oral two times a day with meals  levothyroxine 100 MICROGram(s) Oral daily  lidocaine   Patch 1 Patch Transdermal daily  sertraline 50 milliGRAM(s) Oral daily      PHYSICAL EXAMINATION:  T(F): 98.2 (07-22-18 @ 14:40)  HR: 53 (07-22-18 @ 14:40)  BP: 143/62 (07-22-18 @ 14:40)  RR: 18 (07-22-18 @ 14:40)  SpO2: 100% (07-22-18 @ 14:40)  Conversant, no apparent distress  PERRLA, pink conjunctivae, no ptosis  Good dentition, no pharyngeal erythema  Neck non tender, no mass, no thyromegaly or nodules  Normal respiratory effort, lungs clear to auscultation  Heart with RRR, no murmurs or rubs, no peripheral edema  Abdomen soft, no masses, no organomegaly  Skin no rashes, ulcers or lesions, normal turgor and temperature  Appropriate affect, AO x 3    LABS:                        6.6    5.1   )-----------( 187      ( 22 Jul 2018 10:15 )             20.6     07-22    136  |  96  |  24<H>  ----------------------------<  245<H>  4.6   |  28  |  2.07<H>    Ca    9.3      22 Jul 2018 06:16  Phos  3.3     07-22  Mg     2.0     07-22    TPro  5.9<L>  /  Alb  3.5  /  TBili  0.3  /  DBili  x   /  AST  21  /  ALT  11  /  AlkPhos  148<H>  07-22    RADIOLOGY:  Chest X-Ray personally reviewed and shows NAP      ASSESSMENT:  1.  ESRD on hemodialysis  2.  Acute blood loss anemia    PLAN:  Will schedule inpatient dialysis either tomorrow or Tuesday;  if she needs more blood products these are best given with dialysis to avoid volume overload

## 2018-07-22 NOTE — PROGRESS NOTE ADULT - SUBJECTIVE AND OBJECTIVE BOX
Subjective: Patient seen and examined. No new events except as noted.   bradycardic overnight   no cp or sob     REVIEW OF SYSTEMS:    CONSTITUTIONAL: + weakness, fevers or chills  EYES/ENT: No visual changes;  No vertigo or throat pain   NECK: No pain or stiffness  RESPIRATORY: No cough, wheezing, hemoptysis; No shortness of breath  CARDIOVASCULAR: No chest pain or palpitations  GASTROINTESTINAL: No abdominal or epigastric pain. No nausea, vomiting, or hematemesis; No diarrhea or constipation. No melena or hematochezia.  GENITOURINARY: No dysuria, frequency or hematuria  NEUROLOGICAL: No numbness or weakness  SKIN: No itching, burning, rashes, or lesions   All other review of systems is negative unless indicated above.    MEDICATIONS:  MEDICATIONS  (STANDING):  amLODIPine   Tablet 10 milliGRAM(s) Oral daily  aspirin enteric coated 81 milliGRAM(s) Oral daily  atorvastatin 20 milliGRAM(s) Oral at bedtime  calcium acetate 667 milliGRAM(s) Oral two times a day with meals  clonazePAM Tablet 0.5 milliGRAM(s) Oral at bedtime  cloNIDine 0.2 milliGRAM(s) Oral every 8 hours  dextrose 5%. 1000 milliLiter(s) (50 mL/Hr) IV Continuous <Continuous>  dextrose 50% Injectable 12.5 Gram(s) IV Push once  dextrose 50% Injectable 25 Gram(s) IV Push once  dextrose 50% Injectable 25 Gram(s) IV Push once  docusate sodium 100 milliGRAM(s) Oral two times a day  gabapentin 100 milliGRAM(s) Oral three times a day  hydrALAZINE 100 milliGRAM(s) Oral every 8 hours  insulin glargine Injectable (LANTUS) 10 Unit(s) SubCutaneous at bedtime  insulin lispro (HumaLOG) corrective regimen sliding scale   SubCutaneous three times a day before meals  insulin lispro (HumaLOG) corrective regimen sliding scale   SubCutaneous at bedtime  insulin lispro Injectable (HumaLOG) 5 Unit(s) SubCutaneous three times a day before meals  lactobacillus acidophilus 1 Tablet(s) Oral two times a day with meals  levothyroxine 100 MICROGram(s) Oral daily  lidocaine   Patch 1 Patch Transdermal daily  sertraline 50 milliGRAM(s) Oral daily      PHYSICAL EXAM:  T(C): 36.8 (07-22-18 @ 08:00), Max: 36.8 (07-22-18 @ 08:00)  HR: 55 (07-22-18 @ 08:00) (38 - 73)  BP: 170/62 (07-22-18 @ 08:00) (106/48 - 190/67)  RR: 14 (07-22-18 @ 08:00) (14 - 20)  SpO2: 99% (07-22-18 @ 08:00) (96% - 100%)  Wt(kg): --  I&O's Summary    22 Jul 2018 07:01  -  22 Jul 2018 11:50  --------------------------------------------------------  IN: 240 mL / OUT: 0 mL / NET: 240 mL          Appearance: NAD	  HEENT:   dry oral mucosa, PERRL, EOMI	  Lymphatic: No lymphadenopathy , no edema  Cardiovascular: Normal S1 S2, No JVD, No murmurs , Peripheral pulses palpable 2+ bilaterally  Respiratory: Lungs clear to auscultation, normal effort 	  Gastrointestinal:  Soft, Non-tender, + BS	  Skin: No rashes, No ecchymoses, No cyanosis, warm to touch  Musculoskeletal: decreased range of motion and  strength  Psychiatry:  lethargic  Ext: No edema      LABS:    CARDIAC MARKERS:  CARDIAC MARKERS ( 22 Jul 2018 00:31 )  x     / x     / 44 U/L / x     / 4.9 ng/mL  CARDIAC MARKERS ( 21 Jul 2018 20:31 )  x     / x     / 45 U/L / x     / 4.8 ng/mL                                6.6    5.1   )-----------( 187      ( 22 Jul 2018 10:15 )             20.6     07-22    136  |  96  |  24<H>  ----------------------------<  245<H>  4.6   |  28  |  2.07<H>    Ca    9.3      22 Jul 2018 06:16  Phos  3.3     07-22  Mg     2.0     07-22    TPro  5.9<L>  /  Alb  3.5  /  TBili  0.3  /  DBili  x   /  AST  21  /  ALT  11  /  AlkPhos  148<H>  07-22    proBNP:   Lipid Profile:   HgA1c:   TSH:             TELEMETRY: 	SB     ECG:  	  RADIOLOGY:   DIAGNOSTIC TESTING:  [ ] Echocardiogram:  [ ]  Catheterization:  [ ] Stress Test:    OTHER: Subjective: Patient seen and examined. Events noted. Dropped Hgb this am.  bradycardic overnight   no cp or sob       REVIEW OF SYSTEMS:    CONSTITUTIONAL: + weakness, fevers or chills  EYES/ENT: No visual changes;  No vertigo or throat pain   NECK: No pain or stiffness  RESPIRATORY: No cough, wheezing, hemoptysis; No shortness of breath  CARDIOVASCULAR: No chest pain or palpitations  GASTROINTESTINAL: No abdominal or epigastric pain. No nausea, vomiting, or hematemesis; No diarrhea or constipation. No melena or hematochezia.  GENITOURINARY: No dysuria, frequency or hematuria  NEUROLOGICAL: No numbness or weakness  SKIN: No itching, burning, rashes, or lesions   All other review of systems is negative unless indicated above.    MEDICATIONS:  MEDICATIONS  (STANDING):  amLODIPine   Tablet 10 milliGRAM(s) Oral daily  aspirin enteric coated 81 milliGRAM(s) Oral daily  atorvastatin 20 milliGRAM(s) Oral at bedtime  calcium acetate 667 milliGRAM(s) Oral two times a day with meals  clonazePAM Tablet 0.5 milliGRAM(s) Oral at bedtime  cloNIDine 0.2 milliGRAM(s) Oral every 8 hours  dextrose 5%. 1000 milliLiter(s) (50 mL/Hr) IV Continuous <Continuous>  dextrose 50% Injectable 12.5 Gram(s) IV Push once  dextrose 50% Injectable 25 Gram(s) IV Push once  dextrose 50% Injectable 25 Gram(s) IV Push once  docusate sodium 100 milliGRAM(s) Oral two times a day  gabapentin 100 milliGRAM(s) Oral three times a day  hydrALAZINE 100 milliGRAM(s) Oral every 8 hours  insulin glargine Injectable (LANTUS) 10 Unit(s) SubCutaneous at bedtime  insulin lispro (HumaLOG) corrective regimen sliding scale   SubCutaneous three times a day before meals  insulin lispro (HumaLOG) corrective regimen sliding scale   SubCutaneous at bedtime  insulin lispro Injectable (HumaLOG) 5 Unit(s) SubCutaneous three times a day before meals  lactobacillus acidophilus 1 Tablet(s) Oral two times a day with meals  levothyroxine 100 MICROGram(s) Oral daily  lidocaine   Patch 1 Patch Transdermal daily  sertraline 50 milliGRAM(s) Oral daily      PHYSICAL EXAM:  T(C): 36.8 (07-22-18 @ 08:00), Max: 36.8 (07-22-18 @ 08:00)  HR: 55 (07-22-18 @ 08:00) (38 - 73)  BP: 170/62 (07-22-18 @ 08:00) (106/48 - 190/67)  RR: 14 (07-22-18 @ 08:00) (14 - 20)  SpO2: 99% (07-22-18 @ 08:00) (96% - 100%)  Wt(kg): --  I&O's Summary    22 Jul 2018 07:01  -  22 Jul 2018 11:50  --------------------------------------------------------  IN: 240 mL / OUT: 0 mL / NET: 240 mL          Appearance: NAD	  HEENT:   dry oral mucosa, Left boni-orbital ecchymosis   Lymphatic: No lymphadenopathy , no edema  Cardiovascular: Normal S1 S2, No JVD, No murmurs , Peripheral pulses palpable 2+ bilaterally  Respiratory: Lungs clear to auscultation, normal effort 	  Gastrointestinal:  Soft, Non-tender, + BS	  Skin: No rashes, No ecchymoses, No cyanosis, warm to touch  Musculoskeletal: decreased range of motion and  strength  Psychiatry:  lethargic  Ext: No edema      LABS:    CARDIAC MARKERS:  CARDIAC MARKERS ( 22 Jul 2018 00:31 )  x     / x     / 44 U/L / x     / 4.9 ng/mL  CARDIAC MARKERS ( 21 Jul 2018 20:31 )  x     / x     / 45 U/L / x     / 4.8 ng/mL                                6.6    5.1   )-----------( 187      ( 22 Jul 2018 10:15 )             20.6     07-22    136  |  96  |  24<H>  ----------------------------<  245<H>  4.6   |  28  |  2.07<H>    Ca    9.3      22 Jul 2018 06:16  Phos  3.3     07-22  Mg     2.0     07-22    TPro  5.9<L>  /  Alb  3.5  /  TBili  0.3  /  DBili  x   /  AST  21  /  ALT  11  /  AlkPhos  148<H>  07-22    proBNP:   Lipid Profile:   HgA1c:   TSH:             TELEMETRY: 	SB     ECG:  	  RADIOLOGY: < from: CT Abdomen and Pelvis No Cont (07.22.18 @ 00:19) >    EXAM:  CT ABDOMEN AND PELVIS                            PROCEDURE DATE:  07/22/2018            INTERPRETATION:  CLINICAL INFORMATION: Abdominal pain    COMPARISON: January 14, 2018    PROCEDURE:   Noncontrast CT abdomen and pelvis    FINDINGS:      Trace right pleural effusion noted. There is multichamber cardiac   enlargement.    Hepatic hypodensity is too small to characterize. The liver and spleen   are otherwise unremarkable. Gallbladder is present.    There is no stones or hydronephrosis. Pancreas and adrenals are   unremarkable.    No retroperitoneal lymphadenopathy. At this chronic calcifications noted   throughout the aorta and its branches.    Colonic diverticulosis is noted without diverticulitis. Mild thickening   of the rectal wall There is no evidence of bowel obstruction. Moderate   amount stool noted throughout the colon. The appendix is normal. The   urinary bladder is distended.    Degenerative changes noted throughout the spine. Endplate erosions at L1   and L2 which may be degenerative, although discitis should also be   considered in the right clinical setting.    Impression:    Mild thickening of the rectal wall which may be seen in proctitis.   Otherwise, no acute pathology on this noncontrast examination.    Endplate erosions at L1 and L2 which may be degenerative, although   discitis should also be considered in the right clinical setting. This   appears slightly worse than on previous exam.                    DISHA RODGERS M.D., ATTENDING RADIOLOGIST  This document has been electronically signed. Jul 22 2018 11:31AM                < end of copied text >    DIAGNOSTIC TESTING:  [ ] Echocardiogram:  [ ]  Catheterization:  [ ] Stress Test:    OTHER:

## 2018-07-22 NOTE — CONSULT NOTE ADULT - ATTENDING COMMENTS
I reviewed the resident's note and agree. The patient is a 72-year-old female with a history of ESRD on HD, frequent falls, and bilateral upper and lower extremity weakness, previously seen by neurosurgeon Dr. Chelsey West on 6/18/18 and found on to have a traumatic C3-C4 Grade 1 spondylolisthesis resulting in spinal cord compression with spinal cord signal change, C5-C6 cervical stenosis, and L1-L2 endplate erosions, possibly due to osteomyelitis/discitis, now worsening on repeat imaging 7/22/18, resulting in worsening of her coronal deformity. Recommend a rigid cervical collar, a TLSO brace, and an MRI of the lumbar spine with and without contrast for now. I saw and evaluated the patient. I reviewed the resident's note and agree. The patient is a 72-year-old female with a history of ESRD on HD, frequent falls, and bilateral upper and lower extremity weakness, previously seen by neurosurgeon Dr. Chelsey West on 6/18/18 and found on to have a traumatic C3-C4 Grade 1 spondylolisthesis resulting in spinal cord compression with spinal cord signal change, C5-C6 cervical stenosis, and L1-L2 endplate erosions, possibly due to osteomyelitis/discitis, now worsening on repeat imaging 7/22/18, resulting in worsening of her coronal deformity. Recommend a rigid cervical collar, a TLSO brace, and an MRI of the lumbar spine with and without contrast for now.

## 2018-07-22 NOTE — CHART NOTE - NSCHARTNOTEFT_GEN_A_CORE
NP note - anemia    called by RN for Hg 6.7. repeated Hg/HCT 6.6/20.6. pt seen and examined, asymptomatic. found back of head laceration. site dry, no active bleeding for now. v/s stable. temp 98.3, HR 55, /62, RR14, O2 SAt 99%. T & S sent yesterdat. obtained blood transfusion consent from pt. ordered 1 prbc. Dr. Carter notified. will repeat CBC after transfusion.    NP. Naldo Villagomez  [id number]

## 2018-07-23 LAB
ANION GAP SERPL CALC-SCNC: 15 MMOL/L — SIGNIFICANT CHANGE UP (ref 5–17)
BUN SERPL-MCNC: 36 MG/DL — HIGH (ref 7–23)
CALCIUM SERPL-MCNC: 9.1 MG/DL — SIGNIFICANT CHANGE UP (ref 8.4–10.5)
CHLORIDE SERPL-SCNC: 92 MMOL/L — LOW (ref 96–108)
CO2 SERPL-SCNC: 26 MMOL/L — SIGNIFICANT CHANGE UP (ref 22–31)
CREAT SERPL-MCNC: 3.32 MG/DL — HIGH (ref 0.5–1.3)
GLUCOSE BLDC GLUCOMTR-MCNC: 105 MG/DL — HIGH (ref 70–99)
GLUCOSE BLDC GLUCOMTR-MCNC: 107 MG/DL — HIGH (ref 70–99)
GLUCOSE BLDC GLUCOMTR-MCNC: 111 MG/DL — HIGH (ref 70–99)
GLUCOSE BLDC GLUCOMTR-MCNC: 91 MG/DL — SIGNIFICANT CHANGE UP (ref 70–99)
GLUCOSE SERPL-MCNC: 110 MG/DL — HIGH (ref 70–99)
HCT VFR BLD CALC: 24.5 % — LOW (ref 34.5–45)
HGB BLD-MCNC: 8 G/DL — LOW (ref 11.5–15.5)
MCHC RBC-ENTMCNC: 29.6 PG — SIGNIFICANT CHANGE UP (ref 27–34)
MCHC RBC-ENTMCNC: 32.7 GM/DL — SIGNIFICANT CHANGE UP (ref 32–36)
MCV RBC AUTO: 90.7 FL — SIGNIFICANT CHANGE UP (ref 80–100)
PLATELET # BLD AUTO: 200 K/UL — SIGNIFICANT CHANGE UP (ref 150–400)
POTASSIUM SERPL-MCNC: 5.9 MMOL/L — HIGH (ref 3.5–5.3)
POTASSIUM SERPL-MCNC: 5.9 MMOL/L — HIGH (ref 3.5–5.3)
POTASSIUM SERPL-SCNC: 5.9 MMOL/L — HIGH (ref 3.5–5.3)
POTASSIUM SERPL-SCNC: 5.9 MMOL/L — HIGH (ref 3.5–5.3)
RBC # BLD: 2.7 M/UL — LOW (ref 3.8–5.2)
RBC # FLD: 22.8 % — HIGH (ref 10.3–14.5)
SODIUM SERPL-SCNC: 133 MMOL/L — LOW (ref 135–145)
VIT D25+D1,25 OH+D1,25 PNL SERPL-MCNC: 13.4 PG/ML — LOW (ref 19.9–79.3)
WBC # BLD: 5.46 K/UL — SIGNIFICANT CHANGE UP (ref 3.8–10.5)
WBC # FLD AUTO: 5.46 K/UL — SIGNIFICANT CHANGE UP (ref 3.8–10.5)

## 2018-07-23 PROCEDURE — 93306 TTE W/DOPPLER COMPLETE: CPT | Mod: 26

## 2018-07-23 RX ORDER — SODIUM POLYSTYRENE SULFONATE 4.1 MEQ/G
30 POWDER, FOR SUSPENSION ORAL ONCE
Qty: 0 | Refills: 0 | Status: COMPLETED | OUTPATIENT
Start: 2018-07-23 | End: 2018-07-23

## 2018-07-23 RX ORDER — ONDANSETRON 8 MG/1
4 TABLET, FILM COATED ORAL ONCE
Qty: 0 | Refills: 0 | Status: COMPLETED | OUTPATIENT
Start: 2018-07-23 | End: 2018-07-23

## 2018-07-23 RX ORDER — ERYTHROPOIETIN 10000 [IU]/ML
10000 INJECTION, SOLUTION INTRAVENOUS; SUBCUTANEOUS ONCE
Qty: 0 | Refills: 0 | Status: COMPLETED | OUTPATIENT
Start: 2018-07-23 | End: 2018-07-23

## 2018-07-23 RX ADMIN — Medication 0.2 MILLIGRAM(S): at 06:30

## 2018-07-23 RX ADMIN — Medication 650 MILLIGRAM(S): at 12:43

## 2018-07-23 RX ADMIN — Medication 5 UNIT(S): at 08:09

## 2018-07-23 RX ADMIN — ERYTHROPOIETIN 10000 UNIT(S): 10000 INJECTION, SOLUTION INTRAVENOUS; SUBCUTANEOUS at 15:52

## 2018-07-23 RX ADMIN — Medication 1 TABLET(S): at 08:12

## 2018-07-23 RX ADMIN — HYDROMORPHONE HYDROCHLORIDE 0.5 MILLIGRAM(S): 2 INJECTION INTRAMUSCULAR; INTRAVENOUS; SUBCUTANEOUS at 12:40

## 2018-07-23 RX ADMIN — Medication 650 MILLIGRAM(S): at 06:30

## 2018-07-23 RX ADMIN — Medication 100 MICROGRAM(S): at 06:30

## 2018-07-23 RX ADMIN — Medication 100 MILLIGRAM(S): at 12:13

## 2018-07-23 RX ADMIN — SERTRALINE 50 MILLIGRAM(S): 25 TABLET, FILM COATED ORAL at 12:11

## 2018-07-23 RX ADMIN — ONDANSETRON 4 MILLIGRAM(S): 8 TABLET, FILM COATED ORAL at 14:11

## 2018-07-23 RX ADMIN — Medication 650 MILLIGRAM(S): at 07:00

## 2018-07-23 RX ADMIN — AMLODIPINE BESYLATE 10 MILLIGRAM(S): 2.5 TABLET ORAL at 06:30

## 2018-07-23 RX ADMIN — Medication 0.1 MILLIGRAM(S): at 12:12

## 2018-07-23 RX ADMIN — Medication 0.5 MILLIGRAM(S): at 22:58

## 2018-07-23 RX ADMIN — GABAPENTIN 100 MILLIGRAM(S): 400 CAPSULE ORAL at 22:55

## 2018-07-23 RX ADMIN — Medication 100 MILLIGRAM(S): at 22:56

## 2018-07-23 RX ADMIN — Medication 650 MILLIGRAM(S): at 22:59

## 2018-07-23 RX ADMIN — Medication 100 MILLIGRAM(S): at 06:30

## 2018-07-23 RX ADMIN — HYDROMORPHONE HYDROCHLORIDE 0.5 MILLIGRAM(S): 2 INJECTION INTRAMUSCULAR; INTRAVENOUS; SUBCUTANEOUS at 18:37

## 2018-07-23 RX ADMIN — GABAPENTIN 100 MILLIGRAM(S): 400 CAPSULE ORAL at 12:12

## 2018-07-23 RX ADMIN — GABAPENTIN 100 MILLIGRAM(S): 400 CAPSULE ORAL at 06:30

## 2018-07-23 RX ADMIN — Medication 667 MILLIGRAM(S): at 08:12

## 2018-07-23 RX ADMIN — SODIUM POLYSTYRENE SULFONATE 30 GRAM(S): 4.1 POWDER, FOR SUSPENSION ORAL at 12:10

## 2018-07-23 RX ADMIN — Medication 667 MILLIGRAM(S): at 18:09

## 2018-07-23 RX ADMIN — Medication 5 UNIT(S): at 18:09

## 2018-07-23 RX ADMIN — Medication 1 TABLET(S): at 18:09

## 2018-07-23 RX ADMIN — Medication 650 MILLIGRAM(S): at 12:13

## 2018-07-23 RX ADMIN — Medication 5 UNIT(S): at 12:10

## 2018-07-23 RX ADMIN — HYDROMORPHONE HYDROCHLORIDE 0.5 MILLIGRAM(S): 2 INJECTION INTRAMUSCULAR; INTRAVENOUS; SUBCUTANEOUS at 18:07

## 2018-07-23 RX ADMIN — Medication 0.1 MILLIGRAM(S): at 22:58

## 2018-07-23 RX ADMIN — INSULIN GLARGINE 10 UNIT(S): 100 INJECTION, SOLUTION SUBCUTANEOUS at 22:55

## 2018-07-23 RX ADMIN — ATORVASTATIN CALCIUM 20 MILLIGRAM(S): 80 TABLET, FILM COATED ORAL at 22:55

## 2018-07-23 NOTE — PROGRESS NOTE ADULT - SUBJECTIVE AND OBJECTIVE BOX
Visit Summary: Patient seen and evaluated at bedside.    Overnight Events:    Exam:  T(C): 36.6 (07-23-18 @ 05:45), Max: 36.9 (07-22-18 @ 21:03)  HR: 58 (07-23-18 @ 05:45) (53 - 58)  BP: 126/62 (07-23-18 @ 05:45) (126/62 - 170/62)  RR: 16 (07-23-18 @ 05:45) (14 - 18)  SpO2: 98% (07-23-18 @ 05:45) (98% - 100%)  Wt(kg): --    Constitutional: No Acute Distress     Neurological: AOx3, Following Commands    Motor exam:  Pt does endorse some component of pain limited weakness.          Upper extremity                        Delt     Bicep     Tricep    HG                                                 R         4+/5        4+/5        4+/5       4+/5                                               L          4+/5        4+/5        4+/5       4+/5          Lower extremity                         HF         KF          KE         DF          PF                                                  R        3/5        3/5        4/5       4/5         4/5                                               L         4/5        5/5       4/5       4/5          4/5                                                 Sensation: [X] intact to light touch  [] decreased:     R ankle plantar flexed, no clonus, + ricks b/l                          8.5    6.4   )-----------( 197      ( 22 Jul 2018 19:00 )             25.8     07-22    136  |  96  |  24<H>  ----------------------------<  245<H>  4.6   |  28  |  2.07<H>    Ca    9.3      22 Jul 2018 06:16  Phos  3.3     07-22  Mg     2.0     07-22    TPro  5.9<L>  /  Alb  3.5  /  TBili  0.3  /  DBili  x   /  AST  21  /  ALT  11  /  AlkPhos  148<H>  07-22  PT/INR - ( 21 Jul 2018 20:31 )   PT: 11.2 sec;   INR: 1.04 ratio         PTT - ( 21 Jul 2018 20:31 )  PTT:33.8 sec

## 2018-07-23 NOTE — PROGRESS NOTE ADULT - SUBJECTIVE AND OBJECTIVE BOX
Patient is a 72y old  Female who presents with a chief complaint of wound dehiscence, bradycardia (21 Jul 2018 22:36)      SUBJECTIVE / OVERNIGHT EVENTS: Comfortable without new complaints.   Review of Systems  chest pain no  palpitations no  sob no  nausea no  headache no    MEDICATIONS  (STANDING):  amLODIPine   Tablet 10 milliGRAM(s) Oral daily  atorvastatin 20 milliGRAM(s) Oral at bedtime  calcium acetate 667 milliGRAM(s) Oral two times a day with meals  clonazePAM Tablet 0.5 milliGRAM(s) Oral at bedtime  cloNIDine 0.1 milliGRAM(s) Oral every 8 hours  dextrose 5%. 1000 milliLiter(s) (50 mL/Hr) IV Continuous <Continuous>  dextrose 50% Injectable 12.5 Gram(s) IV Push once  dextrose 50% Injectable 25 Gram(s) IV Push once  dextrose 50% Injectable 25 Gram(s) IV Push once  docusate sodium 100 milliGRAM(s) Oral two times a day  gabapentin 100 milliGRAM(s) Oral three times a day  hydrALAZINE 100 milliGRAM(s) Oral every 8 hours  insulin glargine Injectable (LANTUS) 10 Unit(s) SubCutaneous at bedtime  insulin lispro (HumaLOG) corrective regimen sliding scale   SubCutaneous three times a day before meals  insulin lispro (HumaLOG) corrective regimen sliding scale   SubCutaneous at bedtime  insulin lispro Injectable (HumaLOG) 5 Unit(s) SubCutaneous three times a day before meals  lactobacillus acidophilus 1 Tablet(s) Oral two times a day with meals  levothyroxine 100 MICROGram(s) Oral daily  lidocaine   Patch 1 Patch Transdermal daily  sertraline 50 milliGRAM(s) Oral daily    MEDICATIONS  (PRN):  acetaminophen   Tablet. 650 milliGRAM(s) Oral every 6 hours PRN mild or moderate pain  dextrose 40% Gel 15 Gram(s) Oral once PRN Blood Glucose LESS THAN 70 milliGRAM(s)/deciliter  glucagon  Injectable 1 milliGRAM(s) IntraMuscular once PRN Glucose LESS THAN 70 milligrams/deciliter  HYDROmorphone  Injectable 0.5 milliGRAM(s) IV Push every 6 hours PRN Severe Pain (7 - 10)          PHYSICAL EXAM:  GENERAL: NAD, well-developed  HEAD: L periorbital ecchymosis. Scalp hematoma stable. Bleeding controlled  NECK: Supple, No JVD Cervical collar.  CHEST/LUNG: Clear to auscultation bilaterally; No wheeze  HEART: Regular rate and rhythm; No murmurs, rubs, or gallops  ABDOMEN: Soft, Nontender, Nondistended; Bowel sounds present  EXTREMITIES:  2+ Peripheral Pulses, No clubbing, cyanosis, or edema  PSYCH: AAOx3  NEUROLOGY: non-focal  SKIN: No rashes or lesions    LABS:                        8.0    5.46  )-----------( 200      ( 23 Jul 2018 07:40 )             24.5     07-23    x   |  x   |  x   ----------------------------<  x   5.9<H>   |  x   |  x     Ca    9.1      23 Jul 2018 06:45  Phos  3.3     07-22  Mg     2.0     07-22    TPro  5.9<L>  /  Alb  3.5  /  TBili  0.3  /  DBili  x   /  AST  21  /  ALT  11  /  AlkPhos  148<H>  07-22    PT/INR - ( 21 Jul 2018 20:31 )   PT: 11.2 sec;   INR: 1.04 ratio         PTT - ( 21 Jul 2018 20:31 )  PTT:33.8 sec  CARDIAC MARKERS ( 22 Jul 2018 00:31 )  x     / x     / 44 U/L / x     / 4.9 ng/mL  CARDIAC MARKERS ( 21 Jul 2018 20:31 )  x     / x     / 45 U/L / x     / 4.8 ng/mL          RADIOLOGY & ADDITIONAL TESTS:    Imaging Personally Reviewed:    Consultant(s) Notes Reviewed:      Care Discussed with Consultants/Other Providers:

## 2018-07-23 NOTE — PROGRESS NOTE ADULT - SUBJECTIVE AND OBJECTIVE BOX
No distress    EXAM:  T(F): 98.2 (07-23-18 @ 11:59)  HR: 56 (07-23-18 @ 11:59)  BP: 163/60 (07-23-18 @ 11:59)  RR: 15 (07-23-18 @ 11:59)  SpO2: 98% (07-23-18 @ 11:59)    Gen -Conversant, in no apparent distress  Lungs - good air entry bilaterally  Heart - S1S2  Abd - soft, NT,  ND, + BS  Extr - no peripheral edema         LABS                             8.0    5.46  )-----------( 200      ( 23 Jul 2018 07:40 )             24.5          07-23    x   |  x   |  x   ----------------------------<  x   5.9<H>   |  x   |  x     Ca    9.1      23 Jul 2018 06:45  Phos  3.3     07-22  Mg     2.0     07-22    TPro  5.9<L>  /  Alb  3.5  /  TBili  0.3  /  DBili  x   /  AST  21  /  ALT  11  /  AlkPhos  148<H>  07-22    acetaminophen   Tablet. 650 milliGRAM(s) Oral every 6 hours PRN  amLODIPine   Tablet 10 milliGRAM(s) Oral daily  atorvastatin 20 milliGRAM(s) Oral at bedtime  calcium acetate 667 milliGRAM(s) Oral two times a day with meals  clonazePAM Tablet 0.5 milliGRAM(s) Oral at bedtime  cloNIDine 0.1 milliGRAM(s) Oral every 8 hours  dextrose 40% Gel 15 Gram(s) Oral once PRN  dextrose 5%. 1000 milliLiter(s) IV Continuous <Continuous>  dextrose 50% Injectable 12.5 Gram(s) IV Push once  dextrose 50% Injectable 25 Gram(s) IV Push once  dextrose 50% Injectable 25 Gram(s) IV Push once  docusate sodium 100 milliGRAM(s) Oral two times a day  gabapentin 100 milliGRAM(s) Oral three times a day  glucagon  Injectable 1 milliGRAM(s) IntraMuscular once PRN  hydrALAZINE 100 milliGRAM(s) Oral every 8 hours  HYDROmorphone  Injectable 0.5 milliGRAM(s) IV Push every 6 hours PRN  insulin glargine Injectable (LANTUS) 10 Unit(s) SubCutaneous at bedtime  insulin lispro (HumaLOG) corrective regimen sliding scale   SubCutaneous three times a day before meals  insulin lispro (HumaLOG) corrective regimen sliding scale   SubCutaneous at bedtime  insulin lispro Injectable (HumaLOG) 5 Unit(s) SubCutaneous three times a day before meals  lactobacillus acidophilus 1 Tablet(s) Oral two times a day with meals  levothyroxine 100 MICROGram(s) Oral daily  lidocaine   Patch 1 Patch Transdermal daily  sertraline 50 milliGRAM(s) Oral daily         A/P:    ESRD on HD  HD today due to hyperkalemia  Renal diet  Epogen w/HD  TMP as able  Next HD Wed

## 2018-07-23 NOTE — CHART NOTE - NSCHARTNOTEFT_GEN_A_CORE
Fit and apply [facility] cervical orthosis x-small with replacement interface. Contact information left bedside. To notify office with any issues questions or concerns.  Elliot HORNER  [facility]  [phone]

## 2018-07-23 NOTE — PROGRESS NOTE ADULT - SUBJECTIVE AND OBJECTIVE BOX
Subjective: Patient seen and examined. No new events except as noted.   feels weak and lethargic   No cp or sob   s/p PRBC transfusion yesterday     REVIEW OF SYSTEMS:    CONSTITUTIONAL: + weakness, fevers or chills  EYES/ENT: No visual changes;  No vertigo or throat pain   NECK: No pain or stiffness  RESPIRATORY: No cough, wheezing, hemoptysis; No shortness of breath  CARDIOVASCULAR: No chest pain or palpitations  GASTROINTESTINAL: No abdominal or epigastric pain. No nausea, vomiting, or hematemesis; No diarrhea or constipation. No melena or hematochezia.  GENITOURINARY: No dysuria, frequency or hematuria  NEUROLOGICAL: No numbness or weakness  SKIN: No itching, burning, rashes, or lesions   All other review of systems is negative unless indicated above.    MEDICATIONS:  MEDICATIONS  (STANDING):  amLODIPine   Tablet 10 milliGRAM(s) Oral daily  atorvastatin 20 milliGRAM(s) Oral at bedtime  calcium acetate 667 milliGRAM(s) Oral two times a day with meals  clonazePAM Tablet 0.5 milliGRAM(s) Oral at bedtime  cloNIDine 0.1 milliGRAM(s) Oral every 8 hours  dextrose 5%. 1000 milliLiter(s) (50 mL/Hr) IV Continuous <Continuous>  dextrose 50% Injectable 12.5 Gram(s) IV Push once  dextrose 50% Injectable 25 Gram(s) IV Push once  dextrose 50% Injectable 25 Gram(s) IV Push once  docusate sodium 100 milliGRAM(s) Oral two times a day  gabapentin 100 milliGRAM(s) Oral three times a day  hydrALAZINE 100 milliGRAM(s) Oral every 8 hours  insulin glargine Injectable (LANTUS) 10 Unit(s) SubCutaneous at bedtime  insulin lispro (HumaLOG) corrective regimen sliding scale   SubCutaneous three times a day before meals  insulin lispro (HumaLOG) corrective regimen sliding scale   SubCutaneous at bedtime  insulin lispro Injectable (HumaLOG) 5 Unit(s) SubCutaneous three times a day before meals  lactobacillus acidophilus 1 Tablet(s) Oral two times a day with meals  levothyroxine 100 MICROGram(s) Oral daily  lidocaine   Patch 1 Patch Transdermal daily  sertraline 50 milliGRAM(s) Oral daily  sodium polystyrene sulfonate Suspension 30 Gram(s) Oral once      PHYSICAL EXAM:  T(C): 36.6 (07-23-18 @ 05:45), Max: 36.9 (07-22-18 @ 21:03)  HR: 58 (07-23-18 @ 05:45) (53 - 58)  BP: 126/62 (07-23-18 @ 05:45) (126/62 - 143/62)  RR: 16 (07-23-18 @ 05:45) (15 - 18)  SpO2: 98% (07-23-18 @ 05:45) (98% - 100%)  Wt(kg): --  I&O's Summary    22 Jul 2018 07:01  -  23 Jul 2018 07:00  --------------------------------------------------------  IN: 920 mL / OUT: 0 mL / NET: 920 mL        Weight (kg): 46.3 (07-22 @ 21:52)    Appearance: NAD	  HEENT:  dry  oral mucosa, Left boni-orbital ecchymosis 	  Lymphatic: No lymphadenopathy , no edema  Cardiovascular: Normal S1 S2, No JVD, No murmurs , Peripheral pulses palpable 2+ bilaterally  Respiratory: Lungs clear to auscultation, normal effort 	  Gastrointestinal:  Soft, Non-tender, + BS	  Skin: No rashes, No ecchymoses, No cyanosis, warm to touch  Musculoskeletal: decreased  range of motion and strength  Psychiatry:  lethargic   Ext: No edema      LABS:    CARDIAC MARKERS:  CARDIAC MARKERS ( 22 Jul 2018 00:31 )  x     / x     / 44 U/L / x     / 4.9 ng/mL  CARDIAC MARKERS ( 21 Jul 2018 20:31 )  x     / x     / 45 U/L / x     / 4.8 ng/mL                                8.0    5.46  )-----------( 200      ( 23 Jul 2018 07:40 )             24.5     07-23    x   |  x   |  x   ----------------------------<  x   5.9<H>   |  x   |  x     Ca    9.1      23 Jul 2018 06:45  Phos  3.3     07-22  Mg     2.0     07-22    TPro  5.9<L>  /  Alb  3.5  /  TBili  0.3  /  DBili  x   /  AST  21  /  ALT  11  /  AlkPhos  148<H>  07-22    proBNP:   Lipid Profile:   HgA1c:   TSH:             TELEMETRY: 	SR/SB (40s)     ECG:  	  RADIOLOGY:   DIAGNOSTIC TESTING:  [ ] Echocardiogram:  [ ]  Catheterization:  [ ] Stress Test:    OTHER:

## 2018-07-24 LAB
ANION GAP SERPL CALC-SCNC: 13 MMOL/L — SIGNIFICANT CHANGE UP (ref 5–17)
BUN SERPL-MCNC: 18 MG/DL — SIGNIFICANT CHANGE UP (ref 7–23)
CALCIUM SERPL-MCNC: 9.2 MG/DL — SIGNIFICANT CHANGE UP (ref 8.4–10.5)
CHLORIDE SERPL-SCNC: 96 MMOL/L — SIGNIFICANT CHANGE UP (ref 96–108)
CO2 SERPL-SCNC: 29 MMOL/L — SIGNIFICANT CHANGE UP (ref 22–31)
CREAT SERPL-MCNC: 2.42 MG/DL — HIGH (ref 0.5–1.3)
GLUCOSE BLDC GLUCOMTR-MCNC: 158 MG/DL — HIGH (ref 70–99)
GLUCOSE BLDC GLUCOMTR-MCNC: 165 MG/DL — HIGH (ref 70–99)
GLUCOSE BLDC GLUCOMTR-MCNC: 90 MG/DL — SIGNIFICANT CHANGE UP (ref 70–99)
GLUCOSE BLDC GLUCOMTR-MCNC: 91 MG/DL — SIGNIFICANT CHANGE UP (ref 70–99)
GLUCOSE SERPL-MCNC: 52 MG/DL — LOW (ref 70–99)
HCT VFR BLD CALC: 24 % — LOW (ref 34.5–45)
HCT VFR BLD CALC: 26 % — LOW (ref 34.5–45)
HCT VFR BLD CALC: 26.6 % — LOW (ref 34.5–45)
HGB BLD-MCNC: 7.7 G/DL — LOW (ref 11.5–15.5)
HGB BLD-MCNC: 8.2 G/DL — LOW (ref 11.5–15.5)
HGB BLD-MCNC: 8.6 G/DL — LOW (ref 11.5–15.5)
MCHC RBC-ENTMCNC: 29.1 PG — SIGNIFICANT CHANGE UP (ref 27–34)
MCHC RBC-ENTMCNC: 29.7 PG — SIGNIFICANT CHANGE UP (ref 27–34)
MCHC RBC-ENTMCNC: 29.8 PG — SIGNIFICANT CHANGE UP (ref 27–34)
MCHC RBC-ENTMCNC: 31.5 GM/DL — LOW (ref 32–36)
MCHC RBC-ENTMCNC: 31.9 GM/DL — LOW (ref 32–36)
MCHC RBC-ENTMCNC: 32.3 GM/DL — SIGNIFICANT CHANGE UP (ref 32–36)
MCV RBC AUTO: 92 FL — SIGNIFICANT CHANGE UP (ref 80–100)
MCV RBC AUTO: 92.2 FL — SIGNIFICANT CHANGE UP (ref 80–100)
MCV RBC AUTO: 93 FL — SIGNIFICANT CHANGE UP (ref 80–100)
PLATELET # BLD AUTO: 216 K/UL — SIGNIFICANT CHANGE UP (ref 150–400)
PLATELET # BLD AUTO: 233 K/UL — SIGNIFICANT CHANGE UP (ref 150–400)
PLATELET # BLD AUTO: 234 K/UL — SIGNIFICANT CHANGE UP (ref 150–400)
POTASSIUM SERPL-MCNC: 4.4 MMOL/L — SIGNIFICANT CHANGE UP (ref 3.5–5.3)
POTASSIUM SERPL-SCNC: 4.4 MMOL/L — SIGNIFICANT CHANGE UP (ref 3.5–5.3)
RBC # BLD: 2.59 M/UL — LOW (ref 3.8–5.2)
RBC # BLD: 2.82 M/UL — LOW (ref 3.8–5.2)
RBC # BLD: 2.89 M/UL — LOW (ref 3.8–5.2)
RBC # FLD: 18.7 % — HIGH (ref 10.3–14.5)
RBC # FLD: 18.8 % — HIGH (ref 10.3–14.5)
RBC # FLD: 22.1 % — HIGH (ref 10.3–14.5)
SODIUM SERPL-SCNC: 138 MMOL/L — SIGNIFICANT CHANGE UP (ref 135–145)
WBC # BLD: 5.4 K/UL — SIGNIFICANT CHANGE UP (ref 3.8–10.5)
WBC # BLD: 5.8 K/UL — SIGNIFICANT CHANGE UP (ref 3.8–10.5)
WBC # BLD: 6.72 K/UL — SIGNIFICANT CHANGE UP (ref 3.8–10.5)
WBC # FLD AUTO: 5.4 K/UL — SIGNIFICANT CHANGE UP (ref 3.8–10.5)
WBC # FLD AUTO: 5.8 K/UL — SIGNIFICANT CHANGE UP (ref 3.8–10.5)
WBC # FLD AUTO: 6.72 K/UL — SIGNIFICANT CHANGE UP (ref 3.8–10.5)

## 2018-07-24 RX ORDER — INSULIN GLARGINE 100 [IU]/ML
6 INJECTION, SOLUTION SUBCUTANEOUS AT BEDTIME
Qty: 0 | Refills: 0 | Status: DISCONTINUED | OUTPATIENT
Start: 2018-07-24 | End: 2018-07-26

## 2018-07-24 RX ORDER — INSULIN LISPRO 100/ML
3 VIAL (ML) SUBCUTANEOUS
Qty: 0 | Refills: 0 | Status: DISCONTINUED | OUTPATIENT
Start: 2018-07-24 | End: 2018-07-26

## 2018-07-24 RX ORDER — ACETAMINOPHEN 500 MG
500 TABLET ORAL ONCE
Qty: 0 | Refills: 0 | Status: COMPLETED | OUTPATIENT
Start: 2018-07-24 | End: 2018-07-24

## 2018-07-24 RX ORDER — HYDROMORPHONE HYDROCHLORIDE 2 MG/ML
0.5 INJECTION INTRAMUSCULAR; INTRAVENOUS; SUBCUTANEOUS ONCE
Qty: 0 | Refills: 0 | Status: DISCONTINUED | OUTPATIENT
Start: 2018-07-24 | End: 2018-07-24

## 2018-07-24 RX ADMIN — Medication 0.5 MILLIGRAM(S): at 23:15

## 2018-07-24 RX ADMIN — Medication 650 MILLIGRAM(S): at 00:00

## 2018-07-24 RX ADMIN — SERTRALINE 50 MILLIGRAM(S): 25 TABLET, FILM COATED ORAL at 11:42

## 2018-07-24 RX ADMIN — Medication 1 TABLET(S): at 17:30

## 2018-07-24 RX ADMIN — Medication 650 MILLIGRAM(S): at 08:30

## 2018-07-24 RX ADMIN — Medication 1 TABLET(S): at 08:24

## 2018-07-24 RX ADMIN — Medication 100 MILLIGRAM(S): at 17:30

## 2018-07-24 RX ADMIN — HYDROMORPHONE HYDROCHLORIDE 0.5 MILLIGRAM(S): 2 INJECTION INTRAMUSCULAR; INTRAVENOUS; SUBCUTANEOUS at 11:41

## 2018-07-24 RX ADMIN — HYDROMORPHONE HYDROCHLORIDE 0.5 MILLIGRAM(S): 2 INJECTION INTRAMUSCULAR; INTRAVENOUS; SUBCUTANEOUS at 06:46

## 2018-07-24 RX ADMIN — Medication 3 UNIT(S): at 17:29

## 2018-07-24 RX ADMIN — Medication 100 MILLIGRAM(S): at 05:52

## 2018-07-24 RX ADMIN — Medication 0.1 MILLIGRAM(S): at 05:59

## 2018-07-24 RX ADMIN — HYDROMORPHONE HYDROCHLORIDE 0.5 MILLIGRAM(S): 2 INJECTION INTRAMUSCULAR; INTRAVENOUS; SUBCUTANEOUS at 13:55

## 2018-07-24 RX ADMIN — GABAPENTIN 100 MILLIGRAM(S): 400 CAPSULE ORAL at 11:42

## 2018-07-24 RX ADMIN — Medication 650 MILLIGRAM(S): at 08:26

## 2018-07-24 RX ADMIN — HYDROMORPHONE HYDROCHLORIDE 0.5 MILLIGRAM(S): 2 INJECTION INTRAMUSCULAR; INTRAVENOUS; SUBCUTANEOUS at 17:07

## 2018-07-24 RX ADMIN — HYDROMORPHONE HYDROCHLORIDE 0.5 MILLIGRAM(S): 2 INJECTION INTRAMUSCULAR; INTRAVENOUS; SUBCUTANEOUS at 05:51

## 2018-07-24 RX ADMIN — GABAPENTIN 100 MILLIGRAM(S): 400 CAPSULE ORAL at 23:10

## 2018-07-24 RX ADMIN — Medication 100 MILLIGRAM(S): at 13:56

## 2018-07-24 RX ADMIN — Medication 667 MILLIGRAM(S): at 17:30

## 2018-07-24 RX ADMIN — ATORVASTATIN CALCIUM 20 MILLIGRAM(S): 80 TABLET, FILM COATED ORAL at 23:10

## 2018-07-24 RX ADMIN — Medication 100 MILLIGRAM(S): at 23:10

## 2018-07-24 RX ADMIN — Medication 667 MILLIGRAM(S): at 08:24

## 2018-07-24 RX ADMIN — Medication 100 MICROGRAM(S): at 05:52

## 2018-07-24 RX ADMIN — Medication 0.1 MILLIGRAM(S): at 17:30

## 2018-07-24 RX ADMIN — AMLODIPINE BESYLATE 10 MILLIGRAM(S): 2.5 TABLET ORAL at 05:52

## 2018-07-24 RX ADMIN — Medication 1: at 11:40

## 2018-07-24 RX ADMIN — Medication 5 UNIT(S): at 08:23

## 2018-07-24 RX ADMIN — Medication 3 UNIT(S): at 11:49

## 2018-07-24 RX ADMIN — Medication 200 MILLIGRAM(S): at 23:02

## 2018-07-24 RX ADMIN — Medication 500 MILLIGRAM(S): at 23:52

## 2018-07-24 RX ADMIN — INSULIN GLARGINE 6 UNIT(S): 100 INJECTION, SOLUTION SUBCUTANEOUS at 23:10

## 2018-07-24 RX ADMIN — GABAPENTIN 100 MILLIGRAM(S): 400 CAPSULE ORAL at 05:52

## 2018-07-24 NOTE — CHART NOTE - NSCHARTNOTEFT_GEN_A_CORE
Called by RN for bleeding scalp hematoma  Pt. seen and examined at bedside.   Dressing changed and pressure applied. . Called [facility] surgery to assess pt.   Pt. seen and examined at bedside with team. Agree with pressure and pt. head wrapped by surgical team.   Repeat CBC sent stat. Pt. with a lot of pain and given Dilaudid by RN.    Will continue to monitor patient closely. Surgical team continues to follow.   Dr. Carter made aware of above.   [facility] FN-BC

## 2018-07-24 NOTE — PROGRESS NOTE ADULT - SUBJECTIVE AND OBJECTIVE BOX
No distress    Vital Signs Last 24 Hrs  T(C): 36.9 (07-24-18 @ 11:28), Max: 36.9 (07-23-18 @ 14:20)  T(F): 98.5 (07-24-18 @ 11:28), Max: 98.5 (07-24-18 @ 11:28)  HR: 57 (07-24-18 @ 11:28) (56 - 64)  BP: 129/74 (07-24-18 @ 11:28) (115/40 - 159/49)  RR: 15 (07-24-18 @ 11:28) (15 - 18)  SpO2: 100% (07-24-18 @ 11:28) (98% - 100%)    Gen -Conversant, in no apparent distress  Lungs - good air entry bilaterally  Heart - S1S2  Abd - soft, NT,  ND, + BS  Extr - no peripheral edema                        8.6    6.72  )-----------( 234      ( 24 Jul 2018 08:12 )             26.6     24 Jul 2018 06:16    138    |  96     |  18     ----------------------------<  52     4.4     |  29     |  2.42     Ca    9.2        24 Jul 2018 06:16    acetaminophen   Tablet. 650 milliGRAM(s) Oral every 6 hours PRN  amLODIPine   Tablet 10 milliGRAM(s) Oral daily  atorvastatin 20 milliGRAM(s) Oral at bedtime  calcium acetate 667 milliGRAM(s) Oral two times a day with meals  clonazePAM Tablet 0.5 milliGRAM(s) Oral at bedtime  cloNIDine 0.1 milliGRAM(s) Oral two times a day  dextrose 40% Gel 15 Gram(s) Oral once PRN  dextrose 5%. 1000 milliLiter(s) IV Continuous <Continuous>  dextrose 50% Injectable 12.5 Gram(s) IV Push once  dextrose 50% Injectable 25 Gram(s) IV Push once  dextrose 50% Injectable 25 Gram(s) IV Push once  docusate sodium 100 milliGRAM(s) Oral two times a day  gabapentin 100 milliGRAM(s) Oral three times a day  glucagon  Injectable 1 milliGRAM(s) IntraMuscular once PRN  hydrALAZINE 100 milliGRAM(s) Oral every 8 hours  HYDROmorphone  Injectable 0.5 milliGRAM(s) IV Push every 6 hours PRN  insulin glargine Injectable (LANTUS) 6 Unit(s) SubCutaneous at bedtime  insulin lispro (HumaLOG) corrective regimen sliding scale   SubCutaneous three times a day before meals  insulin lispro (HumaLOG) corrective regimen sliding scale   SubCutaneous at bedtime  insulin lispro Injectable (HumaLOG) 3 Unit(s) SubCutaneous three times a day before meals  lactobacillus acidophilus 1 Tablet(s) Oral two times a day with meals  levothyroxine 100 MICROGram(s) Oral daily  lidocaine   Patch 1 Patch Transdermal daily  sertraline 50 milliGRAM(s) Oral daily         A/P:    ESRD on HD  Renal diet  Epogen w/HD  TMP as able  Next HD Wed

## 2018-07-24 NOTE — PROGRESS NOTE ADULT - SUBJECTIVE AND OBJECTIVE BOX
Surgery Progress Note    SUBJECTIVE: Pt seen and examined at bedside. Patient comfortable and in no-apparent distress. Pain is controlled.       Vital Signs Last 24 Hrs  T(C): 36.7 (24 Jul 2018 05:07), Max: 36.9 (23 Jul 2018 14:20)  T(F): 98 (24 Jul 2018 05:07), Max: 98.4 (23 Jul 2018 14:20)  HR: 64 (24 Jul 2018 05:07) (56 - 64)  BP: 143/46 (24 Jul 2018 05:07) (115/40 - 163/60)  BP(mean): --  RR: 18 (24 Jul 2018 05:07) (15 - 18)  SpO2: 98% (24 Jul 2018 05:07) (98% - 100%)    Physical Exam:  General Appearance: Appears well, NAD. Scalp hematoma present posterior aspect of scalp.   Respiratory: No labored breathing  CV: Pulse regularly present  Abdomen: Soft, nontense      LABS:                        8.6    6.72  )-----------( 234      ( 24 Jul 2018 08:12 )             26.6     07-24    138  |  96  |  18  ----------------------------<  52<L>  4.4   |  29  |  2.42<H>    Ca    9.2      24 Jul 2018 06:16            INs and OUTs:    07-23-18 @ 07:01 - 07-24-18 @ 07:00  --------------------------------------------------------  IN: 580 mL / OUT: 1500 mL / NET: -920 mL    07-24-18 @ 07:01 - 07-24-18 @ 10:28  --------------------------------------------------------  IN: 240 mL / OUT: 0 mL / NET: 240 mL

## 2018-07-24 NOTE — PROGRESS NOTE ADULT - SUBJECTIVE AND OBJECTIVE BOX
Patient is a 72y old  Female who presents with a chief complaint of wound dehiscence, bradycardia (21 Jul 2018 22:36)      SUBJECTIVE / OVERNIGHT EVENTS: Comfortable without new complaints.   Review of Systems  chest pain no  palpitations no  sob no  nausea no  headache no    MEDICATIONS  (STANDING):  amLODIPine   Tablet 10 milliGRAM(s) Oral daily  atorvastatin 20 milliGRAM(s) Oral at bedtime  calcium acetate 667 milliGRAM(s) Oral two times a day with meals  clonazePAM Tablet 0.5 milliGRAM(s) Oral at bedtime  cloNIDine 0.1 milliGRAM(s) Oral two times a day  dextrose 5%. 1000 milliLiter(s) (50 mL/Hr) IV Continuous <Continuous>  dextrose 50% Injectable 12.5 Gram(s) IV Push once  dextrose 50% Injectable 25 Gram(s) IV Push once  dextrose 50% Injectable 25 Gram(s) IV Push once  docusate sodium 100 milliGRAM(s) Oral two times a day  gabapentin 100 milliGRAM(s) Oral three times a day  hydrALAZINE 100 milliGRAM(s) Oral every 8 hours  insulin glargine Injectable (LANTUS) 10 Unit(s) SubCutaneous at bedtime  insulin lispro (HumaLOG) corrective regimen sliding scale   SubCutaneous three times a day before meals  insulin lispro (HumaLOG) corrective regimen sliding scale   SubCutaneous at bedtime  insulin lispro Injectable (HumaLOG) 5 Unit(s) SubCutaneous three times a day before meals  lactobacillus acidophilus 1 Tablet(s) Oral two times a day with meals  levothyroxine 100 MICROGram(s) Oral daily  lidocaine   Patch 1 Patch Transdermal daily  sertraline 50 milliGRAM(s) Oral daily    MEDICATIONS  (PRN):  acetaminophen   Tablet. 650 milliGRAM(s) Oral every 6 hours PRN mild or moderate pain  dextrose 40% Gel 15 Gram(s) Oral once PRN Blood Glucose LESS THAN 70 milliGRAM(s)/deciliter  glucagon  Injectable 1 milliGRAM(s) IntraMuscular once PRN Glucose LESS THAN 70 milligrams/deciliter  HYDROmorphone  Injectable 0.5 milliGRAM(s) IV Push every 6 hours PRN Severe Pain (7 - 10)          PHYSICAL EXAM:  GENERAL: NAD, well-developed  HEAD: L periorbital ecchymosis, occipital hematoma stable, Normocephalic  EYES: EOMI, PERRLA, conjunctiva and sclera clear  NECK: Supple, No JVD Cervical collar in place  CHEST/LUNG: Clear to auscultation bilaterally; No wheeze  HEART: Regular rate and rhythm; No murmurs, rubs, or gallops  ABDOMEN: Soft, Nontender, Nondistended; Bowel sounds present  EXTREMITIES:  2+ Peripheral Pulses, No clubbing, cyanosis, or edema  PSYCH: AAOx3  NEUROLOGY: non-focal  SKIN: No rashes or lesions    LABS:                        8.6    6.72  )-----------( 234      ( 24 Jul 2018 08:12 )             26.6     07-24    138  |  96  |  18  ----------------------------<  52<L>  4.4   |  29  |  2.42<H>    Ca    9.2      24 Jul 2018 06:16          Telemetry  55-70      RADIOLOGY & ADDITIONAL TESTS:    Imaging Personally Reviewed:    Consultant(s) Notes Reviewed:      Care Discussed with Consultants/Other Providers:

## 2018-07-24 NOTE — CHART NOTE - NSCHARTNOTEFT_GEN_A_CORE
EP Note:    Attempted to check ILR however issues with  did not allow device to communicate with device. Device will be checked tomorrow morning with assistance of repNaga Alexander PA-C  [id number]

## 2018-07-24 NOTE — CHART NOTE - NSCHARTNOTEFT_GEN_A_CORE
Patient refused MRI and refusing to fill out screening form. Further work up including MRI can be completed as an outpatient. Would recommend cont fatoumata GARIBAY and TLSO.     For TLSO can contact Elliot Stuart  [phone]

## 2018-07-24 NOTE — PROGRESS NOTE ADULT - SUBJECTIVE AND OBJECTIVE BOX
Subjective: Patient seen and examined. No new events except as noted.   feels weak and lethargic   No cp or sob   s/p PRBC transfusion yesterday     REVIEW OF SYSTEMS:    CONSTITUTIONAL: + weakness, fevers or chills  EYES/ENT: No visual changes;  No vertigo or throat pain   NECK: No pain or stiffness  RESPIRATORY: No cough, wheezing, hemoptysis; No shortness of breath  CARDIOVASCULAR: No chest pain or palpitations  GASTROINTESTINAL: No abdominal or epigastric pain. No nausea, vomiting, or hematemesis; No diarrhea or constipation. No melena or hematochezia.  GENITOURINARY: No dysuria, frequency or hematuria  NEUROLOGICAL: No numbness or weakness  SKIN: No itching, burning, rashes, or lesions   All other review of systems is negative unless indicated above.    MEDICATIONS:  MEDICATIONS  (STANDING):  amLODIPine   Tablet 10 milliGRAM(s) Oral daily  atorvastatin 20 milliGRAM(s) Oral at bedtime  calcium acetate 667 milliGRAM(s) Oral two times a day with meals  clonazePAM Tablet 0.5 milliGRAM(s) Oral at bedtime  cloNIDine 0.1 milliGRAM(s) Oral every 8 hours  dextrose 5%. 1000 milliLiter(s) (50 mL/Hr) IV Continuous <Continuous>  dextrose 50% Injectable 12.5 Gram(s) IV Push once  dextrose 50% Injectable 25 Gram(s) IV Push once  dextrose 50% Injectable 25 Gram(s) IV Push once  docusate sodium 100 milliGRAM(s) Oral two times a day  gabapentin 100 milliGRAM(s) Oral three times a day  hydrALAZINE 100 milliGRAM(s) Oral every 8 hours  insulin glargine Injectable (LANTUS) 10 Unit(s) SubCutaneous at bedtime  insulin lispro (HumaLOG) corrective regimen sliding scale   SubCutaneous three times a day before meals  insulin lispro (HumaLOG) corrective regimen sliding scale   SubCutaneous at bedtime  insulin lispro Injectable (HumaLOG) 5 Unit(s) SubCutaneous three times a day before meals  lactobacillus acidophilus 1 Tablet(s) Oral two times a day with meals  levothyroxine 100 MICROGram(s) Oral daily  lidocaine   Patch 1 Patch Transdermal daily  sertraline 50 milliGRAM(s) Oral daily  sodium polystyrene sulfonate Suspension 30 Gram(s) Oral once      PHYSICAL EXAM:  T(C): 36.6 (07-23-18 @ 05:45), Max: 36.9 (07-22-18 @ 21:03)  HR: 58 (07-23-18 @ 05:45) (53 - 58)  BP: 126/62 (07-23-18 @ 05:45) (126/62 - 143/62)  RR: 16 (07-23-18 @ 05:45) (15 - 18)  SpO2: 98% (07-23-18 @ 05:45) (98% - 100%)  Wt(kg): --  I&O's Summary    22 Jul 2018 07:01  -  23 Jul 2018 07:00  --------------------------------------------------------  IN: 920 mL / OUT: 0 mL / NET: 920 mL        Weight (kg): 46.3 (07-22 @ 21:52)    Appearance: NAD, Scalp hematoma present posterior aspect of scalp.   HEENT:  dry  oral mucosa, Left boni-orbital ecchymosis 	  Lymphatic: No lymphadenopathy , no edema  Cardiovascular: Normal S1 S2, No JVD, No murmurs , Peripheral pulses palpable 2+ bilaterally  Respiratory: Lungs clear to auscultation, normal effort 	  Gastrointestinal:  Soft, Non-tender, + BS	  Skin: No rashes, No ecchymoses, No cyanosis, warm to touch  Musculoskeletal: decreased  range of motion and strength  Psychiatry:  lethargic   Ext: No edema      LABS:    CARDIAC MARKERS:  CARDIAC MARKERS ( 22 Jul 2018 00:31 )  x     / x     / 44 U/L / x     / 4.9 ng/mL  CARDIAC MARKERS ( 21 Jul 2018 20:31 )  x     / x     / 45 U/L / x     / 4.8 ng/mL                                8.0    5.46  )-----------( 200      ( 23 Jul 2018 07:40 )             24.5     07-23    x   |  x   |  x   ----------------------------<  x   5.9<H>   |  x   |  x     Ca    9.1      23 Jul 2018 06:45  Phos  3.3     07-22  Mg     2.0     07-22    TPro  5.9<L>  /  Alb  3.5  /  TBili  0.3  /  DBili  x   /  AST  21  /  ALT  11  /  AlkPhos  148<H>  07-22    proBNP:   Lipid Profile:   HgA1c:   TSH:             TELEMETRY: 	SR    ECG:  	  RADIOLOGY:   DIAGNOSTIC TESTING:  [ ] Echocardiogram:  [ ]  Catheterization:  [ ] Stress Test:    OTHER:

## 2018-07-25 LAB
ANION GAP SERPL CALC-SCNC: 13 MMOL/L — SIGNIFICANT CHANGE UP (ref 5–17)
BLD GP AB SCN SERPL QL: NEGATIVE — SIGNIFICANT CHANGE UP
BUN SERPL-MCNC: 33 MG/DL — HIGH (ref 7–23)
CALCIUM SERPL-MCNC: 8.8 MG/DL — SIGNIFICANT CHANGE UP (ref 8.4–10.5)
CHLORIDE SERPL-SCNC: 93 MMOL/L — LOW (ref 96–108)
CO2 SERPL-SCNC: 26 MMOL/L — SIGNIFICANT CHANGE UP (ref 22–31)
CREAT SERPL-MCNC: 3.42 MG/DL — HIGH (ref 0.5–1.3)
GLUCOSE BLDC GLUCOMTR-MCNC: 102 MG/DL — HIGH (ref 70–99)
GLUCOSE BLDC GLUCOMTR-MCNC: 136 MG/DL — HIGH (ref 70–99)
GLUCOSE BLDC GLUCOMTR-MCNC: 169 MG/DL — HIGH (ref 70–99)
GLUCOSE BLDC GLUCOMTR-MCNC: 180 MG/DL — HIGH (ref 70–99)
GLUCOSE SERPL-MCNC: 116 MG/DL — HIGH (ref 70–99)
HCT VFR BLD CALC: 23.6 % — LOW (ref 34.5–45)
HGB BLD-MCNC: 7.5 G/DL — LOW (ref 11.5–15.5)
MAGNESIUM SERPL-MCNC: 2 MG/DL — SIGNIFICANT CHANGE UP (ref 1.6–2.6)
MCHC RBC-ENTMCNC: 28.8 PG — SIGNIFICANT CHANGE UP (ref 27–34)
MCHC RBC-ENTMCNC: 31.8 GM/DL — LOW (ref 32–36)
MCV RBC AUTO: 90.8 FL — SIGNIFICANT CHANGE UP (ref 80–100)
PLATELET # BLD AUTO: 219 K/UL — SIGNIFICANT CHANGE UP (ref 150–400)
POTASSIUM SERPL-MCNC: 4.6 MMOL/L — SIGNIFICANT CHANGE UP (ref 3.5–5.3)
POTASSIUM SERPL-SCNC: 4.6 MMOL/L — SIGNIFICANT CHANGE UP (ref 3.5–5.3)
RBC # BLD: 2.6 M/UL — LOW (ref 3.8–5.2)
RBC # FLD: 21.3 % — HIGH (ref 10.3–14.5)
RH IG SCN BLD-IMP: POSITIVE — SIGNIFICANT CHANGE UP
SODIUM SERPL-SCNC: 132 MMOL/L — LOW (ref 135–145)
WBC # BLD: 5.27 K/UL — SIGNIFICANT CHANGE UP (ref 3.8–10.5)
WBC # FLD AUTO: 5.27 K/UL — SIGNIFICANT CHANGE UP (ref 3.8–10.5)

## 2018-07-25 RX ORDER — ERYTHROPOIETIN 10000 [IU]/ML
10000 INJECTION, SOLUTION INTRAVENOUS; SUBCUTANEOUS ONCE
Qty: 0 | Refills: 0 | Status: COMPLETED | OUTPATIENT
Start: 2018-07-25 | End: 2018-07-25

## 2018-07-25 RX ADMIN — Medication 100 MILLIGRAM(S): at 06:37

## 2018-07-25 RX ADMIN — Medication 667 MILLIGRAM(S): at 07:54

## 2018-07-25 RX ADMIN — INSULIN GLARGINE 6 UNIT(S): 100 INJECTION, SOLUTION SUBCUTANEOUS at 22:09

## 2018-07-25 RX ADMIN — Medication 650 MILLIGRAM(S): at 22:56

## 2018-07-25 RX ADMIN — Medication 0.1 MILLIGRAM(S): at 06:37

## 2018-07-25 RX ADMIN — Medication 3 UNIT(S): at 11:26

## 2018-07-25 RX ADMIN — Medication 100 MICROGRAM(S): at 06:37

## 2018-07-25 RX ADMIN — Medication 650 MILLIGRAM(S): at 22:26

## 2018-07-25 RX ADMIN — LIDOCAINE 1 PATCH: 4 CREAM TOPICAL at 11:27

## 2018-07-25 RX ADMIN — Medication 3 UNIT(S): at 07:54

## 2018-07-25 RX ADMIN — Medication 3 UNIT(S): at 17:43

## 2018-07-25 RX ADMIN — GABAPENTIN 100 MILLIGRAM(S): 400 CAPSULE ORAL at 13:29

## 2018-07-25 RX ADMIN — HYDROMORPHONE HYDROCHLORIDE 0.5 MILLIGRAM(S): 2 INJECTION INTRAMUSCULAR; INTRAVENOUS; SUBCUTANEOUS at 11:28

## 2018-07-25 RX ADMIN — ATORVASTATIN CALCIUM 20 MILLIGRAM(S): 80 TABLET, FILM COATED ORAL at 22:09

## 2018-07-25 RX ADMIN — GABAPENTIN 100 MILLIGRAM(S): 400 CAPSULE ORAL at 06:37

## 2018-07-25 RX ADMIN — Medication 100 MILLIGRAM(S): at 22:09

## 2018-07-25 RX ADMIN — Medication 667 MILLIGRAM(S): at 17:43

## 2018-07-25 RX ADMIN — Medication 1 TABLET(S): at 07:54

## 2018-07-25 RX ADMIN — Medication 0.5 MILLIGRAM(S): at 22:09

## 2018-07-25 RX ADMIN — AMLODIPINE BESYLATE 10 MILLIGRAM(S): 2.5 TABLET ORAL at 06:37

## 2018-07-25 RX ADMIN — HYDROMORPHONE HYDROCHLORIDE 0.5 MILLIGRAM(S): 2 INJECTION INTRAMUSCULAR; INTRAVENOUS; SUBCUTANEOUS at 22:35

## 2018-07-25 RX ADMIN — Medication 650 MILLIGRAM(S): at 14:09

## 2018-07-25 RX ADMIN — Medication 1: at 11:27

## 2018-07-25 RX ADMIN — Medication 0.1 MILLIGRAM(S): at 22:08

## 2018-07-25 RX ADMIN — SERTRALINE 50 MILLIGRAM(S): 25 TABLET, FILM COATED ORAL at 11:27

## 2018-07-25 RX ADMIN — Medication 650 MILLIGRAM(S): at 13:29

## 2018-07-25 RX ADMIN — ERYTHROPOIETIN 10000 UNIT(S): 10000 INJECTION, SOLUTION INTRAVENOUS; SUBCUTANEOUS at 19:16

## 2018-07-25 RX ADMIN — Medication 100 MILLIGRAM(S): at 17:45

## 2018-07-25 RX ADMIN — Medication 1 TABLET(S): at 17:43

## 2018-07-25 RX ADMIN — HYDROMORPHONE HYDROCHLORIDE 0.5 MILLIGRAM(S): 2 INJECTION INTRAMUSCULAR; INTRAVENOUS; SUBCUTANEOUS at 22:50

## 2018-07-25 RX ADMIN — GABAPENTIN 100 MILLIGRAM(S): 400 CAPSULE ORAL at 22:09

## 2018-07-25 RX ADMIN — HYDROMORPHONE HYDROCHLORIDE 0.5 MILLIGRAM(S): 2 INJECTION INTRAMUSCULAR; INTRAVENOUS; SUBCUTANEOUS at 11:43

## 2018-07-25 RX ADMIN — LIDOCAINE 1 PATCH: 4 CREAM TOPICAL at 22:38

## 2018-07-25 NOTE — PROGRESS NOTE ADULT - SUBJECTIVE AND OBJECTIVE BOX
Patient is a 72y old  Female who presents with a chief complaint of wound dehiscence, bradycardia (21 Jul 2018 22:36)      SUBJECTIVE / OVERNIGHT EVENTS: Comfortable without new complaints.   Review of Systems  chest pain no  palpitations no  sob no  nausea no  headache no    MEDICATIONS  (STANDING):  amLODIPine   Tablet 10 milliGRAM(s) Oral daily  atorvastatin 20 milliGRAM(s) Oral at bedtime  calcium acetate 667 milliGRAM(s) Oral two times a day with meals  clonazePAM Tablet 0.5 milliGRAM(s) Oral at bedtime  cloNIDine 0.1 milliGRAM(s) Oral two times a day  dextrose 5%. 1000 milliLiter(s) (50 mL/Hr) IV Continuous <Continuous>  dextrose 50% Injectable 12.5 Gram(s) IV Push once  dextrose 50% Injectable 25 Gram(s) IV Push once  dextrose 50% Injectable 25 Gram(s) IV Push once  docusate sodium 100 milliGRAM(s) Oral two times a day  epoetin iam Injectable 13745 Unit(s) IV Push once  gabapentin 100 milliGRAM(s) Oral three times a day  hydrALAZINE 100 milliGRAM(s) Oral every 8 hours  insulin glargine Injectable (LANTUS) 6 Unit(s) SubCutaneous at bedtime  insulin lispro (HumaLOG) corrective regimen sliding scale   SubCutaneous three times a day before meals  insulin lispro (HumaLOG) corrective regimen sliding scale   SubCutaneous at bedtime  insulin lispro Injectable (HumaLOG) 3 Unit(s) SubCutaneous three times a day before meals  lactobacillus acidophilus 1 Tablet(s) Oral two times a day with meals  levothyroxine 100 MICROGram(s) Oral daily  lidocaine   Patch 1 Patch Transdermal daily  sertraline 50 milliGRAM(s) Oral daily    MEDICATIONS  (PRN):  acetaminophen   Tablet. 650 milliGRAM(s) Oral every 6 hours PRN mild or moderate pain  dextrose 40% Gel 15 Gram(s) Oral once PRN Blood Glucose LESS THAN 70 milliGRAM(s)/deciliter  glucagon  Injectable 1 milliGRAM(s) IntraMuscular once PRN Glucose LESS THAN 70 milligrams/deciliter  HYDROmorphone  Injectable 0.5 milliGRAM(s) IV Push every 6 hours PRN Severe Pain (7 - 10)      Vital Signs Last 24 Hrs  T(C): 36.8 (25 Jul 2018 11:36), Max: 37.1 (24 Jul 2018 17:32)  T(F): 98.2 (25 Jul 2018 11:36), Max: 98.7 (24 Jul 2018 17:32)  HR: 62 (25 Jul 2018 13:45) (60 - 69)  BP: 130/68 (25 Jul 2018 13:45) (116/43 - 150/60)  BP(mean): --  RR: 15 (25 Jul 2018 11:36) (15 - 18)  SpO2: 99% (25 Jul 2018 11:36) (99% - 100%)    PHYSICAL EXAM:  GENERAL: NAD, well-developed  HEAD: L periorbital ecchymosis, Normocephalic  EYES: EOMI, PERRLA, conjunctiva and sclera clear  NECK: Supple, No JVD cervical collar inplace  CHEST/LUNG: Clear to auscultation bilaterally; No wheeze  HEART: Regular rate and rhythm; No murmurs, rubs, or gallops  ABDOMEN: Soft, Nontender, Nondistended; Bowel sounds present  EXTREMITIES:  2+ Peripheral Pulses, No clubbing, cyanosis, or edema  PSYCH: AAOx3  NEUROLOGY: non-focal  SKIN: No rashes or lesions    LABS:                        7.5    5.27  )-----------( 219      ( 25 Jul 2018 08:05 )             23.6     07-25    132<L>  |  93<L>  |  33<H>  ----------------------------<  116<H>  4.6   |  26  |  3.42<H>    Ca    8.8      25 Jul 2018 06:06  Mg     2.0     07-25                  RADIOLOGY & ADDITIONAL TESTS:    Imaging Personally Reviewed:    Consultant(s) Notes Reviewed:      Care Discussed with Consultants/Other Providers:

## 2018-07-25 NOTE — PROGRESS NOTE ADULT - SUBJECTIVE AND OBJECTIVE BOX
Surgery Progress Note    SUBJECTIVE: Pt seen and examined at bedside. Outer dressing changed overnight, saturated dressing remained in place.     Vital Signs Last 24 Hrs  T(C): 36.7 (25 Jul 2018 06:31), Max: 37.1 (24 Jul 2018 17:32)  T(F): 98 (25 Jul 2018 06:31), Max: 98.7 (24 Jul 2018 17:32)  HR: 69 (25 Jul 2018 06:31) (57 - 69)  BP: 150/60 (25 Jul 2018 06:31) (128/47 - 150/60)  BP(mean): --  RR: 18 (25 Jul 2018 06:31) (15 - 18)  SpO2: 99% (25 Jul 2018 06:31) (99% - 100%)    Physical Exam:  General Appearance: Appears well, NAD. C-collar in place for cervical fracture. Head dressing saturated, ace in place without saturation.   Respiratory: No labored breathing  CV: Pulse regularly present  Abdomen: Soft, nontense      LABS:                        7.5    5.27  )-----------( 219      ( 25 Jul 2018 08:05 )             23.6     07-25    132<L>  |  93<L>  |  33<H>  ----------------------------<  116<H>  4.6   |  26  |  3.42<H>    Ca    8.8      25 Jul 2018 06:06  Mg     2.0     07-25            INs and OUTs:    07-24-18 @ 07:01  -  07-25-18 @ 07:00  --------------------------------------------------------  IN: 600 mL / OUT: 0 mL / NET: 600 mL

## 2018-07-25 NOTE — PROGRESS NOTE ADULT - SUBJECTIVE AND OBJECTIVE BOX
Subjective: Patient seen and examined. No new events except as noted.   eating breakfast   no cp or sob     REVIEW OF SYSTEMS:    CONSTITUTIONAL: + weakness, fevers or chills  EYES/ENT: No visual changes;  No vertigo or throat pain   NECK: No pain or stiffness  RESPIRATORY: No cough, wheezing, hemoptysis; No shortness of breath  CARDIOVASCULAR: No chest pain or palpitations  GASTROINTESTINAL: No abdominal or epigastric pain. No nausea, vomiting, or hematemesis; No diarrhea or constipation. No melena or hematochezia.  GENITOURINARY: No dysuria, frequency or hematuria  NEUROLOGICAL: No numbness or weakness  SKIN: No itching, burning, rashes, or lesions   All other review of systems is negative unless indicated above.    MEDICATIONS:  MEDICATIONS  (STANDING):  amLODIPine   Tablet 10 milliGRAM(s) Oral daily  atorvastatin 20 milliGRAM(s) Oral at bedtime  calcium acetate 667 milliGRAM(s) Oral two times a day with meals  clonazePAM Tablet 0.5 milliGRAM(s) Oral at bedtime  cloNIDine 0.1 milliGRAM(s) Oral two times a day  dextrose 5%. 1000 milliLiter(s) (50 mL/Hr) IV Continuous <Continuous>  dextrose 50% Injectable 12.5 Gram(s) IV Push once  dextrose 50% Injectable 25 Gram(s) IV Push once  dextrose 50% Injectable 25 Gram(s) IV Push once  docusate sodium 100 milliGRAM(s) Oral two times a day  gabapentin 100 milliGRAM(s) Oral three times a day  hydrALAZINE 100 milliGRAM(s) Oral every 8 hours  insulin glargine Injectable (LANTUS) 6 Unit(s) SubCutaneous at bedtime  insulin lispro (HumaLOG) corrective regimen sliding scale   SubCutaneous three times a day before meals  insulin lispro (HumaLOG) corrective regimen sliding scale   SubCutaneous at bedtime  insulin lispro Injectable (HumaLOG) 3 Unit(s) SubCutaneous three times a day before meals  lactobacillus acidophilus 1 Tablet(s) Oral two times a day with meals  levothyroxine 100 MICROGram(s) Oral daily  lidocaine   Patch 1 Patch Transdermal daily  sertraline 50 milliGRAM(s) Oral daily      PHYSICAL EXAM:  T(C): 36.7 (07-25-18 @ 06:31), Max: 37.1 (07-24-18 @ 17:32)  HR: 69 (07-25-18 @ 06:31) (57 - 69)  BP: 150/60 (07-25-18 @ 06:31) (128/47 - 150/60)  RR: 18 (07-25-18 @ 06:31) (15 - 18)  SpO2: 99% (07-25-18 @ 06:31) (99% - 100%)  Wt(kg): --  I&O's Summary    24 Jul 2018 07:01  -  25 Jul 2018 07:00  --------------------------------------------------------  IN: 600 mL / OUT: 0 mL / NET: 600 mL          Appearance: NAD	  HEENT:   Normal oral mucosa, left boni-orbital ecchymosis 	  Lymphatic: No lymphadenopathy ,Cervical collar   Cardiovascular: Normal S1 S2, No JVD, No murmurs , Peripheral pulses palpable 2+ bilaterally  Respiratory: Lungs clear to auscultation, normal effort 	  Gastrointestinal:  Soft, Non-tender, + BS	  Skin: No rashes, No ecchymoses, No cyanosis, warm to touch  Musculoskeletal: Normal range of motion, normal strength  Psychiatry:  Mood & affect appropriate  Ext: No edema      LABS:    CARDIAC MARKERS:                                7.5    5.27  )-----------( 219      ( 25 Jul 2018 08:05 )             23.6     07-25    132<L>  |  93<L>  |  33<H>  ----------------------------<  116<H>  4.6   |  26  |  3.42<H>    Ca    8.8      25 Jul 2018 06:06  Mg     2.0     07-25      proBNP:   Lipid Profile:   HgA1c:   TSH:             TELEMETRY: 	SR    ECG:  	  RADIOLOGY:   DIAGNOSTIC TESTING:  [ ] Echocardiogram:  [ ]  Catheterization:  [ ] Stress Test:    OTHER:

## 2018-07-25 NOTE — CHART NOTE - NSCHARTNOTEFT_GEN_A_CORE
EP Note: ILR interrogation note    Asked to check ILR for arrhythmias    Patient with a [place] Confirm Rx ILR    No arrhythmias seen on interrogation since last check. No AF or chelsea events noted  The 4 episodes labeled as "pause" on 7/9 were artifact/undersensing QRS     Parameters:  Tachy: 160bpm for 12 intervals  Chelsea: 30bpm  Pause: 3 seconds  AF: 6 minutes

## 2018-07-25 NOTE — PROGRESS NOTE ADULT - SUBJECTIVE AND OBJECTIVE BOX
Resting    Vital Signs Last 24 Hrs  T(C): 36.8 (07-25-18 @ 11:36), Max: 37.1 (07-24-18 @ 17:32)  T(F): 98.2 (07-25-18 @ 11:36), Max: 98.7 (07-24-18 @ 17:32)  HR: 60 (07-25-18 @ 11:36) (60 - 69)  BP: 116/43 (07-25-18 @ 11:36) (116/43 - 150/60)  RR: 15 (07-25-18 @ 11:36) (15 - 18)  SpO2: 99% (07-25-18 @ 11:36) (99% - 100%)    Lungs - good air entry bilaterally  Heart - S1S2  Abd - soft, NT,  ND, + BS  Extr - no peripheral edema                              7.5    5.27  )-----------( 219      ( 25 Jul 2018 08:05 )             23.6     25 Jul 2018 06:06    132    |  93     |  33     ----------------------------<  116    4.6     |  26     |  3.42     Ca    8.8        25 Jul 2018 06:06  Mg     2.0       25 Jul 2018 06:06    acetaminophen   Tablet. 650 milliGRAM(s) Oral every 6 hours PRN  amLODIPine   Tablet 10 milliGRAM(s) Oral daily  atorvastatin 20 milliGRAM(s) Oral at bedtime  calcium acetate 667 milliGRAM(s) Oral two times a day with meals  clonazePAM Tablet 0.5 milliGRAM(s) Oral at bedtime  cloNIDine 0.1 milliGRAM(s) Oral two times a day  dextrose 40% Gel 15 Gram(s) Oral once PRN  dextrose 5%. 1000 milliLiter(s) IV Continuous <Continuous>  dextrose 50% Injectable 12.5 Gram(s) IV Push once  dextrose 50% Injectable 25 Gram(s) IV Push once  dextrose 50% Injectable 25 Gram(s) IV Push once  docusate sodium 100 milliGRAM(s) Oral two times a day  gabapentin 100 milliGRAM(s) Oral three times a day  glucagon  Injectable 1 milliGRAM(s) IntraMuscular once PRN  hydrALAZINE 100 milliGRAM(s) Oral every 8 hours  HYDROmorphone  Injectable 0.5 milliGRAM(s) IV Push every 6 hours PRN  insulin glargine Injectable (LANTUS) 6 Unit(s) SubCutaneous at bedtime  insulin lispro (HumaLOG) corrective regimen sliding scale   SubCutaneous three times a day before meals  insulin lispro (HumaLOG) corrective regimen sliding scale   SubCutaneous at bedtime  insulin lispro Injectable (HumaLOG) 3 Unit(s) SubCutaneous three times a day before meals  lactobacillus acidophilus 1 Tablet(s) Oral two times a day with meals  levothyroxine 100 MICROGram(s) Oral daily  lidocaine   Patch 1 Patch Transdermal daily  sertraline 50 milliGRAM(s) Oral daily         A/P:    ESRD on HD  Renal diet  HD today  2K bath  Epogen w/HD  TMP as able  Heparin free

## 2018-07-25 NOTE — CHART NOTE - NSCHARTNOTEFT_GEN_A_CORE
Measure and deliver [place] LSO. Reviewed application skin precautions and care. Orthosis to be worn when out of bed. Written instructions and contact information left bedside. To notify office with any issues questions or concerns.  Elliot HORNER  [facility]  [phone]

## 2018-07-26 LAB
ANION GAP SERPL CALC-SCNC: 11 MMOL/L — SIGNIFICANT CHANGE UP (ref 5–17)
BUN SERPL-MCNC: 13 MG/DL — SIGNIFICANT CHANGE UP (ref 7–23)
CALCIUM SERPL-MCNC: 8.9 MG/DL — SIGNIFICANT CHANGE UP (ref 8.4–10.5)
CHLORIDE SERPL-SCNC: 96 MMOL/L — SIGNIFICANT CHANGE UP (ref 96–108)
CO2 SERPL-SCNC: 30 MMOL/L — SIGNIFICANT CHANGE UP (ref 22–31)
CREAT SERPL-MCNC: 2.16 MG/DL — HIGH (ref 0.5–1.3)
GLUCOSE BLDC GLUCOMTR-MCNC: 125 MG/DL — HIGH (ref 70–99)
GLUCOSE BLDC GLUCOMTR-MCNC: 143 MG/DL — HIGH (ref 70–99)
GLUCOSE BLDC GLUCOMTR-MCNC: 234 MG/DL — HIGH (ref 70–99)
GLUCOSE BLDC GLUCOMTR-MCNC: 271 MG/DL — HIGH (ref 70–99)
GLUCOSE BLDC GLUCOMTR-MCNC: 53 MG/DL — LOW (ref 70–99)
GLUCOSE BLDC GLUCOMTR-MCNC: 57 MG/DL — LOW (ref 70–99)
GLUCOSE BLDC GLUCOMTR-MCNC: 71 MG/DL — SIGNIFICANT CHANGE UP (ref 70–99)
GLUCOSE SERPL-MCNC: 49 MG/DL — LOW (ref 70–99)
HCT VFR BLD CALC: 29.5 % — LOW (ref 34.5–45)
HGB BLD-MCNC: 9.2 G/DL — LOW (ref 11.5–15.5)
MCHC RBC-ENTMCNC: 28.5 PG — SIGNIFICANT CHANGE UP (ref 27–34)
MCHC RBC-ENTMCNC: 31.2 GM/DL — LOW (ref 32–36)
MCV RBC AUTO: 91.3 FL — SIGNIFICANT CHANGE UP (ref 80–100)
PLATELET # BLD AUTO: 257 K/UL — SIGNIFICANT CHANGE UP (ref 150–400)
POTASSIUM SERPL-MCNC: 4 MMOL/L — SIGNIFICANT CHANGE UP (ref 3.5–5.3)
POTASSIUM SERPL-SCNC: 4 MMOL/L — SIGNIFICANT CHANGE UP (ref 3.5–5.3)
RBC # BLD: 3.23 M/UL — LOW (ref 3.8–5.2)
RBC # FLD: 19.2 % — HIGH (ref 10.3–14.5)
SODIUM SERPL-SCNC: 137 MMOL/L — SIGNIFICANT CHANGE UP (ref 135–145)
WBC # BLD: 5.69 K/UL — SIGNIFICANT CHANGE UP (ref 3.8–10.5)
WBC # FLD AUTO: 5.69 K/UL — SIGNIFICANT CHANGE UP (ref 3.8–10.5)

## 2018-07-26 RX ORDER — INSULIN GLARGINE 100 [IU]/ML
3 INJECTION, SOLUTION SUBCUTANEOUS AT BEDTIME
Qty: 0 | Refills: 0 | Status: DISCONTINUED | OUTPATIENT
Start: 2018-07-26 | End: 2018-07-28

## 2018-07-26 RX ORDER — PETROLATUM,WHITE
1 JELLY (GRAM) TOPICAL THREE TIMES A DAY
Qty: 0 | Refills: 0 | Status: DISCONTINUED | OUTPATIENT
Start: 2018-07-26 | End: 2018-07-28

## 2018-07-26 RX ADMIN — Medication 0.5 MILLIGRAM(S): at 21:12

## 2018-07-26 RX ADMIN — GABAPENTIN 100 MILLIGRAM(S): 400 CAPSULE ORAL at 14:02

## 2018-07-26 RX ADMIN — Medication 650 MILLIGRAM(S): at 12:04

## 2018-07-26 RX ADMIN — Medication 0.1 MILLIGRAM(S): at 17:30

## 2018-07-26 RX ADMIN — Medication 650 MILLIGRAM(S): at 11:34

## 2018-07-26 RX ADMIN — Medication 100 MICROGRAM(S): at 05:06

## 2018-07-26 RX ADMIN — HYDROMORPHONE HYDROCHLORIDE 0.5 MILLIGRAM(S): 2 INJECTION INTRAMUSCULAR; INTRAVENOUS; SUBCUTANEOUS at 16:50

## 2018-07-26 RX ADMIN — HYDROMORPHONE HYDROCHLORIDE 0.5 MILLIGRAM(S): 2 INJECTION INTRAMUSCULAR; INTRAVENOUS; SUBCUTANEOUS at 10:48

## 2018-07-26 RX ADMIN — HYDROMORPHONE HYDROCHLORIDE 0.5 MILLIGRAM(S): 2 INJECTION INTRAMUSCULAR; INTRAVENOUS; SUBCUTANEOUS at 10:33

## 2018-07-26 RX ADMIN — HYDROMORPHONE HYDROCHLORIDE 0.5 MILLIGRAM(S): 2 INJECTION INTRAMUSCULAR; INTRAVENOUS; SUBCUTANEOUS at 16:35

## 2018-07-26 RX ADMIN — AMLODIPINE BESYLATE 10 MILLIGRAM(S): 2.5 TABLET ORAL at 05:06

## 2018-07-26 RX ADMIN — Medication 100 MILLIGRAM(S): at 05:06

## 2018-07-26 RX ADMIN — Medication 1 APPLICATION(S): at 14:02

## 2018-07-26 RX ADMIN — INSULIN GLARGINE 3 UNIT(S): 100 INJECTION, SOLUTION SUBCUTANEOUS at 22:35

## 2018-07-26 RX ADMIN — GABAPENTIN 100 MILLIGRAM(S): 400 CAPSULE ORAL at 05:06

## 2018-07-26 RX ADMIN — Medication 100 MILLIGRAM(S): at 21:12

## 2018-07-26 RX ADMIN — Medication 667 MILLIGRAM(S): at 17:30

## 2018-07-26 RX ADMIN — HYDROMORPHONE HYDROCHLORIDE 0.5 MILLIGRAM(S): 2 INJECTION INTRAMUSCULAR; INTRAVENOUS; SUBCUTANEOUS at 21:26

## 2018-07-26 RX ADMIN — LIDOCAINE 1 PATCH: 4 CREAM TOPICAL at 23:00

## 2018-07-26 RX ADMIN — Medication 100 MILLIGRAM(S): at 17:30

## 2018-07-26 RX ADMIN — Medication 1 TABLET(S): at 17:30

## 2018-07-26 RX ADMIN — HYDROMORPHONE HYDROCHLORIDE 0.5 MILLIGRAM(S): 2 INJECTION INTRAMUSCULAR; INTRAVENOUS; SUBCUTANEOUS at 21:11

## 2018-07-26 RX ADMIN — LIDOCAINE 1 PATCH: 4 CREAM TOPICAL at 11:34

## 2018-07-26 RX ADMIN — GABAPENTIN 100 MILLIGRAM(S): 400 CAPSULE ORAL at 21:12

## 2018-07-26 RX ADMIN — Medication 3: at 11:34

## 2018-07-26 RX ADMIN — Medication 100 MILLIGRAM(S): at 14:02

## 2018-07-26 RX ADMIN — Medication 1 APPLICATION(S): at 21:12

## 2018-07-26 RX ADMIN — Medication 1 APPLICATION(S): at 12:05

## 2018-07-26 RX ADMIN — Medication 0: at 22:34

## 2018-07-26 RX ADMIN — Medication 1 TABLET(S): at 08:50

## 2018-07-26 RX ADMIN — Medication 0.1 MILLIGRAM(S): at 05:06

## 2018-07-26 RX ADMIN — ATORVASTATIN CALCIUM 20 MILLIGRAM(S): 80 TABLET, FILM COATED ORAL at 21:12

## 2018-07-26 RX ADMIN — Medication 667 MILLIGRAM(S): at 08:50

## 2018-07-26 RX ADMIN — SERTRALINE 50 MILLIGRAM(S): 25 TABLET, FILM COATED ORAL at 11:34

## 2018-07-26 NOTE — PROGRESS NOTE ADULT - SUBJECTIVE AND OBJECTIVE BOX
Patient is a 72y old  Female who presents with a chief complaint of wound dehiscence, bradycardia (21 Jul 2018 22:36)      SUBJECTIVE / OVERNIGHT EVENTS: No new complaints.   Review of Systems  chest pain no  palpitations no  sob no  nausea no  headache no    MEDICATIONS  (STANDING):  amLODIPine   Tablet 10 milliGRAM(s) Oral daily  AQUAPHOR (petrolatum Ointment) 1 Application(s) Topical three times a day  atorvastatin 20 milliGRAM(s) Oral at bedtime  calcium acetate 667 milliGRAM(s) Oral two times a day with meals  clonazePAM Tablet 0.5 milliGRAM(s) Oral at bedtime  cloNIDine 0.1 milliGRAM(s) Oral two times a day  dextrose 5%. 1000 milliLiter(s) (50 mL/Hr) IV Continuous <Continuous>  dextrose 50% Injectable 12.5 Gram(s) IV Push once  dextrose 50% Injectable 25 Gram(s) IV Push once  dextrose 50% Injectable 25 Gram(s) IV Push once  docusate sodium 100 milliGRAM(s) Oral two times a day  gabapentin 100 milliGRAM(s) Oral three times a day  hydrALAZINE 100 milliGRAM(s) Oral every 8 hours  insulin glargine Injectable (LANTUS) 3 Unit(s) SubCutaneous at bedtime  insulin lispro (HumaLOG) corrective regimen sliding scale   SubCutaneous three times a day before meals  insulin lispro (HumaLOG) corrective regimen sliding scale   SubCutaneous at bedtime  lactobacillus acidophilus 1 Tablet(s) Oral two times a day with meals  levothyroxine 100 MICROGram(s) Oral daily  lidocaine   Patch 1 Patch Transdermal daily  sertraline 50 milliGRAM(s) Oral daily    MEDICATIONS  (PRN):  acetaminophen   Tablet. 650 milliGRAM(s) Oral every 6 hours PRN mild or moderate pain  dextrose 40% Gel 15 Gram(s) Oral once PRN Blood Glucose LESS THAN 70 milliGRAM(s)/deciliter  glucagon  Injectable 1 milliGRAM(s) IntraMuscular once PRN Glucose LESS THAN 70 milligrams/deciliter  HYDROmorphone  Injectable 0.5 milliGRAM(s) IV Push every 6 hours PRN Severe Pain (7 - 10)      Vital Signs Last 24 Hrs  T(C): 37.2 (26 Jul 2018 10:30), Max: 37.2 (26 Jul 2018 10:30)  T(F): 99 (26 Jul 2018 10:30), Max: 99 (26 Jul 2018 10:30)  HR: 64 (26 Jul 2018 10:30) (62 - 72)  BP: 147/61 (26 Jul 2018 10:30) (118/44 - 175/57)  BP(mean): --  RR: 17 (26 Jul 2018 10:30) (15 - 18)  SpO2: 98% (26 Jul 2018 10:30) (98% - 100%)    PHYSICAL EXAM:  GENERAL: NAD, well-developed  HEAD:  Occipital hematoma/ L periorbital ecchymosis, Normocephalic  EYES: EOMI, PERRLA, conjunctiva and sclera clear  NECK: Supple, No JVD Cervical collar in place  CHEST/LUNG: Clear to auscultation bilaterally; No wheeze  HEART: Regular rate and rhythm; No murmurs, rubs, or gallops  ABDOMEN: Soft, Nontender, Nondistended; Bowel sounds present  EXTREMITIES:  2+ Peripheral Pulses, No clubbing, cyanosis, or edema  PSYCH: AAOx3  NEUROLOGY: non-focal  SKIN: No rashes or lesions    LABS:                        9.2    5.69  )-----------( 257      ( 26 Jul 2018 08:20 )             29.5     07-26    137  |  96  |  13  ----------------------------<  49<L>  4.0   |  30  |  2.16<H>    Ca    8.9      26 Jul 2018 06:08  Mg     2.0     07-25                  RADIOLOGY & ADDITIONAL TESTS:    Imaging Personally Reviewed:    Consultant(s) Notes Reviewed:      Care Discussed with Consultants/Other Providers:

## 2018-07-26 NOTE — PROGRESS NOTE ADULT - SUBJECTIVE AND OBJECTIVE BOX
Surgery Progress Note    SUBJECTIVE: Pt seen and examined at bedside. Saturated dressing removed yesterday and scalp examined. Hemostasis achieved. Replaced pressure dressing.    Vital Signs Last 24 Hrs  T(C): 37 (26 Jul 2018 05:01), Max: 37.1 (25 Jul 2018 21:51)  T(F): 98.6 (26 Jul 2018 05:01), Max: 98.8 (25 Jul 2018 21:51)  HR: 69 (26 Jul 2018 05:01) (60 - 72)  BP: 118/44 (26 Jul 2018 05:01) (116/43 - 175/57)  BP(mean): --  RR: 18 (26 Jul 2018 05:01) (15 - 18)  SpO2: 99% (26 Jul 2018 05:01) (98% - 100%)    Physical Exam:  General Appearance: Appears well, NAD, C-collar in place for cervical fracture. Head dressing with minimal spotting.   Respiratory: No labored breathing  CV: Pulse regularly present  Abdomen: Soft, nontense      LABS:                        9.2    5.69  )-----------( 257      ( 26 Jul 2018 08:20 )             29.5     07-26    137  |  96  |  13  ----------------------------<  49<L>  4.0   |  30  |  2.16<H>    Ca    8.9      26 Jul 2018 06:08  Mg     2.0     07-25            INs and OUTs:    07-25-18 @ 07:01  -  07-26-18 @ 07:00  --------------------------------------------------------  IN: 0 mL / OUT: 2000 mL / NET: -2000 mL

## 2018-07-26 NOTE — PROGRESS NOTE ADULT - SUBJECTIVE AND OBJECTIVE BOX
Subjective: Patient seen and examined. No new events except as noted.   resting comfortably in bed   no cp or sob     REVIEW OF SYSTEMS:    CONSTITUTIONAL: + weakness, fevers or chills  EYES/ENT: No visual changes;  No vertigo or throat pain   NECK: No pain or stiffness  RESPIRATORY: No cough, wheezing, hemoptysis; No shortness of breath  CARDIOVASCULAR: No chest pain or palpitations  GASTROINTESTINAL: No abdominal or epigastric pain. No nausea, vomiting, or hematemesis; No diarrhea or constipation. No melena or hematochezia.  GENITOURINARY: No dysuria, frequency or hematuria  NEUROLOGICAL: No numbness or weakness  SKIN: No itching, burning, rashes, or lesions   All other review of systems is negative unless indicated above.    MEDICATIONS:  MEDICATIONS  (STANDING):  amLODIPine   Tablet 10 milliGRAM(s) Oral daily  atorvastatin 20 milliGRAM(s) Oral at bedtime  calcium acetate 667 milliGRAM(s) Oral two times a day with meals  clonazePAM Tablet 0.5 milliGRAM(s) Oral at bedtime  cloNIDine 0.1 milliGRAM(s) Oral two times a day  dextrose 5%. 1000 milliLiter(s) (50 mL/Hr) IV Continuous <Continuous>  dextrose 50% Injectable 12.5 Gram(s) IV Push once  dextrose 50% Injectable 25 Gram(s) IV Push once  dextrose 50% Injectable 25 Gram(s) IV Push once  docusate sodium 100 milliGRAM(s) Oral two times a day  gabapentin 100 milliGRAM(s) Oral three times a day  hydrALAZINE 100 milliGRAM(s) Oral every 8 hours  insulin glargine Injectable (LANTUS) 3 Unit(s) SubCutaneous at bedtime  insulin lispro (HumaLOG) corrective regimen sliding scale   SubCutaneous three times a day before meals  insulin lispro (HumaLOG) corrective regimen sliding scale   SubCutaneous at bedtime  lactobacillus acidophilus 1 Tablet(s) Oral two times a day with meals  levothyroxine 100 MICROGram(s) Oral daily  lidocaine   Patch 1 Patch Transdermal daily  sertraline 50 milliGRAM(s) Oral daily      PHYSICAL EXAM:  T(C): 37 (07-26-18 @ 05:01), Max: 37.1 (07-25-18 @ 21:51)  HR: 69 (07-26-18 @ 05:01) (60 - 72)  BP: 118/44 (07-26-18 @ 05:01) (116/43 - 175/57)  RR: 18 (07-26-18 @ 05:01) (15 - 18)  SpO2: 99% (07-26-18 @ 05:01) (98% - 100%)  Wt(kg): --  I&O's Summary    25 Jul 2018 07:01  -  26 Jul 2018 07:00  --------------------------------------------------------  IN: 0 mL / OUT: 2000 mL / NET: -2000 mL          Appearance: NAD  HEENT:   Normal oral mucosa, PERRL, EOMI, Left periorbital ecchymosis   Lymphatic: No lymphadenopathy , cervical collar   Cardiovascular: Normal S1 S2, No JVD, No murmurs , Peripheral pulses palpable 2+ bilaterally  Respiratory: Lungs clear to auscultation, normal effort 	  Gastrointestinal:  Soft, Non-tender, + BS	  Skin: No rashes, No ecchymoses, No cyanosis, warm to touch  Musculoskeletal: Normal range of motion, normal strength  Psychiatry:  Mood & affect appropriate  Ext: No edema      LABS:    CARDIAC MARKERS:                                9.2    5.69  )-----------( 257      ( 26 Jul 2018 08:20 )             29.5     07-26    137  |  96  |  13  ----------------------------<  49<L>  4.0   |  30  |  2.16<H>    Ca    8.9      26 Jul 2018 06:08  Mg     2.0     07-25      proBNP:   Lipid Profile:   HgA1c:   TSH:             TELEMETRY: 	 SR   ECG:  	  RADIOLOGY:   DIAGNOSTIC TESTING:  [ ] Echocardiogram:  [ ]  Catheterization:  [ ] Stress Test:    OTHER:

## 2018-07-26 NOTE — PHYSICAL THERAPY INITIAL EVALUATION ADULT - ADDITIONAL COMMENTS
PTA pt at rehab due to prior hospital admission s/p fall, prior to initial fall pt lived in pvt home with , was independent as per pt.

## 2018-07-26 NOTE — PROVIDER CONTACT NOTE (OTHER) - RECOMMENDATIONS
Hold sliding scale and premeal and adjust bedtime lantus. Make PO with juice, recheck sugar unitl greater than 100

## 2018-07-26 NOTE — PHYSICAL THERAPY INITIAL EVALUATION ADULT - PRECAUTIONS/LIMITATIONS, REHAB EVAL
+CT abdo shows known L2-L3 fracture with possible worsening compared to previous exams. Pt not agreeable to MRI Lspine at this time./fall precautions

## 2018-07-26 NOTE — PROGRESS NOTE ADULT - SUBJECTIVE AND OBJECTIVE BOX
No complaints    Vital Signs Last 24 Hrs  T(C): 37.2 (07-26-18 @ 13:57), Max: 37.2 (07-26-18 @ 10:30)  T(F): 98.9 (07-26-18 @ 13:57), Max: 99 (07-26-18 @ 10:30)  HR: 71 (07-26-18 @ 17:28) (63 - 71)  BP: 139/67 (07-26-18 @ 17:28) (118/44 - 162/46)  RR: 15 (07-26-18 @ 13:57) (15 - 18)  SpO2: 100% (07-26-18 @ 13:57) (98% - 100%)    Lungs - good air entry bilaterally  Heart - S1S2  Abd - soft, NT,  ND, + BS  Extr - no peripheral edema                                      9.2    5.69  )-----------( 257      ( 26 Jul 2018 08:20 )             29.5     26 Jul 2018 06:08    137    |  96     |  13     ----------------------------<  49     4.0     |  30     |  2.16     Ca    8.9        26 Jul 2018 06:08  Mg     2.0       25 Jul 2018 06:06    acetaminophen   Tablet. 650 milliGRAM(s) Oral every 6 hours PRN  amLODIPine   Tablet 10 milliGRAM(s) Oral daily  AQUAPHOR (petrolatum Ointment) 1 Application(s) Topical three times a day  atorvastatin 20 milliGRAM(s) Oral at bedtime  calcium acetate 667 milliGRAM(s) Oral two times a day with meals  clonazePAM Tablet 0.5 milliGRAM(s) Oral at bedtime  cloNIDine 0.1 milliGRAM(s) Oral two times a day  dextrose 40% Gel 15 Gram(s) Oral once PRN  dextrose 5%. 1000 milliLiter(s) IV Continuous <Continuous>  dextrose 50% Injectable 12.5 Gram(s) IV Push once  dextrose 50% Injectable 25 Gram(s) IV Push once  dextrose 50% Injectable 25 Gram(s) IV Push once  docusate sodium 100 milliGRAM(s) Oral two times a day  gabapentin 100 milliGRAM(s) Oral three times a day  glucagon  Injectable 1 milliGRAM(s) IntraMuscular once PRN  hydrALAZINE 100 milliGRAM(s) Oral every 8 hours  HYDROmorphone  Injectable 0.5 milliGRAM(s) IV Push every 6 hours PRN  insulin glargine Injectable (LANTUS) 3 Unit(s) SubCutaneous at bedtime  insulin lispro (HumaLOG) corrective regimen sliding scale   SubCutaneous three times a day before meals  insulin lispro (HumaLOG) corrective regimen sliding scale   SubCutaneous at bedtime  lactobacillus acidophilus 1 Tablet(s) Oral two times a day with meals  levothyroxine 100 MICROGram(s) Oral daily  lidocaine   Patch 1 Patch Transdermal daily  sertraline 50 milliGRAM(s) Oral daily         A/P:    ESRD on HD  Renal diet  Next HD Sat to get pt back on op schedule  2K bath  Epogen w/HD  TMP as able  Heparin free  D/c planning

## 2018-07-26 NOTE — PHYSICAL THERAPY INITIAL EVALUATION ADULT - GAIT DEVIATIONS NOTED, PT EVAL
decreased stride length/decreased weight-shifting ability/decreased josé miguel/crouch, dec BLE control

## 2018-07-26 NOTE — PHYSICAL THERAPY INITIAL EVALUATION ADULT - PERTINENT HX OF CURRENT PROBLEM, REHAB EVAL
Pt is a 72 F PMH type 2 DM, HTN, nonobstructive CAD, ESRD on HD TTS, syncope s/p ILR, frequent falls c/b prior nasal fracture and cervical spine fracture, Afib no a/c 2/2 recent fall with scalp laceration/hematoma, now p/w repeat fall at HD center with resultant dehiscence of scalp wound c/b transient symptomatic bradycardia. +CT C spine shows known C3 on C4 anterior listhesis with MRI showing significant spinal stenosis.

## 2018-07-27 ENCOUNTER — TRANSCRIPTION ENCOUNTER (OUTPATIENT)
Age: 73
End: 2018-07-27

## 2018-07-27 LAB
ANION GAP SERPL CALC-SCNC: 12 MMOL/L — SIGNIFICANT CHANGE UP (ref 5–17)
BUN SERPL-MCNC: 29 MG/DL — HIGH (ref 7–23)
CALCIUM SERPL-MCNC: 8.8 MG/DL — SIGNIFICANT CHANGE UP (ref 8.4–10.5)
CHLORIDE SERPL-SCNC: 92 MMOL/L — LOW (ref 96–108)
CO2 SERPL-SCNC: 28 MMOL/L — SIGNIFICANT CHANGE UP (ref 22–31)
CREAT SERPL-MCNC: 3.68 MG/DL — HIGH (ref 0.5–1.3)
GLUCOSE BLDC GLUCOMTR-MCNC: 132 MG/DL — HIGH (ref 70–99)
GLUCOSE BLDC GLUCOMTR-MCNC: 195 MG/DL — HIGH (ref 70–99)
GLUCOSE BLDC GLUCOMTR-MCNC: 220 MG/DL — HIGH (ref 70–99)
GLUCOSE BLDC GLUCOMTR-MCNC: 246 MG/DL — HIGH (ref 70–99)
GLUCOSE SERPL-MCNC: 116 MG/DL — HIGH (ref 70–99)
HCT VFR BLD CALC: 26.8 % — LOW (ref 34.5–45)
HGB BLD-MCNC: 8.5 G/DL — LOW (ref 11.5–15.5)
MCHC RBC-ENTMCNC: 29.8 PG — SIGNIFICANT CHANGE UP (ref 27–34)
MCHC RBC-ENTMCNC: 31.7 GM/DL — LOW (ref 32–36)
MCV RBC AUTO: 94 FL — SIGNIFICANT CHANGE UP (ref 80–100)
PLATELET # BLD AUTO: 261 K/UL — SIGNIFICANT CHANGE UP (ref 150–400)
POTASSIUM SERPL-MCNC: 4.8 MMOL/L — SIGNIFICANT CHANGE UP (ref 3.5–5.3)
POTASSIUM SERPL-SCNC: 4.8 MMOL/L — SIGNIFICANT CHANGE UP (ref 3.5–5.3)
RBC # BLD: 2.85 M/UL — LOW (ref 3.8–5.2)
RBC # FLD: 18.9 % — HIGH (ref 10.3–14.5)
SODIUM SERPL-SCNC: 132 MMOL/L — LOW (ref 135–145)
WBC # BLD: 4.31 K/UL — SIGNIFICANT CHANGE UP (ref 3.8–10.5)
WBC # FLD AUTO: 4.31 K/UL — SIGNIFICANT CHANGE UP (ref 3.8–10.5)

## 2018-07-27 RX ADMIN — Medication 100 MILLIGRAM(S): at 05:12

## 2018-07-27 RX ADMIN — Medication 1 TABLET(S): at 17:26

## 2018-07-27 RX ADMIN — Medication 0.1 MILLIGRAM(S): at 17:26

## 2018-07-27 RX ADMIN — HYDROMORPHONE HYDROCHLORIDE 0.5 MILLIGRAM(S): 2 INJECTION INTRAMUSCULAR; INTRAVENOUS; SUBCUTANEOUS at 11:49

## 2018-07-27 RX ADMIN — Medication 100 MILLIGRAM(S): at 05:11

## 2018-07-27 RX ADMIN — Medication 100 MILLIGRAM(S): at 14:07

## 2018-07-27 RX ADMIN — Medication 0.1 MILLIGRAM(S): at 05:11

## 2018-07-27 RX ADMIN — Medication 100 MICROGRAM(S): at 05:11

## 2018-07-27 RX ADMIN — Medication 650 MILLIGRAM(S): at 17:30

## 2018-07-27 RX ADMIN — Medication 650 MILLIGRAM(S): at 18:00

## 2018-07-27 RX ADMIN — HYDROMORPHONE HYDROCHLORIDE 0.5 MILLIGRAM(S): 2 INJECTION INTRAMUSCULAR; INTRAVENOUS; SUBCUTANEOUS at 12:04

## 2018-07-27 RX ADMIN — AMLODIPINE BESYLATE 10 MILLIGRAM(S): 2.5 TABLET ORAL at 05:11

## 2018-07-27 RX ADMIN — Medication 1 TABLET(S): at 08:01

## 2018-07-27 RX ADMIN — Medication 1 APPLICATION(S): at 05:11

## 2018-07-27 RX ADMIN — Medication 0.5 MILLIGRAM(S): at 21:23

## 2018-07-27 RX ADMIN — Medication 1 APPLICATION(S): at 14:07

## 2018-07-27 RX ADMIN — GABAPENTIN 100 MILLIGRAM(S): 400 CAPSULE ORAL at 21:23

## 2018-07-27 RX ADMIN — Medication 1 APPLICATION(S): at 21:24

## 2018-07-27 RX ADMIN — Medication 100 MILLIGRAM(S): at 21:23

## 2018-07-27 RX ADMIN — GABAPENTIN 100 MILLIGRAM(S): 400 CAPSULE ORAL at 14:07

## 2018-07-27 RX ADMIN — Medication 100 MILLIGRAM(S): at 17:26

## 2018-07-27 RX ADMIN — Medication 650 MILLIGRAM(S): at 00:01

## 2018-07-27 RX ADMIN — GABAPENTIN 100 MILLIGRAM(S): 400 CAPSULE ORAL at 05:12

## 2018-07-27 RX ADMIN — ATORVASTATIN CALCIUM 20 MILLIGRAM(S): 80 TABLET, FILM COATED ORAL at 21:23

## 2018-07-27 RX ADMIN — Medication 1: at 17:27

## 2018-07-27 RX ADMIN — SERTRALINE 50 MILLIGRAM(S): 25 TABLET, FILM COATED ORAL at 11:50

## 2018-07-27 RX ADMIN — HYDROMORPHONE HYDROCHLORIDE 0.5 MILLIGRAM(S): 2 INJECTION INTRAMUSCULAR; INTRAVENOUS; SUBCUTANEOUS at 22:03

## 2018-07-27 RX ADMIN — LIDOCAINE 1 PATCH: 4 CREAM TOPICAL at 11:49

## 2018-07-27 RX ADMIN — Medication 667 MILLIGRAM(S): at 08:01

## 2018-07-27 RX ADMIN — HYDROMORPHONE HYDROCHLORIDE 0.5 MILLIGRAM(S): 2 INJECTION INTRAMUSCULAR; INTRAVENOUS; SUBCUTANEOUS at 21:33

## 2018-07-27 RX ADMIN — Medication 667 MILLIGRAM(S): at 17:26

## 2018-07-27 RX ADMIN — Medication 2: at 11:49

## 2018-07-27 RX ADMIN — Medication 650 MILLIGRAM(S): at 00:31

## 2018-07-27 RX ADMIN — INSULIN GLARGINE 3 UNIT(S): 100 INJECTION, SOLUTION SUBCUTANEOUS at 22:21

## 2018-07-27 NOTE — PROGRESS NOTE ADULT - PROBLEM SELECTOR PLAN 5
Now improved   cont with Clonidine 0.1 mg bid  monitor on Tele
Now improved   cont with Clonidine 0.1 mg bid  review Loop recorder   monitor on Tele   Atropine at bedside
Decrease Clonidine 0.1 mg bid  review Loop recorder   monitor on Tele   Atropine at bedside
Decrease Clonidine 01. mg bid  review Loop recorder   monitor on Tele   Atropine at bedside
Now improved   cont with Clonidine 0.1 mg bid  monitor on Tele
review Loop recorder   monitor on Tele   Atropine at bedside

## 2018-07-27 NOTE — CHART NOTE - NSCHARTNOTEFT_GEN_A_CORE
Pt ordered for MRI L spine  - pt refusing test, states she did MRi in June and it was too painful, pain medication offered, pt still refuse, pt educated on the need for test  -  made aware  - pt schedule for d/c tomm post HD

## 2018-07-27 NOTE — DISCHARGE NOTE ADULT - SECONDARY DIAGNOSIS.
Other congestive heart failure Closed head injury, initial encounter Essential hypertension Type 2 diabetes mellitus with hyperglycemia, with long-term current use of insulin

## 2018-07-27 NOTE — DISCHARGE NOTE ADULT - MEDICATION SUMMARY - MEDICATIONS TO TAKE
I will START or STAY ON the medications listed below when I get home from the hospital:    Tylenol 500 mg oral tablet  -- 2 tab(s) by mouth , As Needed  -- Indication: For Pain    cloNIDine 0.1 mg oral tablet  -- 1 tab(s) by mouth 2 times a day  -- Indication: For Blood Pressure    gabapentin 100 mg oral capsule  -- 1 cap(s) by mouth 3 times a day  -- Indication: For Anti-convulsant    clonazePAM 0.5 mg oral tablet  -- 1 tab(s) by mouth once a day (at bedtime)  -- Indication: For Anti-convulsant    sertraline 50 mg oral tablet  -- 1 tab(s) by mouth once a day  -- Indication: For Depression    insulin glargine  -- 3 unit(s) subcutaneous once a day (at bedtime)  -- Indication: For Diabetes    atorvastatin 20 mg oral tablet  -- 1 tab(s) by mouth once a day (at bedtime)  -- Indication: For High Cholesterol    amLODIPine 10 mg oral tablet  -- 1 tab(s) by mouth once a day  -- Indication: For Blood Pressure    petrolatum topical ointment  -- 1 application on skin 3 times a day  -- Indication: For Topical agent    lidocaine 5% topical film  -- Apply on skin to affected area once a day  -- Indication: For Pain    docusate sodium 100 mg oral capsule  -- 1 cap(s) by mouth 2 times a day  -- Indication: For Laxative    calcium acetate 667 mg oral tablet  -- 1 tab(s) by mouth once a day  -- Indication: For CKD    lactobacillus acidophilus oral capsule  -- 1 tab(s) by mouth 2 times a day  -- Indication: For Probiotics    levothyroxine 100 mcg (0.1 mg) oral tablet  -- 1 tab(s) by mouth once a day  -- Indication: For Thyroid Hormone    hydrALAZINE 100 mg oral tablet  -- 1 tab(s) by mouth every 8 hours  -- Indication: For Blood Pressure

## 2018-07-27 NOTE — PROGRESS NOTE ADULT - SUBJECTIVE AND OBJECTIVE BOX
No distress    EXAM:  T(F): 98.1 (07-27-18 @ 11:54)  HR: 62 (07-27-18 @ 11:54)  BP: 164/67 (07-27-18 @ 11:54)  RR: 15 (07-27-18 @ 11:54)  SpO2: 99% (07-27-18 @ 11:54)    Gen -Conversant, in no apparent distress  Lungs - Normal respiratory effort, lungs clear bilaterally  Heart - RRR with no murmur  Abd - soft, NT,  ND, + BS  Extr - no peripheral edema         LABS                             8.5    4.31  )-----------( 261      ( 27 Jul 2018 08:24 )             26.8          07-27    132<L>  |  92<L>  |  29<H>  ----------------------------<  116<H>  4.8   |  28  |  3.68<H>    Ca    8.8      27 Jul 2018 06:47    acetaminophen   Tablet. 650 milliGRAM(s) Oral every 6 hours PRN  amLODIPine   Tablet 10 milliGRAM(s) Oral daily  AQUAPHOR (petrolatum Ointment) 1 Application(s) Topical three times a day  atorvastatin 20 milliGRAM(s) Oral at bedtime  calcium acetate 667 milliGRAM(s) Oral two times a day with meals  clonazePAM Tablet 0.5 milliGRAM(s) Oral at bedtime  cloNIDine 0.1 milliGRAM(s) Oral two times a day  dextrose 40% Gel 15 Gram(s) Oral once PRN  dextrose 5%. 1000 milliLiter(s) IV Continuous <Continuous>  dextrose 50% Injectable 12.5 Gram(s) IV Push once  dextrose 50% Injectable 25 Gram(s) IV Push once  dextrose 50% Injectable 25 Gram(s) IV Push once  docusate sodium 100 milliGRAM(s) Oral two times a day  gabapentin 100 milliGRAM(s) Oral three times a day  glucagon  Injectable 1 milliGRAM(s) IntraMuscular once PRN  hydrALAZINE 100 milliGRAM(s) Oral every 8 hours  HYDROmorphone  Injectable 0.5 milliGRAM(s) IV Push every 6 hours PRN  insulin glargine Injectable (LANTUS) 3 Unit(s) SubCutaneous at bedtime  insulin lispro (HumaLOG) corrective regimen sliding scale   SubCutaneous three times a day before meals  insulin lispro (HumaLOG) corrective regimen sliding scale   SubCutaneous at bedtime  lactobacillus acidophilus 1 Tablet(s) Oral two times a day with meals  levothyroxine 100 MICROGram(s) Oral daily  lidocaine   Patch 1 Patch Transdermal daily  sertraline 50 milliGRAM(s) Oral daily    A/P:    ESRD on HD  Renal diet  HD TTS  2K bath  Epogen w/HD  TMP as able  Heparin free  D/c planning

## 2018-07-27 NOTE — DISCHARGE NOTE ADULT - CARE PROVIDER_API CALL
Valorie Fernandez), Nephrology  [address]  [place], [place] [id number]  Phone: [phone]  Fax: [phone]    Anoop Tomas), Cardiology; Internal Medicine  [address]  [address]  [place] [address]  Phone: [phone]  Fax: [phone]

## 2018-07-27 NOTE — DISCHARGE NOTE ADULT - PLAN OF CARE
Weigh yourself daily.  If you gain 3lbs in 3 days, or 5lbs in a week call your Health Care Provider.  Do not eat or drink foods containing more than 2000mg of salt (sodium) in your diet every day.  Call your Health Care Provider if you have any swelling or increased swelling in your feet, ankles, and/or stomach.  Take all of your medication as directed.  If you become dizzy call your Health Care Provider. Surgery consulted: no surgical intervention needed Low salt diet  Activity as tolerated.  Take all medication as prescribed.  Follow up with your medical doctor for routine blood pressure monitoring at your next visit.  Notify your doctor if you have any of the following symptoms:   Dizziness, Lightheadedness, Blurry vision, Headache, Chest pain, Shortness of breath HgA1C this admission.  Make sure you get your HgA1c checked every three months.  If you take oral diabetes medications, check your blood glucose two times a day.  If you take insulin, check your blood glucose before meals and at bedtime.  It's important not to skip any meals.  Keep a log of your blood glucose results and always take it with you to your doctor appointments.  Keep a list of your current medications including injectables and over the counter medications and bring this medication list with you to all your doctor appointments.  If you have not seen your ophthalmologist this year call for appointment.  Check your feet daily for redness, sores, or openings. Do not self treat. If no improvement in two days call your primary care physician for an appointment.  Low blood sugar (hypoglycemia) is a blood sugar below 70mg/dl. Check your blood sugar if you feel signs/symptoms of hypoglycemia. If your blood sugar is below 70 take 15 grams of carbohydrates (ex 4 oz of apple juice, 3-4 glucose tablets, or 4-6 oz of regular soda) wait 15 minutes and repeat blood sugar to make sure it comes up above 70.  If your blood sugar is above 70 and you are due for a meal, have a meal.  If you are not due for a meal have a snack.  This snack helps keeps your blood sugar at a safe range. Symptoms resolved Pt was s/p fall likely related to bradycardia vs anemia  HOME CARE INSTRUCTIONS  Have someone stay with you until you feel stable.  Do not drive, operate machinery, or play sports until your caregiver says it is okay.  Keep all follow-up appointments as directed by your caregiver.   Lie down right away if you start feeling like you might faint. Breathe deeply and steadily. Wait until all the symptoms have passed.Drink enough fluids to keep your urine clear or pale yellow.  If you are taking blood pressure or heart medicine, get up slowly, taking several minutes to sit and then stand. This can reduce dizziness.  SEEK IMMEDIATE MEDICAL CARE IF:  You have a severe headache.  You have unusual pain in the chest, abdomen, or back.  You are bleeding from the mouth or rectum, or you have black or tarry stool.  You have an irregular or very fast heartbeat.  You have pain with breathing.  You have repeated fainting or seizure-like jerking during an episode.  You faint when sitting or lying down.  You have confusion.  You have difficulty walking.  You have severe weakness.  You have vision problems.  If you fainted, call your local emergency services (_____________________). Do not drive yourself to the hospital HgA1C this admission 8.9.  Make sure you get your HgA1c checked every three months.  If you take oral diabetes medications, check your blood glucose two times a day.  If you take insulin, check your blood glucose before meals and at bedtime.  It's important not to skip any meals.  Keep a log of your blood glucose results and always take it with you to your doctor appointments.  Keep a list of your current medications including injectables and over the counter medications and bring this medication list with you to all your doctor appointments.  If you have not seen your ophthalmologist this year call for appointment.  Check your feet daily for redness, sores, or openings. Do not self treat. If no improvement in two days call your primary care physician for an appointment.  Low blood sugar (hypoglycemia) is a blood sugar below 70mg/dl. Check your blood sugar if you feel signs/symptoms of hypoglycemia. If your blood sugar is below 70 take 15 grams of carbohydrates (ex 4 oz of apple juice, 3-4 glucose tablets, or 4-6 oz of regular soda) wait 15 minutes and repeat blood sugar to make sure it comes up above 70.  If your blood sugar is above 70 and you are due for a meal, have a meal.  If you are not due for a meal have a snack.  This snack helps keeps your blood sugar at a safe range.

## 2018-07-27 NOTE — PROGRESS NOTE ADULT - SUBJECTIVE AND OBJECTIVE BOX
Surgery Progress Note    SUBJECTIVE: Pt seen and examined at bedside. Patient comfortable and in no-apparent distress.       Vital Signs Last 24 Hrs  T(C): 36.9 (27 Jul 2018 04:59), Max: 37.6 (26 Jul 2018 20:30)  T(F): 98.5 (27 Jul 2018 04:59), Max: 99.7 (26 Jul 2018 20:30)  HR: 62 (27 Jul 2018 04:59) (62 - 71)  BP: 133/50 (27 Jul 2018 04:59) (133/50 - 186/44)  BP(mean): --  RR: 16 (27 Jul 2018 04:59) (15 - 17)  SpO2: 99% (27 Jul 2018 04:59) (97% - 100%)    Physical Exam:  General Appearance: Appears well, NAD, Head dressing with minimal spotting.   Respiratory: No labored breathing  CV: Pulse regularly present  Abdomen: Soft, nontense      LABS:                        8.5    4.31  )-----------( 261      ( 27 Jul 2018 08:24 )             26.8     07-27    132<L>  |  92<L>  |  29<H>  ----------------------------<  116<H>  4.8   |  28  |  3.68<H>    Ca    8.8      27 Jul 2018 06:47            INs and OUTs:    07-26-18 @ 07:01  -  07-27-18 @ 07:00  --------------------------------------------------------  IN: 780 mL / OUT: 0 mL / NET: 780 mL

## 2018-07-27 NOTE — PROGRESS NOTE ADULT - SUBJECTIVE AND OBJECTIVE BOX
Patient is a 72y old  Female who presents with a chief complaint of wound dehiscence, bradycardia (27 Jul 2018 09:54)      SUBJECTIVE / OVERNIGHT EVENTS: No new complaints. Refuses MRI LS.  Review of Systems  chest pain no  palpitations no  sob no  nausea no  headache no    MEDICATIONS  (STANDING):  amLODIPine   Tablet 10 milliGRAM(s) Oral daily  AQUAPHOR (petrolatum Ointment) 1 Application(s) Topical three times a day  atorvastatin 20 milliGRAM(s) Oral at bedtime  calcium acetate 667 milliGRAM(s) Oral two times a day with meals  clonazePAM Tablet 0.5 milliGRAM(s) Oral at bedtime  cloNIDine 0.1 milliGRAM(s) Oral two times a day  dextrose 5%. 1000 milliLiter(s) (50 mL/Hr) IV Continuous <Continuous>  dextrose 50% Injectable 12.5 Gram(s) IV Push once  dextrose 50% Injectable 25 Gram(s) IV Push once  dextrose 50% Injectable 25 Gram(s) IV Push once  docusate sodium 100 milliGRAM(s) Oral two times a day  gabapentin 100 milliGRAM(s) Oral three times a day  hydrALAZINE 100 milliGRAM(s) Oral every 8 hours  insulin glargine Injectable (LANTUS) 3 Unit(s) SubCutaneous at bedtime  insulin lispro (HumaLOG) corrective regimen sliding scale   SubCutaneous three times a day before meals  insulin lispro (HumaLOG) corrective regimen sliding scale   SubCutaneous at bedtime  lactobacillus acidophilus 1 Tablet(s) Oral two times a day with meals  levothyroxine 100 MICROGram(s) Oral daily  lidocaine   Patch 1 Patch Transdermal daily  sertraline 50 milliGRAM(s) Oral daily    MEDICATIONS  (PRN):  acetaminophen   Tablet. 650 milliGRAM(s) Oral every 6 hours PRN mild or moderate pain  dextrose 40% Gel 15 Gram(s) Oral once PRN Blood Glucose LESS THAN 70 milliGRAM(s)/deciliter  glucagon  Injectable 1 milliGRAM(s) IntraMuscular once PRN Glucose LESS THAN 70 milligrams/deciliter  HYDROmorphone  Injectable 0.5 milliGRAM(s) IV Push every 6 hours PRN Severe Pain (7 - 10)      Vital Signs Last 24 Hrs  T(C): 36.7 (27 Jul 2018 11:54), Max: 37.6 (26 Jul 2018 20:30)  T(F): 98.1 (27 Jul 2018 11:54), Max: 99.7 (26 Jul 2018 20:30)  HR: 62 (27 Jul 2018 11:54) (62 - 70)  BP: 164/67 (27 Jul 2018 11:54) (133/50 - 186/44)  BP(mean): --  RR: 15 (27 Jul 2018 11:54) (15 - 16)  SpO2: 99% (27 Jul 2018 11:54) (97% - 99%)    PHYSICAL EXAM:  GENERAL: NAD, well-developed  HEAD:  Occipital hematoma stable. Periorbital ecchymosis improving , Normocephalic  EYES: EOMI, PERRLA, conjunctiva and sclera clear  NECK: Supple, No JVD Cervical collar  CHEST/LUNG: Clear to auscultation bilaterally; No wheeze  HEART: Regular rate and rhythm; No murmurs, rubs, or gallops  ABDOMEN: Soft, Nontender, Nondistended; Bowel sounds present  EXTREMITIES:  2+ Peripheral Pulses, No clubbing, cyanosis, or edema  PSYCH: AAOx3  NEUROLOGY: non-focal  SKIN: No rashes or lesions    LABS:                        8.5    4.31  )-----------( 261      ( 27 Jul 2018 08:24 )             26.8     07-27    132<L>  |  92<L>  |  29<H>  ----------------------------<  116<H>  4.8   |  28  |  3.68<H>    Ca    8.8      27 Jul 2018 06:47                  RADIOLOGY & ADDITIONAL TESTS:    Imaging Personally Reviewed:    Consultant(s) Notes Reviewed:      Care Discussed with Consultants/Other Providers:

## 2018-07-27 NOTE — DISCHARGE NOTE ADULT - MEDICATION SUMMARY - MEDICATIONS TO CHANGE
I will SWITCH the dose or number of times a day I take the medications listed below when I get home from the hospital:    cloNIDine 0.3 mg oral tablet  -- 1 tab(s) by mouth every 8 hours    calcium acetate 667 mg oral tablet  -- 1 cap(s) by mouth 3 times a day    insulin glargine  -- 10 unit(s) subcutaneous once a day (at bedtime)    cloNIDine 0.2 mg oral tablet  -- 1 tab(s) by mouth 3 times a day    hydrALAZINE 50 mg oral tablet  -- 1.5 tab(s) by mouth 3 times a day    NovoLOG 100 units/mL subcutaneous solution  -- 5 unit(s) subcutaneous 3 times a day (before meals)

## 2018-07-27 NOTE — DISCHARGE NOTE ADULT - CARE PLAN
Principal Discharge DX:	Bradycardia  Secondary Diagnosis:	Other congestive heart failure  Secondary Diagnosis:	Closed head injury, initial encounter  Secondary Diagnosis:	Essential hypertension  Secondary Diagnosis:	Type 2 diabetes mellitus with hyperglycemia, with long-term current use of insulin Principal Discharge DX:	Bradycardia  Goal:	Symptoms resolved  Assessment and plan of treatment:	Pt was s/p fall likely related to bradycardia vs anemia  HOME CARE INSTRUCTIONS  Have someone stay with you until you feel stable.  Do not drive, operate machinery, or play sports until your caregiver says it is okay.  Keep all follow-up appointments as directed by your caregiver.   Lie down right away if you start feeling like you might faint. Breathe deeply and steadily. Wait until all the symptoms have passed.Drink enough fluids to keep your urine clear or pale yellow.  If you are taking blood pressure or heart medicine, get up slowly, taking several minutes to sit and then stand. This can reduce dizziness.  SEEK IMMEDIATE MEDICAL CARE IF:  You have a severe headache.  You have unusual pain in the chest, abdomen, or back.  You are bleeding from the mouth or rectum, or you have black or tarry stool.  You have an irregular or very fast heartbeat.  You have pain with breathing.  You have repeated fainting or seizure-like jerking during an episode.  You faint when sitting or lying down.  You have confusion.  You have difficulty walking.  You have severe weakness.  You have vision problems.  If you fainted, call your local emergency services (_____________________). Do not drive yourself to the hospital  Secondary Diagnosis:	Other congestive heart failure  Assessment and plan of treatment:	Weigh yourself daily.  If you gain 3lbs in 3 days, or 5lbs in a week call your Health Care Provider.  Do not eat or drink foods containing more than 2000mg of salt (sodium) in your diet every day.  Call your Health Care Provider if you have any swelling or increased swelling in your feet, ankles, and/or stomach.  Take all of your medication as directed.  If you become dizzy call your Health Care Provider.  Secondary Diagnosis:	Closed head injury, initial encounter  Assessment and plan of treatment:	Surgery consulted: no surgical intervention needed  Secondary Diagnosis:	Essential hypertension  Assessment and plan of treatment:	Low salt diet  Activity as tolerated.  Take all medication as prescribed.  Follow up with your medical doctor for routine blood pressure monitoring at your next visit.  Notify your doctor if you have any of the following symptoms:   Dizziness, Lightheadedness, Blurry vision, Headache, Chest pain, Shortness of breath  Secondary Diagnosis:	Type 2 diabetes mellitus with hyperglycemia, with long-term current use of insulin  Assessment and plan of treatment:	HgA1C this admission.  Make sure you get your HgA1c checked every three months.  If you take oral diabetes medications, check your blood glucose two times a day.  If you take insulin, check your blood glucose before meals and at bedtime.  It's important not to skip any meals.  Keep a log of your blood glucose results and always take it with you to your doctor appointments.  Keep a list of your current medications including injectables and over the counter medications and bring this medication list with you to all your doctor appointments.  If you have not seen your ophthalmologist this year call for appointment.  Check your feet daily for redness, sores, or openings. Do not self treat. If no improvement in two days call your primary care physician for an appointment.  Low blood sugar (hypoglycemia) is a blood sugar below 70mg/dl. Check your blood sugar if you feel signs/symptoms of hypoglycemia. If your blood sugar is below 70 take 15 grams of carbohydrates (ex 4 oz of apple juice, 3-4 glucose tablets, or 4-6 oz of regular soda) wait 15 minutes and repeat blood sugar to make sure it comes up above 70.  If your blood sugar is above 70 and you are due for a meal, have a meal.  If you are not due for a meal have a snack.  This snack helps keeps your blood sugar at a safe range. Principal Discharge DX:	Bradycardia  Goal:	Symptoms resolved  Assessment and plan of treatment:	Pt was s/p fall likely related to bradycardia vs anemia  HOME CARE INSTRUCTIONS  Have someone stay with you until you feel stable.  Do not drive, operate machinery, or play sports until your caregiver says it is okay.  Keep all follow-up appointments as directed by your caregiver.   Lie down right away if you start feeling like you might faint. Breathe deeply and steadily. Wait until all the symptoms have passed.Drink enough fluids to keep your urine clear or pale yellow.  If you are taking blood pressure or heart medicine, get up slowly, taking several minutes to sit and then stand. This can reduce dizziness.  SEEK IMMEDIATE MEDICAL CARE IF:  You have a severe headache.  You have unusual pain in the chest, abdomen, or back.  You are bleeding from the mouth or rectum, or you have black or tarry stool.  You have an irregular or very fast heartbeat.  You have pain with breathing.  You have repeated fainting or seizure-like jerking during an episode.  You faint when sitting or lying down.  You have confusion.  You have difficulty walking.  You have severe weakness.  You have vision problems.  If you fainted, call your local emergency services (_____________________). Do not drive yourself to the hospital  Secondary Diagnosis:	Other congestive heart failure  Assessment and plan of treatment:	Weigh yourself daily.  If you gain 3lbs in 3 days, or 5lbs in a week call your Health Care Provider.  Do not eat or drink foods containing more than 2000mg of salt (sodium) in your diet every day.  Call your Health Care Provider if you have any swelling or increased swelling in your feet, ankles, and/or stomach.  Take all of your medication as directed.  If you become dizzy call your Health Care Provider.  Secondary Diagnosis:	Closed head injury, initial encounter  Assessment and plan of treatment:	Surgery consulted: no surgical intervention needed  Secondary Diagnosis:	Essential hypertension  Assessment and plan of treatment:	Low salt diet  Activity as tolerated.  Take all medication as prescribed.  Follow up with your medical doctor for routine blood pressure monitoring at your next visit.  Notify your doctor if you have any of the following symptoms:   Dizziness, Lightheadedness, Blurry vision, Headache, Chest pain, Shortness of breath  Secondary Diagnosis:	Type 2 diabetes mellitus with hyperglycemia, with long-term current use of insulin  Assessment and plan of treatment:	HgA1C this admission 8.9.  Make sure you get your HgA1c checked every three months.  If you take oral diabetes medications, check your blood glucose two times a day.  If you take insulin, check your blood glucose before meals and at bedtime.  It's important not to skip any meals.  Keep a log of your blood glucose results and always take it with you to your doctor appointments.  Keep a list of your current medications including injectables and over the counter medications and bring this medication list with you to all your doctor appointments.  If you have not seen your ophthalmologist this year call for appointment.  Check your feet daily for redness, sores, or openings. Do not self treat. If no improvement in two days call your primary care physician for an appointment.  Low blood sugar (hypoglycemia) is a blood sugar below 70mg/dl. Check your blood sugar if you feel signs/symptoms of hypoglycemia. If your blood sugar is below 70 take 15 grams of carbohydrates (ex 4 oz of apple juice, 3-4 glucose tablets, or 4-6 oz of regular soda) wait 15 minutes and repeat blood sugar to make sure it comes up above 70.  If your blood sugar is above 70 and you are due for a meal, have a meal.  If you are not due for a meal have a snack.  This snack helps keeps your blood sugar at a safe range.

## 2018-07-27 NOTE — PROVIDER CONTACT NOTE (OTHER) - ASSESSMENT
Pt AO4, able to follow commands, no complaints of SOB, chest pain, dizziness, or palpitations; complaining of 7/10 head pain at hematoma site.  Hematoma site dry, intact, WDL.

## 2018-07-27 NOTE — PROGRESS NOTE ADULT - SUBJECTIVE AND OBJECTIVE BOX
Subjective: Patient seen and examined. No new events except as noted.   resting comfortably in bed     REVIEW OF SYSTEMS:    CONSTITUTIONAL: + weakness, fevers or chills  EYES/ENT: No visual changes;  No vertigo or throat pain   NECK: No pain or stiffness  RESPIRATORY: No cough, wheezing, hemoptysis; No shortness of breath  CARDIOVASCULAR: No chest pain or palpitations  GASTROINTESTINAL: No abdominal or epigastric pain. No nausea, vomiting, or hematemesis; No diarrhea or constipation. No melena or hematochezia.  GENITOURINARY: No dysuria, frequency or hematuria  NEUROLOGICAL: No numbness or weakness  SKIN: No itching, burning, rashes, or lesions   All other review of systems is negative unless indicated above.    MEDICATIONS:  MEDICATIONS  (STANDING):  amLODIPine   Tablet 10 milliGRAM(s) Oral daily  AQUAPHOR (petrolatum Ointment) 1 Application(s) Topical three times a day  atorvastatin 20 milliGRAM(s) Oral at bedtime  calcium acetate 667 milliGRAM(s) Oral two times a day with meals  clonazePAM Tablet 0.5 milliGRAM(s) Oral at bedtime  cloNIDine 0.1 milliGRAM(s) Oral two times a day  dextrose 5%. 1000 milliLiter(s) (50 mL/Hr) IV Continuous <Continuous>  dextrose 50% Injectable 12.5 Gram(s) IV Push once  dextrose 50% Injectable 25 Gram(s) IV Push once  dextrose 50% Injectable 25 Gram(s) IV Push once  docusate sodium 100 milliGRAM(s) Oral two times a day  gabapentin 100 milliGRAM(s) Oral three times a day  hydrALAZINE 100 milliGRAM(s) Oral every 8 hours  insulin glargine Injectable (LANTUS) 3 Unit(s) SubCutaneous at bedtime  insulin lispro (HumaLOG) corrective regimen sliding scale   SubCutaneous three times a day before meals  insulin lispro (HumaLOG) corrective regimen sliding scale   SubCutaneous at bedtime  lactobacillus acidophilus 1 Tablet(s) Oral two times a day with meals  levothyroxine 100 MICROGram(s) Oral daily  lidocaine   Patch 1 Patch Transdermal daily  sertraline 50 milliGRAM(s) Oral daily      PHYSICAL EXAM:  T(C): 36.9 (07-27-18 @ 04:59), Max: 37.6 (07-26-18 @ 20:30)  HR: 62 (07-27-18 @ 04:59) (62 - 71)  BP: 133/50 (07-27-18 @ 04:59) (133/50 - 186/44)  RR: 16 (07-27-18 @ 04:59) (15 - 17)  SpO2: 99% (07-27-18 @ 04:59) (97% - 100%)  Wt(kg): --  I&O's Summary    26 Jul 2018 07:01  -  27 Jul 2018 07:00  --------------------------------------------------------  IN: 780 mL / OUT: 0 mL / NET: 780 mL          Appearance: Normal	  HEENT:   Normal oral mucosa, Left boni-orbital ecchymosis   Lymphatic: No lymphadenopathy , no edema  Cardiovascular: Normal S1 S2, No JVD, No murmurs , Peripheral pulses palpable 2+ bilaterally  Respiratory: Lungs clear to auscultation, normal effort 	  Gastrointestinal:  Soft, Non-tender, + BS	  Skin: No rashes, No ecchymoses, No cyanosis, warm to touch  Musculoskeletal: decreased range of motion and  strength  Psychiatry:  Mood & affect appropriate  Ext: No edema      LABS:    CARDIAC MARKERS:                                8.5    4.31  )-----------( 261      ( 27 Jul 2018 08:24 )             26.8     07-27    132<L>  |  92<L>  |  29<H>  ----------------------------<  116<H>  4.8   |  28  |  3.68<H>    Ca    8.8      27 Jul 2018 06:47      proBNP:   Lipid Profile:   HgA1c:   TSH:             TELEMETRY: 	  SR  ECG:  	  RADIOLOGY:   DIAGNOSTIC TESTING:  [ ] Echocardiogram:  [ ]  Catheterization:  [ ] Stress Test:    OTHER:

## 2018-07-27 NOTE — DISCHARGE NOTE ADULT - MEDICATION SUMMARY - MEDICATIONS TO STOP TAKING
I will STOP taking the medications listed below when I get home from the hospital:    aspirin 81 mg oral delayed release tablet  -- 1 tab(s) by mouth once a day Home/hosp    traMADol 50 mg oral tablet  -- 1 tab(s) by mouth once a day    insulin lispro 100 units/mL subcutaneous solution  -- 2 Unit(s) if Glucose 151 - 200  4 Unit(s) if Glucose 201 - 250  6 Unit(s) if Glucose 251 - 300  8 Unit(s) if Glucose 301 - 350  10 Unit(s) if Glucose 351 - 400  12 Unit(s) if Glucose Greater Than 400es a day    insulin lispro 100 units/mL subcutaneous solution  -- 0 Unit(s) if Glucose 61 - 250  2 Unit(s) if Glucose 251 - 300  4 Unit(s) if Glucose 301 - 350  6 Unit(s) if Glucose 351 - 400  8 Unit(s) if Glucose Greater Than 400us once a day (at bedtime)    HumaLOG 100 units/mL subcutaneous solution  -- 5 unit(s) subcutaneous 3 times a day (before meals)    insulin glargine  -- 15 unit(s) subcutaneous once a day (at bedtime)    labetalol 200 mg oral tablet  -- 1 tab(s) by mouth 2 times a day

## 2018-07-27 NOTE — PROGRESS NOTE ADULT - PROBLEM SELECTOR PLAN 4
Unclear etiology   monitor on Tele
Unclear etiology   Check Orthostatics  monitor on Tele
Unclear etiology   monitor on Tele

## 2018-07-27 NOTE — PROGRESS NOTE ADULT - PROBLEM SELECTOR PROBLEM 1
ESRD (end stage renal disease)

## 2018-07-27 NOTE — DISCHARGE NOTE ADULT - PATIENT PORTAL LINK FT
You can access the [facility] Patient Portal, offered by [facility], by registering with the following website: [email or web address] [place]

## 2018-07-27 NOTE — PROGRESS NOTE ADULT - PROBLEM SELECTOR PLAN 6
Hgb stable   s/p PRBC transfusion   ? proctitis related
Hgb trending down again  s/p PRBC transfusion   ? proctitis related   GI consulted
Hgb stable   s/p PRBC transfusion   ? proctitis related
PRBC transfusion   ? proctitis related   GI consulted
s/p PRBC transfusion   ? proctitis related   GI consulted
PRBC transfusion   ? proctitis related   GI consult

## 2018-07-28 VITALS — HEART RATE: 70 BPM | TEMPERATURE: 98 F | OXYGEN SATURATION: 99 % | RESPIRATION RATE: 16 BRPM

## 2018-07-28 LAB
ANION GAP SERPL CALC-SCNC: 15 MMOL/L — SIGNIFICANT CHANGE UP (ref 5–17)
BUN SERPL-MCNC: 48 MG/DL — HIGH (ref 7–23)
CALCIUM SERPL-MCNC: 9.1 MG/DL — SIGNIFICANT CHANGE UP (ref 8.4–10.5)
CHLORIDE SERPL-SCNC: 92 MMOL/L — LOW (ref 96–108)
CO2 SERPL-SCNC: 24 MMOL/L — SIGNIFICANT CHANGE UP (ref 22–31)
CREAT SERPL-MCNC: 4.62 MG/DL — HIGH (ref 0.5–1.3)
GLUCOSE BLDC GLUCOMTR-MCNC: 114 MG/DL — HIGH (ref 70–99)
GLUCOSE BLDC GLUCOMTR-MCNC: 144 MG/DL — HIGH (ref 70–99)
GLUCOSE SERPL-MCNC: 105 MG/DL — HIGH (ref 70–99)
HCT VFR BLD CALC: 29.5 % — LOW (ref 34.5–45)
HGB BLD-MCNC: 9.3 G/DL — LOW (ref 11.5–15.5)
MCHC RBC-ENTMCNC: 29.2 PG — SIGNIFICANT CHANGE UP (ref 27–34)
MCHC RBC-ENTMCNC: 31.5 GM/DL — LOW (ref 32–36)
MCV RBC AUTO: 92.5 FL — SIGNIFICANT CHANGE UP (ref 80–100)
PLATELET # BLD AUTO: 314 K/UL — SIGNIFICANT CHANGE UP (ref 150–400)
POTASSIUM SERPL-MCNC: 4.9 MMOL/L — SIGNIFICANT CHANGE UP (ref 3.5–5.3)
POTASSIUM SERPL-SCNC: 4.9 MMOL/L — SIGNIFICANT CHANGE UP (ref 3.5–5.3)
RBC # BLD: 3.19 M/UL — LOW (ref 3.8–5.2)
RBC # FLD: 18.7 % — HIGH (ref 10.3–14.5)
SODIUM SERPL-SCNC: 131 MMOL/L — LOW (ref 135–145)
WBC # BLD: 5.96 K/UL — SIGNIFICANT CHANGE UP (ref 3.8–10.5)
WBC # FLD AUTO: 5.96 K/UL — SIGNIFICANT CHANGE UP (ref 3.8–10.5)

## 2018-07-28 PROCEDURE — 86850 RBC ANTIBODY SCREEN: CPT

## 2018-07-28 PROCEDURE — 36430 TRANSFUSION BLD/BLD COMPNT: CPT

## 2018-07-28 PROCEDURE — 85730 THROMBOPLASTIN TIME PARTIAL: CPT

## 2018-07-28 PROCEDURE — 82652 VIT D 1 25-DIHYDROXY: CPT

## 2018-07-28 PROCEDURE — 86901 BLOOD TYPING SEROLOGIC RH(D): CPT

## 2018-07-28 PROCEDURE — 99261: CPT

## 2018-07-28 PROCEDURE — 72170 X-RAY EXAM OF PELVIS: CPT

## 2018-07-28 PROCEDURE — 80053 COMPREHEN METABOLIC PANEL: CPT

## 2018-07-28 PROCEDURE — P9040: CPT

## 2018-07-28 PROCEDURE — 71045 X-RAY EXAM CHEST 1 VIEW: CPT

## 2018-07-28 PROCEDURE — 82962 GLUCOSE BLOOD TEST: CPT

## 2018-07-28 PROCEDURE — 99285 EMERGENCY DEPT VISIT HI MDM: CPT | Mod: 25

## 2018-07-28 PROCEDURE — 93005 ELECTROCARDIOGRAM TRACING: CPT

## 2018-07-28 PROCEDURE — 82435 ASSAY OF BLOOD CHLORIDE: CPT

## 2018-07-28 PROCEDURE — 83605 ASSAY OF LACTIC ACID: CPT

## 2018-07-28 PROCEDURE — 82553 CREATINE MB FRACTION: CPT

## 2018-07-28 PROCEDURE — 85014 HEMATOCRIT: CPT

## 2018-07-28 PROCEDURE — 82550 ASSAY OF CK (CPK): CPT

## 2018-07-28 PROCEDURE — 84295 ASSAY OF SERUM SODIUM: CPT

## 2018-07-28 PROCEDURE — 82330 ASSAY OF CALCIUM: CPT

## 2018-07-28 PROCEDURE — 97162 PT EVAL MOD COMPLEX 30 MIN: CPT

## 2018-07-28 PROCEDURE — 86923 COMPATIBILITY TEST ELECTRIC: CPT

## 2018-07-28 PROCEDURE — 80048 BASIC METABOLIC PNL TOTAL CA: CPT

## 2018-07-28 PROCEDURE — 83735 ASSAY OF MAGNESIUM: CPT

## 2018-07-28 PROCEDURE — 86900 BLOOD TYPING SEROLOGIC ABO: CPT

## 2018-07-28 PROCEDURE — 96372 THER/PROPH/DIAG INJ SC/IM: CPT | Mod: XU

## 2018-07-28 PROCEDURE — 96375 TX/PRO/DX INJ NEW DRUG ADDON: CPT

## 2018-07-28 PROCEDURE — 84100 ASSAY OF PHOSPHORUS: CPT

## 2018-07-28 PROCEDURE — 73502 X-RAY EXAM HIP UNI 2-3 VIEWS: CPT

## 2018-07-28 PROCEDURE — 72125 CT NECK SPINE W/O DYE: CPT

## 2018-07-28 PROCEDURE — 85610 PROTHROMBIN TIME: CPT

## 2018-07-28 PROCEDURE — 96374 THER/PROPH/DIAG INJ IV PUSH: CPT

## 2018-07-28 PROCEDURE — 70450 CT HEAD/BRAIN W/O DYE: CPT

## 2018-07-28 PROCEDURE — 84132 ASSAY OF SERUM POTASSIUM: CPT

## 2018-07-28 PROCEDURE — 93306 TTE W/DOPPLER COMPLETE: CPT

## 2018-07-28 PROCEDURE — 85027 COMPLETE CBC AUTOMATED: CPT

## 2018-07-28 PROCEDURE — 84484 ASSAY OF TROPONIN QUANT: CPT

## 2018-07-28 PROCEDURE — 82803 BLOOD GASES ANY COMBINATION: CPT

## 2018-07-28 PROCEDURE — 74176 CT ABD & PELVIS W/O CONTRAST: CPT

## 2018-07-28 PROCEDURE — 93308 TTE F-UP OR LMTD: CPT

## 2018-07-28 PROCEDURE — 82947 ASSAY GLUCOSE BLOOD QUANT: CPT

## 2018-07-28 RX ORDER — CLONAZEPAM 1 MG
1 TABLET ORAL
Qty: 0 | Refills: 0 | COMMUNITY
Start: 2018-07-28

## 2018-07-28 RX ORDER — ATORVASTATIN CALCIUM 80 MG/1
1 TABLET, FILM COATED ORAL
Qty: 0 | Refills: 0 | COMMUNITY
Start: 2018-07-28

## 2018-07-28 RX ORDER — LACTOBACILLUS ACIDOPHILUS 100MM CELL
1 CAPSULE ORAL
Qty: 0 | Refills: 0 | COMMUNITY
Start: 2018-07-28

## 2018-07-28 RX ORDER — LEVOTHYROXINE SODIUM 125 MCG
1 TABLET ORAL
Qty: 0 | Refills: 0 | COMMUNITY
Start: 2018-07-28

## 2018-07-28 RX ORDER — ATORVASTATIN CALCIUM 80 MG/1
0.5 TABLET, FILM COATED ORAL
Qty: 0 | Refills: 0 | COMMUNITY
Start: 2018-07-28

## 2018-07-28 RX ORDER — INSULIN GLARGINE 100 [IU]/ML
15 INJECTION, SOLUTION SUBCUTANEOUS
Qty: 0 | Refills: 0 | COMMUNITY

## 2018-07-28 RX ORDER — CALCIUM ACETATE 667 MG
1 TABLET ORAL
Qty: 0 | Refills: 0 | COMMUNITY
Start: 2018-07-28

## 2018-07-28 RX ORDER — HYDRALAZINE HCL 50 MG
1 TABLET ORAL
Qty: 0 | Refills: 0 | COMMUNITY
Start: 2018-07-28

## 2018-07-28 RX ORDER — ERYTHROPOIETIN 10000 [IU]/ML
10000 INJECTION, SOLUTION INTRAVENOUS; SUBCUTANEOUS ONCE
Qty: 0 | Refills: 0 | Status: COMPLETED | OUTPATIENT
Start: 2018-07-28 | End: 2018-07-28

## 2018-07-28 RX ORDER — DOCUSATE SODIUM 100 MG
1 CAPSULE ORAL
Qty: 0 | Refills: 0 | COMMUNITY
Start: 2018-07-28

## 2018-07-28 RX ORDER — LISINOPRIL 2.5 MG/1
1 TABLET ORAL
Qty: 0 | Refills: 0 | COMMUNITY

## 2018-07-28 RX ORDER — INSULIN GLARGINE 100 [IU]/ML
3 INJECTION, SOLUTION SUBCUTANEOUS
Qty: 0 | Refills: 0 | COMMUNITY
Start: 2018-07-28

## 2018-07-28 RX ORDER — LABETALOL HCL 100 MG
1 TABLET ORAL
Qty: 0 | Refills: 0 | COMMUNITY

## 2018-07-28 RX ORDER — GABAPENTIN 400 MG/1
1 CAPSULE ORAL
Qty: 0 | Refills: 0 | COMMUNITY
Start: 2018-07-28

## 2018-07-28 RX ORDER — SERTRALINE 25 MG/1
1 TABLET, FILM COATED ORAL
Qty: 0 | Refills: 0 | COMMUNITY
Start: 2018-07-28

## 2018-07-28 RX ORDER — LIDOCAINE 4 G/100G
1 CREAM TOPICAL
Qty: 0 | Refills: 0 | COMMUNITY
Start: 2018-07-28

## 2018-07-28 RX ORDER — AMLODIPINE BESYLATE 2.5 MG/1
1 TABLET ORAL
Qty: 0 | Refills: 0 | COMMUNITY
Start: 2018-07-28

## 2018-07-28 RX ORDER — HYDRALAZINE HCL 50 MG
1.5 TABLET ORAL
Qty: 0 | Refills: 0 | COMMUNITY

## 2018-07-28 RX ORDER — INSULIN ASPART 100 [IU]/ML
5 INJECTION, SOLUTION SUBCUTANEOUS
Qty: 0 | Refills: 0 | COMMUNITY

## 2018-07-28 RX ORDER — TRAMADOL HYDROCHLORIDE 50 MG/1
1 TABLET ORAL
Qty: 0 | Refills: 0 | COMMUNITY

## 2018-07-28 RX ORDER — PETROLATUM,WHITE
1 JELLY (GRAM) TOPICAL
Qty: 0 | Refills: 0 | COMMUNITY
Start: 2018-07-28

## 2018-07-28 RX ORDER — CLONAZEPAM 1 MG
1 TABLET ORAL
Qty: 0 | Refills: 0 | COMMUNITY

## 2018-07-28 RX ORDER — ATORVASTATIN CALCIUM 80 MG/1
1 TABLET, FILM COATED ORAL
Qty: 0 | Refills: 0 | COMMUNITY

## 2018-07-28 RX ADMIN — ERYTHROPOIETIN 10000 UNIT(S): 10000 INJECTION, SOLUTION INTRAVENOUS; SUBCUTANEOUS at 11:07

## 2018-07-28 RX ADMIN — Medication 100 MILLIGRAM(S): at 14:06

## 2018-07-28 RX ADMIN — GABAPENTIN 100 MILLIGRAM(S): 400 CAPSULE ORAL at 14:06

## 2018-07-28 RX ADMIN — LIDOCAINE 1 PATCH: 4 CREAM TOPICAL at 12:24

## 2018-07-28 RX ADMIN — Medication 100 MILLIGRAM(S): at 05:09

## 2018-07-28 RX ADMIN — LIDOCAINE 1 PATCH: 4 CREAM TOPICAL at 12:25

## 2018-07-28 RX ADMIN — HYDROMORPHONE HYDROCHLORIDE 0.5 MILLIGRAM(S): 2 INJECTION INTRAMUSCULAR; INTRAVENOUS; SUBCUTANEOUS at 13:50

## 2018-07-28 RX ADMIN — Medication 1 APPLICATION(S): at 05:11

## 2018-07-28 RX ADMIN — Medication 1 APPLICATION(S): at 14:07

## 2018-07-28 RX ADMIN — HYDROMORPHONE HYDROCHLORIDE 0.5 MILLIGRAM(S): 2 INJECTION INTRAMUSCULAR; INTRAVENOUS; SUBCUTANEOUS at 05:25

## 2018-07-28 RX ADMIN — Medication 0.1 MILLIGRAM(S): at 05:09

## 2018-07-28 RX ADMIN — Medication 100 MICROGRAM(S): at 05:10

## 2018-07-28 RX ADMIN — AMLODIPINE BESYLATE 10 MILLIGRAM(S): 2.5 TABLET ORAL at 05:09

## 2018-07-28 RX ADMIN — HYDROMORPHONE HYDROCHLORIDE 0.5 MILLIGRAM(S): 2 INJECTION INTRAMUSCULAR; INTRAVENOUS; SUBCUTANEOUS at 12:52

## 2018-07-28 RX ADMIN — Medication 100 MILLIGRAM(S): at 05:10

## 2018-07-28 RX ADMIN — Medication 650 MILLIGRAM(S): at 08:41

## 2018-07-28 RX ADMIN — SERTRALINE 50 MILLIGRAM(S): 25 TABLET, FILM COATED ORAL at 12:25

## 2018-07-28 RX ADMIN — HYDROMORPHONE HYDROCHLORIDE 0.5 MILLIGRAM(S): 2 INJECTION INTRAMUSCULAR; INTRAVENOUS; SUBCUTANEOUS at 04:55

## 2018-07-28 RX ADMIN — GABAPENTIN 100 MILLIGRAM(S): 400 CAPSULE ORAL at 05:10

## 2018-07-28 RX ADMIN — Medication 650 MILLIGRAM(S): at 07:56

## 2018-07-28 NOTE — PROGRESS NOTE ADULT - SUBJECTIVE AND OBJECTIVE BOX
Subjective: C/o fatigue. Uneventful HD earlier this am.       MEDICATIONS  (STANDING):  amLODIPine   Tablet 10 milliGRAM(s) Oral daily  AQUAPHOR (petrolatum Ointment) 1 Application(s) Topical three times a day  atorvastatin 20 milliGRAM(s) Oral at bedtime  calcium acetate 667 milliGRAM(s) Oral two times a day with meals  clonazePAM Tablet 0.5 milliGRAM(s) Oral at bedtime  cloNIDine 0.1 milliGRAM(s) Oral two times a day  dextrose 5%. 1000 milliLiter(s) (50 mL/Hr) IV Continuous <Continuous>  dextrose 50% Injectable 12.5 Gram(s) IV Push once  dextrose 50% Injectable 25 Gram(s) IV Push once  dextrose 50% Injectable 25 Gram(s) IV Push once  docusate sodium 100 milliGRAM(s) Oral two times a day  gabapentin 100 milliGRAM(s) Oral three times a day  hydrALAZINE 100 milliGRAM(s) Oral every 8 hours  insulin glargine Injectable (LANTUS) 3 Unit(s) SubCutaneous at bedtime  insulin lispro (HumaLOG) corrective regimen sliding scale   SubCutaneous three times a day before meals  insulin lispro (HumaLOG) corrective regimen sliding scale   SubCutaneous at bedtime  lactobacillus acidophilus 1 Tablet(s) Oral two times a day with meals  levothyroxine 100 MICROGram(s) Oral daily  lidocaine   Patch 1 Patch Transdermal daily  sertraline 50 milliGRAM(s) Oral daily    MEDICATIONS  (PRN):  acetaminophen   Tablet. 650 milliGRAM(s) Oral every 6 hours PRN mild or moderate pain  dextrose 40% Gel 15 Gram(s) Oral once PRN Blood Glucose LESS THAN 70 milliGRAM(s)/deciliter  glucagon  Injectable 1 milliGRAM(s) IntraMuscular once PRN Glucose LESS THAN 70 milligrams/deciliter  HYDROmorphone  Injectable 0.5 milliGRAM(s) IV Push every 6 hours PRN Severe Pain (7 - 10)          T(C): 36.9 (07-28-18 @ 12:51), Max: 37.3 (07-27-18 @ 20:15)  HR: 70 (07-28-18 @ 12:51) (66 - 77)  BP: 158/62 (07-28-18 @ 12:50) (139/64 - 158/62)  RR: 16 (07-28-18 @ 12:51) (16 - 18)  SpO2: 99% (07-28-18 @ 12:51) (97% - 100%)  Wt(kg): --        I&O's Detail    27 Jul 2018 07:01  -  28 Jul 2018 07:00  --------------------------------------------------------  IN:    Oral Fluid: 360 mL  Total IN: 360 mL    OUT:  Total OUT: 0 mL    Total NET: 360 mL      28 Jul 2018 07:01  -  28 Jul 2018 15:26  --------------------------------------------------------  IN:    Oral Fluid: 360 mL    Other: 800 mL  Total IN: 1160 mL    OUT:    Other: 2700 mL  Total OUT: 2700 mL    Total NET: -1540 mL               PHYSICAL EXAM:    GENERAL: anxious  EYES: EOMI, PERRLA, conjunctiva and sclera clear  NECK: Supple, no inc in JVP  CHEST/LUNG: Clear  HEART: S1S2  ABDOMEN: Soft, Nontender, Nondistended; Bowel sounds present  EXTREMITIES:  no edema  NEURO: no asterixis  ACCESS: L AVF pos thrill, bruit      LABS:  CBC Full  -  ( 28 Jul 2018 08:35 )  WBC Count : 5.96 K/uL  Hemoglobin : 9.3 g/dL  Hematocrit : 29.5 %  Platelet Count - Automated : 314 K/uL  Mean Cell Volume : 92.5 fl  Mean Cell Hemoglobin : 29.2 pg  Mean Cell Hemoglobin Concentration : 31.5 gm/dL  Auto Neutrophil # : x  Auto Lymphocyte # : x  Auto Monocyte # : x  Auto Eosinophil # : x  Auto Basophil # : x  Auto Neutrophil % : x  Auto Lymphocyte % : x  Auto Monocyte % : x  Auto Eosinophil % : x  Auto Basophil % : x    07-28    131<L>  |  92<L>  |  48<H>  ----------------------------<  105<H>  4.9   |  24  |  4.62<H>    Ca    9.1      28 Jul 2018 06:35            Impression:   * HD dependent ESRD  * Frequent falls  * Scalp laceration  * Bradycardia    Recommendations:   * Stable HD earlier today. 1.5L of fluid removed  * Next maint HD 7/31

## 2018-07-28 NOTE — PROGRESS NOTE ADULT - SUBJECTIVE AND OBJECTIVE BOX
Patient is a 72y old  Female who presents with a chief complaint of wound dehiscence, bradycardia (27 Jul 2018 09:54)      SUBJECTIVE / OVERNIGHT EVENTS: Comfortable without new complaints.   Review of Systems  chest pain no  palpitations no  sob no  nausea no  headache no    MEDICATIONS  (STANDING):  amLODIPine   Tablet 10 milliGRAM(s) Oral daily  AQUAPHOR (petrolatum Ointment) 1 Application(s) Topical three times a day  atorvastatin 20 milliGRAM(s) Oral at bedtime  calcium acetate 667 milliGRAM(s) Oral two times a day with meals  clonazePAM Tablet 0.5 milliGRAM(s) Oral at bedtime  cloNIDine 0.1 milliGRAM(s) Oral two times a day  dextrose 5%. 1000 milliLiter(s) (50 mL/Hr) IV Continuous <Continuous>  dextrose 50% Injectable 12.5 Gram(s) IV Push once  dextrose 50% Injectable 25 Gram(s) IV Push once  dextrose 50% Injectable 25 Gram(s) IV Push once  docusate sodium 100 milliGRAM(s) Oral two times a day  gabapentin 100 milliGRAM(s) Oral three times a day  hydrALAZINE 100 milliGRAM(s) Oral every 8 hours  insulin glargine Injectable (LANTUS) 3 Unit(s) SubCutaneous at bedtime  insulin lispro (HumaLOG) corrective regimen sliding scale   SubCutaneous three times a day before meals  insulin lispro (HumaLOG) corrective regimen sliding scale   SubCutaneous at bedtime  lactobacillus acidophilus 1 Tablet(s) Oral two times a day with meals  levothyroxine 100 MICROGram(s) Oral daily  lidocaine   Patch 1 Patch Transdermal daily  sertraline 50 milliGRAM(s) Oral daily    MEDICATIONS  (PRN):  acetaminophen   Tablet. 650 milliGRAM(s) Oral every 6 hours PRN mild or moderate pain  dextrose 40% Gel 15 Gram(s) Oral once PRN Blood Glucose LESS THAN 70 milliGRAM(s)/deciliter  glucagon  Injectable 1 milliGRAM(s) IntraMuscular once PRN Glucose LESS THAN 70 milligrams/deciliter  HYDROmorphone  Injectable 0.5 milliGRAM(s) IV Push every 6 hours PRN Severe Pain (7 - 10)      Vital Signs Last 24 Hrs  T(C): 36.8 (28 Jul 2018 11:43), Max: 37.3 (27 Jul 2018 20:15)  T(F): 98.2 (28 Jul 2018 11:43), Max: 99.2 (27 Jul 2018 20:15)  HR: 66 (28 Jul 2018 11:43) (66 - 77)  BP: 142/33 (28 Jul 2018 11:43) (139/64 - 154/43)  BP(mean): --  RR: 18 (28 Jul 2018 11:43) (16 - 18)  SpO2: 100% (28 Jul 2018 11:43) (97% - 100%)    PHYSICAL EXAM:  GENERAL: NAD, well-developed  HEAD:  Hematoma stable. L periorbital ecchymosis improving , Normocephalic  EYES: EOMI, PERRLA, conjunctiva and sclera clear  NECK: Supple, No JVD  CHEST/LUNG: Clear to auscultation bilaterally; No wheeze  HEART: Regular rate and rhythm; No murmurs, rubs, or gallops  ABDOMEN: Soft, Nontender, Nondistended; Bowel sounds present  EXTREMITIES:  2+ Peripheral Pulses, No clubbing, cyanosis, or edema  PSYCH: AAOx3  NEUROLOGY: non-focal  SKIN: No rashes or lesions    LABS:                        9.3    5.96  )-----------( 314      ( 28 Jul 2018 08:35 )             29.5     07-28    131<L>  |  92<L>  |  48<H>  ----------------------------<  105<H>  4.9   |  24  |  4.62<H>    Ca    9.1      28 Jul 2018 06:35                  RADIOLOGY & ADDITIONAL TESTS:    Imaging Personally Reviewed:    Consultant(s) Notes Reviewed:      Care Discussed with Consultants/Other Providers:

## 2018-07-28 NOTE — PROGRESS NOTE ADULT - PROVIDER SPECIALTY LIST ADULT
Cardiology
Internal Medicine
Nephrology
Neurosurgery
Surgery
Cardiology
Cardiology

## 2018-07-28 NOTE — PROGRESS NOTE ADULT - ASSESSMENT
71 yo male admitted with recurrent syncope
72 F PMH type 2 DM, HTN, nonobstructive CAD, ESRD on HD TTS, syncope s/p ILR, frequent falls c/b prior nasal fracture and cervical spine / Lumbar spine fractures, Afib no a/c 2/2 recent fall with scalp laceration/hematoma, now p/w repeat fall at HD center with resultant dehiscence of scalp wound.   - Cervical spine precautions with hard collar  - MRI wo L spine to evaluate L spine fracture
73 yo male admitted with recurrent syncope
ASSESSMENT: Patient is a 72y old f with ESRD admitted after fall with rebleed into scalp hematoma. Now at baseline mental status with no active bleeding from lacerations and no significant expansion of hematomas noted.     - Scalp has hemostasis for 24 hours.   - H/H stable      ATP  [id number]
ASSESSMENT: Patient is a 72y old f with ESRD admitted after fall with rebleed into scalp hematoma. Now at baseline mental status with no active bleeding from lacerations and no significant expansion of hematomas noted.     - Scalp has hemostasis for >48 hours.   - H/H stable  - Signing off, please page with questions      MNG [id number]
ASSESSMENT: Patient is a 72y old f with ESRD admitted after fall with rebleed into scalp hematoma. Now at baseline mental status with no active bleeding from lacerations and no significant expansion of hematomas noted.     - Trend CBC and transfuse as needed. CBC has been stable for 3 days without further transfusion.   - Hematoma has tamponaded itself and no surgical intervention indicated at this time.  - Please reconsult surgery PRN    ATP  [id number]
ASSESSMENT: Patient is a 72y old f with ESRD admitted after fall with rebleed into scalp hematoma. Now at baseline mental status with no active bleeding from lacerations and no significant expansion of hematomas noted.     Patient had bleeding yesterday from scalp. Pressure applied and pressure dressing in place. Gauze saturated and outer dressing changed overnight 2/2 slippage. ]  H/H stable this am compared to yesterday. Patient hemodynamically stable.    - Will take down dressing today and inspect for hemostasis.  - Trend CBC and transfuse as needed. CBC has been stable for 4 days without further transfusion.       ATP  [id number]
· Assessment		  72 F PMH type 2 DM, HTN, nonobstructive CAD, ESRD on HD TTS, syncope s/p ILR, frequent falls c/b prior nasal fracture and cervical spine fracture, Afib no a/c 2/2 recent fall with scalp laceration/hematoma, now p/w repeat fall at HD center with resultant dehiscence of scalp wound c/b transient symptomatic bradycardia.       Scalp laceration, subsequent encounter.  -holding a/c given recent hematoma/dehiscence,   reportedly has hx of pAF would eventually need a/c given high CHADSVASC  -wound care cs.  surgical evaluation noted    Anemia post hemhoragic- Transfuse, follow Hct.    Cervical spine anterolistesis- Neurosurgery evaluation noted. Continue Cervical collar.     MRI L spine pending     Symptomatic bradycardia.  -transient, responded to atropine  -continue telemetry  -avoid reina blockade  -cardiology follow  -pacer pads on at all times, atropine at bedside.     Other chronic pain.   -start dilaudid 0.5 q6 prn for severe pain  -tylenol for mild/mod pain.     ESRD on hemodialysis. - Nephrology evaluation called.       Type 2 diabetes mellitus with hyperglycemia, with long-term current use of insulin.  -c/w prior basal/bolus dosing from last admission in June      Essential hypertension.   -hold reina blockers  -resume hydralazine, clonidine, norvasc  -holding labetalol in setting of bradycardia  -cards f/u.    Coronary artery disease without angina pectoris, unspecified vessel or lesion type, unspecified whether native or transplanted heart.   -no CP  c/w statin, hold aspirin because of bleed.   Rajendra Carter MD pager [phone]
· Assessment		  72 F PMH type 2 DM, HTN, nonobstructive CAD, ESRD on HD TTS, syncope s/p ILR, frequent falls c/b prior nasal fracture and cervical spine fracture, Afib no a/c 2/2 recent fall with scalp laceration/hematoma, now p/w repeat fall at HD center with resultant dehiscence of scalp wound c/b transient symptomatic bradycardia.       Scalp laceration, subsequent encounter.  -holding a/c given recent hematoma/dehiscence,   reportedly has hx of pAF would eventually need a/c given high CHADSVASC  -wound care cs.  surgical evaluation noted    Anemia post hemhoragic- Transfuse, follow Hct.    Cervical spine anterolistesis- Neurosurgery evaluation noted. Continue Cervical collar.     MRI L spine pending / refused.    Symptomatic bradycardia.  -transient, responded to atropine  -continue telemetry  -avoid reina blockade  -cardiology follow  -pacer pads on at all times, atropine at bedside.     Other chronic pain.   -start dilaudid 0.5 q6 prn for severe pain  -tylenol for mild/mod pain.     ESRD on hemodialysis. - Nephrology evaluation called.       Type 2 diabetes mellitus with hyperglycemia, with long-term current use of insulin.  -c/w prior basal/bolus dosing from last admission in June      Essential hypertension.   -hold reina blockers  -resume hydralazine, clonidine, norvasc  -holding labetalol in setting of bradycardia  -cards f/u.    Coronary artery disease without angina pectoris, unspecified vessel or lesion type, unspecified whether native or transplanted heart.   -no CP  c/w statin, hold aspirin because of bleed.     DCP rehab in progress  Rajendra Carter MD pager [phone]
· Assessment		  72 F PMH type 2 DM, HTN, nonobstructive CAD, ESRD on HD TTS, syncope s/p ILR, frequent falls c/b prior nasal fracture and cervical spine fracture, Afib no a/c 2/2 recent fall with scalp laceration/hematoma, now p/w repeat fall at HD center with resultant dehiscence of scalp wound c/b transient symptomatic bradycardia.       Scalp laceration, subsequent encounter.  -holding a/c given recent hematoma/dehiscence,   reportedly has hx of pAF would eventually need a/c given high CHADSVASC  -wound care cs.  surgical follow evaluation noted    Anemia post hemhoragic- Transfuse 1 PRBC today, follow Hct.    Cervical spine anterolistesis- Neurosurgery evaluation noted. Continue Cervical collar.     MRI L spine pending / refused.    Symptomatic bradycardia.  -transient, responded to atropine  -continue telemetry  -avoid reina blockade  -cardiology follow  -pacer pads on at all times, atropine at bedside.     Other chronic pain.   -start dilaudid 0.5 q6 prn for severe pain  -tylenol for mild/mod pain.     ESRD on hemodialysis. - Nephrology evaluation called.       Type 2 diabetes mellitus with hyperglycemia, with long-term current use of insulin.  -c/w prior basal/bolus dosing from last admission in June      Essential hypertension.   -hold reina blockers  -resume hydralazine, clonidine, norvasc  -holding labetalol in setting of bradycardia  -cards f/u.    Coronary artery disease without angina pectoris, unspecified vessel or lesion type, unspecified whether native or transplanted heart.   -no CP  c/w statin, hold aspirin because of bleed.     DCP rehab in progress  Rajendra Carter MD pager [phone]
· Assessment		  72 F PMH type 2 DM, HTN, nonobstructive CAD, ESRD on HD TTS, syncope s/p ILR, frequent falls c/b prior nasal fracture and cervical spine fracture, Afib no a/c 2/2 recent fall with scalp laceration/hematoma, now p/w repeat fall at HD center with resultant dehiscence of scalp wound c/b transient symptomatic bradycardia.       Scalp laceration, subsequent encounter.  -holding a/c given recent hematoma/dehiscence, To be restarted as OTP.   reportedly has hx of pAF would eventually need a/c given high CHADSVASC  -wound care cs.  surgical follow     Anemia post hemhoragic- s/p Transfusion PRBC, follow Hct.    Cervical spine anterolistesis- Neurosurgery evaluation noted. Continue Cervical collar.     MRI L spine refused.    Symptomatic bradycardia resolved.   -transient, responded to atropine  -continue telemetry  -avoid reina blockade  -cardiology follow    Other chronic pain.   -pain control.     ESRD on hemodialysis. - Nephrology follow.       Type 2 diabetes mellitus with hyperglycemia, with long-term current use of insulin.  -c/w prior basal/bolus dosing from last admission in June      Essential hypertension.   -hold reina blockers  -resume hydralazine, clonidine, norvasc  -holding labetalol in setting of bradycardia  -cards f/u.    Coronary artery disease without angina pectoris, unspecified vessel or lesion type, unspecified whether native or transplanted heart.   -no CP  c/w statin, hold aspirin because of bleed.    PT     DC rehab. Follow with PMD/ Cardiology/Nephrology/ Surgery for sutures removal in 1 week. QA.  Rajendra Carter MD pager [phone]
· Assessment		  72 F PMH type 2 DM, HTN, nonobstructive CAD, ESRD on HD TTS, syncope s/p ILR, frequent falls c/b prior nasal fracture and cervical spine fracture, Afib no a/c 2/2 recent fall with scalp laceration/hematoma, now p/w repeat fall at HD center with resultant dehiscence of scalp wound c/b transient symptomatic bradycardia.       Scalp laceration, subsequent encounter.  -holding a/c given recent hematoma/dehiscence, To be restarted as OTP.   reportedly has hx of pAF would eventually need a/c given high CHADSVASC  -wound care cs.  surgical follow     Anemia post hemhoragic- s/p Transfusion PRBC, follow Hct.    Cervical spine anterolistesis- Neurosurgery evaluation noted. Continue Cervical collar.     MRI L spine refused.    Symptomatic bradycardia resolved.   -transient, responded to atropine  -continue telemetry  -avoid reina blockade  -cardiology follow    Other chronic pain.   -pain control.     ESRD on hemodialysis. - Nephrology follow.       Type 2 diabetes mellitus with hyperglycemia, with long-term current use of insulin.  -c/w prior basal/bolus dosing from last admission in June      Essential hypertension.   -hold reina blockers  -resume hydralazine, clonidine, norvasc  -holding labetalol in setting of bradycardia  -cards f/u.    Coronary artery disease without angina pectoris, unspecified vessel or lesion type, unspecified whether native or transplanted heart.   -no CP  c/w statin, hold aspirin because of bleed.    PT     DCP rehab after HD tomorrow.if stable.  Rajendra Carter MD pager [phone]
· Assessment		  72 F PMH type 2 DM, HTN, nonobstructive CAD, ESRD on HD TTS, syncope s/p ILR, frequent falls c/b prior nasal fracture and cervical spine fracture, Afib no a/c 2/2 recent fall with scalp laceration/hematoma, now p/w repeat fall at HD center with resultant dehiscence of scalp wound c/b transient symptomatic bradycardia.       Scalp laceration, subsequent encounter.  -holding a/c given recent hematoma/dehiscence, To be restarted as OTP.   reportedly has hx of pAF would eventually need a/c given high CHADSVASC  -wound care cs.  surgical follow     Anemia post hemhoragic- s/p Transfusion PRBC, follow Hct.    Cervical spine anterolistesis- Neurosurgery evaluation noted. Continue Cervical collar.     MRI L spine refused.    Symptomatic bradycardia resolved.   -transient, responded to atropine  -continue telemetry  -avoid reina blockade  -cardiology follow    Other chronic pain.   -pain control.     ESRD on hemodialysis. - Nephrology follow.       Type 2 diabetes mellitus with hyperglycemia, with long-term current use of insulin.  -c/w prior basal/bolus dosing from last admission in June      Essential hypertension.   -hold reina blockers  -resume hydralazine, clonidine, norvasc  -holding labetalol in setting of bradycardia  -cards f/u.    Coronary artery disease without angina pectoris, unspecified vessel or lesion type, unspecified whether native or transplanted heart.   -no CP  c/w statin, hold aspirin because of bleed.    PT     DCP rehab in progress  Rajendra Carter MD pager [phone]
· Assessment		  72 F PMH type 2 DM, HTN, nonobstructive CAD, ESRD on HD TTS, syncope s/p ILR, frequent falls c/b prior nasal fracture and cervical spine fracture, Afib no a/c 2/2 recent fall with scalp laceration/hematoma, now p/w repeat fall at HD center with resultant dehiscence of scalp wound c/b transient symptomatic bradycardia.       Scalp laceration, subsequent encounter. Surgical evaluation called.  -holding a/c given recent hematoma/dehiscence,   reportedly has hx of pAF would eventually need a/c given high CHADSVASC  -wound care cs.    Anemia post hemhoragic- Transfuse, follow Hct.    Cervical spine anterolistesis- Neurosurgery evaluation called. Continue Cervical collar.     Symptomatic bradycardia.  -transient, responded to atropine  -continue telemetry  -avoid reina blockade  -cardiology follow  -pacer pads on at all times, atropine at bedside.     Other chronic pain.   -start dilaudid 0.5 q6 prn for severe pain  -tylenol for mild/mod pain.     ESRD on hemodialysis. - Nephrology evaluation called.       Type 2 diabetes mellitus with hyperglycemia, with long-term current use of insulin.  -c/w prior basal/bolus dosing from last admission in June      Essential hypertension.   -hold reina blockers  -resume hydralazine, clonidine, norvasc  -holding labetalol in setting of bradycardia  -cards f/u.    Coronary artery disease without angina pectoris, unspecified vessel or lesion type, unspecified whether native or transplanted heart.   -no CP  c/w statin, hold aspirin because of bleed.   Rajendra Carter MD pager [phone]
71 yo male admitted with recurrent syncope
71 yo male admitted with recurrent syncope

## 2018-07-31 ENCOUNTER — MESSAGE (OUTPATIENT)
Age: 73
End: 2018-07-31

## 2018-08-01 ENCOUNTER — OTHER (OUTPATIENT)
Age: 73
End: 2018-08-01

## 2018-08-07 ENCOUNTER — EMERGENCY (EMERGENCY)
Facility: HOSPITAL | Age: 73
LOS: 1 days | Discharge: HOME | End: 2018-08-07
Attending: EMERGENCY MEDICINE
Payer: MEDICARE

## 2018-08-07 VITALS
SYSTOLIC BLOOD PRESSURE: 173 MMHG | OXYGEN SATURATION: 98 % | RESPIRATION RATE: 18 BRPM | DIASTOLIC BLOOD PRESSURE: 79 MMHG | HEART RATE: 72 BPM | TEMPERATURE: 98 F

## 2018-08-07 LAB
ALBUMIN SERPL ELPH-MCNC: 3.3 G/DL — SIGNIFICANT CHANGE UP (ref 3.3–5)
ALP SERPL-CCNC: 145 U/L — HIGH (ref 40–120)
ALT FLD-CCNC: 14 U/L — SIGNIFICANT CHANGE UP (ref 10–45)
ANION GAP SERPL CALC-SCNC: 21 MMOL/L — HIGH (ref 5–17)
APTT BLD: 31.3 SEC — SIGNIFICANT CHANGE UP (ref 27.5–37.4)
AST SERPL-CCNC: 48 U/L — HIGH (ref 10–40)
BASOPHILS # BLD AUTO: 0 K/UL — SIGNIFICANT CHANGE UP (ref 0–0.2)
BASOPHILS NFR BLD AUTO: 0.5 % — SIGNIFICANT CHANGE UP (ref 0–2)
BILIRUB SERPL-MCNC: 0.2 MG/DL — SIGNIFICANT CHANGE UP (ref 0.2–1.2)
BUN SERPL-MCNC: 56 MG/DL — HIGH (ref 7–23)
CALCIUM SERPL-MCNC: 9.1 MG/DL — SIGNIFICANT CHANGE UP (ref 8.4–10.5)
CHLORIDE SERPL-SCNC: 92 MMOL/L — LOW (ref 96–108)
CO2 SERPL-SCNC: 21 MMOL/L — LOW (ref 22–31)
CREAT SERPL-MCNC: 4.52 MG/DL — HIGH (ref 0.5–1.3)
EOSINOPHIL # BLD AUTO: 0.4 K/UL — SIGNIFICANT CHANGE UP (ref 0–0.5)
EOSINOPHIL NFR BLD AUTO: 5.2 % — SIGNIFICANT CHANGE UP (ref 0–6)
GLUCOSE SERPL-MCNC: 159 MG/DL — HIGH (ref 70–99)
HCT VFR BLD CALC: 32.7 % — LOW (ref 34.5–45)
HGB BLD-MCNC: 10.4 G/DL — LOW (ref 11.5–15.5)
INR BLD: 1 RATIO — SIGNIFICANT CHANGE UP (ref 0.88–1.16)
LYMPHOCYTES # BLD AUTO: 0.6 K/UL — LOW (ref 1–3.3)
LYMPHOCYTES # BLD AUTO: 8.4 % — LOW (ref 13–44)
MCHC RBC-ENTMCNC: 29.5 PG — SIGNIFICANT CHANGE UP (ref 27–34)
MCHC RBC-ENTMCNC: 31.7 GM/DL — LOW (ref 32–36)
MCV RBC AUTO: 93 FL — SIGNIFICANT CHANGE UP (ref 80–100)
MONOCYTES # BLD AUTO: 0.4 K/UL — SIGNIFICANT CHANGE UP (ref 0–0.9)
MONOCYTES NFR BLD AUTO: 4.9 % — SIGNIFICANT CHANGE UP (ref 2–14)
NEUTROPHILS # BLD AUTO: 6.1 K/UL — SIGNIFICANT CHANGE UP (ref 1.8–7.4)
NEUTROPHILS NFR BLD AUTO: 81 % — HIGH (ref 43–77)
PLATELET # BLD AUTO: 390 K/UL — SIGNIFICANT CHANGE UP (ref 150–400)
POTASSIUM SERPL-MCNC: 5.4 MMOL/L — HIGH (ref 3.5–5.3)
POTASSIUM SERPL-SCNC: 5.4 MMOL/L — HIGH (ref 3.5–5.3)
PROT SERPL-MCNC: 6.9 G/DL — SIGNIFICANT CHANGE UP (ref 6–8.3)
PROTHROM AB SERPL-ACNC: 10.8 SEC — SIGNIFICANT CHANGE UP (ref 9.8–12.7)
RBC # BLD: 3.51 M/UL — LOW (ref 3.8–5.2)
RBC # FLD: 16.5 % — HIGH (ref 10.3–14.5)
SODIUM SERPL-SCNC: 134 MMOL/L — LOW (ref 135–145)
WBC # BLD: 7.5 K/UL — SIGNIFICANT CHANGE UP (ref 3.8–10.5)
WBC # FLD AUTO: 7.5 K/UL — SIGNIFICANT CHANGE UP (ref 3.8–10.5)

## 2018-08-07 PROCEDURE — 70450 CT HEAD/BRAIN W/O DYE: CPT | Mod: 26

## 2018-08-07 PROCEDURE — 99284 EMERGENCY DEPT VISIT MOD MDM: CPT | Mod: 25

## 2018-08-07 PROCEDURE — 12001 RPR S/N/AX/GEN/TRNK 2.5CM/<: CPT

## 2018-08-07 RX ORDER — ACETAMINOPHEN 500 MG
975 TABLET ORAL ONCE
Qty: 0 | Refills: 0 | Status: COMPLETED | OUTPATIENT
Start: 2018-08-07 | End: 2018-08-07

## 2018-08-07 RX ORDER — OXYCODONE HYDROCHLORIDE 5 MG/1
5 TABLET ORAL ONCE
Qty: 0 | Refills: 0 | Status: DISCONTINUED | OUTPATIENT
Start: 2018-08-07 | End: 2018-08-07

## 2018-08-07 RX ADMIN — OXYCODONE HYDROCHLORIDE 5 MILLIGRAM(S): 5 TABLET ORAL at 23:34

## 2018-08-07 RX ADMIN — Medication 975 MILLIGRAM(S): at 17:53

## 2018-08-07 RX ADMIN — OXYCODONE HYDROCHLORIDE 5 MILLIGRAM(S): 5 TABLET ORAL at 20:45

## 2018-08-07 NOTE — ED PROVIDER NOTE - MEDICAL DECISION MAKING DETAILS
Attending MD Conley: 73 yo female presents from rehab.  PMH for Type 2 DM, HTN, CAD, ESRD on HD, Afib on no AC sp recent admission for bradycardia and fall presents for second time with a dehiscence of a scalp hematoma.  Patient with no new trauma.  Large amount bleeding from scalp wound with necrotic edges.  Wound debrided by me and clean edges closed with staples with good hemostatic control.  Will check hgb and likely able to discharge back to rehab.

## 2018-08-07 NOTE — ED PROVIDER NOTE - PROGRESS NOTE DETAILS
CT negative for emergent intracranial pathology. Labs noted. HGB 10.4. Was 9.3. Not in need of emergent HD. Scalp wound repaired by Gen Surg. Hemostasis achieved. Scalp hematoma does not appear to be expanding. Coags WNLs. Cleared from the their perspective. Will be discharged back to her facility. Patient's systolic BP appreciated. There is nothing clinically evident to suggest any emergent process related to it; e.g., hypertensive emergency or underlying stressor in need of targeted intervention.

## 2018-08-07 NOTE — ED PROVIDER NOTE - PHYSICAL EXAMINATION
A&Ox3 mild distress. Posterior scalp laceration with clot 2cm x 2cm, arterial bleeding noted. CN 2-12 grossly intact without focal neurologic defict. PERRLA, EOMI. Heart RRR no murmur. No JVD. No peripheral edema. Lungs CTA b/l no wheezes, rhonchi, rales. Abdomen soft nontender nondistended. Peripheral pulses present and equal b/l.

## 2018-08-07 NOTE — ED PROVIDER NOTE - OBJECTIVE STATEMENT
72 F PMH type 2 DM, HTN, CAD, ESRD on dialysis, Afib no a/c 2/2 recently admitted for bradycardia presents with 2nd incidence of dehiscence of scalp hematoma. She initially fell 4 weeks ago, originally sutured at [place]. Seen in ED here 2 weeks ago 2/2 repeat fall and wound dehiscence, was sutured at bedside and admitted. Pt was discharged, and today when nurse at dialysis center moved her her scalp wound began to bleed again. Pt currently c/o of scalp pain, no other pain or symptoms. Denies fever, chills, neck pain, dizziness, abdominal pain, N/V/D, back pain, leg pain leg pain, CP, SOB, new fall, new trauma, cough, dyspnea. Renal Valorie Fernandez. Pt was due for dialysis today.

## 2018-08-07 NOTE — ED PROVIDER NOTE - CARE PLAN
Principal Discharge DX:	Laceration of scalp, subsequent encounter  Secondary Diagnosis:	ESRD (end stage renal disease)  Secondary Diagnosis:	Anemia

## 2018-08-07 NOTE — ED PROCEDURE NOTE - ATTENDING CONTRIBUTION TO CARE
Attending MD Cnoley:   I personally have seen and examined this patient.  Physician assistant note reviewed and agree on plan of care and except where noted.  See MDM for details.

## 2018-08-07 NOTE — ED PROCEDURE NOTE - GENERAL PROCEDURE DETAILS
bleeding hematoma has central area of blood clot which was removed so the wound could be approximated with staples.

## 2018-08-07 NOTE — CONSULT NOTE ADULT - SUBJECTIVE AND OBJECTIVE BOX
72 F PMH type 2 DM, HTN, nonobstructive CAD, ESRD on HD TTS, syncope s/p ILR, frequent falls c/b prior nasal fracture and cervical spine fracture, Afib no a/c 2/2 recent fall with scalp laceration/hematoma, now presents from her dialysis center with scalp wound re-bleeding. Denies fever, chills, neck pain, dizziness, abdominal pain, N/V/D, back pain, leg pain leg pain, CP, SOB, new fall, new trauma, cough, dyspnea.    PMH  [place] (hard of hearing)  Cataracts, bilateral  Pleural thickening  Hip pain  LBP radiating to left leg  CHF (congestive heart failure)  MI (myocardial infarction)  Diabetes  Hypothyroidism  ESRD (end stage renal disease)  HTN (hypertension)  MI (myocardial infarction)  Pleural effusion  Hypothyroid  Anxiety  HTN (hypertension)  Hyperlipidemia  CHF (congestive heart failure)  CKD (chronic kidney disease)  Anemia  Diabetes    PSH  Thoracotomy scar of left chest  S/P myomectomy  AV fistula    Allergies  Avelox (Unknown)  fish (Hives; Rash)  shellfish (Unknown)    Physical Exam  T(C): 36.6 (08-07-18 @ 23:37), Max: 36.8 (08-07-18 @ 16:17)  HR: 69 (08-07-18 @ 23:37) (69 - 72)  BP: 194/60 (08-07-18 @ 23:37) (173/79 - 194/60)  RR: 17 (08-07-18 @ 23:37) (17 - 18)  SpO2: 97% (08-07-18 @ 23:37) (97% - 98%)  Wt(kg): --  Tmax: T(C): , Max: 36.8 (08-07-18 @ 16:17)  Wt(kg): --    Gen: NAD  HEENT: normocephalic, no scleral icterus, right occipital scalp laceration (s/p staples x5 placed by the ED) with underlying hematoma, tender to palpation  Ext: warm, no edema      Labs:                        10.4   7.5   )-----------( 390      ( 07 Aug 2018 18:43 )             32.7     08-07    134<L>  |  92<L>  |  56<H>  ----------------------------<  159<H>  5.4<H>   |  21<L>  |  4.52<H>    Ca    9.1      07 Aug 2018 18:43    TPro  6.9  /  Alb  3.3  /  TBili  0.2  /  DBili  x   /  AST  48<H>  /  ALT  14  /  AlkPhos  145<H>  08-07      Imaging    < from: CT Head No Cont (08.07.18 @ 17:57) >  IMPRESSION:    No acute intracranial hemorrhage or displaced calvarial fracture.    Evolving left periorbital hematoma and right parietal occipital   extracalvarial hematoma.    < end of copied text >

## 2018-08-07 NOTE — CONSULT NOTE ADULT - ASSESSMENT
73yo F with right occipital scalp hematoma. Staples removed, two figure-of-eight 3-0 prolene stitches placed to approximate dermis and epidermis  - Check coags  - Observe for one hour in ED for signs of hematoma drainage out of the closed wound  - Follow-up in general surgery clinic for stitch removal in 2 weeks  - Seen and examined with trauma fellow    ATP  [id number]

## 2018-08-07 NOTE — ED PROVIDER NOTE - SHIFT CHANGE DETAILS
Received sign-out from Dr. Conley regarding this patient awaiting screening labs and head CT. If her labs and head CT are without emergent abnormality and the bleeding from her scalp wound remains controlled, we anticipate DC. She is presently resting comfortably on her stretcher. Will follow her labs and CT, reassess and treat/dispo accordingly.

## 2018-08-07 NOTE — ED ADULT NURSE NOTE - NSIMPLEMENTINTERV_GEN_ALL_ED
Implemented All Fall Risk Interventions:  [place] to call system. Call bell, personal items and telephone within reach. Instruct patient to call for assistance. Room bathroom lighting operational. Non-slip footwear when patient is off stretcher. Physically safe environment: no spills, clutter or unnecessary equipment. Stretcher in lowest position, wheels locked, appropriate side rails in place. Provide visual cue, wrist band, yellow gown, etc. Monitor gait and stability. Monitor for mental status changes and reorient to person, place, and time. Review medications for side effects contributing to fall risk. Reinforce activity limits and safety measures with patient and family.

## 2018-08-07 NOTE — ED ADULT NURSE NOTE - OBJECTIVE STATEMENT
73 yo female A&OX3 presents to the ED via EMS, pt is [place] speaking. States that she was PT and her hematoma in the back of her head busted. Pt had a fall last month, states that she did not fall today. No c/o dizziness, + head pain. Pt has open wound to back of head, actively bleeding. Pt denies head trauma. Neuro intact, no blurry vision, PERRL, pt following commands. MAEX4.

## 2018-08-07 NOTE — ED ADULT NURSE NOTE - PMH
Anemia    Anxiety    Cataracts, bilateral  left worse than right  CHF (congestive heart failure)    CHF (congestive heart failure)  diagnosed 2-2014  CKD (chronic kidney disease)    Diabetes  DM 2  Diabetes  diagnosed 2004  no medications in 5 months since hemodialysis  ESRD (end stage renal disease)    Hip pain  left  [place] (hard of hearing)    HTN (hypertension)    HTN (hypertension)    Hyperlipidemia    Hypothyroid    Hypothyroidism    LBP radiating to left leg    MI (myocardial infarction)  may 2016  MI (myocardial infarction)  2-2014   cardiac cath done [facility]  Pleural effusion  Left side - several times since March 2014  Pleural thickening  s/p pleurodisis left vats 2014

## 2018-08-08 VITALS
SYSTOLIC BLOOD PRESSURE: 229 MMHG | OXYGEN SATURATION: 97 % | HEART RATE: 61 BPM | DIASTOLIC BLOOD PRESSURE: 77 MMHG | RESPIRATION RATE: 16 BRPM

## 2018-08-08 PROCEDURE — 85027 COMPLETE CBC AUTOMATED: CPT

## 2018-08-08 PROCEDURE — 99284 EMERGENCY DEPT VISIT MOD MDM: CPT | Mod: 25

## 2018-08-08 PROCEDURE — 80053 COMPREHEN METABOLIC PANEL: CPT

## 2018-08-08 PROCEDURE — 70450 CT HEAD/BRAIN W/O DYE: CPT

## 2018-08-08 PROCEDURE — 12001 RPR S/N/AX/GEN/TRNK 2.5CM/<: CPT

## 2018-08-08 PROCEDURE — 85610 PROTHROMBIN TIME: CPT

## 2018-08-08 PROCEDURE — 85730 THROMBOPLASTIN TIME PARTIAL: CPT

## 2018-08-08 RX ORDER — ACETAMINOPHEN 500 MG
975 TABLET ORAL ONCE
Qty: 0 | Refills: 0 | Status: COMPLETED | OUTPATIENT
Start: 2018-08-08 | End: 2018-08-08

## 2018-08-08 RX ORDER — HYDRALAZINE HCL 50 MG
100 TABLET ORAL ONCE
Qty: 0 | Refills: 0 | Status: COMPLETED | OUTPATIENT
Start: 2018-08-08 | End: 2018-08-08

## 2018-08-08 RX ADMIN — Medication 100 MILLIGRAM(S): at 02:27

## 2018-08-08 RX ADMIN — Medication 975 MILLIGRAM(S): at 02:28

## 2018-08-09 ENCOUNTER — EMERGENCY (EMERGENCY)
Facility: HOSPITAL | Age: 73
LOS: 1 days | Discharge: HOME | End: 2018-08-09
Attending: EMERGENCY MEDICINE
Payer: MEDICARE

## 2018-08-09 VITALS
TEMPERATURE: 97 F | DIASTOLIC BLOOD PRESSURE: 62 MMHG | RESPIRATION RATE: 20 BRPM | SYSTOLIC BLOOD PRESSURE: 200 MMHG | HEART RATE: 78 BPM | OXYGEN SATURATION: 98 %

## 2018-08-09 LAB
ALBUMIN SERPL ELPH-MCNC: 3.4 G/DL — SIGNIFICANT CHANGE UP (ref 3.3–5)
ALP SERPL-CCNC: 133 U/L — HIGH (ref 40–120)
ALT FLD-CCNC: 9 U/L — LOW (ref 10–45)
ANION GAP SERPL CALC-SCNC: 13 MMOL/L — SIGNIFICANT CHANGE UP (ref 5–17)
APTT BLD: 30.1 SEC — SIGNIFICANT CHANGE UP (ref 27.5–37.4)
AST SERPL-CCNC: 24 U/L — SIGNIFICANT CHANGE UP (ref 10–40)
BILIRUB SERPL-MCNC: 0.4 MG/DL — SIGNIFICANT CHANGE UP (ref 0.2–1.2)
BLD GP AB SCN SERPL QL: NEGATIVE — SIGNIFICANT CHANGE UP
BUN SERPL-MCNC: 8 MG/DL — SIGNIFICANT CHANGE UP (ref 7–23)
CALCIUM SERPL-MCNC: 8.9 MG/DL — SIGNIFICANT CHANGE UP (ref 8.4–10.5)
CHLORIDE SERPL-SCNC: 94 MMOL/L — LOW (ref 96–108)
CO2 SERPL-SCNC: 26 MMOL/L — SIGNIFICANT CHANGE UP (ref 22–31)
CREAT SERPL-MCNC: 1.31 MG/DL — HIGH (ref 0.5–1.3)
GLUCOSE SERPL-MCNC: 117 MG/DL — HIGH (ref 70–99)
HCT VFR BLD CALC: 29.4 % — LOW (ref 34.5–45)
HGB BLD-MCNC: 9 G/DL — LOW (ref 11.5–15.5)
INR BLD: 1.04 RATIO — SIGNIFICANT CHANGE UP (ref 0.88–1.16)
MCHC RBC-ENTMCNC: 29.1 PG — SIGNIFICANT CHANGE UP (ref 27–34)
MCHC RBC-ENTMCNC: 30.8 GM/DL — LOW (ref 32–36)
MCV RBC AUTO: 94.6 FL — SIGNIFICANT CHANGE UP (ref 80–100)
PLATELET # BLD AUTO: 362 K/UL — SIGNIFICANT CHANGE UP (ref 150–400)
POTASSIUM SERPL-MCNC: 3.5 MMOL/L — SIGNIFICANT CHANGE UP (ref 3.5–5.3)
POTASSIUM SERPL-SCNC: 3.5 MMOL/L — SIGNIFICANT CHANGE UP (ref 3.5–5.3)
PROT SERPL-MCNC: 6.8 G/DL — SIGNIFICANT CHANGE UP (ref 6–8.3)
PROTHROM AB SERPL-ACNC: 11.2 SEC — SIGNIFICANT CHANGE UP (ref 9.8–12.7)
RBC # BLD: 3.11 M/UL — LOW (ref 3.8–5.2)
RBC # FLD: 17.6 % — HIGH (ref 10.3–14.5)
RH IG SCN BLD-IMP: POSITIVE — SIGNIFICANT CHANGE UP
SODIUM SERPL-SCNC: 133 MMOL/L — LOW (ref 135–145)
TROPONIN T, HIGH SENSITIVITY RESULT: 434 NG/L — HIGH (ref 0–51)
WBC # BLD: 10.1 K/UL — SIGNIFICANT CHANGE UP (ref 3.8–10.5)
WBC # FLD AUTO: 10.1 K/UL — SIGNIFICANT CHANGE UP (ref 3.8–10.5)

## 2018-08-09 PROCEDURE — 99285 EMERGENCY DEPT VISIT HI MDM: CPT

## 2018-08-09 PROCEDURE — 71045 X-RAY EXAM CHEST 1 VIEW: CPT | Mod: 26

## 2018-08-09 PROCEDURE — 70450 CT HEAD/BRAIN W/O DYE: CPT | Mod: 26

## 2018-08-09 RX ORDER — OXYCODONE HYDROCHLORIDE 5 MG/1
5 TABLET ORAL ONCE
Qty: 0 | Refills: 0 | Status: DISCONTINUED | OUTPATIENT
Start: 2018-08-09 | End: 2018-08-09

## 2018-08-09 RX ORDER — ACETAMINOPHEN 500 MG
650 TABLET ORAL ONCE
Qty: 0 | Refills: 0 | Status: COMPLETED | OUTPATIENT
Start: 2018-08-09 | End: 2018-08-09

## 2018-08-09 RX ORDER — SODIUM CHLORIDE 9 MG/ML
500 INJECTION INTRAMUSCULAR; INTRAVENOUS; SUBCUTANEOUS ONCE
Qty: 0 | Refills: 0 | Status: COMPLETED | OUTPATIENT
Start: 2018-08-09 | End: 2018-08-09

## 2018-08-09 RX ADMIN — OXYCODONE HYDROCHLORIDE 5 MILLIGRAM(S): 5 TABLET ORAL at 22:23

## 2018-08-09 RX ADMIN — SODIUM CHLORIDE 500 MILLILITER(S): 9 INJECTION INTRAMUSCULAR; INTRAVENOUS; SUBCUTANEOUS at 23:15

## 2018-08-09 RX ADMIN — Medication 650 MILLIGRAM(S): at 22:24

## 2018-08-09 NOTE — ED PROVIDER NOTE - PROGRESS NOTE DETAILS
No active bleeding from scalp wound; there is evidence of 1 cm dehiscence from scalp hematoma without obvious active bleeding and no obvious pulsations at the hematoma site.  Surgical consult placed, they will come see the patient.  Pending labs. Improved pain, seen by surgery, recommend against intervention on hematoma given evidence of tamponade and lack of bleeding during ED course.  H/H essentially stable, will require q4 hr rpt.  Trop - 452, which is roughly where it has been in the past.  Pt is ESRD.  Will trend trop q3.  Assuming no drop in h/h and/or change in troponin will be able to d/c back to NH.  --BMM Patient's hematoma noted to be bleeding again.  Oozing blood, no active arterial bleed.  Pressure held, seemed to improve bleeding.  Surgery reconsulted, area injected c lido c epinephrine and 4 staples placed by surg resident.  Surgicel and pressure dressing reapplied.  Excellent hemostasis after staples placed.  Pt tolerated procedure well.  Will c/t monitor and plan as above to rpt h/h and trop with plan to d/c if both WNL.  S/O to Dr. Castro pending re-assess and lab f/u.  --BMM PILAR Castro MD : pt endorsed pending repeat cbc and repeat trop - cbc and trop are both stable.   pt reassessed, sleeping comfortably. will dc back to facility.

## 2018-08-09 NOTE — ED ADULT NURSE NOTE - PMH
Anemia    Anxiety    Cataracts, bilateral  left worse than right  CHF (congestive heart failure)    CHF (congestive heart failure)  diagnosed 2-2014  CKD (chronic kidney disease)    Diabetes  DM 2  Diabetes  diagnosed 2004  no medications in 5 months since hemodialysis  ESRD (end stage renal disease)    Hip pain  left  [place] (hard of hearing)    HTN (hypertension)    HTN (hypertension)    Hyperlipidemia    Hypothyroid    Hypothyroidism    LBP radiating to left leg    MI (myocardial infarction)  may 2016  MI (myocardial infarction)  2-2014   cardiac cath done [facility]  Pleural effusion  Left side - several times since March 2014  Pleural thickening  s/p pleurodisis left vats 2014

## 2018-08-09 NOTE — ED ADULT NURSE NOTE - NSIMPLEMENTINTERV_GEN_ALL_ED
Implemented All Fall with Harm Risk Interventions:  [place] to call system. Call bell, personal items and telephone within reach. Instruct patient to call for assistance. Room bathroom lighting operational. Non-slip footwear when patient is off stretcher. Physically safe environment: no spills, clutter or unnecessary equipment. Stretcher in lowest position, wheels locked, appropriate side rails in place. Provide visual cue, wrist band, yellow gown, etc. Monitor gait and stability. Monitor for mental status changes and reorient to person, place, and time. Review medications for side effects contributing to fall risk. Reinforce activity limits and safety measures with patient and family. Provide visual clues: red socks.

## 2018-08-09 NOTE — ED PROVIDER NOTE - PHYSICAL EXAMINATION
GENERAL: AAOx4, GCS 15, mild distress, mild fatigue, WDWN; HEENT: subconjunctival palor, MMM, no jugular venous distension, supple neck, PERRLA, EOMI, nonicteric sclera; PULM: CTA B, no crackles/rubs/rales; CV: RRR, S1S2, no MRG; ABD: Flat abdomen, NTND, no R/G/R, no CVAT.  MSK: PECK, +2 pulses x4;  NEURO: No obvious focal deficits; PSYCH: AAOx3, clear thought and normal sensorium.  SKIN -- 2 cm hematoma c stellate lac

## 2018-08-09 NOTE — ED PROVIDER NOTE - OBJECTIVE STATEMENT
72 y F pw wound dehiscence of scalp hematoma.  Seen here 2 days ago for identical symptom, ED note and surg c/s at that time appreciated.  Wound repaired by gen surg, labs and CT head performed at that time unremarkable.  States that today after dialysis she noticed she was bleeding from her scalp.  Held pressure approx 4 hrs but unable to fully control bleeding.  No trauma.  No dizziness/weakness.  Has pain at site of hematoma.  No pain otherwise.  No n/v.  Not on AC.

## 2018-08-09 NOTE — ED PROVIDER NOTE - PMH
Anemia    Anxiety    Cataracts, bilateral  left worse than right  CHF (congestive heart failure)    CHF (congestive heart failure)  diagnosed 2-2014  CKD (chronic kidney disease)    Diabetes  DM 2  Diabetes  diagnosed 2004  no medications in 5 months since hemodialysis  ESRD (end stage renal disease)    Hip pain  left  [place] (hard of hearing)    HTN (hypertension)    HTN (hypertension)    Hyperlipidemia    Hypothyroid    Hypothyroidism    LBP radiating to left leg    MI (myocardial infarction)  may 2016  MI (myocardial infarction)  2-2014   cardiac cath done [facility]  Pleural effusion  Left side - several times since March 2014  Pleural thickening  s/p pleurodisis left vats 2014

## 2018-08-09 NOTE — ED ADULT NURSE NOTE - OBJECTIVE STATEMENT
72 yr old female by EMS from rehab center with spontaneous bleed of posterior head wound, s/p fall backwards in kitchen in june, +lac which was sutured, fell again few days later, was undergoing dialysis today when pt noted "something flowing in back of head", noted to be blood, came to ED with large head dressing, at present sutures in tact, no active bleeding noted, +L under eye ecchymosis noted, +hypertensive, +pink bad on LUE, afebrile, c/o head pain,  present at bedside, clear lungs, skin wdi, abd soft nontender, repositioned, boosted up in bed, s/p head CT, labs sent to lab

## 2018-08-10 VITALS
HEART RATE: 75 BPM | RESPIRATION RATE: 18 BRPM | SYSTOLIC BLOOD PRESSURE: 219 MMHG | OXYGEN SATURATION: 96 % | TEMPERATURE: 98 F | DIASTOLIC BLOOD PRESSURE: 63 MMHG

## 2018-08-10 LAB
HCT VFR BLD CALC: 29.4 % — LOW (ref 34.5–45)
HGB BLD-MCNC: 8.9 G/DL — LOW (ref 11.5–15.5)
MCHC RBC-ENTMCNC: 28.8 PG — SIGNIFICANT CHANGE UP (ref 27–34)
MCHC RBC-ENTMCNC: 30.3 GM/DL — LOW (ref 32–36)
MCV RBC AUTO: 95.2 FL — SIGNIFICANT CHANGE UP (ref 80–100)
PLATELET # BLD AUTO: 355 K/UL — SIGNIFICANT CHANGE UP (ref 150–400)
RBC # BLD: 3.09 M/UL — LOW (ref 3.8–5.2)
RBC # FLD: 17.6 % — HIGH (ref 10.3–14.5)
TROPONIN T, HIGH SENSITIVITY RESULT: 440 NG/L — HIGH (ref 0–51)
WBC # BLD: 10.6 K/UL — HIGH (ref 3.8–10.5)
WBC # FLD AUTO: 10.6 K/UL — HIGH (ref 3.8–10.5)

## 2018-08-10 PROCEDURE — 86900 BLOOD TYPING SEROLOGIC ABO: CPT

## 2018-08-10 PROCEDURE — 96374 THER/PROPH/DIAG INJ IV PUSH: CPT

## 2018-08-10 PROCEDURE — 84484 ASSAY OF TROPONIN QUANT: CPT

## 2018-08-10 PROCEDURE — 86901 BLOOD TYPING SEROLOGIC RH(D): CPT

## 2018-08-10 PROCEDURE — 85730 THROMBOPLASTIN TIME PARTIAL: CPT

## 2018-08-10 PROCEDURE — 70450 CT HEAD/BRAIN W/O DYE: CPT

## 2018-08-10 PROCEDURE — 80053 COMPREHEN METABOLIC PANEL: CPT

## 2018-08-10 PROCEDURE — 85027 COMPLETE CBC AUTOMATED: CPT

## 2018-08-10 PROCEDURE — 71045 X-RAY EXAM CHEST 1 VIEW: CPT

## 2018-08-10 PROCEDURE — 99284 EMERGENCY DEPT VISIT MOD MDM: CPT | Mod: 25

## 2018-08-10 PROCEDURE — 85610 PROTHROMBIN TIME: CPT

## 2018-08-10 PROCEDURE — 86850 RBC ANTIBODY SCREEN: CPT

## 2018-08-10 RX ORDER — ACETAMINOPHEN 500 MG
650 TABLET ORAL ONCE
Qty: 0 | Refills: 0 | Status: COMPLETED | OUTPATIENT
Start: 2018-08-10 | End: 2018-08-10

## 2018-08-10 RX ORDER — OXYCODONE HYDROCHLORIDE 5 MG/1
5 TABLET ORAL ONCE
Qty: 0 | Refills: 0 | Status: DISCONTINUED | OUTPATIENT
Start: 2018-08-10 | End: 2018-08-10

## 2018-08-10 RX ORDER — FENTANYL CITRATE 50 UG/ML
50 INJECTION INTRAVENOUS ONCE
Qty: 0 | Refills: 0 | Status: DISCONTINUED | OUTPATIENT
Start: 2018-08-10 | End: 2018-08-10

## 2018-08-10 RX ADMIN — FENTANYL CITRATE 50 MICROGRAM(S): 50 INJECTION INTRAVENOUS at 03:14

## 2018-08-10 RX ADMIN — Medication 650 MILLIGRAM(S): at 04:35

## 2018-08-10 NOTE — ED ADULT NURSE REASSESSMENT NOTE - NS ED NURSE REASSESS COMMENT FT1
seen by surgery resident and ED attending at bedside to re-evaluate head wound, no active bleed, medicated for pain with fentanyl

## 2018-08-10 NOTE — ED ADULT NURSE REASSESSMENT NOTE - NS ED NURSE REASSESS COMMENT FT1
Report received from      XAVIER Hernandez  Pt resting comfortably  Awaiting lab results   Safety maintained at all times, bed in lowest position, call bell in hand.  Will continue to monitor closely.

## 2018-08-10 NOTE — ED ADULT NURSE REASSESSMENT NOTE - NS ED NURSE REASSESS COMMENT FT1
[facility] here for pt transport  EMS requested to call Jose to Notify staff of pts elevated BP  spoke with Laxmi nursing supervisor aware of pts elevated BP to 200s  MD Castro states pt stable for d/c with elevated BP  Jose aware of pts pending arrival despite BP

## 2018-08-16 NOTE — H&P CARDIOLOGY - SOURCE
[address]    Jarod Quesada  MR#: [id number]  : 1984  ACCOUNT #: [de-identified]   DATE OF SERVICE: 2018    HISTORY OF PRESENT ILLNESS:  Seen for end stage renal disease. She has a significant past medical history. Supposedly, last dialysis was done on Tuesday, came yesterday very out of breath and she said the lower extremity edema similar not much worse. Ended up with a bunch of tests including a CAT scan showed diffuse subQ edema also on the CAT scan. She ended up with x-ray done, a right pulmonary opacity and looking at her pulse ox, blood pressure was suman high in the 963 systolic, satting 28-16% on room air. Needed urgent dialysis at 2 in the morning and with fluid removal around 3 liters. PAST MEDICAL HISTORY:  Include end-stage renal disease with hemodialysis, multiple abdominal surgeries, hypertension, lupus, dyslipidemia, AV fistula, cachexia. SOCIAL HISTORY:  Reviewed. FAMILY HISTORY:  Reviewed. REVIEW OF SYSTEMS:  With HPI, rest is negative. MEDICATION:  Eliquis 5 mg 2 times a day, Imuran, amlodipine, Elavil, allopurinol, Coreg, clonidine, Lopid, Plaquenil, prednisone. REVIEW OF SYSTEMS:  Look up in the HPI, the rest is negative. ALLERGIES:  COMPAZINE. PHYSICAL EXAMINATION:  GENERAL:  Feels better, not in acute distress. VITAL SIGNS:  Blood pressure 180/100, satting 97%. Weight 56.4 kilos. NECK:  JVD is negative except trace edema. LUNGS:  Decreased breath sounds. NEUROLOGIC:  Alert, oriented to time and place. LABORATORY DATA:  Sodium 134, potassium 4, BUN 18, creatinine 3.6 and calcium is 9.3. WBC was 2.9, now 3.7, platelets 280. Hemoglobin 12.5. IMPRESSION:  1. End-stage renal disease, hemodialysis,  ; status post urgent dialysis today for volume overload. 2.  Uncontrolled blood pressure. 3.  Leukopenia. To note, she is on allopurinol and Imuran. 4.  Lupus.   5.  Multiple abdominal procedure. RECOMMENDATIONS:  1. Can be sent home from renal standpoint. 2.  We will stop allopurinol. 3.  On Imuran and other lupus treatment as per her rheumatologist.    4.  On Eliquis higher dose than expected. 5.  We will follow. MD CAROLYN Sherman / LN  D: 08/16/2018 09:35     T: 08/16/2018 09:52  JOB #: [id number] Patient Patient/Other/#[id number]

## 2018-10-18 ENCOUNTER — INPATIENT (INPATIENT)
Facility: HOSPITAL | Age: 73
LOS: 4 days | Discharge: NURSING, REHAB OR LONG-TERM CARE | End: 2018-10-23
Attending: HOSPITALIST | Admitting: HOSPITALIST
Payer: MEDICARE

## 2018-10-18 VITALS
DIASTOLIC BLOOD PRESSURE: 57 MMHG | TEMPERATURE: 98 F | RESPIRATION RATE: 18 BRPM | SYSTOLIC BLOOD PRESSURE: 191 MMHG | HEART RATE: 59 BPM | OXYGEN SATURATION: 99 %

## 2018-10-18 LAB
ALBUMIN SERPL ELPH-MCNC: 3.6 G/DL — SIGNIFICANT CHANGE UP (ref 3.3–5)
ALP SERPL-CCNC: 332 U/L — HIGH (ref 40–120)
ALT FLD-CCNC: 15 U/L — SIGNIFICANT CHANGE UP (ref 4–33)
APTT BLD: 31.4 SEC — SIGNIFICANT CHANGE UP (ref 27.5–37.4)
AST SERPL-CCNC: 44 U/L — HIGH (ref 4–32)
BASE EXCESS BLDV CALC-SCNC: 2.4 MMOL/L — SIGNIFICANT CHANGE UP
BASOPHILS # BLD AUTO: 0.04 K/UL — SIGNIFICANT CHANGE UP (ref 0–0.2)
BASOPHILS NFR BLD AUTO: 0.8 % — SIGNIFICANT CHANGE UP (ref 0–2)
BILIRUB SERPL-MCNC: 0.3 MG/DL — SIGNIFICANT CHANGE UP (ref 0.2–1.2)
BLD GP AB SCN SERPL QL: NEGATIVE — SIGNIFICANT CHANGE UP
BLOOD GAS VENOUS - CREATININE: 3.09 MG/DL — HIGH (ref 0.5–1.3)
BUN SERPL-MCNC: 57 MG/DL — HIGH (ref 7–23)
CALCIUM SERPL-MCNC: 8.6 MG/DL — SIGNIFICANT CHANGE UP (ref 8.4–10.5)
CHLORIDE BLDV-SCNC: 97 MMOL/L — SIGNIFICANT CHANGE UP (ref 96–108)
CHLORIDE SERPL-SCNC: 90 MMOL/L — LOW (ref 98–107)
CK MB BLD-MCNC: 3.23 NG/ML — SIGNIFICANT CHANGE UP (ref 1–4.7)
CK MB BLD-MCNC: SIGNIFICANT CHANGE UP (ref 0–2.5)
CK SERPL-CCNC: 63 U/L — SIGNIFICANT CHANGE UP (ref 25–170)
CO2 SERPL-SCNC: 23 MMOL/L — SIGNIFICANT CHANGE UP (ref 22–31)
CREAT SERPL-MCNC: 3.04 MG/DL — HIGH (ref 0.5–1.3)
EOSINOPHIL # BLD AUTO: 0.27 K/UL — SIGNIFICANT CHANGE UP (ref 0–0.5)
EOSINOPHIL NFR BLD AUTO: 5.3 % — SIGNIFICANT CHANGE UP (ref 0–6)
GAS PNL BLDV: 129 MMOL/L — LOW (ref 136–146)
GLUCOSE BLDV-MCNC: 318 — HIGH (ref 70–99)
GLUCOSE SERPL-MCNC: 331 MG/DL — HIGH (ref 70–99)
HCO3 BLDV-SCNC: 26 MMOL/L — SIGNIFICANT CHANGE UP (ref 20–27)
HCT VFR BLD CALC: 25 % — LOW (ref 34.5–45)
HCT VFR BLDV CALC: 25.4 % — LOW (ref 34.5–45)
HGB BLD-MCNC: 7.8 G/DL — LOW (ref 11.5–15.5)
HGB BLDV-MCNC: 8.2 G/DL — LOW (ref 11.5–15.5)
IMM GRANULOCYTES # BLD AUTO: 0.05 # — SIGNIFICANT CHANGE UP
IMM GRANULOCYTES NFR BLD AUTO: 1 % — SIGNIFICANT CHANGE UP (ref 0–1.5)
INR BLD: 1.26 — HIGH (ref 0.88–1.17)
LACTATE BLDV-MCNC: 1.8 MMOL/L — SIGNIFICANT CHANGE UP (ref 0.5–2)
LYMPHOCYTES # BLD AUTO: 0.25 K/UL — LOW (ref 1–3.3)
LYMPHOCYTES # BLD AUTO: 4.9 % — LOW (ref 13–44)
MCHC RBC-ENTMCNC: 28.4 PG — SIGNIFICANT CHANGE UP (ref 27–34)
MCHC RBC-ENTMCNC: 31.2 % — LOW (ref 32–36)
MCV RBC AUTO: 90.9 FL — SIGNIFICANT CHANGE UP (ref 80–100)
MONOCYTES # BLD AUTO: 0.54 K/UL — SIGNIFICANT CHANGE UP (ref 0–0.9)
MONOCYTES NFR BLD AUTO: 10.6 % — SIGNIFICANT CHANGE UP (ref 2–14)
NEUTROPHILS # BLD AUTO: 3.96 K/UL — SIGNIFICANT CHANGE UP (ref 1.8–7.4)
NEUTROPHILS NFR BLD AUTO: 77.4 % — HIGH (ref 43–77)
NRBC # FLD: 0 — SIGNIFICANT CHANGE UP
PCO2 BLDV: 41 MMHG — SIGNIFICANT CHANGE UP (ref 41–51)
PH BLDV: 7.43 PH — SIGNIFICANT CHANGE UP (ref 7.32–7.43)
PLATELET # BLD AUTO: 183 K/UL — SIGNIFICANT CHANGE UP (ref 150–400)
PMV BLD: 11.6 FL — SIGNIFICANT CHANGE UP (ref 7–13)
PO2 BLDV: 46 MMHG — HIGH (ref 35–40)
POTASSIUM BLDV-SCNC: 3.8 MMOL/L — SIGNIFICANT CHANGE UP (ref 3.4–4.5)
POTASSIUM SERPL-MCNC: 4.6 MMOL/L — SIGNIFICANT CHANGE UP (ref 3.5–5.3)
POTASSIUM SERPL-SCNC: 4.6 MMOL/L — SIGNIFICANT CHANGE UP (ref 3.5–5.3)
PROT SERPL-MCNC: 6.7 G/DL — SIGNIFICANT CHANGE UP (ref 6–8.3)
PROTHROM AB SERPL-ACNC: 14 SEC — HIGH (ref 9.8–13.1)
RBC # BLD: 2.75 M/UL — LOW (ref 3.8–5.2)
RBC # FLD: 17.9 % — HIGH (ref 10.3–14.5)
RH IG SCN BLD-IMP: POSITIVE — SIGNIFICANT CHANGE UP
SAO2 % BLDV: 78.9 % — SIGNIFICANT CHANGE UP (ref 60–85)
SODIUM SERPL-SCNC: 133 MMOL/L — LOW (ref 135–145)
TROPONIN T, HIGH SENSITIVITY: 345 NG/L — CRITICAL HIGH (ref ?–14)
WBC # BLD: 5.11 K/UL — SIGNIFICANT CHANGE UP (ref 3.8–10.5)
WBC # FLD AUTO: 5.11 K/UL — SIGNIFICANT CHANGE UP (ref 3.8–10.5)

## 2018-10-18 PROCEDURE — 70450 CT HEAD/BRAIN W/O DYE: CPT | Mod: 26

## 2018-10-18 PROCEDURE — 71045 X-RAY EXAM CHEST 1 VIEW: CPT | Mod: 26

## 2018-10-18 NOTE — ED ADULT NURSE NOTE - NSIMPLEMENTINTERV_GEN_ALL_ED
Implemented All Fall with Harm Risk Interventions:  [place] to call system. Call bell, personal items and telephone within reach. Instruct patient to call for assistance. Room bathroom lighting operational. Non-slip footwear when patient is off stretcher. Physically safe environment: no spills, clutter or unnecessary equipment. Stretcher in lowest position, wheels locked, appropriate side rails in place. Provide visual cue, wrist band, yellow gown, etc. Monitor gait and stability. Monitor for mental status changes and reorient to person, place, and time. Review medications for side effects contributing to fall risk. Reinforce activity limits and safety measures with patient and family. Provide visual clues: red socks.

## 2018-10-18 NOTE — ED PROVIDER NOTE - OBJECTIVE STATEMENT
72 F PMH type 2 DM, HTN, nonobstructive CAD, ESRD on HD TTS, syncope s/p ILR, frequent falls c/b prior nasal fracture and cervical spine fracture, Afib no a/c 2/2 recent fall with scalp laceration/hematoma, now p/w repeat fall at HD center with resultant dehiscence 71yo F w/ pmhx DM2, HTN, nonobstructive CAD, ESRD on HD TTS, sent in from [place] for hemoglobin of 7.5, c/o chest pain and SOB. Pt also didn't get dialyzed today. Pt speaks [place], was expressing chest pain. Denies any other symptoms.

## 2018-10-18 NOTE — ED PROVIDER NOTE - MEDICAL DECISION MAKING DETAILS
sent in from [place] for low hemoglobin and needs to be dialyzed.   - Labs, EKG, CXR, Renal Nolan SALAMANCA: 73 yo F with AMS, missed HD today. Pt appears weak, c/o chest pain. Will check CT Head, labs, ekg. Will likely need admission for HD. Will r/o occult infection.   sent in from [place] for low hemoglobin and needs to be dialyzed.   - Labs, EKG, CXR, Renal

## 2018-10-18 NOTE — ED PROVIDER NOTE - PROGRESS NOTE DETAILS
Renal was consulted. Pt has been admitted to hospitalist Norbert att: Patient admitted by prior team. Patient seen by RENITA Nephro. Per Nephro fellow neg acute sob, rales, fluid overload, k nl, HD tomorrow afternoon. Fellow expressed concern aptient uncomfortable and nauseous. Patinet diffusely ttp. CT abd pelvis IV contrast ordered with HD later this afternoon. MAR notified.

## 2018-10-18 NOTE — ED ADULT TRIAGE NOTE - CHIEF COMPLAINT QUOTE
Pt arrives from [facility]; pt Citizen of [place] speaking, requests EMS to translate. Per EMS RN on unit stated pts Hemoglobin was 7.5 and her dialysis (M-THUR-SAT) was not performed today. Also RN at [place] stated that pt was experiencing chest pain around 4pm this afternoon which was relieved by Nitro SL 04.mg. Pt in triage endorsing CP and SOB.

## 2018-10-18 NOTE — ED ADULT NURSE NOTE - CHIEF COMPLAINT QUOTE
Pt arrives from [facility]; pt [place] speaking, requests EMS to translate. Per EMS RN on unit stated pts Hemoglobin was 7.5 and her dialysis (M-THUR-SAT) was not performed today. Also RN at [place] stated that pt was experiencing chest pain around 4pm this afternoon which was relieved by Nitro SL 04.mg. Pt in triage endorsing CP and SOB.

## 2018-10-18 NOTE — ED PROVIDER NOTE - PMH
Anemia    Anxiety    Cataracts, bilateral  left worse than right  CHF (congestive heart failure)    CHF (congestive heart failure)  diagnosed 2-2014  CKD (chronic kidney disease)    Diabetes  DM 2  Diabetes  diagnosed 2004  no medications in 5 months since hemodialysis  ESRD (end stage renal disease)    Hip pain  left  [place] (hard of hearing)    HTN (hypertension)    HTN (hypertension)    Hyperlipidemia    Hypothyroid    Hypothyroidism    LBP radiating to left leg    MI (myocardial infarction)  may 2016  MI (myocardial infarction)  2-2014   cardiac cath done [facility]  Pleural effusion  Left side - several times since March 2014  Pleural thickening  s/p pleurodisis left vats 2014

## 2018-10-18 NOTE — ED ADULT NURSE NOTE - OBJECTIVE STATEMENT
float Nurse-Pt. received into room #5, disoriented sent in from [facility] for low H/H and chest pain. CM to NSR. BP elevated 198/65. 20g placed to R ac. Pt. with L AV fistula on dialysis. MD vaughan in progress.

## 2018-10-19 DIAGNOSIS — N18.6 END STAGE RENAL DISEASE: ICD-10-CM

## 2018-10-19 DIAGNOSIS — I10 ESSENTIAL (PRIMARY) HYPERTENSION: ICD-10-CM

## 2018-10-19 DIAGNOSIS — E11.9 TYPE 2 DIABETES MELLITUS WITHOUT COMPLICATIONS: ICD-10-CM

## 2018-10-19 DIAGNOSIS — E78.5 HYPERLIPIDEMIA, UNSPECIFIED: ICD-10-CM

## 2018-10-19 DIAGNOSIS — R07.9 CHEST PAIN, UNSPECIFIED: ICD-10-CM

## 2018-10-19 DIAGNOSIS — Z91.81 HISTORY OF FALLING: ICD-10-CM

## 2018-10-19 DIAGNOSIS — E03.9 HYPOTHYROIDISM, UNSPECIFIED: ICD-10-CM

## 2018-10-19 DIAGNOSIS — N18.9 CHRONIC KIDNEY DISEASE, UNSPECIFIED: ICD-10-CM

## 2018-10-19 LAB
BUN SERPL-MCNC: 62 MG/DL — HIGH (ref 7–23)
CALCIUM SERPL-MCNC: 8.4 MG/DL — SIGNIFICANT CHANGE UP (ref 8.4–10.5)
CHLORIDE SERPL-SCNC: 90 MMOL/L — LOW (ref 98–107)
CHOLEST SERPL-MCNC: 80 MG/DL — LOW (ref 120–199)
CO2 SERPL-SCNC: 23 MMOL/L — SIGNIFICANT CHANGE UP (ref 22–31)
CREAT SERPL-MCNC: 3.23 MG/DL — HIGH (ref 0.5–1.3)
FRUCTOSAMINE SERPL-MCNC: 437 UMOL/L — HIGH (ref 205–285)
GLUCOSE SERPL-MCNC: 222 MG/DL — HIGH (ref 70–99)
HBV SURFACE AG SER-ACNC: NEGATIVE — SIGNIFICANT CHANGE UP
HCT VFR BLD CALC: 23 % — LOW (ref 34.5–45)
HDLC SERPL-MCNC: 35 MG/DL — LOW (ref 45–65)
HGB BLD-MCNC: 7.2 G/DL — LOW (ref 11.5–15.5)
LIPID PNL WITH DIRECT LDL SERPL: 36 MG/DL — SIGNIFICANT CHANGE UP
MAGNESIUM SERPL-MCNC: 2.3 MG/DL — SIGNIFICANT CHANGE UP (ref 1.6–2.6)
MCHC RBC-ENTMCNC: 28.6 PG — SIGNIFICANT CHANGE UP (ref 27–34)
MCHC RBC-ENTMCNC: 31.3 % — LOW (ref 32–36)
MCV RBC AUTO: 91.3 FL — SIGNIFICANT CHANGE UP (ref 80–100)
NRBC # FLD: 0 — SIGNIFICANT CHANGE UP
NT-PROBNP SERPL-SCNC: SIGNIFICANT CHANGE UP PG/ML
PLATELET # BLD AUTO: 160 K/UL — SIGNIFICANT CHANGE UP (ref 150–400)
PMV BLD: 11.1 FL — SIGNIFICANT CHANGE UP (ref 7–13)
POTASSIUM SERPL-MCNC: 4.5 MMOL/L — SIGNIFICANT CHANGE UP (ref 3.5–5.3)
POTASSIUM SERPL-SCNC: 4.5 MMOL/L — SIGNIFICANT CHANGE UP (ref 3.5–5.3)
RBC # BLD: 2.52 M/UL — LOW (ref 3.8–5.2)
RBC # FLD: 17.6 % — HIGH (ref 10.3–14.5)
SODIUM SERPL-SCNC: 132 MMOL/L — LOW (ref 135–145)
TRIGL SERPL-MCNC: 63 MG/DL — SIGNIFICANT CHANGE UP (ref 10–149)
TROPONIN T, HIGH SENSITIVITY: 336 NG/L — CRITICAL HIGH (ref ?–14)
TSH SERPL-MCNC: 9.34 UIU/ML — HIGH (ref 0.27–4.2)
WBC # BLD: 7 K/UL — SIGNIFICANT CHANGE UP (ref 3.8–10.5)
WBC # FLD AUTO: 7 K/UL — SIGNIFICANT CHANGE UP (ref 3.8–10.5)

## 2018-10-19 PROCEDURE — 71250 CT THORAX DX C-: CPT | Mod: 26

## 2018-10-19 PROCEDURE — 99223 1ST HOSP IP/OBS HIGH 75: CPT | Mod: GC

## 2018-10-19 PROCEDURE — 99223 1ST HOSP IP/OBS HIGH 75: CPT

## 2018-10-19 PROCEDURE — 74177 CT ABD & PELVIS W/CONTRAST: CPT | Mod: 26

## 2018-10-19 PROCEDURE — 12345: CPT | Mod: NC

## 2018-10-19 RX ORDER — GABAPENTIN 400 MG/1
100 CAPSULE ORAL DAILY
Qty: 0 | Refills: 0 | Status: DISCONTINUED | OUTPATIENT
Start: 2018-10-19 | End: 2018-10-23

## 2018-10-19 RX ORDER — DEXTROSE 50 % IN WATER 50 %
25 SYRINGE (ML) INTRAVENOUS ONCE
Qty: 0 | Refills: 0 | Status: DISCONTINUED | OUTPATIENT
Start: 2018-10-19 | End: 2018-10-23

## 2018-10-19 RX ORDER — GLUCAGON INJECTION, SOLUTION 0.5 MG/.1ML
1 INJECTION, SOLUTION SUBCUTANEOUS ONCE
Qty: 0 | Refills: 0 | Status: DISCONTINUED | OUTPATIENT
Start: 2018-10-19 | End: 2018-10-23

## 2018-10-19 RX ORDER — CARVEDILOL PHOSPHATE 80 MG/1
25 CAPSULE, EXTENDED RELEASE ORAL EVERY 12 HOURS
Qty: 0 | Refills: 0 | Status: DISCONTINUED | OUTPATIENT
Start: 2018-10-19 | End: 2018-10-23

## 2018-10-19 RX ORDER — ONDANSETRON 8 MG/1
4 TABLET, FILM COATED ORAL ONCE
Qty: 0 | Refills: 0 | Status: COMPLETED | OUTPATIENT
Start: 2018-10-19 | End: 2018-10-19

## 2018-10-19 RX ORDER — INSULIN LISPRO 100/ML
VIAL (ML) SUBCUTANEOUS
Qty: 0 | Refills: 0 | Status: DISCONTINUED | OUTPATIENT
Start: 2018-10-19 | End: 2018-10-23

## 2018-10-19 RX ORDER — ATORVASTATIN CALCIUM 80 MG/1
10 TABLET, FILM COATED ORAL AT BEDTIME
Qty: 0 | Refills: 0 | Status: DISCONTINUED | OUTPATIENT
Start: 2018-10-19 | End: 2018-10-23

## 2018-10-19 RX ORDER — VANCOMYCIN HCL 1 G
1000 VIAL (EA) INTRAVENOUS ONCE
Qty: 0 | Refills: 0 | Status: COMPLETED | OUTPATIENT
Start: 2018-10-18 | End: 2018-10-19

## 2018-10-19 RX ORDER — LIDOCAINE 4 G/100G
1 CREAM TOPICAL DAILY
Qty: 0 | Refills: 0 | Status: DISCONTINUED | OUTPATIENT
Start: 2018-10-19 | End: 2018-10-23

## 2018-10-19 RX ORDER — DEXTROSE 50 % IN WATER 50 %
15 SYRINGE (ML) INTRAVENOUS ONCE
Qty: 0 | Refills: 0 | Status: DISCONTINUED | OUTPATIENT
Start: 2018-10-19 | End: 2018-10-23

## 2018-10-19 RX ORDER — LEVOTHYROXINE SODIUM 125 MCG
100 TABLET ORAL DAILY
Qty: 0 | Refills: 0 | Status: DISCONTINUED | OUTPATIENT
Start: 2018-10-19 | End: 2018-10-23

## 2018-10-19 RX ORDER — SODIUM CHLORIDE 9 MG/ML
1000 INJECTION, SOLUTION INTRAVENOUS
Qty: 0 | Refills: 0 | Status: DISCONTINUED | OUTPATIENT
Start: 2018-10-19 | End: 2018-10-23

## 2018-10-19 RX ORDER — CALCIUM ACETATE 667 MG
667 TABLET ORAL DAILY
Qty: 0 | Refills: 0 | Status: DISCONTINUED | OUTPATIENT
Start: 2018-10-19 | End: 2018-10-22

## 2018-10-19 RX ORDER — HYDRALAZINE HCL 50 MG
10 TABLET ORAL ONCE
Qty: 0 | Refills: 0 | Status: COMPLETED | OUTPATIENT
Start: 2018-10-19 | End: 2018-10-19

## 2018-10-19 RX ORDER — AMLODIPINE BESYLATE 2.5 MG/1
10 TABLET ORAL DAILY
Qty: 0 | Refills: 0 | Status: DISCONTINUED | OUTPATIENT
Start: 2018-10-19 | End: 2018-10-23

## 2018-10-19 RX ORDER — CLONAZEPAM 1 MG
0.5 TABLET ORAL AT BEDTIME
Qty: 0 | Refills: 0 | Status: DISCONTINUED | OUTPATIENT
Start: 2018-10-19 | End: 2018-10-23

## 2018-10-19 RX ORDER — HYDRALAZINE HCL 50 MG
100 TABLET ORAL EVERY 8 HOURS
Qty: 0 | Refills: 0 | Status: DISCONTINUED | OUTPATIENT
Start: 2018-10-19 | End: 2018-10-23

## 2018-10-19 RX ORDER — INSULIN GLARGINE 100 [IU]/ML
8 INJECTION, SOLUTION SUBCUTANEOUS AT BEDTIME
Qty: 0 | Refills: 0 | Status: DISCONTINUED | OUTPATIENT
Start: 2018-10-19 | End: 2018-10-21

## 2018-10-19 RX ORDER — SERTRALINE 25 MG/1
50 TABLET, FILM COATED ORAL DAILY
Qty: 0 | Refills: 0 | Status: DISCONTINUED | OUTPATIENT
Start: 2018-10-19 | End: 2018-10-23

## 2018-10-19 RX ORDER — DEXTROSE 50 % IN WATER 50 %
12.5 SYRINGE (ML) INTRAVENOUS ONCE
Qty: 0 | Refills: 0 | Status: DISCONTINUED | OUTPATIENT
Start: 2018-10-19 | End: 2018-10-23

## 2018-10-19 RX ORDER — CEFEPIME 1 G/1
2000 INJECTION, POWDER, FOR SOLUTION INTRAMUSCULAR; INTRAVENOUS ONCE
Qty: 0 | Refills: 0 | Status: DISCONTINUED | OUTPATIENT
Start: 2018-10-18 | End: 2018-10-19

## 2018-10-19 RX ORDER — LABETALOL HCL 100 MG
10 TABLET ORAL ONCE
Qty: 0 | Refills: 0 | Status: DISCONTINUED | OUTPATIENT
Start: 2018-10-19 | End: 2018-10-19

## 2018-10-19 RX ORDER — CEFEPIME 1 G/1
2000 INJECTION, POWDER, FOR SOLUTION INTRAMUSCULAR; INTRAVENOUS ONCE
Qty: 0 | Refills: 0 | Status: COMPLETED | OUTPATIENT
Start: 2018-10-18 | End: 2018-10-18

## 2018-10-19 RX ORDER — INSULIN LISPRO 100/ML
VIAL (ML) SUBCUTANEOUS AT BEDTIME
Qty: 0 | Refills: 0 | Status: DISCONTINUED | OUTPATIENT
Start: 2018-10-19 | End: 2018-10-23

## 2018-10-19 RX ORDER — DOCUSATE SODIUM 100 MG
200 CAPSULE ORAL DAILY
Qty: 0 | Refills: 0 | Status: DISCONTINUED | OUTPATIENT
Start: 2018-10-19 | End: 2018-10-22

## 2018-10-19 RX ORDER — ISOSORBIDE MONONITRATE 60 MG/1
30 TABLET, EXTENDED RELEASE ORAL DAILY
Qty: 0 | Refills: 0 | Status: DISCONTINUED | OUTPATIENT
Start: 2018-10-19 | End: 2018-10-23

## 2018-10-19 RX ADMIN — Medication 200 MILLIGRAM(S): at 13:50

## 2018-10-19 RX ADMIN — Medication 100 MILLIGRAM(S): at 13:49

## 2018-10-19 RX ADMIN — Medication 250 MILLIGRAM(S): at 01:07

## 2018-10-19 RX ADMIN — ISOSORBIDE MONONITRATE 30 MILLIGRAM(S): 60 TABLET, EXTENDED RELEASE ORAL at 13:49

## 2018-10-19 RX ADMIN — CARVEDILOL PHOSPHATE 25 MILLIGRAM(S): 80 CAPSULE, EXTENDED RELEASE ORAL at 06:07

## 2018-10-19 RX ADMIN — Medication 0.2 MILLIGRAM(S): at 06:07

## 2018-10-19 RX ADMIN — Medication 10 MILLIGRAM(S): at 23:08

## 2018-10-19 RX ADMIN — Medication 100 MICROGRAM(S): at 06:08

## 2018-10-19 RX ADMIN — LIDOCAINE 1 PATCH: 4 CREAM TOPICAL at 19:15

## 2018-10-19 RX ADMIN — Medication 1: at 13:52

## 2018-10-19 RX ADMIN — Medication 0.2 MILLIGRAM(S): at 13:49

## 2018-10-19 RX ADMIN — CEFEPIME 100 MILLIGRAM(S): 1 INJECTION, POWDER, FOR SOLUTION INTRAMUSCULAR; INTRAVENOUS at 02:14

## 2018-10-19 RX ADMIN — Medication 1: at 09:08

## 2018-10-19 RX ADMIN — GABAPENTIN 100 MILLIGRAM(S): 400 CAPSULE ORAL at 13:53

## 2018-10-19 RX ADMIN — ONDANSETRON 4 MILLIGRAM(S): 8 TABLET, FILM COATED ORAL at 20:58

## 2018-10-19 RX ADMIN — AMLODIPINE BESYLATE 10 MILLIGRAM(S): 2.5 TABLET ORAL at 06:07

## 2018-10-19 RX ADMIN — LIDOCAINE 1 PATCH: 4 CREAM TOPICAL at 13:50

## 2018-10-19 RX ADMIN — Medication 0.2 MILLIGRAM(S): at 22:35

## 2018-10-19 RX ADMIN — Medication 1: at 16:51

## 2018-10-19 RX ADMIN — SERTRALINE 50 MILLIGRAM(S): 25 TABLET, FILM COATED ORAL at 13:49

## 2018-10-19 RX ADMIN — CARVEDILOL PHOSPHATE 25 MILLIGRAM(S): 80 CAPSULE, EXTENDED RELEASE ORAL at 17:09

## 2018-10-19 RX ADMIN — Medication 667 MILLIGRAM(S): at 13:49

## 2018-10-19 RX ADMIN — ONDANSETRON 4 MILLIGRAM(S): 8 TABLET, FILM COATED ORAL at 01:06

## 2018-10-19 RX ADMIN — Medication 100 MILLIGRAM(S): at 06:08

## 2018-10-19 NOTE — CONSULT NOTE ADULT - PROBLEM SELECTOR RECOMMENDATION 4
Recommend checking serum phosphorus. Pt. on phosphate binders with meals. Low phosphorus diet. Monitor serum phosphorus

## 2018-10-19 NOTE — H&P ADULT - PROBLEM SELECTOR PLAN 5
Monitor BP  DASH low fat low cholesterol low salt, diet  Continue Norvasc, Clonidine, hydralazine, Imdur, Coreg

## 2018-10-19 NOTE — H&P ADULT - ATTENDING COMMENTS
71 y/o female from [place], Hard of Hearing, DM, HTN, ESRD on HD, HX of Syncope, HX of CAD, HX of Bradycardia, A Fib not on AC due to FALL,  Anemia, PVD, Dementia, Pt missed HD, Hgb 7.5, + CP, + SOB, + abdominal pain,   No Fever, NO N/V, Hypothyroid, HX of Diastolic CHF, HX of Pleural Effusion, Left ARM AV Fistula, No Edema, HX of Hypothyroid,   BUN 62, Creatinine 3.23, TSH 9.34, pt was seen by Renal,  getting HD today,   CT Chest No Contrast, Fall/aspiration precaution, Hold Antibiotics  for now, Cardiology consult, TELE Monitor, Venodyne for DVT Prophylaxis,   on Coreg/Imdur,  on synthroid,  Lipitor, SQ Lantus, FS, Sliding Scale, Clonidine, Hydralazine, Klonopin, Zoloft, Gabapentin, F/U CBC, CMP, TSH, Fructosamine,  Dr. NEENA Oviedo     Pt was seen by me ,  Dr. NEENA Oviedo on 10/19/18.

## 2018-10-19 NOTE — CONSULT NOTE ADULT - PROBLEM SELECTOR RECOMMENDATION 9
Atypical features   Pain reproducible on exam with palpation   No further cardiac workup at present  time

## 2018-10-19 NOTE — CONSULT NOTE ADULT - PROBLEM SELECTOR RECOMMENDATION 9
Pt. with ESRD on HD three times a week (TTS). Last HD was on 10/16/18 via L AVF. Pt. clinically stable. Labs reviewed. Will arrange for HD later this morning. Monitor labs

## 2018-10-19 NOTE — H&P ADULT - ASSESSMENT
73 yo F with PMHx of ESRD on HD TIW, non-obstructive CAD, diastolic heart failure, pulmonary hypertension, and HTN, PVD, Anemia, Anxiety, depression, Benign Lung nodule s/p thoraocentesis, hypothyroidism, s/p ILR transferred from [facility] for CP, SOB & Anemia h/h 7.8/25.0 71 yo F with PMHx of ESRD on HD TIW, non-obstructive CAD, diastolic heart failure, pulmonary hypertension, and HTN, PVD, Anemia, Anxiety, depression, Benign Lung nodule s/p thoraocentesis, hypothyroidism, s/p ILR transferred from [facility] for CP, SOB & Anemia h/h 7.8/25.0

## 2018-10-19 NOTE — H&P ADULT - NSHPLABSRESULTS_GEN_ALL_CORE
EKG - S Cooper @ 58 bpm, 1st deg AVB. LAD, Incomplete RBBB, Poor R wave progression, Q waves in III, AVF, T wave inversions in lateral leads  CXR - prelim -  pulmonary edema. Retrocardiac opacity may be secondary to left lower lobe segmental atelectasis versus pneumonia. Linear atelectasis of the left midlung. -  official results to follow

## 2018-10-19 NOTE — H&P ADULT - RS GEN PE MLT RESP DETAILS PC
no chest wall tenderness/airway patent/good air movement/no rales/no intercostal retractions/clear to auscultation bilaterally/no wheezes/respirations non-labored/breath sounds equal/no rhonchi

## 2018-10-19 NOTE — H&P ADULT - PROBLEM SELECTOR PLAN 1
Admit to Telemetry, trend CE r/o ACS  consider Cardiology consult, Ischemic evaluation  Continue Coreg, Imdur

## 2018-10-19 NOTE — H&P ADULT - HISTORY OF PRESENT ILLNESS
73 y/o  female, w PMHx of  type 2 DM, HTN, [place], nonobstructive CAD, ESRD on HD TTS, syncope s/p ILR, frequent falls, Afib no a/c 2/2  falls, PVD, anxiety / depression, transferred from [facility] for chest pain, shortness of breath, and hemoglobin of 7.5 and missed HD session. Denies fever, chills, n, vomiting, diaphoresis, c/o intermittent abdominal pain 71 y/o  female, w PMHx of  type 2 DM, HTN, [place], nonobstructive CAD, ESRD on HD TTS, syncope s/p ILR, frequent falls, Afib no a/c 2/2  falls, PVD, anxiety / depression, transferred from [facility] for chest pain, shortness of breath, and hemoglobin of 7.5 and missed HD session. Denies fever, chills, nausea,  vomiting, diaphoresis, c/o intermittent abdominal pain

## 2018-10-19 NOTE — H&P ADULT - PROBLEM SELECTOR PLAN 4
low fat low cholesterol low salt, diabetic diet   Insulin sliding scale coverage, HgA1c   Continue Lantus

## 2018-10-19 NOTE — CONSULT NOTE ADULT - SUBJECTIVE AND OBJECTIVE BOX
[facility], [place] DIVISION OF KIDNEY DISEASES AND HYPERTENSION -- INITIAL CONSULT NOTE  --------------------------------------------------------------------------------  HPI: 73 yo F with PMHx of ESRD on HD TIW, non-obstructive CAD, diastolic heart failure, pulmonary hypertension, and HTN is being admitted with chest pain. Nephrology consulted for ESRD/HD management.    Patient seen and examined at bedside in the ER. Patient states that she receives HD at [facility]. Her nephrologist is Dr. Fernandez. She receives HD treatments on a TTS schedule, with her last full treatment on 10/16/18. Patient was at HD today when she developed chest pain. She was then sent to the ER for further evaluation. Patient vomiting during examination, with and complained of abdominal pain. Patient currently denies CP, SOB, F/C.    PAST HISTORY  --------------------------------------------------------------------------------  PAST MEDICAL & SURGICAL HISTORY:  [place] (hard of hearing)  Cataracts, bilateral: left worse than right  Pleural thickening: s/p pleurodisis left vats 2014  Hip pain: left  LBP radiating to left leg  CHF (congestive heart failure): diagnosed 2-2014  MI (myocardial infarction): 2-2014   cardiac cath done [facility]  Diabetes: diagnosed 2004  no medications in 5 months since hemodialysis  Hypothyroidism  ESRD (end stage renal disease)  HTN (hypertension)  MI (myocardial infarction): may 2016  Pleural effusion: Left side - several times since March 2014  Hypothyroid  Anxiety  HTN (hypertension)  Hyperlipidemia  CHF (congestive heart failure)  CKD (chronic kidney disease)  Anemia  Diabetes: DM 2  Thoracotomy scar of left chest: 4-2014 ? pneumonia/ scarring  S/P myomectomy: abdominal age 52  AV fistula: left upper 8-2014 attempted 12-16-14    FAMILY HISTORY:  No significant family history  Denies family history of kidney disease    PAST SOCIAL HISTORY: Denies tobacco or alcohol use    ALLERGIES & MEDICATIONS  --------------------------------------------------------------------------------  Allergies    Avelox (Unknown)  fish (Hives; Rash)  shellfish (Unknown)    Intolerances    Standing Inpatient Medications  cefepime   IVPB 2000 milliGRAM(s) IV Intermittent once    PRN Inpatient Medications    REVIEW OF SYSTEMS  --------------------------------------------------------------------------------  Gen: +fatigue  Respiratory: No dyspnea  CV: No chest pain  GI: + abdominal pain, + vomiting  MSK: No LE edema  Neuro: No dizziness  Heme: No bleeding  Psych: No confusion    All other systems were reviewed and are negative, except as noted.    VITALS/PHYSICAL EXAM  --------------------------------------------------------------------------------  T(C): 36.1 (10-18-18 @ 21:53), Max: 36.5 (10-18-18 @ 20:36)  HR: 60 (10-18-18 @ 21:53) (59 - 60)  BP: 198/65 (10-18-18 @ 21:53) (191/57 - 198/65)  RR: 18 (10-18-18 @ 21:53) (18 - 18)  SpO2: 100% (10-18-18 @ 21:53) (99% - 100%)  Wt(kg): --    Physical Exam:  	Gen: + vomiting, elderly female  	HEENT: Supple neck  	Pulm: CTA B/L, nonlabored breathing  	CV: RRR, S1S2; no rub  	Abd: +BS, soft, nontender/nondistended  	: No suprapubic tenderness  	LE: No edema  	Skin: Warm, without rashes  	Vascular access: LUE AVF - with bruit/thrill    LABS/STUDIES  --------------------------------------------------------------------------------              7.8    5.11  >-----------<  183      [10-18-18 @ 21:32]              25.0     133  |  90  |  57  ----------------------------<  331      [10-18-18 @ 21:32]  4.6   |  23  |  3.04        Ca     8.6     [10-18-18 @ 21:32]    TPro  6.7  /  Alb  3.6  /  TBili  0.3  /  DBili  x   /  AST  44  /  ALT  15  /  AlkPhos  332  [10-18-18 @ 21:32]    PT/INR: PT 14.0 , INR 1.26       [10-18-18 @ 21:43]  PTT: 31.4       [10-18-18 @ 21:43]    CK 63      [10-18-18 @ 21:32]    Creatinine Trend:  SCr 3.04 [10-18 @ 21:32] [facility] DIVISION OF KIDNEY DISEASES AND HYPERTENSION -- INITIAL CONSULT NOTE  --------------------------------------------------------------------------------  HPI: 71 yo F with PMHx of ESRD on HD TIW, non-obstructive CAD, diastolic heart failure, pulmonary hypertension, and HTN is being admitted with chest pain. Nephrology consulted for ESRD/HD management.    Patient seen and examined at bedside in the ER. Patient states that she receives HD at [facility]. Her nephrologist is Dr. Fernandez. She receives HD treatments on a TTS schedule, with her last full treatment on 10/16/18. Patient was at HD today when she developed chest pain. She was then sent to the ER for further evaluation. Patient vomiting during examination, with and complained of abdominal pain. Patient currently denies CP, SOB, F/C.    PAST HISTORY  --------------------------------------------------------------------------------  PAST MEDICAL & SURGICAL HISTORY:  [place] (hard of hearing)  Cataracts, bilateral: left worse than right  Pleural thickening: s/p pleurodisis left vats 2014  Hip pain: left  LBP radiating to left leg  CHF (congestive heart failure): diagnosed 2-2014  MI (myocardial infarction): 2-2014   cardiac cath done [facility]  Diabetes: diagnosed 2004  no medications in 5 months since hemodialysis  Hypothyroidism  ESRD (end stage renal disease)  HTN (hypertension)  MI (myocardial infarction): may 2016  Pleural effusion: Left side - several times since March 2014  Hypothyroid  Anxiety  HTN (hypertension)  Hyperlipidemia  CHF (congestive heart failure)  CKD (chronic kidney disease)  Anemia  Diabetes: DM 2  Thoracotomy scar of left chest: 4-2014 ? pneumonia/ scarring  S/P myomectomy: abdominal age 52  AV fistula: left upper 8-2014 attempted 12-16-14    FAMILY HISTORY:  No significant family history  Denies family history of kidney disease    PAST SOCIAL HISTORY: Denies tobacco or alcohol use, Currently resides [place]    ALLERGIES & MEDICATIONS  --------------------------------------------------------------------------------  Allergies    Avelox (Unknown)  fish (Hives; Rash)  shellfish (Unknown)    Intolerances    Standing Inpatient Medications  cefepime   IVPB 2000 milliGRAM(s) IV Intermittent once    PRN Inpatient Medications    REVIEW OF SYSTEMS  --------------------------------------------------------------------------------  Gen: +fatigue  Respiratory: No dyspnea  CV: No chest pain  GI: + abdominal pain, + vomiting  MSK: No LE edema  Neuro: No dizziness  Heme: No bleeding  Psych: No confusion    All other systems were reviewed and are negative, except as noted.    VITALS/PHYSICAL EXAM  --------------------------------------------------------------------------------  T(C): 36.1 (10-18-18 @ 21:53), Max: 36.5 (10-18-18 @ 20:36)  HR: 60 (10-18-18 @ 21:53) (59 - 60)  BP: 198/65 (10-18-18 @ 21:53) (191/57 - 198/65)  RR: 18 (10-18-18 @ 21:53) (18 - 18)  SpO2: 100% (10-18-18 @ 21:53) (99% - 100%)  Wt(kg): --    Physical Exam:  	Gen: + vomiting, elderly female  	HEENT: Supple neck  	Pulm: CTA B/L, nonlabored breathing  	CV: RRR, S1S2; no rub  	Abd: +BS, soft, nontender/nondistended  	: No suprapubic tenderness  	LE: No edema  	Skin: Warm, without rashes  	Vascular access: LUE AVF - with bruit/thrill    LABS/STUDIES  --------------------------------------------------------------------------------              7.8    5.11  >-----------<  183      [10-18-18 @ 21:32]              25.0     133  |  90  |  57  ----------------------------<  331      [10-18-18 @ 21:32]  4.6   |  23  |  3.04        Ca     8.6     [10-18-18 @ 21:32]    TPro  6.7  /  Alb  3.6  /  TBili  0.3  /  DBili  x   /  AST  44  /  ALT  15  /  AlkPhos  332  [10-18-18 @ 21:32]    PT/INR: PT 14.0 , INR 1.26       [10-18-18 @ 21:43]  PTT: 31.4       [10-18-18 @ 21:43]    CK 63      [10-18-18 @ 21:32]    Creatinine Trend:  SCr 3.04 [10-18 @ 21:32]

## 2018-10-19 NOTE — H&P ADULT - PMH
Anemia    Anxiety    Cataracts, bilateral  left worse than right  CHF (congestive heart failure)    CHF (congestive heart failure)  diagnosed 2-2014  CKD (chronic kidney disease)    Diabetes  DM 2  Diabetes  diagnosed 2004  no medications in 5 months since hemodialysis  ESRD (end stage renal disease)    Hip pain  left  [place]    HTN (hypertension)    HTN (hypertension)    Hyperlipidemia    Hypothyroid    Hypothyroidism    LBP radiating to left leg    MI (myocardial infarction)  may 2016  MI (myocardial infarction)  2-2014   cardiac cath done [facility]  Pleural effusion  Left side - several times since March 2014  Pleural thickening  s/p pleurodisis left vats 2014

## 2018-10-19 NOTE — CONSULT NOTE ADULT - ASSESSMENT
71 yo F with PMHx of ESRD on HD TIW, non-obstructive CAD, diastolic heart failure, pulmonary hypertension, and HTN is being admitted with chest pain. Nephrology consulted for ESRD/HD management.

## 2018-10-19 NOTE — H&P ADULT - NSHPREVIEWOFSYSTEMS_GEN_ALL_CORE
REVIEW OF SYSTEMS:  CONSTITUTIONAL: +weakness. No fevers. No chills. No weight loss. Good appetite.  EYES: No visual changes. No eye pain.  ENT: No hearing difficulty. No vertigo. No dysphagia. No Sinusitis/rhinorrhea.  NECK: +pain. No stiffness/rigidity.  CARDIAC: No palpitations.  RESPIRATORY: No cough.  No hemoptysis.  GASTROINTESTINAL: Int c/o abdominal pain. No nausea. No vomiting. No hematemesis. No diarrhea. No constipation. No melena. No hematochezia.  GENITOURINARY: No dysuria. No frequency. No hesitancy. No hematuria.  NEUROLOGICAL: No numbness. No focal weakness. No incontinence. No headache.  BACK: +back pain.  EXTREMITIES: No lower extremity edema. dec ROM.  SKIN: No rashes. No itching. No other lesions.  ALLERGIC: No lip swelling. No hives.  All other review of systems is negative unless indicated above.

## 2018-10-19 NOTE — CONSULT NOTE ADULT - SUBJECTIVE AND OBJECTIVE BOX
CHIEF COMPLAINT:  Chest pain and Shortness of Breath     HISTORY OF PRESENT ILLNESS:  73 y/o  female, w PMHx of  type 2 DM, HTN, [place], nonobstructive CAD, ESRD on HD TTS, syncope s/p ILR, frequent falls, Afib no a/c 2/2  falls, PVD, anxiety / depression, transferred from [facility] for chest pain, shortness of breath, and hemoglobin of 7.5 and missed HD session. Denies fever, chills, nausea,  vomiting, diaphoresis, c/o intermittent abd.      PAST MEDICAL & SURGICAL HISTORY:  [place] (hard of hearing)  Cataracts, bilateral: left worse than right  Pleural thickening: s/p pleurodisis left vats 2014  Hip pain: left  LBP radiating to left leg  CHF (congestive heart failure): diagnosed 2-2014  MI (myocardial infarction): 2-2014   cardiac cath done [facility]  Diabetes: diagnosed 2004  no medications in 5 months since hemodialysis  Hypothyroidism  ESRD (end stage renal disease)  HTN (hypertension)  MI (myocardial infarction): may 2016  Pleural effusion: Left side - several times since March 2014  Hypothyroid  Anxiety  HTN (hypertension)  Hyperlipidemia  CHF (congestive heart failure)  CKD (chronic kidney disease)  Anemia  Diabetes: DM 2  Thoracotomy scar of left chest: 4-2014 ? pneumonia/ scarring  S/P myomectomy: abdominal age 52  AV fistula: left upper 8-2014 attempted 12-16-14          MEDICATIONS:  amLODIPine   Tablet 10 milliGRAM(s) Oral daily  carvedilol 25 milliGRAM(s) Oral every 12 hours  cloNIDine 0.2 milliGRAM(s) Oral every 8 hours  hydrALAZINE 100 milliGRAM(s) Oral every 8 hours  isosorbide   mononitrate ER Tablet (IMDUR) 30 milliGRAM(s) Oral daily        clonazePAM Tablet 0.5 milliGRAM(s) Oral at bedtime  gabapentin 100 milliGRAM(s) Oral daily  sertraline 50 milliGRAM(s) Oral daily    docusate sodium 200 milliGRAM(s) Oral daily    atorvastatin 10 milliGRAM(s) Oral at bedtime  dextrose 40% Gel 15 Gram(s) Oral once PRN  dextrose 50% Injectable 12.5 Gram(s) IV Push once  dextrose 50% Injectable 25 Gram(s) IV Push once  dextrose 50% Injectable 25 Gram(s) IV Push once  glucagon  Injectable 1 milliGRAM(s) IntraMuscular once PRN  insulin glargine Injectable (LANTUS) 8 Unit(s) SubCutaneous at bedtime  insulin lispro (HumaLOG) corrective regimen sliding scale   SubCutaneous three times a day before meals  insulin lispro (HumaLOG) corrective regimen sliding scale   SubCutaneous at bedtime  levothyroxine 100 MICROGram(s) Oral daily    calcium acetate 667 milliGRAM(s) Oral daily  dextrose 5%. 1000 milliLiter(s) IV Continuous <Continuous>  lidocaine   Patch 1 Patch Transdermal daily      FAMILY HISTORY:  No significant family history      SOCIAL HISTORY:    [ ] Non-smoker  [ ] Smoker  [ ] Alcohol    Allergies    Avelox (Unknown)  fish (Hives; Rash)  shellfish (Unknown)    Intolerances    	    REVIEW OF SYSTEMS:  CONSTITUTIONAL: No fever, weight loss, + fatigue  EYES: No eye pain, visual disturbances, or discharge  ENMT:  No difficulty hearing, tinnitus, vertigo; No sinus or throat pain  NECK: No pain or stiffness  RESPIRATORY: No cough, wheezing, chills or hemoptysis; No Shortness of Breath  CARDIOVASCULAR: No chest pain, palpitations, passing out, dizziness, or leg swelling  GASTROINTESTINAL: No abdominal or epigastric pain. No nausea, vomiting, or hematemesis; No diarrhea or constipation. No melena or hematochezia.  GENITOURINARY: No dysuria, frequency, hematuria, or incontinence  NEUROLOGICAL: + headaches, memory loss, loss of strength, numbness, or tremors  SKIN: No itching, burning, rashes, or lesions   LYMPH Nodes: No enlarged glands  ENDOCRINE: No heat or cold intolerance; No hair loss  MUSCULOSKELETAL: No joint pain or swelling; No muscle, back, or extremity pain  PSYCHIATRIC: No depression, anxiety, mood swings, or difficulty sleeping  HEME/LYMPH: No easy bruising, or bleeding gums  ALLERY AND IMMUNOLOGIC: No hives or eczema	    [ ] All others negative	  [ ] Unable to obtain    PHYSICAL EXAM:  T(C): 36.4 (10-19-18 @ 12:54), Max: 36.5 (10-18-18 @ 20:36)  HR: 55 (10-19-18 @ 12:54) (54 - 60)  BP: 165/55 (10-19-18 @ 12:54) (148/57 - 198/65)  RR: 19 (10-19-18 @ 12:54) (17 - 19)  SpO2: 100% (10-19-18 @ 12:54) (99% - 100%)  Wt(kg): --  I&O's Summary      Appearance: NAD	  HEENT:   Normal oral mucosa, PERRL, EOMI	  Lymphatic: No lymphadenopathy  Cardiovascular: Normal S1 S2, No JVD, No murmurs, No edema  Respiratory: Lungs clear to auscultation	  Psychiatry: A & O x 3, Mood & affect appropriate  Gastrointestinal:  Soft, Non-tender, + BS	  Skin: No rashes, No ecchymoses, No cyanosis	  Neurologic: Non-focal  Extremities: Normal range of motion, No clubbing, cyanosis or edema  Vascular: Peripheral pulses palpable 2+ bilaterally    TELEMETRY: SR	    ECG:  	NSR, no acute ischemic stt changes   RADIOLOGY:  < from: Xray Chest 1 View- PORTABLE-Urgent (10.18.18 @ 22:45) >    EXAM:  XR CHEST PORTABLE URGENT 1V        PROCEDURE DATE:  Oct 18 2018         INTERPRETATION:  CLINICAL INFORMATION: Abnormal labs.    TECHNIQUE: AP view of the chest 10/18/2018.    COMPARISON: Chest radiograph 8/9/2018.     FINDINGS:   A loop recorder overlies the left lower thorax.  There is indistinct of the bilateral perihilar vasculature and increased   bilateral reticular opacities. There is linear atelectasis of the left   midlung. There is a retrocardiac opacity, which may be secondaryto left   lower lobe segmental atelectasis.   Cardiac size is not accurately evaluated in this projection.  There are degenerative changes of spine.    IMPRESSION:   Pulmonary edema.  Retrocardiac opacity may be secondary to left lower lobe segmental  atelectasis versus pneumonia. Linear atelectasis of the left midlung.              COLEEN BASURTO M.D., RADIOLOGY RESIDENT  This document has been electronically signed.  JUANA ANDERSON M.D., ATTENDING RADIOLOGIST  This document has been electronically signed. Oct 19 2018  6:26AM                  < end of copied text >    OTHER: 	  	  LABS:	 	    CARDIAC MARKERS:      CKMB: 3.23 ng/mL (10-18 @ 21:32)    CKMB Relative Index: Test not performed (10-18 @ 21:32)                            7.2    7.00  )-----------( 160      ( 19 Oct 2018 06:18 )             23.0     10-19    132<L>  |  90<L>  |  62<H>  ----------------------------<  222<H>  4.5   |  23  |  3.23<H>    Ca    8.4      19 Oct 2018 06:18  Mg     2.3     10-19    TPro  6.7  /  Alb  3.6  /  TBili  0.3  /  DBili  x   /  AST  44<H>  /  ALT  15  /  AlkPhos  332<H>  10-18    proBNP: Serum Pro-Brain Natriuretic Peptide: > 10161 pg/mL (10-18 @ 21:32)    Lipid Profile:   HgA1c:   TSH: Thyroid Stimulating Hormone, Serum: 9.34 uIU/mL (10-19 @ 06:18)

## 2018-10-19 NOTE — PROGRESS NOTE ADULT - SUBJECTIVE AND OBJECTIVE BOX
Patient is a 72y old  Female who presents with a chief complaint of Anemia hgb 7.5, missed hemodialysis, abd pain, chest pain, shortness of breath r/o ACS (19 Oct 2018 05:20)      SUBJECTIVE / OVERNIGHT EVENTS: This AM, patient states chest pain is "better", no SOB. Anxious about not having been dialyzed since Tuesday. No n/v/d/f/c.    MEDICATIONS  (STANDING):  amLODIPine   Tablet 10 milliGRAM(s) Oral daily  atorvastatin 10 milliGRAM(s) Oral at bedtime  calcium acetate 667 milliGRAM(s) Oral daily  carvedilol 25 milliGRAM(s) Oral every 12 hours  clonazePAM Tablet 0.5 milliGRAM(s) Oral at bedtime  cloNIDine 0.2 milliGRAM(s) Oral every 8 hours  dextrose 5%. 1000 milliLiter(s) (50 mL/Hr) IV Continuous <Continuous>  dextrose 50% Injectable 12.5 Gram(s) IV Push once  dextrose 50% Injectable 25 Gram(s) IV Push once  dextrose 50% Injectable 25 Gram(s) IV Push once  docusate sodium 200 milliGRAM(s) Oral daily  gabapentin 100 milliGRAM(s) Oral daily  hydrALAZINE 100 milliGRAM(s) Oral every 8 hours  insulin glargine Injectable (LANTUS) 8 Unit(s) SubCutaneous at bedtime  insulin lispro (HumaLOG) corrective regimen sliding scale   SubCutaneous three times a day before meals  insulin lispro (HumaLOG) corrective regimen sliding scale   SubCutaneous at bedtime  isosorbide   mononitrate ER Tablet (IMDUR) 30 milliGRAM(s) Oral daily  levothyroxine 100 MICROGram(s) Oral daily  lidocaine   Patch 1 Patch Transdermal daily  sertraline 50 milliGRAM(s) Oral daily    MEDICATIONS  (PRN):  dextrose 40% Gel 15 Gram(s) Oral once PRN Blood Glucose LESS THAN 70 milliGRAM(s)/deciliter  glucagon  Injectable 1 milliGRAM(s) IntraMuscular once PRN Glucose LESS THAN 70 milligrams/deciliter      PHYSICAL EXAM:  T(C): 35.9 (10-19-18 @ 09:12), Max: 36.5 (10-18-18 @ 20:36)  HR: 54 (10-19-18 @ 09:12) (54 - 60)  BP: 167/65 (10-19-18 @ 09:12) (148/57 - 198/65)  RR: 18 (10-19-18 @ 09:12) (17 - 19)  SpO2: 99% (10-19-18 @ 09:12) (99% - 100%)  I&O's Summary    GENERAL: NAD, well-developed, lying in bed asleep, awakes to voice, pleasant  HEAD:  Atraumatic, Normocephalic, MMM  NECK: Supple, No elevated JVD  CHEST/LUNG: Clear to auscultation bilaterally; No wheeze  HEART: Regular rate and rhythm; No murmurs, rubs, or gallops  ABDOMEN: Soft, Nontender, Nondistended; Bowel sounds present  EXTREMITIES:  2+ Peripheral Pulses, No clubbing, cyanosis, or edema  PSYCH: AAOx3  NEUROLOGY: CN II-XII grossly intact, moving all extremities    LABS:  CAPILLARY BLOOD GLUCOSE      POCT Blood Glucose.: 185 mg/dL (19 Oct 2018 09:02)                          7.2    7.00  )-----------( 160      ( 19 Oct 2018 06:18 )             23.0     10-19    132<L>  |  90<L>  |  62<H>  ----------------------------<  222<H>  4.5   |  23  |  3.23<H>    Ca    8.4      19 Oct 2018 06:18  Mg     2.3     10-19    TPro  6.7  /  Alb  3.6  /  TBili  0.3  /  DBili  x   /  AST  44<H>  /  ALT  15  /  AlkPhos  332<H>  10-18    PT/INR - ( 18 Oct 2018 21:43 )   PT: 14.0 SEC;   INR: 1.26          PTT - ( 18 Oct 2018 21:43 )  PTT:31.4 SEC  CARDIAC MARKERS ( 18 Oct 2018 21:32 )  x     / x     / 63 u/L / 3.23 ng/mL / x          RADIOLOGY & ADDITIONAL TESTS:    Imaging Personally Reviewed:    Consultant(s) Notes Reviewed:      Care Discussed with Consultants/Other Providers:

## 2018-10-19 NOTE — CONSULT NOTE ADULT - ASSESSMENT
71 yo female well known to me from previous hospitalization admitted with chest pain and sob in setting of anemia

## 2018-10-19 NOTE — H&P ADULT - REASON FOR ADMISSION
wound dehiscence, bradycardia Anemia hgb 7.5, missed hemodialysis, abd pain, chest pain, shortness of breath r/o ACS

## 2018-10-19 NOTE — CONSULT NOTE ADULT - PROBLEM SELECTOR RECOMMENDATION 2
BP above acceptable range in the setting of abdominal pain and ESRD. Titrate medications as needed. Plan for HD later this morning. Monitor BP on current BP medication. Low salt diet

## 2018-10-20 DIAGNOSIS — R10.9 UNSPECIFIED ABDOMINAL PAIN: ICD-10-CM

## 2018-10-20 LAB
HBV CORE IGM SER-ACNC: NONREACTIVE — SIGNIFICANT CHANGE UP
HBV SURFACE AB SER-ACNC: 10.8 MLU/ML — LOW
HCV AB S/CO SERPL IA: 0.13 S/CO — SIGNIFICANT CHANGE UP
HCV AB SERPL-IMP: SIGNIFICANT CHANGE UP
SPECIMEN SOURCE: SIGNIFICANT CHANGE UP
SPECIMEN SOURCE: SIGNIFICANT CHANGE UP

## 2018-10-20 PROCEDURE — 93010 ELECTROCARDIOGRAM REPORT: CPT

## 2018-10-20 PROCEDURE — 99233 SBSQ HOSP IP/OBS HIGH 50: CPT

## 2018-10-20 RX ORDER — LACTULOSE 10 G/15ML
20 SOLUTION ORAL
Qty: 0 | Refills: 0 | Status: DISCONTINUED | OUTPATIENT
Start: 2018-10-20 | End: 2018-10-22

## 2018-10-20 RX ORDER — FAMOTIDINE 10 MG/ML
20 INJECTION INTRAVENOUS DAILY
Qty: 0 | Refills: 0 | Status: DISCONTINUED | OUTPATIENT
Start: 2018-10-20 | End: 2018-10-23

## 2018-10-20 RX ORDER — ONDANSETRON 8 MG/1
4 TABLET, FILM COATED ORAL EVERY 6 HOURS
Qty: 0 | Refills: 0 | Status: DISCONTINUED | OUTPATIENT
Start: 2018-10-20 | End: 2018-10-23

## 2018-10-20 RX ORDER — SENNA PLUS 8.6 MG/1
2 TABLET ORAL AT BEDTIME
Qty: 0 | Refills: 0 | Status: DISCONTINUED | OUTPATIENT
Start: 2018-10-20 | End: 2018-10-23

## 2018-10-20 RX ORDER — HYDRALAZINE HCL 50 MG
10 TABLET ORAL ONCE
Qty: 0 | Refills: 0 | Status: COMPLETED | OUTPATIENT
Start: 2018-10-20 | End: 2018-10-20

## 2018-10-20 RX ADMIN — Medication 0.5 MILLIGRAM(S): at 20:50

## 2018-10-20 RX ADMIN — LIDOCAINE 1 PATCH: 4 CREAM TOPICAL at 01:00

## 2018-10-20 RX ADMIN — ONDANSETRON 4 MILLIGRAM(S): 8 TABLET, FILM COATED ORAL at 18:03

## 2018-10-20 RX ADMIN — Medication 10 MILLIGRAM(S): at 01:04

## 2018-10-20 RX ADMIN — INSULIN GLARGINE 8 UNIT(S): 100 INJECTION, SOLUTION SUBCUTANEOUS at 00:29

## 2018-10-20 RX ADMIN — Medication 100 MILLIGRAM(S): at 11:09

## 2018-10-20 RX ADMIN — SERTRALINE 50 MILLIGRAM(S): 25 TABLET, FILM COATED ORAL at 11:09

## 2018-10-20 RX ADMIN — Medication 100 MICROGRAM(S): at 05:24

## 2018-10-20 RX ADMIN — ISOSORBIDE MONONITRATE 30 MILLIGRAM(S): 60 TABLET, EXTENDED RELEASE ORAL at 11:09

## 2018-10-20 RX ADMIN — ONDANSETRON 4 MILLIGRAM(S): 8 TABLET, FILM COATED ORAL at 04:37

## 2018-10-20 RX ADMIN — FAMOTIDINE 20 MILLIGRAM(S): 10 INJECTION INTRAVENOUS at 16:59

## 2018-10-20 RX ADMIN — Medication 0.2 MILLIGRAM(S): at 11:09

## 2018-10-20 RX ADMIN — ATORVASTATIN CALCIUM 10 MILLIGRAM(S): 80 TABLET, FILM COATED ORAL at 20:54

## 2018-10-20 RX ADMIN — Medication 100 MILLIGRAM(S): at 05:24

## 2018-10-20 RX ADMIN — Medication 667 MILLIGRAM(S): at 11:10

## 2018-10-20 RX ADMIN — SENNA PLUS 2 TABLET(S): 8.6 TABLET ORAL at 21:05

## 2018-10-20 RX ADMIN — Medication 0.2 MILLIGRAM(S): at 05:24

## 2018-10-20 RX ADMIN — LIDOCAINE 1 PATCH: 4 CREAM TOPICAL at 11:10

## 2018-10-20 RX ADMIN — AMLODIPINE BESYLATE 10 MILLIGRAM(S): 2.5 TABLET ORAL at 05:24

## 2018-10-20 RX ADMIN — LIDOCAINE 1 PATCH: 4 CREAM TOPICAL at 18:03

## 2018-10-20 RX ADMIN — LIDOCAINE 1 PATCH: 4 CREAM TOPICAL at 23:00

## 2018-10-20 RX ADMIN — Medication 0.3 MILLIGRAM(S): at 15:50

## 2018-10-20 RX ADMIN — Medication 0.3 MILLIGRAM(S): at 20:50

## 2018-10-20 RX ADMIN — CARVEDILOL PHOSPHATE 25 MILLIGRAM(S): 80 CAPSULE, EXTENDED RELEASE ORAL at 05:24

## 2018-10-20 RX ADMIN — LACTULOSE 20 GRAM(S): 10 SOLUTION ORAL at 15:58

## 2018-10-20 RX ADMIN — GABAPENTIN 100 MILLIGRAM(S): 400 CAPSULE ORAL at 11:09

## 2018-10-20 RX ADMIN — Medication 100 MILLIGRAM(S): at 20:58

## 2018-10-20 RX ADMIN — CARVEDILOL PHOSPHATE 25 MILLIGRAM(S): 80 CAPSULE, EXTENDED RELEASE ORAL at 17:00

## 2018-10-20 RX ADMIN — Medication 200 MILLIGRAM(S): at 11:09

## 2018-10-20 NOTE — PROGRESS NOTE ADULT - PROBLEM SELECTOR PLAN 6
DM2  low fat low cholesterol low salt, diabetic diet   Insulin sliding scale coverage, HgA1c   Continue Lantus

## 2018-10-20 NOTE — CHART NOTE - NSCHARTNOTEFT_GEN_A_CORE
Patient is c/o CP again - As per cards pain is reproducible and there is low suspicion for cardiac event - Will obtain EKG and handoff to night staff to monitor patient

## 2018-10-20 NOTE — CHART NOTE - NSCHARTNOTEFT_GEN_A_CORE
Called by dialysis RN for hypertension - 210/70 - 215/73. Pt tolerating dialysis, pt asymptomatic     Vital Signs Last 24 Hrs  T(C): 35.9 (19 Oct 2018 21:40), Max: 36.5 (19 Oct 2018 20:37)  T(F): 96.6 (19 Oct 2018 21:40), Max: 97.7 (19 Oct 2018 20:37)  HR: 56 (19 Oct 2018 23:00) (54 - 64)  BP: 215/73 (19 Oct 2018 23:00) (148/57 - 215/73)  BP(mean): --  RR: 16 (19 Oct 2018 21:40) (16 - 19)  SpO2: 98% (19 Oct 2018 20:37) (98% - 100%)    -stat hydralazine 10mg IV x 2 given  -will continue to monitor bp

## 2018-10-20 NOTE — PROGRESS NOTE ADULT - SUBJECTIVE AND OBJECTIVE BOX
Subjective: Patient seen and examined. No new events except as noted.     REVIEW OF SYSTEMS:    CONSTITUTIONAL: + weakness, fevers or chills  EYES/ENT: No visual changes;  No vertigo or throat pain   NECK: No pain or stiffness  RESPIRATORY: No cough, wheezing, hemoptysis; No shortness of breath  CARDIOVASCULAR: No chest pain or palpitations  GASTROINTESTINAL: No abdominal or epigastric pain. No nausea, vomiting, or hematemesis; No diarrhea or constipation. No melena or hematochezia.  GENITOURINARY: No dysuria, frequency or hematuria  NEUROLOGICAL: No numbness or weakness  SKIN: No itching, burning, rashes, or lesions   All other review of systems is negative unless indicated above.    MEDICATIONS:  MEDICATIONS  (STANDING):  amLODIPine   Tablet 10 milliGRAM(s) Oral daily  atorvastatin 10 milliGRAM(s) Oral at bedtime  calcium acetate 667 milliGRAM(s) Oral daily  carvedilol 25 milliGRAM(s) Oral every 12 hours  clonazePAM Tablet 0.5 milliGRAM(s) Oral at bedtime  cloNIDine 0.3 milliGRAM(s) Oral every 8 hours  dextrose 5%. 1000 milliLiter(s) (50 mL/Hr) IV Continuous <Continuous>  dextrose 50% Injectable 12.5 Gram(s) IV Push once  dextrose 50% Injectable 25 Gram(s) IV Push once  dextrose 50% Injectable 25 Gram(s) IV Push once  docusate sodium 200 milliGRAM(s) Oral daily  famotidine    Tablet 20 milliGRAM(s) Oral daily  gabapentin 100 milliGRAM(s) Oral daily  hydrALAZINE 100 milliGRAM(s) Oral every 8 hours  insulin glargine Injectable (LANTUS) 8 Unit(s) SubCutaneous at bedtime  insulin lispro (HumaLOG) corrective regimen sliding scale   SubCutaneous three times a day before meals  insulin lispro (HumaLOG) corrective regimen sliding scale   SubCutaneous at bedtime  isosorbide   mononitrate ER Tablet (IMDUR) 30 milliGRAM(s) Oral daily  levothyroxine 100 MICROGram(s) Oral daily  lidocaine   Patch 1 Patch Transdermal daily  senna 2 Tablet(s) Oral at bedtime  sertraline 50 milliGRAM(s) Oral daily      PHYSICAL EXAM:  T(C): 36.7 (10-20-18 @ 20:06), Max: 37.1 (10-20-18 @ 18:57)  HR: 56 (10-20-18 @ 20:06) (55 - 64)  BP: 148/56 (10-20-18 @ 20:06) (148/56 - 215/73)  RR: 18 (10-20-18 @ 20:06) (16 - 18)  SpO2: 100% (10-20-18 @ 20:06) (98% - 100%)  Wt(kg): --  I&O's Summary    19 Oct 2018 07:01  -  20 Oct 2018 07:00  --------------------------------------------------------  IN: 400 mL / OUT: 2200 mL / NET: -1800 mL          Appearance: Normal	  HEENT:   Normal oral mucosa, PERRL, EOMI	  Lymphatic: No lymphadenopathy , no edema  Cardiovascular: Normal S1 S2, No JVD, No murmurs , Peripheral pulses palpable 2+ bilaterally  Respiratory: Lungs clear to auscultation, normal effort 	  Gastrointestinal:  Soft, Non-tender, + BS	  Skin: No rashes, No ecchymoses, No cyanosis, warm to touch  Musculoskeletal: Normal range of motion, normal strength  Psychiatry:  Mood & affect appropriate  Ext: No edema      LABS:    CARDIAC MARKERS:  CARDIAC MARKERS ( 18 Oct 2018 21:32 )  x     / x     / 63 u/L / 3.23 ng/mL / x                                    7.2    7.00  )-----------( 160      ( 19 Oct 2018 06:18 )             23.0     10-19    132<L>  |  90<L>  |  62<H>  ----------------------------<  222<H>  4.5   |  23  |  3.23<H>    Ca    8.4      19 Oct 2018 06:18  Mg     2.3     10-19    TPro  6.7  /  Alb  3.6  /  TBili  0.3  /  DBili  x   /  AST  44<H>  /  ALT  15  /  AlkPhos  332<H>  10-18    proBNP:   Lipid Profile:   HgA1c:   TSH:             TELEMETRY: 	    ECG:  	  RADIOLOGY:   DIAGNOSTIC TESTING:  [ ] Echocardiogram:  [ ]  Catheterization:  [ ] Stress Test:    OTHER:

## 2018-10-20 NOTE — PROGRESS NOTE ADULT - SUBJECTIVE AND OBJECTIVE BOX
Patient is a 72y old  Female who presents with a chief complaint of Anemia hgb 7.5, missed hemodialysis, abd pain, chest pain, shortness of breath r/o ACS (19 Oct 2018 14:29)      SUBJECTIVE / OVERNIGHT EVENTS:    MEDICATIONS  (STANDING):  amLODIPine   Tablet 10 milliGRAM(s) Oral daily  atorvastatin 10 milliGRAM(s) Oral at bedtime  calcium acetate 667 milliGRAM(s) Oral daily  carvedilol 25 milliGRAM(s) Oral every 12 hours  clonazePAM Tablet 0.5 milliGRAM(s) Oral at bedtime  cloNIDine 0.2 milliGRAM(s) Oral every 8 hours  dextrose 5%. 1000 milliLiter(s) (50 mL/Hr) IV Continuous <Continuous>  dextrose 50% Injectable 12.5 Gram(s) IV Push once  dextrose 50% Injectable 25 Gram(s) IV Push once  dextrose 50% Injectable 25 Gram(s) IV Push once  docusate sodium 200 milliGRAM(s) Oral daily  gabapentin 100 milliGRAM(s) Oral daily  hydrALAZINE 100 milliGRAM(s) Oral every 8 hours  insulin glargine Injectable (LANTUS) 8 Unit(s) SubCutaneous at bedtime  insulin lispro (HumaLOG) corrective regimen sliding scale   SubCutaneous three times a day before meals  insulin lispro (HumaLOG) corrective regimen sliding scale   SubCutaneous at bedtime  isosorbide   mononitrate ER Tablet (IMDUR) 30 milliGRAM(s) Oral daily  levothyroxine 100 MICROGram(s) Oral daily  lidocaine   Patch 1 Patch Transdermal daily  sertraline 50 milliGRAM(s) Oral daily    MEDICATIONS  (PRN):  dextrose 40% Gel 15 Gram(s) Oral once PRN Blood Glucose LESS THAN 70 milliGRAM(s)/deciliter  glucagon  Injectable 1 milliGRAM(s) IntraMuscular once PRN Glucose LESS THAN 70 milligrams/deciliter  ondansetron Injectable 4 milliGRAM(s) IV Push every 6 hours PRN Nausea and/or Vomiting      Vital Signs Last 24 Hrs  T(C): 36.8 (20 Oct 2018 05:00), Max: 36.8 (20 Oct 2018 01:45)  T(F): 98.2 (20 Oct 2018 05:00), Max: 98.3 (20 Oct 2018 01:45)  HR: 62 (20 Oct 2018 07:26) (54 - 64)  BP: 181/45 (20 Oct 2018 07:26) (155/65 - 215/73)  BP(mean): --  RR: 18 (20 Oct 2018 05:00) (16 - 19)  SpO2: 98% (20 Oct 2018 05:00) (98% - 100%)  CAPILLARY BLOOD GLUCOSE      POCT Blood Glucose.: 96 mg/dL (20 Oct 2018 07:41)  POCT Blood Glucose.: 119 mg/dL (20 Oct 2018 00:13)  POCT Blood Glucose.: 124 mg/dL (19 Oct 2018 22:12)  POCT Blood Glucose.: 192 mg/dL (19 Oct 2018 16:48)  POCT Blood Glucose.: 158 mg/dL (19 Oct 2018 13:31)    I&O's Summary    19 Oct 2018 07:01  -  20 Oct 2018 07:00  --------------------------------------------------------  IN: 400 mL / OUT: 2200 mL / NET: -1800 mL        PHYSICAL EXAM:  GENERAL: NAD, well-developed  HEAD:  Atraumatic, Normocephalic  EYES: EOMI, conjunctiva and sclera clear  NECK: Supple, No JVD  CHEST/LUNG: Clear to auscultation bilaterally; No wheeze  HEART: Regular rate and rhythm; No murmurs, rubs, or gallops  ABDOMEN: Soft, Nontender, Nondistended; Bowel sounds present  EXTREMITIES:  2+ Peripheral Pulses, No clubbing, cyanosis, or edema  PSYCH: AAOx3  NEUROLOGY: non-focal  SKIN: No rashes or lesions    LABS:                        7.2    7.00  )-----------( 160      ( 19 Oct 2018 06:18 )             23.0     10-19    132<L>  |  90<L>  |  62<H>  ----------------------------<  222<H>  4.5   |  23  |  3.23<H>    Ca    8.4      19 Oct 2018 06:18  Mg     2.3     10-19    TPro  6.7  /  Alb  3.6  /  TBili  0.3  /  DBili  x   /  AST  44<H>  /  ALT  15  /  AlkPhos  332<H>  10-18    PT/INR - ( 18 Oct 2018 21:43 )   PT: 14.0 SEC;   INR: 1.26          PTT - ( 18 Oct 2018 21:43 )  PTT:31.4 SEC  CARDIAC MARKERS ( 18 Oct 2018 21:32 )  x     / x     / 63 u/L / 3.23 ng/mL / x              RADIOLOGY & ADDITIONAL TESTS:    Imaging Personally Reviewed:    Consultant(s) Notes Reviewed:      Care Discussed with Consultants/Other Providers: Patient is a 72y old  Female who presents with a chief complaint of Anemia hgb 7.5, missed hemodialysis, abd pain, chest pain, shortness of breath r/o ACS (19 Oct 2018 14:29)      SUBJECTIVE / OVERNIGHT EVENTS: c/o abdominal pain, coughing up mucus, no BM    MEDICATIONS  (STANDING):  amLODIPine   Tablet 10 milliGRAM(s) Oral daily  atorvastatin 10 milliGRAM(s) Oral at bedtime  calcium acetate 667 milliGRAM(s) Oral daily  carvedilol 25 milliGRAM(s) Oral every 12 hours  clonazePAM Tablet 0.5 milliGRAM(s) Oral at bedtime  cloNIDine 0.2 milliGRAM(s) Oral every 8 hours  dextrose 5%. 1000 milliLiter(s) (50 mL/Hr) IV Continuous <Continuous>  dextrose 50% Injectable 12.5 Gram(s) IV Push once  dextrose 50% Injectable 25 Gram(s) IV Push once  dextrose 50% Injectable 25 Gram(s) IV Push once  docusate sodium 200 milliGRAM(s) Oral daily  gabapentin 100 milliGRAM(s) Oral daily  hydrALAZINE 100 milliGRAM(s) Oral every 8 hours  insulin glargine Injectable (LANTUS) 8 Unit(s) SubCutaneous at bedtime  insulin lispro (HumaLOG) corrective regimen sliding scale   SubCutaneous three times a day before meals  insulin lispro (HumaLOG) corrective regimen sliding scale   SubCutaneous at bedtime  isosorbide   mononitrate ER Tablet (IMDUR) 30 milliGRAM(s) Oral daily  levothyroxine 100 MICROGram(s) Oral daily  lidocaine   Patch 1 Patch Transdermal daily  sertraline 50 milliGRAM(s) Oral daily    MEDICATIONS  (PRN):  dextrose 40% Gel 15 Gram(s) Oral once PRN Blood Glucose LESS THAN 70 milliGRAM(s)/deciliter  glucagon  Injectable 1 milliGRAM(s) IntraMuscular once PRN Glucose LESS THAN 70 milligrams/deciliter  ondansetron Injectable 4 milliGRAM(s) IV Push every 6 hours PRN Nausea and/or Vomiting      Vital Signs Last 24 Hrs  T(C): 36.8 (20 Oct 2018 05:00), Max: 36.8 (20 Oct 2018 01:45)  T(F): 98.2 (20 Oct 2018 05:00), Max: 98.3 (20 Oct 2018 01:45)  HR: 62 (20 Oct 2018 07:26) (54 - 64)  BP: 181/45 (20 Oct 2018 07:26) (155/65 - 215/73)  BP(mean): --  RR: 18 (20 Oct 2018 05:00) (16 - 19)  SpO2: 98% (20 Oct 2018 05:00) (98% - 100%)  CAPILLARY BLOOD GLUCOSE      POCT Blood Glucose.: 96 mg/dL (20 Oct 2018 07:41)  POCT Blood Glucose.: 119 mg/dL (20 Oct 2018 00:13)  POCT Blood Glucose.: 124 mg/dL (19 Oct 2018 22:12)  POCT Blood Glucose.: 192 mg/dL (19 Oct 2018 16:48)  POCT Blood Glucose.: 158 mg/dL (19 Oct 2018 13:31)    I&O's Summary    19 Oct 2018 07:01  -  20 Oct 2018 07:00  --------------------------------------------------------  IN: 400 mL / OUT: 2200 mL / NET: -1800 mL        PHYSICAL EXAM:  GENERAL: NAD, well-developed, lying in bed asleep, awakes to voice, pleasant  HEAD:  Atraumatic, Normocephalic, MMM  NECK: Supple, No elevated JVD  CHEST/LUNG: Clear to auscultation bilaterally; No wheeze  HEART: Regular rate and rhythm; No murmurs, rubs, or gallops  ABDOMEN: Soft, mildly distended; , TTP no guarding or rebound; Bowel sounds present  EXTREMITIES:  2+ Peripheral Pulses, No clubbing, cyanosis, or edema  PSYCH: AAOx3  NEUROLOGY: CN II-XII grossly intact, moving all extremities    LABS:                        7.2    7.00  )-----------( 160      ( 19 Oct 2018 06:18 )             23.0     10-19    132<L>  |  90<L>  |  62<H>  ----------------------------<  222<H>  4.5   |  23  |  3.23<H>    Ca    8.4      19 Oct 2018 06:18  Mg     2.3     10-19    TPro  6.7  /  Alb  3.6  /  TBili  0.3  /  DBili  x   /  AST  44<H>  /  ALT  15  /  AlkPhos  332<H>  10-18    PT/INR - ( 18 Oct 2018 21:43 )   PT: 14.0 SEC;   INR: 1.26          PTT - ( 18 Oct 2018 21:43 )  PTT:31.4 SEC  CARDIAC MARKERS ( 18 Oct 2018 21:32 )  x     / x     / 63 u/L / 3.23 ng/mL / x              RADIOLOGY & ADDITIONAL TESTS:    Imaging Personally Reviewed:    Consultant(s) Notes Reviewed:      Care Discussed with Consultants/Other Providers:

## 2018-10-21 ENCOUNTER — TRANSCRIPTION ENCOUNTER (OUTPATIENT)
Age: 73
End: 2018-10-21

## 2018-10-21 LAB
BASOPHILS # BLD AUTO: 0.05 K/UL — SIGNIFICANT CHANGE UP (ref 0–0.2)
BASOPHILS NFR BLD AUTO: 1.1 % — SIGNIFICANT CHANGE UP (ref 0–2)
BLD GP AB SCN SERPL QL: NEGATIVE — SIGNIFICANT CHANGE UP
BUN SERPL-MCNC: 44 MG/DL — HIGH (ref 7–23)
CA-I BLD-SCNC: 1.11 MMOL/L — SIGNIFICANT CHANGE UP (ref 1.03–1.23)
CALCIUM SERPL-MCNC: 8.9 MG/DL — SIGNIFICANT CHANGE UP (ref 8.4–10.5)
CHLORIDE SERPL-SCNC: 91 MMOL/L — LOW (ref 98–107)
CO2 SERPL-SCNC: 24 MMOL/L — SIGNIFICANT CHANGE UP (ref 22–31)
CREAT SERPL-MCNC: 2.82 MG/DL — HIGH (ref 0.5–1.3)
EOSINOPHIL # BLD AUTO: 0.14 K/UL — SIGNIFICANT CHANGE UP (ref 0–0.5)
EOSINOPHIL NFR BLD AUTO: 3 % — SIGNIFICANT CHANGE UP (ref 0–6)
GLUCOSE SERPL-MCNC: 50 MG/DL — LOW (ref 70–99)
HBA1C BLD-MCNC: 6 % — HIGH (ref 4–5.6)
HCT VFR BLD CALC: 23.2 % — LOW (ref 34.5–45)
HGB BLD-MCNC: 7.1 G/DL — LOW (ref 11.5–15.5)
IMM GRANULOCYTES # BLD AUTO: 0.02 # — SIGNIFICANT CHANGE UP
IMM GRANULOCYTES NFR BLD AUTO: 0.4 % — SIGNIFICANT CHANGE UP (ref 0–1.5)
LYMPHOCYTES # BLD AUTO: 0.3 K/UL — LOW (ref 1–3.3)
LYMPHOCYTES # BLD AUTO: 6.4 % — LOW (ref 13–44)
MAGNESIUM SERPL-MCNC: 2.3 MG/DL — SIGNIFICANT CHANGE UP (ref 1.6–2.6)
MCHC RBC-ENTMCNC: 28.4 PG — SIGNIFICANT CHANGE UP (ref 27–34)
MCHC RBC-ENTMCNC: 30.6 % — LOW (ref 32–36)
MCV RBC AUTO: 92.8 FL — SIGNIFICANT CHANGE UP (ref 80–100)
MONOCYTES # BLD AUTO: 0.57 K/UL — SIGNIFICANT CHANGE UP (ref 0–0.9)
MONOCYTES NFR BLD AUTO: 12.1 % — SIGNIFICANT CHANGE UP (ref 2–14)
NEUTROPHILS # BLD AUTO: 3.62 K/UL — SIGNIFICANT CHANGE UP (ref 1.8–7.4)
NEUTROPHILS NFR BLD AUTO: 77 % — SIGNIFICANT CHANGE UP (ref 43–77)
NRBC # FLD: 0.02 — SIGNIFICANT CHANGE UP
PHOSPHATE SERPL-MCNC: 4.3 MG/DL — SIGNIFICANT CHANGE UP (ref 2.5–4.5)
PLATELET # BLD AUTO: 154 K/UL — SIGNIFICANT CHANGE UP (ref 150–400)
PMV BLD: 11.7 FL — SIGNIFICANT CHANGE UP (ref 7–13)
POTASSIUM SERPL-MCNC: 4.4 MMOL/L — SIGNIFICANT CHANGE UP (ref 3.5–5.3)
POTASSIUM SERPL-SCNC: 4.4 MMOL/L — SIGNIFICANT CHANGE UP (ref 3.5–5.3)
RBC # BLD: 2.5 M/UL — LOW (ref 3.8–5.2)
RBC # FLD: 18.3 % — HIGH (ref 10.3–14.5)
RH IG SCN BLD-IMP: POSITIVE — SIGNIFICANT CHANGE UP
SODIUM SERPL-SCNC: 134 MMOL/L — LOW (ref 135–145)
T4 FREE SERPL-MCNC: 1.18 NG/DL — SIGNIFICANT CHANGE UP (ref 0.9–1.8)
WBC # BLD: 4.7 K/UL — SIGNIFICANT CHANGE UP (ref 3.8–10.5)
WBC # FLD AUTO: 4.7 K/UL — SIGNIFICANT CHANGE UP (ref 3.8–10.5)

## 2018-10-21 PROCEDURE — 99233 SBSQ HOSP IP/OBS HIGH 50: CPT

## 2018-10-21 RX ORDER — INSULIN GLARGINE 100 [IU]/ML
7 INJECTION, SOLUTION SUBCUTANEOUS AT BEDTIME
Qty: 0 | Refills: 0 | Status: DISCONTINUED | OUTPATIENT
Start: 2018-10-21 | End: 2018-10-23

## 2018-10-21 RX ADMIN — LIDOCAINE 1 PATCH: 4 CREAM TOPICAL at 18:54

## 2018-10-21 RX ADMIN — Medication 100 MILLIGRAM(S): at 21:47

## 2018-10-21 RX ADMIN — Medication 3: at 12:00

## 2018-10-21 RX ADMIN — Medication 0.3 MILLIGRAM(S): at 05:37

## 2018-10-21 RX ADMIN — Medication 0.3 MILLIGRAM(S): at 14:31

## 2018-10-21 RX ADMIN — SERTRALINE 50 MILLIGRAM(S): 25 TABLET, FILM COATED ORAL at 12:15

## 2018-10-21 RX ADMIN — CARVEDILOL PHOSPHATE 25 MILLIGRAM(S): 80 CAPSULE, EXTENDED RELEASE ORAL at 05:35

## 2018-10-21 RX ADMIN — INSULIN GLARGINE 7 UNIT(S): 100 INJECTION, SOLUTION SUBCUTANEOUS at 21:49

## 2018-10-21 RX ADMIN — SENNA PLUS 2 TABLET(S): 8.6 TABLET ORAL at 21:47

## 2018-10-21 RX ADMIN — Medication 100 MICROGRAM(S): at 05:43

## 2018-10-21 RX ADMIN — ONDANSETRON 4 MILLIGRAM(S): 8 TABLET, FILM COATED ORAL at 13:02

## 2018-10-21 RX ADMIN — FAMOTIDINE 20 MILLIGRAM(S): 10 INJECTION INTRAVENOUS at 12:16

## 2018-10-21 RX ADMIN — Medication 0.3 MILLIGRAM(S): at 21:47

## 2018-10-21 RX ADMIN — AMLODIPINE BESYLATE 10 MILLIGRAM(S): 2.5 TABLET ORAL at 05:37

## 2018-10-21 RX ADMIN — Medication 0.5 MILLIGRAM(S): at 21:47

## 2018-10-21 RX ADMIN — ISOSORBIDE MONONITRATE 30 MILLIGRAM(S): 60 TABLET, EXTENDED RELEASE ORAL at 12:15

## 2018-10-21 RX ADMIN — CARVEDILOL PHOSPHATE 25 MILLIGRAM(S): 80 CAPSULE, EXTENDED RELEASE ORAL at 17:29

## 2018-10-21 RX ADMIN — LACTULOSE 20 GRAM(S): 10 SOLUTION ORAL at 17:26

## 2018-10-21 RX ADMIN — LIDOCAINE 1 PATCH: 4 CREAM TOPICAL at 12:16

## 2018-10-21 RX ADMIN — ATORVASTATIN CALCIUM 10 MILLIGRAM(S): 80 TABLET, FILM COATED ORAL at 21:47

## 2018-10-21 RX ADMIN — Medication 1: at 17:26

## 2018-10-21 RX ADMIN — Medication 667 MILLIGRAM(S): at 12:15

## 2018-10-21 RX ADMIN — Medication 200 MILLIGRAM(S): at 12:15

## 2018-10-21 RX ADMIN — Medication 100 MILLIGRAM(S): at 14:31

## 2018-10-21 RX ADMIN — GABAPENTIN 100 MILLIGRAM(S): 400 CAPSULE ORAL at 12:15

## 2018-10-21 RX ADMIN — Medication 100 MILLIGRAM(S): at 05:38

## 2018-10-21 RX ADMIN — ONDANSETRON 4 MILLIGRAM(S): 8 TABLET, FILM COATED ORAL at 05:35

## 2018-10-21 NOTE — DISCHARGE NOTE ADULT - PROVIDER TOKENS
FREE:[LAST:[Medical Staff Jose Francisco],PHONE:[(   )    -],FAX:[(   )    -],ADDRESS:[[facility] Provider]]

## 2018-10-21 NOTE — DISCHARGE NOTE ADULT - HOSPITAL COURSE
73 y/o F with PMHx of ESRD on HD TIW via L AVF, non-obstructive CAD, diastolic heart failure, pulmonary hypertension, and HTN, PVD, Anemia, Anxiety, depression, Benign Lung nodule s/p thoraocentesis, hypothyroidism, s/p ILR transferred from [facility] for CP, SOB & Anemia h/h 7.8/25.0     COURSE______ 73 y/o F with PMHx of ESRD on HD TIW via L AVF, non-obstructive CAD, diastolic heart failure, pulmonary hypertension, and HTN, PVD, Anemia, Anxiety, depression, Benign Lung nodule s/p thoraocentesis, hypothyroidism, s/p ILR transferred from [facility] for CP, SOB & Anemia h/h 7.8/25.0     Chest pain  - Chest pain improved. TPN elevated but in setting of ESRD and missed HD sessions.   - Low suspicion for ACS.   - Continue Coreg, Imdur  - Cards (Genoveva) no further workup needed   - CT chest: No PNA; small R pleural effusion  - Blood Cx NGTD    ESRD (end stage renal disease) on dialysis  - Volume status acceptable  - Continue HD T, Th, Sat   - House renal following - HD as ordered     Hyperlipidemia  - Lipid profile low total cholesterol   - Continue Lipitor  - Low fat low cholesterol low salt, diet.     Diabetes  - Low fat low cholesterol low salt, diabetic diet   - Insulin sliding scale coverage   - HgA1c 6%  - Continue Lantus.     HTN  - BP above goal but has not had HD in 3 days, will trend BP and reassess BP afterward.  - DASH low fat low cholesterol low salt, diet  - Continue Norvasc, Clonidine, hydralazine, Imdur, Coreg.     Hypothyroid  - Continue Synthroid.    Anemia due to chronic kidney disease, on chronic dialysis  - H/H stable, T & S,   - Transfuse prn Hgb < 7  - 2 units PRBCs ordered for 10/21    Dispo - Jose Francisco 71 y/o F with PMHx of ESRD on HD TIW via L AVF, non-obstructive CAD, diastolic heart failure, pulmonary hypertension, and HTN, PVD, Anemia, Anxiety, depression, Benign Lung nodule s/p thoraocentesis, hypothyroidism, s/p ILR transferred from [facility] for CP, SOB & Anemia h/h 7.8/25.0     Chest pain- Chest pain improved. TPN elevated but in setting of ESRD and missed HD sessions. Low suspicion for ACS. Continue Coreg, Imdur. Cards (Genoveva) no further workup needed. CT chest: No PNA; small R pleural effusion. Blood Cx NGTD    ESRD (end stage renal disease) on dialysis- Volume status acceptable. Continue HD T, Th, Sat. House renal following - HD as ordered     Hyperlipidemia- Lipid profile low total cholesterol. Continue Lipitor. Low fat low cholesterol low salt, diet.     Diabetes- Low fat low cholesterol low salt, diabetic diet. Insulin sliding scale coverage. HgA1c 6%.  Lantus d/c on discharge     HTN- BP above goal but has not had HD in 3 days, will trend BP and reassess BP afterward. DASH low fat low cholesterol low salt, diet. Continue Norvasc, Clonidine, hydralazine, Imdur, Coreg.     Hypothyroid- Continue Synthroid.    Anemia due to chronic kidney disease, on chronic dialysis- H/H stable, T & S, Transfuse prn Hgb < 7. 2 units PRBCs ordered for 10/21    Dispo - [place]

## 2018-10-21 NOTE — PROVIDER CONTACT NOTE (OTHER) - ACTION/TREATMENT ORDERED:
Pt given apple juice, FS rechecked 79, pt given another apple juice FS rechecked 99, pt setup for breakfast, family @bedside. ADS aware will consult endocrine and will continue to monitor.

## 2018-10-21 NOTE — DISCHARGE NOTE ADULT - MEDICATION SUMMARY - MEDICATIONS TO CHANGE
I will SWITCH the dose or number of times a day I take the medications listed below when I get home from the hospital:    cloNIDine 0.1 mg oral tablet  -- 1 tab(s) by mouth 2 times a day    gabapentin 100 mg oral capsule  -- 1 cap(s) by mouth 3 times a day

## 2018-10-21 NOTE — DISCHARGE NOTE ADULT - CARE PLAN
Principal Discharge DX:	Chest pain  Goal:	Resolution and return to baseline  Assessment and plan of treatment:	Cardiology was consulted and no further intervention/testing was warranted - Continue your medications as directed and follow-up as outpatient  Secondary Diagnosis:	ESRD (end stage renal disease) on dialysis  Assessment and plan of treatment:	Please continue to follow your dialysis schedule and refer to your primary provider/nephrologist for further care/recommendations. Continue your medications and supplementation as directed.  Secondary Diagnosis:	Hypothyroid  Assessment and plan of treatment:	Continue your thyroid medications as recommended and follow-up with your outpatient provider for continual thyroid function testing and further medication management.  Secondary Diagnosis:	Hypertension, unspecified type  Assessment and plan of treatment:	Continue blood pressure medication regimen as directed. Monitor for any visual changes, headaches or dizziness.  Monitor blood pressure regularly.  Follow up with your PCP for further management for high blood pressure.  Secondary Diagnosis:	Diabetes  Assessment and plan of treatment:	*** LANTUS UPON DC______ (Decreased to 7 units 10/21) **    Continue your medication regimen and a consistent carbohydrate diet (Meaning eating the same amount of carbohydrates at the same time each day). Monitor blood glucose levels throughout the day before meals and at bedtime. Record blood sugars and bring to outpatient providers appointment in order to be reviewed by your doctor for management modifications. Monitor for signs/symptoms of low blood glucose, such as, dizziness, altered mental status, or cool/clammy skin. In addition, monitor for signs/symptoms of high blood glucose, such as, feeling hot, dry, fatigued, or with increased thirst/urination. Make regular podiatry appointments in order to have feet checked for wounds and uncontrolled toe nail growth to prevent infections, as well as, appointments with an ophthalmologist to monitor your vision.  Secondary Diagnosis:	Abdominal pain  Assessment and plan of treatment:	Resolved - Had bowel movement overnight 10/20 into 10/21  Secondary Diagnosis:	Anemia  Assessment and plan of treatment:	Secondary to end-stage renal disease - Received 2 units PRBCs during hospitalization. Continue to monitor labs while in rehab (CBC). Follow-up with your outpatient provider for further care/recommendations. Monitor for signs/symptoms indicating worsening of disease, such as, easy bleeding/bruising, pale skin, fatigue, dizziness, increased heart rate, or chest pain. Principal Discharge DX:	Chest pain  Goal:	Resolution and return to baseline  Assessment and plan of treatment:	- Cardiology was consulted and no further intervention/testing was warranted   - Continue your medications as directed and follow-up as outpatient  Secondary Diagnosis:	ESRD (end stage renal disease) on dialysis  Assessment and plan of treatment:	- Please continue to follow your dialysis schedule and refer to your primary provider/nephrologist for further care/recommendations.   - Continue your medications and supplementation as directed.  Secondary Diagnosis:	Hypothyroid  Assessment and plan of treatment:	- Continue your thyroid medications as recommended and follow-up with your outpatient provider for continual thyroid function testing and further medication management.  Secondary Diagnosis:	Hypertension, unspecified type  Assessment and plan of treatment:	- Continue blood pressure medication regimen as directed.   - Monitor for any visual changes, headaches or dizziness.  Monitor blood pressure regularly.   - Follow up with your PCP for further management for high blood pressure.  - Follow up with Provider at the facility for further management  Secondary Diagnosis:	Diabetes  Assessment and plan of treatment:	- Your HgA1C level is 6.0%, Lantus was decreased to 7u in the hospital  - Blood glucose levels well controlled at present, Lantus discontinued on discharge  - Continue to follow insulin sliding scale proocol and a consistent carbohydrate diet   - Monitor blood glucose levels throughout the day before meals and at bedtime. Record blood sugars and bring to outpatient providers appointment in order to be reviewed by your doctor for management modifications. Monitor for signs/symptoms of low blood glucose, such as, dizziness, altered mental status, or cool/clammy skin. In addition, monitor for signs/symptoms of high blood glucose, such as, feeling hot, dry, fatigued, or with increased thirst/urination. Make regular podiatry appointments in order to have feet checked for wounds and uncontrolled toe nail growth to prevent infections, as well as, appointments with an ophthalmologist to monitor your vision.  Secondary Diagnosis:	Abdominal pain  Assessment and plan of treatment:	- Resolved - Had bowel movement overnight 10/20 into 10/21  Secondary Diagnosis:	Anemia  Assessment and plan of treatment:	- Secondary to end-stage renal disease   - Received 2 units PRBCs during hospitalization.  - Continue to monitor labs while in rehab (CBC). Follow-up with your outpatient provider for further care/recommendations. Monitor for signs/symptoms indicating worsening of disease, such as, easy bleeding/bruising, pale skin, fatigue, dizziness, increased heart rate, or chest pain. Principal Discharge DX:	Chest pain  Goal:	Resolution and return to baseline  Assessment and plan of treatment:	- Cardiology was consulted and no further intervention/testing was warranted   - Continue your medications as directed and follow-up as outpatient  Secondary Diagnosis:	ESRD (end stage renal disease) on dialysis  Assessment and plan of treatment:	- Please continue to follow your dialysis schedule and refer to your primary provider/nephrologist for further care/recommendations.   - Continue your medications and supplementation as directed.  Secondary Diagnosis:	Hypothyroid  Assessment and plan of treatment:	- Continue your thyroid medications as recommended and follow-up with your outpatient provider for continual thyroid function testing and further medication management.  Secondary Diagnosis:	Hypertension, unspecified type  Assessment and plan of treatment:	- Continue blood pressure medication regimen as directed.   - Monitor for any visual changes, headaches or dizziness.  Monitor blood pressure regularly.   - Follow up with your PCP for further management for high blood pressure.  - Follow up with Provider at the facility for further management  Secondary Diagnosis:	Diabetes  Assessment and plan of treatment:	- Your HgA1C level is 6.0%, Lantus was decreased to 7u in the hospital  - Blood glucose levels well controlled at present, Lantus discontinued on discharge  - Continue to follow insulin sliding scale proocol and a consistent carbohydrate diet   - Monitor blood glucose levels throughout the day before meals and at bedtime. Record blood sugars and bring to outpatient providers appointment in order to be reviewed by your doctor for management modifications. Monitor for signs/symptoms of low blood glucose, such as, dizziness, altered mental status, or cool/clammy skin. In addition, monitor for signs/symptoms of high blood glucose, such as, feeling hot, dry, fatigued, or with increased thirst/urination. Make regular podiatry appointments in order to have feet checked for wounds and uncontrolled toe nail growth to prevent infections, as well as, appointments with an ophthalmologist to monitor your vision.  Secondary Diagnosis:	Abdominal pain  Assessment and plan of treatment:	- Resolved - Had bowel movement overnight 10/20 into 10/21, and 10/22 to 23  Secondary Diagnosis:	Anemia  Assessment and plan of treatment:	- Secondary to end-stage renal disease   - Received 2 units PRBCs during hospitalization.  - Continue to monitor labs while in rehab (CBC). Follow-up with your outpatient provider for further care/recommendations. Monitor for signs/symptoms indicating worsening of disease, such as, easy bleeding/bruising, pale skin, fatigue, dizziness, increased heart rate, or chest pain.

## 2018-10-21 NOTE — PROGRESS NOTE ADULT - SUBJECTIVE AND OBJECTIVE BOX
Subjective: Patient seen and examined. No new events except as noted.     REVIEW OF SYSTEMS:    CONSTITUTIONAL: +weakness, fevers or chills  EYES/ENT: No visual changes;  No vertigo or throat pain   NECK: No pain or stiffness  RESPIRATORY: No cough, wheezing, hemoptysis; No shortness of breath  CARDIOVASCULAR: No chest pain or palpitations  GASTROINTESTINAL: No abdominal or epigastric pain. No nausea, vomiting, or hematemesis; No diarrhea or constipation. No melena or hematochezia.  GENITOURINARY: No dysuria, frequency or hematuria  NEUROLOGICAL: No numbness or weakness  SKIN: No itching, burning, rashes, or lesions   All other review of systems is negative unless indicated above.    MEDICATIONS:  MEDICATIONS  (STANDING):  amLODIPine   Tablet 10 milliGRAM(s) Oral daily  atorvastatin 10 milliGRAM(s) Oral at bedtime  calcium acetate 667 milliGRAM(s) Oral daily  carvedilol 25 milliGRAM(s) Oral every 12 hours  clonazePAM Tablet 0.5 milliGRAM(s) Oral at bedtime  cloNIDine 0.3 milliGRAM(s) Oral every 8 hours  dextrose 5%. 1000 milliLiter(s) (50 mL/Hr) IV Continuous <Continuous>  dextrose 50% Injectable 12.5 Gram(s) IV Push once  dextrose 50% Injectable 25 Gram(s) IV Push once  dextrose 50% Injectable 25 Gram(s) IV Push once  docusate sodium 200 milliGRAM(s) Oral daily  famotidine    Tablet 20 milliGRAM(s) Oral daily  gabapentin 100 milliGRAM(s) Oral daily  hydrALAZINE 100 milliGRAM(s) Oral every 8 hours  insulin glargine Injectable (LANTUS) 7 Unit(s) SubCutaneous at bedtime  insulin lispro (HumaLOG) corrective regimen sliding scale   SubCutaneous three times a day before meals  insulin lispro (HumaLOG) corrective regimen sliding scale   SubCutaneous at bedtime  isosorbide   mononitrate ER Tablet (IMDUR) 30 milliGRAM(s) Oral daily  levothyroxine 100 MICROGram(s) Oral daily  lidocaine   Patch 1 Patch Transdermal daily  senna 2 Tablet(s) Oral at bedtime  sertraline 50 milliGRAM(s) Oral daily      PHYSICAL EXAM:  T(C): 37.1 (10-21-18 @ 05:55), Max: 37.1 (10-20-18 @ 18:57)  HR: 60 (10-21-18 @ 05:55) (55 - 60)  BP: 146/58 (10-21-18 @ 05:55) (146/58 - 174/62)  RR: 17 (10-21-18 @ 05:55) (17 - 18)  SpO2: 99% (10-21-18 @ 05:55) (99% - 100%)  Wt(kg): --  I&O's Summary        Appearance: NAD	  HEENT:   Normal oral mucosa, PERRL, EOMI	  Lymphatic: No lymphadenopathy , no edema  Cardiovascular: Normal S1 S2, No JVD, No murmurs , Peripheral pulses palpable 2+ bilaterally  Respiratory: Lungs clear to auscultation, normal effort 	  Gastrointestinal:  Soft, Non-tender, + BS	  Skin: No rashes, No ecchymoses, No cyanosis, warm to touch  Musculoskeletal: Normal range of motion, normal strength  Psychiatry:  lethargic   Ext: No edema      LABS:    CARDIAC MARKERS:  CARDIAC MARKERS ( 18 Oct 2018 21:32 )  x     / x     / 63 u/L / 3.23 ng/mL / x                                    7.1    4.70  )-----------( 154      ( 21 Oct 2018 04:40 )             23.2     10-21    134<L>  |  91<L>  |  44<H>  ----------------------------<  50<L>  4.4   |  24  |  2.82<H>    Ca    8.9      21 Oct 2018 04:40  Phos  4.3     10-21  Mg     2.3     10-21      proBNP:   Lipid Profile:   HgA1c: Hemoglobin A1C, Whole Blood: 6.0 % (10-21 @ 04:40)    TSH:             TELEMETRY: 	    ECG:  	  RADIOLOGY:   DIAGNOSTIC TESTING:  [ ] Echocardiogram:  [ ]  Catheterization:  [ ] Stress Test:    OTHER:

## 2018-10-21 NOTE — DISCHARGE NOTE ADULT - COMMUNITY RESOURCES
[facility]  [phone] [place] [id number]  [facility]  [phone]

## 2018-10-21 NOTE — PROGRESS NOTE ADULT - PROBLEM SELECTOR PLAN 6
DM2  low fat low cholesterol low salt, diabetic diet   Insulin sliding scale coverage, HgA1c   Continue Lantus DM2. BG low this am, reduce lantus to 7 units qhs  low fat low cholesterol low salt, diabetic diet   Insulin sliding scale coverage, HgA1c   Continue Lantus

## 2018-10-21 NOTE — DISCHARGE NOTE ADULT - MEDICATION SUMMARY - MEDICATIONS TO TAKE
I will START or STAY ON the medications listed below when I get home from the hospital:    Tylenol 500 mg oral tablet  -- 2 tab(s) by mouth , As Needed  -- Indication: For Abdominal pain / Chest pain    cloNIDine 0.3 mg oral tablet  -- 1 tab(s) by mouth every 8 hours  -- Indication: For HTN (hypertension)    isosorbide mononitrate 30 mg oral tablet, extended release  -- 1 tab(s) by mouth once a day  -- Indication: For HTN (hypertension)    gabapentin 100 mg oral capsule  -- 1 cap(s) by mouth once a day  -- Indication: For Neuropathy    clonazePAM 0.5 mg oral tablet  -- 1 tab(s) by mouth once a day (at bedtime)  -- Indication: For Anxiety    sertraline 50 mg oral tablet  -- 1 tab(s) by mouth once a day  -- Indication: For Depression    insulin lispro (concentrated) 200 units/mL subcutaneous solution  --  subcutaneous   1 Unit(s) if Glucose 151 - 200  2 Unit(s) if Glucose 201 - 250  3 Unit(s) if Glucose 251 - 300  4 Unit(s) if Glucose 301 - 350  5 Unit(s) if Glucose 351 - 400  6 Unit(s) if Glucose Greater Than 400  -- Indication: For Diabetes    insulin lispro (concentrated) 200 units/mL subcutaneous solution  -- 1 unit(s) subcutaneous once a day (at bedtime)  0 Unit(s) if Glucose 0 - 250  1 Unit(s) if Glucose 251 - 300  2 Unit(s) if Glucose 301 - 350  3 Unit(s) if Glucose 351 - 400  4 Unit(s) if Glucose Greater Than 400  -- Indication: For Diabetes    atorvastatin 10 mg oral tablet  -- 1 tab(s) by mouth once a day (at bedtime)  -- Indication: For Hyperlipidemia    carvedilol 25 mg oral tablet  -- 1 tab(s) by mouth every 12 hours  -- Indication: For Hypertension, unspecified type    amLODIPine 10 mg oral tablet  -- 1 tab(s) by mouth once a day  -- Indication: For Hypertension, unspecified type    petrolatum topical ointment  -- 1 application on skin 3 times a day  -- Indication: For skin care    lidocaine 5% topical film  -- Apply on skin to affected area once a day  -- Indication: For back pain    famotidine 20 mg oral tablet  -- 1 tab(s) by mouth once a day  -- Indication: For Abdominal pain    senna oral tablet  -- 2 tab(s) by mouth once a day (at bedtime)  -- Indication: For Constipation    docusate sodium 100 mg oral capsule  -- 1 cap(s) by mouth 2 times a day  -- Indication: For Constipation    calcium acetate 667 mg oral tablet  -- 1 tab(s) by mouth once a day  -- Indication: For ESRD (end stage renal disease) on dialysis    lactobacillus acidophilus oral capsule  -- 1 tab(s) by mouth 2 times a day  -- Indication: For ppx    levothyroxine 100 mcg (0.1 mg) oral tablet  -- 1 tab(s) by mouth once a day  -- Indication: For Hypothyroid    hydrALAZINE 100 mg oral tablet  -- 1 tab(s) by mouth every 8 hours  -- Indication: For Hypertension, unspecified type

## 2018-10-21 NOTE — CHART NOTE - NSCHARTNOTEFT_GEN_A_CORE
Spoke with patient and patient's sister at bedside via [employer] in regards to the need for a blood transfusion in order to return to [facility] - Patient has received transfusions in the past and there is no history of reactions - Patient and sister understand and consent for 2 units - Intents on DC back to [facility] in the morning

## 2018-10-21 NOTE — PROGRESS NOTE ADULT - SUBJECTIVE AND OBJECTIVE BOX
Patient is a 72y old  Female who presents with a chief complaint of Anemia hgb 7.5, missed hemodialysis, abd pain, chest pain, shortness of breath r/o ACS (20 Oct 2018 20:31)      SUBJECTIVE / OVERNIGHT EVENTS: Feels better some abd pain but much improved. Had BM x 3 last night.    MEDICATIONS  (STANDING):  amLODIPine   Tablet 10 milliGRAM(s) Oral daily  atorvastatin 10 milliGRAM(s) Oral at bedtime  calcium acetate 667 milliGRAM(s) Oral daily  carvedilol 25 milliGRAM(s) Oral every 12 hours  clonazePAM Tablet 0.5 milliGRAM(s) Oral at bedtime  cloNIDine 0.3 milliGRAM(s) Oral every 8 hours  dextrose 5%. 1000 milliLiter(s) (50 mL/Hr) IV Continuous <Continuous>  dextrose 50% Injectable 12.5 Gram(s) IV Push once  dextrose 50% Injectable 25 Gram(s) IV Push once  dextrose 50% Injectable 25 Gram(s) IV Push once  docusate sodium 200 milliGRAM(s) Oral daily  famotidine    Tablet 20 milliGRAM(s) Oral daily  gabapentin 100 milliGRAM(s) Oral daily  hydrALAZINE 100 milliGRAM(s) Oral every 8 hours  insulin glargine Injectable (LANTUS) 8 Unit(s) SubCutaneous at bedtime  insulin lispro (HumaLOG) corrective regimen sliding scale   SubCutaneous three times a day before meals  insulin lispro (HumaLOG) corrective regimen sliding scale   SubCutaneous at bedtime  isosorbide   mononitrate ER Tablet (IMDUR) 30 milliGRAM(s) Oral daily  levothyroxine 100 MICROGram(s) Oral daily  lidocaine   Patch 1 Patch Transdermal daily  senna 2 Tablet(s) Oral at bedtime  sertraline 50 milliGRAM(s) Oral daily    MEDICATIONS  (PRN):  dextrose 40% Gel 15 Gram(s) Oral once PRN Blood Glucose LESS THAN 70 milliGRAM(s)/deciliter  glucagon  Injectable 1 milliGRAM(s) IntraMuscular once PRN Glucose LESS THAN 70 milligrams/deciliter  lactulose Syrup 20 Gram(s) Oral two times a day PRN constipation  ondansetron Injectable 4 milliGRAM(s) IV Push every 6 hours PRN Nausea and/or Vomiting      Vital Signs Last 24 Hrs  T(C): 37.1 (21 Oct 2018 05:55), Max: 37.1 (20 Oct 2018 18:57)  T(F): 98.7 (21 Oct 2018 05:55), Max: 98.8 (20 Oct 2018 18:57)  HR: 60 (21 Oct 2018 05:55) (55 - 63)  BP: 146/58 (21 Oct 2018 05:55) (146/58 - 179/82)  BP(mean): --  RR: 17 (21 Oct 2018 05:55) (17 - 18)  SpO2: 99% (21 Oct 2018 05:55) (99% - 100%)  CAPILLARY BLOOD GLUCOSE      POCT Blood Glucose.: 99 mg/dL (21 Oct 2018 08:19)  POCT Blood Glucose.: 79 mg/dL (21 Oct 2018 08:02)  POCT Blood Glucose.: 61 mg/dL (21 Oct 2018 07:43)  POCT Blood Glucose.: 79 mg/dL (20 Oct 2018 21:44)  POCT Blood Glucose.: 89 mg/dL (20 Oct 2018 16:37)  POCT Blood Glucose.: 115 mg/dL (20 Oct 2018 11:42)    I&O's Summary      PHYSICAL EXAM:  GENERAL: NAD, well-developed  HEAD:  Atraumatic, Normocephalic  EYES: EOMI, conjunctiva and sclera clear  NECK: Supple, No JVD  CHEST/LUNG: Clear to auscultation bilaterally; No wheeze  HEART: Regular rate and rhythm; No murmurs, rubs, or gallops  ABDOMEN: Soft, Nontender, Nondistended; Bowel sounds present  EXTREMITIES:  2+ Peripheral Pulses, No clubbing, cyanosis, or edema  PSYCH: AAOx3  NEUROLOGY: non-focal  SKIN: No rashes or lesions    LABS:                        7.1    4.70  )-----------( 154      ( 21 Oct 2018 04:40 )             23.2     10-21    134<L>  |  91<L>  |  44<H>  ----------------------------<  50<L>  4.4   |  24  |  2.82<H>    Ca    8.9      21 Oct 2018 04:40  Phos  4.3     10-21  Mg     2.3     10-21                RADIOLOGY & ADDITIONAL TESTS:    Imaging Personally Reviewed:    Consultant(s) Notes Reviewed:      Care Discussed with Consultants/Other Providers: Patient is a 72y old  Female who presents with a chief complaint of Anemia hgb 7.5, missed hemodialysis, abd pain, chest pain, shortness of breath r/o ACS (20 Oct 2018 20:31)      SUBJECTIVE / OVERNIGHT EVENTS: Feels better some abd pain but much improved. Had BM x 3 last night.    MEDICATIONS  (STANDING):  amLODIPine   Tablet 10 milliGRAM(s) Oral daily  atorvastatin 10 milliGRAM(s) Oral at bedtime  calcium acetate 667 milliGRAM(s) Oral daily  carvedilol 25 milliGRAM(s) Oral every 12 hours  clonazePAM Tablet 0.5 milliGRAM(s) Oral at bedtime  cloNIDine 0.3 milliGRAM(s) Oral every 8 hours  dextrose 5%. 1000 milliLiter(s) (50 mL/Hr) IV Continuous <Continuous>  dextrose 50% Injectable 12.5 Gram(s) IV Push once  dextrose 50% Injectable 25 Gram(s) IV Push once  dextrose 50% Injectable 25 Gram(s) IV Push once  docusate sodium 200 milliGRAM(s) Oral daily  famotidine    Tablet 20 milliGRAM(s) Oral daily  gabapentin 100 milliGRAM(s) Oral daily  hydrALAZINE 100 milliGRAM(s) Oral every 8 hours  insulin glargine Injectable (LANTUS) 8 Unit(s) SubCutaneous at bedtime  insulin lispro (HumaLOG) corrective regimen sliding scale   SubCutaneous three times a day before meals  insulin lispro (HumaLOG) corrective regimen sliding scale   SubCutaneous at bedtime  isosorbide   mononitrate ER Tablet (IMDUR) 30 milliGRAM(s) Oral daily  levothyroxine 100 MICROGram(s) Oral daily  lidocaine   Patch 1 Patch Transdermal daily  senna 2 Tablet(s) Oral at bedtime  sertraline 50 milliGRAM(s) Oral daily    MEDICATIONS  (PRN):  dextrose 40% Gel 15 Gram(s) Oral once PRN Blood Glucose LESS THAN 70 milliGRAM(s)/deciliter  glucagon  Injectable 1 milliGRAM(s) IntraMuscular once PRN Glucose LESS THAN 70 milligrams/deciliter  lactulose Syrup 20 Gram(s) Oral two times a day PRN constipation  ondansetron Injectable 4 milliGRAM(s) IV Push every 6 hours PRN Nausea and/or Vomiting      Vital Signs Last 24 Hrs  T(C): 37.1 (21 Oct 2018 05:55), Max: 37.1 (20 Oct 2018 18:57)  T(F): 98.7 (21 Oct 2018 05:55), Max: 98.8 (20 Oct 2018 18:57)  HR: 60 (21 Oct 2018 05:55) (55 - 63)  BP: 146/58 (21 Oct 2018 05:55) (146/58 - 179/82)  BP(mean): --  RR: 17 (21 Oct 2018 05:55) (17 - 18)  SpO2: 99% (21 Oct 2018 05:55) (99% - 100%)  CAPILLARY BLOOD GLUCOSE      POCT Blood Glucose.: 99 mg/dL (21 Oct 2018 08:19)  POCT Blood Glucose.: 79 mg/dL (21 Oct 2018 08:02)  POCT Blood Glucose.: 61 mg/dL (21 Oct 2018 07:43)  POCT Blood Glucose.: 79 mg/dL (20 Oct 2018 21:44)  POCT Blood Glucose.: 89 mg/dL (20 Oct 2018 16:37)  POCT Blood Glucose.: 115 mg/dL (20 Oct 2018 11:42)    I&O's Summary      PHYSICAL EXAM:  GENERAL: NAD, well-developed  HEAD:  Atraumatic, Normocephalic  EYES: EOMI, conjunctiva and sclera clear  NECK: Supple, No JVD  CHEST/LUNG: Clear to auscultation bilaterally; No wheeze  HEART: Regular rate and rhythm; No murmurs, rubs, or gallops  ABDOMEN: Soft, Nontender, mild distention; Bowel sounds present  EXTREMITIES:  2+ Peripheral Pulses, No clubbing, cyanosis, or edema  NEUROLOGY: non-focal  SKIN: No rashes or lesions    LABS:                        7.1    4.70  )-----------( 154      ( 21 Oct 2018 04:40 )             23.2     10-21    134<L>  |  91<L>  |  44<H>  ----------------------------<  50<L>  4.4   |  24  |  2.82<H>    Ca    8.9      21 Oct 2018 04:40  Phos  4.3     10-21  Mg     2.3     10-21                RADIOLOGY & ADDITIONAL TESTS:    Imaging Personally Reviewed:    Consultant(s) Notes Reviewed:      Care Discussed with Consultants/Other Providers:

## 2018-10-21 NOTE — DISCHARGE NOTE ADULT - PATIENT PORTAL LINK FT
You can access the [facility] Patient Portal, offered by [facility], by registering with the following website: [email or web address]

## 2018-10-21 NOTE — DISCHARGE NOTE ADULT - SECONDARY DIAGNOSIS.
ESRD (end stage renal disease) on dialysis Hypothyroid Hypertension, unspecified type Diabetes Abdominal pain Anemia

## 2018-10-21 NOTE — CHART NOTE - NSCHARTNOTEFT_GEN_A_CORE
10/20 - 2100     Pt resting comfortably in no distress. Pt denies any chest pain, palpitations, -sob.     Vital Signs Last 24 Hrs  T(C): 37.1 (21 Oct 2018 05:55), Max: 37.1 (20 Oct 2018 18:57)  T(F): 98.7 (21 Oct 2018 05:55), Max: 98.8 (20 Oct 2018 18:57)  HR: 60 (21 Oct 2018 05:55) (55 - 63)  BP: 146/58 (21 Oct 2018 05:55) (146/58 - 181/45)  BP(mean): --  RR: 17 (21 Oct 2018 05:55) (17 - 18)  SpO2: 99% (21 Oct 2018 05:55) (99% - 100%)    will continue to monitor

## 2018-10-21 NOTE — DISCHARGE NOTE ADULT - MEDICATION SUMMARY - MEDICATIONS TO STOP TAKING
I will STOP taking the medications listed below when I get home from the hospital:    insulin glargine  -- 3 unit(s) subcutaneous once a day (at bedtime)

## 2018-10-21 NOTE — DISCHARGE NOTE ADULT - PLAN OF CARE
Resolution and return to baseline Cardiology was consulted and no further intervention/testing was warranted - Continue your medications as directed and follow-up as outpatient Please continue to follow your dialysis schedule and refer to your primary provider/nephrologist for further care/recommendations. Continue your medications and supplementation as directed. Continue your thyroid medications as recommended and follow-up with your outpatient provider for continual thyroid function testing and further medication management. Continue blood pressure medication regimen as directed. Monitor for any visual changes, headaches or dizziness.  Monitor blood pressure regularly.  Follow up with your PCP for further management for high blood pressure. *** LANTUS UPON DC______ (Decreased to 7 units 10/21) **    Continue your medication regimen and a consistent carbohydrate diet (Meaning eating the same amount of carbohydrates at the same time each day). Monitor blood glucose levels throughout the day before meals and at bedtime. Record blood sugars and bring to outpatient providers appointment in order to be reviewed by your doctor for management modifications. Monitor for signs/symptoms of low blood glucose, such as, dizziness, altered mental status, or cool/clammy skin. In addition, monitor for signs/symptoms of high blood glucose, such as, feeling hot, dry, fatigued, or with increased thirst/urination. Make regular podiatry appointments in order to have feet checked for wounds and uncontrolled toe nail growth to prevent infections, as well as, appointments with an ophthalmologist to monitor your vision. Resolved - Had bowel movement overnight 10/20 into 10/21 Secondary to end-stage renal disease - Received 2 units PRBCs during hospitalization. Continue to monitor labs while in rehab (CBC). Follow-up with your outpatient provider for further care/recommendations. Monitor for signs/symptoms indicating worsening of disease, such as, easy bleeding/bruising, pale skin, fatigue, dizziness, increased heart rate, or chest pain. - Cardiology was consulted and no further intervention/testing was warranted   - Continue your medications as directed and follow-up as outpatient - Please continue to follow your dialysis schedule and refer to your primary provider/nephrologist for further care/recommendations.   - Continue your medications and supplementation as directed. - Continue your thyroid medications as recommended and follow-up with your outpatient provider for continual thyroid function testing and further medication management. - Continue blood pressure medication regimen as directed.   - Monitor for any visual changes, headaches or dizziness.  Monitor blood pressure regularly.   - Follow up with your PCP for further management for high blood pressure.  - Follow up with Provider at the facility for further management - Your HgA1C level is 6.0%, Lantus was decreased to 7u in the hospital  - Blood glucose levels well controlled at present, Lantus discontinued on discharge  - Continue to follow insulin sliding scale proocol and a consistent carbohydrate diet   - Monitor blood glucose levels throughout the day before meals and at bedtime. Record blood sugars and bring to outpatient providers appointment in order to be reviewed by your doctor for management modifications. Monitor for signs/symptoms of low blood glucose, such as, dizziness, altered mental status, or cool/clammy skin. In addition, monitor for signs/symptoms of high blood glucose, such as, feeling hot, dry, fatigued, or with increased thirst/urination. Make regular podiatry appointments in order to have feet checked for wounds and uncontrolled toe nail growth to prevent infections, as well as, appointments with an ophthalmologist to monitor your vision. - Resolved - Had bowel movement overnight 10/20 into 10/21 - Secondary to end-stage renal disease   - Received 2 units PRBCs during hospitalization.  - Continue to monitor labs while in rehab (CBC). Follow-up with your outpatient provider for further care/recommendations. Monitor for signs/symptoms indicating worsening of disease, such as, easy bleeding/bruising, pale skin, fatigue, dizziness, increased heart rate, or chest pain. - Resolved - Had bowel movement overnight 10/20 into 10/21, and 10/22 to 23

## 2018-10-21 NOTE — PROGRESS NOTE ADULT - PROBLEM SELECTOR PLAN 8
H/H stable, T & S,   Transfuse prn Hgb < 7 H/H stable, T & S,   Transfuse prn Hgb < 9 so that Jose Francisco will take her back, 2 U PRBC

## 2018-10-21 NOTE — DISCHARGE NOTE ADULT - ADDITIONAL INSTRUCTIONS
Additional Health Issues:  Hyperlipidemia: Continue cholesterol control medications. Continue DASH diet. Follow up with your PCP within 1 week of discharge for further management and monitoring of lipid and cholesterol panels.  Diastolic heart failure: Continue recommended medication regimen. Monitor for signs/symptoms of fluid overload and electrolyte abnormalities, such as, shortness of breath, cough, swelling, chest discomfort, changes in heart rate, dizziness, fainting, or changes in mental status. Follow-up with your PCP/cardiologist outpatient after you've been discharged from the hospital for an echocardiogram.

## 2018-10-22 DIAGNOSIS — D69.6 THROMBOCYTOPENIA, UNSPECIFIED: ICD-10-CM

## 2018-10-22 LAB
ALBUMIN SERPL ELPH-MCNC: 3.4 G/DL — SIGNIFICANT CHANGE UP (ref 3.3–5)
ALP SERPL-CCNC: 246 U/L — HIGH (ref 40–120)
ALT FLD-CCNC: 11 U/L — SIGNIFICANT CHANGE UP (ref 4–33)
AST SERPL-CCNC: 23 U/L — SIGNIFICANT CHANGE UP (ref 4–32)
BILIRUB SERPL-MCNC: 0.5 MG/DL — SIGNIFICANT CHANGE UP (ref 0.2–1.2)
BUN SERPL-MCNC: 52 MG/DL — HIGH (ref 7–23)
CALCIUM SERPL-MCNC: 8.9 MG/DL — SIGNIFICANT CHANGE UP (ref 8.4–10.5)
CHLORIDE SERPL-SCNC: 92 MMOL/L — LOW (ref 98–107)
CO2 SERPL-SCNC: 25 MMOL/L — SIGNIFICANT CHANGE UP (ref 22–31)
CREAT SERPL-MCNC: 3.47 MG/DL — HIGH (ref 0.5–1.3)
GLUCOSE BLDC GLUCOMTR-MCNC: 102 MG/DL — HIGH (ref 70–99)
GLUCOSE BLDC GLUCOMTR-MCNC: 139 MG/DL — HIGH (ref 70–99)
GLUCOSE BLDC GLUCOMTR-MCNC: 62 MG/DL — LOW (ref 70–99)
GLUCOSE BLDC GLUCOMTR-MCNC: 62 MG/DL — LOW (ref 70–99)
GLUCOSE BLDC GLUCOMTR-MCNC: 69 MG/DL — LOW (ref 70–99)
GLUCOSE BLDC GLUCOMTR-MCNC: 87 MG/DL — SIGNIFICANT CHANGE UP (ref 70–99)
GLUCOSE BLDC GLUCOMTR-MCNC: 99 MG/DL — SIGNIFICANT CHANGE UP (ref 70–99)
GLUCOSE SERPL-MCNC: 85 MG/DL — SIGNIFICANT CHANGE UP (ref 70–99)
HCT VFR BLD CALC: 30.6 % — LOW (ref 34.5–45)
HGB BLD-MCNC: 9.8 G/DL — LOW (ref 11.5–15.5)
MAGNESIUM SERPL-MCNC: 2.4 MG/DL — SIGNIFICANT CHANGE UP (ref 1.6–2.6)
MCHC RBC-ENTMCNC: 28.4 PG — SIGNIFICANT CHANGE UP (ref 27–34)
MCHC RBC-ENTMCNC: 32 % — SIGNIFICANT CHANGE UP (ref 32–36)
MCV RBC AUTO: 88.7 FL — SIGNIFICANT CHANGE UP (ref 80–100)
NRBC # FLD: 0 — SIGNIFICANT CHANGE UP
PHOSPHATE SERPL-MCNC: 5.3 MG/DL — HIGH (ref 2.5–4.5)
PLATELET # BLD AUTO: 131 K/UL — LOW (ref 150–400)
PMV BLD: 11.3 FL — SIGNIFICANT CHANGE UP (ref 7–13)
POTASSIUM SERPL-MCNC: 4.9 MMOL/L — SIGNIFICANT CHANGE UP (ref 3.5–5.3)
POTASSIUM SERPL-SCNC: 4.9 MMOL/L — SIGNIFICANT CHANGE UP (ref 3.5–5.3)
PROT SERPL-MCNC: 6.1 G/DL — SIGNIFICANT CHANGE UP (ref 6–8.3)
RBC # BLD: 3.45 M/UL — LOW (ref 3.8–5.2)
RBC # FLD: 18.9 % — HIGH (ref 10.3–14.5)
SODIUM SERPL-SCNC: 135 MMOL/L — SIGNIFICANT CHANGE UP (ref 135–145)
WBC # BLD: 4.99 K/UL — SIGNIFICANT CHANGE UP (ref 3.8–10.5)
WBC # FLD AUTO: 4.99 K/UL — SIGNIFICANT CHANGE UP (ref 3.8–10.5)

## 2018-10-22 PROCEDURE — 93010 ELECTROCARDIOGRAM REPORT: CPT

## 2018-10-22 PROCEDURE — 99233 SBSQ HOSP IP/OBS HIGH 50: CPT

## 2018-10-22 PROCEDURE — 90935 HEMODIALYSIS ONE EVALUATION: CPT | Mod: GC

## 2018-10-22 RX ORDER — CALCIUM ACETATE 667 MG
667 TABLET ORAL
Qty: 0 | Refills: 0 | Status: DISCONTINUED | OUTPATIENT
Start: 2018-10-22 | End: 2018-10-23

## 2018-10-22 RX ORDER — ACETAMINOPHEN 500 MG
650 TABLET ORAL ONCE
Qty: 0 | Refills: 0 | Status: COMPLETED | OUTPATIENT
Start: 2018-10-22 | End: 2018-10-22

## 2018-10-22 RX ORDER — DOCUSATE SODIUM 100 MG
100 CAPSULE ORAL THREE TIMES A DAY
Qty: 0 | Refills: 0 | Status: DISCONTINUED | OUTPATIENT
Start: 2018-10-22 | End: 2018-10-23

## 2018-10-22 RX ORDER — HYDRALAZINE HCL 50 MG
5 TABLET ORAL ONCE
Qty: 0 | Refills: 0 | Status: COMPLETED | OUTPATIENT
Start: 2018-10-22 | End: 2018-10-22

## 2018-10-22 RX ORDER — POLYETHYLENE GLYCOL 3350 17 G/17G
17 POWDER, FOR SOLUTION ORAL DAILY
Qty: 0 | Refills: 0 | Status: DISCONTINUED | OUTPATIENT
Start: 2018-10-22 | End: 2018-10-22

## 2018-10-22 RX ADMIN — CARVEDILOL PHOSPHATE 25 MILLIGRAM(S): 80 CAPSULE, EXTENDED RELEASE ORAL at 17:50

## 2018-10-22 RX ADMIN — Medication 650 MILLIGRAM(S): at 20:45

## 2018-10-22 RX ADMIN — LACTULOSE 20 GRAM(S): 10 SOLUTION ORAL at 14:22

## 2018-10-22 RX ADMIN — LIDOCAINE 1 PATCH: 4 CREAM TOPICAL at 14:23

## 2018-10-22 RX ADMIN — AMLODIPINE BESYLATE 10 MILLIGRAM(S): 2.5 TABLET ORAL at 11:36

## 2018-10-22 RX ADMIN — Medication 0.3 MILLIGRAM(S): at 11:35

## 2018-10-22 RX ADMIN — Medication 0.3 MILLIGRAM(S): at 23:00

## 2018-10-22 RX ADMIN — GABAPENTIN 100 MILLIGRAM(S): 400 CAPSULE ORAL at 11:40

## 2018-10-22 RX ADMIN — LIDOCAINE 1 PATCH: 4 CREAM TOPICAL at 00:39

## 2018-10-22 RX ADMIN — SERTRALINE 50 MILLIGRAM(S): 25 TABLET, FILM COATED ORAL at 11:39

## 2018-10-22 RX ADMIN — Medication 667 MILLIGRAM(S): at 11:37

## 2018-10-22 RX ADMIN — Medication 667 MILLIGRAM(S): at 09:21

## 2018-10-22 RX ADMIN — Medication 0.5 MILLIGRAM(S): at 22:59

## 2018-10-22 RX ADMIN — CARVEDILOL PHOSPHATE 25 MILLIGRAM(S): 80 CAPSULE, EXTENDED RELEASE ORAL at 09:06

## 2018-10-22 RX ADMIN — Medication 5 MILLIGRAM(S): at 19:46

## 2018-10-22 RX ADMIN — Medication 650 MILLIGRAM(S): at 19:46

## 2018-10-22 RX ADMIN — Medication 100 MILLIGRAM(S): at 14:23

## 2018-10-22 RX ADMIN — Medication 100 MICROGRAM(S): at 11:44

## 2018-10-22 RX ADMIN — FAMOTIDINE 20 MILLIGRAM(S): 10 INJECTION INTRAVENOUS at 11:37

## 2018-10-22 RX ADMIN — ISOSORBIDE MONONITRATE 30 MILLIGRAM(S): 60 TABLET, EXTENDED RELEASE ORAL at 11:39

## 2018-10-22 RX ADMIN — Medication 667 MILLIGRAM(S): at 17:50

## 2018-10-22 RX ADMIN — LIDOCAINE 1 PATCH: 4 CREAM TOPICAL at 19:29

## 2018-10-22 RX ADMIN — Medication 100 MILLIGRAM(S): at 23:00

## 2018-10-22 RX ADMIN — Medication 200 MILLIGRAM(S): at 11:36

## 2018-10-22 RX ADMIN — Medication 100 MILLIGRAM(S): at 09:07

## 2018-10-22 RX ADMIN — ATORVASTATIN CALCIUM 10 MILLIGRAM(S): 80 TABLET, FILM COATED ORAL at 23:00

## 2018-10-22 NOTE — PROGRESS NOTE ADULT - PROBLEM SELECTOR PLAN 3
Stable   nephrology consulted
Chest pain improved.  trop elevated in setting of ESRD and missed HD sessions. Low suspicion for ACS.   Continue Coreg, Imdur
Chest pain improved.  trop elevated in setting of ESRD and missed HD sessions. Low suspicion for ACS.   Continue Coreg, Imdur
Fasting Lipid profile  Continue Lipitor  low fat low cholesterol low salt, diet
Hgb at goal. Cont to monitor
Stable   nephrology consulted
Chest pain improved. TPN elevated but in setting of ESRD and missed HD sessions. Low suspicion for ACS.   Continue Coreg, Imdur

## 2018-10-22 NOTE — PROGRESS NOTE ADULT - PROBLEM SELECTOR PROBLEM 3
Chronic kidney disease-mineral and bone disorder
Chest pain
Anemia due to chronic kidney disease, on chronic dialysis
Chest pain
Chronic kidney disease-mineral and bone disorder
Hyperlipidemia
Chest pain

## 2018-10-22 NOTE — PROGRESS NOTE ADULT - PROBLEM SELECTOR PROBLEM 1
Chest pain
HTN (hypertension)
Chest pain
Chest pain
ESRD (end stage renal disease) on dialysis
HTN (hypertension)
HTN (hypertension)

## 2018-10-22 NOTE — PROGRESS NOTE ADULT - PROBLEM SELECTOR PLAN 2
Chronic   monitor closely
Improved with BM.  continue pepcid 20 daily
Chronic   monitor closely
Uncontrolled- SBP in 200s after HD however it appears patient did not get her meds this AM. Will need better control and to take her meds everyday. Consider clonidine patch.
Volume status acceptable  Continue HD T, Th, Sat   House renal following  HD session today
still with abd pain  likely due to constipation  aggressive bowel regimen
Needs bowel regimen, will add senna/colace, lactulose   Will also give pepcid 20 daily

## 2018-10-22 NOTE — PROVIDER CONTACT NOTE (OTHER) - BACKGROUND
71 yo F with PMH ESRD on HD, CAD, DHF,PHTN, HTN. BP found to be elevated post-dialysis and re-assesed now back on the unit.

## 2018-10-22 NOTE — PROGRESS NOTE ADULT - PROBLEM SELECTOR PROBLEM 8
Anemia due to chronic kidney disease, on chronic dialysis
Anemia due to chronic kidney disease, on chronic dialysis
H/O fall
Anemia due to chronic kidney disease, on chronic dialysis

## 2018-10-22 NOTE — PROGRESS NOTE ADULT - SUBJECTIVE AND OBJECTIVE BOX
[facility] Division of Kidney Diseases & Hypertension  HEMODIALYSIS NOTE  [phone]--------------------------------------------------------------------------------  Chief Complaint: ESRD/Ongoing hemodialysis requirement    24 hour events/subjective:  Patient without complaints while being seen on HD this AM. Tolerating HD well.      PAST HISTORY  --------------------------------------------------------------------------------  No significant changes to PMH, PSH, FHx, SHx, unless otherwise noted    ALLERGIES & MEDICATIONS  --------------------------------------------------------------------------------  Allergies    Avelox (Unknown)  fish (Hives; Rash)  shellfish (Unknown)    Intolerances      Standing Inpatient Medications  amLODIPine   Tablet 10 milliGRAM(s) Oral daily  atorvastatin 10 milliGRAM(s) Oral at bedtime  calcium acetate 667 milliGRAM(s) Oral daily  carvedilol 25 milliGRAM(s) Oral every 12 hours  clonazePAM Tablet 0.5 milliGRAM(s) Oral at bedtime  cloNIDine 0.3 milliGRAM(s) Oral every 8 hours  dextrose 5%. 1000 milliLiter(s) IV Continuous <Continuous>  dextrose 50% Injectable 12.5 Gram(s) IV Push once  dextrose 50% Injectable 25 Gram(s) IV Push once  dextrose 50% Injectable 25 Gram(s) IV Push once  docusate sodium 200 milliGRAM(s) Oral daily  famotidine    Tablet 20 milliGRAM(s) Oral daily  gabapentin 100 milliGRAM(s) Oral daily  hydrALAZINE 100 milliGRAM(s) Oral every 8 hours  insulin glargine Injectable (LANTUS) 7 Unit(s) SubCutaneous at bedtime  insulin lispro (HumaLOG) corrective regimen sliding scale   SubCutaneous three times a day before meals  insulin lispro (HumaLOG) corrective regimen sliding scale   SubCutaneous at bedtime  isosorbide   mononitrate ER Tablet (IMDUR) 30 milliGRAM(s) Oral daily  levothyroxine 100 MICROGram(s) Oral daily  lidocaine   Patch 1 Patch Transdermal daily  senna 2 Tablet(s) Oral at bedtime  sertraline 50 milliGRAM(s) Oral daily    PRN Inpatient Medications  dextrose 40% Gel 15 Gram(s) Oral once PRN  glucagon  Injectable 1 milliGRAM(s) IntraMuscular once PRN  lactulose Syrup 20 Gram(s) Oral two times a day PRN  ondansetron Injectable 4 milliGRAM(s) IV Push every 6 hours PRN      REVIEW OF SYSTEMS  --------------------------------------------------------------------------------  Gen: +fatigue  Respiratory: No dyspnea  CV: No chest pain  GI: + abdominal pain, + vomiting  MSK: No LE edema  Neuro: No dizziness  Heme: No bleeding  Psych: No confusion    VITALS/PHYSICAL EXAM  --------------------------------------------------------------------------------  T(C): 36.7 (10-22-18 @ 10:08), Max: 36.9 (10-21-18 @ 16:35)  HR: 69 (10-22-18 @ 10:08) (53 - 69)  BP: 192/52 (10-22-18 @ 10:55) (138/81 - 210/77)  RR: 16 (10-22-18 @ 10:08) (16 - 18)  SpO2: 100% (10-22-18 @ 10:08) (100% - 100%)  Wt(kg): --        10-22-18 @ 07:01  -  10-22-18 @ 13:27  --------------------------------------------------------  IN: 400 mL / OUT: 2400 mL / NET: -2000 mL      Physical Exam:  	Gen: + vomiting, elderly female  	HEENT: Supple neck  	Pulm: CTA B/L, nonlabored breathing  	CV: RRR, S1S2; no rub  	Abd: +BS, soft, nontender/nondistended  	: No suprapubic tenderness  	LE: No edema  	Skin: Warm, without rashes  	Vascular access: LUE AVF - with bruit/thrill    LABS/STUDIES  --------------------------------------------------------------------------------              9.8    4.99  >-----------<  131      [10-22-18 @ 05:06]              30.6     135  |  92  |  52  ----------------------------<  85      [10-22-18 @ 05:06]  4.9   |  25  |  3.47        Ca     8.9     [10-22-18 @ 05:06]      iCa    1.11     [10-21 @ 04:40]      Mg     2.4     [10-22-18 @ 05:06]      Phos  5.3     [10-22-18 @ 05:06]    TPro  6.1  /  Alb  3.4  /  TBili  0.5  /  DBili  x   /  AST  23  /  ALT  11  /  AlkPhos  246  [10-22-18 @ 05:06]          HbA1c 6.0      [10-21-18 @ 04:40]  TSH 9.34      [10-19-18 @ 06:18]  Lipid: chol 80, TG 63, HDL 35, LDL 36      [10-19-18 @ 06:18]    HBsAb 10.8      [10-19-18 @ 21:45]  HBsAg NEGATIVE      [10-19-18 @ 21:45]  HCV 0.13, Nonreactive Hepatitis C AB  S/CO Ratio                        Interpretation  < 1.0                                     Non-Reactive  1.0 - 4.9                           Weakly-Reactive  > 5.0                                 Reactive  Non-Reactive: [place] with a non-reactive HCV antibody  result is considered uninfected.  No further action is  needed unless recent infection is suspected.  In these  cases, consider repeat testing later to detect  seroconversion..  Weakly-Reactive: HCV antibody test is abnormal, HCV RNA  Qualitative test will follow.  Reactive: HCV antibody test is abnormal, HCV RNA  Qualitative test will follow.  Note: HCV antibody testing is performed on the [employer]   system.      [10-19-18 @ 21:45]

## 2018-10-22 NOTE — PROGRESS NOTE ADULT - PROBLEM SELECTOR PROBLEM 4
Diabetes
ESRD (end stage renal disease) on dialysis
Chronic kidney disease-mineral and bone disorder
Diabetes
Diabetes
ESRD (end stage renal disease) on dialysis
ESRD (end stage renal disease) on dialysis

## 2018-10-22 NOTE — PROGRESS NOTE ADULT - PROBLEM SELECTOR PLAN 4
RISS
Volume status acceptable  Continue HD T, Th, Sat   House renal following  HD per renal
Cont phosphorus binder
RISS
Volume status acceptable  Continue HD T, Th, Sat   House renal following  HD per renal
low fat low cholesterol low salt, diabetic diet   Insulin sliding scale coverage, HgA1c   Continue Lantus
Volume status acceptable  Continue HD T, Th, Sat   House renal following  HD per renal

## 2018-10-22 NOTE — PROGRESS NOTE ADULT - REASON FOR ADMISSION
Anemia hgb 7.5, missed hemodialysis, abd pain, chest pain, shortness of breath r/o ACS

## 2018-10-22 NOTE — PROGRESS NOTE ADULT - PROBLEM SELECTOR PLAN 5
Continue Lipitor  low fat low cholesterol low salt, diet
BP above goal but has not had HD in 3 days, will trend BP and reassess BP afterward.  DASH low fat low cholesterol low salt, diet  Continue Norvasc, Clonidine, hydralazine, Imdur, Coreg
Continue Lipitor  low fat low cholesterol low salt, diet
Continue Lipitor  low fat low cholesterol low salt, diet

## 2018-10-22 NOTE — PROGRESS NOTE ADULT - SUBJECTIVE AND OBJECTIVE BOX
Patient is a 72y old  Female who presents with a chief complaint of Anemia hgb 7.5, missed hemodialysis, abd pain, chest pain, shortness of breath r/o ACS       SUBJECTIVE / OVERNIGHT EVENTS: c/o abd discomfort and spitting up; constipated, hard BM      MEDICATIONS  (STANDING):  amLODIPine   Tablet 10 milliGRAM(s) Oral daily  atorvastatin 10 milliGRAM(s) Oral at bedtime  calcium acetate 667 milliGRAM(s) Oral daily  carvedilol 25 milliGRAM(s) Oral every 12 hours  clonazePAM Tablet 0.5 milliGRAM(s) Oral at bedtime  cloNIDine 0.3 milliGRAM(s) Oral every 8 hours  dextrose 5%. 1000 milliLiter(s) (50 mL/Hr) IV Continuous <Continuous>  dextrose 50% Injectable 12.5 Gram(s) IV Push once  dextrose 50% Injectable 25 Gram(s) IV Push once  dextrose 50% Injectable 25 Gram(s) IV Push once  docusate sodium 200 milliGRAM(s) Oral daily  famotidine    Tablet 20 milliGRAM(s) Oral daily  gabapentin 100 milliGRAM(s) Oral daily  hydrALAZINE 100 milliGRAM(s) Oral every 8 hours  insulin glargine Injectable (LANTUS) 7 Unit(s) SubCutaneous at bedtime  insulin lispro (HumaLOG) corrective regimen sliding scale   SubCutaneous three times a day before meals  insulin lispro (HumaLOG) corrective regimen sliding scale   SubCutaneous at bedtime  isosorbide   mononitrate ER Tablet (IMDUR) 30 milliGRAM(s) Oral daily  levothyroxine 100 MICROGram(s) Oral daily  lidocaine   Patch 1 Patch Transdermal daily  senna 2 Tablet(s) Oral at bedtime  sertraline 50 milliGRAM(s) Oral daily    MEDICATIONS  (PRN):  dextrose 40% Gel 15 Gram(s) Oral once PRN Blood Glucose LESS THAN 70 milliGRAM(s)/deciliter  glucagon  Injectable 1 milliGRAM(s) IntraMuscular once PRN Glucose LESS THAN 70 milligrams/deciliter  lactulose Syrup 20 Gram(s) Oral two times a day PRN constipation  ondansetron Injectable 4 milliGRAM(s) IV Push every 6 hours PRN Nausea and/or Vomiting      Vital Signs Last 24 Hrs  T(F): 98 (22 Oct 2018 14:14), Max: 98.5 (21 Oct 2018 16:35)  HR: 58 (22 Oct 2018 14:14) (53 - 69)  BP: 175/71 (22 Oct 2018 14:14) (138/81 - 210/77)  RR: 18 (22 Oct 2018 14:14) (16 - 18)  SpO2: 99% (22 Oct 2018 14:14) (99% - 100%)      CAPILLARY BLOOD GLUCOSE  POCT Blood Glucose.: 99 mg/dL (22 Oct 2018 11:38)  POCT Blood Glucose.: 79 mg/dL (22 Oct 2018 08:22)  POCT Blood Glucose.: 94 mg/dL (22 Oct 2018 05:56)  POCT Blood Glucose.: 129 mg/dL (21 Oct 2018 21:46)  POCT Blood Glucose.: 177 mg/dL (21 Oct 2018 16:33)            PHYSICAL EXAM  GENERAL: NAD, well-developed  EYES: EOMI, PERRLA, conjunctiva and sclera clear  CHEST/LUNG: Clear to auscultation bilaterally;  HEART: Regular rate and rhythm;  ABDOMEN: Soft, mildly tender to palp, softly distended; Bowel sounds present  EXTREMITIES:  No clubbing, cyanosis, or edema  PSYCH: AAOx3      LABS:                        9.8    4.99  )-----------( 131      ( 22 Oct 2018 05:06 )             30.6     10-22    135  |  92<L>  |  52<H>  ----------------------------<  85  4.9   |  25  |  3.47<H>    Ca    8.9      22 Oct 2018 05:06  Phos  5.3     10-22  Mg     2.4     10-22    TPro  6.1  /  Alb  3.4  /  TBili  0.5  /  DBili  x   /  AST  23  /  ALT  11  /  AlkPhos  246<H>  10-22

## 2018-10-22 NOTE — PROGRESS NOTE ADULT - PROBLEM SELECTOR PLAN 1
Non cardiac  ASA
Elevated BP--improved with increased clonidine  Continue Norvasc, Clonidine, hydralazine, Imdur, Coreg  HD may help, IV hydralazine if urgency
Chest pain improved. TPN elevated but in setting of ESRD and missed HD sessions. Low suspicion for ACS.   Continue Coreg, Imdur
Non cardiac  ASA
On HD now, will cont TIW- next session planned for Wednesday.
remains elevated but pt uncomfortable  cont clonidine, norvasc, hydralazine, coreg
Elevated BP  Continue Norvasc, Clonidine, hydralazine, Imdur, Coreg; doses all maxed except clonidine, will give 0.3 q8  HD may help, IV hydralazine if urgency

## 2018-10-22 NOTE — CHART NOTE - NSCHARTNOTEFT_GEN_A_CORE
Called by Primary RN stating that pt c/o chest pain.  Pt was seen and examined at the bedside. Sister and  present. Pt found to be laying in bed comfortably in no resp distress, dizziness states that she has chest pain which gets better when she put her arm over her chest. Pt denies pain radiating to her jaw or arm area. Pt denies n/v, states moved her bowels 2-3 times today. Upon assessment Lungs clear to auscultation, S1, S2 present, Abd soft, non-tender with positive BS. Card following. EKG done shows Sinus Cooper with 1st degree HB, no changes. VS as noted, BP remains elevated. Will order Hydralazine 5mg IVP. Will continue to monitor pts clinical status.

## 2018-10-22 NOTE — PROGRESS NOTE ADULT - PROBLEM SELECTOR PROBLEM 2
Anemia due to chronic kidney disease, on chronic dialysis
Abdominal pain
Abdominal pain
Anemia due to chronic kidney disease, on chronic dialysis
ESRD (end stage renal disease) on dialysis
Hypertension, unspecified type
Abdominal pain

## 2018-10-22 NOTE — PROGRESS NOTE ADULT - ASSESSMENT
73 yo W with PMHx of ESRD on HD TIW, non-obstructive CAD, chronic diastolic heart failure, pulmonary hypertension, and HTN, PVD, Anemia, Anxiety, depression, Benign Lung nodule s/p thoraocentesis, hypothyroidism, s/p ILR transferred from [facility] for CP, SOB & Anemia h/h 7.8/25.0
71 yo F with PMHx of ESRD on HD TIW, non-obstructive CAD, diastolic heart failure, pulmonary hypertension, and HTN is being admitted with chest pain. Nephrology consulted for ESRD/HD management.
71 yo W with PMHx of ESRD on HD TIW, non-obstructive CAD, chronic diastolic heart failure, pulmonary hypertension, and HTN, PVD, Anemia, Anxiety, depression, Benign Lung nodule s/p thoraocentesis, hypothyroidism, s/p ILR transferred from [facility] for CP, SOB & Anemia h/h 7.8/25.0
71 yo female well known to me from previous hospitalization admitted with chest pain and sob in setting of anemia
73 yo W with PMHx of ESRD on HD TIW, non-obstructive CAD, diastolic heart failure, pulmonary hypertension, and HTN, PVD, Anemia, Anxiety, depression, Benign Lung nodule s/p thoraocentesis, hypothyroidism, s/p ILR transferred from [facility] for CP, SOB & Anemia h/h 7.8/25.0
73 yo W with PMHx of ESRD on HD TIW, non-obstructive CAD, chronic diastolic heart failure, pulmonary hypertension, and HTN, PVD, Anemia, Anxiety, depression, Benign Lung nodule s/p thoraocentesis, hypothyroidism, s/p ILR transferred from [facility] for CP, SOB & Anemia h/h 7.8/25.0
73 yo female well known to me from previous hospitalization admitted with chest pain and sob in setting of anemia

## 2018-10-23 VITALS
OXYGEN SATURATION: 100 % | DIASTOLIC BLOOD PRESSURE: 54 MMHG | SYSTOLIC BLOOD PRESSURE: 178 MMHG | TEMPERATURE: 98 F | HEART RATE: 52 BPM

## 2018-10-23 LAB
BUN SERPL-MCNC: 28 MG/DL — HIGH (ref 7–23)
CALCIUM SERPL-MCNC: 8.8 MG/DL — SIGNIFICANT CHANGE UP (ref 8.4–10.5)
CHLORIDE SERPL-SCNC: 92 MMOL/L — LOW (ref 98–107)
CO2 SERPL-SCNC: 25 MMOL/L — SIGNIFICANT CHANGE UP (ref 22–31)
CREAT SERPL-MCNC: 2.56 MG/DL — HIGH (ref 0.5–1.3)
GLUCOSE BLDC GLUCOMTR-MCNC: 112 MG/DL — HIGH (ref 70–99)
GLUCOSE BLDC GLUCOMTR-MCNC: 82 MG/DL — SIGNIFICANT CHANGE UP (ref 70–99)
GLUCOSE SERPL-MCNC: 79 MG/DL — SIGNIFICANT CHANGE UP (ref 70–99)
HCT VFR BLD CALC: 30.8 % — LOW (ref 34.5–45)
HGB BLD-MCNC: 10 G/DL — LOW (ref 11.5–15.5)
MAGNESIUM SERPL-MCNC: 2.2 MG/DL — SIGNIFICANT CHANGE UP (ref 1.6–2.6)
MCHC RBC-ENTMCNC: 28.8 PG — SIGNIFICANT CHANGE UP (ref 27–34)
MCHC RBC-ENTMCNC: 32.5 % — SIGNIFICANT CHANGE UP (ref 32–36)
MCV RBC AUTO: 88.8 FL — SIGNIFICANT CHANGE UP (ref 80–100)
NRBC # FLD: 0 — SIGNIFICANT CHANGE UP
PHOSPHATE SERPL-MCNC: 3.6 MG/DL — SIGNIFICANT CHANGE UP (ref 2.5–4.5)
PLATELET # BLD AUTO: 127 K/UL — LOW (ref 150–400)
PMV BLD: 11.2 FL — SIGNIFICANT CHANGE UP (ref 7–13)
POTASSIUM SERPL-MCNC: 4 MMOL/L — SIGNIFICANT CHANGE UP (ref 3.5–5.3)
POTASSIUM SERPL-SCNC: 4 MMOL/L — SIGNIFICANT CHANGE UP (ref 3.5–5.3)
RBC # BLD: 3.47 M/UL — LOW (ref 3.8–5.2)
RBC # FLD: 19.3 % — HIGH (ref 10.3–14.5)
SODIUM SERPL-SCNC: 133 MMOL/L — LOW (ref 135–145)
WBC # BLD: 4.74 K/UL — SIGNIFICANT CHANGE UP (ref 3.8–10.5)
WBC # FLD AUTO: 4.74 K/UL — SIGNIFICANT CHANGE UP (ref 3.8–10.5)

## 2018-10-23 RX ORDER — INSULIN LISPRO 100/ML
1 VIAL (ML) SUBCUTANEOUS
Qty: 0 | Refills: 0 | COMMUNITY
Start: 2018-10-23

## 2018-10-23 RX ORDER — SENNA PLUS 8.6 MG/1
2 TABLET ORAL
Qty: 0 | Refills: 0 | COMMUNITY
Start: 2018-10-23

## 2018-10-23 RX ORDER — CARVEDILOL PHOSPHATE 80 MG/1
1 CAPSULE, EXTENDED RELEASE ORAL
Qty: 0 | Refills: 0 | COMMUNITY
Start: 2018-10-23

## 2018-10-23 RX ORDER — GABAPENTIN 400 MG/1
1 CAPSULE ORAL
Qty: 0 | Refills: 0 | COMMUNITY
Start: 2018-10-23

## 2018-10-23 RX ORDER — INSULIN LISPRO 100/ML
0 VIAL (ML) SUBCUTANEOUS
Qty: 0 | Refills: 0 | COMMUNITY
Start: 2018-10-23

## 2018-10-23 RX ORDER — LEVOTHYROXINE SODIUM 125 MCG
1 TABLET ORAL
Qty: 0 | Refills: 0 | COMMUNITY
Start: 2018-10-23

## 2018-10-23 RX ORDER — LIDOCAINE 4 G/100G
1 CREAM TOPICAL
Qty: 0 | Refills: 0 | COMMUNITY
Start: 2018-10-23

## 2018-10-23 RX ORDER — ATORVASTATIN CALCIUM 80 MG/1
1 TABLET, FILM COATED ORAL
Qty: 0 | Refills: 0 | COMMUNITY
Start: 2018-10-23

## 2018-10-23 RX ORDER — ISOSORBIDE MONONITRATE 60 MG/1
1 TABLET, EXTENDED RELEASE ORAL
Qty: 0 | Refills: 0 | COMMUNITY
Start: 2018-10-23

## 2018-10-23 RX ORDER — INSULIN GLARGINE 100 [IU]/ML
5 INJECTION, SOLUTION SUBCUTANEOUS AT BEDTIME
Qty: 0 | Refills: 0 | Status: DISCONTINUED | OUTPATIENT
Start: 2018-10-23 | End: 2018-10-23

## 2018-10-23 RX ORDER — FAMOTIDINE 10 MG/ML
1 INJECTION INTRAVENOUS
Qty: 0 | Refills: 0 | COMMUNITY
Start: 2018-10-23

## 2018-10-23 RX ORDER — ACETAMINOPHEN 500 MG
650 TABLET ORAL EVERY 6 HOURS
Qty: 0 | Refills: 0 | Status: DISCONTINUED | OUTPATIENT
Start: 2018-10-23 | End: 2018-10-23

## 2018-10-23 RX ORDER — CALCIUM ACETATE 667 MG
667 TABLET ORAL DAILY
Qty: 0 | Refills: 0 | Status: DISCONTINUED | OUTPATIENT
Start: 2018-10-23 | End: 2018-10-23

## 2018-10-23 RX ADMIN — ISOSORBIDE MONONITRATE 30 MILLIGRAM(S): 60 TABLET, EXTENDED RELEASE ORAL at 13:11

## 2018-10-23 RX ADMIN — Medication 650 MILLIGRAM(S): at 10:04

## 2018-10-23 RX ADMIN — LIDOCAINE 1 PATCH: 4 CREAM TOPICAL at 03:01

## 2018-10-23 RX ADMIN — CARVEDILOL PHOSPHATE 25 MILLIGRAM(S): 80 CAPSULE, EXTENDED RELEASE ORAL at 05:36

## 2018-10-23 RX ADMIN — Medication 100 MILLIGRAM(S): at 05:36

## 2018-10-23 RX ADMIN — Medication 667 MILLIGRAM(S): at 13:10

## 2018-10-23 RX ADMIN — Medication 667 MILLIGRAM(S): at 08:09

## 2018-10-23 RX ADMIN — SERTRALINE 50 MILLIGRAM(S): 25 TABLET, FILM COATED ORAL at 13:10

## 2018-10-23 RX ADMIN — Medication 650 MILLIGRAM(S): at 11:04

## 2018-10-23 RX ADMIN — Medication 0.3 MILLIGRAM(S): at 13:11

## 2018-10-23 RX ADMIN — Medication 100 MICROGRAM(S): at 05:35

## 2018-10-23 RX ADMIN — Medication 100 MILLIGRAM(S): at 13:11

## 2018-10-23 RX ADMIN — GABAPENTIN 100 MILLIGRAM(S): 400 CAPSULE ORAL at 13:11

## 2018-10-23 RX ADMIN — AMLODIPINE BESYLATE 10 MILLIGRAM(S): 2.5 TABLET ORAL at 05:36

## 2018-10-23 RX ADMIN — INSULIN GLARGINE 7 UNIT(S): 100 INJECTION, SOLUTION SUBCUTANEOUS at 00:10

## 2018-10-23 RX ADMIN — Medication 0.3 MILLIGRAM(S): at 05:36

## 2018-10-23 RX ADMIN — FAMOTIDINE 20 MILLIGRAM(S): 10 INJECTION INTRAVENOUS at 13:11

## 2018-10-23 RX ADMIN — LIDOCAINE 1 PATCH: 4 CREAM TOPICAL at 13:12

## 2018-10-23 RX ADMIN — Medication 100 MILLIGRAM(S): at 13:12

## 2018-10-23 NOTE — PHYSICAL THERAPY INITIAL EVALUATION ADULT - ADDITIONAL COMMENTS
Pt is from [facility]. Pt's sister that pt was ambulating with assist using rolling walker with bilateral AFO's.  BP supine- 168/51mmHg HR-57bpm  BP sitting- 120-98mmHg HR-58bpm    Pt left comfortable in bed, NAD, all lines intact, all precautions maintained, with call bell in reach, bed alarm on, sister @bedside, and RN aware of PT evaluation/pt's chest pain/ and pt being orthostatic Pt is from [facility]. Pt's sister reports that pt was ambulating with assist using rolling walker with bilateral AFO's.  BP supine- 168/51mmHg HR-57bpm  BP sitting- 120-98mmHg HR-58bpm    Pt left comfortable in bed, NAD, all lines intact, all precautions maintained, with call bell in reach, bed alarm on, sister @bedside, and RN aware of PT evaluation/pt's chest pain/ and pt being orthostatic

## 2018-10-23 NOTE — CHART NOTE - NSCHARTNOTEFT_GEN_A_CORE
pt planned for dc  abd pain improved  mult BM yesterday after lactulose  CHEST: b/l AE  ABD: + BS softer than yesterday, NT  pt stable to return to [facility]  BP overall improved  will req further med adjustements

## 2018-10-23 NOTE — CHART NOTE - NSCHARTNOTEFT_GEN_A_CORE
Called by RN for hypoglycemia - FS 62 - double checked 69.  Pt drank juice and ate some food. Repeat 102 and 139.  Pt in no distress. Will continue to monitor blood glucose.

## 2018-10-23 NOTE — PHYSICAL THERAPY INITIAL EVALUATION ADULT - ACTIVE RANGE OF MOTION EXAMINATION, REHAB EVAL
Right shoulder flexion ~70 degrees, right elbow/hand/wrist WFL, Left shoulder flexion ~60degrees, left elbow/hand/wrist WFL bilateral  lower extremity Active ROM was WFL (within functional limits)/Right shoulder flexion ~70 degrees, right elbow/hand/wrist WFL, Left shoulder flexion ~60degrees, left elbow/hand/wrist WFL

## 2018-10-23 NOTE — PHYSICAL THERAPY INITIAL EVALUATION ADULT - GENERAL OBSERVATIONS, REHAB EVAL
Pt encountered in semisupine position, no distress, AxOx3/4, with +IV, 2.5L of O2 through nasal cannula, +cardiac monitor, and sister @bedside.

## 2018-10-23 NOTE — PHYSICAL THERAPY INITIAL EVALUATION ADULT - DIAGNOSIS, PT EVAL
Pt admitted for chest pain, anemia, and SOB; pt presents with decreased strength, decreased balance, and (+)Orthostatic.

## 2018-10-23 NOTE — PHYSICAL THERAPY INITIAL EVALUATION ADULT - PRECAUTIONS/LIMITATIONS, REHAB EVAL
cardiac precautions/oxygen therapy device and L/min/fall precautions/2.5L of O2 through nasal cannula 2.5L of O2 through nasal cannula; [place]; left arm precautions/oxygen therapy device and L/min/fall precautions/cardiac precautions

## 2018-10-23 NOTE — PHYSICAL THERAPY INITIAL EVALUATION ADULT - PATIENT PROFILE REVIEW, REHAB EVAL
No Formal Activity Order in the computer; spoke with XAVIER Edgar prior to PT evaluation--> Pt OK for PT consult/yes

## 2018-10-23 NOTE — PHYSICAL THERAPY INITIAL EVALUATION ADULT - PLANNED THERAPY INTERVENTIONS, PT EVAL
balance training/postural re-education/transfer training/strengthening/bed mobility training/gait training

## 2018-10-23 NOTE — PHYSICAL THERAPY INITIAL EVALUATION ADULT - MANUAL MUSCLE TESTING RESULTS, REHAB EVAL
Right shoulder 3-/5, Left shoulder 2/5, Bilateral elbow/hand/wrist 3/5, Bilateral LE 3-/5 except right anterior tib 3-/5

## 2018-10-23 NOTE — PHYSICAL THERAPY INITIAL EVALUATION ADULT - PERTINENT HX OF CURRENT PROBLEM, REHAB EVAL
71 yo F with PMHx of ESRD on HD TIW, non-obstructive CAD, diastolic heart failure, pulmonary hypertension, and HTN, PVD, Anemia, Anxiety, depression, Benign Lung nodule s/p thoraocentesis, hypothyroidism, s/p ILR transferred from [facility] for CP, SOB & Anemia h/h 7.8/25.0

## 2018-10-24 LAB
BACTERIA BLD CULT: SIGNIFICANT CHANGE UP
BACTERIA BLD CULT: SIGNIFICANT CHANGE UP

## 2018-10-30 ENCOUNTER — INPATIENT (INPATIENT)
Facility: HOSPITAL | Age: 73
LOS: 20 days | Discharge: HOME | End: 2018-11-20
Attending: HOSPITALIST | Admitting: HOSPITALIST
Payer: MEDICARE

## 2018-10-30 VITALS
HEART RATE: 56 BPM | OXYGEN SATURATION: 100 % | DIASTOLIC BLOOD PRESSURE: 47 MMHG | RESPIRATION RATE: 16 BRPM | SYSTOLIC BLOOD PRESSURE: 172 MMHG | TEMPERATURE: 98 F

## 2018-10-30 LAB
ALBUMIN SERPL ELPH-MCNC: 3.4 G/DL — SIGNIFICANT CHANGE UP (ref 3.3–5)
ALP SERPL-CCNC: 255 U/L — HIGH (ref 40–120)
ALT FLD-CCNC: 9 U/L — SIGNIFICANT CHANGE UP (ref 4–33)
APTT BLD: 31.6 SEC — SIGNIFICANT CHANGE UP (ref 27.5–37.4)
AST SERPL-CCNC: 23 U/L — SIGNIFICANT CHANGE UP (ref 4–32)
BASOPHILS # BLD AUTO: 0.04 K/UL — SIGNIFICANT CHANGE UP (ref 0–0.2)
BASOPHILS NFR BLD AUTO: 0.8 % — SIGNIFICANT CHANGE UP (ref 0–2)
BILIRUB SERPL-MCNC: 0.4 MG/DL — SIGNIFICANT CHANGE UP (ref 0.2–1.2)
BUN SERPL-MCNC: 42 MG/DL — HIGH (ref 7–23)
CALCIUM SERPL-MCNC: 9 MG/DL — SIGNIFICANT CHANGE UP (ref 8.4–10.5)
CHLORIDE SERPL-SCNC: 93 MMOL/L — LOW (ref 98–107)
CK MB BLD-MCNC: 3.45 NG/ML — SIGNIFICANT CHANGE UP (ref 1–4.7)
CK MB BLD-MCNC: 3.53 NG/ML — SIGNIFICANT CHANGE UP (ref 1–4.7)
CK MB BLD-MCNC: SIGNIFICANT CHANGE UP (ref 0–2.5)
CK MB BLD-MCNC: SIGNIFICANT CHANGE UP (ref 0–2.5)
CK SERPL-CCNC: 42 U/L — SIGNIFICANT CHANGE UP (ref 25–170)
CK SERPL-CCNC: 57 U/L — SIGNIFICANT CHANGE UP (ref 25–170)
CO2 SERPL-SCNC: 26 MMOL/L — SIGNIFICANT CHANGE UP (ref 22–31)
CREAT SERPL-MCNC: 3.63 MG/DL — HIGH (ref 0.5–1.3)
EOSINOPHIL # BLD AUTO: 0.22 K/UL — SIGNIFICANT CHANGE UP (ref 0–0.5)
EOSINOPHIL NFR BLD AUTO: 4.6 % — SIGNIFICANT CHANGE UP (ref 0–6)
GLUCOSE SERPL-MCNC: 151 MG/DL — HIGH (ref 70–99)
HCT VFR BLD CALC: 31.8 % — LOW (ref 34.5–45)
HGB BLD-MCNC: 9.9 G/DL — LOW (ref 11.5–15.5)
IMM GRANULOCYTES # BLD AUTO: 0.02 # — SIGNIFICANT CHANGE UP
IMM GRANULOCYTES NFR BLD AUTO: 0.4 % — SIGNIFICANT CHANGE UP (ref 0–1.5)
INR BLD: 1.17 — SIGNIFICANT CHANGE UP (ref 0.88–1.17)
LYMPHOCYTES # BLD AUTO: 0.3 K/UL — LOW (ref 1–3.3)
LYMPHOCYTES # BLD AUTO: 6.3 % — LOW (ref 13–44)
MCHC RBC-ENTMCNC: 28.6 PG — SIGNIFICANT CHANGE UP (ref 27–34)
MCHC RBC-ENTMCNC: 31.1 % — LOW (ref 32–36)
MCV RBC AUTO: 91.9 FL — SIGNIFICANT CHANGE UP (ref 80–100)
MONOCYTES # BLD AUTO: 0.63 K/UL — SIGNIFICANT CHANGE UP (ref 0–0.9)
MONOCYTES NFR BLD AUTO: 13.1 % — SIGNIFICANT CHANGE UP (ref 2–14)
NEUTROPHILS # BLD AUTO: 3.59 K/UL — SIGNIFICANT CHANGE UP (ref 1.8–7.4)
NEUTROPHILS NFR BLD AUTO: 74.8 % — SIGNIFICANT CHANGE UP (ref 43–77)
NRBC # FLD: 0 — SIGNIFICANT CHANGE UP
PLATELET # BLD AUTO: 101 K/UL — LOW (ref 150–400)
PMV BLD: 12.3 FL — SIGNIFICANT CHANGE UP (ref 7–13)
POTASSIUM SERPL-MCNC: 4.7 MMOL/L — SIGNIFICANT CHANGE UP (ref 3.5–5.3)
POTASSIUM SERPL-SCNC: 4.7 MMOL/L — SIGNIFICANT CHANGE UP (ref 3.5–5.3)
PROT SERPL-MCNC: 6.2 G/DL — SIGNIFICANT CHANGE UP (ref 6–8.3)
PROTHROM AB SERPL-ACNC: 13 SEC — SIGNIFICANT CHANGE UP (ref 9.8–13.1)
RBC # BLD: 3.46 M/UL — LOW (ref 3.8–5.2)
RBC # FLD: 17.4 % — HIGH (ref 10.3–14.5)
SODIUM SERPL-SCNC: 134 MMOL/L — LOW (ref 135–145)
TROPONIN T, HIGH SENSITIVITY: 401 NG/L — CRITICAL HIGH (ref ?–14)
TROPONIN T, HIGH SENSITIVITY: 408 NG/L — CRITICAL HIGH (ref ?–14)
WBC # BLD: 4.8 K/UL — SIGNIFICANT CHANGE UP (ref 3.8–10.5)
WBC # FLD AUTO: 4.8 K/UL — SIGNIFICANT CHANGE UP (ref 3.8–10.5)

## 2018-10-30 PROCEDURE — 74177 CT ABD & PELVIS W/CONTRAST: CPT | Mod: 26

## 2018-10-30 PROCEDURE — 71045 X-RAY EXAM CHEST 1 VIEW: CPT | Mod: 26

## 2018-10-30 RX ORDER — HYDRALAZINE HCL 50 MG
10 TABLET ORAL ONCE
Qty: 0 | Refills: 0 | Status: COMPLETED | OUTPATIENT
Start: 2018-10-30 | End: 2018-10-30

## 2018-10-30 RX ORDER — HYDRALAZINE HCL 50 MG
10 TABLET ORAL ONCE
Qty: 0 | Refills: 0 | Status: DISCONTINUED | OUTPATIENT
Start: 2018-10-30 | End: 2018-10-30

## 2018-10-30 RX ORDER — MORPHINE SULFATE 50 MG/1
4 CAPSULE, EXTENDED RELEASE ORAL ONCE
Qty: 0 | Refills: 0 | Status: DISCONTINUED | OUTPATIENT
Start: 2018-10-30 | End: 2018-10-30

## 2018-10-30 RX ADMIN — Medication 0.3 MILLIGRAM(S): at 23:00

## 2018-10-30 RX ADMIN — Medication 10 MILLIGRAM(S): at 22:06

## 2018-10-30 RX ADMIN — MORPHINE SULFATE 4 MILLIGRAM(S): 50 CAPSULE, EXTENDED RELEASE ORAL at 23:00

## 2018-10-30 RX ADMIN — MORPHINE SULFATE 4 MILLIGRAM(S): 50 CAPSULE, EXTENDED RELEASE ORAL at 21:10

## 2018-10-30 NOTE — ED PROVIDER NOTE - ATTENDING CONTRIBUTION TO CARE
Dr. Toussaint: I performed a face to face bedside interview with patient regarding history of present illness, review of symptoms and past medical history. I completed an independent physical exam.  I have discussed patient's plan of care with PA.   I agree with note as stated above, having amended the EMR as needed to reflect my findings.   This includes HISTORY OF PRESENT ILLNESS, HIV, PAST MEDICAL/SURGICAL/FAMILY/SOCIAL HISTORY, ALLERGIES AND HOME MEDICATIONS, REVIEW OF SYSTEMS, PHYSICAL EXAM, and any PROGRESS NOTES during the time I functioned as the attending physician for this patient. HPI above as by me. PE above as by me. DDX (1) Bradycardia new 1st degree AV block PLAN trop, xr, labs, ccu eval (2) Abd distension PLAN ct r/o sbo

## 2018-10-30 NOTE — ED ADULT NURSE NOTE - PMH
Anemia    Anxiety    Cataracts, bilateral  left worse than right  CHF (congestive heart failure)    CHF (congestive heart failure)  diagnosed 2-2014  CKD (chronic kidney disease)    Diabetes  DM 2  Diabetes  diagnosed 2004  no medications in 5 months since hemodialysis  ESRD (end stage renal disease)    Hip pain  left  [place] (hard of hearing)    HTN (hypertension)    HTN (hypertension)    Hyperlipidemia    Hypothyroid    Hypothyroidism    LBP radiating to left leg    MI (myocardial infarction)  may 2016  MI (myocardial infarction)  2-2014   cardiac cath done [facility]  Pleural effusion  Left side - several times since March 2014  Pleural thickening  s/p pleurodisis left vats 2014

## 2018-10-30 NOTE — ED ADULT NURSE NOTE - NSIMPLEMENTINTERV_GEN_ALL_ED
Implemented All Fall with Harm Risk Interventions:  [place] to call system. Call bell, personal items and telephone within reach. Instruct patient to call for assistance. Room bathroom lighting operational. Non-slip footwear when patient is off stretcher. Physically safe environment: no spills, clutter or unnecessary equipment. Stretcher in lowest position, wheels locked, appropriate side rails in place. Provide visual cue, wrist band, yellow gown, etc. Monitor gait and stability. Monitor for mental status changes and reorient to person, place, and time. Review medications for side effects contributing to fall risk. Reinforce activity limits and safety measures with patient and family. Provide visual clues: red socks.

## 2018-10-30 NOTE — ED PROVIDER NOTE - MEDICAL DECISION MAKING DETAILS
(1) Bradycardia without active CP EKG sinus chelsea  PLAN low threshold to give adenosine, trop, xr, (1) Bradycardia new 1st degree AV block PLAN trop, xr, labs, ccu eval   (2) Abd distension PLAN ct r/o sbo

## 2018-10-30 NOTE — ED PROVIDER NOTE - PMH
Anemia    Anxiety    Cataracts, bilateral  left worse than right  CHF (congestive heart failure)    CHF (congestive heart failure)  diagnosed 2-2014  CKD (chronic kidney disease)    Diabetes  DM 2  Diabetes  diagnosed 2004  no medications in 5 months since hemodialysis  ESRD (end stage renal disease)    Hip pain  left  [place] (hard of hearing)    HTN (hypertension)    HTN (hypertension)    Hyperlipidemia    Hypothyroid    Hypothyroidism    LBP radiating to left leg    MI (myocardial infarction)  may 2016  MI (myocardial infarction)  2-2014   cardiac cath done [facility]  Pleural effusion  Left side - several times since March 2014  Pleural thickening  s/p pleurodisis left vats 2014

## 2018-10-30 NOTE — ED ADULT NURSE NOTE - OBJECTIVE STATEMENT
break coverage RN- pt brought in from [facility] (there for frequent falls)  c/o left chest pain since this afternoon- pt has left sided AV fistula- dialysis t/th/sat (did not receive dialysis today)- pt also noted to have distended abdomen which family states is not her normal- pt a/ox2, not to time- skin intact- stage one ulcer to bilateral buttocks- pt is poor historian- pending EDMD evaluation, will continue to monitor, pt on cardiac montior in NAD break coverage RN- pt brought in from [facility] (there for frequent falls) primary language [place]-  to translate.  c/o left chest pain since this afternoon- pt has left sided AV fistula- dialysis t/th/sat (did not receive dialysis today)- pt also noted to have distended abdomen which family states is not her normal- pt a/ox2, not to time, able to follow minimal commands- skin intact- stage one ulcer to bilateral buttocks- pt is poor historian- pending EDMD evaluation, will continue to monitor, pt on cardiac monitor in NAD

## 2018-10-30 NOTE — ED PROVIDER NOTE - OBJECTIVE STATEMENT
16:34 Norbert att: 72F h/o htn, anemia, pulm HTN, non obstructive CAD, diastolic HF, ESRD on HD BIBEMS from [facility] for Cp. Patient arousable to sternal rub, answers question with mutliple redirections, reports chest pain and little else. Per  at bedside since noon today patient notes chest pain, left sided, unclear if constant or intermittent, no sob. Of note patient sinus chelsea 40-62 with no active symptom.  also notes last bm 2 days ago, abdomen distended, and frequent belching. 16:34 Norbert att: 72F h/o htn, anemia, pulm HTN, non obstructive CAD, diastolic HF, ESRD on HD BIBEMS from [facility] for Cp. Patient arousable to sternal rub, answers question with multiple redirections, reports chest pain and little else. Per  at bedside since noon today patient notes chest pain, left sided, unclear if constant or intermittent, no sob. Of note patient sinus chelsea 40-62 with no active symptom.  also notes last bm 2 days ago, abdomen distended, and frequent belching.

## 2018-10-30 NOTE — CONSULT NOTE ADULT - SUBJECTIVE AND OBJECTIVE BOX
Patient is a 72y old  Female who presents with a chief complaint of         HPI:  72 year old  Tongan speaking female with a PMH of L pleural thickening s/p pleurodisis with L VATS in 2014, MI, non-obstructive CAD, ESRD-HD, [place], DM, hypothyroidism, chronic diastolic HF, HTN, hypothyroidism, anemia and multiple fall s/p ILR ([facility]implantation [facility] 18 [facility].  She was found bradycardic with HR down to low 40's when dozing off and up to 50's bpm when aroused.  She has been on high dose of Carvedilol 25 mg BID and Clonidine 0.3 mg TID for HTN.  Patient was recently admitted with CP on 10/19.  ILR interrogation showed SB                   PAST MEDICAL & SURGICAL HISTORY:  [place] (hard of hearing)  Cataracts, bilateral: left worse than right  Pleural thickening: s/p pleurodisis left vats 2014  Hip pain: left  LBP radiating to left leg  CHF (congestive heart failure): diagnosed 2-2014  MI (myocardial infarction): 2-2014   cardiac cath done [facility]  Diabetes: diagnosed 2004  no medications in 5 months since hemodialysis  Hypothyroidism  ESRD (end stage renal disease)  HTN (hypertension)  MI (myocardial infarction): may 2016  Pleural effusion: Left side - several times since March 2014  Hypothyroid  Anxiety  HTN (hypertension)  Hyperlipidemia  CHF (congestive heart failure)  CKD (chronic kidney disease)  Anemia  Diabetes: DM 2  Thoracotomy scar of left chest: 4-2014 ? pneumonia/ scarring  S/P myomectomy: abdominal age 52  AV fistula: left upper 8-2014 attempted 12-16-14      MEDICATIONS  (STANDING):    MEDICATIONS  (PRN):    Allergies    Avelox (Unknown)  fish (Hives; Rash)  shellfish (Unknown)    Intolerances      FAMILY HISTORY:  No significant family history      SOCIAL HISTORY:  Denies smoking; no   Alcohol  or  Drug abuse               REVIEW OF SYSTEMS:    CONSTITUTIONAL: No fever, weight loss, chills, shakes, or fatigue  EYES: No eye pain, visual disturbances, or discharge  ENMT:  No difficulty hearing, tinnitus, vertigo; No sinus or throat pain  NECK: No pain or stiffness  RESPIRATORY: No cough, wheezing, hemoptysis, or shortness of breath  CARDIOVASCULAR: No chest pain, dyspnea, palpitations, dizziness, syncope, paroxysmal nocturnal dyspnea, orthopnea, or arm or leg swelling  GASTROINTESTINAL: No abdominal  or epigastric pain, nausea, vomiting, hematemesis, diarrhea, constipation, melena or bright red blood.  GENITOURINARY: No dysuria, nocturia, hematuria, or urinary incontinence  NEUROLOGICAL: No headaches, memory loss, slurred speech, limb weakness, loss of strength, numbness, or tremors  SKIN: No itching, burning, rashes, or lesions   LYMPH NODES: No enlarged glands  ENDOCRINE: No heat or cold intolerance, or hair loss  MUSCULOSKELETAL: No joint pain or swelling, muscle, back, or extremity pain  PSYCHIATRIC: No depression, anxiety, or difficulty sleeping  HEME/LYMPH: No easy bruising or bleeding gums  ALLERY AND IMMUNOLOGIC: No hives or rash.      Vital Signs Last 24 Hrs  T(C): 36 (30 Oct 2018 16:05), Max: 36.7 (30 Oct 2018 14:34)  T(F): 96.8 (30 Oct 2018 16:05), Max: 98 (30 Oct 2018 14:34)  HR: 48 (30 Oct 2018 18:09) (48 - 56)  BP: 209/57 (30 Oct 2018 18:09) (172/47 - 209/57)  BP(mean): --  RR: 16 (30 Oct 2018 18:09) (15 - 16)  SpO2: 100% (30 Oct 2018 18:09) (100% - 100%)    PHYSICAL EXAM:    GENERAL: In no apparent distress, well nourished, and hydrated.  HEAD:  Atraumatic, Normocephalic  NECK: Supple . No JVD or carotid bruit or thyroidmegaly.  Carotid pulse is 2+ bilaterally.  PULMONARY: Clear to auscultation and perfusion.  No rales, wheezing, or rhonchi bilaterally.  HEART: Regular rate and rhythm; No murmurs, rubs, or gallops.  ABDOMEN: Soft, Nontender, Nondistended; Bowel sounds present  EXTREMITIES: No clubbing, cyanosis, or edema  NEUROLOGICAL: Alert oriented to person, place and time.  Speech clear.  Skin: Dry intact, no rashes or lesions.          INTERPRETATION OF TELEMETRY:  Sinus chelsea 40-50 bpm with 1 st degree AVB with occasional PVC's    ECG: SB 57 with 1 st degree AVB, left axis; QRS 96 ms; anterolateral Q wave        LABS:                        9.9    4.80  )-----------( 101      ( 30 Oct 2018 15:47 )             31.8     10-30    134<L>  |  93<L>  |  42<H>  ----------------------------<  151<H>  4.7   |  26  |  3.63<H>    Ca    9.0      30 Oct 2018 15:47    TPro  6.2  /  Alb  3.4  /  TBili  0.4  /  DBili  x   /  AST  23  /  ALT  9   /  AlkPhos  255<H>  10-30        PT/INR - ( 30 Oct 2018 15:47 )   PT: 13.0 SEC;   INR: 1.17          PTT - ( 30 Oct 2018 15:47 )  PTT:31.6 SEC      BNP  RADIOLOGY & ADDITIONAL STUDIES:  PREVIOUS DIAGNOSTIC TESTING:      ECHO FINDINGS:    STRESS FINDINGS:    CATHETERIZATION FINDINGS: Patient is a 72y old  Female who presents with a chief complaint of         HPI:  72 year old  Macanese speaking female with a PMH of L pleural thickening s/p pleurodisis with L VATS in 2014, MI, non-obstructive CAD, ESRD-HD, [place], DM, hypothyroidism, chronic diastolic HF, HTN, hypothyroidism, anemia and multiple falls ?syncope s/p ILR ([facility]implantation [facility].  She was found bradycardic with HR down to low 40's when dozing off and up to 50's bpm when aroused.  She has been on high dose of Carvedilol 25 mg BID and Clonidine 0.3 mg TID for HTN.  Patient was recently admitted with CP on 10/19.  ILR interrogation showed frequent episodes pauses due to undersensing R wave.  No significant chelsea <40 or pauses>3 seconds seen on recorded strips.   Per patient's spouse at bedside, she denies dizziness, lightheadedness or syncope.         PAST MEDICAL & SURGICAL HISTORY:  [place] (hard of hearing)  Cataracts, bilateral: left worse than right  Pleural thickening: s/p pleurodisis left vats 2014  Hip pain: left  LBP radiating to left leg  CHF (congestive heart failure): diagnosed 2-2014  MI (myocardial infarction): 2-2014   cardiac cath done [facility]  Diabetes: diagnosed 2004  no medications in 5 months since hemodialysis  Hypothyroidism  ESRD (end stage renal disease)  HTN (hypertension)  MI (myocardial infarction): may 2016  Pleural effusion: Left side - several times since March 2014  Hypothyroid  Anxiety  HTN (hypertension)  Hyperlipidemia  CHF (congestive heart failure)  CKD (chronic kidney disease)  Anemia  Diabetes: DM 2  Thoracotomy scar of left chest: 4-2014 ? pneumonia/ scarring  S/P myomectomy: abdominal age 52  AV fistula: left upper 8-2014 attempted 12-16-14      MEDICATIONS  (STANDING):    MEDICATIONS  (PRN):    Allergies    Avelox (Unknown)  fish (Hives; Rash)  shellfish (Unknown)    Intolerances      FAMILY HISTORY:  No significant family history      SOCIAL HISTORY:  Denies smoking; no   Alcohol  or  Drug abuse       REVIEW OF SYSTEMS:    CONSTITUTIONAL: No fever, weight loss, chills, shakes, or fatigue   EYES: No eye pain, visual disturbances, or discharge  ENMT:  No difficulty hearing, tinnitus, vertigo; No sinus or throat pain  NECK: No pain or stiffness  RESPIRATORY: No cough, wheezing, hemoptysis, or shortness of breath  CARDIOVASCULAR: No  dyspnea, palpitations, dizziness, syncope, paroxysmal nocturnal dyspnea, orthopnea, or arm or leg swelling chest pain,  GASTROINTESTINAL: No nausea, vomiting, hematemesis, diarrhea,  melena or bright red blood.  +abdominal pain, constipation,  GENITOURINARY: No dysuria, nocturia, hematuria, or urinary incontinence  NEUROLOGICAL: No headaches, memory loss, slurred speech, limb weakness, loss of strength, numbness, or tremors  +lethargic /drowsy   SKIN: No itching, burning, rashes, or lesions   LYMPH NODES: No enlarged glands  ENDOCRINE: No heat or cold intolerance, or hair loss  MUSCULOSKELETAL: No joint pain or swelling, muscle, back, or extremity pain  PSYCHIATRIC: No depression, anxiety, or difficulty sleeping  HEME/LYMPH: No easy bruising or bleeding gums  ALLERY AND IMMUNOLOGIC: No hives or rash.      Vital Signs Last 24 Hrs  T(C): 36 (30 Oct 2018 16:05), Max: 36.7 (30 Oct 2018 14:34)  T(F): 96.8 (30 Oct 2018 16:05), Max: 98 (30 Oct 2018 14:34)  HR: 48 (30 Oct 2018 18:09) (48 - 56)  BP: 209/57 (30 Oct 2018 18:09) (172/47 - 209/57)  BP(mean): --  RR: 16 (30 Oct 2018 18:09) (15 - 16)  SpO2: 100% (30 Oct 2018 18:09) (100% - 100%)    PHYSICAL EXAM:    GENERAL: In no apparent distress, well nourished, and hydrated. +very [place]  HEAD:  Atraumatic, Normocephalic    NECK: Supple . No JVD or carotid bruit or thyroidmegaly.  Carotid pulse is 2+ bilaterally.  PULMONARY: diminished BS at L lower base No rales, wheezing, or rhonchi bilaterally.  HEART: Regular rate and rhythm chelsea; No murmurs, rubs, or gallops. L AV fistula  ABDOMEN: Soft, Nontender, Nondistended; Bowel sounds present  EXTREMITIES: No clubbing, cyanosis, or edema  NEUROLOGICAL: Alert oriented to person, place and time.  Speech clear. + drowsy but easily arousable  Skin: Dry intact, no rashes or lesions. +faint L facial bruising           INTERPRETATION OF TELEMETRY:  Sinus chelsea 40-50 bpm with 1 st degree AVB with occasional PVC's    ECG: SB 57 with 1 st degree AVB, NC interval 254ms;  left axis;  ms; incomplete RBBB; anterolateral Q wave        LABS:                        9.9    4.80  )-----------( 101      ( 30 Oct 2018 15:47 )             31.8     10-30    134<L>  |  93<L>  |  42<H>  ----------------------------<  151<H>  4.7   |  26  |  3.63<H>    Ca    9.0      30 Oct 2018 15:47    TPro  6.2  /  Alb  3.4  /  TBili  0.4  /  DBili  x   /  AST  23  /  ALT  9   /  AlkPhos  255<H>  10-30        PT/INR - ( 30 Oct 2018 15:47 )   PT: 13.0 SEC;   INR: 1.17          PTT - ( 30 Oct 2018 15:47 )  PTT:31.6 SEC      BNP  RADIOLOGY & ADDITIONAL STUDIES:  PREVIOUS DIAGNOSTIC TESTING:      ECHO FINDINGS:  Conclusions:  1. Moderate concentric left ventricular hypertrophy.  2. Normal left ventricular systolic function. Septal motion  consistent with right ventricular overload.  3. Severe diastolic dysfunction (Stage III).  4. Right atrial enlargement.  5. Right ventricular enlargement with normal right  ventricular systolic function.  6. Estimated pulmonary artery systolic pressure equals 66  mm Hg, assuming right atrial pressure equals 8 mm Hg,  consistent with severe pulmonary pressures.  *** Compared with echocardiogram of 6/15/2018, no  significant changes noted.  ------------------------------------------------------------------------  Confirmed on  7/23/2018 - 13:55:42 by Ezra Nicholas M.D.  ------------------------------------------------------------------------    STRESS FINDINGS:    CATHETERIZATION FINDINGS:  CORONARY VESSELS: The coronary circulation is right dominant.  LM:   --  LM: Normal.  LAD:   --  LAD: Angiography showed minor luminal irregularities with no  flow limiting lesions.  --  Proximal LAD: There was a tubular 20 % stenosis inthe proximal third  of the vessel segment.  CX:   --  Circumflex: Angiography showed minor luminal irregularities with  no flow limiting lesions.  --  Proximal circumflex: There was a discrete 20 % stenosis.  RCA:   --  Mid RCA: Angiography showed minor luminal irregularities with no  flow limiting lesions.  COMPLICATIONS: There were no complications.  DIAGNOSTIC RECOMMENDATIONS: Medical management is recommended. At this  time, patient is considered an acceptable risk from a cardiac standpoint  for renal transplant.

## 2018-10-30 NOTE — ED ADULT NURSE REASSESSMENT NOTE - NS ED NURSE REASSESS COMMENT FT1
received pt from previous shift RN, pt responsive to tactile and loud verbal stimuli.  Appears unwell, in pain.  On cardiac monitor, sinus bradycardia noted to be hypertensive.  MD Toussaint aware of pt status at this time,  as per MD , pt is at baseline hr and mental status as per initial assessment when receiving pt.  pt remains on zoll, given pain med as per order.  Will continue to monitor/assess

## 2018-10-30 NOTE — ED ADULT TRIAGE NOTE - CHIEF COMPLAINT QUOTE
Hx of anxiety, HTN, CHF, DM2 ESRD on HD, CAD, MI, multiple falls. Pt recently evaluated in ED for chest pain and SOB, sent from [facility] for chest pain denies SOB, dizziness, n/v/d. Appears in NAD. Resp even and unlabored.   162 asa given.

## 2018-10-31 DIAGNOSIS — R74.8 ABNORMAL LEVELS OF OTHER SERUM ENZYMES: ICD-10-CM

## 2018-10-31 DIAGNOSIS — R07.9 CHEST PAIN, UNSPECIFIED: ICD-10-CM

## 2018-10-31 DIAGNOSIS — R00.1 BRADYCARDIA, UNSPECIFIED: ICD-10-CM

## 2018-10-31 DIAGNOSIS — I16.1 HYPERTENSIVE EMERGENCY: ICD-10-CM

## 2018-10-31 DIAGNOSIS — T68.XXXA HYPOTHERMIA, INITIAL ENCOUNTER: ICD-10-CM

## 2018-10-31 DIAGNOSIS — I16.0 HYPERTENSIVE URGENCY: ICD-10-CM

## 2018-10-31 DIAGNOSIS — I48.91 UNSPECIFIED ATRIAL FIBRILLATION: ICD-10-CM

## 2018-10-31 DIAGNOSIS — Z29.9 ENCOUNTER FOR PROPHYLACTIC MEASURES, UNSPECIFIED: ICD-10-CM

## 2018-10-31 DIAGNOSIS — E03.9 HYPOTHYROIDISM, UNSPECIFIED: ICD-10-CM

## 2018-10-31 DIAGNOSIS — R14.0 ABDOMINAL DISTENSION (GASEOUS): ICD-10-CM

## 2018-10-31 DIAGNOSIS — I10 ESSENTIAL (PRIMARY) HYPERTENSION: ICD-10-CM

## 2018-10-31 DIAGNOSIS — N18.6 END STAGE RENAL DISEASE: ICD-10-CM

## 2018-10-31 DIAGNOSIS — R19.5 OTHER FECAL ABNORMALITIES: ICD-10-CM

## 2018-10-31 DIAGNOSIS — E87.70 FLUID OVERLOAD, UNSPECIFIED: ICD-10-CM

## 2018-10-31 LAB
BASOPHILS # BLD AUTO: 0.05 K/UL — SIGNIFICANT CHANGE UP (ref 0–0.2)
BASOPHILS NFR BLD AUTO: 1.2 % — SIGNIFICANT CHANGE UP (ref 0–2)
BUN SERPL-MCNC: 46 MG/DL — HIGH (ref 7–23)
CALCIUM SERPL-MCNC: 9 MG/DL — SIGNIFICANT CHANGE UP (ref 8.4–10.5)
CHLORIDE SERPL-SCNC: 90 MMOL/L — LOW (ref 98–107)
CHOLEST SERPL-MCNC: 92 MG/DL — LOW (ref 120–199)
CK MB BLD-MCNC: 4.86 NG/ML — HIGH (ref 1–4.7)
CK MB BLD-MCNC: SIGNIFICANT CHANGE UP (ref 0–2.5)
CK SERPL-CCNC: 43 U/L — SIGNIFICANT CHANGE UP (ref 25–170)
CO2 SERPL-SCNC: 23 MMOL/L — SIGNIFICANT CHANGE UP (ref 22–31)
CREAT SERPL-MCNC: 3.85 MG/DL — HIGH (ref 0.5–1.3)
EOSINOPHIL # BLD AUTO: 0.32 K/UL — SIGNIFICANT CHANGE UP (ref 0–0.5)
EOSINOPHIL NFR BLD AUTO: 7.7 % — HIGH (ref 0–6)
GLUCOSE SERPL-MCNC: 116 MG/DL — HIGH (ref 70–99)
HBA1C BLD-MCNC: 5.5 % — SIGNIFICANT CHANGE UP (ref 4–5.6)
HCT VFR BLD CALC: 34.9 % — SIGNIFICANT CHANGE UP (ref 34.5–45)
HDLC SERPL-MCNC: 35 MG/DL — LOW (ref 45–65)
HGB BLD-MCNC: 11 G/DL — LOW (ref 11.5–15.5)
IMM GRANULOCYTES # BLD AUTO: 0.01 # — SIGNIFICANT CHANGE UP
IMM GRANULOCYTES NFR BLD AUTO: 0.2 % — SIGNIFICANT CHANGE UP (ref 0–1.5)
LIPID PNL WITH DIRECT LDL SERPL: 39 MG/DL — SIGNIFICANT CHANGE UP
LYMPHOCYTES # BLD AUTO: 0.23 K/UL — LOW (ref 1–3.3)
LYMPHOCYTES # BLD AUTO: 5.6 % — LOW (ref 13–44)
MAGNESIUM SERPL-MCNC: 2.4 MG/DL — SIGNIFICANT CHANGE UP (ref 1.6–2.6)
MCHC RBC-ENTMCNC: 28.3 PG — SIGNIFICANT CHANGE UP (ref 27–34)
MCHC RBC-ENTMCNC: 31.5 % — LOW (ref 32–36)
MCV RBC AUTO: 89.7 FL — SIGNIFICANT CHANGE UP (ref 80–100)
MONOCYTES # BLD AUTO: 0.54 K/UL — SIGNIFICANT CHANGE UP (ref 0–0.9)
MONOCYTES NFR BLD AUTO: 13.1 % — SIGNIFICANT CHANGE UP (ref 2–14)
NEUTROPHILS # BLD AUTO: 2.98 K/UL — SIGNIFICANT CHANGE UP (ref 1.8–7.4)
NEUTROPHILS NFR BLD AUTO: 72.2 % — SIGNIFICANT CHANGE UP (ref 43–77)
NRBC # FLD: 0 — SIGNIFICANT CHANGE UP
OB PNL STL: POSITIVE — SIGNIFICANT CHANGE UP
PHOSPHATE SERPL-MCNC: 4.2 MG/DL — SIGNIFICANT CHANGE UP (ref 2.5–4.5)
PLATELET # BLD AUTO: 117 K/UL — LOW (ref 150–400)
PMV BLD: 12.4 FL — SIGNIFICANT CHANGE UP (ref 7–13)
POTASSIUM SERPL-MCNC: 4.8 MMOL/L — SIGNIFICANT CHANGE UP (ref 3.5–5.3)
POTASSIUM SERPL-SCNC: 4.8 MMOL/L — SIGNIFICANT CHANGE UP (ref 3.5–5.3)
RBC # BLD: 3.89 M/UL — SIGNIFICANT CHANGE UP (ref 3.8–5.2)
RBC # FLD: 17.4 % — HIGH (ref 10.3–14.5)
SODIUM SERPL-SCNC: 132 MMOL/L — LOW (ref 135–145)
T4 FREE SERPL-MCNC: 0.91 NG/DL — SIGNIFICANT CHANGE UP (ref 0.9–1.8)
TRIGL SERPL-MCNC: 111 MG/DL — SIGNIFICANT CHANGE UP (ref 10–149)
TROPONIN T, HIGH SENSITIVITY: 402 NG/L — CRITICAL HIGH (ref ?–14)
TSH SERPL-MCNC: 15.67 UIU/ML — HIGH (ref 0.27–4.2)
WBC # BLD: 4.13 K/UL — SIGNIFICANT CHANGE UP (ref 3.8–10.5)
WBC # FLD AUTO: 4.13 K/UL — SIGNIFICANT CHANGE UP (ref 3.8–10.5)

## 2018-10-31 PROCEDURE — 12345: CPT | Mod: NC

## 2018-10-31 PROCEDURE — 99223 1ST HOSP IP/OBS HIGH 75: CPT | Mod: GC

## 2018-10-31 PROCEDURE — 99223 1ST HOSP IP/OBS HIGH 75: CPT

## 2018-10-31 RX ORDER — ACETAMINOPHEN 500 MG
650 TABLET ORAL EVERY 6 HOURS
Qty: 0 | Refills: 0 | Status: DISCONTINUED | OUTPATIENT
Start: 2018-10-31 | End: 2018-11-20

## 2018-10-31 RX ORDER — ISOSORBIDE MONONITRATE 60 MG/1
30 TABLET, EXTENDED RELEASE ORAL DAILY
Qty: 0 | Refills: 0 | Status: DISCONTINUED | OUTPATIENT
Start: 2018-10-31 | End: 2018-11-02

## 2018-10-31 RX ORDER — SODIUM CHLORIDE 9 MG/ML
3 INJECTION INTRAMUSCULAR; INTRAVENOUS; SUBCUTANEOUS EVERY 8 HOURS
Qty: 0 | Refills: 0 | Status: DISCONTINUED | OUTPATIENT
Start: 2018-10-31 | End: 2018-11-20

## 2018-10-31 RX ORDER — LEVOTHYROXINE SODIUM 125 MCG
125 TABLET ORAL DAILY
Qty: 0 | Refills: 0 | Status: DISCONTINUED | OUTPATIENT
Start: 2018-11-01 | End: 2018-11-20

## 2018-10-31 RX ORDER — CLONAZEPAM 1 MG
0.5 TABLET ORAL AT BEDTIME
Qty: 0 | Refills: 0 | Status: DISCONTINUED | OUTPATIENT
Start: 2018-10-31 | End: 2018-11-01

## 2018-10-31 RX ORDER — SENNA PLUS 8.6 MG/1
2 TABLET ORAL AT BEDTIME
Qty: 0 | Refills: 0 | Status: DISCONTINUED | OUTPATIENT
Start: 2018-10-31 | End: 2018-11-20

## 2018-10-31 RX ORDER — DOCUSATE SODIUM 100 MG
100 CAPSULE ORAL
Qty: 0 | Refills: 0 | Status: DISCONTINUED | OUTPATIENT
Start: 2018-10-31 | End: 2018-11-03

## 2018-10-31 RX ORDER — AMLODIPINE BESYLATE 2.5 MG/1
10 TABLET ORAL DAILY
Qty: 0 | Refills: 0 | Status: DISCONTINUED | OUTPATIENT
Start: 2018-10-31 | End: 2018-11-20

## 2018-10-31 RX ORDER — HYDRALAZINE HCL 50 MG
100 TABLET ORAL EVERY 8 HOURS
Qty: 0 | Refills: 0 | Status: DISCONTINUED | OUTPATIENT
Start: 2018-10-31 | End: 2018-11-20

## 2018-10-31 RX ORDER — LEVOTHYROXINE SODIUM 125 MCG
100 TABLET ORAL DAILY
Qty: 0 | Refills: 0 | Status: DISCONTINUED | OUTPATIENT
Start: 2018-10-31 | End: 2018-10-31

## 2018-10-31 RX ORDER — CALCIUM ACETATE 667 MG
667 TABLET ORAL
Qty: 0 | Refills: 0 | Status: DISCONTINUED | OUTPATIENT
Start: 2018-10-31 | End: 2018-11-19

## 2018-10-31 RX ORDER — SERTRALINE 25 MG/1
50 TABLET, FILM COATED ORAL DAILY
Qty: 0 | Refills: 0 | Status: DISCONTINUED | OUTPATIENT
Start: 2018-10-31 | End: 2018-11-20

## 2018-10-31 RX ORDER — ATORVASTATIN CALCIUM 80 MG/1
10 TABLET, FILM COATED ORAL AT BEDTIME
Qty: 0 | Refills: 0 | Status: DISCONTINUED | OUTPATIENT
Start: 2018-10-31 | End: 2018-11-20

## 2018-10-31 RX ORDER — HYDRALAZINE HCL 50 MG
10 TABLET ORAL ONCE
Qty: 0 | Refills: 0 | Status: COMPLETED | OUTPATIENT
Start: 2018-10-31 | End: 2018-10-31

## 2018-10-31 RX ORDER — CARVEDILOL PHOSPHATE 80 MG/1
25 CAPSULE, EXTENDED RELEASE ORAL EVERY 12 HOURS
Qty: 0 | Refills: 0 | Status: DISCONTINUED | OUTPATIENT
Start: 2018-10-31 | End: 2018-11-01

## 2018-10-31 RX ADMIN — SENNA PLUS 2 TABLET(S): 8.6 TABLET ORAL at 21:56

## 2018-10-31 RX ADMIN — ATORVASTATIN CALCIUM 10 MILLIGRAM(S): 80 TABLET, FILM COATED ORAL at 21:56

## 2018-10-31 RX ADMIN — CARVEDILOL PHOSPHATE 25 MILLIGRAM(S): 80 CAPSULE, EXTENDED RELEASE ORAL at 18:00

## 2018-10-31 RX ADMIN — SERTRALINE 50 MILLIGRAM(S): 25 TABLET, FILM COATED ORAL at 12:01

## 2018-10-31 RX ADMIN — SODIUM CHLORIDE 3 MILLILITER(S): 9 INJECTION INTRAMUSCULAR; INTRAVENOUS; SUBCUTANEOUS at 21:49

## 2018-10-31 RX ADMIN — Medication 650 MILLIGRAM(S): at 21:40

## 2018-10-31 RX ADMIN — Medication 0.3 MILLIGRAM(S): at 17:01

## 2018-10-31 RX ADMIN — SODIUM CHLORIDE 3 MILLILITER(S): 9 INJECTION INTRAMUSCULAR; INTRAVENOUS; SUBCUTANEOUS at 15:17

## 2018-10-31 RX ADMIN — Medication 667 MILLIGRAM(S): at 12:01

## 2018-10-31 RX ADMIN — SODIUM CHLORIDE 3 MILLILITER(S): 9 INJECTION INTRAMUSCULAR; INTRAVENOUS; SUBCUTANEOUS at 07:19

## 2018-10-31 RX ADMIN — ISOSORBIDE MONONITRATE 30 MILLIGRAM(S): 60 TABLET, EXTENDED RELEASE ORAL at 12:01

## 2018-10-31 RX ADMIN — Medication 0.3 MILLIGRAM(S): at 08:20

## 2018-10-31 RX ADMIN — AMLODIPINE BESYLATE 10 MILLIGRAM(S): 2.5 TABLET ORAL at 12:42

## 2018-10-31 RX ADMIN — Medication 10 MILLIGRAM(S): at 01:55

## 2018-10-31 RX ADMIN — Medication 100 MILLIGRAM(S): at 18:00

## 2018-10-31 RX ADMIN — Medication 100 MILLIGRAM(S): at 14:11

## 2018-10-31 RX ADMIN — Medication 650 MILLIGRAM(S): at 20:40

## 2018-10-31 NOTE — PATIENT PROFILE ADULT - DO YOU FEEL LIKE HURTING OTHERS?
Past Medical History:   Diagnosis Date   • Anal and rectal polyp 6/16/03    Colonoscopy (Dr. Garcias) Tubular adenoma and hyperplastic polyp   • Anxiety state, unspecified     panic attacks   • Benign neoplasm of colon 5/8/08    cecum-tubular adenoma   • BPH with urinary obstruction 03/27/2018    Marked BPH by [facility]   • Diverticulosis of colon (without mention of hemorrhage) 5/8/08    sigmoid colon - scattered   • Esophageal reflux    • Mixed hyperlipidemia    • Unspecified hemorrhoids without mention of complication 5/8/08    mild internal   • Urine retention 03/27/2018    Knight placed: 1800 cc drained      Past Surgical History:   Procedure Laterality Date   • Aneurysm surg/carotid circ  3/2012    Anerurysm Repair, Abdominal per pt-stent   • Appendectomy  1975   • Colonoscopy  5/2013    1 polyp removed per pt    • Colonoscopy diagnostic  6/16/03    Colonoscopy (Dr. Garcias) adenoma polyp - due 6/2008   • Colonoscopy w biopsy  5/8/08 recall due 5/2013    Dr. Garcias   • Esophagogastroduodenoscopy transoral flex w/bx single or mult  10/94    Dr. Arreola, GERD,  Candida esophagitis   • Insert temp bladder cath simp  03/27/2018    Retention: 1800 cc clear urine   • Laparoscopy,rp ini ing hernia Right 01/2000     R Hernia repair, inguinal by laparoscope, Dr. Paul   • Repair ing hernia,7+y/o,reducibl Left 1960    Hernia, L Inguinal   • Spine surgery procedure unlisted  1993    laminectomy, Dr. Peña   • Transurethral elec-surg prostatectom  05/31/2018    Gyrus bipolar TURP, 25 gm BPH   • Vasectomy  1976      Mike Reaves is a 75 year old male post 5/31/2018 bipolar TURP of 25 gm of BPH & retention of 1800 cc. He reports marked improvement in voiding characteristics with mild daytime frequency, nocturia 3 times a night, and good force of stream with good control. He continues to have grossly bloody urine (today's urine is dark red and he acknowledges this is much darker than usual). He denies dysuria, fevers,  chills.    The PVR today is 313 cc which was very close to the volume identified at fill and pull of 6/4 @ 273 cc but worse than the following day on 6/5 @ 127 cc.    PE:  Blood pressure 108/52, pulse 68, weight 71.2 kg.   No bladder distention  Urine is grossly [place] without clots     IMP:  Gross hematuria 1 month after TURP for BPH with presenting retention of 1800 cc. He is voiding better but still significant PVR.    Plan:  Urine sent for culture to be sure not infected  A hematocrit and hemoglobin requested to be sure not developing significant anemia.  Continue reduced activities and avoid sexual activity until urine is clear for 1 week (advised possibility for retrograde ejaculation)  See in 4 weeks (if significant hematuria continues or marked anemia, cystoscopy and fulguration may be appropriate)   no

## 2018-10-31 NOTE — H&P ADULT - ASSESSMENT
72F h/o htn, anemia, pulm HTN, non obstructive CAD, depression, diastolic HF, ESRD on HD[MWF] BIBEMS from [facility] for CP

## 2018-10-31 NOTE — H&P ADULT - NSHPLABSRESULTS_GEN_ALL_CORE
EKG: sinus bradycardia with 1st deg AV block. TWI in I, AVR                          9.9    4.80  )-----------( 101      ( 30 Oct 2018 15:47 )             31.8     10-30    134<L>  |  93<L>  |  42<H>  ----------------------------<  151<H>  4.7   |  26  |  3.63<H>    Ca    9.0      30 Oct 2018 15:47    TPro  6.2  /  Alb  3.4  /  TBili  0.4  /  DBili  x   /  AST  23  /  ALT  9   /  AlkPhos  255<H>  10-30    Troponin T, High Sensitivity: 401: Delta: 408 on 10/30/  RESULT CALLED TO: N/A  DATE / TIME CALLED: 10/30/18 1817  CALLED BY: ADAM GOMEZ  Delta: 408 on 10/30/  ---------------------***PLEASE NOTE***----------------------  Rapid changes upward or downward in high-sensitivity  troponin levels strongly suggest acute myocardial injury.  Hemolysis may falsely lower results. Renal impairment may  increase results.    Normal: <6 - 14 ng/L  Indeterminate: 15 - 51 ng/L  Elevated: >51 ng/L    Please see "[email or web address] on the [employer]  intranet for more information. ng/L (10.30.18 @ 16:50) EKG personally reviewed: sinus bradycardia with 1st deg AV block. TWI in I, AVR                          9.9    4.80  )-----------( 101      ( 30 Oct 2018 15:47 )             31.8     10-30    134<L>  |  93<L>  |  42<H>  ----------------------------<  151<H>  4.7   |  26  |  3.63<H>    Ca    9.0      30 Oct 2018 15:47    TPro  6.2  /  Alb  3.4  /  TBili  0.4  /  DBili  x   /  AST  23  /  ALT  9   /  AlkPhos  255<H>  10-30    Troponin T, High Sensitivity: 401: Delta: 408 on 10/30/  RESULT CALLED TO: N/A  DATE / TIME CALLED: 10/30/18 1817  CALLED BY: ADAM GOMEZ  Delta: 408 on 10/30/  ---------------------***PLEASE NOTE***----------------------  Rapid changes upward or downward in high-sensitivity  troponin levels strongly suggest acute myocardial injury.  Hemolysis may falsely lower results. Renal impairment may  increase results.    Normal: <6 - 14 ng/L  Indeterminate: 15 - 51 ng/L  Elevated: >51 ng/L    Please see "[email or web address] on the [address]  intranet for more information. ng/L (10.30.18 @ 16:50)

## 2018-10-31 NOTE — H&P ADULT - RS GEN PE MLT RESP DETAILS PC
good air movement/airway patent/breath sounds equal airway patent/good air movement/respirations non-labored/breath sounds equal

## 2018-10-31 NOTE — PROGRESS NOTE ADULT - PROBLEM SELECTOR PLAN 1
patient hypothermia to 94 rectally, bradycardic, elevated TSH with nml FT4  Bcx sent to check for infectious source  CT A/P unremarkable, CXR with mild pulm edema but no evidence of infection  Synthroid increased to 125mcg to be given at 6am on empty stomach for better absorption. Will consider Endocrine consult if continued hypothermia

## 2018-10-31 NOTE — H&P ADULT - PMH
Anemia    Anxiety    Cataracts, bilateral  left worse than right  CHF (congestive heart failure)    CHF (congestive heart failure)  diagnosed 2-2014  CKD (chronic kidney disease)    Diabetes  DM 2  Diabetes  diagnosed 2004  no medications in 5 months since hemodialysis  ESRD (end stage renal disease)    Hip pain  left  [place] (hard of hearing)    HTN (hypertension)    HTN (hypertension)    Hyperlipidemia    Hypothyroid    Hypothyroidism    LBP radiating to left leg    MI (myocardial infarction)  may 2016  MI (myocardial infarction)  2-2014   cardiac cath done [facility]  Pleural effusion  Left side - several times since March 2014  Pleural thickening  s/p pleurodisis left vats 2014

## 2018-10-31 NOTE — H&P ADULT - PROBLEM SELECTOR PLAN 3
No acute findings of CT abdomen restart home meds  renal in AM for possible HD  CTH ordered --> given patient ?AMS v ?dementia restart home meds  renal in AM for possible HD

## 2018-10-31 NOTE — H&P ADULT - PROBLEM SELECTOR PLAN 4
cont levothyroxine EP consult appreciated   Per EP: May continue current meds; if symptomatic chelsea or advanced AV block occurs, then would reduce Carvediolol dosage    -No acute pacing indication at present time on coreg  [id number] score of 6   NO AC given hx of multiple falls.

## 2018-10-31 NOTE — PROGRESS NOTE ADULT - SUBJECTIVE AND OBJECTIVE BOX
Patient is a 72y old  Female who presents with a chief complaint of chest pain (31 Oct 2018 15:30)      SUBJECTIVE / OVERNIGHT EVENTS:  ID: [id number]. Sinus chelsea to 40-50s on tele with no significant pauses. Reports left sided chest pressure nonradiating, no SOB, no dizziness, no vomiting and reports constipation for 3 days. Hypothermic to 94.9 rectally in ED. Sister Catrina at bedside    MEDICATIONS  (STANDING):  amLODIPine   Tablet 10 milliGRAM(s) Oral daily  atorvastatin 10 milliGRAM(s) Oral at bedtime  calcium acetate 667 milliGRAM(s) Oral with lunch  carvedilol 25 milliGRAM(s) Oral every 12 hours  cloNIDine 0.3 milliGRAM(s) Oral every 8 hours  docusate sodium 100 milliGRAM(s) Oral two times a day  hydrALAZINE 100 milliGRAM(s) Oral every 8 hours  isosorbide   mononitrate ER Tablet (IMDUR) 30 milliGRAM(s) Oral daily  sertraline 50 milliGRAM(s) Oral daily  sodium chloride 0.9% lock flush 3 milliLiter(s) IV Push every 8 hours    MEDICATIONS  (PRN):  acetaminophen   Tablet .. 650 milliGRAM(s) Oral every 6 hours PRN Mild Pain (1 - 3), Moderate Pain (4 - 6), Severe Pain (7 - 10)  clonazePAM Tablet 0.5 milliGRAM(s) Oral at bedtime PRN Anxiety      T(C): 36.1 (10-31-18 @ 14:36), Max: 36.1 (10-31-18 @ 14:36)  HR: 55 (10-31-18 @ 14:11) (45 - 55)  BP: 193/57 (10-31-18 @ 14:11) (173/50 - 226/59)  RR: 18 (10-31-18 @ 14:11) (16 - 18)  SpO2: 100% (10-31-18 @ 14:11) (100% - 100%)  CAPILLARY BLOOD GLUCOSE      POCT Blood Glucose.: 108 mg/dL (31 Oct 2018 09:11)  POCT Blood Glucose.: 163 mg/dL (30 Oct 2018 16:31)    I&O's Summary      PHYSICAL EXAM:  GENERAL: no apparent distress, on room air with hypothermia blanket on  EYES: sclera clear b/l  NECK: no palpable nodules but enlarged thyroid  CHEST/LUNG: Clear to auscultation bilaterally; No wheezing or crackles  HEART: s1/s2, no murmurs appreciated, RRR, bradycardic  ABDOMEN: Soft, mildly tender diffuse abdomen without guarding or rigidity, Nondistended; Bowel sounds present  EXTREMITIES:  2+ Peripheral Pulses, No clubbing, cyanosis, or edema  NEUROLOGY: awake, alert, responds to Qs appropriately, hard of hearing, follows commands  PSYCH: AAOx3  VASCULAR: left arm AVF with thrill and bruit    LABS:                        11.0   4.13  )-----------( 117      ( 31 Oct 2018 06:25 )             34.9     10-31    132<L>  |  90<L>  |  46<H>  ----------------------------<  116<H>  4.8   |  23  |  3.85<H>    Ca    9.0      31 Oct 2018 06:25  Phos  4.2     10-31  Mg     2.4     10-31    TPro  6.2  /  Alb  3.4  /  TBili  0.4  /  DBili  x   /  AST  23  /  ALT  9   /  AlkPhos  255<H>  10-30    PT/INR - ( 30 Oct 2018 15:47 )   PT: 13.0 SEC;   INR: 1.17          PTT - ( 30 Oct 2018 15:47 )  PTT:31.6 SEC  CARDIAC MARKERS ( 31 Oct 2018 06:25 )  x     / x     / 43 u/L / 4.86 ng/mL / x      CARDIAC MARKERS ( 30 Oct 2018 16:50 )  x     / x     / 57 u/L / 3.53 ng/mL / x      CARDIAC MARKERS ( 30 Oct 2018 15:47 )  x     / x     / 42 u/L / 3.45 ng/mL / x              RADIOLOGY & ADDITIONAL TESTS:

## 2018-10-31 NOTE — H&P ADULT - PROBLEM SELECTOR PLAN 5
cont hydralazine, clonidine cont levothyroxine No acute findings on CT abdomen No acute findings on CT abdomen, patient w/ chronic abdominal pain, unclear as negative workup in the past and patient unable to provide further history. monitor for now.

## 2018-10-31 NOTE — PROGRESS NOTE ADULT - PROBLEM SELECTOR PLAN 3
asymptomatic bradycardia on coreg  Appreciate EP eval: No advanced AV blocks or persistent significant chelsea <40 bpm seen on telemetry, per EP if symptomatic chelsea or advanced AV block occurs, then would reduce Carvedilol dosage    -No acute pacing indication at present time  [id number] score of 6   NO AC given hx of multiple falls.

## 2018-10-31 NOTE — H&P ADULT - NSHPPHYSICALEXAM_GEN_ALL_CORE
T(C): 35.6 (10-30-18 @ 22:45), Max: 36.7 (10-30-18 @ 14:34)  HR: 47 (10-31-18 @ 03:07) (45 - 56)  BP: 173/50 (10-31-18 @ 03:07) (172/47 - 211/48)  RR: 16 (10-31-18 @ 03:07) (15 - 18)  SpO2: 100% (10-31-18 @ 03:07) (100% - 100%)

## 2018-10-31 NOTE — H&P ADULT - PROBLEM SELECTOR PLAN 2
restart home meds  needs HD in AM Admit to tele  patient with hx of elevated troponin   CK neg  Trend CE likely due to renal failure, f/u repeat HST

## 2018-10-31 NOTE — H&P ADULT - PROBLEM SELECTOR PLAN 6
on HD  renal in For HD cont hydralazine, clonidine EP consult appreciated   Per EP: May continue current meds; if symptomatic chelsea or advanced AV block occurs, then would reduce Carvedilol dosage    -No acute pacing indication at present time

## 2018-10-31 NOTE — PROGRESS NOTE ADULT - SUBJECTIVE AND OBJECTIVE BOX
Patient is seen and examined. Denies chest pain, SOB, palpitations or dizziness.    PAST MEDICAL & SURGICAL HISTORY:  [place] (hard of hearing)  Cataracts, bilateral: left worse than right  Pleural thickening: s/p pleurodisis left vats 2014  Hip pain: left  LBP radiating to left leg  CHF (congestive heart failure): diagnosed 2-2014  MI (myocardial infarction): 2-2014   cardiac cath done [facility]  Diabetes: diagnosed 2004  no medications in 5 months since hemodialysis  Hypothyroidism  ESRD (end stage renal disease)  HTN (hypertension)  MI (myocardial infarction): may 2016  Pleural effusion: Left side - several times since March 2014  Hypothyroid  Anxiety  HTN (hypertension)  Hyperlipidemia  CHF (congestive heart failure)  CKD (chronic kidney disease)  Anemia  Diabetes: DM 2  Thoracotomy scar of left chest: 4-2014 ? pneumonia/ scarring  S/P myomectomy: abdominal age 52  AV fistula: left upper 8-2014 attempted 12-16-14      MEDICATIONS  (STANDING):  amLODIPine   Tablet 10 milliGRAM(s) Oral daily  atorvastatin 10 milliGRAM(s) Oral at bedtime  calcium acetate 667 milliGRAM(s) Oral with lunch  carvedilol 25 milliGRAM(s) Oral every 12 hours  cloNIDine 0.3 milliGRAM(s) Oral every 8 hours  docusate sodium 100 milliGRAM(s) Oral two times a day  hydrALAZINE 100 milliGRAM(s) Oral every 8 hours  isosorbide   mononitrate ER Tablet (IMDUR) 30 milliGRAM(s) Oral daily  senna 2 Tablet(s) Oral at bedtime  sertraline 50 milliGRAM(s) Oral daily  sodium chloride 0.9% lock flush 3 milliLiter(s) IV Push every 8 hours    MEDICATIONS  (PRN):  acetaminophen   Tablet .. 650 milliGRAM(s) Oral every 6 hours PRN Mild Pain (1 - 3), Moderate Pain (4 - 6), Severe Pain (7 - 10)  aluminum hydroxide/magnesium hydroxide/simethicone Suspension 30 milliLiter(s) Oral every 6 hours PRN Dyspepsia  clonazePAM Tablet 0.5 milliGRAM(s) Oral at bedtime PRN Anxiety        Vital Signs Last 24 Hrs  T(C): 36.4 (31 Oct 2018 17:19), Max: 36.4 (31 Oct 2018 17:19)  T(F): 97.6 (31 Oct 2018 17:19), Max: 97.6 (31 Oct 2018 17:19)  HR: 55 (31 Oct 2018 14:11) (45 - 55)  BP: 193/57 (31 Oct 2018 14:11) (173/50 - 226/59)  BP(mean): --  RR: 18 (31 Oct 2018 14:11) (16 - 18)  SpO2: 100% (31 Oct 2018 14:11) (100% - 100%)      INTERPRETATION OF TELEMETRY: Sinus bradycardia with HR 40s- 50s      LABS:                        11.0   4.13  )-----------( 117      ( 31 Oct 2018 06:25 )             34.9     10-31    132<L>  |  90<L>  |  46<H>  ----------------------------<  116<H>  4.8   |  23  |  3.85<H>    Ca    9.0      31 Oct 2018 06:25  Phos  4.2     10-31  Mg     2.4     10-31    TPro  6.2  /  Alb  3.4  /  TBili  0.4  /  DBili  x   /  AST  23  /  ALT  9   /  AlkPhos  255<H>  10-30    CARDIAC MARKERS ( 31 Oct 2018 06:25 )  x     / x     / 43 u/L / 4.86 ng/mL / x      CARDIAC MARKERS ( 30 Oct 2018 16:50 )  x     / x     / 57 u/L / 3.53 ng/mL / x      CARDIAC MARKERS ( 30 Oct 2018 15:47 )  x     / x     / 42 u/L / 3.45 ng/mL / x          PT/INR - ( 30 Oct 2018 15:47 )   PT: 13.0 SEC;   INR: 1.17          PTT - ( 30 Oct 2018 15:47 )  PTT:31.6 SEC      PHYSICAL EXAM:    GENERAL: In no apparent distress, well nourished, and hydrated.  HEAD:  Atraumatic, Normocephalic  HEART: Regular rate and rhythm; No murmurs, rubs, or gallops.  PULMONARY: Clear to auscultation and percussion.  No rales, wheezing, or rhonchi bilaterally.  ABDOMEN: Soft, Nontender, Nondistended; Bowel sounds present  EXTREMITIES:  2+ Peripheral Pulses, No clubbing, cyanosis, or edema

## 2018-10-31 NOTE — H&P ADULT - HISTORY OF PRESENT ILLNESS
72F h/o htn, anemia, pulm HTN, non obstructive CAD, depression, diastolic HF, ESRD on HD[MWF] BIBEMS from [facility] for CP. Patient is awake, but would not answer questions. XAVIER Driscoll who speaks Afghan aided with translation. Patient said yes when asked if her name is Lissy. She states chest pain. Would not comment further than that. Per ED provider note, that's how she presented to the ED as well. Unclear if patient is having chest pain intermittently or other complaints. ROS limited. 72 F PMH type 2 DM, HTN, nonobstructive CAD, ESRD on HD TTS, syncope s/p ILR, frequent falls c/b prior nasal fracture and cervical spine fracture, Afib no a/c 2/2 recent fall with scalp laceration/hematoma, BIBEMS from [facility] for CP. Patient is awake, but would not answer questions. RN Yamila who speaks Sami aided with translation. Patient said yes when asked if her name is Lissy. She states chest pain. Would not comment further than that. Per ED provider note, that's how she presented to the ED as well. Unclear if patient is having chest pain intermittently or other complaints. ROS limited.     It is unsure whether patient has dementia or this mental status is new.     On admission: No distress noted. Repeat EKG ordered. Cardiac enzymes sent.     Tried to reach family for collateral, but unable to. 72 F PMH type 2 DM, HTN, nonobstructive CAD, ESRD on HD TTS, syncope s/p ILR, frequent falls c/b prior nasal fracture and cervical spine fracture, Afib no a/c 2/2 recent fall with scalp laceration/hematoma, BIBEMS from [facility] for CP. Patient is awake, but would not answer questions. XAVIER Driscoll who speaks [place] Zealander aided with translation. Patient said yes when asked if her name is Lissy. She states chest pain. Would not comment further than that. Per ED provider note, that's how she presented to the ED as well. Unclear if patient is having chest pain intermittently or other complaints. ROS limited.     On admission: No distress noted. Repeat EKG ordered. Cardiac enzymes sent.     Tried to reach family for collateral, but unable to. *****Patient unable to provide a subjective history due to underlying dementia, history obtained from medical records, ER************  72 F PMH type 2 DM, HTN, nonobstructive CAD, ESRD on HD TTS, syncope s/p ILR, frequent falls c/b prior nasal fracture and cervical spine fracture, Afib no a/c 2/2 recent fall with scalp laceration/hematoma, BIBEMS from [facility] for CP. Patient reports pain in the head, chest, and abdomen, unable to elaborate any further, answers any questions with, pain in the head, chest, and stomach. Discussed with ED providers, d/w family, this has been patient's baseline for the past month.     On admission: No distress noted. Repeat EKG ordered. Cardiac enzymes sent.     Tried to reach family for collateral, but unable to.

## 2018-10-31 NOTE — CONSULT NOTE ADULT - SUBJECTIVE AND OBJECTIVE BOX
[facility] DIVISION OF KIDNEY DISEASES AND HYPERTENSION -- INITIAL CONSULT NOTE  --------------------------------------------------------------------------------    HPI: 71 yo F with PMHx of ESRD on HD TIW, non-obstructive CAD, diastolic heart failure, pulmonary hypertension, and HTN admitted for evaluation of chest pain. Patient states that she receives HD at [facility]. Her nephrologist is Dr. Fernandez. Pt missed her outpatient dialysis yesterday. Pt seen and examined in the ER. Plan for HD today. Pt denies CP, SOB or LE edema.     PAST HISTORY  --------------------------------------------------------------------------------  PAST MEDICAL & SURGICAL HISTORY:  [place] (hard of hearing)  Cataracts, bilateral: left worse than right  Pleural thickening: s/p pleurodisis left vats 2014  Hip pain: left  LBP radiating to left leg  CHF (congestive heart failure): diagnosed 2-2014  MI (myocardial infarction): 2-2014   cardiac cath done [facility]  Diabetes: diagnosed 2004  no medications in 5 months since hemodialysis  Hypothyroidism  ESRD (end stage renal disease)  HTN (hypertension)  MI (myocardial infarction): may 2016  Pleural effusion: Left side - several times since March 2014  Hypothyroid  Anxiety  HTN (hypertension)  Hyperlipidemia  CHF (congestive heart failure)  CKD (chronic kidney disease)  Anemia  Diabetes: DM 2  Thoracotomy scar of left chest: 4-2014 ? pneumonia/ scarring  S/P myomectomy: abdominal age 52  AV fistula: left upper 8-2014 attempted 12-16-14    FAMILY HISTORY:  No significant family history    PAST SOCIAL HISTORY:    ALLERGIES & MEDICATIONS  --------------------------------------------------------------------------------  Allergies    Avelox (Unknown)  fish (Hives; Rash)  shellfish (Unknown)    Intolerances      Standing Inpatient Medications  amLODIPine   Tablet 10 milliGRAM(s) Oral daily  atorvastatin 10 milliGRAM(s) Oral at bedtime  calcium acetate 667 milliGRAM(s) Oral with lunch  carvedilol 25 milliGRAM(s) Oral every 12 hours  cloNIDine 0.3 milliGRAM(s) Oral every 8 hours  docusate sodium 100 milliGRAM(s) Oral two times a day  hydrALAZINE 100 milliGRAM(s) Oral every 8 hours  isosorbide   mononitrate ER Tablet (IMDUR) 30 milliGRAM(s) Oral daily  levothyroxine 100 MICROGram(s) Oral daily  sertraline 50 milliGRAM(s) Oral daily  sodium chloride 0.9% lock flush 3 milliLiter(s) IV Push every 8 hours    PRN Inpatient Medications  acetaminophen   Tablet .. 650 milliGRAM(s) Oral every 6 hours PRN  clonazePAM Tablet 0.5 milliGRAM(s) Oral at bedtime PRN      REVIEW OF SYSTEMS  --------------------------------------------------------------------------------  Gen: No weakness  Skin: No rashes  Head/Eyes/Ears/Mouth: No headache  Respiratory: No dyspnea, cough  CV: + chest pain  GI: No abdominal pain, diarrhea, constipation  : No increased frequency, dysuria, hematuria, nocturia  MSK: No joint pain/swelling; no back pain; no edema  Neuro: No dizziness/lightheadedness    All other systems were reviewed and are negative, except as noted.    VITALS/PHYSICAL EXAM  --------------------------------------------------------------------------------  T(C): 36.1 (10-31-18 @ 14:36), Max: 36.1 (10-31-18 @ 14:36)  HR: 55 (10-31-18 @ 14:11) (45 - 55)  BP: 193/57 (10-31-18 @ 14:11) (173/50 - 226/59)  RR: 18 (10-31-18 @ 14:11) (15 - 18)  SpO2: 100% (10-31-18 @ 14:11) (100% - 100%)  Wt(kg): --    Physical Exam:  	Gen: NAD  	Pulm: CTA B/L  	CV: RRR, S1S2; no rub              Abdomen: + distended  	UE: Warm, no asterixis  	LE: Warm, no edema  	Psych: Normal affect and mood  	Skin: Warm, without rashes  	Vascular access: +MERYL AVF    LABS/STUDIES  --------------------------------------------------------------------------------              11.0   4.13  >-----------<  117      [10-31-18 @ 06:25]              34.9     132  |  90  |  46  ----------------------------<  116      [10-31-18 @ 06:25]  4.8   |  23  |  3.85        Ca     9.0     [10-31-18 @ 06:25]      Mg     2.4     [10-31-18 @ 06:25]      Phos  4.2     [10-31-18 @ 06:25]    TPro  6.2  /  Alb  3.4  /  TBili  0.4  /  DBili  x   /  AST  23  /  ALT  9   /  AlkPhos  255  [10-30-18 @ 15:47]    PT/INR: PT 13.0 , INR 1.17       [10-30-18 @ 15:47]  PTT: 31.6       [10-30-18 @ 15:47]    CK 43      [10-31-18 @ 06:25]    Creatinine Trend:  SCr 3.85 [10-31 @ 06:25]  SCr 3.63 [10-30 @ 15:47]  SCr 2.56 [10-23 @ 06:00]  SCr 3.47 [10-22 @ 05:06]  SCr 2.82 [10-21 @ 04:40]    Urinalysis - [07-15-15 @ 18:01]      Color Yellow / Appearance Clear / SG 1.021 / pH 7.5      Gluc 250 / Ketone Negative  / Bili Negative / Urobili Normal       Blood Negative / Protein >300 / Leuk Est Negative / Nitrite Negative      RBC 6 / WBC 7 / Hyaline 0 / Gran  / Sq Epi  / Non Sq Epi 25 / Bacteria Ocassional    HbA1c 5.5      [10-31-18 @ 06:25]  TSH 15.67      [10-31-18 @ 06:25]  Lipid: chol 92, , HDL 35, LDL 39      [10-31-18 @ 06:25]    HBsAb 10.8      [10-19-18 @ 21:45]  HBsAb Nonreact      [02-01-17 @ 15:56]  HBsAg NEGATIVE      [10-19-18 @ 21:45]  HBcAb Nonreact      [06-14-18 @ 21:56]  HCV 0.13, Nonreactive Hepatitis C AB  S/CO Ratio                        Interpretation  < 1.0                                     Non-Reactive  1.0 - 4.9                           Weakly-Reactive  > 5.0                                 Reactive  Non-Reactive: [place] with a non-reactive HCV antibody  result is considered uninfected.  No further action is  needed unless recent infection is suspected.  In these  cases, consider repeat testing later to detect  seroconversion..  Weakly-Reactive: HCV antibody test is abnormal, HCV RNA  Qualitative test will follow.  Reactive: HCV antibody test is abnormal, HCV RNA  Qualitative test will follow.  Note: HCV antibody testing is performed on the [employer] system.      [10-19-18 @ 21:45]  HIV Nonreact      [02-01-17 @ 15:56]

## 2018-10-31 NOTE — PROGRESS NOTE ADULT - PROBLEM SELECTOR PLAN 4
Patient w/ frequent admissions in the past for similar presentations, complaining of pain in head/chest/abdomen. HST in 400s, no significant delta change  cont isosorbide, due for HD today

## 2018-10-31 NOTE — H&P ADULT - PROBLEM SELECTOR PLAN 1
Admit to tele  check cb, bmp, a1c, flp, tsh, trend CE  cont isosorbide  f/u MD note Admit to tele  check cbc, bmp, a1c, flp, tsh, trend CE  cont isosorbide  f/u MD note  Discussed with Dr. Dozier Patient w/ frequent admissions in the past for similar presentations, complaining of pain in head/chest/abdomen. F/u repeat troponin, f/u card, check cbc, bmp, a1c, flp, tsh, trend CE  cont isosorbide

## 2018-10-31 NOTE — H&P ADULT - PROBLEM SELECTOR PLAN 10
LE stockings given  patient with hx of hematomas in the left frontal  periorbital preseptal and right parietal regions.

## 2018-11-01 LAB
BUN SERPL-MCNC: 26 MG/DL — HIGH (ref 7–23)
CALCIUM SERPL-MCNC: 8.7 MG/DL — SIGNIFICANT CHANGE UP (ref 8.4–10.5)
CHLORIDE SERPL-SCNC: 94 MMOL/L — LOW (ref 98–107)
CO2 SERPL-SCNC: 24 MMOL/L — SIGNIFICANT CHANGE UP (ref 22–31)
CREAT SERPL-MCNC: 2.66 MG/DL — HIGH (ref 0.5–1.3)
GLUCOSE SERPL-MCNC: 113 MG/DL — HIGH (ref 70–99)
HCT VFR BLD CALC: 30.2 % — LOW (ref 34.5–45)
HGB BLD-MCNC: 9.5 G/DL — LOW (ref 11.5–15.5)
MCHC RBC-ENTMCNC: 28.3 PG — SIGNIFICANT CHANGE UP (ref 27–34)
MCHC RBC-ENTMCNC: 31.5 % — LOW (ref 32–36)
MCV RBC AUTO: 89.9 FL — SIGNIFICANT CHANGE UP (ref 80–100)
NRBC # FLD: 0 — SIGNIFICANT CHANGE UP
PLATELET # BLD AUTO: 127 K/UL — LOW (ref 150–400)
PMV BLD: 12.6 FL — SIGNIFICANT CHANGE UP (ref 7–13)
POTASSIUM SERPL-MCNC: 4 MMOL/L — SIGNIFICANT CHANGE UP (ref 3.5–5.3)
POTASSIUM SERPL-SCNC: 4 MMOL/L — SIGNIFICANT CHANGE UP (ref 3.5–5.3)
RBC # BLD: 3.36 M/UL — LOW (ref 3.8–5.2)
RBC # FLD: 17.3 % — HIGH (ref 10.3–14.5)
SODIUM SERPL-SCNC: 137 MMOL/L — SIGNIFICANT CHANGE UP (ref 135–145)
SPECIMEN SOURCE: SIGNIFICANT CHANGE UP
WBC # BLD: 3.79 K/UL — LOW (ref 3.8–10.5)
WBC # FLD AUTO: 3.79 K/UL — LOW (ref 3.8–10.5)

## 2018-11-01 PROCEDURE — 99233 SBSQ HOSP IP/OBS HIGH 50: CPT

## 2018-11-01 PROCEDURE — 93291 INTERROG DEV EVAL SCRMS IP: CPT | Mod: 26

## 2018-11-01 PROCEDURE — 99233 SBSQ HOSP IP/OBS HIGH 50: CPT | Mod: GC

## 2018-11-01 RX ORDER — LISINOPRIL 2.5 MG/1
10 TABLET ORAL DAILY
Qty: 0 | Refills: 0 | Status: DISCONTINUED | OUTPATIENT
Start: 2018-11-01 | End: 2018-11-02

## 2018-11-01 RX ORDER — CARVEDILOL PHOSPHATE 80 MG/1
25 CAPSULE, EXTENDED RELEASE ORAL EVERY 12 HOURS
Qty: 0 | Refills: 0 | Status: DISCONTINUED | OUTPATIENT
Start: 2018-11-01 | End: 2018-11-02

## 2018-11-01 RX ORDER — CHLORHEXIDINE GLUCONATE 213 G/1000ML
1 SOLUTION TOPICAL
Qty: 0 | Refills: 0 | Status: DISCONTINUED | OUTPATIENT
Start: 2018-11-01 | End: 2018-11-20

## 2018-11-01 RX ORDER — LABETALOL HCL 100 MG
100 TABLET ORAL EVERY 8 HOURS
Qty: 0 | Refills: 0 | Status: DISCONTINUED | OUTPATIENT
Start: 2018-11-01 | End: 2018-11-01

## 2018-11-01 RX ORDER — CLONAZEPAM 1 MG
0.5 TABLET ORAL AT BEDTIME
Qty: 0 | Refills: 0 | Status: DISCONTINUED | OUTPATIENT
Start: 2018-11-01 | End: 2018-11-08

## 2018-11-01 RX ADMIN — SERTRALINE 50 MILLIGRAM(S): 25 TABLET, FILM COATED ORAL at 14:00

## 2018-11-01 RX ADMIN — SENNA PLUS 2 TABLET(S): 8.6 TABLET ORAL at 21:25

## 2018-11-01 RX ADMIN — Medication 0.3 MILLIGRAM(S): at 02:27

## 2018-11-01 RX ADMIN — CHLORHEXIDINE GLUCONATE 1 APPLICATION(S): 213 SOLUTION TOPICAL at 11:26

## 2018-11-01 RX ADMIN — Medication 100 MILLIGRAM(S): at 05:39

## 2018-11-01 RX ADMIN — ATORVASTATIN CALCIUM 10 MILLIGRAM(S): 80 TABLET, FILM COATED ORAL at 21:25

## 2018-11-01 RX ADMIN — Medication 100 MILLIGRAM(S): at 01:29

## 2018-11-01 RX ADMIN — Medication 100 MILLIGRAM(S): at 17:57

## 2018-11-01 RX ADMIN — LISINOPRIL 10 MILLIGRAM(S): 2.5 TABLET ORAL at 14:00

## 2018-11-01 RX ADMIN — Medication 30 MILLILITER(S): at 18:03

## 2018-11-01 RX ADMIN — Medication 650 MILLIGRAM(S): at 02:51

## 2018-11-01 RX ADMIN — Medication 0.3 MILLIGRAM(S): at 08:23

## 2018-11-01 RX ADMIN — AMLODIPINE BESYLATE 10 MILLIGRAM(S): 2.5 TABLET ORAL at 05:39

## 2018-11-01 RX ADMIN — CARVEDILOL PHOSPHATE 25 MILLIGRAM(S): 80 CAPSULE, EXTENDED RELEASE ORAL at 05:39

## 2018-11-01 RX ADMIN — SODIUM CHLORIDE 3 MILLILITER(S): 9 INJECTION INTRAMUSCULAR; INTRAVENOUS; SUBCUTANEOUS at 13:32

## 2018-11-01 RX ADMIN — SODIUM CHLORIDE 3 MILLILITER(S): 9 INJECTION INTRAMUSCULAR; INTRAVENOUS; SUBCUTANEOUS at 22:34

## 2018-11-01 RX ADMIN — Medication 650 MILLIGRAM(S): at 02:07

## 2018-11-01 RX ADMIN — Medication 667 MILLIGRAM(S): at 11:28

## 2018-11-01 RX ADMIN — Medication 0.3 MILLIGRAM(S): at 16:20

## 2018-11-01 RX ADMIN — SODIUM CHLORIDE 3 MILLILITER(S): 9 INJECTION INTRAMUSCULAR; INTRAVENOUS; SUBCUTANEOUS at 05:42

## 2018-11-01 RX ADMIN — Medication 650 MILLIGRAM(S): at 20:28

## 2018-11-01 RX ADMIN — Medication 100 MILLIGRAM(S): at 21:25

## 2018-11-01 RX ADMIN — Medication 0.3 MILLIGRAM(S): at 23:32

## 2018-11-01 RX ADMIN — Medication 125 MICROGRAM(S): at 05:39

## 2018-11-01 RX ADMIN — CARVEDILOL PHOSPHATE 25 MILLIGRAM(S): 80 CAPSULE, EXTENDED RELEASE ORAL at 17:57

## 2018-11-01 RX ADMIN — Medication 100 MILLIGRAM(S): at 14:00

## 2018-11-01 RX ADMIN — ISOSORBIDE MONONITRATE 30 MILLIGRAM(S): 60 TABLET, EXTENDED RELEASE ORAL at 14:09

## 2018-11-01 RX ADMIN — Medication 650 MILLIGRAM(S): at 19:35

## 2018-11-01 NOTE — PROGRESS NOTE ADULT - PROBLEM SELECTOR PLAN 3
asymptomatic bradycardia on coreg  Appreciate EP eval: No advanced AV blocks or persistent significant chelsea <40 bpm seen on telemetry, per EP if symptomatic chelsea or advanced AV block occurs, then would reduce Carvedilol dosage    -No acute pacing indication at present time  [id number] score of 6   NO AC given hx of multiple falls.

## 2018-11-01 NOTE — PROGRESS NOTE ADULT - ATTENDING COMMENTS
· Assessment	  73 yo F with PMHx of ESRD on HD TIW, non-obstructive CAD, diastolic heart failure, pulmonary hypertension, and HTN admitted for evaluation of chest pain.     admitted Hypervolemia,  HD yesterday .. no edema or crackles today       ·  Problem: ESRD (end stage renal disease).  Recommendation: ESRD on HD. Labs reviewed. HD tomorrow      Problem/Recommendation - 3:  ·  Problem: Hypertension, unspecified type.  Recommendation: Pt with elevated BP in the setting of hypervolemia. agree to add ACE/ARB

## 2018-11-01 NOTE — PROGRESS NOTE ADULT - PROBLEM SELECTOR PLAN 4
resolved  Patient w/ frequent admissions in the past for similar presentations, complaining of pain in head/chest/abdomen. HST in 400s, no significant delta change  cont isosorbide

## 2018-11-01 NOTE — PHYSICAL THERAPY INITIAL EVALUATION ADULT - GENERAL OBSERVATIONS, REHAB EVAL
Consult received, chart reviewed. Patient received supine in bed, NAD, sister present to assist Cuban translation, +O2. Patient agreed to EVALUATION from Physical Therapist.

## 2018-11-01 NOTE — PROGRESS NOTE ADULT - SUBJECTIVE AND OBJECTIVE BOX
Patient is seen and examined. Denies chest pain, SOB, palpitations or dizziness. Alert and awake      PAST MEDICAL & SURGICAL HISTORY:  [place] (hard of hearing)  Cataracts, bilateral: left worse than right  Pleural thickening: s/p pleurodisis left vats 2014  Hip pain: left  LBP radiating to left leg  CHF (congestive heart failure): diagnosed 2-2014  MI (myocardial infarction): 2-2014   cardiac cath done [facility]  Diabetes: diagnosed 2004  no medications in 5 months since hemodialysis  Hypothyroidism  ESRD (end stage renal disease)  HTN (hypertension)  MI (myocardial infarction): may 2016  Pleural effusion: Left side - several times since March 2014  Hypothyroid  Anxiety  HTN (hypertension)  Hyperlipidemia  CHF (congestive heart failure)  CKD (chronic kidney disease)  Anemia  Diabetes: DM 2  Thoracotomy scar of left chest: 4-2014 ? pneumonia/ scarring  S/P myomectomy: abdominal age 52  AV fistula: left upper 8-2014 attempted 12-16-14      MEDICATIONS  (STANDING):  amLODIPine   Tablet 10 milliGRAM(s) Oral daily  atorvastatin 10 milliGRAM(s) Oral at bedtime  calcium acetate 667 milliGRAM(s) Oral with lunch  carvedilol 25 milliGRAM(s) Oral every 12 hours  chlorhexidine 4% Liquid 1 Application(s) Topical <User Schedule>  cloNIDine 0.3 milliGRAM(s) Oral every 8 hours  docusate sodium 100 milliGRAM(s) Oral two times a day  hydrALAZINE 100 milliGRAM(s) Oral every 8 hours  isosorbide   mononitrate ER Tablet (IMDUR) 30 milliGRAM(s) Oral daily  levothyroxine 125 MICROGram(s) Oral daily  lisinopril 10 milliGRAM(s) Oral daily  senna 2 Tablet(s) Oral at bedtime  sertraline 50 milliGRAM(s) Oral daily  sodium chloride 0.9% lock flush 3 milliLiter(s) IV Push every 8 hours    MEDICATIONS  (PRN):  acetaminophen   Tablet .. 650 milliGRAM(s) Oral every 6 hours PRN Mild Pain (1 - 3), Moderate Pain (4 - 6), Severe Pain (7 - 10)  aluminum hydroxide/magnesium hydroxide/simethicone Suspension 30 milliLiter(s) Oral every 6 hours PRN Dyspepsia  clonazePAM Tablet 0.5 milliGRAM(s) Oral at bedtime PRN Anxiety    Vital Signs Last 24 Hrs  T(C): 36.4 (01 Nov 2018 13:58), Max: 36.6 (31 Oct 2018 22:50)  T(F): 97.6 (01 Nov 2018 13:58), Max: 97.9 (01 Nov 2018 02:24)  HR: 54 (01 Nov 2018 13:58) (53 - 64)  BP: 178/- (01 Nov 2018 13:58) (137/107 - 199/62)  BP(mean): 54 (01 Nov 2018 13:58) (54 - 54)  RR: 18 (01 Nov 2018 13:58) (16 - 18)  SpO2: 100% (01 Nov 2018 13:58) (95% - 100%)    INTERPRETATION OF TELEMETRY: Sinus bradycardia/sinus rhythm with HR 50s-60s    LABS:                        9.5    3.79  )-----------( 127      ( 01 Nov 2018 05:20 )             30.2     11-01    137  |  94<L>  |  26<H>  ----------------------------<  113<H>  4.0   |  24  |  2.66<H>    Ca    8.7      01 Nov 2018 05:20  Phos  4.2     10-31  Mg     2.4     10-31    TPro  6.2  /  Alb  3.4  /  TBili  0.4  /  DBili  x   /  AST  23  /  ALT  9   /  AlkPhos  255<H>  10-30    CARDIAC MARKERS ( 31 Oct 2018 06:25 )  x     / x     / 43 u/L / 4.86 ng/mL / x      CARDIAC MARKERS ( 30 Oct 2018 16:50 )  x     / x     / 57 u/L / 3.53 ng/mL / x      CARDIAC MARKERS ( 30 Oct 2018 15:47 )  x     / x     / 42 u/L / 3.45 ng/mL / x          PT/INR - ( 30 Oct 2018 15:47 )   PT: 13.0 SEC;   INR: 1.17          PTT - ( 30 Oct 2018 15:47 )  PTT:31.6 SEC    I&O's Summary    31 Oct 2018 07:01  -  01 Nov 2018 07:00  --------------------------------------------------------  IN: 400 mL / OUT: 3600 mL / NET: -3200 mL    PHYSICAL EXAM:    GENERAL: In no apparent distress, well nourished, and hydrated.  HEAD:  Atraumatic, Normocephalic  HEART: Regular rate and rhythm; No murmurs, rubs, or gallops.  PULMONARY: Clear to auscultation and percussion.  No rales, wheezing, or rhonchi bilaterally.  ABDOMEN: Soft, Nontender, Nondistended; Bowel sounds present  EXTREMITIES:  2+ Peripheral Pulses, No clubbing, cyanosis, or edema

## 2018-11-01 NOTE — PROGRESS NOTE ADULT - PROBLEM SELECTOR PLAN 1
patient hypothermia to 94 rectally on 10/31 now improving, bradycardic, elevated TSH with nml FT4  Bcx sent to check for infectious source-pending  CT A/P unremarkable, CXR with mild pulm edema but no evidence of infection  Synthroid increased to 125mcg to be given at 6am on empty stomach for better absorption. Will consider Endocrine consult if continued hypothermia

## 2018-11-01 NOTE — PROGRESS NOTE ADULT - SUBJECTIVE AND OBJECTIVE BOX
Patient is a 72y old  Female who presents with a chief complaint of chest pain (01 Nov 2018 12:25)      SUBJECTIVE / OVERNIGHT EVENTS: No acute events overnight.  ID:  Ivelisse [id number]. Patient feeling better today, no headache, chest pain, SOB, N/V/D, still no bowel movement. Having good appetite. Sister: Catrina at bedside states patient sometimes pulls at hair when anxious. Patient denies depression, anxiety, suicidal ideations and states she feels well supported now that her sister is here    MEDICATIONS  (STANDING):  amLODIPine   Tablet 10 milliGRAM(s) Oral daily  atorvastatin 10 milliGRAM(s) Oral at bedtime  calcium acetate 667 milliGRAM(s) Oral with lunch  carvedilol 25 milliGRAM(s) Oral every 12 hours  chlorhexidine 4% Liquid 1 Application(s) Topical <User Schedule>  cloNIDine 0.3 milliGRAM(s) Oral every 8 hours  docusate sodium 100 milliGRAM(s) Oral two times a day  hydrALAZINE 100 milliGRAM(s) Oral every 8 hours  isosorbide   mononitrate ER Tablet (IMDUR) 30 milliGRAM(s) Oral daily  levothyroxine 125 MICROGram(s) Oral daily  lisinopril 10 milliGRAM(s) Oral daily  senna 2 Tablet(s) Oral at bedtime  sertraline 50 milliGRAM(s) Oral daily  sodium chloride 0.9% lock flush 3 milliLiter(s) IV Push every 8 hours    MEDICATIONS  (PRN):  acetaminophen   Tablet .. 650 milliGRAM(s) Oral every 6 hours PRN Mild Pain (1 - 3), Moderate Pain (4 - 6), Severe Pain (7 - 10)  aluminum hydroxide/magnesium hydroxide/simethicone Suspension 30 milliLiter(s) Oral every 6 hours PRN Dyspepsia  clonazePAM Tablet 0.5 milliGRAM(s) Oral at bedtime PRN Anxiety      T(C): 36.4 (11-01-18 @ 13:58), Max: 36.6 (10-31-18 @ 22:50)  HR: 54 (11-01-18 @ 13:58) (53 - 64)  BP: 178/- (11-01-18 @ 13:58) (137/107 - 199/62)  RR: 18 (11-01-18 @ 13:58) (16 - 18)  SpO2: 100% (11-01-18 @ 13:58) (95% - 100%)  CAPILLARY BLOOD GLUCOSE        I&O's Summary    31 Oct 2018 07:01  -  01 Nov 2018 07:00  --------------------------------------------------------  IN: 400 mL / OUT: 3600 mL / NET: -3200 mL    PHYSICAL EXAM:  GENERAL: no apparent distress, on nasal cannula  EYES: sclera clear b/l  CHEST/LUNG: mild LLL crackles otherwise CTA  HEART: s1/s2, no murmurs appreciated, RRR, bradycardic  ABDOMEN: Soft, mildly tender diffuse abdomen without guarding or rigidity, Nondistended; Bowel sounds present  EXTREMITIES:  2+ Peripheral Pulses, No clubbing, cyanosis, or edema  NEUROLOGY: awake, alert, responds to Qs appropriately, hard of hearing, follows commands  PSYCH: AAOx3  VASCULAR: left arm AVF with thrill and bruit    LABS:                        9.5    3.79  )-----------( 127      ( 01 Nov 2018 05:20 )             30.2     11-01    137  |  94<L>  |  26<H>  ----------------------------<  113<H>  4.0   |  24  |  2.66<H>    Ca    8.7      01 Nov 2018 05:20  Phos  4.2     10-31  Mg     2.4     10-31    TPro  6.2  /  Alb  3.4  /  TBili  0.4  /  DBili  x   /  AST  23  /  ALT  9   /  AlkPhos  255<H>  10-30    PT/INR - ( 30 Oct 2018 15:47 )   PT: 13.0 SEC;   INR: 1.17          PTT - ( 30 Oct 2018 15:47 )  PTT:31.6 SEC  CARDIAC MARKERS ( 31 Oct 2018 06:25 )  x     / x     / 43 u/L / 4.86 ng/mL / x      CARDIAC MARKERS ( 30 Oct 2018 16:50 )  x     / x     / 57 u/L / 3.53 ng/mL / x      CARDIAC MARKERS ( 30 Oct 2018 15:47 )  x     / x     / 42 u/L / 3.45 ng/mL / x              RADIOLOGY & ADDITIONAL TESTS:

## 2018-11-01 NOTE — PHYSICAL THERAPY INITIAL EVALUATION ADULT - ADDITIONAL COMMENTS
Pt. sister reports pt. uses wheelchair.    Patient left supine in bed as received, NAD, call bell in reach, all lines/devices intact, RN aware.

## 2018-11-01 NOTE — PROGRESS NOTE ADULT - PROBLEM SELECTOR PLAN 8
cont levothyroxine-increase to 125mcg on 10/31 on empty stomach, unclear if was taking appropriately

## 2018-11-01 NOTE — PHYSICAL THERAPY INITIAL EVALUATION ADULT - PLANNED THERAPY INTERVENTIONS, PT EVAL
balance training/bed mobility training/strengthening/transfer training/gait training/postural re-education/ROM

## 2018-11-01 NOTE — PROGRESS NOTE ADULT - SUBJECTIVE AND OBJECTIVE BOX
[facility] DIVISION OF KIDNEY DISEASES AND HYPERTENSION -- FOLLOW UP NOTE  --------------------------------------------------------------------------------    Chief Complaint:    24 hour events/subjective:        PAST HISTORY  --------------------------------------------------------------------------------  No significant changes to PMH, PSH, FHx, SHx, unless otherwise noted    ALLERGIES & MEDICATIONS  --------------------------------------------------------------------------------  Allergies    Avelox (Unknown)  fish (Hives; Rash)  shellfish (Unknown)    Intolerances      Standing Inpatient Medications  amLODIPine   Tablet 10 milliGRAM(s) Oral daily  atorvastatin 10 milliGRAM(s) Oral at bedtime  calcium acetate 667 milliGRAM(s) Oral with lunch  chlorhexidine 4% Liquid 1 Application(s) Topical <User Schedule>  cloNIDine 0.3 milliGRAM(s) Oral every 8 hours  docusate sodium 100 milliGRAM(s) Oral two times a day  hydrALAZINE 100 milliGRAM(s) Oral every 8 hours  isosorbide   mononitrate ER Tablet (IMDUR) 30 milliGRAM(s) Oral daily  labetalol 100 milliGRAM(s) Oral every 8 hours  levothyroxine 125 MICROGram(s) Oral daily  senna 2 Tablet(s) Oral at bedtime  sertraline 50 milliGRAM(s) Oral daily  sodium chloride 0.9% lock flush 3 milliLiter(s) IV Push every 8 hours    PRN Inpatient Medications  acetaminophen   Tablet .. 650 milliGRAM(s) Oral every 6 hours PRN  aluminum hydroxide/magnesium hydroxide/simethicone Suspension 30 milliLiter(s) Oral every 6 hours PRN  clonazePAM Tablet 0.5 milliGRAM(s) Oral at bedtime PRN      REVIEW OF SYSTEMS  --------------------------------------------------------------------------------  Gen: No weakness  Skin: No new rashes  Head/Eyes/Ears/Mouth: No headache;   Respiratory: No dyspnea,   CV: No chest pain, orthopnea  GI: , nausea, vomiting,  : No  hematuria,  MSK: No joint pain/swelling;  no edema  Neuro:  seizures, numbness,  Psych: No anxiety,     All other systems were reviewed and are negative, except as noted.    VITALS/PHYSICAL EXAM  --------------------------------------------------------------------------------  T(C): 36.6 (11-01-18 @ 10:20), Max: 36.6 (10-31-18 @ 22:50)  HR: 57 (11-01-18 @ 10:20) (53 - 64)  BP: 178/57 (11-01-18 @ 10:20) (137/107 - 199/62)  RR: 18 (11-01-18 @ 10:20) (16 - 18)  SpO2: 100% (11-01-18 @ 10:20) (95% - 100%)  Wt(kg): --    Weight (kg): 57 (10-31-18 @ 17:54)      10-31-18 @ 07:01  -  11-01-18 @ 07:00  --------------------------------------------------------  IN: 400 mL / OUT: 3600 mL / NET: -3200 mL      Physical Exam:  	Gen: NAD, well-appearing  	HEENT: PERRL, No JVD  	Pulm: CTA B/L  	CV: RRR, S1S2; no rub  	Abd:  soft, nontender/nondistended          LE:  no edema  	Neuro: No focal deficits,   	Psych: Normal affect and mood  	Skin: Warm, without rashes  	Vascular     LABS/STUDIES  --------------------------------------------------------------------------------              9.5    3.79  >-----------<  127      [11-01-18 @ 05:20]              30.2     137  |  94  |  26  ----------------------------<  113      [11-01-18 @ 05:20]  4.0   |  24  |  2.66        Ca     8.7     [11-01-18 @ 05:20]      Mg     2.4     [10-31-18 @ 06:25]      Phos  4.2     [10-31-18 @ 06:25]    TPro  6.2  /  Alb  3.4  /  TBili  0.4  /  DBili  x   /  AST  23  /  ALT  9   /  AlkPhos  255  [10-30-18 @ 15:47]    PT/INR: PT 13.0 , INR 1.17       [10-30-18 @ 15:47]  PTT: 31.6       [10-30-18 @ 15:47]    CK 43      [10-31-18 @ 06:25]    Creatinine Trend:  SCr 2.66 [11-01 @ 05:20]  SCr 3.85 [10-31 @ 06:25]  SCr 3.63 [10-30 @ 15:47]  SCr 2.56 [10-23 @ 06:00]  SCr 3.47 [10-22 @ 05:06]    Sodium trend:        HbA1c 5.5      [10-31-18 @ 06:25]  TSH 15.67      [10-31-18 @ 06:25]  Lipid: chol 92, , HDL 35, LDL 39      [10-31-18 @ 06:25]    HBsAb 10.8      [10-19-18 @ 21:45]  HBsAg NEGATIVE      [10-19-18 @ 21:45]  HCV 0.13, Nonreactive Hepatitis C AB  S/CO Ratio                        Interpretation  < 1.0                                     Non-Reactive  1.0 - 4.9                           Weakly-Reactive  > 5.0                                 Reactive  Non-Reactive: Aperson with a non-reactive HCV antibody  result is considered uninfected.  No further action is  needed unless recent infection is suspected.  In these  cases, consider repeat testing later to detect  seroconversion..  Weakly-Reactive: HCV antibody test is abnormal, HCV RNA  Qualitative test will follow.  Reactive: HCV antibody test is abnormal, HCV RNA  Qualitative test will follow.  Note: HCV antibody testing is performed on the [employer]   system.      [10-19-18 @ 21:45] [facility] DIVISION OF KIDNEY DISEASES AND HYPERTENSION -- FOLLOW UP NOTE  --------------------------------------------------------------------------------    Chief Complaint: no complaints sister at bedside. eating lunch     24 hour events/subjective: BP high         PAST HISTORY  --------------------------------------------------------------------------------  No significant changes to PMH, PSH, FHx, SHx, unless otherwise noted    ALLERGIES & MEDICATIONS  --------------------------------------------------------------------------------  Allergies    Avelox (Unknown)  fish (Hives; Rash)  shellfish (Unknown)    Intolerances      Standing Inpatient Medications  amLODIPine   Tablet 10 milliGRAM(s) Oral daily  atorvastatin 10 milliGRAM(s) Oral at bedtime  calcium acetate 667 milliGRAM(s) Oral with lunch  chlorhexidine 4% Liquid 1 Application(s) Topical <User Schedule>  cloNIDine 0.3 milliGRAM(s) Oral every 8 hours  docusate sodium 100 milliGRAM(s) Oral two times a day  hydrALAZINE 100 milliGRAM(s) Oral every 8 hours  isosorbide   mononitrate ER Tablet (IMDUR) 30 milliGRAM(s) Oral daily  labetalol 100 milliGRAM(s) Oral every 8 hours  levothyroxine 125 MICROGram(s) Oral daily  senna 2 Tablet(s) Oral at bedtime  sertraline 50 milliGRAM(s) Oral daily  sodium chloride 0.9% lock flush 3 milliLiter(s) IV Push every 8 hours    PRN Inpatient Medications  acetaminophen   Tablet .. 650 milliGRAM(s) Oral every 6 hours PRN  aluminum hydroxide/magnesium hydroxide/simethicone Suspension 30 milliLiter(s) Oral every 6 hours PRN  clonazePAM Tablet 0.5 milliGRAM(s) Oral at bedtime PRN      REVIEW OF SYSTEMS  --------------------------------------------------------------------------------  Gen: No weakness  Respiratory: No dyspnea,   CV: No chest pain, orthopnea  GI: , nausea, vomiting,  : No  hematuria,  neuro:  seizures, numbness,  Psych: No anxiety,     All other systems were reviewed and are negative, except as noted.    VITALS/PHYSICAL EXAM  --------------------------------------------------------------------------------  T(C): 36.6 (11-01-18 @ 10:20), Max: 36.6 (10-31-18 @ 22:50)  HR: 57 (11-01-18 @ 10:20) (53 - 64)  BP: 178/57 (11-01-18 @ 10:20) (137/107 - 199/62)  RR: 18 (11-01-18 @ 10:20) (16 - 18)  SpO2: 100% (11-01-18 @ 10:20) (95% - 100%)  Wt(kg): --    Weight (kg): 57 (10-31-18 @ 17:54)      10-31-18 @ 07:01  -  11-01-18 @ 07:00  --------------------------------------------------------  IN: 400 mL / OUT: 3600 mL / NET: -3200 mL      Physical Exam:  	Gen: NAD, well-appearing  	HEENT: PERRL, No JVD  	Pulm: CTA B/L  	CV: RRR, S1S2; no rub  	Abd:  soft, nontender/nondistended          LE:  no edema  	Neuro: No focal deficits,   	Psych: Normal affect and mood  	Skin: Warm, without rashes  	Vascular L BC fistula. Thrill +    LABS/STUDIES  --------------------------------------------------------------------------------              9.5    3.79  >-----------<  127      [11-01-18 @ 05:20]              30.2     137  |  94  |  26  ----------------------------<  113      [11-01-18 @ 05:20]  4.0   |  24  |  2.66        Ca     8.7     [11-01-18 @ 05:20]      Mg     2.4     [10-31-18 @ 06:25]      Phos  4.2     [10-31-18 @ 06:25]    TPro  6.2  /  Alb  3.4  /  TBili  0.4  /  DBili  x   /  AST  23  /  ALT  9   /  AlkPhos  255  [10-30-18 @ 15:47]    PT/INR: PT 13.0 , INR 1.17       [10-30-18 @ 15:47]  PTT: 31.6       [10-30-18 @ 15:47]    CK 43      [10-31-18 @ 06:25]    Creatinine Trend:  SCr 2.66 [11-01 @ 05:20]  SCr 3.85 [10-31 @ 06:25]  SCr 3.63 [10-30 @ 15:47]  SCr 2.56 [10-23 @ 06:00]  SCr 3.47 [10-22 @ 05:06]    Sodium trend:        HbA1c 5.5      [10-31-18 @ 06:25]  TSH 15.67      [10-31-18 @ 06:25]  Lipid: chol 92, , HDL 35, LDL 39      [10-31-18 @ 06:25]    HBsAb 10.8      [10-19-18 @ 21:45]  HBsAg NEGATIVE      [10-19-18 @ 21:45]  HCV 0.13, Nonreactive Hepatitis C AB  S/CO Ratio                        Interpretation  < 1.0                                     Non-Reactive  1.0 - 4.9                           Weakly-Reactive  > 5.0                                 Reactive  Non-Reactive: Aperson with a non-reactive HCV antibody  result is considered uninfected.  No further action is  needed unless recent infection is suspected.  In these  cases, consider repeat testing later to detect  seroconversion..  Weakly-Reactive: HCV antibody test is abnormal, HCV RNA  Qualitative test will follow.  Reactive: HCV antibody test is abnormal, HCV RNA  Qualitative test will follow.  Note: HCV antibody testing is performed on the [employer]   system.      [10-19-18 @ 21:45]

## 2018-11-01 NOTE — PROGRESS NOTE ADULT - PROBLEM SELECTOR PLAN 2
CXR with pulm edema, now improved  continue clonidine, hydralazine, Coreg 25mg BID  Ok to add ACEi per Dr. Reynolds (renal). Also noted that some labetalol is dialyzed unlike the Coreg  HD MWF with fluid removal as tolerated

## 2018-11-02 ENCOUNTER — TRANSCRIPTION ENCOUNTER (OUTPATIENT)
Age: 73
End: 2018-11-02

## 2018-11-02 LAB
BASOPHILS # BLD AUTO: 0.03 K/UL — SIGNIFICANT CHANGE UP (ref 0–0.2)
BASOPHILS NFR BLD AUTO: 0.7 % — SIGNIFICANT CHANGE UP (ref 0–2)
BUN SERPL-MCNC: 35 MG/DL — HIGH (ref 7–23)
CALCIUM SERPL-MCNC: 8.6 MG/DL — SIGNIFICANT CHANGE UP (ref 8.4–10.5)
CHLORIDE SERPL-SCNC: 94 MMOL/L — LOW (ref 98–107)
CO2 SERPL-SCNC: 26 MMOL/L — SIGNIFICANT CHANGE UP (ref 22–31)
CREAT SERPL-MCNC: 3.55 MG/DL — HIGH (ref 0.5–1.3)
EOSINOPHIL # BLD AUTO: 0.14 K/UL — SIGNIFICANT CHANGE UP (ref 0–0.5)
EOSINOPHIL NFR BLD AUTO: 3.4 % — SIGNIFICANT CHANGE UP (ref 0–6)
GLUCOSE SERPL-MCNC: 128 MG/DL — HIGH (ref 70–99)
HCT VFR BLD CALC: 28.7 % — LOW (ref 34.5–45)
HGB BLD-MCNC: 9.2 G/DL — LOW (ref 11.5–15.5)
IMM GRANULOCYTES # BLD AUTO: 0.02 # — SIGNIFICANT CHANGE UP
IMM GRANULOCYTES NFR BLD AUTO: 0.5 % — SIGNIFICANT CHANGE UP (ref 0–1.5)
LYMPHOCYTES # BLD AUTO: 0.3 K/UL — LOW (ref 1–3.3)
LYMPHOCYTES # BLD AUTO: 7.2 % — LOW (ref 13–44)
MAGNESIUM SERPL-MCNC: 2.3 MG/DL — SIGNIFICANT CHANGE UP (ref 1.6–2.6)
MCHC RBC-ENTMCNC: 29.1 PG — SIGNIFICANT CHANGE UP (ref 27–34)
MCHC RBC-ENTMCNC: 32.1 % — SIGNIFICANT CHANGE UP (ref 32–36)
MCV RBC AUTO: 90.8 FL — SIGNIFICANT CHANGE UP (ref 80–100)
MONOCYTES # BLD AUTO: 0.5 K/UL — SIGNIFICANT CHANGE UP (ref 0–0.9)
MONOCYTES NFR BLD AUTO: 12 % — SIGNIFICANT CHANGE UP (ref 2–14)
NEUTROPHILS # BLD AUTO: 3.18 K/UL — SIGNIFICANT CHANGE UP (ref 1.8–7.4)
NEUTROPHILS NFR BLD AUTO: 76.2 % — SIGNIFICANT CHANGE UP (ref 43–77)
NRBC # FLD: 0 — SIGNIFICANT CHANGE UP
PHOSPHATE SERPL-MCNC: 3.2 MG/DL — SIGNIFICANT CHANGE UP (ref 2.5–4.5)
PLATELET # BLD AUTO: 113 K/UL — LOW (ref 150–400)
PMV BLD: 12.6 FL — SIGNIFICANT CHANGE UP (ref 7–13)
POTASSIUM SERPL-MCNC: 4.8 MMOL/L — SIGNIFICANT CHANGE UP (ref 3.5–5.3)
POTASSIUM SERPL-SCNC: 4.8 MMOL/L — SIGNIFICANT CHANGE UP (ref 3.5–5.3)
RBC # BLD: 3.16 M/UL — LOW (ref 3.8–5.2)
RBC # FLD: 17.3 % — HIGH (ref 10.3–14.5)
SODIUM SERPL-SCNC: 134 MMOL/L — LOW (ref 135–145)
SPECIMEN SOURCE: SIGNIFICANT CHANGE UP
T4 FREE SERPL-MCNC: 0.85 NG/DL — LOW (ref 0.9–1.8)
TSH SERPL-MCNC: 16.55 UIU/ML — HIGH (ref 0.27–4.2)
WBC # BLD: 4.17 K/UL — SIGNIFICANT CHANGE UP (ref 3.8–10.5)
WBC # FLD AUTO: 4.17 K/UL — SIGNIFICANT CHANGE UP (ref 3.8–10.5)

## 2018-11-02 PROCEDURE — 90792 PSYCH DIAG EVAL W/MED SRVCS: CPT

## 2018-11-02 PROCEDURE — 90935 HEMODIALYSIS ONE EVALUATION: CPT | Mod: GC

## 2018-11-02 PROCEDURE — 99233 SBSQ HOSP IP/OBS HIGH 50: CPT

## 2018-11-02 RX ORDER — ERYTHROPOIETIN 10000 [IU]/ML
4000 INJECTION, SOLUTION INTRAVENOUS; SUBCUTANEOUS
Qty: 0 | Refills: 0 | Status: DISCONTINUED | OUTPATIENT
Start: 2018-11-02 | End: 2018-11-20

## 2018-11-02 RX ORDER — LISINOPRIL 2.5 MG/1
20 TABLET ORAL DAILY
Qty: 0 | Refills: 0 | Status: DISCONTINUED | OUTPATIENT
Start: 2018-11-03 | End: 2018-11-04

## 2018-11-02 RX ORDER — CARVEDILOL PHOSPHATE 80 MG/1
12.5 CAPSULE, EXTENDED RELEASE ORAL EVERY 12 HOURS
Qty: 0 | Refills: 0 | Status: DISCONTINUED | OUTPATIENT
Start: 2018-11-02 | End: 2018-11-20

## 2018-11-02 RX ORDER — ISOSORBIDE MONONITRATE 60 MG/1
60 TABLET, EXTENDED RELEASE ORAL DAILY
Qty: 0 | Refills: 0 | Status: DISCONTINUED | OUTPATIENT
Start: 2018-11-02 | End: 2018-11-06

## 2018-11-02 RX ORDER — LISINOPRIL 2.5 MG/1
10 TABLET ORAL ONCE
Qty: 0 | Refills: 0 | Status: COMPLETED | OUTPATIENT
Start: 2018-11-02 | End: 2018-11-02

## 2018-11-02 RX ADMIN — SODIUM CHLORIDE 3 MILLILITER(S): 9 INJECTION INTRAMUSCULAR; INTRAVENOUS; SUBCUTANEOUS at 15:15

## 2018-11-02 RX ADMIN — Medication 125 MICROGRAM(S): at 05:13

## 2018-11-02 RX ADMIN — Medication 0.3 MILLIGRAM(S): at 08:15

## 2018-11-02 RX ADMIN — Medication 100 MILLIGRAM(S): at 13:29

## 2018-11-02 RX ADMIN — SODIUM CHLORIDE 3 MILLILITER(S): 9 INJECTION INTRAMUSCULAR; INTRAVENOUS; SUBCUTANEOUS at 05:14

## 2018-11-02 RX ADMIN — CHLORHEXIDINE GLUCONATE 1 APPLICATION(S): 213 SOLUTION TOPICAL at 09:00

## 2018-11-02 RX ADMIN — SODIUM CHLORIDE 3 MILLILITER(S): 9 INJECTION INTRAMUSCULAR; INTRAVENOUS; SUBCUTANEOUS at 22:08

## 2018-11-02 RX ADMIN — ATORVASTATIN CALCIUM 10 MILLIGRAM(S): 80 TABLET, FILM COATED ORAL at 22:05

## 2018-11-02 RX ADMIN — Medication 0.3 MILLIGRAM(S): at 15:14

## 2018-11-02 RX ADMIN — Medication 100 MILLIGRAM(S): at 17:31

## 2018-11-02 RX ADMIN — Medication 667 MILLIGRAM(S): at 15:16

## 2018-11-02 RX ADMIN — CARVEDILOL PHOSPHATE 12.5 MILLIGRAM(S): 80 CAPSULE, EXTENDED RELEASE ORAL at 17:31

## 2018-11-02 RX ADMIN — SERTRALINE 50 MILLIGRAM(S): 25 TABLET, FILM COATED ORAL at 15:14

## 2018-11-02 RX ADMIN — LISINOPRIL 10 MILLIGRAM(S): 2.5 TABLET ORAL at 08:17

## 2018-11-02 RX ADMIN — Medication 100 MILLIGRAM(S): at 05:14

## 2018-11-02 RX ADMIN — Medication 100 MILLIGRAM(S): at 05:13

## 2018-11-02 RX ADMIN — LISINOPRIL 10 MILLIGRAM(S): 2.5 TABLET ORAL at 05:14

## 2018-11-02 RX ADMIN — AMLODIPINE BESYLATE 10 MILLIGRAM(S): 2.5 TABLET ORAL at 05:14

## 2018-11-02 RX ADMIN — ISOSORBIDE MONONITRATE 30 MILLIGRAM(S): 60 TABLET, EXTENDED RELEASE ORAL at 13:28

## 2018-11-02 RX ADMIN — SENNA PLUS 2 TABLET(S): 8.6 TABLET ORAL at 22:05

## 2018-11-02 NOTE — PROGRESS NOTE ADULT - PROBLEM SELECTOR PLAN 3
asymptomatic bradycardia on coreg-decreased to 12.5mg BID  Appreciate EP eval: No advanced AV blocks or persistent significant chelsea <40 bpm seen on telemetry, per EP if symptomatic chelsea or advanced AV block occurs, then would reduce Carvedilol dosage    -No acute pacing indication at present time  [id number] score of 6   NO AC given hx of multiple falls.

## 2018-11-02 NOTE — PROGRESS NOTE ADULT - PROBLEM SELECTOR PLAN 1
patient hypothermia to 94 rectally on 10/31 now improving, bradycardic, elevated rpt TSH with low FT4  10/31 Bcx NGTD  CT A/P unremarkable, CXR with mild pulm edema but no evidence of infection  Synthroid increased to 125mcg to be given at 6am on empty stomach for better absorption. Will consider Endocrine consult if continued hypothermia

## 2018-11-02 NOTE — BEHAVIORAL HEALTH ASSESSMENT NOTE - SUMMARY
71 y/o female from [facility] with PMHx of type 2 DM, HTN, nonobstructive CAD, ESRD on HD TTS, syncope s/p ILR, frequent falls c/b prior nasal fracture and cervical spine fracture, Afib no a/c 2/2 recent fall with scalp laceration/hematoma admitted for hypertensive emergency in the ICU and transferred to medicine when stable for further management. Psychiatry consulted for depression.    Patient is sad about her living situation but not depressed, anxious or pulling her hair. She feels well and happy that her sister is visiting. She otherwise states she is feeling well. 71 y/o female from [facility] with PMHx of type 2 DM, HTN, nonobstructive CAD, ESRD on HD TTS, syncope s/p ILR, frequent falls c/b prior nasal fracture and cervical spine fracture, Afib no a/c 2/2 recent fall with scalp laceration/hematoma admitted for hypertensive emergency in the ICU and transferred to medicine when stable for further management. Psychiatry consulted for depression.    Patient is sad about her living situation but not depressed, anxious or pulling her hair. She feels well and happy that her sister is visiting. She otherwise states she is feeling well. Denies any si or hi, or psychosis

## 2018-11-02 NOTE — PROGRESS NOTE ADULT - PROBLEM SELECTOR PLAN 2
CXR with pulm edema, now improved  continue clonidine, hydralazine, decreased Coreg to 12.5 mg BID  Increased lisinopril to 20mg  HD MWF with fluid removal as tolerated
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
## 2018-11-02 NOTE — DISCHARGE NOTE ADULT - CARE PLAN
Principal Discharge DX:	Chest pain, unspecified type  Goal:	Free of chest pain and to be hemodynamically stable  Assessment and plan of treatment:	Follow up with PMD as out pt in a week- Started pt on Imdur for better control  Secondary Diagnosis:	Hypothermia  Goal:	Resolved  Assessment and plan of treatment:	Pt was started on Synthyroid 125 mcg Daily - Will need repeat TFTs in 3-4 weeks  Secondary Diagnosis:	Hypertensive emergency  Assessment and plan of treatment:	Follow up with Renal as out pt- minoxidil added to regimen for BP control Principal Discharge DX:	Chest pain, unspecified type  Goal:	Free of chest pain and to be hemodynamically stable  Assessment and plan of treatment:	Follow up with PMD as out pt in a week- Started pt on Imdur and Minoxidil for better control of BP   Will need to follow up with PMD/ Nephrology as out pt  Secondary Diagnosis:	Hypothermia  Goal:	Resolved  Assessment and plan of treatment:	Pt was started on Synthyroid 125 mcg Daily - Will need repeat TFTs in 3-4 weeks  Secondary Diagnosis:	Hypertensive emergency  Assessment and plan of treatment:	Follow up with Renal as out pt- minoxidil added to regimen for BP control Principal Discharge DX:	Chest pain, unspecified type  Goal:	Free of chest pain and to be hemodynamically stable  Assessment and plan of treatment:	Follow up with PMD as out pt in a week- Started pt on Imdur and Minoxidil for better control of BP   Will need to follow up with PMD/ Nephrology as out pt  Secondary Diagnosis:	Hypothermia  Goal:	Resolved  Assessment and plan of treatment:	Pt was started on Synthyroid 125 mcg Daily - Will need repeat TFTs in 3-4 weeks  Secondary Diagnosis:	Hypertensive emergency  Assessment and plan of treatment:	Continue current blood pressure medication regimen as directed. Monitor for any visual changes, headaches or dizziness.  Monitor blood pressure regularly.  Follow up with your PCP for further management for high blood pressure, please call to make appointment within 1 week of discharge  Secondary Diagnosis:	ESRD (end stage renal disease)  Assessment and plan of treatment:	Continue with HD per routine. Abide by renal diet.  Your phoslo was discontinued prior to discharge, follow up with your nephrologist for resumption.  Secondary Diagnosis:	Afib  Assessment and plan of treatment:	Continue with rate control medications as prescribed. NO AC given hx of multiple falls and positive FOBT. Monitor for palpitations and chest pain. Follow up with cardiologist within 1 week of discharge.  Secondary Diagnosis:	Nausea and vomiting, intractability of vomiting not specified, unspecified vomiting type  Assessment and plan of treatment:	Continue with anti-emetics as prescribed.  Secondary Diagnosis:	Anemia due to chronic kidney disease  Assessment and plan of treatment:	Continue with protonix. Continue with epogen during HD as per your nephrologist.

## 2018-11-02 NOTE — DISCHARGE NOTE ADULT - CARE PROVIDERS DIRECT ADDRESSES
[email or web address],DirectAddress_Unknown,DirectAddress_Unknown

## 2018-11-02 NOTE — CHART NOTE - NSCHARTNOTEFT_GEN_A_CORE
The patient Lissy Mullen has diagnoses of .9, ESRD on HD N18.6, Hypertensive urgency  I16.0, Elevated trop R74.8, HTN I10, Afib I48.91, and cannot perform ADLs with the use of cane, rolling walker or crutches. The patient can self-propel and will require a standard wheelchair in order to move about in the home.

## 2018-11-02 NOTE — PROGRESS NOTE ADULT - SUBJECTIVE AND OBJECTIVE BOX
[facility] DIVISION OF KIDNEY DISEASES AND HYPERTENSION -- HEMODIALYSIS NOTE  --------------------------------------------------------------------------------      Chief Complaint: ESRD/Ongoing hemodialysis requirement    24 hour events/subjective:        PAST HISTORY  --------------------------------------------------------------------------------  No significant changes to PMH, PSH, FHx, SHx, unless otherwise noted    ALLERGIES & MEDICATIONS  --------------------------------------------------------------------------------  Allergies    Avelox (Unknown)  fish (Hives; Rash)  shellfish (Unknown)    Intolerances      Standing Inpatient Medications  amLODIPine   Tablet 10 milliGRAM(s) Oral daily  atorvastatin 10 milliGRAM(s) Oral at bedtime  bisacodyl Suppository 10 milliGRAM(s) Rectal once  calcium acetate 667 milliGRAM(s) Oral with lunch  carvedilol 12.5 milliGRAM(s) Oral every 12 hours  chlorhexidine 4% Liquid 1 Application(s) Topical <User Schedule>  cloNIDine 0.3 milliGRAM(s) Oral every 8 hours  docusate sodium 100 milliGRAM(s) Oral two times a day  hydrALAZINE 100 milliGRAM(s) Oral every 8 hours  isosorbide   mononitrate ER Tablet (IMDUR) 30 milliGRAM(s) Oral daily  levothyroxine 125 MICROGram(s) Oral daily  senna 2 Tablet(s) Oral at bedtime  sertraline 50 milliGRAM(s) Oral daily  sodium chloride 0.9% lock flush 3 milliLiter(s) IV Push every 8 hours    PRN Inpatient Medications  acetaminophen   Tablet .. 650 milliGRAM(s) Oral every 6 hours PRN  aluminum hydroxide/magnesium hydroxide/simethicone Suspension 30 milliLiter(s) Oral every 6 hours PRN  clonazePAM Tablet 0.5 milliGRAM(s) Oral at bedtime PRN      REVIEW OF SYSTEMS  --------------------------------------------------------------------------------  Gen: No weakness  Skin: No new rashes  Head/Eyes/Ears/Mouth: No headache; no sore throat  Respiratory: No dyspnea, cough, wheezing, hemoptysis  GI: No abdominal pain, diarrhea, constipation, nausea, vomiting,  : No  hematuria,  MSK:   no edema  Neuro:  seizures, numbness, tingling      All other systems were reviewed and are negative, except as noted.  VITALS/PHYSICAL EXAM  --------------------------------------------------------------------------------  T(C): 36.8 (11-02-18 @ 09:22), Max: 36.9 (11-02-18 @ 03:20)  HR: 60 (11-02-18 @ 09:22) (54 - 62)  BP: 162/66 (11-02-18 @ 09:22) (162/66 - 185/72)  RR: 16 (11-02-18 @ 09:22) (16 - 18)  SpO2: 100% (11-02-18 @ 09:22) (100% - 100%)  Wt(kg): --    Weight (kg): 57 (10-31-18 @ 17:54)      Physical Exam:  	Gen: NAD,  	HEENT: PERRL, supple neck, clear oropharynx  	Pulm: CTA B/L  	CV: RRR, S1S2; no rub  	Abd: +BS, soft, nontender/nondistended  	LE:  no edema  	Neuro: No focal deficits,   	Psych: Normal affect and mood              Vascular R AVF       LABS/STUDIES  --------------------------------------------------------------------------------              9.2    4.17  >-----------<  113      [11-02-18 @ 05:50]              28.7     134  |  94  |  35  ----------------------------<  128      [11-02-18 @ 05:50]  4.8   |  26  |  3.55        Ca     8.6     [11-02-18 @ 05:50]      Mg     2.3     [11-02-18 @ 05:50]      Phos  3.2     [11-02-18 @ 05:50]            HbA1c 5.5      [10-31-18 @ 06:25]  TSH 16.55      [11-02-18 @ 05:50]  Lipid: chol 92, , HDL 35, LDL 39      [10-31-18 @ 06:25]    HBsAb 10.8      [10-19-18 @ 21:45]  HBsAg NEGATIVE      [10-19-18 @ 21:45]  HCV 0.13, Nonreactive Hepatitis C AB  S/CO Ratio                        Interpretation  < 1.0                                     Non-Reactive  1.0 - 4.9                           Weakly-Reactive  > 5.0                                 Reactive  Non-Reactive: Aperson with a non-reactive HCV antibody  result is considered uninfected.  No further action is  needed unless recent infection is suspected.  In these  cases, consider repeat testing later to detect  seroconversion..  Weakly-Reactive: HCV antibody test is abnormal, HCV RNA  Qualitative test will follow.  Reactive: HCV antibody test is abnormal, HCV RNA  Qualitative test will follow.  Note: HCV antibody testing is performed on the [employer] system.      [10-19-18 @ 21:45]

## 2018-11-02 NOTE — BEHAVIORAL HEALTH ASSESSMENT NOTE - NSBHCHARTREVIEWVS_PSY_A_CORE FT
Vital Signs Last 24 Hrs  T(C): 36.5 (02 Nov 2018 15:10), Max: 37 (02 Nov 2018 10:50)  T(F): 97.7 (02 Nov 2018 15:10), Max: 98.6 (02 Nov 2018 10:50)  HR: 95 (02 Nov 2018 15:10) (57 - 95)  BP: 193/59 (02 Nov 2018 15:10) (162/66 - 199/66)  BP(mean): --  RR: 16 (02 Nov 2018 15:10) (16 - 18)  SpO2: 100% (02 Nov 2018 15:10) (100% - 100%)

## 2018-11-02 NOTE — PROGRESS NOTE ADULT - PROBLEM SELECTOR PLAN 4
increased Imdur to 60mg for unstable angina  Patient w/ frequent admissions in the past for similar presentations, complaining of pain in head/chest/abdomen. HST in 400s, no significant delta change

## 2018-11-02 NOTE — PROGRESS NOTE ADULT - PROBLEM SELECTOR PLAN 7
No acute findings on CT abdomen, patient w/ chronic abdominal pain,  bowel regimen for constipation, patient refused dulcolax suppository

## 2018-11-02 NOTE — BEHAVIORAL HEALTH ASSESSMENT NOTE - RISK ASSESSMENT
low: depression- improved, medical issues, dependent for care, anxiety, but denies any si or hi, or psychosis, more supports in family is now in place, future oriented, no h/o of mental health issues

## 2018-11-02 NOTE — DISCHARGE NOTE ADULT - MEDICATION SUMMARY - MEDICATIONS TO TAKE
I will START or STAY ON the medications listed below when I get home from the hospital:    acetaminophen 325 mg oral tablet  -- 2 tab(s) by mouth every 6 hours, As needed, Mild Pain (1 - 3), Moderate Pain (4 - 6), Severe Pain (7 - 10)  -- Indication: For Pain    lisinopril 40 mg oral tablet  -- 1 tab(s) by mouth once a day  -- Indication: For CHF (congestive heart failure)    cloNIDine 0.3 mg oral tablet  -- 1 tab(s) by mouth every 8 hours  -- Indication: For Hypertension, unspecified type    isosorbide mononitrate 120 mg oral tablet, extended release  -- 1 tab(s) by mouth once a day  -- Indication: For CHF (congestive heart failure)    clonazePAM 0.5 mg oral tablet  -- 1 tab(s) by mouth once a day (at bedtime)  -- Indication: For Anxiety    sertraline 50 mg oral tablet  -- 1 tab(s) by mouth once a day  -- Indication: For Adjustment disorder with mixed anxiety and depressed mood    prochlorperazine 10 mg oral tablet  -- 1 tab(s) by mouth every 6 hours  -- Indication: For Nausea and vomiting, intractability of vomiting not specified, unspecified vomiting type    Zofran ODT 4 mg oral tablet, disintegrating  -- 1 tab(s) by mouth every 6 hours, As Needed   -- Indication: For Nausea and vomiting, intractability of vomiting not specified, unspecified vomiting type    atorvastatin 10 mg oral tablet  -- 1 tab(s) by mouth once a day (at bedtime)  -- Indication: For Hyperlipidemia    carvedilol 12.5 mg oral tablet  -- 1 tab(s) by mouth every 12 hours  -- Indication: For Afib, HTN    amLODIPine 10 mg oral tablet  -- 1 tab(s) by mouth once a day  -- Indication: For HTN (hypertension)    lidocaine 5% topical film  -- Apply on skin to affected area once a day  -- Indication: For Pain    dicyclomine 10 mg oral capsule  -- 1 cap(s) by mouth 3 times a day (before meals)  -- Indication: For Nausea and vomiting, intractability of vomiting not specified, unspecified vomiting type    senna oral tablet  -- 2 tab(s) by mouth once a day (at bedtime)  -- Indication: For Constipation    docusate sodium 100 mg oral capsule  -- 1 cap(s) by mouth 3 times a day  -- Indication: For Constipation    polyethylene glycol 3350 oral powder for reconstitution  -- 17 gram(s) by mouth once a day, As needed, Constipation  -- Indication: For Constipation    sucralfate 1 g/10 mL oral suspension  -- 10 milliliter(s) by mouth 4 times a day  -- Indication: For Gastritis    pantoprazole 40 mg oral delayed release tablet  -- 1 tab(s) by mouth 2 times a day  -- Indication: For Gastritis    levothyroxine 125 mcg (0.125 mg) oral tablet  -- 1 tab(s) by mouth once a day  -- Indication: For Hypothyroidism    hydrALAZINE 100 mg oral tablet  -- 1 tab(s) by mouth every 8 hours  -- Indication: For HTN (hypertension)    minoxidil 2.5 mg oral tablet  -- 2 tab(s) by mouth 2 times a day  -- Indication: For CHF (congestive heart failure)    cholecalciferol oral tablet  -- 1 tab(s) by mouth once a day   -- Take with food.    -- Indication: For Supplement

## 2018-11-02 NOTE — DISCHARGE NOTE ADULT - INSTRUCTIONS
Consistent carb / DASH diet- fluid restriction 1200cc Dysphagia 2 mechanical soft, thin liquids  Renal restriction   Nepro 1 can three times per day

## 2018-11-02 NOTE — DISCHARGE NOTE ADULT - CARE PROVIDER_API CALL
Wang Reynolds), Internal Medicine; Nephrology  [address] [place] [address]  [address] [place] [address]  Phone: [phone]  Fax: [phone] Wang Reynolds), Internal Medicine; Nephrology  [address]  [address] [place] [address]  Phone: [phone]  Fax: [phone]    Librado Helm (DO), Gastroenterology; Internal Medicine  [address] [place] [address]  Phone: [phone]  Fax: [phone]    Anoop Tomas (DO), Cardiology; Internal Medicine  [address]  [address]  [place] [address]  Phone: [phone]  Fax: [phone]

## 2018-11-02 NOTE — PROGRESS NOTE ADULT - SUBJECTIVE AND OBJECTIVE BOX
Patient is a 72y old  Female who presents with a chief complaint of chest pain (02 Nov 2018 12:28)      SUBJECTIVE / OVERNIGHT EVENTS: No acute events overnight. : [id number] used. Reports chest pain, SOB now improved. Feeling depressed and requests to speak to therapist, denies suicidal ideations. States feels better now that her sister is with her. Does not want to go back to rehab and states she was unhappy with the services there and that she was treated poorly. Wants to go home and agreeable to home PT services.    MEDICATIONS  (STANDING):  amLODIPine   Tablet 10 milliGRAM(s) Oral daily  atorvastatin 10 milliGRAM(s) Oral at bedtime  bisacodyl Suppository 10 milliGRAM(s) Rectal once  calcium acetate 667 milliGRAM(s) Oral with lunch  carvedilol 12.5 milliGRAM(s) Oral every 12 hours  chlorhexidine 4% Liquid 1 Application(s) Topical <User Schedule>  cloNIDine 0.3 milliGRAM(s) Oral every 8 hours  docusate sodium 100 milliGRAM(s) Oral two times a day  epoetin iam Injectable 4000 Unit(s) IV Push <User Schedule>  hydrALAZINE 100 milliGRAM(s) Oral every 8 hours  isosorbide   mononitrate ER Tablet (IMDUR) 60 milliGRAM(s) Oral daily  levothyroxine 125 MICROGram(s) Oral daily  senna 2 Tablet(s) Oral at bedtime  sertraline 50 milliGRAM(s) Oral daily  sodium chloride 0.9% lock flush 3 milliLiter(s) IV Push every 8 hours    MEDICATIONS  (PRN):  acetaminophen   Tablet .. 650 milliGRAM(s) Oral every 6 hours PRN Mild Pain (1 - 3), Moderate Pain (4 - 6), Severe Pain (7 - 10)  aluminum hydroxide/magnesium hydroxide/simethicone Suspension 30 milliLiter(s) Oral every 6 hours PRN Dyspepsia  clonazePAM Tablet 0.5 milliGRAM(s) Oral at bedtime PRN Anxiety      T(C): 37 (11-02-18 @ 10:50), Max: 37 (11-02-18 @ 10:50)  HR: 60 (11-02-18 @ 10:50) (57 - 62)  BP: 167/79 (11-02-18 @ 10:50) (162/66 - 185/72)  RR: 16 (11-02-18 @ 10:50) (16 - 18)  SpO2: 100% (11-02-18 @ 09:22) (100% - 100%)  CAPILLARY BLOOD GLUCOSE        I&O's Summary      PHYSICAL EXAM: seen at dialysis  GENERAL: no apparent distress, on nasal cannula  EYES: sclera clear b/l  CHEST/LUNG: CTA b/l  HEART: s1/s2, no murmurs appreciated, RRR, bradycardic  ABDOMEN: Soft, nontender, Nondistended; Bowel sounds present  EXTREMITIES:  2+ Peripheral Pulses, No clubbing, cyanosis, or edema  NEUROLOGY: awake, alert, responds to Qs appropriately, follows commands  PSYCH: AAOx3  VASCULAR: left arm AVF with thrill and bruit    LABS:                        9.2    4.17  )-----------( 113      ( 02 Nov 2018 05:50 )             28.7     11-02    134<L>  |  94<L>  |  35<H>  ----------------------------<  128<H>  4.8   |  26  |  3.55<H>    Ca    8.6      02 Nov 2018 05:50  Phos  3.2     11-02  Mg     2.3     11-02                RADIOLOGY & ADDITIONAL TESTS: Patient is a 72y old  Female who presents with a chief complaint of chest pain (02 Nov 2018 12:28)      SUBJECTIVE / OVERNIGHT EVENTS: No acute events overnight. : [id number] used. Reports chest pain, SOB now improved. Feeling depressed and requests to speak to therapist, denies suicidal ideations. States feels better now that her sister is with her. Does not want to go back to rehab and states she was unhappy with the services there and that she was treated poorly. Wants to go home and agreeable to home PT services.    MEDICATIONS  (STANDING):  amLODIPine   Tablet 10 milliGRAM(s) Oral daily  atorvastatin 10 milliGRAM(s) Oral at bedtime  bisacodyl Suppository 10 milliGRAM(s) Rectal once  calcium acetate 667 milliGRAM(s) Oral with lunch  carvedilol 12.5 milliGRAM(s) Oral every 12 hours  chlorhexidine 4% Liquid 1 Application(s) Topical <User Schedule>  cloNIDine 0.3 milliGRAM(s) Oral every 8 hours  docusate sodium 100 milliGRAM(s) Oral two times a day  epoetin iam Injectable 4000 Unit(s) IV Push <User Schedule>  hydrALAZINE 100 milliGRAM(s) Oral every 8 hours  isosorbide   mononitrate ER Tablet (IMDUR) 60 milliGRAM(s) Oral daily  levothyroxine 125 MICROGram(s) Oral daily  senna 2 Tablet(s) Oral at bedtime  sertraline 50 milliGRAM(s) Oral daily  sodium chloride 0.9% lock flush 3 milliLiter(s) IV Push every 8 hours    MEDICATIONS  (PRN):  acetaminophen   Tablet .. 650 milliGRAM(s) Oral every 6 hours PRN Mild Pain (1 - 3), Moderate Pain (4 - 6), Severe Pain (7 - 10)  aluminum hydroxide/magnesium hydroxide/simethicone Suspension 30 milliLiter(s) Oral every 6 hours PRN Dyspepsia  clonazePAM Tablet 0.5 milliGRAM(s) Oral at bedtime PRN Anxiety      T(C): 37 (11-02-18 @ 10:50), Max: 37 (11-02-18 @ 10:50)  HR: 60 (11-02-18 @ 10:50) (57 - 62)  BP: 167/79 (11-02-18 @ 10:50) (162/66 - 185/72)  RR: 16 (11-02-18 @ 10:50) (16 - 18)  SpO2: 100% (11-02-18 @ 09:22) (100% - 100%)  CAPILLARY BLOOD GLUCOSE        I&O's Summary      PHYSICAL EXAM: seen at dialysis  GENERAL: no apparent distress, on nasal cannula  EYES: sclera clear b/l  CHEST/LUNG: CTA b/l  HEART: s1/s2, no murmurs appreciated, RRR, bradycardic  ABDOMEN: Soft, nontender, Nondistended; Bowel sounds present  EXTREMITIES:  2+ Peripheral Pulses, No clubbing, cyanosis, or edema  NEUROLOGY: awake, alert, responds to Qs appropriately, follows commands  PSYCH: AAOx3, tearful  VASCULAR: left arm AVF with thrill and bruit    LABS:                        9.2    4.17  )-----------( 113      ( 02 Nov 2018 05:50 )             28.7     11-02    134<L>  |  94<L>  |  35<H>  ----------------------------<  128<H>  4.8   |  26  |  3.55<H>    Ca    8.6      02 Nov 2018 05:50  Phos  3.2     11-02  Mg     2.3     11-02                RADIOLOGY & ADDITIONAL TESTS:

## 2018-11-02 NOTE — DISCHARGE NOTE ADULT - PATIENT PORTAL LINK FT
You can access the [facility] Patient Portal, offered by [facility], by registering with the following website: [email or web address]

## 2018-11-02 NOTE — DISCHARGE NOTE ADULT - MEDICATION SUMMARY - MEDICATIONS TO STOP TAKING
I will STOP taking the medications listed below when I get home from the hospital:    calcium acetate 667 mg oral tablet  -- 1 tab(s) by mouth once a day    lactobacillus acidophilus oral capsule  -- 1 tab(s) by mouth 2 times a day    gabapentin 100 mg oral capsule  -- 1 cap(s) by mouth once a day    famotidine 20 mg oral tablet  -- 1 tab(s) by mouth once a day    insulin lispro (concentrated) 200 units/mL subcutaneous solution  --  subcutaneous   1 Unit(s) if Glucose 151 - 200  2 Unit(s) if Glucose 201 - 250  3 Unit(s) if Glucose 251 - 300  4 Unit(s) if Glucose 301 - 350  5 Unit(s) if Glucose 351 - 400  6 Unit(s) if Glucose Greater Than 400    insulin lispro (concentrated) 200 units/mL subcutaneous solution  -- 1 unit(s) subcutaneous once a day (at bedtime)  0 Unit(s) if Glucose 0 - 250  1 Unit(s) if Glucose 251 - 300  2 Unit(s) if Glucose 301 - 350  3 Unit(s) if Glucose 351 - 400  4 Unit(s) if Glucose Greater Than 400

## 2018-11-02 NOTE — DISCHARGE NOTE ADULT - SECONDARY DIAGNOSIS.
Hypothermia Hypertensive emergency ESRD (end stage renal disease) Afib Nausea and vomiting, intractability of vomiting not specified, unspecified vomiting type Anemia due to chronic kidney disease

## 2018-11-02 NOTE — DISCHARGE NOTE ADULT - HOSPITAL COURSE
72F h/o htn, anemia, pulm HTN, non obstructive CAD, depression, diastolic HF, ESRD on HD[MWF] BIBEMS from [facility] for CP. Found to have hypertensive emergency with acute pulm edema complicated by hypothermia.     Abdominal distension.   No acute findings on CT abdomen, patient w/ chronic abdominal pain,  AXR on 11/3 with nonobstructive bowel pattern        Hypothermia   patient hypothermia to 94 rectally on 10/31 now improving, bradycardic, elevated rpt TSH with low FT4  10/31 Bcx NGTD  CT A/P unremarkable, CXR with mild pulm edema but no evidence of infection  Synthroid increased to 125mcg to be given at 6am on empty stomach for better absorption.      Hypertensive emergency  Repeat CXR showing pulmonary edema   continue with lisinopril, clonidine, hydralazine, decreased Coreg to 12.5 mg BID  Needs fluid removal at HD  Still having elevated BP, on nearly max meds.  Monitor BP after HD.      Atrial fibrillation with slow ventricular response.  asymptomatic bradycardia on coreg-decreased to 12.5mg BID  Appreciate EP eval: No advanced AV blocks or persistent significant chelsea <40 bpm seen on telemetry, per EP if symptomatic chelsea or advanced AV block occurs, then would reduce Carvedilol dosage    -No acute pacing indication at present time  [id number] score of 6   NO AC given hx of multiple falls and positive FOBT.      Problem/Plan - 5:  ·  Problem: Chest pain, unspecified type.  Plan: Imdur to 60mg for unstable angina  Patient w/ frequent admissions in the past for similar presentations, complaining of pain in head/chest/abdomen. HST in 400s, no significant delta change.      Problem/Plan - 6:  Problem: Elevated troponin. Plan: likely due to renal failure.     Problem/Plan - 7:  ·  Problem: Positive fecal occult blood test.  Plan: H/H stable, no gross bleeding noted.      Problem/Plan - 8:  ·  Problem: Hypothyroidism.  Plan: cont levothyroxine-increase to 125mcg on 10/31 on empty stomach, unclear if was taking appropriately  Will need repeat in 3 weeks outpatient for additional titration.      Problem/Plan - 9:  ·  Problem: ESRD (end stage renal disease).  Plan: on HD MWF    Will need to reinstate outpatient HD prior to 72F h/o htn, anemia, pulm HTN, non obstructive CAD, depression, diastolic HF, ESRD on HD[MWF] BIBEMS from [facility] for CP. Found to have hypertensive emergency with acute pulm edema complicated by hypothermia.     Abdominal distension.   No acute findings on CT abdomen, patient w/ chronic abdominal pain,  AXR on 11/3 with nonobstructive bowel pattern        Hypothermia  now improving, elevated rpt TSH with low FT4  10/31 Bcx NGTD  CT A/P unremarkable, CXR with mild pulm edema but no evidence of infection  Synthroid increased to 125mcg to be given at 6am on empty stomach for better absorption.      Hypertensive emergency  Repeat CXR showing pulmonary edema   continue with lisinopril, clonidine, hydralazine, decreased Coreg to 12.5 mg BID  Needs fluid removal at HD  Still having elevated BP, on nearly max meds.  Added minoxidil 2.5mg BID      Atrial fibrillation with slow ventricular response.  asymptomatic bradycardia on coreg-decreased to 12.5mg BID  Appreciate EP eval: No advanced AV blocks or persistent significant chelsea <40 bpm seen on telemetry, per EP if symptomatic chelsea or advanced AV block occurs, then would reduce Carvedilol dosage    -No acute pacing indication at present time  [id number] score of 6   NO AC given hx of multiple falls and positive FOBT.      Chest pain   Imdur to 60mg for unstable angina  Patient w/ frequent admissions in the past for similar presentations, complaining of pain in head/chest/abdomen. HST in 400s, no significant delta change.     Elevated troponin.   likely due to renal failure.     Positive fecal occult blood test   H/H stable, no gross bleeding noted.     Hypothyroidism   cont levothyroxine-increase to 125mcg on 10/31   Will need repeat in 3 weeks outpatient for additional titration.      ESRD (end stage renal disease)  .on HD MWF    Dispo   Pt recs Rehab- family wants to take pt home with Home PT   HD reinstated - 72F h/o htn, anemia, pulm HTN, non obstructive CAD, depression, diastolic HF, ESRD on HD[MWF] BIBEMS from [facility] for CP. Found to have hypertensive emergency with acute pulm edema complicated by hypothermia.     Abdominal distension.   No acute findings on CT abdomen, patient w/ chronic abdominal pain,  AXR on 11/3 with nonobstructive bowel pattern        Hypothermia  now improving, elevated rpt TSH with low FT4  10/31 Bcx NGTD  CT A/P unremarkable, CXR with mild pulm edema but no evidence of infection  Synthroid increased to 125mcg to be given at 6am on empty stomach for better absorption.      Hypertensive emergency  Repeat CXR showing pulmonary edema   continue with lisinopril, clonidine, hydralazine, decreased Coreg to 12.5 mg BID  Needs fluid removal at HD  Still having elevated BP, on nearly max meds.  Added minoxidil 2.5mg BID      Atrial fibrillation with slow ventricular response.  asymptomatic bradycardia on coreg-decreased to 12.5mg BID  Appreciate EP eval: No advanced AV blocks or persistent significant chelsea <40 bpm seen on telemetry, per EP if symptomatic chelsea or advanced AV block occurs, then would reduce Carvedilol dosage    -No acute pacing indication at present time  [id number] score of 6   NO AC given hx of multiple falls and positive FOBT.      Chest pain   Imdur to 60mg for unstable angina  Patient w/ frequent admissions in the past for similar presentations, complaining of pain in head/chest/abdomen. HST in 400s, no significant delta change.     Elevated troponin.   likely due to renal failure.     Positive fecal occult blood test   H/H stable, no gross bleeding noted.     Hypothyroidism   cont levothyroxine-increase to 125mcg on 10/31   Will need repeat in 3 weeks outpatient for additional titration.      ESRD (end stage renal disease)  .on HD MWF    Dispo   Pt recs Rehab- family wants to take pt home with Home PT   HD reinstated -   Will need close follow up with BP   Dispo Home with home PT 72F h/o htn, anemia, pulm HTN, non obstructive CAD, depression, diastolic HF, ESRD on HD[MWF] BIBEMS from [facility] for CP. Found to have hypertensive emergency with acute pulm edema complicated by hypothermia.    Hospital course:       Abdominal distension.  -No acute findings on CT abdomen, patient w/ chronic abdominal pain,  -AXR on 11/3 with nonobstructive bowel pattern  -GI consulted - bowel regimen       Hypothermia  -Now improving, elevated rpt TSH with low FT4  -10/31 Bcx Negative  -CT A/P unremarkable, CXR with mild pulm edema but no evidence of infection  -Synthroid increased to 125mcg to be given at 6am on empty stomach for better absorption  -Repeat TSH labs in 4 weeks      Hypertensive emergency  -Cardiology and nephrology consulted  -Repeat CXR showing pulmonary edema   -Lisinopril, clonidine, hydralazine, decreased Coreg to 12.5 mg BID   -S/p fluid removal with HD  -Added minoxidil 2.5mg BID       Atrial fibrillation with slow ventricular response.  -EP consulted: No advanced AV blocks or persistent significant chelsea <40 bpm seen on telemetry, per EP if symptomatic chelsea or advanced AV block occurs, then would reduce Carvedilol dosage    -Asymptomatic bradycardia on coreg-therefore decreased to 12.5mg BID  -No acute pacing indication at present time  -[id number] score of 6   -NO AC given hx of multiple falls and positive FOBT.     Chest pain  -Imdur   -Patient w/ frequent admissions in the past for similar presentations, complaining of pain in head/chest/abdomen. HST in 400s (likely 2/2 ESRD), no significant delta change.       Elevated troponin.  -Likely due to renal failure.      Positive fecal occult blood test  -H/H stable, no gross bleeding noted.       Hypothyroidism  -levothyroxine-increased to 125mcg on 10/31   -Will need repeat in 3 weeks outpatient for additional titration.       ESRD (end stage renal disease)  -Nephrology consulted  -HD MWF    Dispo   Pt recs Rehab- family wants to take pt home with Home PT   HD reinstated   You need close monitoring of your blood pressure, follow up with your PCP, cardiologist and nephrologist.   Dispo Home with home PT 72F h/o htn, anemia, pulm HTN, non obstructive CAD, depression, diastolic HF, ESRD on HD[MWF] BIBEMS from [facility] for CP. Found to have hypertensive emergency with acute pulm edema complicated by hypothermia.    Hospital course:       Abdominal distension.  -No acute findings on CT abdomen, patient w/ chronic abdominal pain,  -AXR on 11/3 with nonobstructive bowel pattern  -GI consulted - bowel regimen, prn anti-emetics      Hypothermia  -Now improving, elevated rpt TSH with low FT4  -10/31 Bcx Negative  -CT A/P unremarkable, CXR with mild pulm edema but no evidence of infection  -Synthroid increased to 125mcg to be given at 6am on empty stomach for better absorption  -Repeat TSH labs in 4 weeks      Hypertensive emergency  -Cardiology and nephrology consulted  -Repeat CXR showing pulmonary edema   -Lisinopril, clonidine, hydralazine, decreased Coreg to 12.5 mg BID   -S/p fluid removal with HD  -Added minoxidil 2.5mg BID       Atrial fibrillation with slow ventricular response.  -EP consulted: No advanced AV blocks or persistent significant chelsea <40 bpm seen on telemetry, per EP if symptomatic chelsea or advanced AV block occurs, then would reduce Carvedilol dosage    -Asymptomatic bradycardia on coreg-therefore decreased to 12.5mg BID  -No acute pacing indication at present time  -[id number] score of 6   -NO AC given hx of multiple falls and positive FOBT.     Chest pain  -Imdur   -Patient w/ frequent admissions in the past for similar presentations, complaining of pain in head/chest/abdomen. HST in 400s (likely 2/2 ESRD), no significant delta change.       Elevated troponin.  -Likely due to renal failure.      Positive fecal occult blood test  -H/H stable, no gross bleeding noted.       Hypothyroidism  -levothyroxine-increased to 125mcg on 10/31   -Will need repeat in 3 weeks outpatient for additional titration.       ESRD (end stage renal disease)  -Nephrology consulted  -HD MWF    Dispo   Pt recs Rehab- family wants to take pt home with Home PT   HD reinstated   You need close monitoring of your blood pressure, follow up with your PCP, cardiologist and nephrologist.   Dispo Home with home PT

## 2018-11-02 NOTE — DISCHARGE NOTE ADULT - COMMUNITY RESOURCES
[facility] [phone] to be reinstated for Tues 11/6 at 10:15am [facility] [phone] to be reinstated

## 2018-11-02 NOTE — BEHAVIORAL HEALTH ASSESSMENT NOTE - NSBHCHARTREVIEWLAB_PSY_A_CORE FT
9.2    4.17  )-----------( 113      ( 02 Nov 2018 05:50 )             28.7   11-02    134<L>  |  94<L>  |  35<H>  ----------------------------<  128<H>  4.8   |  26  |  3.55<H>    Ca    8.6      02 Nov 2018 05:50  Phos  3.2     11-02  Mg     2.3     11-02

## 2018-11-02 NOTE — BEHAVIORAL HEALTH ASSESSMENT NOTE - NSBHREFERLPTEAMNAME_PSY_A_CORE_FT
DATE OF SERVICE:  05/23/2017      PREOPERATIVE DIAGNOSES:   1.  Renal cell cancer.   2.  Mediastinal lymphadenopathy.   3.  Left adrenal nodules.   4.  Pancreatic lesion.      PROCEDURE PERFORMED:  Endoscopic esophageal ultrasound with fine needle aspiration, biopsy of mediastinal lymph nodes and left adrenal gland.      SURGEON:  Rosaline Chiu MD (present and participated for the entire procedure).      RESIDENT SURGEON:  Luis Miner MD        CO-SURGEON:  Shazia Rhoades MD (I obtained Dr. Rhoades only for credentialing purposes, not for billing purposes.  Dr. Rhoades participated in the endoscopic esophageal ultrasound including the identification of the mediastinal lymph nodes and the left adrenal nodules).      ANESTHESIA:  General.      ESTIMATED BLOOD LOSS:  Zero.      FINDINGS:  We obtained good samples from the adrenal gland as well as from the mediastinal lymph nodes (station 5) and it was suspicious for malignancy.      DESCRIPTION OF PROCEDURE:  With Mickey Amezcua in supine position under general anesthesia, we performed an endoscopic esophageal ultrasound.  We easily identified the 2 nodules in the left adrenal gland which were quite distinct and had some hypoechogenicity in the center.  We sampled these multiple times with a 19-gauge needle and obtained tissue that had atypical features and was most likely malignant.  We also then identified an enlarged lymph node which was about 2 cm in diameter lateral to the pulmonary artery (station 5) and sampled that one 3 or 4 times with a 22-gauge needle through the pulmonary artery.  We also obtained some tissue that was suspicious for malignancy on intraoperative cytology.      I then asked Dr. Cash to evaluate and sample the pancreatic lesion (please refer to his report).      The patient tolerated the procedure well.         ROSALINE CHIU MD             D: 05/23/2017 20:58   T: 05/24/2017 08:40   MT:       Name:     MICKEY AMEZCUA   MRN:      [id number]         Account:        [id number]   :      1962           Procedure Date: 2017      Document: [id number]       cc: [facility] Billing     Dr. Natarajan

## 2018-11-02 NOTE — DISCHARGE NOTE ADULT - MEDICATION SUMMARY - MEDICATIONS TO CHANGE
I will SWITCH the dose or number of times a day I take the medications listed below when I get home from the hospital:    Tylenol 500 mg oral tablet  -- 2 tab(s) by mouth , As Needed    docusate sodium 100 mg oral capsule  -- 1 cap(s) by mouth 2 times a day    isosorbide mononitrate 30 mg oral tablet, extended release  -- 1 tab(s) by mouth once a day    carvedilol 25 mg oral tablet  -- 1 tab(s) by mouth every 12 hours    levothyroxine 100 mcg (0.1 mg) oral tablet  -- 1 tab(s) by mouth once a day

## 2018-11-02 NOTE — DISCHARGE NOTE ADULT - ADDITIONAL INSTRUCTIONS
Follow up with PCP within 1 week of discharge,  Continue with HD per routine. Follow up with nephrologist within 1 week of discharge.  Follow up with cardiologist within 1 week of discharge.   Follow up with gastroenterologist within 1 week of discharge.

## 2018-11-02 NOTE — DISCHARGE NOTE ADULT - HOME CARE AGENCY
[facility] [phone], [phone]. Nurse to visit on the day after discharge.  Other appropriate services to be arranged thereafter.

## 2018-11-02 NOTE — DISCHARGE NOTE ADULT - PLAN OF CARE
Free of chest pain and to be hemodynamically stable Follow up with PMD as out pt in a week- Started pt on Imdur for better control Resolved Pt was started on Synthyroid 125 mcg Daily - Will need repeat TFTs in 3-4 weeks Follow up with Renal as out pt- minoxidil added to regimen for BP control Follow up with PMD as out pt in a week- Started pt on Imdur and Minoxidil for better control of BP   Will need to follow up with PMD/ Nephrology as out pt Continue current blood pressure medication regimen as directed. Monitor for any visual changes, headaches or dizziness.  Monitor blood pressure regularly.  Follow up with your PCP for further management for high blood pressure, please call to make appointment within 1 week of discharge Continue with HD per routine. Abide by renal diet.  Your phoslo was discontinued prior to discharge, follow up with your nephrologist for resumption. Continue with rate control medications as prescribed. NO AC given hx of multiple falls and positive FOBT. Monitor for palpitations and chest pain. Follow up with cardiologist within 1 week of discharge. Continue with anti-emetics as prescribed. Continue with protonix. Continue with epogen during HD as per your nephrologist.

## 2018-11-03 DIAGNOSIS — N18.6 END STAGE RENAL DISEASE: ICD-10-CM

## 2018-11-03 LAB
24R-OH-CALCIDIOL SERPL-MCNC: 14.8 NG/ML — LOW (ref 30–80)
BACTERIA NPH CULT: SIGNIFICANT CHANGE UP

## 2018-11-03 PROCEDURE — 71045 X-RAY EXAM CHEST 1 VIEW: CPT | Mod: 26

## 2018-11-03 PROCEDURE — 99233 SBSQ HOSP IP/OBS HIGH 50: CPT

## 2018-11-03 PROCEDURE — 74018 RADEX ABDOMEN 1 VIEW: CPT | Mod: 26

## 2018-11-03 RX ORDER — DOCUSATE SODIUM 100 MG
100 CAPSULE ORAL THREE TIMES A DAY
Qty: 0 | Refills: 0 | Status: DISCONTINUED | OUTPATIENT
Start: 2018-11-03 | End: 2018-11-20

## 2018-11-03 RX ORDER — POLYETHYLENE GLYCOL 3350 17 G/17G
17 POWDER, FOR SOLUTION ORAL DAILY
Qty: 0 | Refills: 0 | Status: DISCONTINUED | OUTPATIENT
Start: 2018-11-03 | End: 2018-11-16

## 2018-11-03 RX ORDER — ONDANSETRON 8 MG/1
4 TABLET, FILM COATED ORAL ONCE
Qty: 0 | Refills: 0 | Status: COMPLETED | OUTPATIENT
Start: 2018-11-03 | End: 2018-11-04

## 2018-11-03 RX ORDER — QUETIAPINE FUMARATE 200 MG/1
50 TABLET, FILM COATED ORAL DAILY
Qty: 0 | Refills: 0 | Status: DISCONTINUED | OUTPATIENT
Start: 2018-11-03 | End: 2018-11-09

## 2018-11-03 RX ORDER — ACETAMINOPHEN 500 MG
1000 TABLET ORAL ONCE
Qty: 0 | Refills: 0 | Status: COMPLETED | OUTPATIENT
Start: 2018-11-03 | End: 2018-11-03

## 2018-11-03 RX ORDER — HYDRALAZINE HCL 50 MG
10 TABLET ORAL ONCE
Qty: 0 | Refills: 0 | Status: COMPLETED | OUTPATIENT
Start: 2018-11-03 | End: 2018-11-03

## 2018-11-03 RX ORDER — ONDANSETRON 8 MG/1
4 TABLET, FILM COATED ORAL ONCE
Qty: 0 | Refills: 0 | Status: COMPLETED | OUTPATIENT
Start: 2018-11-03 | End: 2018-11-03

## 2018-11-03 RX ADMIN — SODIUM CHLORIDE 3 MILLILITER(S): 9 INJECTION INTRAMUSCULAR; INTRAVENOUS; SUBCUTANEOUS at 05:15

## 2018-11-03 RX ADMIN — Medication 100 MILLIGRAM(S): at 22:05

## 2018-11-03 RX ADMIN — QUETIAPINE FUMARATE 50 MILLIGRAM(S): 200 TABLET, FILM COATED ORAL at 12:19

## 2018-11-03 RX ADMIN — Medication 100 MILLIGRAM(S): at 05:12

## 2018-11-03 RX ADMIN — Medication 1000 MILLIGRAM(S): at 11:22

## 2018-11-03 RX ADMIN — SERTRALINE 50 MILLIGRAM(S): 25 TABLET, FILM COATED ORAL at 12:19

## 2018-11-03 RX ADMIN — ATORVASTATIN CALCIUM 10 MILLIGRAM(S): 80 TABLET, FILM COATED ORAL at 22:04

## 2018-11-03 RX ADMIN — Medication 0.3 MILLIGRAM(S): at 16:09

## 2018-11-03 RX ADMIN — Medication 0.3 MILLIGRAM(S): at 08:52

## 2018-11-03 RX ADMIN — Medication 100 MILLIGRAM(S): at 17:37

## 2018-11-03 RX ADMIN — ONDANSETRON 4 MILLIGRAM(S): 8 TABLET, FILM COATED ORAL at 10:28

## 2018-11-03 RX ADMIN — Medication 400 MILLIGRAM(S): at 10:34

## 2018-11-03 RX ADMIN — Medication 10 MILLIGRAM(S): at 17:42

## 2018-11-03 RX ADMIN — SODIUM CHLORIDE 3 MILLILITER(S): 9 INJECTION INTRAMUSCULAR; INTRAVENOUS; SUBCUTANEOUS at 22:00

## 2018-11-03 RX ADMIN — ISOSORBIDE MONONITRATE 60 MILLIGRAM(S): 60 TABLET, EXTENDED RELEASE ORAL at 12:19

## 2018-11-03 RX ADMIN — Medication 30 MILLILITER(S): at 08:43

## 2018-11-03 RX ADMIN — CHLORHEXIDINE GLUCONATE 1 APPLICATION(S): 213 SOLUTION TOPICAL at 12:19

## 2018-11-03 RX ADMIN — CARVEDILOL PHOSPHATE 12.5 MILLIGRAM(S): 80 CAPSULE, EXTENDED RELEASE ORAL at 17:37

## 2018-11-03 RX ADMIN — Medication 0.3 MILLIGRAM(S): at 00:28

## 2018-11-03 RX ADMIN — Medication 100 MILLIGRAM(S): at 13:52

## 2018-11-03 RX ADMIN — LISINOPRIL 20 MILLIGRAM(S): 2.5 TABLET ORAL at 05:03

## 2018-11-03 RX ADMIN — SODIUM CHLORIDE 3 MILLILITER(S): 9 INJECTION INTRAMUSCULAR; INTRAVENOUS; SUBCUTANEOUS at 13:52

## 2018-11-03 RX ADMIN — Medication 100 MILLIGRAM(S): at 05:04

## 2018-11-03 RX ADMIN — Medication 667 MILLIGRAM(S): at 12:19

## 2018-11-03 RX ADMIN — Medication 100 MILLIGRAM(S): at 22:08

## 2018-11-03 RX ADMIN — AMLODIPINE BESYLATE 10 MILLIGRAM(S): 2.5 TABLET ORAL at 05:03

## 2018-11-03 RX ADMIN — CARVEDILOL PHOSPHATE 12.5 MILLIGRAM(S): 80 CAPSULE, EXTENDED RELEASE ORAL at 05:03

## 2018-11-03 RX ADMIN — Medication 125 MICROGRAM(S): at 05:03

## 2018-11-03 RX ADMIN — SENNA PLUS 2 TABLET(S): 8.6 TABLET ORAL at 22:04

## 2018-11-03 NOTE — PROGRESS NOTE ADULT - PROBLEM SELECTOR PLAN 3
CXR with pulm edema, now improved  continue clonidine, hydralazine, decreased Coreg to 12.5 mg BID  Increased lisinopril to 20mg  HD MWF with fluid removal as tolerated.   Still having elevated BP, on nearly max meds. Will f/u with nephrology, and HD.

## 2018-11-03 NOTE — PROGRESS NOTE ADULT - PROBLEM SELECTOR PLAN 4
asymptomatic bradycardia on coreg-decreased to 12.5mg BID  Appreciate EP eval: No advanced AV blocks or persistent significant chelsea <40 bpm seen on telemetry, per EP if symptomatic chelsea or advanced AV block occurs, then would reduce Carvedilol dosage    -No acute pacing indication at present time  [id number] score of 6   NO AC given hx of multiple falls and positive FOBT.

## 2018-11-03 NOTE — PROGRESS NOTE ADULT - PROBLEM SELECTOR PLAN 8
cont levothyroxine-increase to 125mcg on 10/31 on empty stomach, unclear if was taking appropriately  Will need repeat in 3 weeks outpatient for additional titration.

## 2018-11-03 NOTE — PROGRESS NOTE ADULT - PROBLEM SELECTOR PLAN 2
patient hypothermia to 94 rectally on 10/31 now improving, bradycardic, elevated rpt TSH with low FT4  10/31 Bcx NGTD  CT A/P unremarkable, CXR with mild pulm edema but no evidence of infection  Synthroid increased to 125mcg to be given at 6am on empty stomach for better absorption. Will consider Endocrine consult if continued hypothermia.

## 2018-11-03 NOTE — PROGRESS NOTE ADULT - PROBLEM SELECTOR PLAN 1
Abdominal pain today, exam unchanged from prior, improved with Maalox and Zofran. Recent Ct scan with same presentation/complaint wnl.   No acute findings on CT abdomen, patient w/ chronic abdominal pain,  bowel regimen for constipation, patient refused dulcolax suppository.  Will monitor for not. If persistent may need repeat imaging,

## 2018-11-03 NOTE — PROGRESS NOTE ADULT - SUBJECTIVE AND OBJECTIVE BOX
Patient is a 72y old  Female who presents with a chief complaint of chest pain (02 Nov 2018 14:39)        SUBJECTIVE / OVERNIGHT EVENTS:    Patient states she is having abdominal discomfort States has Bm yesterday and is having one currently. Has chronic abn pain, had a CT a/p 4 days ago with no acute findings. Felt better with Maalox having nausea without emesis.  [id number] used.    MEDICATIONS  (STANDING):  amLODIPine   Tablet 10 milliGRAM(s) Oral daily  atorvastatin 10 milliGRAM(s) Oral at bedtime  bisacodyl Suppository 10 milliGRAM(s) Rectal once  calcium acetate 667 milliGRAM(s) Oral with lunch  carvedilol 12.5 milliGRAM(s) Oral every 12 hours  chlorhexidine 4% Liquid 1 Application(s) Topical <User Schedule>  cloNIDine 0.3 milliGRAM(s) Oral every 8 hours  docusate sodium 100 milliGRAM(s) Oral two times a day  epoetin iam Injectable 4000 Unit(s) IV Push <User Schedule>  hydrALAZINE 100 milliGRAM(s) Oral every 8 hours  isosorbide   mononitrate ER Tablet (IMDUR) 60 milliGRAM(s) Oral daily  levothyroxine 125 MICROGram(s) Oral daily  lisinopril 20 milliGRAM(s) Oral daily  ondansetron Injectable 4 milliGRAM(s) IV Push once  QUEtiapine 50 milliGRAM(s) Oral daily  senna 2 Tablet(s) Oral at bedtime  sertraline 50 milliGRAM(s) Oral daily  sodium chloride 0.9% lock flush 3 milliLiter(s) IV Push every 8 hours    MEDICATIONS  (PRN):  acetaminophen   Tablet .. 650 milliGRAM(s) Oral every 6 hours PRN Mild Pain (1 - 3), Moderate Pain (4 - 6), Severe Pain (7 - 10)  aluminum hydroxide/magnesium hydroxide/simethicone Suspension 30 milliLiter(s) Oral every 6 hours PRN Dyspepsia  clonazePAM Tablet 0.5 milliGRAM(s) Oral at bedtime PRN Anxiety        CAPILLARY BLOOD GLUCOSE        I&O's Summary    02 Nov 2018 07:01  -  03 Nov 2018 07:00  --------------------------------------------------------  IN: 400 mL / OUT: 3600 mL / NET: -3200 mL      Vital Signs Last 24 Hrs  T(C): 37.1 (03 Nov 2018 08:36), Max: 37.2 (02 Nov 2018 20:59)  T(F): 98.8 (03 Nov 2018 08:36), Max: 98.9 (02 Nov 2018 20:59)  HR: 60 (03 Nov 2018 12:16) (59 - 95)  BP: 180/66 (03 Nov 2018 12:16) (118/48 - 199/66)  BP(mean): --  RR: 18 (03 Nov 2018 08:36) (16 - 18)  SpO2: 100% (03 Nov 2018 08:36) (100% - 100%)    PHYSICAL EXAM  GENERAL: no apparent distress, on nasal cannula  EYES: sclera clear b/l  CHEST/LUNG: CTA b/l  HEART: s1/s2, no murmurs appreciated, RRR, bradycardic  ABDOMEN: Soft, nontender, Nondistended; Bowel sounds present  EXTREMITIES:  2+ Peripheral Pulses, No clubbing, cyanosis, or edema  NEUROLOGY: awake, alert, responds to Qs appropriately, follows commands  PSYCH: AAOx3,  VASCULAR: left arm AVF with thrill and bruit      LABS:                        9.2    4.17  )-----------( 113      ( 02 Nov 2018 05:50 )             28.7     11-02    134<L>  |  94<L>  |  35<H>  ----------------------------<  128<H>  4.8   |  26  |  3.55<H>    Ca    8.6      02 Nov 2018 05:50  Phos  3.2     11-02  Mg     2.3     11-02                RADIOLOGY & ADDITIONAL TESTS:    Imaging Personally Reviewed:  Consultant(s) Notes Reviewed:    Care Discussed with Consultants/Other Providers:

## 2018-11-04 LAB
BUN SERPL-MCNC: 30 MG/DL — HIGH (ref 7–23)
CALCIUM SERPL-MCNC: 9.1 MG/DL — SIGNIFICANT CHANGE UP (ref 8.4–10.5)
CHLORIDE SERPL-SCNC: 91 MMOL/L — LOW (ref 98–107)
CO2 SERPL-SCNC: 26 MMOL/L — SIGNIFICANT CHANGE UP (ref 22–31)
CREAT SERPL-MCNC: 3.09 MG/DL — HIGH (ref 0.5–1.3)
GLUCOSE SERPL-MCNC: 121 MG/DL — HIGH (ref 70–99)
HCT VFR BLD CALC: 31.2 % — LOW (ref 34.5–45)
HGB BLD-MCNC: 10 G/DL — LOW (ref 11.5–15.5)
LIDOCAIN IGE QN: 11.5 U/L — SIGNIFICANT CHANGE UP (ref 7–60)
MAGNESIUM SERPL-MCNC: 2.4 MG/DL — SIGNIFICANT CHANGE UP (ref 1.6–2.6)
MCHC RBC-ENTMCNC: 29.8 PG — SIGNIFICANT CHANGE UP (ref 27–34)
MCHC RBC-ENTMCNC: 32.1 % — SIGNIFICANT CHANGE UP (ref 32–36)
MCV RBC AUTO: 92.9 FL — SIGNIFICANT CHANGE UP (ref 80–100)
NRBC # FLD: 0 — SIGNIFICANT CHANGE UP
PHOSPHATE SERPL-MCNC: 3.2 MG/DL — SIGNIFICANT CHANGE UP (ref 2.5–4.5)
PLATELET # BLD AUTO: 135 K/UL — LOW (ref 150–400)
PMV BLD: 12.3 FL — SIGNIFICANT CHANGE UP (ref 7–13)
POTASSIUM SERPL-MCNC: 5 MMOL/L — SIGNIFICANT CHANGE UP (ref 3.5–5.3)
POTASSIUM SERPL-SCNC: 5 MMOL/L — SIGNIFICANT CHANGE UP (ref 3.5–5.3)
RBC # BLD: 3.36 M/UL — LOW (ref 3.8–5.2)
RBC # FLD: 17.5 % — HIGH (ref 10.3–14.5)
SODIUM SERPL-SCNC: 133 MMOL/L — LOW (ref 135–145)
WBC # BLD: 4.32 K/UL — SIGNIFICANT CHANGE UP (ref 3.8–10.5)
WBC # FLD AUTO: 4.32 K/UL — SIGNIFICANT CHANGE UP (ref 3.8–10.5)

## 2018-11-04 PROCEDURE — 99233 SBSQ HOSP IP/OBS HIGH 50: CPT

## 2018-11-04 RX ORDER — LISINOPRIL 2.5 MG/1
40 TABLET ORAL DAILY
Qty: 0 | Refills: 0 | Status: DISCONTINUED | OUTPATIENT
Start: 2018-11-05 | End: 2018-11-20

## 2018-11-04 RX ORDER — CHOLECALCIFEROL (VITAMIN D3) 125 MCG
1000 CAPSULE ORAL DAILY
Qty: 0 | Refills: 0 | Status: DISCONTINUED | OUTPATIENT
Start: 2018-11-04 | End: 2018-11-20

## 2018-11-04 RX ORDER — ONDANSETRON 8 MG/1
4 TABLET, FILM COATED ORAL EVERY 6 HOURS
Qty: 0 | Refills: 0 | Status: DISCONTINUED | OUTPATIENT
Start: 2018-11-04 | End: 2018-11-14

## 2018-11-04 RX ADMIN — SODIUM CHLORIDE 3 MILLILITER(S): 9 INJECTION INTRAMUSCULAR; INTRAVENOUS; SUBCUTANEOUS at 14:10

## 2018-11-04 RX ADMIN — CARVEDILOL PHOSPHATE 12.5 MILLIGRAM(S): 80 CAPSULE, EXTENDED RELEASE ORAL at 17:58

## 2018-11-04 RX ADMIN — SODIUM CHLORIDE 3 MILLILITER(S): 9 INJECTION INTRAMUSCULAR; INTRAVENOUS; SUBCUTANEOUS at 22:25

## 2018-11-04 RX ADMIN — Medication 100 MILLIGRAM(S): at 05:02

## 2018-11-04 RX ADMIN — POLYETHYLENE GLYCOL 3350 17 GRAM(S): 17 POWDER, FOR SOLUTION ORAL at 11:25

## 2018-11-04 RX ADMIN — ONDANSETRON 4 MILLIGRAM(S): 8 TABLET, FILM COATED ORAL at 19:56

## 2018-11-04 RX ADMIN — SERTRALINE 50 MILLIGRAM(S): 25 TABLET, FILM COATED ORAL at 11:25

## 2018-11-04 RX ADMIN — Medication 0.3 MILLIGRAM(S): at 08:26

## 2018-11-04 RX ADMIN — Medication 125 MICROGRAM(S): at 05:02

## 2018-11-04 RX ADMIN — Medication 10 MILLIGRAM(S): at 19:56

## 2018-11-04 RX ADMIN — CHLORHEXIDINE GLUCONATE 1 APPLICATION(S): 213 SOLUTION TOPICAL at 11:24

## 2018-11-04 RX ADMIN — Medication 100 MILLIGRAM(S): at 22:25

## 2018-11-04 RX ADMIN — ISOSORBIDE MONONITRATE 60 MILLIGRAM(S): 60 TABLET, EXTENDED RELEASE ORAL at 11:27

## 2018-11-04 RX ADMIN — ATORVASTATIN CALCIUM 10 MILLIGRAM(S): 80 TABLET, FILM COATED ORAL at 22:24

## 2018-11-04 RX ADMIN — Medication 100 MILLIGRAM(S): at 05:53

## 2018-11-04 RX ADMIN — Medication 0.3 MILLIGRAM(S): at 16:26

## 2018-11-04 RX ADMIN — Medication 667 MILLIGRAM(S): at 11:49

## 2018-11-04 RX ADMIN — Medication 100 MILLIGRAM(S): at 14:16

## 2018-11-04 RX ADMIN — AMLODIPINE BESYLATE 10 MILLIGRAM(S): 2.5 TABLET ORAL at 05:02

## 2018-11-04 RX ADMIN — SENNA PLUS 2 TABLET(S): 8.6 TABLET ORAL at 22:24

## 2018-11-04 RX ADMIN — SODIUM CHLORIDE 3 MILLILITER(S): 9 INJECTION INTRAMUSCULAR; INTRAVENOUS; SUBCUTANEOUS at 05:53

## 2018-11-04 RX ADMIN — LISINOPRIL 20 MILLIGRAM(S): 2.5 TABLET ORAL at 05:02

## 2018-11-04 RX ADMIN — ONDANSETRON 4 MILLIGRAM(S): 8 TABLET, FILM COATED ORAL at 05:53

## 2018-11-04 RX ADMIN — CARVEDILOL PHOSPHATE 12.5 MILLIGRAM(S): 80 CAPSULE, EXTENDED RELEASE ORAL at 05:02

## 2018-11-04 NOTE — PROGRESS NOTE ADULT - PROBLEM SELECTOR PLAN 4
asymptomatic bradycardia on coreg-decreased to 12.5mg BID  Appreciate EP eval: No advanced AV blocks or persistent significant chelsea <40 bpm seen on telemetry, per EP if symptomatic chelsea or advanced AV block occurs, then would reduce Carvedilol dosage    -No acute pacing indication at present time  [id number] score of 6   NO AC given hx of multiple falls and positive FOBT.

## 2018-11-04 NOTE — PROGRESS NOTE ADULT - PROBLEM SELECTOR PLAN 2
patient hypothermia to 94 rectally on 10/31 now improving, bradycardic, elevated rpt TSH with low FT4  10/31 Bcx NGTD  CT A/P unremarkable, CXR with mild pulm edema but no evidence of infection  Synthroid increased to 125mcg to be given at 6am on empty stomach for better absorption.

## 2018-11-04 NOTE — PROGRESS NOTE ADULT - SUBJECTIVE AND OBJECTIVE BOX
Patient is a 72y old  Female who presents with a chief complaint of chest pain (03 Nov 2018 12:47)        SUBJECTIVE / OVERNIGHT EVENTS:     [id number]  Patient had several complaints today, C/O poster scalpain, Had trauma and hd stables since sept. Was not removed. Also c/o abn discomfort which is chronic. Yesterday resolved spontaneously, having BM's. Increased SOB and o2 requirement CXR was done showing increase edema on the right side on my read.     MEDICATIONS  (STANDING):  amLODIPine   Tablet 10 milliGRAM(s) Oral daily  atorvastatin 10 milliGRAM(s) Oral at bedtime  calcium acetate 667 milliGRAM(s) Oral with lunch  carvedilol 12.5 milliGRAM(s) Oral every 12 hours  chlorhexidine 4% Liquid 1 Application(s) Topical <User Schedule>  cloNIDine 0.3 milliGRAM(s) Oral every 8 hours  docusate sodium 100 milliGRAM(s) Oral three times a day  epoetin iam Injectable 4000 Unit(s) IV Push <User Schedule>  hydrALAZINE 100 milliGRAM(s) Oral every 8 hours  isosorbide   mononitrate ER Tablet (IMDUR) 60 milliGRAM(s) Oral daily  levothyroxine 125 MICROGram(s) Oral daily  polyethylene glycol 3350 17 Gram(s) Oral daily  QUEtiapine 50 milliGRAM(s) Oral daily  senna 2 Tablet(s) Oral at bedtime  sertraline 50 milliGRAM(s) Oral daily  sodium chloride 0.9% lock flush 3 milliLiter(s) IV Push every 8 hours    MEDICATIONS  (PRN):  acetaminophen   Tablet .. 650 milliGRAM(s) Oral every 6 hours PRN Mild Pain (1 - 3), Moderate Pain (4 - 6), Severe Pain (7 - 10)  aluminum hydroxide/magnesium hydroxide/simethicone Suspension 30 milliLiter(s) Oral every 6 hours PRN Dyspepsia  clonazePAM Tablet 0.5 milliGRAM(s) Oral at bedtime PRN Anxiety  ondansetron Injectable 4 milliGRAM(s) IV Push every 6 hours PRN Nausea and/or Vomiting        CAPILLARY BLOOD GLUCOSE        I&O's Summary    Vital Signs Last 24 Hrs  T(C): 36.6 (04 Nov 2018 08:24), Max: 36.8 (03 Nov 2018 22:00)  T(F): 97.8 (04 Nov 2018 08:24), Max: 98.2 (03 Nov 2018 22:00)  HR: 56 (04 Nov 2018 11:20) (55 - 64)  BP: 179/64 (04 Nov 2018 11:20) (102/58 - 201/71)  BP(mean): --  RR: 17 (04 Nov 2018 11:20) (16 - 18)  SpO2: 100% (04 Nov 2018 11:20) (92% - 100%)      PHYSICAL EXAM  GENERAL: no apparent distress, on nasal cannula, very depressed.   EYES: sclera clear b/l,   CHEST/LUNG: B/l crackles.  HEART: s1/s2, no murmurs appreciated, RRR, bradycardic  ABDOMEN: Soft, nontender, Nondistended; Bowel sounds present.  EXTREMITIES:  2+ Peripheral Pulses, No clubbing, cyanosis, or edema  NEUROLOGY: awake, alert, responds to Qs appropriately, follows commands  PSYCH: AAOx3,  VASCULAR: left arm AVF with thrill and bruit  Skin: Staples removed in the right occipital region, no lesions, well healed.     LABS:                        10.0   4.32  )-----------( 135      ( 04 Nov 2018 05:30 )             31.2     11-04    133<L>  |  91<L>  |  30<H>  ----------------------------<  121<H>  5.0   |  26  |  3.09<H>    Ca    9.1      04 Nov 2018 05:30  Phos  3.2     11-04  Mg     2.4     11-04                RADIOLOGY & ADDITIONAL TESTS:    Imaging Personally Reviewed:  Consultant(s) Notes Reviewed:    Care Discussed with Consultants/Other Providers: Patient is a 72y old  Female who presents with a chief complaint of chest pain (03 Nov 2018 12:47)        SUBJECTIVE / OVERNIGHT EVENTS:     [id number]  Patient had several complaints today, C/O poster scalpain, Had trauma and hd stables since sept. Was not removed. Also c/o abn discomfort which is chronic. Yesterday resolved spontaneously, having BM's. Increased SOB and o2 requirement CXR was done showing increase edema on the right side on my read.     MEDICATIONS  (STANDING):  amLODIPine   Tablet 10 milliGRAM(s) Oral daily  atorvastatin 10 milliGRAM(s) Oral at bedtime  calcium acetate 667 milliGRAM(s) Oral with lunch  carvedilol 12.5 milliGRAM(s) Oral every 12 hours  chlorhexidine 4% Liquid 1 Application(s) Topical <User Schedule>  cloNIDine 0.3 milliGRAM(s) Oral every 8 hours  docusate sodium 100 milliGRAM(s) Oral three times a day  epoetin iam Injectable 4000 Unit(s) IV Push <User Schedule>  hydrALAZINE 100 milliGRAM(s) Oral every 8 hours  isosorbide   mononitrate ER Tablet (IMDUR) 60 milliGRAM(s) Oral daily  levothyroxine 125 MICROGram(s) Oral daily  polyethylene glycol 3350 17 Gram(s) Oral daily  QUEtiapine 50 milliGRAM(s) Oral daily  senna 2 Tablet(s) Oral at bedtime  sertraline 50 milliGRAM(s) Oral daily  sodium chloride 0.9% lock flush 3 milliLiter(s) IV Push every 8 hours    MEDICATIONS  (PRN):  acetaminophen   Tablet .. 650 milliGRAM(s) Oral every 6 hours PRN Mild Pain (1 - 3), Moderate Pain (4 - 6), Severe Pain (7 - 10)  aluminum hydroxide/magnesium hydroxide/simethicone Suspension 30 milliLiter(s) Oral every 6 hours PRN Dyspepsia  clonazePAM Tablet 0.5 milliGRAM(s) Oral at bedtime PRN Anxiety  ondansetron Injectable 4 milliGRAM(s) IV Push every 6 hours PRN Nausea and/or Vomiting        CAPILLARY BLOOD GLUCOSE        I&O's Summary    Vital Signs Last 24 Hrs  T(C): 36.6 (04 Nov 2018 08:24), Max: 36.8 (03 Nov 2018 22:00)  T(F): 97.8 (04 Nov 2018 08:24), Max: 98.2 (03 Nov 2018 22:00)  HR: 56 (04 Nov 2018 11:20) (55 - 64)  BP: 179/64 (04 Nov 2018 11:20) (102/58 - 201/71)  BP(mean): --  RR: 17 (04 Nov 2018 11:20) (16 - 18)  SpO2: 100% (04 Nov 2018 11:20) (92% - 100%)      PHYSICAL EXAM  GENERAL: no apparent distress, on nasal cannula, very depressed.   EYES: sclera clear b/l,   CHEST/LUNG: B/l crackles.  HEART: s1/s2, no murmurs appreciated, RRR, bradycardic  ABDOMEN: Soft, nontender, Nondistended; Bowel sounds present.  EXTREMITIES:  2+ Peripheral Pulses, No clubbing, cyanosis, or edema  NEUROLOGY: awake, alert, responds to Qs appropriately, follows commands  PSYCH: AAOx3,  VASCULAR: left arm AVF with thrill and bruit  Skin: Staples removed in the right occipital region, no lesions, well healed.     LABS:                        10.0   4.32  )-----------( 135      ( 04 Nov 2018 05:30 )             31.2     11-04    133<L>  |  91<L>  |  30<H>  ----------------------------<  121<H>  5.0   |  26  |  3.09<H>    Ca    9.1      04 Nov 2018 05:30  Phos  3.2     11-04  Mg     2.4     11-04                RADIOLOGY & ADDITIONAL TESTS:    Imaging Personally Reviewed: CXR  Consultant(s) Notes Reviewed:    Care Discussed with Consultants/Other Providers: Renal Patient is a 72y old  Female who presents with a chief complaint of chest pain (03 Nov 2018 12:47)        SUBJECTIVE / OVERNIGHT EVENTS:     [id number]  Patient had several complaints today, C/O poster scalpain, Had trauma and hd stables since sept. Was not removed. Also c/o abn discomfort which is chronic. Yesterday resolved spontaneously, having BM's. Increased SOB and o2 requirement CXR was done showing edema on the right side on my read.     MEDICATIONS  (STANDING):  amLODIPine   Tablet 10 milliGRAM(s) Oral daily  atorvastatin 10 milliGRAM(s) Oral at bedtime  calcium acetate 667 milliGRAM(s) Oral with lunch  carvedilol 12.5 milliGRAM(s) Oral every 12 hours  chlorhexidine 4% Liquid 1 Application(s) Topical <User Schedule>  cloNIDine 0.3 milliGRAM(s) Oral every 8 hours  docusate sodium 100 milliGRAM(s) Oral three times a day  epoetin iam Injectable 4000 Unit(s) IV Push <User Schedule>  hydrALAZINE 100 milliGRAM(s) Oral every 8 hours  isosorbide   mononitrate ER Tablet (IMDUR) 60 milliGRAM(s) Oral daily  levothyroxine 125 MICROGram(s) Oral daily  polyethylene glycol 3350 17 Gram(s) Oral daily  QUEtiapine 50 milliGRAM(s) Oral daily  senna 2 Tablet(s) Oral at bedtime  sertraline 50 milliGRAM(s) Oral daily  sodium chloride 0.9% lock flush 3 milliLiter(s) IV Push every 8 hours    MEDICATIONS  (PRN):  acetaminophen   Tablet .. 650 milliGRAM(s) Oral every 6 hours PRN Mild Pain (1 - 3), Moderate Pain (4 - 6), Severe Pain (7 - 10)  aluminum hydroxide/magnesium hydroxide/simethicone Suspension 30 milliLiter(s) Oral every 6 hours PRN Dyspepsia  clonazePAM Tablet 0.5 milliGRAM(s) Oral at bedtime PRN Anxiety  ondansetron Injectable 4 milliGRAM(s) IV Push every 6 hours PRN Nausea and/or Vomiting        CAPILLARY BLOOD GLUCOSE        I&O's Summary    Vital Signs Last 24 Hrs  T(C): 36.6 (04 Nov 2018 08:24), Max: 36.8 (03 Nov 2018 22:00)  T(F): 97.8 (04 Nov 2018 08:24), Max: 98.2 (03 Nov 2018 22:00)  HR: 56 (04 Nov 2018 11:20) (55 - 64)  BP: 179/64 (04 Nov 2018 11:20) (102/58 - 201/71)  BP(mean): --  RR: 17 (04 Nov 2018 11:20) (16 - 18)  SpO2: 100% (04 Nov 2018 11:20) (92% - 100%)      PHYSICAL EXAM  GENERAL: no apparent distress, on nasal cannula, very depressed.   EYES: sclera clear b/l,   CHEST/LUNG: B/l crackles.  HEART: s1/s2, no murmurs appreciated, RRR, bradycardic  ABDOMEN: Soft, nontender, Nondistended; Bowel sounds present.  EXTREMITIES:  2+ Peripheral Pulses, No clubbing, cyanosis, or edema  NEUROLOGY: awake, alert, responds to Qs appropriately, follows commands  PSYCH: AAOx3,  VASCULAR: left arm AVF with thrill and bruit  Skin: Staples removed in the right occipital region, no lesions, well healed.     LABS:                        10.0   4.32  )-----------( 135      ( 04 Nov 2018 05:30 )             31.2     11-04    133<L>  |  91<L>  |  30<H>  ----------------------------<  121<H>  5.0   |  26  |  3.09<H>    Ca    9.1      04 Nov 2018 05:30  Phos  3.2     11-04  Mg     2.4     11-04                RADIOLOGY & ADDITIONAL TESTS:    Imaging Personally Reviewed: CXR  Consultant(s) Notes Reviewed:    Care Discussed with Consultants/Other Providers: Renal

## 2018-11-04 NOTE — PROGRESS NOTE ADULT - PROBLEM SELECTOR PLAN 3
Repeat CXR on my read showing increased vascular marking on the right.   continue with lisinopril, clonidine, hydralazine, decreased Coreg to 12.5 mg BID  HD MWF with fluid removal as tolerated.   Still having elevated BP, on nearly max meds.  Spoke with nephrology fellow, c/w current bp meds, not much options, IV hydralazine if very elevated bp with symptoms, requested earlier HD tomorrow as increase pulmonary edema and increasing o2 requirements. Repeat CXR on my read showing increased vascular marking on the right.   continue with lisinopril, clonidine, hydralazine, decreased Coreg to 12.5 mg BID  HD MWF with fluid removal as tolerated.   Still having elevated BP, on nearly max meds.  Spoke with nephrology fellow, c/w current bp meds, not much options, IV hydralazine if very elevated bp with symptoms, requested earlier HD tomorrow as  pulmonary edema and increasing o2 requirements. Repeat CXR showing pulmonary edema   continue with lisinopril, clonidine, hydralazine, decreased Coreg to 12.5 mg BID  HD MWF with fluid removal as tolerated.   Still having elevated BP, on nearly max meds.  Spoke with nephrology fellow, c/w current bp meds, not much options, IV hydralazine if very elevated bp with symptoms, requested earlier HD tomorrow as  pulmonary edema and increasing o2 requirements.

## 2018-11-04 NOTE — PROGRESS NOTE ADULT - PROBLEM SELECTOR PLAN 5
Imdur to 60mg for unstable angina  Patient w/ frequent admissions in the past for similar presentations, complaining of pain in head/chest/abdomen. HST in 400s, no significant delta change  No Cp today.

## 2018-11-04 NOTE — PROGRESS NOTE ADULT - PROBLEM SELECTOR PLAN 1
Abdominal pain today, exam unchanged from prior, spontaneously yesterday. Recent Ct scan with same presentation/complaint wnl.   No acute findings on CT abdomen, patient w/ chronic abdominal pain,  bowel regimen for constipation, patient refused dulcolax suppository.  Will monitor. Abdominal pain today, exam unchanged from prior, resolved spontaneously yesterday. Recent Ct scan with same presentation/complaint wnl.   No acute findings on CT abdomen, patient w/ chronic abdominal pain,  bowel regimen for constipation, patient refused dulcolax suppository.  Will monitor. abn x-ray read pending.

## 2018-11-05 DIAGNOSIS — F43.23 ADJUSTMENT DISORDER WITH MIXED ANXIETY AND DEPRESSED MOOD: ICD-10-CM

## 2018-11-05 LAB
ALBUMIN SERPL ELPH-MCNC: 3.2 G/DL — LOW (ref 3.3–5)
ALP SERPL-CCNC: 212 U/L — HIGH (ref 40–120)
ALT FLD-CCNC: 7 U/L — SIGNIFICANT CHANGE UP (ref 4–33)
AST SERPL-CCNC: 19 U/L — SIGNIFICANT CHANGE UP (ref 4–32)
BACTERIA BLD CULT: SIGNIFICANT CHANGE UP
BILIRUB SERPL-MCNC: 0.4 MG/DL — SIGNIFICANT CHANGE UP (ref 0.2–1.2)
BUN SERPL-MCNC: 38 MG/DL — HIGH (ref 7–23)
CALCIUM SERPL-MCNC: 8.7 MG/DL — SIGNIFICANT CHANGE UP (ref 8.4–10.5)
CHLORIDE SERPL-SCNC: 91 MMOL/L — LOW (ref 98–107)
CO2 SERPL-SCNC: 25 MMOL/L — SIGNIFICANT CHANGE UP (ref 22–31)
CREAT SERPL-MCNC: 3.73 MG/DL — HIGH (ref 0.5–1.3)
GLUCOSE SERPL-MCNC: 93 MG/DL — SIGNIFICANT CHANGE UP (ref 70–99)
HCT VFR BLD CALC: 30.3 % — LOW (ref 34.5–45)
HGB BLD-MCNC: 9.5 G/DL — LOW (ref 11.5–15.5)
MCHC RBC-ENTMCNC: 28.9 PG — SIGNIFICANT CHANGE UP (ref 27–34)
MCHC RBC-ENTMCNC: 31.4 % — LOW (ref 32–36)
MCV RBC AUTO: 92.1 FL — SIGNIFICANT CHANGE UP (ref 80–100)
NRBC # FLD: 0 — SIGNIFICANT CHANGE UP
PHOSPHATE SERPL-MCNC: 3.5 MG/DL — SIGNIFICANT CHANGE UP (ref 2.5–4.5)
PLATELET # BLD AUTO: 124 K/UL — LOW (ref 150–400)
PMV BLD: 12.6 FL — SIGNIFICANT CHANGE UP (ref 7–13)
POTASSIUM SERPL-MCNC: 5.2 MMOL/L — SIGNIFICANT CHANGE UP (ref 3.5–5.3)
POTASSIUM SERPL-SCNC: 5.2 MMOL/L — SIGNIFICANT CHANGE UP (ref 3.5–5.3)
PROT SERPL-MCNC: 6 G/DL — SIGNIFICANT CHANGE UP (ref 6–8.3)
PTH-INTACT SERPL-MCNC: 185.3 PG/ML — HIGH (ref 15–65)
RBC # BLD: 3.29 M/UL — LOW (ref 3.8–5.2)
RBC # FLD: 17.2 % — HIGH (ref 10.3–14.5)
SODIUM SERPL-SCNC: 134 MMOL/L — LOW (ref 135–145)
VIT D25+D1,25 OH+D1,25 PNL SERPL-MCNC: <5 PG/ML — LOW (ref 19.9–79.3)
WBC # BLD: 4.13 K/UL — SIGNIFICANT CHANGE UP (ref 3.8–10.5)
WBC # FLD AUTO: 4.13 K/UL — SIGNIFICANT CHANGE UP (ref 3.8–10.5)

## 2018-11-05 PROCEDURE — 99233 SBSQ HOSP IP/OBS HIGH 50: CPT

## 2018-11-05 PROCEDURE — 90935 HEMODIALYSIS ONE EVALUATION: CPT | Mod: GC

## 2018-11-05 RX ORDER — ACETAMINOPHEN 500 MG
2 TABLET ORAL
Qty: 0 | Refills: 0 | COMMUNITY

## 2018-11-05 RX ORDER — MINOXIDIL 10 MG
2.5 TABLET ORAL
Qty: 0 | Refills: 0 | Status: DISCONTINUED | OUTPATIENT
Start: 2018-11-05 | End: 2018-11-07

## 2018-11-05 RX ORDER — LISINOPRIL 2.5 MG/1
1 TABLET ORAL
Qty: 30 | Refills: 0 | OUTPATIENT
Start: 2018-11-05 | End: 2018-12-04

## 2018-11-05 RX ORDER — HYDRALAZINE HCL 50 MG
10 TABLET ORAL ONCE
Qty: 0 | Refills: 0 | Status: COMPLETED | OUTPATIENT
Start: 2018-11-05 | End: 2018-11-06

## 2018-11-05 RX ORDER — ACETAMINOPHEN 500 MG
2 TABLET ORAL
Qty: 0 | Refills: 0 | COMMUNITY
Start: 2018-11-05

## 2018-11-05 RX ADMIN — CARVEDILOL PHOSPHATE 12.5 MILLIGRAM(S): 80 CAPSULE, EXTENDED RELEASE ORAL at 16:44

## 2018-11-05 RX ADMIN — SERTRALINE 50 MILLIGRAM(S): 25 TABLET, FILM COATED ORAL at 15:50

## 2018-11-05 RX ADMIN — SENNA PLUS 2 TABLET(S): 8.6 TABLET ORAL at 22:48

## 2018-11-05 RX ADMIN — Medication 100 MILLIGRAM(S): at 06:07

## 2018-11-05 RX ADMIN — SODIUM CHLORIDE 3 MILLILITER(S): 9 INJECTION INTRAMUSCULAR; INTRAVENOUS; SUBCUTANEOUS at 22:49

## 2018-11-05 RX ADMIN — CARVEDILOL PHOSPHATE 12.5 MILLIGRAM(S): 80 CAPSULE, EXTENDED RELEASE ORAL at 06:07

## 2018-11-05 RX ADMIN — ISOSORBIDE MONONITRATE 60 MILLIGRAM(S): 60 TABLET, EXTENDED RELEASE ORAL at 12:58

## 2018-11-05 RX ADMIN — CHLORHEXIDINE GLUCONATE 1 APPLICATION(S): 213 SOLUTION TOPICAL at 15:50

## 2018-11-05 RX ADMIN — Medication 100 MILLIGRAM(S): at 15:50

## 2018-11-05 RX ADMIN — Medication 0.3 MILLIGRAM(S): at 00:27

## 2018-11-05 RX ADMIN — Medication 100 MILLIGRAM(S): at 22:34

## 2018-11-05 RX ADMIN — Medication 125 MICROGRAM(S): at 06:07

## 2018-11-05 RX ADMIN — Medication 0.3 MILLIGRAM(S): at 22:34

## 2018-11-05 RX ADMIN — Medication 100 MILLIGRAM(S): at 22:40

## 2018-11-05 RX ADMIN — SODIUM CHLORIDE 3 MILLILITER(S): 9 INJECTION INTRAMUSCULAR; INTRAVENOUS; SUBCUTANEOUS at 15:46

## 2018-11-05 RX ADMIN — LISINOPRIL 40 MILLIGRAM(S): 2.5 TABLET ORAL at 06:07

## 2018-11-05 RX ADMIN — Medication 2.5 MILLIGRAM(S): at 16:44

## 2018-11-05 RX ADMIN — Medication 650 MILLIGRAM(S): at 22:47

## 2018-11-05 RX ADMIN — ATORVASTATIN CALCIUM 10 MILLIGRAM(S): 80 TABLET, FILM COATED ORAL at 22:40

## 2018-11-05 RX ADMIN — AMLODIPINE BESYLATE 10 MILLIGRAM(S): 2.5 TABLET ORAL at 06:07

## 2018-11-05 RX ADMIN — QUETIAPINE FUMARATE 50 MILLIGRAM(S): 200 TABLET, FILM COATED ORAL at 15:50

## 2018-11-05 RX ADMIN — SODIUM CHLORIDE 3 MILLILITER(S): 9 INJECTION INTRAMUSCULAR; INTRAVENOUS; SUBCUTANEOUS at 06:01

## 2018-11-05 RX ADMIN — Medication 0.5 MILLIGRAM(S): at 22:40

## 2018-11-05 RX ADMIN — Medication 100 MILLIGRAM(S): at 06:06

## 2018-11-05 RX ADMIN — Medication 0.3 MILLIGRAM(S): at 12:58

## 2018-11-05 RX ADMIN — Medication 1000 UNIT(S): at 15:50

## 2018-11-05 NOTE — PROGRESS NOTE ADULT - SUBJECTIVE AND OBJECTIVE BOX
CHIEF COMPLAINT: Patient is a 72y old  female who presents with a chief complaint of chest pain (04 Nov 2018 11:40)      SUBJECTIVE / OVERNIGHT EVENTS:    Seen at HD. Speaking in a low voice - difficult to understand. Appears weak. Denies SOB.    MEDICATIONS  (STANDING):  amLODIPine   Tablet 10 milliGRAM(s) Oral daily  atorvastatin 10 milliGRAM(s) Oral at bedtime  calcium acetate 667 milliGRAM(s) Oral with lunch  carvedilol 12.5 milliGRAM(s) Oral every 12 hours  chlorhexidine 4% Liquid 1 Application(s) Topical <User Schedule>  cholecalciferol 1000 Unit(s) Oral daily  cloNIDine 0.3 milliGRAM(s) Oral every 8 hours  docusate sodium 100 milliGRAM(s) Oral three times a day  epoetin iam Injectable 4000 Unit(s) IV Push <User Schedule>  hydrALAZINE 100 milliGRAM(s) Oral every 8 hours  isosorbide   mononitrate ER Tablet (IMDUR) 60 milliGRAM(s) Oral daily  levothyroxine 125 MICROGram(s) Oral daily  lisinopril 40 milliGRAM(s) Oral daily  polyethylene glycol 3350 17 Gram(s) Oral daily  QUEtiapine 50 milliGRAM(s) Oral daily  senna 2 Tablet(s) Oral at bedtime  sertraline 50 milliGRAM(s) Oral daily  sodium chloride 0.9% lock flush 3 milliLiter(s) IV Push every 8 hours    MEDICATIONS  (PRN):  acetaminophen   Tablet .. 650 milliGRAM(s) Oral every 6 hours PRN Mild Pain (1 - 3), Moderate Pain (4 - 6), Severe Pain (7 - 10)  aluminum hydroxide/magnesium hydroxide/simethicone Suspension 30 milliLiter(s) Oral every 6 hours PRN Dyspepsia  clonazePAM Tablet 0.5 milliGRAM(s) Oral at bedtime PRN Anxiety  ondansetron Injectable 4 milliGRAM(s) IV Push every 6 hours PRN Nausea and/or Vomiting      VITALS:  T(F): 98.1 (11-05-18 @ 11:10), Max: 98.5 (11-04-18 @ 14:14)  HR: 57 (11-05-18 @ 11:10) (56 - 58)  BP: 178/70 (11-05-18 @ 11:10) (171/50 - 194/90)  RR: 18 (11-05-18 @ 11:10) (17 - 18)  SpO2: 100% (11-05-18 @ 09:38)      CAPILLARY BLOOD GLUCOSE    Output     I&O's Summary  T(F): 98.1 (11-05-18 @ 11:10), Max: 98.5 (11-04-18 @ 14:14)  HR: 57 (11-05-18 @ 11:10) (56 - 58)  BP: 178/70 (11-05-18 @ 11:10) (171/50 - 194/90)  RR: 18 (11-05-18 @ 11:10) (17 - 18)  SpO2: 100% (11-05-18 @ 09:38)    PHYSICAL EXAM:  GENERAL: elderly woman lying in bed at HD, appears debilitated  HEAD:  Atraumatic, Normocephalic  EYES: EOMI  NECK: Supple, No JVD  CHEST/LUNG: nonlabored breathing, CTA anteriorly  HEART: nl S1/S2  ABDOMEN: nondistended, soft  NEUROLOGY: non-focal  SKIN: No rashes noted    LABS:              9.5                  134  | 25   | 38           4.13  >-----------< 124     ------------------------< 93                    30.3                 5.2  | 91   | 3.73                                         Ca 8.7   Mg x     Ph 3.5         TPro  6.0  /  Alb  3.2      TBili  0.4  /  DBili  x         AST  19  /  ALT  7             AlkPhos  212                    MICROBIOLOGY:        RADIOLOGY & ADDITIONAL TESTS:    Imaging Personally Reviewed:    < from: CT Head No Cont (10.18.18 @ 23:12) >  IMPRESSION:   No CT evidence of acute intracranial hemorrhage, brain edema, or mass   effect.     < end of copied text >      [] Consultant(s) Notes Reviewed:    [] Care Discussed with Consultants/Other Providers:

## 2018-11-05 NOTE — PROGRESS NOTE ADULT - PROBLEM SELECTOR PLAN 1
No acute findings on CT abdomen, patient w/ chronic abdominal pain,  AXR on 11/3 with nonobstructive bowel pattern  Monitor closely

## 2018-11-05 NOTE — PROGRESS NOTE ADULT - PROBLEM SELECTOR PLAN 4
asymptomatic bradycardia on coreg-decreased to 12.5mg BID  Appreciate EP eval: No advanced AV blocks or persistent significant chelsea <40 bpm seen on telemetry, per EP if symptomatic chelsea or advanced AV block occurs, then would reduce Carvedilol dosage    -No acute pacing indication at present time  [id number] score of 6   NO AC given hx of multiple falls and positive FOBT.

## 2018-11-05 NOTE — CHART NOTE - NSCHARTNOTEFT_GEN_A_CORE
Discharge cancelled sec to BP being elevated inspite completion of HD- Minoxidil added to regimen - D/c On hold   Spoke with renal fellow- Will monitor pt roselia for BP for one more day- D/w daughter Glory

## 2018-11-05 NOTE — PROGRESS NOTE ADULT - PROBLEM SELECTOR PLAN 5
Imdur to 60mg for unstable angina  Patient w/ frequent admissions in the past for similar presentations, complaining of pain in head/chest/abdomen. HST in 400s, no significant delta change

## 2018-11-05 NOTE — PROGRESS NOTE ADULT - PROBLEM SELECTOR PLAN 8
cont levothyroxine-increase to 125mcg on 10/31 on empty stomach, unclear if was taking appropriately  Will need repeat in 3 weeks outpatient for additional titration

## 2018-11-05 NOTE — PROGRESS NOTE ADULT - PROBLEM SELECTOR PLAN 3
Repeat CXR showing pulmonary edema   continue with lisinopril, clonidine, hydralazine, decreased Coreg to 12.5 mg BID  Needs fluid removal at HD  Still having elevated BP, on nearly max meds.  Monitor BP after HD

## 2018-11-05 NOTE — PROGRESS NOTE ADULT - SUBJECTIVE AND OBJECTIVE BOX
[facility] DIVISION OF KIDNEY DISEASES AND HYPERTENSION -- HEMODIALYSIS NOTE  --------------------------------------------------------------------------------    72 F PMH type 2 DM, HTN, nonobstructive CAD, ESRD on HD TTS, syncope s/p ILR, frequent falls c/b prior nasal fracture and cervical spine fracture, Afib no a/c 2/2 recent fall , BIBEMS from [facility] for CP.         24 hour events/subjective: seen while on HD. endorses abdominal pain( chronic)         PAST HISTORY  --------------------------------------------------------------------------------  No significant changes to PMH, PSH, FHx, SHx, unless otherwise noted    ALLERGIES & MEDICATIONS  --------------------------------------------------------------------------------  Allergies    Avelox (Unknown)  fish (Hives; Rash)  shellfish (Unknown)    Intolerances      Standing Inpatient Medications  amLODIPine   Tablet 10 milliGRAM(s) Oral daily  atorvastatin 10 milliGRAM(s) Oral at bedtime  calcium acetate 667 milliGRAM(s) Oral with lunch  carvedilol 12.5 milliGRAM(s) Oral every 12 hours  chlorhexidine 4% Liquid 1 Application(s) Topical <User Schedule>  cholecalciferol 1000 Unit(s) Oral daily  cloNIDine 0.3 milliGRAM(s) Oral every 8 hours  docusate sodium 100 milliGRAM(s) Oral three times a day  epoetin iam Injectable 4000 Unit(s) IV Push <User Schedule>  hydrALAZINE 100 milliGRAM(s) Oral every 8 hours  isosorbide   mononitrate ER Tablet (IMDUR) 60 milliGRAM(s) Oral daily  levothyroxine 125 MICROGram(s) Oral daily  lisinopril 40 milliGRAM(s) Oral daily  polyethylene glycol 3350 17 Gram(s) Oral daily  QUEtiapine 50 milliGRAM(s) Oral daily  senna 2 Tablet(s) Oral at bedtime  sertraline 50 milliGRAM(s) Oral daily  sodium chloride 0.9% lock flush 3 milliLiter(s) IV Push every 8 hours    PRN Inpatient Medications  acetaminophen   Tablet .. 650 milliGRAM(s) Oral every 6 hours PRN  aluminum hydroxide/magnesium hydroxide/simethicone Suspension 30 milliLiter(s) Oral every 6 hours PRN  clonazePAM Tablet 0.5 milliGRAM(s) Oral at bedtime PRN  ondansetron Injectable 4 milliGRAM(s) IV Push every 6 hours PRN      VITALS/PHYSICAL EXAM  --------------------------------------------------------------------------------  T(C): 36.7 (11-05-18 @ 11:10), Max: 36.9 (11-04-18 @ 14:14)  HR: 57 (11-05-18 @ 11:10) (56 - 58)  BP: 178/70 (11-05-18 @ 11:10) (171/50 - 194/90)  RR: 18 (11-05-18 @ 11:10) (17 - 18)  SpO2: 100% (11-05-18 @ 09:38) (100% - 100%)  Wt(kg): --        Physical Exam:  	Gen: NAD, chronically ill  	HEENT: upple neck  	Pulm: CTA B/L  	CV: RRR, S1S2; no rub  	Abd: +BS, soft, nontender/nondistended  	LE:  no edema  	Neuro: No focal deficits,   	Psych: Normal affect and mood               vascular: left AVF RC      LABS/STUDIES  --------------------------------------------------------------------------------              9.5    4.13  >-----------<  124      [11-05-18 @ 06:35]              30.3     134  |  91  |  38  ----------------------------<  93      [11-05-18 @ 06:35]  5.2   |  25  |  3.73        Ca     8.7     [11-05-18 @ 06:35]      Mg     2.4     [11-04-18 @ 05:30]      Phos  3.5     [11-05-18 @ 06:35]    TPro  6.0  /  Alb  3.2  /  TBili  0.4  /  DBili  x   /  AST  19  /  ALT  7   /  AlkPhos  212  [11-05-18 @ 06:35]          .3 (Ca --)      [11-05-18 @ 06:35]   --  Vitamin D (25OH) 14.8      [11-03-18 @ 05:10]  HbA1c 5.5      [10-31-18 @ 06:25]  TSH 16.55      [11-02-18 @ 05:50]  Lipid: chol 92, , HDL 35, LDL 39      [10-31-18 @ 06:25]    HBsAb 10.8      [10-19-18 @ 21:45]  HBsAg NEGATIVE      [10-19-18 @ 21:45]  HCV 0.13, Nonreactive Hepatitis C AB  S/CO Ratio                        Interpretation  < 1.0                                     Non-Reactive  1.0 - 4.9                           Weakly-Reactive  > 5.0                                 Reactive  Non-Reactive: Aperson with a non-reactive HCV antibody  result is considered uninfected.  No further action is  needed unless recent infection is suspected.  In these  cases, consider repeat testing later to detect  seroconversion..  Weakly-Reactive: HCV antibody test is abnormal, HCV RNA  Qualitative test will follow.  Reactive: HCV antibody test is abnormal, HCV RNA  Qualitative test will follow.  Note: HCV antibody testing is performed on the [employer] system.      [10-19-18 @ 21:45]

## 2018-11-05 NOTE — CHART NOTE - NSCHARTNOTEFT_GEN_A_CORE
Telemetry shows sinus rhythm with first degree AV block and HR 50s. No significant bradycardia noted.    -No acute pacing indication at present time  -Continue care per primary team

## 2018-11-06 PROCEDURE — 99233 SBSQ HOSP IP/OBS HIGH 50: CPT

## 2018-11-06 RX ORDER — ISOSORBIDE MONONITRATE 60 MG/1
120 TABLET, EXTENDED RELEASE ORAL DAILY
Qty: 0 | Refills: 0 | Status: DISCONTINUED | OUTPATIENT
Start: 2018-11-06 | End: 2018-11-20

## 2018-11-06 RX ADMIN — LISINOPRIL 40 MILLIGRAM(S): 2.5 TABLET ORAL at 04:21

## 2018-11-06 RX ADMIN — Medication 0.3 MILLIGRAM(S): at 22:34

## 2018-11-06 RX ADMIN — AMLODIPINE BESYLATE 10 MILLIGRAM(S): 2.5 TABLET ORAL at 04:16

## 2018-11-06 RX ADMIN — Medication 1000 UNIT(S): at 12:30

## 2018-11-06 RX ADMIN — Medication 100 MILLIGRAM(S): at 04:21

## 2018-11-06 RX ADMIN — SODIUM CHLORIDE 3 MILLILITER(S): 9 INJECTION INTRAMUSCULAR; INTRAVENOUS; SUBCUTANEOUS at 13:58

## 2018-11-06 RX ADMIN — Medication 0.3 MILLIGRAM(S): at 06:27

## 2018-11-06 RX ADMIN — QUETIAPINE FUMARATE 50 MILLIGRAM(S): 200 TABLET, FILM COATED ORAL at 12:35

## 2018-11-06 RX ADMIN — Medication 100 MILLIGRAM(S): at 13:58

## 2018-11-06 RX ADMIN — Medication 667 MILLIGRAM(S): at 12:32

## 2018-11-06 RX ADMIN — SENNA PLUS 2 TABLET(S): 8.6 TABLET ORAL at 22:34

## 2018-11-06 RX ADMIN — Medication 100 MILLIGRAM(S): at 13:57

## 2018-11-06 RX ADMIN — Medication 125 MICROGRAM(S): at 04:17

## 2018-11-06 RX ADMIN — ATORVASTATIN CALCIUM 10 MILLIGRAM(S): 80 TABLET, FILM COATED ORAL at 22:34

## 2018-11-06 RX ADMIN — CHLORHEXIDINE GLUCONATE 1 APPLICATION(S): 213 SOLUTION TOPICAL at 12:30

## 2018-11-06 RX ADMIN — Medication 0.3 MILLIGRAM(S): at 13:57

## 2018-11-06 RX ADMIN — Medication 10 MILLIGRAM(S): at 00:03

## 2018-11-06 RX ADMIN — SODIUM CHLORIDE 3 MILLILITER(S): 9 INJECTION INTRAMUSCULAR; INTRAVENOUS; SUBCUTANEOUS at 22:32

## 2018-11-06 RX ADMIN — Medication 100 MILLIGRAM(S): at 22:34

## 2018-11-06 RX ADMIN — Medication 2.5 MILLIGRAM(S): at 17:40

## 2018-11-06 RX ADMIN — SERTRALINE 50 MILLIGRAM(S): 25 TABLET, FILM COATED ORAL at 12:31

## 2018-11-06 RX ADMIN — CARVEDILOL PHOSPHATE 12.5 MILLIGRAM(S): 80 CAPSULE, EXTENDED RELEASE ORAL at 17:39

## 2018-11-06 RX ADMIN — POLYETHYLENE GLYCOL 3350 17 GRAM(S): 17 POWDER, FOR SOLUTION ORAL at 12:30

## 2018-11-06 RX ADMIN — SODIUM CHLORIDE 3 MILLILITER(S): 9 INJECTION INTRAMUSCULAR; INTRAVENOUS; SUBCUTANEOUS at 04:13

## 2018-11-06 RX ADMIN — Medication 2.5 MILLIGRAM(S): at 04:16

## 2018-11-06 RX ADMIN — CARVEDILOL PHOSPHATE 12.5 MILLIGRAM(S): 80 CAPSULE, EXTENDED RELEASE ORAL at 04:16

## 2018-11-06 RX ADMIN — ISOSORBIDE MONONITRATE 120 MILLIGRAM(S): 60 TABLET, EXTENDED RELEASE ORAL at 12:30

## 2018-11-06 NOTE — PROGRESS NOTE ADULT - PROBLEM SELECTOR PLAN 4
asymptomatic bradycardia on coreg-decreased to 12.5mg BID  Appreciate EP eval: No advanced AV blocks or persistent significant chelsea <40 bpm seen on telemetry, per EP if symptomatic chelsea or advanced AV block occurs, then would reduce Carvedilol dosage    -No acute pacing indication at present time  [id number] score of 6   NO AC given hx of multiple falls and positive FOBT.

## 2018-11-06 NOTE — PROGRESS NOTE ADULT - PROBLEM SELECTOR PLAN 5
Cont Imdur unstable angina  Patient w/ frequent admissions in the past for similar presentations, complaining of pain in head/chest/abdomen. HST in 400s, no significant delta change

## 2018-11-06 NOTE — PROGRESS NOTE ADULT - SUBJECTIVE AND OBJECTIVE BOX
CHIEF COMPLAINT: Patient is a 72y old  female who presents with a chief complaint of chest pain (05 Nov 2018 12:23)      SUBJECTIVE / OVERNIGHT EVENTS:    Denies chest pain. Denies abdominal pain. Sister at bedside. Patient requesting to go home. Discussed BP is elevated and will need to control. No other complaints today.    MEDICATIONS  (STANDING):  amLODIPine   Tablet 10 milliGRAM(s) Oral daily  atorvastatin 10 milliGRAM(s) Oral at bedtime  calcium acetate 667 milliGRAM(s) Oral with lunch  carvedilol 12.5 milliGRAM(s) Oral every 12 hours  chlorhexidine 4% Liquid 1 Application(s) Topical <User Schedule>  cholecalciferol 1000 Unit(s) Oral daily  cloNIDine 0.3 milliGRAM(s) Oral every 8 hours  docusate sodium 100 milliGRAM(s) Oral three times a day  epoetin iam Injectable 4000 Unit(s) IV Push <User Schedule>  hydrALAZINE 100 milliGRAM(s) Oral every 8 hours  isosorbide   mononitrate ER Tablet (IMDUR) 120 milliGRAM(s) Oral daily  levothyroxine 125 MICROGram(s) Oral daily  lisinopril 40 milliGRAM(s) Oral daily  minoxidil 2.5 milliGRAM(s) Oral two times a day  polyethylene glycol 3350 17 Gram(s) Oral daily  QUEtiapine 50 milliGRAM(s) Oral daily  senna 2 Tablet(s) Oral at bedtime  sertraline 50 milliGRAM(s) Oral daily  sodium chloride 0.9% lock flush 3 milliLiter(s) IV Push every 8 hours    MEDICATIONS  (PRN):  acetaminophen   Tablet .. 650 milliGRAM(s) Oral every 6 hours PRN Mild Pain (1 - 3), Moderate Pain (4 - 6), Severe Pain (7 - 10)  aluminum hydroxide/magnesium hydroxide/simethicone Suspension 30 milliLiter(s) Oral every 6 hours PRN Dyspepsia  clonazePAM Tablet 0.5 milliGRAM(s) Oral at bedtime PRN Anxiety  ondansetron Injectable 4 milliGRAM(s) IV Push every 6 hours PRN Nausea and/or Vomiting      VITALS:  T(F): 98 (11-06-18 @ 08:55), Max: 98.1 (11-05-18 @ 14:27)  HR: 62 (11-06-18 @ 08:55) (58 - 66)  BP: 180/63 (11-06-18 @ 08:55) (180/63 - 199/67)  RR: 18 (11-06-18 @ 08:55) (17 - 18)  SpO2: 100% (11-06-18 @ 08:55)      CAPILLARY BLOOD GLUCOSE    Output     I&O's Summary  T(F): 98 (11-06-18 @ 08:55), Max: 98.1 (11-05-18 @ 14:27)  HR: 62 (11-06-18 @ 08:55) (58 - 66)  BP: 180/63 (11-06-18 @ 08:55) (180/63 - 199/67)  RR: 18 (11-06-18 @ 08:55) (17 - 18)  SpO2: 100% (11-06-18 @ 08:55)    PHYSICAL EXAM:  GENERAL: elderly woman lying in bed, NAD  HEAD:  Atraumatic, Normocephalic  EYES: EOMI  NECK: Supple, No JVD  CHEST/LUNG: bilateral crackles  HEART: nl S1/S2  ABDOMEN: nondistended, soft  EXTREMITIES:  no LE edema  PSYCH: alert, answering questions appropriately  NEUROLOGY: non-focal  SKIN: No rashes noted    LABS:              9.5                  134  | 25   | 38           4.13  >-----------< 124     ------------------------< 93                    30.3                 5.2  | 91   | 3.73                                         Ca 8.7   Mg x     Ph 3.5         TPro  6.0  /  Alb  3.2      TBili  0.4  /  DBili  x         AST  19  /  ALT  7             AlkPhos  212                    MICROBIOLOGY:        RADIOLOGY & ADDITIONAL TESTS:    Imaging Personally Reviewed:    < from: CT Chest No Cont (10.19.18 @ 18:00) >  Impression: There is no pneumonia.    Small right pleural effusion.    < end of copied text >      [x] Consultant(s) Notes Reviewed: EP regarding no need for PPM    [] Care Discussed with Consultants/Other Providers:

## 2018-11-06 NOTE — PROGRESS NOTE ADULT - PROBLEM SELECTOR PLAN 10
LE stockings given  patient with hx of hematomas in the left frontal  periorbital preseptal and right parietal regions - appears improved today    If patient stays should be on subcut heparin for DVT ppx

## 2018-11-06 NOTE — PROGRESS NOTE ADULT - PROBLEM SELECTOR PLAN 3
Repeat CXR showing pulmonary edema   continue with lisinopril, clonidine, hydralazine, decreased Coreg to 12.5 mg BID  Needs fluid removal at HD - 3 kg UF yesterday  Still having elevated BP  Starting minoxidil 2.5 mg BID per renal  Increase Imdur to 120 mg daily  Crackles on lung exam today - may need additional fluid removal

## 2018-11-07 DIAGNOSIS — N18.9 CHRONIC KIDNEY DISEASE, UNSPECIFIED: ICD-10-CM

## 2018-11-07 DIAGNOSIS — N18.6 END STAGE RENAL DISEASE: ICD-10-CM

## 2018-11-07 LAB
BUN SERPL-MCNC: 29 MG/DL — HIGH (ref 7–23)
CALCIUM SERPL-MCNC: 8.4 MG/DL — SIGNIFICANT CHANGE UP (ref 8.4–10.5)
CHLORIDE SERPL-SCNC: 92 MMOL/L — LOW (ref 98–107)
CO2 SERPL-SCNC: 24 MMOL/L — SIGNIFICANT CHANGE UP (ref 22–31)
CREAT SERPL-MCNC: 3.17 MG/DL — HIGH (ref 0.5–1.3)
GLUCOSE SERPL-MCNC: 144 MG/DL — HIGH (ref 70–99)
HCT VFR BLD CALC: 26.5 % — LOW (ref 34.5–45)
HGB BLD-MCNC: 8.6 G/DL — LOW (ref 11.5–15.5)
MCHC RBC-ENTMCNC: 29.4 PG — SIGNIFICANT CHANGE UP (ref 27–34)
MCHC RBC-ENTMCNC: 32.5 % — SIGNIFICANT CHANGE UP (ref 32–36)
MCV RBC AUTO: 90.4 FL — SIGNIFICANT CHANGE UP (ref 80–100)
NRBC # FLD: 0 — SIGNIFICANT CHANGE UP
PLATELET # BLD AUTO: 143 K/UL — LOW (ref 150–400)
PMV BLD: 11.9 FL — SIGNIFICANT CHANGE UP (ref 7–13)
POTASSIUM SERPL-MCNC: 4.5 MMOL/L — SIGNIFICANT CHANGE UP (ref 3.5–5.3)
POTASSIUM SERPL-SCNC: 4.5 MMOL/L — SIGNIFICANT CHANGE UP (ref 3.5–5.3)
RBC # BLD: 2.93 M/UL — LOW (ref 3.8–5.2)
RBC # FLD: 17.2 % — HIGH (ref 10.3–14.5)
SODIUM SERPL-SCNC: 133 MMOL/L — LOW (ref 135–145)
WBC # BLD: 3.94 K/UL — SIGNIFICANT CHANGE UP (ref 3.8–10.5)
WBC # FLD AUTO: 3.94 K/UL — SIGNIFICANT CHANGE UP (ref 3.8–10.5)

## 2018-11-07 PROCEDURE — 99233 SBSQ HOSP IP/OBS HIGH 50: CPT

## 2018-11-07 PROCEDURE — 90935 HEMODIALYSIS ONE EVALUATION: CPT | Mod: GC

## 2018-11-07 PROCEDURE — 74018 RADEX ABDOMEN 1 VIEW: CPT | Mod: 26

## 2018-11-07 RX ORDER — MINOXIDIL 10 MG
2.5 TABLET ORAL ONCE
Qty: 0 | Refills: 0 | Status: COMPLETED | OUTPATIENT
Start: 2018-11-07 | End: 2018-11-07

## 2018-11-07 RX ORDER — HYDRALAZINE HCL 50 MG
10 TABLET ORAL ONCE
Qty: 0 | Refills: 0 | Status: COMPLETED | OUTPATIENT
Start: 2018-11-07 | End: 2018-11-07

## 2018-11-07 RX ORDER — MINOXIDIL 10 MG
5 TABLET ORAL
Qty: 0 | Refills: 0 | Status: DISCONTINUED | OUTPATIENT
Start: 2018-11-07 | End: 2018-11-20

## 2018-11-07 RX ADMIN — SERTRALINE 50 MILLIGRAM(S): 25 TABLET, FILM COATED ORAL at 15:00

## 2018-11-07 RX ADMIN — Medication 100 MILLIGRAM(S): at 15:00

## 2018-11-07 RX ADMIN — Medication 0.3 MILLIGRAM(S): at 22:11

## 2018-11-07 RX ADMIN — Medication 100 MILLIGRAM(S): at 06:33

## 2018-11-07 RX ADMIN — Medication 650 MILLIGRAM(S): at 09:00

## 2018-11-07 RX ADMIN — ISOSORBIDE MONONITRATE 120 MILLIGRAM(S): 60 TABLET, EXTENDED RELEASE ORAL at 15:00

## 2018-11-07 RX ADMIN — Medication 5 MILLIGRAM(S): at 18:30

## 2018-11-07 RX ADMIN — Medication 0.3 MILLIGRAM(S): at 06:33

## 2018-11-07 RX ADMIN — CARVEDILOL PHOSPHATE 12.5 MILLIGRAM(S): 80 CAPSULE, EXTENDED RELEASE ORAL at 06:34

## 2018-11-07 RX ADMIN — SODIUM CHLORIDE 3 MILLILITER(S): 9 INJECTION INTRAMUSCULAR; INTRAVENOUS; SUBCUTANEOUS at 22:06

## 2018-11-07 RX ADMIN — Medication 100 MILLIGRAM(S): at 22:11

## 2018-11-07 RX ADMIN — Medication 1000 UNIT(S): at 15:01

## 2018-11-07 RX ADMIN — SODIUM CHLORIDE 3 MILLILITER(S): 9 INJECTION INTRAMUSCULAR; INTRAVENOUS; SUBCUTANEOUS at 15:01

## 2018-11-07 RX ADMIN — POLYETHYLENE GLYCOL 3350 17 GRAM(S): 17 POWDER, FOR SOLUTION ORAL at 15:01

## 2018-11-07 RX ADMIN — ATORVASTATIN CALCIUM 10 MILLIGRAM(S): 80 TABLET, FILM COATED ORAL at 22:11

## 2018-11-07 RX ADMIN — QUETIAPINE FUMARATE 50 MILLIGRAM(S): 200 TABLET, FILM COATED ORAL at 15:01

## 2018-11-07 RX ADMIN — Medication 125 MICROGRAM(S): at 06:33

## 2018-11-07 RX ADMIN — SENNA PLUS 2 TABLET(S): 8.6 TABLET ORAL at 22:11

## 2018-11-07 RX ADMIN — Medication 10 MILLIGRAM(S): at 02:18

## 2018-11-07 RX ADMIN — ONDANSETRON 4 MILLIGRAM(S): 8 TABLET, FILM COATED ORAL at 23:30

## 2018-11-07 RX ADMIN — Medication 0.3 MILLIGRAM(S): at 15:01

## 2018-11-07 RX ADMIN — Medication 650 MILLIGRAM(S): at 09:38

## 2018-11-07 RX ADMIN — CARVEDILOL PHOSPHATE 12.5 MILLIGRAM(S): 80 CAPSULE, EXTENDED RELEASE ORAL at 18:30

## 2018-11-07 RX ADMIN — Medication 2.5 MILLIGRAM(S): at 15:02

## 2018-11-07 RX ADMIN — Medication 2.5 MILLIGRAM(S): at 06:33

## 2018-11-07 RX ADMIN — LISINOPRIL 40 MILLIGRAM(S): 2.5 TABLET ORAL at 06:33

## 2018-11-07 RX ADMIN — Medication 100 MILLIGRAM(S): at 15:01

## 2018-11-07 RX ADMIN — AMLODIPINE BESYLATE 10 MILLIGRAM(S): 2.5 TABLET ORAL at 06:34

## 2018-11-07 NOTE — PROGRESS NOTE ADULT - SUBJECTIVE AND OBJECTIVE BOX
[facility] DIVISION OF KIDNEY DISEASES AND HYPERTENSION -- HEMODIALYSIS NOTE  --------------------------------------------------------------------------------  Chief Complaint: ESRD/Ongoing hemodialysis requirement    24 hour events/subjective:  Seen at bedside today.  No acute complaint.         PAST HISTORY  --------------------------------------------------------------------------------  No significant changes to PMH, PSH, FHx, SHx, unless otherwise noted    ALLERGIES & MEDICATIONS  --------------------------------------------------------------------------------  Allergies    Avelox (Unknown)  fish (Hives; Rash)  shellfish (Unknown)    Intolerances      Standing Inpatient Medications  amLODIPine   Tablet 10 milliGRAM(s) Oral daily  atorvastatin 10 milliGRAM(s) Oral at bedtime  calcium acetate 667 milliGRAM(s) Oral with lunch  carvedilol 12.5 milliGRAM(s) Oral every 12 hours  chlorhexidine 4% Liquid 1 Application(s) Topical <User Schedule>  cholecalciferol 1000 Unit(s) Oral daily  cloNIDine 0.3 milliGRAM(s) Oral every 8 hours  docusate sodium 100 milliGRAM(s) Oral three times a day  epoetin iam Injectable 4000 Unit(s) IV Push <User Schedule>  hydrALAZINE 100 milliGRAM(s) Oral every 8 hours  isosorbide   mononitrate ER Tablet (IMDUR) 120 milliGRAM(s) Oral daily  levothyroxine 125 MICROGram(s) Oral daily  lisinopril 40 milliGRAM(s) Oral daily  minoxidil 2.5 milliGRAM(s) Oral two times a day  polyethylene glycol 3350 17 Gram(s) Oral daily  QUEtiapine 50 milliGRAM(s) Oral daily  senna 2 Tablet(s) Oral at bedtime  sertraline 50 milliGRAM(s) Oral daily  sodium chloride 0.9% lock flush 3 milliLiter(s) IV Push every 8 hours    PRN Inpatient Medications  acetaminophen   Tablet .. 650 milliGRAM(s) Oral every 6 hours PRN  aluminum hydroxide/magnesium hydroxide/simethicone Suspension 30 milliLiter(s) Oral every 6 hours PRN  clonazePAM Tablet 0.5 milliGRAM(s) Oral at bedtime PRN  ondansetron Injectable 4 milliGRAM(s) IV Push every 6 hours PRN      REVIEW OF SYSTEMS  --------------------------------------------------------------------------------    VITALS/PHYSICAL EXAM  --------------------------------------------------------------------------------  T(C): 36.8 (11-07-18 @ 08:55), Max: 37.3 (11-06-18 @ 21:00)  HR: 65 (11-07-18 @ 08:55) (62 - 67)  BP: 195/72 (11-07-18 @ 08:55) (170/60 - 199/63)  RR: 18 (11-07-18 @ 08:55) (18 - 18)  SpO2: 97% (11-07-18 @ 08:55) (97% - 100%)  Wt(kg): --        11-06-18 @ 07:01  -  11-07-18 @ 07:00  --------------------------------------------------------  IN: 720 mL / OUT: 0 mL / NET: 720 mL      Physical Exam:  	Gen: NAD, appears chronically ill  	Pulm: faint crackles b/l+  	CV: RRR, S1S2; no rub  	Abd: +BS, soft, distended+ generalized tenderness+  	LE:  no edema  	Neuro: awake and aler                vascular: ROCKE CHRIS         LABS/STUDIES  --------------------------------------------------------------------------------                .3 (Ca --)      [11-05-18 @ 06:35]   --  Vitamin D (25OH) 14.8      [11-03-18 @ 05:10]  HbA1c 5.5      [10-31-18 @ 06:25]  TSH 16.55      [11-02-18 @ 05:50]  Lipid: chol 92, , HDL 35, LDL 39      [10-31-18 @ 06:25]    HBsAb 10.8      [10-19-18 @ 21:45]  HBsAg NEGATIVE      [10-19-18 @ 21:45]  HCV 0.13, Nonreactive Hepatitis C AB  S/CO Ratio                        Interpretation  < 1.0                                     Non-Reactive  1.0 - 4.9                           Weakly-Reactive  > 5.0                                 Reactive  Non-Reactive: Aperson with a non-reactive HCV antibody  result is considered uninfected.  No further action is  needed unless recent infection is suspected.  In these  cases, consider repeat testing later to detect  seroconversion..  Weakly-Reactive: HCV antibody test is abnormal, HCV RNA  Qualitative test will follow.  Reactive: HCV antibody test is abnormal, HCV RNA  Qualitative test will follow.  Note: HCV antibody testing is performed on the [employer] system.      [10-19-18 @ 21:45]

## 2018-11-07 NOTE — PROGRESS NOTE ADULT - PROBLEM SELECTOR PLAN 4
asymptomatic bradycardia on coreg-decreased to 12.5mg BID  Appreciate EP eval: No advanced AV blocks or persistent significant chelsea <40 bpm seen on telemetry, per EP if symptomatic chelsea or advanced AV block occurs, then would reduce Carvedilol dosage    -No acute pacing indication at present time  [id number] score of 6   NO AC given hx of multiple falls and positive FOBT.

## 2018-11-07 NOTE — PROGRESS NOTE ADULT - PROBLEM SELECTOR PLAN 2
patient hypothermia to 94 rectally on 10/31 now resolved , bradycardia improved , elevated rpt TSH with low FT4  10/31 Bcx NGTD  CT A/P unremarkable, CXR with mild pulm edema but no evidence of infection  Synthroid increased to 125mcg to be given at 6am on empty stomach for better absorption.

## 2018-11-07 NOTE — PROGRESS NOTE ADULT - PROBLEM SELECTOR PLAN 1
No acute findings on CT abdomen, patient w/ chronic abdominal pain,  AXR on 11/3 with nonobstructive bowel pattern  pt c/o abd pain, will monitor for BM, will repeat abd xray

## 2018-11-07 NOTE — PROGRESS NOTE ADULT - PROBLEM SELECTOR PLAN 3
Hb below goal, resume SHAY once BP better controlled. Hb below goal, continue SHAY as per BP hold parameters.

## 2018-11-07 NOTE — PROGRESS NOTE ADULT - PROBLEM SELECTOR PLAN 3
Repeat CXR showing pulmonary edema   continue with lisinopril, clonidine, hydralazine, decreased Coreg to 12.5 mg BID  Still having elevated BP  Starting minoxidil 2.5 mg BID per renal  Increase Imdur to 120 mg daily  Crackles on lung exam  - may need additional fluid removal, c/w HD as per Renal, Renal recommending  Renal doppler to r/o SHAYAN

## 2018-11-07 NOTE — PROGRESS NOTE ADULT - SUBJECTIVE AND OBJECTIVE BOX
Patient is a 72y old  Female who presents with a chief complaint of chest pain (07 Nov 2018 09:09)      SUBJECTIVE / OVERNIGHT EVENTS: patient seen and examined by bedside at 11Am, pt c/o abdominal pain, patient she is passing gas, not sure of BM, denies CP, palpitation       MEDICATIONS  (STANDING):  amLODIPine   Tablet 10 milliGRAM(s) Oral daily  atorvastatin 10 milliGRAM(s) Oral at bedtime  calcium acetate 667 milliGRAM(s) Oral with lunch  carvedilol 12.5 milliGRAM(s) Oral every 12 hours  chlorhexidine 4% Liquid 1 Application(s) Topical <User Schedule>  cholecalciferol 1000 Unit(s) Oral daily  cloNIDine 0.3 milliGRAM(s) Oral every 8 hours  docusate sodium 100 milliGRAM(s) Oral three times a day  epoetin iam Injectable 4000 Unit(s) IV Push <User Schedule>  hydrALAZINE 100 milliGRAM(s) Oral every 8 hours  isosorbide   mononitrate ER Tablet (IMDUR) 120 milliGRAM(s) Oral daily  levothyroxine 125 MICROGram(s) Oral daily  lisinopril 40 milliGRAM(s) Oral daily  minoxidil 5 milliGRAM(s) Oral two times a day  minoxidil 2.5 milliGRAM(s) Oral once  polyethylene glycol 3350 17 Gram(s) Oral daily  QUEtiapine 50 milliGRAM(s) Oral daily  senna 2 Tablet(s) Oral at bedtime  sertraline 50 milliGRAM(s) Oral daily  sodium chloride 0.9% lock flush 3 milliLiter(s) IV Push every 8 hours    MEDICATIONS  (PRN):  acetaminophen   Tablet .. 650 milliGRAM(s) Oral every 6 hours PRN Mild Pain (1 - 3), Moderate Pain (4 - 6), Severe Pain (7 - 10)  aluminum hydroxide/magnesium hydroxide/simethicone Suspension 30 milliLiter(s) Oral every 6 hours PRN Dyspepsia  clonazePAM Tablet 0.5 milliGRAM(s) Oral at bedtime PRN Anxiety  ondansetron Injectable 4 milliGRAM(s) IV Push every 6 hours PRN Nausea and/or Vomiting      Vital Signs Last 24 Hrs  T(C): 36.6 (07 Nov 2018 13:50), Max: 37.3 (06 Nov 2018 21:00)  T(F): 97.9 (07 Nov 2018 13:50), Max: 99.2 (06 Nov 2018 21:00)  HR: 64 (07 Nov 2018 13:50) (62 - 67)  BP: 185/63 (07 Nov 2018 13:50) (170/61 - 195/72)  BP(mean): --  RR: 17 (07 Nov 2018 13:50) (17 - 18)  SpO2: 97% (07 Nov 2018 08:55) (97% - 100%)  CAPILLARY BLOOD GLUCOSE        I&O's Summary    06 Nov 2018 07:01  -  07 Nov 2018 07:00  --------------------------------------------------------  IN: 720 mL / OUT: 0 mL / NET: 720 mL    07 Nov 2018 07:01  -  07 Nov 2018 14:40  --------------------------------------------------------  IN: 400 mL / OUT: 3400 mL / NET: -3000 mL        PHYSICAL EXAM:  GENERAL: NAD,   HEAD:  Atraumatic, Normocephalic  EYES: EOMI, PERRLA, conjunctiva and sclera clear  NECK: Supple,   CHEST/LUNG: bibasilar crackles   HEART: Regular rate and rhythm;   ABDOMEN: Soft, mild diffuse tenderness , slightly distended; Bowel sounds present  EXTREMITIES:  2+ Peripheral Pulses, No clubbing, cyanosis, or edema, LUE  AVF   PSYCH: awake, Alert answering questions  appropriately   NEUROLOGY: non-focal  SKIN: No rashes or lesions    LABS:                        8.6    3.94  )-----------( 143      ( 07 Nov 2018 11:00 )             26.5     11-07    133<L>  |  92<L>  |  29<H>  ----------------------------<  144<H>  4.5   |  24  |  3.17<H>    Ca    8.4      07 Nov 2018 11:00                RADIOLOGY & ADDITIONAL TESTS:    Imaging Personally Reviewed:    Consultant(s) Notes Reviewed:  nephrology     Care Discussed with Consultants/Other Providers:

## 2018-11-08 DIAGNOSIS — I50.9 HEART FAILURE, UNSPECIFIED: ICD-10-CM

## 2018-11-08 DIAGNOSIS — R10.9 UNSPECIFIED ABDOMINAL PAIN: ICD-10-CM

## 2018-11-08 LAB
BASOPHILS # BLD AUTO: 0.03 K/UL — SIGNIFICANT CHANGE UP (ref 0–0.2)
BASOPHILS NFR BLD AUTO: 0.6 % — SIGNIFICANT CHANGE UP (ref 0–2)
BUN SERPL-MCNC: 17 MG/DL — SIGNIFICANT CHANGE UP (ref 7–23)
CALCIUM SERPL-MCNC: 8.6 MG/DL — SIGNIFICANT CHANGE UP (ref 8.4–10.5)
CHLORIDE SERPL-SCNC: 92 MMOL/L — LOW (ref 98–107)
CO2 SERPL-SCNC: 26 MMOL/L — SIGNIFICANT CHANGE UP (ref 22–31)
CREAT SERPL-MCNC: 2.37 MG/DL — HIGH (ref 0.5–1.3)
EOSINOPHIL # BLD AUTO: 0.08 K/UL — SIGNIFICANT CHANGE UP (ref 0–0.5)
EOSINOPHIL NFR BLD AUTO: 1.6 % — SIGNIFICANT CHANGE UP (ref 0–6)
GLUCOSE SERPL-MCNC: 109 MG/DL — HIGH (ref 70–99)
HCT VFR BLD CALC: 28 % — LOW (ref 34.5–45)
HGB BLD-MCNC: 8.9 G/DL — LOW (ref 11.5–15.5)
IMM GRANULOCYTES # BLD AUTO: 0.03 # — SIGNIFICANT CHANGE UP
IMM GRANULOCYTES NFR BLD AUTO: 0.6 % — SIGNIFICANT CHANGE UP (ref 0–1.5)
LYMPHOCYTES # BLD AUTO: 0.29 K/UL — LOW (ref 1–3.3)
LYMPHOCYTES # BLD AUTO: 5.9 % — LOW (ref 13–44)
MAGNESIUM SERPL-MCNC: 2.2 MG/DL — SIGNIFICANT CHANGE UP (ref 1.6–2.6)
MCHC RBC-ENTMCNC: 29.1 PG — SIGNIFICANT CHANGE UP (ref 27–34)
MCHC RBC-ENTMCNC: 31.8 % — LOW (ref 32–36)
MCV RBC AUTO: 91.5 FL — SIGNIFICANT CHANGE UP (ref 80–100)
MONOCYTES # BLD AUTO: 0.49 K/UL — SIGNIFICANT CHANGE UP (ref 0–0.9)
MONOCYTES NFR BLD AUTO: 9.9 % — SIGNIFICANT CHANGE UP (ref 2–14)
NEUTROPHILS # BLD AUTO: 4.02 K/UL — SIGNIFICANT CHANGE UP (ref 1.8–7.4)
NEUTROPHILS NFR BLD AUTO: 81.4 % — HIGH (ref 43–77)
NRBC # FLD: 0 — SIGNIFICANT CHANGE UP
PLATELET # BLD AUTO: 144 K/UL — LOW (ref 150–400)
PMV BLD: 11.5 FL — SIGNIFICANT CHANGE UP (ref 7–13)
POTASSIUM SERPL-MCNC: 3.5 MMOL/L — SIGNIFICANT CHANGE UP (ref 3.5–5.3)
POTASSIUM SERPL-SCNC: 3.5 MMOL/L — SIGNIFICANT CHANGE UP (ref 3.5–5.3)
RBC # BLD: 3.06 M/UL — LOW (ref 3.8–5.2)
RBC # FLD: 17.3 % — HIGH (ref 10.3–14.5)
SODIUM SERPL-SCNC: 136 MMOL/L — SIGNIFICANT CHANGE UP (ref 135–145)
WBC # BLD: 4.94 K/UL — SIGNIFICANT CHANGE UP (ref 3.8–10.5)
WBC # FLD AUTO: 4.94 K/UL — SIGNIFICANT CHANGE UP (ref 3.8–10.5)

## 2018-11-08 PROCEDURE — 99233 SBSQ HOSP IP/OBS HIGH 50: CPT

## 2018-11-08 PROCEDURE — 99232 SBSQ HOSP IP/OBS MODERATE 35: CPT | Mod: GC

## 2018-11-08 RX ORDER — SIMETHICONE 80 MG/1
80 TABLET, CHEWABLE ORAL EVERY 6 HOURS
Qty: 0 | Refills: 0 | Status: COMPLETED | OUTPATIENT
Start: 2018-11-08 | End: 2018-11-13

## 2018-11-08 RX ORDER — CLONAZEPAM 1 MG
0.5 TABLET ORAL AT BEDTIME
Qty: 0 | Refills: 0 | Status: DISCONTINUED | OUTPATIENT
Start: 2018-11-08 | End: 2018-11-15

## 2018-11-08 RX ORDER — LACTULOSE 10 G/15ML
20 SOLUTION ORAL ONCE
Qty: 0 | Refills: 0 | Status: COMPLETED | OUTPATIENT
Start: 2018-11-08 | End: 2018-11-08

## 2018-11-08 RX ORDER — PANTOPRAZOLE SODIUM 20 MG/1
40 TABLET, DELAYED RELEASE ORAL
Qty: 0 | Refills: 0 | Status: DISCONTINUED | OUTPATIENT
Start: 2018-11-08 | End: 2018-11-20

## 2018-11-08 RX ADMIN — ONDANSETRON 4 MILLIGRAM(S): 8 TABLET, FILM COATED ORAL at 06:05

## 2018-11-08 RX ADMIN — Medication 100 MILLIGRAM(S): at 06:06

## 2018-11-08 RX ADMIN — Medication 650 MILLIGRAM(S): at 18:47

## 2018-11-08 RX ADMIN — CHLORHEXIDINE GLUCONATE 1 APPLICATION(S): 213 SOLUTION TOPICAL at 12:16

## 2018-11-08 RX ADMIN — SERTRALINE 50 MILLIGRAM(S): 25 TABLET, FILM COATED ORAL at 12:15

## 2018-11-08 RX ADMIN — CARVEDILOL PHOSPHATE 12.5 MILLIGRAM(S): 80 CAPSULE, EXTENDED RELEASE ORAL at 06:05

## 2018-11-08 RX ADMIN — Medication 125 MICROGRAM(S): at 06:05

## 2018-11-08 RX ADMIN — Medication 5 MILLIGRAM(S): at 06:05

## 2018-11-08 RX ADMIN — Medication 0.3 MILLIGRAM(S): at 06:05

## 2018-11-08 RX ADMIN — Medication 5 MILLIGRAM(S): at 17:35

## 2018-11-08 RX ADMIN — Medication 100 MILLIGRAM(S): at 21:25

## 2018-11-08 RX ADMIN — Medication 10 MILLIGRAM(S): at 13:09

## 2018-11-08 RX ADMIN — Medication 0.3 MILLIGRAM(S): at 13:09

## 2018-11-08 RX ADMIN — Medication 100 MILLIGRAM(S): at 06:05

## 2018-11-08 RX ADMIN — LACTULOSE 20 GRAM(S): 10 SOLUTION ORAL at 13:08

## 2018-11-08 RX ADMIN — QUETIAPINE FUMARATE 50 MILLIGRAM(S): 200 TABLET, FILM COATED ORAL at 17:35

## 2018-11-08 RX ADMIN — Medication 100 MILLIGRAM(S): at 13:09

## 2018-11-08 RX ADMIN — Medication 30 MILLILITER(S): at 12:14

## 2018-11-08 RX ADMIN — Medication 650 MILLIGRAM(S): at 21:16

## 2018-11-08 RX ADMIN — SODIUM CHLORIDE 3 MILLILITER(S): 9 INJECTION INTRAMUSCULAR; INTRAVENOUS; SUBCUTANEOUS at 13:08

## 2018-11-08 RX ADMIN — LISINOPRIL 40 MILLIGRAM(S): 2.5 TABLET ORAL at 06:05

## 2018-11-08 RX ADMIN — Medication 100 MILLIGRAM(S): at 13:08

## 2018-11-08 RX ADMIN — PANTOPRAZOLE SODIUM 40 MILLIGRAM(S): 20 TABLET, DELAYED RELEASE ORAL at 16:14

## 2018-11-08 RX ADMIN — Medication 667 MILLIGRAM(S): at 12:15

## 2018-11-08 RX ADMIN — SODIUM CHLORIDE 3 MILLILITER(S): 9 INJECTION INTRAMUSCULAR; INTRAVENOUS; SUBCUTANEOUS at 05:38

## 2018-11-08 RX ADMIN — Medication 650 MILLIGRAM(S): at 12:15

## 2018-11-08 RX ADMIN — ATORVASTATIN CALCIUM 10 MILLIGRAM(S): 80 TABLET, FILM COATED ORAL at 21:25

## 2018-11-08 RX ADMIN — POLYETHYLENE GLYCOL 3350 17 GRAM(S): 17 POWDER, FOR SOLUTION ORAL at 12:16

## 2018-11-08 RX ADMIN — ISOSORBIDE MONONITRATE 120 MILLIGRAM(S): 60 TABLET, EXTENDED RELEASE ORAL at 12:15

## 2018-11-08 RX ADMIN — CARVEDILOL PHOSPHATE 12.5 MILLIGRAM(S): 80 CAPSULE, EXTENDED RELEASE ORAL at 17:35

## 2018-11-08 RX ADMIN — Medication 0.3 MILLIGRAM(S): at 21:25

## 2018-11-08 RX ADMIN — AMLODIPINE BESYLATE 10 MILLIGRAM(S): 2.5 TABLET ORAL at 06:05

## 2018-11-08 RX ADMIN — Medication 650 MILLIGRAM(S): at 04:34

## 2018-11-08 RX ADMIN — SENNA PLUS 2 TABLET(S): 8.6 TABLET ORAL at 21:25

## 2018-11-08 RX ADMIN — Medication 1000 UNIT(S): at 12:15

## 2018-11-08 RX ADMIN — SODIUM CHLORIDE 3 MILLILITER(S): 9 INJECTION INTRAMUSCULAR; INTRAVENOUS; SUBCUTANEOUS at 21:14

## 2018-11-08 RX ADMIN — Medication 650 MILLIGRAM(S): at 05:25

## 2018-11-08 RX ADMIN — SIMETHICONE 80 MILLIGRAM(S): 80 TABLET, CHEWABLE ORAL at 16:14

## 2018-11-08 RX ADMIN — Medication 650 MILLIGRAM(S): at 12:45

## 2018-11-08 RX ADMIN — Medication 10 MILLIGRAM(S): at 16:14

## 2018-11-08 NOTE — PROGRESS NOTE ADULT - PROBLEM SELECTOR PLAN 2
patient hypothermia to 94 rectally on 10/31 now resolved , bradycardia improved , elevated rpt TSH with low FT4  10/31 Bcx NGTD  CT A/P unremarkable, CXR with mild pulm edema but no evidence of infection  Synthroid increased to 125mcg to be given at 6am on empty stomach for better absorption. patient hypothermia to 94 rectally on 10/31 now resolved , bradycardia improved , elevated rpt TSH with low FT4  10/31 Bcx NGTD  CT A/P unremarkable, CXR with mild pulm edema but no evidence of infection, small left infrahilar region opacity seen on CXR, on 11/3/18  will recommend repeating CXR in 1-2 weeks for resolution   Synthroid increased to 125mcg to be given at 6am on empty stomach for better absorption.

## 2018-11-08 NOTE — CONSULT NOTE ADULT - PROBLEM SELECTOR RECOMMENDATION 9
- suspect in setting of ESRD/dCHF with Severe Pulmonary HTN as noted on TTE 7/2018  - CT Abd and AXR both unrevealing   - trial of Bentyl TID   - protonix 40mg PO BID  - simethicone q6h x 5 days  - cont bowel regimen  - pain control prn/zofran prn   - consider trial of reglan     - cont HD per renal with fluid removal   - diet as tolerated - suspect in setting of ESRD/dCHF with Severe Pulmonary HTN as noted on TTE 7/2018  - CT Abd and AXR both unrevealing   - trial of Bentyl TID   - protonix 40mg PO BID  - simethicone q6h x 5 days  - started on senna/colace and miralax bid; dulcolax supp x 1 given  - pain control prn/zofran prn   - consider trial of reglan     - cont HD per renal with fluid removal   - diet as tolerated

## 2018-11-08 NOTE — PROGRESS NOTE ADULT - SUBJECTIVE AND OBJECTIVE BOX
Patient is a 72y old  Female who presents with a chief complaint of chest pain (07 Nov 2018 14:40)      SUBJECTIVE / OVERNIGHT EVENTS: patient seen and examined by bedside at 10:15 am, pt c/o abdominal pain, moving bowels as per nursing staff       MEDICATIONS  (STANDING):  amLODIPine   Tablet 10 milliGRAM(s) Oral daily  atorvastatin 10 milliGRAM(s) Oral at bedtime  bisacodyl Suppository 10 milliGRAM(s) Rectal once  calcium acetate 667 milliGRAM(s) Oral with lunch  carvedilol 12.5 milliGRAM(s) Oral every 12 hours  chlorhexidine 4% Liquid 1 Application(s) Topical <User Schedule>  cholecalciferol 1000 Unit(s) Oral daily  cloNIDine 0.3 milliGRAM(s) Oral every 8 hours  docusate sodium 100 milliGRAM(s) Oral three times a day  epoetin iam Injectable 4000 Unit(s) IV Push <User Schedule>  hydrALAZINE 100 milliGRAM(s) Oral every 8 hours  isosorbide   mononitrate ER Tablet (IMDUR) 120 milliGRAM(s) Oral daily  lactulose Syrup 20 Gram(s) Oral once  levothyroxine 125 MICROGram(s) Oral daily  lisinopril 40 milliGRAM(s) Oral daily  minoxidil 5 milliGRAM(s) Oral two times a day  pantoprazole    Tablet 40 milliGRAM(s) Oral two times a day  polyethylene glycol 3350 17 Gram(s) Oral daily  QUEtiapine 50 milliGRAM(s) Oral daily  senna 2 Tablet(s) Oral at bedtime  sertraline 50 milliGRAM(s) Oral daily  simethicone 80 milliGRAM(s) Chew every 6 hours  sodium chloride 0.9% lock flush 3 milliLiter(s) IV Push every 8 hours    MEDICATIONS  (PRN):  acetaminophen   Tablet .. 650 milliGRAM(s) Oral every 6 hours PRN Mild Pain (1 - 3), Moderate Pain (4 - 6), Severe Pain (7 - 10)  aluminum hydroxide/magnesium hydroxide/simethicone Suspension 30 milliLiter(s) Oral every 6 hours PRN Dyspepsia  clonazePAM Tablet 0.5 milliGRAM(s) Oral at bedtime PRN Anxiety  ondansetron Injectable 4 milliGRAM(s) IV Push every 6 hours PRN Nausea and/or Vomiting      Vital Signs Last 24 Hrs  T(C): 36.6 (08 Nov 2018 09:00), Max: 36.8 (08 Nov 2018 00:00)  T(F): 97.8 (08 Nov 2018 09:00), Max: 98.2 (08 Nov 2018 00:00)  HR: 61 (08 Nov 2018 09:00) (61 - 70)  BP: 141/47 (08 Nov 2018 09:00) (132/56 - 194/63)  BP(mean): --  RR: 18 (08 Nov 2018 09:00) (17 - 18)  SpO2: 97% (08 Nov 2018 09:00) (97% - 98%)  CAPILLARY BLOOD GLUCOSE        I&O's Summary    07 Nov 2018 07:01  -  08 Nov 2018 07:00  --------------------------------------------------------  IN: 400 mL / OUT: 3400 mL / NET: -3000 mL        PHYSICAL EXAM:  GENERAL: NAD,   HEAD:  Atraumatic, Normocephalic  EYES: EOMI, PERRLA, conjunctiva and sclera clear  NECK: Supple,   CHEST/LUNG: bibasilar crackles   HEART: Regular rate and rhythm;   ABDOMEN: Soft, mild diffuse tenderness , slightly distended; Bowel sounds present  EXTREMITIES:  2+ Peripheral Pulses, No clubbing, cyanosis, or edema, LUE  AVF   PSYCH: awake, Alert answering questions  appropriately   NEUROLOGY: non-focal  SKIN: No rashes or lesions      LABS:                        8.9    4.94  )-----------( 144      ( 08 Nov 2018 05:07 )             28.0     11-08    136  |  92<L>  |  17  ----------------------------<  109<H>  3.5   |  26  |  2.37<H>    Ca    8.6      08 Nov 2018 05:07  Mg     2.2     11-08                RADIOLOGY & ADDITIONAL TESTS:    Imaging Personally Reviewed:    Consultant(s) Notes Reviewed:      Care Discussed with Consultants/Other Providers: Patient is a 72y old  Female who presents with a chief complaint of chest pain (07 Nov 2018 14:40)      SUBJECTIVE / OVERNIGHT EVENTS: patient seen and examined by bedside at 10:15 am, pt c/o abdominal pain, moving bowels as per nursing staff . Pt also reports nausea but no vomiting       MEDICATIONS  (STANDING):  amLODIPine   Tablet 10 milliGRAM(s) Oral daily  atorvastatin 10 milliGRAM(s) Oral at bedtime  bisacodyl Suppository 10 milliGRAM(s) Rectal once  calcium acetate 667 milliGRAM(s) Oral with lunch  carvedilol 12.5 milliGRAM(s) Oral every 12 hours  chlorhexidine 4% Liquid 1 Application(s) Topical <User Schedule>  cholecalciferol 1000 Unit(s) Oral daily  cloNIDine 0.3 milliGRAM(s) Oral every 8 hours  docusate sodium 100 milliGRAM(s) Oral three times a day  epoetin iam Injectable 4000 Unit(s) IV Push <User Schedule>  hydrALAZINE 100 milliGRAM(s) Oral every 8 hours  isosorbide   mononitrate ER Tablet (IMDUR) 120 milliGRAM(s) Oral daily  lactulose Syrup 20 Gram(s) Oral once  levothyroxine 125 MICROGram(s) Oral daily  lisinopril 40 milliGRAM(s) Oral daily  minoxidil 5 milliGRAM(s) Oral two times a day  pantoprazole    Tablet 40 milliGRAM(s) Oral two times a day  polyethylene glycol 3350 17 Gram(s) Oral daily  QUEtiapine 50 milliGRAM(s) Oral daily  senna 2 Tablet(s) Oral at bedtime  sertraline 50 milliGRAM(s) Oral daily  simethicone 80 milliGRAM(s) Chew every 6 hours  sodium chloride 0.9% lock flush 3 milliLiter(s) IV Push every 8 hours    MEDICATIONS  (PRN):  acetaminophen   Tablet .. 650 milliGRAM(s) Oral every 6 hours PRN Mild Pain (1 - 3), Moderate Pain (4 - 6), Severe Pain (7 - 10)  aluminum hydroxide/magnesium hydroxide/simethicone Suspension 30 milliLiter(s) Oral every 6 hours PRN Dyspepsia  clonazePAM Tablet 0.5 milliGRAM(s) Oral at bedtime PRN Anxiety  ondansetron Injectable 4 milliGRAM(s) IV Push every 6 hours PRN Nausea and/or Vomiting      Vital Signs Last 24 Hrs  T(C): 36.6 (08 Nov 2018 09:00), Max: 36.8 (08 Nov 2018 00:00)  T(F): 97.8 (08 Nov 2018 09:00), Max: 98.2 (08 Nov 2018 00:00)  HR: 61 (08 Nov 2018 09:00) (61 - 70)  BP: 141/47 (08 Nov 2018 09:00) (132/56 - 194/63)  BP(mean): --  RR: 18 (08 Nov 2018 09:00) (17 - 18)  SpO2: 97% (08 Nov 2018 09:00) (97% - 98%)  CAPILLARY BLOOD GLUCOSE        I&O's Summary    07 Nov 2018 07:01  -  08 Nov 2018 07:00  --------------------------------------------------------  IN: 400 mL / OUT: 3400 mL / NET: -3000 mL        PHYSICAL EXAM:  GENERAL: mild distress due to abdominal pain   HEAD:  Atraumatic, Normocephalic  EYES: EOMI, PERRLA, conjunctiva and sclera clear  CHEST/LUNG: bibasilar crackles   HEART: Regular rate and rhythm;   ABDOMEN: Soft, mild diffuse tenderness , slightly distended; Bowel sounds present  EXTREMITIES:  2+ Peripheral Pulses, No clubbing, cyanosis, or edema, LUE  AVF   PSYCH: awake, Alert answering questions  appropriately   NEUROLOGY: non-focal  SKIN: No rashes or lesions      LABS:                        8.9    4.94  )-----------( 144      ( 08 Nov 2018 05:07 )             28.0     11-08    136  |  92<L>  |  17  ----------------------------<  109<H>  3.5   |  26  |  2.37<H>    Ca    8.6      08 Nov 2018 05:07  Mg     2.2     11-08                RADIOLOGY & ADDITIONAL TESTS:    Imaging Personally Reviewed:    Consultant(s) Notes Reviewed:      Care Discussed with Consultants/Other Providers:

## 2018-11-08 NOTE — PROGRESS NOTE ADULT - SUBJECTIVE AND OBJECTIVE BOX
[facility] DIVISION OF KIDNEY DISEASES AND HYPERTENSION -- FOLLOW UP NOTE  --------------------------------------------------------------------------------        24 hour events/subjective: abdominal pain.         PAST HISTORY  --------------------------------------------------------------------------------  No significant changes to PMH, PSH, FHx, SHx, unless otherwise noted    ALLERGIES & MEDICATIONS  --------------------------------------------------------------------------------  Allergies    Avelox (Unknown)  fish (Hives; Rash)  shellfish (Unknown)    Intolerances      Standing Inpatient Medications  amLODIPine   Tablet 10 milliGRAM(s) Oral daily  atorvastatin 10 milliGRAM(s) Oral at bedtime  calcium acetate 667 milliGRAM(s) Oral with lunch  carvedilol 12.5 milliGRAM(s) Oral every 12 hours  chlorhexidine 4% Liquid 1 Application(s) Topical <User Schedule>  cholecalciferol 1000 Unit(s) Oral daily  cloNIDine 0.3 milliGRAM(s) Oral every 8 hours  dicyclomine 10 milliGRAM(s) Oral three times a day before meals  docusate sodium 100 milliGRAM(s) Oral three times a day  epoetin iam Injectable 4000 Unit(s) IV Push <User Schedule>  hydrALAZINE 100 milliGRAM(s) Oral every 8 hours  isosorbide   mononitrate ER Tablet (IMDUR) 120 milliGRAM(s) Oral daily  levothyroxine 125 MICROGram(s) Oral daily  lisinopril 40 milliGRAM(s) Oral daily  minoxidil 5 milliGRAM(s) Oral two times a day  pantoprazole    Tablet 40 milliGRAM(s) Oral two times a day  polyethylene glycol 3350 17 Gram(s) Oral daily  QUEtiapine 50 milliGRAM(s) Oral daily  senna 2 Tablet(s) Oral at bedtime  sertraline 50 milliGRAM(s) Oral daily  simethicone 80 milliGRAM(s) Chew every 6 hours  sodium chloride 0.9% lock flush 3 milliLiter(s) IV Push every 8 hours    PRN Inpatient Medications  acetaminophen   Tablet .. 650 milliGRAM(s) Oral every 6 hours PRN  aluminum hydroxide/magnesium hydroxide/simethicone Suspension 30 milliLiter(s) Oral every 6 hours PRN  clonazePAM Tablet 0.5 milliGRAM(s) Oral at bedtime PRN  ondansetron Injectable 4 milliGRAM(s) IV Push every 6 hours PRN      REVIEW OF SYSTEMS  --------------------------------------------------------------------------------  Gen: + weakness  Skin: No new rashes  Head/Eyes/Ears/Mouth: No headache;   Respiratory: No dyspnea,   CV: No chest pain, orthopnea  GI: ,+ nausea, abdominal pain   : No  hematuria,  MSK: No joint pain/swelling;  no edema  Neuro:  seizures, numbness,  Psych: No anxiety,     All other systems were reviewed and are negative, except as noted.    VITALS/PHYSICAL EXAM  --------------------------------------------------------------------------------  T(C): 36.7 (11-08-18 @ 13:00), Max: 36.8 (11-08-18 @ 00:00)  HR: 60 (11-08-18 @ 13:00) (60 - 70)  BP: 160/43 (11-08-18 @ 13:00) (132/56 - 180/65)  RR: 18 (11-08-18 @ 13:00) (17 - 18)  SpO2: 98% (11-08-18 @ 13:00) (97% - 98%)  Wt(kg): --  Height (cm): 152.4 (11-08-18 @ 13:00)      11-07-18 @ 07:01  -  11-08-18 @ 07:00  --------------------------------------------------------  IN: 400 mL / OUT: 3400 mL / NET: -3000 mL      Physical Exam:  	Gen: NAD  	HEENT: PERRL, No JVD  	Pulm: CTA B/L  	CV: RRR, S1S2; no rub  	Abd:  soft, tender/ nondistended              LE:  no edema  	Neuro: No focal deficits,   	Psych: Normal affect and mood  	Skin: Warm, without rashes  	Vascular L AVF    LABS/STUDIES  --------------------------------------------------------------------------------              8.9    4.94  >-----------<  144      [11-08-18 @ 05:07]              28.0     136  |  92  |  17  ----------------------------<  109      [11-08-18 @ 05:07]  3.5   |  26  |  2.37        Ca     8.6     [11-08-18 @ 05:07]      Mg     2.2     [11-08-18 @ 05:07]            Creatinine Trend:  SCr 2.37 [11-08 @ 05:07]  SCr 3.17 [11-07 @ 11:00]  SCr 3.73 [11-05 @ 06:35]  SCr 3.09 [11-04 @ 05:30]  SCr 3.55 [11-02 @ 05:50]    Sodium trend:        .3 (Ca --)      [11-05-18 @ 06:35]   --  Vitamin D (25OH) 14.8      [11-03-18 @ 05:10]  HbA1c 5.5      [10-31-18 @ 06:25]  TSH 16.55      [11-02-18 @ 05:50]  Lipid: chol 92, , HDL 35, LDL 39      [10-31-18 @ 06:25]    HBsAb 10.8      [10-19-18 @ 21:45]  HBsAg NEGATIVE      [10-19-18 @ 21:45]  HCV 0.13, Nonreactive Hepatitis C AB  S/CO Ratio                        Interpretation  < 1.0                                     Non-Reactive  1.0 - 4.9                           Weakly-Reactive  > 5.0                                 Reactive  Non-Reactive: [place] with a non-reactive HCV antibody  result is considered uninfected.  No further action is  needed unless recent infection is suspected.  In these  cases, consider repeat testing later to detect  seroconversion..  Weakly-Reactive: HCV antibody test is abnormal, HCV RNA  Qualitative test will follow.  Reactive: HCV antibody test is abnormal, HCV RNA  Qualitative test will follow.  Note: HCV antibody testing is performed on the [employer] system.      [10-19-18 @ 21:45]

## 2018-11-08 NOTE — PROGRESS NOTE ADULT - ATTENDING COMMENTS
abdominal pain managed by medicine and GI. discussed with nurse to give bentanyl, given tylenol already

## 2018-11-08 NOTE — CONSULT NOTE ADULT - SUBJECTIVE AND OBJECTIVE BOX
Chief Complaint:  Patient is a 72y old  Female who presents with a chief complaint of chest pain (08 Nov 2018 12:57)    [place] (hard of hearing)  Cataracts, bilateral  Pleural thickening  Hip pain  LBP radiating to left leg  CHF (congestive heart failure)  MI (myocardial infarction)  Diabetes  Hypothyroidism  ESRD (end stage renal disease)  HTN (hypertension)  MI (myocardial infarction)  Pleural effusion  Hypothyroid  Anxiety  HTN (hypertension)  Hyperlipidemia  CHF (congestive heart failure)  CKD (chronic kidney disease)  Anemia  Diabetes  Thoracotomy scar of left chest  S/P myomectomy  AV fistula     HPI: **Patient unable to provide a subjective history due to underlying dementia, history obtained from medical records, ER**  72 F PMH dementia, type 2 DM, HTN, nonobstructive CAD, ESRD on HD TTS, syncope s/p ILR, frequent falls c/b prior nasal fracture and cervical spine fracture, Afib no a/c 2/2 recent fall with scalp laceration/hematoma, [place] [facility] [place]. Patient reports pain in the head, chest, and abdomen, unable to elaborate any further, answers any questions with, pain in the head, chest, and stomach. Discussed with ED providers, d/w family, this has been patient's baseline for the past month. GI consulted for abdominal pain with nausea but no vomiting. As per the RN the patient is having daily bowel movements that are non-bloody. The patient is seen moaning in discomfort and stating abd pain, llq worse but rubbing entire abdomen. She is also noting nausea with clear spit in the bag next to her. She is unable to recall if she ever had a colonoscopy or endoscopy in the past.     On admission: No distress noted. Repeat EKG ordered. Cardiac enzymes sent.     Tried to reach family for collateral, but unable to. (31 Oct 2018 05:24)      Avelox (Unknown)  fish (Hives; Rash)  shellfish (Unknown)      acetaminophen   Tablet .. 650 milliGRAM(s) Oral every 6 hours PRN  aluminum hydroxide/magnesium hydroxide/simethicone Suspension 30 milliLiter(s) Oral every 6 hours PRN  amLODIPine   Tablet 10 milliGRAM(s) Oral daily  atorvastatin 10 milliGRAM(s) Oral at bedtime  calcium acetate 667 milliGRAM(s) Oral with lunch  carvedilol 12.5 milliGRAM(s) Oral every 12 hours  chlorhexidine 4% Liquid 1 Application(s) Topical <User Schedule>  cholecalciferol 1000 Unit(s) Oral daily  clonazePAM Tablet 0.5 milliGRAM(s) Oral at bedtime PRN  cloNIDine 0.3 milliGRAM(s) Oral every 8 hours  dicyclomine 10 milliGRAM(s) Oral three times a day before meals  docusate sodium 100 milliGRAM(s) Oral three times a day  epoetin iam Injectable 4000 Unit(s) IV Push <User Schedule>  hydrALAZINE 100 milliGRAM(s) Oral every 8 hours  isosorbide   mononitrate ER Tablet (IMDUR) 120 milliGRAM(s) Oral daily  levothyroxine 125 MICROGram(s) Oral daily  lisinopril 40 milliGRAM(s) Oral daily  minoxidil 5 milliGRAM(s) Oral two times a day  ondansetron Injectable 4 milliGRAM(s) IV Push every 6 hours PRN  pantoprazole    Tablet 40 milliGRAM(s) Oral two times a day  polyethylene glycol 3350 17 Gram(s) Oral daily  QUEtiapine 50 milliGRAM(s) Oral daily  senna 2 Tablet(s) Oral at bedtime  sertraline 50 milliGRAM(s) Oral daily  simethicone 80 milliGRAM(s) Chew every 6 hours  sodium chloride 0.9% lock flush 3 milliLiter(s) IV Push every 8 hours        FAMILY HISTORY:  No significant family history        Review of Systems:    General:  No wt loss, fevers, chills, night sweats, fatigue  Eyes:  Good vision, no reported pain  ENT:  No sore throat, pain, runny nose, dysphagia  CV:  No pain, palpitations, no lightheadedness  Resp:  No dyspnea, cough, tachypnea, wheezing  GI: diffuse abd pain, worse llq; nausea but no vomiting; denies d/c, melena or brbpr   :  No pain, bleeding, incontinence, nocturia  Muscle:  No pain, weakness  Neuro:  No weakness, tingling, memory problems  Psych:  No fatigue, insomnia, mood problems, depression  Endocrine:  No polyuria, polydypsia, cold/heat intolerance  Heme:  No petechiae, ecchymosis, easy bruisability  Skin:  No rash, tattoos, scars, edema    Relevant Family History:   n/c    Relevant Social History: n/c      Physical Exam:    Vital Signs:  Vital Signs Last 24 Hrs  T(C): 36.7 (08 Nov 2018 13:00), Max: 36.8 (08 Nov 2018 00:00)  T(F): 98.1 (08 Nov 2018 13:00), Max: 98.2 (08 Nov 2018 00:00)  HR: 60 (08 Nov 2018 13:00) (60 - 70)  BP: 160/43 (08 Nov 2018 13:00) (132/56 - 194/63)  BP(mean): --  RR: 18 (08 Nov 2018 13:00) (17 - 18)  SpO2: 98% (08 Nov 2018 13:00) (97% - 98%)  Daily     Daily     General:  Appears stated age, well-groomed, nad  HEENT:  NC/AT,  conjunctivae clear and pink, no thyromegaly, nodules, adenopathy, no JVD  Chest:  Full & symmetric excursion, no increased effort, breath sounds clear  Cardiovascular:  Regular rhythm, S1, S2, no murmur/rub/S3/S4, no abdominal bruit, no edema  Abdomen:  Soft, (+)TTP diffusely worse in left lower quadrant, normoactive bowel sounds,  no masses ,no hepatosplenomeagaly, no signs of chronic liver disease  Extremities:  no cyanosis,clubbing or edema  Skin:  No rash/erythema/ecchymoses/petechiae/wounds/abscess/warm/dry  Neuro/Psych:  A&Ox3  , no asterixis, no tremor, no encephalopathy    Laboratory:                            8.9    4.94  )-----------( 144      ( 08 Nov 2018 05:07 )             28.0     11-08    136  |  92<L>  |  17  ----------------------------<  109<H>  3.5   |  26  |  2.37<H>    Ca    8.6      08 Nov 2018 05:07  Mg     2.2     11-08              Imaging:    < from: Xray Abdomen 1 View PORTABLE -Routine (11.03.18 @ 18:44) >    EXAM:  XR ABDOMEN PORTABLE ROUTINE 1V        PROCEDURE DATE:  Nov  3 2018         INTERPRETATION:  EXAMINATION: XR ABDOMEN PORTABLE ROUTINE 1V    CLINICAL INDICATION: abd pain    TECHNIQUE: Single radiograph of the abdomen    COMPARISON: None.    FINDINGS:     Nonspecific bowel gas pattern with dilated small and large bowel loops.   Degenerative changes of the hips and spine.    IMPRESSION:   Nonspecific bowel gas pattern.    LIBBY ISAAC M.D., ATTENDING RADIOLOGIST  This documenthas been electronically signed. Nov 4 2018  1:23PM      < end of copied text >  -------------------------------------      < from: CT Abdomen and Pelvis w/ IV Cont (10.19.18 @ 06:18) >    EXAM:  CT ABDOMEN AND PELVIS IC        PROCEDURE DATE:  Oct 19 2018         INTERPRETATION:  CLINICAL INFORMATION: Diffuse abdominal pain, nausea.    COMPARISON: CT abdomen and pelvis 07/22/2018 and 01/14/2018.    PROCEDURE:   CT of the Abdomen andPelvis was performed with intravenous contrast.   Intravenous contrast: 90 ml Omnipaque 350. 10 ml discarded.  Oral contrast: None.  Sagittal and coronal reformats were performed.    FINDINGS:    LOWER CHEST: Cardiomegaly with reflux of contrast intothe IVC, may be   seen in congestive hepatopathy. New small pericardial effusion. Small   right and trace left pleural effusions with adjacent passive atelectasis.    LIVER: Within normal limits.  BILE DUCTS: Normal caliber.  GALLBLADDER: Within normal limits.  SPLEEN: Within normal limits.  PANCREAS: Within normal limits.  ADRENALS: Within normal limits.  KIDNEYS/URETERS: Atrophic. Enhance symmetrically. No hydronephrosis.    BLADDER: Mild bladder wall thickening, likely reactive.  REPRODUCTIVE ORGANS: Hysterectomy, no adnexal mass.    BOWEL: Colonic diverticulosis without acute enterocolitis. No bowel   obstruction. Appendix is normal.   PERITONEUM: New moderate volume abdominopelvic ascites.  VESSELS:  Atherosclerotic change.  RETROPERITONEUM: No lymphadenopathy.    ABDOMINAL WALL: Anasarca.  BONES: Multilevel degenerative change. Erosive changes at the L1-L2   endplate, essentially unchanged since 07/22/2018, but advanced from   01/14/2018.    IMPRESSION:   Cardiomegaly with a new small pericardial effusion. New small right and   trace left pleural effusions.    New moderate volume abdominopelvic ascites.    No small bowel obstruction.    Erosive changes at the L1-L2 endplate, essentially unchanged since   07/22/2018, but progressed from 01/14/2018. Consider   discitis-osteomyelitis in the appropriate setting.              LYDIA STOCTKON M.D., RADIOLOGY FELLOW  This document has been electronically signed.  VEGA AUGUSTIN M.D., ATTENDING RADIOLOGIST  This document has been electronically signed. Oct 19 2018 10:50AM                  < end of copied text >    ----    < from: Transthoracic Echocardiogram (07.23.18 @ 10:43) >    Patient name: ANGEL LECHUGA  YOB: 1945   Age: 72 (F)   MR#: [id number]  Study Date: 7/23/2018  [facility]  Study quality: Technically fair  Referring Physician: Rajendra Carter MD  Blood Pressure: 163/60 mmHg  Height: 152 cm  Weight: 45 kg  BSA: 1.4 m2  ------------------------------------------------------------------------  PROCEDURE: Transthoracic echocardiogram with 2-D, M-Mode  and complete spectral and color flow Doppler.  INDICATION: Abnormal electrocardiogram (ECG) (EKG) (R94.31)  ------------------------------------------------------------------------  Dimensions:    Normal Values:  LA:     3.4    2.0 - 4.0 cm  Ao:     2.7    2.0 - 3.8 cm  SEPTUM: 1.2    0.6 - 1.2 cm  PWT:    1.2    0.6 - 1.1 cm  LVIDd:  4.4    3.0 - 5.6 cm  LVIDs:  3.2    1.8 - 4.0 cm  Derived variables:  LVMI: 139 g/m2  RWT: 0.54  Fractional short: 27 %  EF (Collins Rule): 56 %Doppler Peak Velocity (m/sec):  AoV=1.3  ------------------------------------------------------------------------  Observations:  Mitral Valve: Mitral annular calcification, otherwise  normal mitral valve. Mild mitral regurgitation.  Aortic Valve/Aorta: Calcified trileaflet aortic valve with  normal opening. Peak transaortic valve gradient equals 7 mm  Hg, mean transaortic valve gradient equals 3 mm Hg, aortic  valve velocity time integral equals 34 cm. Mild aortic  regurgitation.  Peak left ventricular outflow tract  gradient equals 3 mm Hg, mean gradient is equal to 1 mm Hg,  LVOT velocity time integral equals 21 cm.  Aortic Root: 2.7 cm.  LVOT diameter: 1.7 cm.  Left Atrium: Normal left atrium.  LA volume index = 32  cc/m2.  Left Ventricle: Normal left ventricular systolic function.  Septal motion consistent with right ventricular overload.  Moderate concentric left ventricular hypertrophy. Severe  diastolic dysfunction (Stage III).  Right Heart: Right atrial enlargement. Right ventricular  enlargement with normal right ventricular systolic  function. Normal tricuspid valve. Mild tricuspid  regurgitation. Normal pulmonic valve. Minimal pulmonic  regurgitation.  Pericardium/Pleura: Normal pericardium with no pericardial  effusion.  Hemodynamic: Estimated right atrial pressure is 8 mm Hg.  Estimated right ventricular systolic pressure equals 66 mm  Hg, assuming right atrial pressure equals 8 mm Hg,  consistent with severe pulmonary hypertension.  ------------------------------------------------------------------------  Conclusions:  1. Moderate concentric left ventricular hypertrophy.  2. Normal left ventricular systolic function. Septal motion  consistent with right ventricular overload.  3. Severe diastolic dysfunction (Stage III).  4. Right atrial enlargement.  5. Right ventricular enlargement with normal right  ventricular systolic function.  6. Estimated pulmonary artery systolic pressure equals 66  mm Hg, assuming right atrial pressure equals 8 mm Hg,  consistent with severe pulmonary pressures.  *** Compared with echocardiogram of 6/15/2018, no  significant changes noted.  ------------------------------------------------------------------------  Confirmed on  7/23/2018 - 13:55:42 by Ezra Nicholas M.D.  ------------------------------------------------------------------------    < end of copied text >

## 2018-11-08 NOTE — PROGRESS NOTE ADULT - PROBLEM SELECTOR PLAN 3
Repeat CXR showing pulmonary edema   continue with lisinopril, clonidine, hydralazine, decreased Coreg to 12.5 mg BID  Still having elevated BP  Starting minoxidil 2.5 mg BID per renal  Increase Imdur to 120 mg daily  Crackles on lung exam  - may need additional fluid removal, c/w HD as per Renal, Renal recommending  Renal doppler to r/o SHAYAN Repeat CXR showing pulmonary edema   continue with lisinopril, clonidine, hydralazine, decreased Coreg to 12.5 mg BID   BP slightly better controlled   Started  minoxidil 2.5 mg BID per renal  Increase Imdur to 120 mg daily  Crackles on lung exam  - may need additional fluid removal, c/w HD as per Renal, Renal recommending  Renal doppler to r/o SHAYAN

## 2018-11-08 NOTE — PROGRESS NOTE ADULT - PROBLEM SELECTOR PLAN 1
No acute findings on CT abdomen, patient w/ chronic abdominal pain,  AXR on 11/3 with nonobstructive bowel pattern  pt c/o abd pain, will monitor for BM, will repeat abd xray No acute findings on CT abdomen, patient w/ chronic abdominal pain,  AXR on 11/3 with nonobstructive bowel pattern  pt c/o abd pain, will monitor for BM, abd xray repeated, prelim report with no obstruction , lots of stool , will f/u official report  will request GI eval

## 2018-11-08 NOTE — PROGRESS NOTE ADULT - PROBLEM SELECTOR PLAN 10
LE stockings given  patient with hx of hematomas in the left frontal  periorbital preseptal and right parietal regions - appears improved today  If patient stays should be on subcut heparin for DVT ppx LE stockings given  patient with hx of hematomas in the left frontal  periorbital preseptal and right parietal regions - appears improved  If patient stays should be on subcut heparin for DVT ppx

## 2018-11-09 LAB
BUN SERPL-MCNC: 23 MG/DL — SIGNIFICANT CHANGE UP (ref 7–23)
CALCIUM SERPL-MCNC: 8.7 MG/DL — SIGNIFICANT CHANGE UP (ref 8.4–10.5)
CHLORIDE SERPL-SCNC: 93 MMOL/L — LOW (ref 98–107)
CO2 SERPL-SCNC: 26 MMOL/L — SIGNIFICANT CHANGE UP (ref 22–31)
CREAT SERPL-MCNC: 3.16 MG/DL — HIGH (ref 0.5–1.3)
FERRITIN SERPL-MCNC: 701.9 NG/ML — HIGH (ref 15–150)
FOLATE SERPL-MCNC: 17.1 NG/ML — SIGNIFICANT CHANGE UP (ref 4.7–20)
GLUCOSE BLDC GLUCOMTR-MCNC: 106 MG/DL — HIGH (ref 70–99)
GLUCOSE SERPL-MCNC: 83 MG/DL — SIGNIFICANT CHANGE UP (ref 70–99)
HCT VFR BLD CALC: 27.8 % — LOW (ref 34.5–45)
HGB BLD-MCNC: 8.9 G/DL — LOW (ref 11.5–15.5)
IRON SATN MFR SERPL: 130 UG/DL — LOW (ref 140–530)
IRON SATN MFR SERPL: 38 UG/DL — SIGNIFICANT CHANGE UP (ref 30–160)
MCHC RBC-ENTMCNC: 28.6 PG — SIGNIFICANT CHANGE UP (ref 27–34)
MCHC RBC-ENTMCNC: 32 % — SIGNIFICANT CHANGE UP (ref 32–36)
MCV RBC AUTO: 89.4 FL — SIGNIFICANT CHANGE UP (ref 80–100)
NRBC # FLD: 0 — SIGNIFICANT CHANGE UP
PLATELET # BLD AUTO: 155 K/UL — SIGNIFICANT CHANGE UP (ref 150–400)
PMV BLD: 12.1 FL — SIGNIFICANT CHANGE UP (ref 7–13)
POTASSIUM SERPL-MCNC: 3.5 MMOL/L — SIGNIFICANT CHANGE UP (ref 3.5–5.3)
POTASSIUM SERPL-SCNC: 3.5 MMOL/L — SIGNIFICANT CHANGE UP (ref 3.5–5.3)
RBC # BLD: 3.11 M/UL — LOW (ref 3.8–5.2)
RBC # FLD: 17.5 % — HIGH (ref 10.3–14.5)
SODIUM SERPL-SCNC: 137 MMOL/L — SIGNIFICANT CHANGE UP (ref 135–145)
UIBC SERPL-MCNC: 92.4 UG/DL — LOW (ref 110–370)
VIT B12 SERPL-MCNC: 1857 PG/ML — HIGH (ref 200–900)
WBC # BLD: 3.87 K/UL — SIGNIFICANT CHANGE UP (ref 3.8–10.5)
WBC # FLD AUTO: 3.87 K/UL — SIGNIFICANT CHANGE UP (ref 3.8–10.5)

## 2018-11-09 PROCEDURE — 99233 SBSQ HOSP IP/OBS HIGH 50: CPT

## 2018-11-09 PROCEDURE — 90935 HEMODIALYSIS ONE EVALUATION: CPT | Mod: GC

## 2018-11-09 RX ORDER — MORPHINE SULFATE 50 MG/1
2 CAPSULE, EXTENDED RELEASE ORAL ONCE
Qty: 0 | Refills: 0 | Status: DISCONTINUED | OUTPATIENT
Start: 2018-11-09 | End: 2018-11-09

## 2018-11-09 RX ADMIN — Medication 100 MILLIGRAM(S): at 21:18

## 2018-11-09 RX ADMIN — ONDANSETRON 4 MILLIGRAM(S): 8 TABLET, FILM COATED ORAL at 03:53

## 2018-11-09 RX ADMIN — Medication 1000 UNIT(S): at 11:05

## 2018-11-09 RX ADMIN — PANTOPRAZOLE SODIUM 40 MILLIGRAM(S): 20 TABLET, DELAYED RELEASE ORAL at 17:52

## 2018-11-09 RX ADMIN — ISOSORBIDE MONONITRATE 120 MILLIGRAM(S): 60 TABLET, EXTENDED RELEASE ORAL at 11:05

## 2018-11-09 RX ADMIN — Medication 650 MILLIGRAM(S): at 00:37

## 2018-11-09 RX ADMIN — Medication 650 MILLIGRAM(S): at 00:07

## 2018-11-09 RX ADMIN — Medication 100 MILLIGRAM(S): at 14:06

## 2018-11-09 RX ADMIN — SERTRALINE 50 MILLIGRAM(S): 25 TABLET, FILM COATED ORAL at 11:04

## 2018-11-09 RX ADMIN — Medication 0.3 MILLIGRAM(S): at 09:02

## 2018-11-09 RX ADMIN — ERYTHROPOIETIN 4000 UNIT(S): 10000 INJECTION, SOLUTION INTRAVENOUS; SUBCUTANEOUS at 09:03

## 2018-11-09 RX ADMIN — SODIUM CHLORIDE 3 MILLILITER(S): 9 INJECTION INTRAMUSCULAR; INTRAVENOUS; SUBCUTANEOUS at 22:00

## 2018-11-09 RX ADMIN — ONDANSETRON 4 MILLIGRAM(S): 8 TABLET, FILM COATED ORAL at 10:49

## 2018-11-09 RX ADMIN — Medication 650 MILLIGRAM(S): at 18:30

## 2018-11-09 RX ADMIN — SIMETHICONE 80 MILLIGRAM(S): 80 TABLET, CHEWABLE ORAL at 01:43

## 2018-11-09 RX ADMIN — Medication 0.3 MILLIGRAM(S): at 14:06

## 2018-11-09 RX ADMIN — Medication 5 MILLIGRAM(S): at 11:04

## 2018-11-09 RX ADMIN — POLYETHYLENE GLYCOL 3350 17 GRAM(S): 17 POWDER, FOR SOLUTION ORAL at 11:05

## 2018-11-09 RX ADMIN — SIMETHICONE 80 MILLIGRAM(S): 80 TABLET, CHEWABLE ORAL at 17:52

## 2018-11-09 RX ADMIN — SIMETHICONE 80 MILLIGRAM(S): 80 TABLET, CHEWABLE ORAL at 05:17

## 2018-11-09 RX ADMIN — PANTOPRAZOLE SODIUM 40 MILLIGRAM(S): 20 TABLET, DELAYED RELEASE ORAL at 05:21

## 2018-11-09 RX ADMIN — Medication 650 MILLIGRAM(S): at 17:52

## 2018-11-09 RX ADMIN — QUETIAPINE FUMARATE 50 MILLIGRAM(S): 200 TABLET, FILM COATED ORAL at 11:05

## 2018-11-09 RX ADMIN — LISINOPRIL 40 MILLIGRAM(S): 2.5 TABLET ORAL at 09:51

## 2018-11-09 RX ADMIN — Medication 5 MILLIGRAM(S): at 17:52

## 2018-11-09 RX ADMIN — SODIUM CHLORIDE 3 MILLILITER(S): 9 INJECTION INTRAMUSCULAR; INTRAVENOUS; SUBCUTANEOUS at 05:21

## 2018-11-09 RX ADMIN — Medication 100 MILLIGRAM(S): at 09:51

## 2018-11-09 RX ADMIN — CARVEDILOL PHOSPHATE 12.5 MILLIGRAM(S): 80 CAPSULE, EXTENDED RELEASE ORAL at 09:51

## 2018-11-09 RX ADMIN — AMLODIPINE BESYLATE 10 MILLIGRAM(S): 2.5 TABLET ORAL at 09:03

## 2018-11-09 RX ADMIN — MORPHINE SULFATE 2 MILLIGRAM(S): 50 CAPSULE, EXTENDED RELEASE ORAL at 20:51

## 2018-11-09 RX ADMIN — SODIUM CHLORIDE 3 MILLILITER(S): 9 INJECTION INTRAMUSCULAR; INTRAVENOUS; SUBCUTANEOUS at 14:05

## 2018-11-09 RX ADMIN — SIMETHICONE 80 MILLIGRAM(S): 80 TABLET, CHEWABLE ORAL at 11:04

## 2018-11-09 RX ADMIN — Medication 125 MICROGRAM(S): at 05:17

## 2018-11-09 RX ADMIN — Medication 0.3 MILLIGRAM(S): at 21:18

## 2018-11-09 RX ADMIN — Medication 10 MILLIGRAM(S): at 16:20

## 2018-11-09 RX ADMIN — SENNA PLUS 2 TABLET(S): 8.6 TABLET ORAL at 21:17

## 2018-11-09 RX ADMIN — CHLORHEXIDINE GLUCONATE 1 APPLICATION(S): 213 SOLUTION TOPICAL at 11:04

## 2018-11-09 RX ADMIN — Medication 10 MILLIGRAM(S): at 10:36

## 2018-11-09 RX ADMIN — CARVEDILOL PHOSPHATE 12.5 MILLIGRAM(S): 80 CAPSULE, EXTENDED RELEASE ORAL at 17:51

## 2018-11-09 RX ADMIN — ATORVASTATIN CALCIUM 10 MILLIGRAM(S): 80 TABLET, FILM COATED ORAL at 21:17

## 2018-11-09 RX ADMIN — MORPHINE SULFATE 2 MILLIGRAM(S): 50 CAPSULE, EXTENDED RELEASE ORAL at 20:36

## 2018-11-09 NOTE — PROGRESS NOTE ADULT - PROBLEM SELECTOR PLAN 2
- Hgb appears to be at baseline from previous admissions  - no overt gi bleeding with bowel movements  - suspect in setting of AOCKD  - gi ppx with protonix  - cont epogen with hd per renal

## 2018-11-09 NOTE — PROGRESS NOTE ADULT - SUBJECTIVE AND OBJECTIVE BOX
[facility] DIVISION OF KIDNEY DISEASES AND HYPERTENSION -- FOLLOW UP NOTE  --------------------------------------------------------------------------------        24 hour events/subjective: less abdominal today. seen while on HD         PAST HISTORY  --------------------------------------------------------------------------------  No significant changes to PMH, PSH, FHx, SHx, unless otherwise noted    ALLERGIES & MEDICATIONS  --------------------------------------------------------------------------------  Allergies    Avelox (Unknown)  fish (Hives; Rash)  shellfish (Unknown)    Intolerances      Standing Inpatient Medications  amLODIPine   Tablet 10 milliGRAM(s) Oral daily  atorvastatin 10 milliGRAM(s) Oral at bedtime  calcium acetate 667 milliGRAM(s) Oral with lunch  carvedilol 12.5 milliGRAM(s) Oral every 12 hours  chlorhexidine 4% Liquid 1 Application(s) Topical <User Schedule>  cholecalciferol 1000 Unit(s) Oral daily  cloNIDine 0.3 milliGRAM(s) Oral every 8 hours  dicyclomine 10 milliGRAM(s) Oral three times a day before meals  docusate sodium 100 milliGRAM(s) Oral three times a day  epoetin iam Injectable 4000 Unit(s) IV Push <User Schedule>  hydrALAZINE 100 milliGRAM(s) Oral every 8 hours  isosorbide   mononitrate ER Tablet (IMDUR) 120 milliGRAM(s) Oral daily  levothyroxine 125 MICROGram(s) Oral daily  lisinopril 40 milliGRAM(s) Oral daily  minoxidil 5 milliGRAM(s) Oral two times a day  pantoprazole    Tablet 40 milliGRAM(s) Oral two times a day  polyethylene glycol 3350 17 Gram(s) Oral daily  senna 2 Tablet(s) Oral at bedtime  sertraline 50 milliGRAM(s) Oral daily  simethicone 80 milliGRAM(s) Chew every 6 hours  sodium chloride 0.9% lock flush 3 milliLiter(s) IV Push every 8 hours    PRN Inpatient Medications  acetaminophen   Tablet .. 650 milliGRAM(s) Oral every 6 hours PRN  aluminum hydroxide/magnesium hydroxide/simethicone Suspension 30 milliLiter(s) Oral every 6 hours PRN  clonazePAM Tablet 0.5 milliGRAM(s) Oral at bedtime PRN  ondansetron Injectable 4 milliGRAM(s) IV Push every 6 hours PRN      REVIEW OF SYSTEMS  --------------------------------------------------------------------------------  Gen: + weakness  Skin: No new rashes  Respiratory: No dyspnea,   CV: No chest pain,   GI: + abdominal pain,  MSK: No edema  Psych: No anxiety,  All other systems were reviewed and are negative, except as noted.    VITALS/PHYSICAL EXAM  --------------------------------------------------------------------------------  T(C): 36.7 (11-09-18 @ 11:02), Max: 37.1 (11-08-18 @ 21:21)  HR: 61 (11-09-18 @ 11:02) (60 - 80)  BP: 182/59 (11-09-18 @ 11:02) (151/49 - 198/90)  RR: 18 (11-09-18 @ 11:02) (16 - 18)  SpO2: 97% (11-09-18 @ 11:02) (97% - 100%)  Wt(kg): --  Height (cm): 152.4 (11-08-18 @ 13:00)      11-09-18 @ 07:01  -  11-09-18 @ 12:12  --------------------------------------------------------  IN: 400 mL / OUT: 2400 mL / NET: -2000 mL      Physical Exam:  Gen: NAD, frail   	HEENT: supple neck, NO JVD   	Pulm: CTA B/L  	CV: RRR, S1S2; no rub  	Abd: +BS, soft, nontender/nondistended  	: No suprapubic tenderness  	UE:  no edema; L  upper AVF   	LE:  no edema  	Neuro: No focal deficits,   	Psych: Normal affect and mood      	>>> <<<    LABS/STUDIES  --------------------------------------------------------------------------------              8.9    3.87  >-----------<  155      [11-09-18 @ 05:15]              27.8     137  |  93  |  23  ----------------------------<  83      [11-09-18 @ 05:15]  3.5   |  26  |  3.16        Ca     8.7     [11-09-18 @ 05:15]      Mg     2.2     [11-08-18 @ 05:07]            Creatinine Trend:  SCr 3.16 [11-09 @ 05:15]  SCr 2.37 [11-08 @ 05:07]  SCr 3.17 [11-07 @ 11:00]  SCr 3.73 [11-05 @ 06:35]  SCr 3.09 [11-04 @ 05:30]    Sodium trend:        Iron 38, TIBC 130, %sat --      [11-09-18 @ 05:15]  Ferritin 701.9      [11-09-18 @ 05:15]  .3 (Ca --)      [11-05-18 @ 06:35]   --  Vitamin D (25OH) 14.8      [11-03-18 @ 05:10]  HbA1c 5.5      [10-31-18 @ 06:25]  TSH 16.55      [11-02-18 @ 05:50]  Lipid: chol 92, , HDL 35, LDL 39      [10-31-18 @ 06:25]    HBsAb 10.8      [10-19-18 @ 21:45]  HBsAg NEGATIVE      [10-19-18 @ 21:45]  HCV 0.13, Nonreactive Hepatitis C AB  S/CO Ratio                        Interpretation  < 1.0                                     Non-Reactive  1.0 - 4.9                           Weakly-Reactive  > 5.0                                 Reactive  Non-Reactive: [place] with a non-reactive HCV antibody  result is considered uninfected.  No further action is  needed unless recent infection is suspected.  In these  cases, consider repeat testing later to detect  seroconversion..  Weakly-Reactive: HCV antibody test is abnormal, HCV RNA  Qualitative test will follow.  Reactive: HCV antibody test is abnormal, HCV RNA  Qualitative test will follow.  Note: HCV antibody testing is performed on the [employer] system.      [10-19-18 @ 21:45]

## 2018-11-09 NOTE — PROGRESS NOTE ADULT - PROBLEM SELECTOR PLAN 4
asymptomatic bradycardia on coreg-decreased to 12.5mg BID  Appreciate EP eval: No advanced AV blocks or persistent significant chelsea <40 bpm seen on telemetry, per EP if symptomatic chelsea or advanced AV block occurs, then would reduce Carvedilol dosage    -No acute pacing indication at present time  [id number] score of 6   NO AC given hx of multiple falls and positive FOBT.

## 2018-11-09 NOTE — PROGRESS NOTE ADULT - SUBJECTIVE AND OBJECTIVE BOX
INTERVAL HPI/OVERNIGHT EVENTS:    pt still with c/o diffuse abdominal pain   c/o nausea with spitting up  (+) large bm x 2   did not eat much of her breakfast, roughly 1/4-1/2 of pancake     MEDICATIONS  (STANDING):  amLODIPine   Tablet 10 milliGRAM(s) Oral daily  atorvastatin 10 milliGRAM(s) Oral at bedtime  calcium acetate 667 milliGRAM(s) Oral with lunch  carvedilol 12.5 milliGRAM(s) Oral every 12 hours  chlorhexidine 4% Liquid 1 Application(s) Topical <User Schedule>  cholecalciferol 1000 Unit(s) Oral daily  cloNIDine 0.3 milliGRAM(s) Oral every 8 hours  dicyclomine 10 milliGRAM(s) Oral three times a day before meals  docusate sodium 100 milliGRAM(s) Oral three times a day  epoetin iam Injectable 4000 Unit(s) IV Push <User Schedule>  hydrALAZINE 100 milliGRAM(s) Oral every 8 hours  isosorbide   mononitrate ER Tablet (IMDUR) 120 milliGRAM(s) Oral daily  levothyroxine 125 MICROGram(s) Oral daily  lisinopril 40 milliGRAM(s) Oral daily  minoxidil 5 milliGRAM(s) Oral two times a day  pantoprazole    Tablet 40 milliGRAM(s) Oral two times a day  polyethylene glycol 3350 17 Gram(s) Oral daily  senna 2 Tablet(s) Oral at bedtime  sertraline 50 milliGRAM(s) Oral daily  simethicone 80 milliGRAM(s) Chew every 6 hours  sodium chloride 0.9% lock flush 3 milliLiter(s) IV Push every 8 hours    MEDICATIONS  (PRN):  acetaminophen   Tablet .. 650 milliGRAM(s) Oral every 6 hours PRN Mild Pain (1 - 3), Moderate Pain (4 - 6), Severe Pain (7 - 10)  aluminum hydroxide/magnesium hydroxide/simethicone Suspension 30 milliLiter(s) Oral every 6 hours PRN Dyspepsia  clonazePAM Tablet 0.5 milliGRAM(s) Oral at bedtime PRN Anxiety  ondansetron Injectable 4 milliGRAM(s) IV Push every 6 hours PRN Nausea and/or Vomiting      Allergies    Avelox (Unknown)  fish (Hives; Rash)  shellfish (Unknown)    Intolerances        Review of Systems:    General:  No wt loss, fevers, chills, night sweats, fatigue   Eyes:  Good vision, no reported pain  ENT:  No sore throat, pain, runny nose, dysphagia  CV:  No pain, palpitations, hypo/hypertension  Resp:  No dyspnea, cough, tachypnea, wheezing  GI:  + pain, +nausea, No vomiting, No diarrhea, No constipation, No weight loss, No fever, No pruritis, No rectal bleeding, No melena, No dysphagia  :  No pain, bleeding, incontinence, nocturia  Muscle:  No pain, weakness  Neuro:  No weakness, tingling, memory problems  Psych:  No fatigue, insomnia, mood problems, depression  Endocrine:  No polyuria, polydypsia, cold/heat intolerance  Heme:  No petechiae, ecchymosis, easy bruisability  Skin:  No rash, tattoos, scars, edema      Vital Signs Last 24 Hrs  T(C): 36.7 (09 Nov 2018 11:02), Max: 37.1 (08 Nov 2018 21:21)  T(F): 98.1 (09 Nov 2018 11:02), Max: 98.7 (08 Nov 2018 21:21)  HR: 61 (09 Nov 2018 11:02) (60 - 80)  BP: 182/59 (09 Nov 2018 11:02) (151/49 - 198/90)  BP(mean): --  RR: 18 (09 Nov 2018 11:02) (16 - 18)  SpO2: 97% (09 Nov 2018 11:02) (97% - 100%)    PHYSICAL EXAM:    Constitutional: NAD  HEENT: EOMI, throat clear  Neck: No LAD, supple  Respiratory: CTA and P  Cardiovascular: S1 and S2, RRR, no M  Gastrointestinal: BS+, soft, NT/ND, neg HSM,  Extremities: No peripheral edema, neg clubbing, cyanosis  Vascular: 2+ peripheral pulses  Neurological: A/O x 3, no focal deficits  Psychiatric: Normal mood, normal affect  Skin: No rashes      LABS:                        8.9    3.87  )-----------( 155      ( 09 Nov 2018 05:15 )             27.8     11-09    137  |  93<L>  |  23  ----------------------------<  83  3.5   |  26  |  3.16<H>    Ca    8.7      09 Nov 2018 05:15  Mg     2.2     11-08            RADIOLOGY & ADDITIONAL TESTS:

## 2018-11-09 NOTE — PROGRESS NOTE ADULT - PROBLEM SELECTOR PLAN 1
No acute findings on CT abdomen, patient w/ chronic abdominal pain,  AXR on 11/3 with nonobstructive bowel pattern  S/p BM after initiation of bowel regimen   -Seen by GI, started on bentyl for possible IBS?  -Case discussed with GI today about indication for colonoscopy?

## 2018-11-09 NOTE — PROGRESS NOTE ADULT - SUBJECTIVE AND OBJECTIVE BOX
Patient is a 72y old  Female who presents with a chief complaint of chest pain (07 Nov 2018 14:40)      SUBJECTIVE / OVERNIGHT EVENTS: patient seen and examined.  phone used to get history. Patient c/o 10/10 abdominal pain with nausea. Per sister at bedside patient has had similar pain for months. She had 2 large BM yesterday per RN.     MEDICATIONS  (STANDING):  amLODIPine   Tablet 10 milliGRAM(s) Oral daily  atorvastatin 10 milliGRAM(s) Oral at bedtime  bisacodyl Suppository 10 milliGRAM(s) Rectal once  calcium acetate 667 milliGRAM(s) Oral with lunch  carvedilol 12.5 milliGRAM(s) Oral every 12 hours  chlorhexidine 4% Liquid 1 Application(s) Topical <User Schedule>  cholecalciferol 1000 Unit(s) Oral daily  cloNIDine 0.3 milliGRAM(s) Oral every 8 hours  docusate sodium 100 milliGRAM(s) Oral three times a day  epoetin iam Injectable 4000 Unit(s) IV Push <User Schedule>  hydrALAZINE 100 milliGRAM(s) Oral every 8 hours  isosorbide   mononitrate ER Tablet (IMDUR) 120 milliGRAM(s) Oral daily  lactulose Syrup 20 Gram(s) Oral once  levothyroxine 125 MICROGram(s) Oral daily  lisinopril 40 milliGRAM(s) Oral daily  minoxidil 5 milliGRAM(s) Oral two times a day  pantoprazole    Tablet 40 milliGRAM(s) Oral two times a day  polyethylene glycol 3350 17 Gram(s) Oral daily  QUEtiapine 50 milliGRAM(s) Oral daily  senna 2 Tablet(s) Oral at bedtime  sertraline 50 milliGRAM(s) Oral daily  simethicone 80 milliGRAM(s) Chew every 6 hours  sodium chloride 0.9% lock flush 3 milliLiter(s) IV Push every 8 hours    MEDICATIONS  (PRN):  acetaminophen   Tablet .. 650 milliGRAM(s) Oral every 6 hours PRN Mild Pain (1 - 3), Moderate Pain (4 - 6), Severe Pain (7 - 10)  aluminum hydroxide/magnesium hydroxide/simethicone Suspension 30 milliLiter(s) Oral every 6 hours PRN Dyspepsia  clonazePAM Tablet 0.5 milliGRAM(s) Oral at bedtime PRN Anxiety  ondansetron Injectable 4 milliGRAM(s) IV Push every 6 hours PRN Nausea and/or Vomiting    Vital Signs Last 24 Hrs  T(C): 36.7 (09 Nov 2018 11:02), Max: 37.1 (08 Nov 2018 21:21)  T(F): 98.1 (09 Nov 2018 11:02), Max: 98.7 (08 Nov 2018 21:21)  HR: 61 (09 Nov 2018 11:02) (61 - 80)  BP: 182/59 (09 Nov 2018 11:02) (151/49 - 198/90)  BP(mean): --  RR: 18 (09 Nov 2018 11:02) (16 - 18)  SpO2: 97% (09 Nov 2018 11:02) (97% - 100%)    PHYSICAL EXAM:  GENERAL: mild distress due to abdominal pain   HEAD:  Atraumatic, Normocephalic  EYES: EOMI, PERRLA, conjunctiva and sclera clear  CHEST/LUNG: bibasilar crackles   HEART: Regular rate and rhythm;   ABDOMEN: Soft, slightly distended; Bowel sounds present  EXTREMITIES:  2+ Peripheral Pulses, No clubbing, cyanosis, or edema, LUE  AVF   PSYCH: awake, Alert answering questions  appropriately   NEUROLOGY: non-focal  SKIN: No rashes or lesions      LABS:                                   8.9    3.87  )-----------( 155      ( 09 Nov 2018 05:15 )             27.8   11-09    137  |  93<L>  |  23  ----------------------------<  83  3.5   |  26  |  3.16<H>    Ca    8.7      09 Nov 2018 05:15  Mg     2.2     11-08      RADIOLOGY & ADDITIONAL TESTS:    Imaging Personally Reviewed:    Consultant(s) Notes Reviewed:      Care Discussed with Consultants/Other Providers:

## 2018-11-09 NOTE — PROGRESS NOTE ADULT - PROBLEM SELECTOR PLAN 3
continue with lisinopril, clonidine, hydralazine, Coreg to 12.5 mg BID  BP elevated today but likely as patient didn't get medicine prior to HD today  Started  minoxidil 2.5 mg BID per renal  Increase Imdur to 120 mg daily

## 2018-11-09 NOTE — PROGRESS NOTE ADULT - PROBLEM SELECTOR PLAN 2
patient hypothermia to 94 rectally on 10/31 now resolved , bradycardia improved , elevated rpt TSH with low FT4  10/31 Bcx NGTD  CT A/P unremarkable, CXR with mild pulm edema but no evidence of infection, small left infrahilar region opacity seen on CXR, on 11/3/18  will recommend repeating CXR in 1-2 weeks for resolution   Synthroid increased to 125mcg to be given at 6am on empty stomach for better absorption.

## 2018-11-09 NOTE — PROGRESS NOTE ADULT - PROBLEM SELECTOR PLAN 1
- suspect in setting of ESRD/dCHF with Severe Pulmonary HTN as noted on TTE 7/2018  - CT Abd and AXR both unrevealing   - trial of Bentyl TID   - protonix 40mg PO BID  - simethicone q6h x 5 days then prn  - cont senna/colace and miralax bid; dulcolax supp prn  - pain control prn/zofran prn   - cont HD per renal with fluid removal   - diet as tolerated  - will plan for EGD/Colonoscopy on Tuesday to better assess etiology of abdominal pain and nausea given normal CT/AXR findings

## 2018-11-10 LAB
BUN SERPL-MCNC: 12 MG/DL — SIGNIFICANT CHANGE UP (ref 7–23)
CALCIUM SERPL-MCNC: 8.7 MG/DL — SIGNIFICANT CHANGE UP (ref 8.4–10.5)
CHLORIDE SERPL-SCNC: 96 MMOL/L — LOW (ref 98–107)
CO2 SERPL-SCNC: 23 MMOL/L — SIGNIFICANT CHANGE UP (ref 22–31)
CREAT SERPL-MCNC: 2.23 MG/DL — HIGH (ref 0.5–1.3)
GLUCOSE SERPL-MCNC: 99 MG/DL — SIGNIFICANT CHANGE UP (ref 70–99)
HCT VFR BLD CALC: 30.5 % — LOW (ref 34.5–45)
HGB BLD-MCNC: 9.7 G/DL — LOW (ref 11.5–15.5)
MCHC RBC-ENTMCNC: 28.6 PG — SIGNIFICANT CHANGE UP (ref 27–34)
MCHC RBC-ENTMCNC: 31.8 % — LOW (ref 32–36)
MCV RBC AUTO: 90 FL — SIGNIFICANT CHANGE UP (ref 80–100)
NRBC # FLD: 0 — SIGNIFICANT CHANGE UP
PLATELET # BLD AUTO: 158 K/UL — SIGNIFICANT CHANGE UP (ref 150–400)
PMV BLD: 11.7 FL — SIGNIFICANT CHANGE UP (ref 7–13)
POTASSIUM SERPL-MCNC: 3.9 MMOL/L — SIGNIFICANT CHANGE UP (ref 3.5–5.3)
POTASSIUM SERPL-SCNC: 3.9 MMOL/L — SIGNIFICANT CHANGE UP (ref 3.5–5.3)
RBC # BLD: 3.39 M/UL — LOW (ref 3.8–5.2)
RBC # FLD: 17.5 % — HIGH (ref 10.3–14.5)
SODIUM SERPL-SCNC: 140 MMOL/L — SIGNIFICANT CHANGE UP (ref 135–145)
WBC # BLD: 3.49 K/UL — LOW (ref 3.8–10.5)
WBC # FLD AUTO: 3.49 K/UL — LOW (ref 3.8–10.5)

## 2018-11-10 PROCEDURE — 99233 SBSQ HOSP IP/OBS HIGH 50: CPT

## 2018-11-10 RX ADMIN — SENNA PLUS 2 TABLET(S): 8.6 TABLET ORAL at 21:14

## 2018-11-10 RX ADMIN — Medication 100 MILLIGRAM(S): at 21:14

## 2018-11-10 RX ADMIN — Medication 100 MILLIGRAM(S): at 05:34

## 2018-11-10 RX ADMIN — AMLODIPINE BESYLATE 10 MILLIGRAM(S): 2.5 TABLET ORAL at 05:34

## 2018-11-10 RX ADMIN — Medication 100 MILLIGRAM(S): at 13:44

## 2018-11-10 RX ADMIN — Medication 125 MICROGRAM(S): at 05:34

## 2018-11-10 RX ADMIN — CARVEDILOL PHOSPHATE 12.5 MILLIGRAM(S): 80 CAPSULE, EXTENDED RELEASE ORAL at 18:33

## 2018-11-10 RX ADMIN — CHLORHEXIDINE GLUCONATE 1 APPLICATION(S): 213 SOLUTION TOPICAL at 11:57

## 2018-11-10 RX ADMIN — SODIUM CHLORIDE 3 MILLILITER(S): 9 INJECTION INTRAMUSCULAR; INTRAVENOUS; SUBCUTANEOUS at 05:35

## 2018-11-10 RX ADMIN — SODIUM CHLORIDE 3 MILLILITER(S): 9 INJECTION INTRAMUSCULAR; INTRAVENOUS; SUBCUTANEOUS at 22:00

## 2018-11-10 RX ADMIN — PANTOPRAZOLE SODIUM 40 MILLIGRAM(S): 20 TABLET, DELAYED RELEASE ORAL at 05:35

## 2018-11-10 RX ADMIN — ISOSORBIDE MONONITRATE 120 MILLIGRAM(S): 60 TABLET, EXTENDED RELEASE ORAL at 11:57

## 2018-11-10 RX ADMIN — Medication 5 MILLIGRAM(S): at 05:34

## 2018-11-10 RX ADMIN — Medication 10 MILLIGRAM(S): at 05:36

## 2018-11-10 RX ADMIN — Medication 10 MILLIGRAM(S): at 18:32

## 2018-11-10 RX ADMIN — Medication 5 MILLIGRAM(S): at 18:33

## 2018-11-10 RX ADMIN — Medication 100 MILLIGRAM(S): at 05:35

## 2018-11-10 RX ADMIN — SIMETHICONE 80 MILLIGRAM(S): 80 TABLET, CHEWABLE ORAL at 18:33

## 2018-11-10 RX ADMIN — SIMETHICONE 80 MILLIGRAM(S): 80 TABLET, CHEWABLE ORAL at 11:57

## 2018-11-10 RX ADMIN — SERTRALINE 50 MILLIGRAM(S): 25 TABLET, FILM COATED ORAL at 11:58

## 2018-11-10 RX ADMIN — SIMETHICONE 80 MILLIGRAM(S): 80 TABLET, CHEWABLE ORAL at 01:46

## 2018-11-10 RX ADMIN — Medication 0.3 MILLIGRAM(S): at 13:44

## 2018-11-10 RX ADMIN — PANTOPRAZOLE SODIUM 40 MILLIGRAM(S): 20 TABLET, DELAYED RELEASE ORAL at 18:33

## 2018-11-10 RX ADMIN — Medication 0.3 MILLIGRAM(S): at 05:35

## 2018-11-10 RX ADMIN — Medication 0.3 MILLIGRAM(S): at 21:14

## 2018-11-10 RX ADMIN — LISINOPRIL 40 MILLIGRAM(S): 2.5 TABLET ORAL at 05:35

## 2018-11-10 RX ADMIN — CARVEDILOL PHOSPHATE 12.5 MILLIGRAM(S): 80 CAPSULE, EXTENDED RELEASE ORAL at 05:34

## 2018-11-10 RX ADMIN — ATORVASTATIN CALCIUM 10 MILLIGRAM(S): 80 TABLET, FILM COATED ORAL at 21:14

## 2018-11-10 RX ADMIN — SIMETHICONE 80 MILLIGRAM(S): 80 TABLET, CHEWABLE ORAL at 05:34

## 2018-11-10 RX ADMIN — Medication 1000 UNIT(S): at 11:57

## 2018-11-10 RX ADMIN — SODIUM CHLORIDE 3 MILLILITER(S): 9 INJECTION INTRAMUSCULAR; INTRAVENOUS; SUBCUTANEOUS at 13:41

## 2018-11-10 RX ADMIN — Medication 667 MILLIGRAM(S): at 11:57

## 2018-11-10 RX ADMIN — Medication 10 MILLIGRAM(S): at 11:57

## 2018-11-10 NOTE — PROGRESS NOTE ADULT - SUBJECTIVE AND OBJECTIVE BOX
INTERVAL HPI/OVERNIGHT EVENTS:  No new overnight event.  No N/V/D.  Tolerating diet.     MEDICATIONS  (STANDING):  amLODIPine   Tablet 10 milliGRAM(s) Oral daily  atorvastatin 10 milliGRAM(s) Oral at bedtime  calcium acetate 667 milliGRAM(s) Oral with lunch  carvedilol 12.5 milliGRAM(s) Oral every 12 hours  chlorhexidine 4% Liquid 1 Application(s) Topical <User Schedule>  cholecalciferol 1000 Unit(s) Oral daily  cloNIDine 0.3 milliGRAM(s) Oral every 8 hours  dicyclomine 10 milliGRAM(s) Oral three times a day before meals  docusate sodium 100 milliGRAM(s) Oral three times a day  epoetin iam Injectable 4000 Unit(s) IV Push <User Schedule>  hydrALAZINE 100 milliGRAM(s) Oral every 8 hours  isosorbide   mononitrate ER Tablet (IMDUR) 120 milliGRAM(s) Oral daily  levothyroxine 125 MICROGram(s) Oral daily  lisinopril 40 milliGRAM(s) Oral daily  minoxidil 5 milliGRAM(s) Oral two times a day  pantoprazole    Tablet 40 milliGRAM(s) Oral two times a day  polyethylene glycol 3350 17 Gram(s) Oral daily  senna 2 Tablet(s) Oral at bedtime  sertraline 50 milliGRAM(s) Oral daily  simethicone 80 milliGRAM(s) Chew every 6 hours  sodium chloride 0.9% lock flush 3 milliLiter(s) IV Push every 8 hours    MEDICATIONS  (PRN):  acetaminophen   Tablet .. 650 milliGRAM(s) Oral every 6 hours PRN Mild Pain (1 - 3), Moderate Pain (4 - 6), Severe Pain (7 - 10)  aluminum hydroxide/magnesium hydroxide/simethicone Suspension 30 milliLiter(s) Oral every 6 hours PRN Dyspepsia  clonazePAM Tablet 0.5 milliGRAM(s) Oral at bedtime PRN Anxiety  ondansetron Injectable 4 milliGRAM(s) IV Push every 6 hours PRN Nausea and/or Vomiting      Allergies    Avelox (Unknown)  fish (Hives; Rash)  shellfish (Unknown)    Intolerances        Review of Systems:    General:  No wt loss, fevers, chills, night sweats,fatigue,   Eyes:  Good vision, no reported pain  ENT:  No sore throat, pain, runny nose, dysphagia  CV:  No pain, palpitatioins, hypo/hypertension  Resp:  No dyspnea, cough, tachypnea, wheezing  GI:  No pain, No nausea, No vomiting, No diarrhea, No constipatiion, No weight loss, No fever, No pruritis, No rectal bleeding, No tarry stools, No dysphagia,  :  No pain, bleeding, incontinence, nocturia  Muscle:  No pain, weakness  Neuro:  No weakness, tingling, memory problems  Psych:  No fatigue, insomnia, mood problems, depression  Endocrine:  No polyuria, polydypsia, cold/heat intolerance  Heme:  No petechiae, ecchymosis, easy bruisability  Skin:  No rash, tattoos, scars, edema      Vital Signs Last 24 Hrs  T(C): 36.8 (10 Nov 2018 13:30), Max: 37.1 (10 Nov 2018 09:30)  T(F): 98.2 (10 Nov 2018 13:30), Max: 98.7 (10 Nov 2018 09:30)  HR: 62 (10 Nov 2018 13:30) (58 - 67)  BP: 118/62 (10 Nov 2018 13:30) (118/62 - 198/65)  BP(mean): --  RR: 18 (10 Nov 2018 13:30) (16 - 18)  SpO2: 100% (10 Nov 2018 13:30) (98% - 100%)    PHYSICAL EXAM:    Constitutional: NAD, well-developed  HEENT: EOMI, throat clear  Neck: No LAD, supple  Respiratory: CTA and P  Cardiovascular: S1 and S2, RRR, no M  Gastrointestinal: BS+, soft, NT/ND, neg HSM,  Extremities: No peripheral edema, neg clubing, cyanosis  Vascular: 2+ peripheral pulses  Neurological: A/O x 3, no focal deficits  Psychiatric: Normal mood, normal affect  Skin: No rashes      LABS:                        9.7    3.49  )-----------( 158      ( 10 Nov 2018 06:45 )             30.5     11-10    140  |  96<L>  |  12  ----------------------------<  99  3.9   |  23  |  2.23<H>    Ca    8.7      10 Nov 2018 06:45            RADIOLOGY & ADDITIONAL TESTS:

## 2018-11-10 NOTE — PROGRESS NOTE ADULT - SUBJECTIVE AND OBJECTIVE BOX
Patient is a 72y old  Female who presents with a chief complaint of chest pain (07 Nov 2018 14:40)      SUBJECTIVE / OVERNIGHT EVENTS: patient seen and examined. Pain improved today. Able to tolerate PO.     MEDICATIONS  (STANDING):  amLODIPine   Tablet 10 milliGRAM(s) Oral daily  atorvastatin 10 milliGRAM(s) Oral at bedtime  bisacodyl Suppository 10 milliGRAM(s) Rectal once  calcium acetate 667 milliGRAM(s) Oral with lunch  carvedilol 12.5 milliGRAM(s) Oral every 12 hours  chlorhexidine 4% Liquid 1 Application(s) Topical <User Schedule>  cholecalciferol 1000 Unit(s) Oral daily  cloNIDine 0.3 milliGRAM(s) Oral every 8 hours  docusate sodium 100 milliGRAM(s) Oral three times a day  epoetin iam Injectable 4000 Unit(s) IV Push <User Schedule>  hydrALAZINE 100 milliGRAM(s) Oral every 8 hours  isosorbide   mononitrate ER Tablet (IMDUR) 120 milliGRAM(s) Oral daily  lactulose Syrup 20 Gram(s) Oral once  levothyroxine 125 MICROGram(s) Oral daily  lisinopril 40 milliGRAM(s) Oral daily  minoxidil 5 milliGRAM(s) Oral two times a day  pantoprazole    Tablet 40 milliGRAM(s) Oral two times a day  polyethylene glycol 3350 17 Gram(s) Oral daily  QUEtiapine 50 milliGRAM(s) Oral daily  senna 2 Tablet(s) Oral at bedtime  sertraline 50 milliGRAM(s) Oral daily  simethicone 80 milliGRAM(s) Chew every 6 hours  sodium chloride 0.9% lock flush 3 milliLiter(s) IV Push every 8 hours    MEDICATIONS  (PRN):  acetaminophen   Tablet .. 650 milliGRAM(s) Oral every 6 hours PRN Mild Pain (1 - 3), Moderate Pain (4 - 6), Severe Pain (7 - 10)  aluminum hydroxide/magnesium hydroxide/simethicone Suspension 30 milliLiter(s) Oral every 6 hours PRN Dyspepsia  clonazePAM Tablet 0.5 milliGRAM(s) Oral at bedtime PRN Anxiety  ondansetron Injectable 4 milliGRAM(s) IV Push every 6 hours PRN Nausea and/or Vomiting    Vital Signs Last 24 Hrs  T(C): 36.8 (10 Nov 2018 13:30), Max: 37.1 (10 Nov 2018 09:30)  T(F): 98.2 (10 Nov 2018 13:30), Max: 98.7 (10 Nov 2018 09:30)  HR: 62 (10 Nov 2018 13:30) (58 - 69)  BP: 118/62 (10 Nov 2018 13:30) (118/62 - 198/65)  BP(mean): --  RR: 18 (10 Nov 2018 13:30) (16 - 18)  SpO2: 100% (10 Nov 2018 13:30) (96% - 100%)    PHYSICAL EXAM:  GENERAL: mild distress due to abdominal pain   HEAD:  Atraumatic, Normocephalic  EYES: EOMI, PERRLA, conjunctiva and sclera clear  CHEST/LUNG: bibasilar crackles   HEART: Regular rate and rhythm;   ABDOMEN: Soft, slightly distended; Bowel sounds present  EXTREMITIES:  2+ Peripheral Pulses, No clubbing, cyanosis, or edema, LUE  AVF   PSYCH: awake, Alert answering questions  appropriately   NEUROLOGY: non-focal  SKIN: No rashes or lesions      LABS:                                   8.9    3.87  )-----------( 155      ( 09 Nov 2018 05:15 )             27.8   11-09    137  |  93<L>  |  23  ----------------------------<  83  3.5   |  26  |  3.16<H>    Ca    8.7      09 Nov 2018 05:15  Mg     2.2     11-08      RADIOLOGY & ADDITIONAL TESTS:    Imaging Personally Reviewed:    Consultant(s) Notes Reviewed:      Care Discussed with Consultants/Other Providers:

## 2018-11-10 NOTE — PROGRESS NOTE ADULT - PROBLEM SELECTOR PLAN 4
asymptomatic bradycardia on coreg-decreased to 12.5mg BID  Appreciate EP eval: No advanced AV blocks or persistent significant chelsea <40 bpm seen on telemetry, per EP if symptomatic chelsea or advanced AV block occurs, then would reduce Carvedilol dosage    -No acute pacing indication at present time  [id number] score of 6   NO AC given hx of multiple falls and positive FOBT.

## 2018-11-10 NOTE — PROGRESS NOTE ADULT - PROBLEM SELECTOR PLAN 3
continue with lisinopril, clonidine, hydralazine, Coreg to 12.5 mg BID  BP improved today  Started  minoxidil 2.5 mg BID per renal  Increase Imdur to 120 mg daily

## 2018-11-10 NOTE — PROGRESS NOTE ADULT - PROBLEM SELECTOR PLAN 1
No acute findings on CT abdomen, patient w/ chronic abdominal pain,  AXR on 11/3 with nonobstructive bowel pattern  S/p BM after initiation of bowel regimen   -Seen by GI, started on bentyl for possible IBS?  -Case discussed with GI, plan for EGD/colon if pain does not improve on tuesday

## 2018-11-11 LAB
TROPONIN T, HIGH SENSITIVITY: 465 NG/L — CRITICAL HIGH (ref ?–14)
TROPONIN T, HIGH SENSITIVITY: 480 NG/L — CRITICAL HIGH (ref ?–14)

## 2018-11-11 PROCEDURE — 93010 ELECTROCARDIOGRAM REPORT: CPT

## 2018-11-11 PROCEDURE — 99233 SBSQ HOSP IP/OBS HIGH 50: CPT

## 2018-11-11 RX ORDER — METOCLOPRAMIDE HCL 10 MG
5 TABLET ORAL ONCE
Qty: 0 | Refills: 0 | Status: COMPLETED | OUTPATIENT
Start: 2018-11-11 | End: 2018-11-11

## 2018-11-11 RX ADMIN — Medication 667 MILLIGRAM(S): at 12:08

## 2018-11-11 RX ADMIN — PANTOPRAZOLE SODIUM 40 MILLIGRAM(S): 20 TABLET, DELAYED RELEASE ORAL at 05:07

## 2018-11-11 RX ADMIN — AMLODIPINE BESYLATE 10 MILLIGRAM(S): 2.5 TABLET ORAL at 05:08

## 2018-11-11 RX ADMIN — SODIUM CHLORIDE 3 MILLILITER(S): 9 INJECTION INTRAMUSCULAR; INTRAVENOUS; SUBCUTANEOUS at 07:13

## 2018-11-11 RX ADMIN — ATORVASTATIN CALCIUM 10 MILLIGRAM(S): 80 TABLET, FILM COATED ORAL at 22:19

## 2018-11-11 RX ADMIN — POLYETHYLENE GLYCOL 3350 17 GRAM(S): 17 POWDER, FOR SOLUTION ORAL at 12:08

## 2018-11-11 RX ADMIN — SIMETHICONE 80 MILLIGRAM(S): 80 TABLET, CHEWABLE ORAL at 12:08

## 2018-11-11 RX ADMIN — Medication 10 MILLIGRAM(S): at 05:08

## 2018-11-11 RX ADMIN — CARVEDILOL PHOSPHATE 12.5 MILLIGRAM(S): 80 CAPSULE, EXTENDED RELEASE ORAL at 17:42

## 2018-11-11 RX ADMIN — SODIUM CHLORIDE 3 MILLILITER(S): 9 INJECTION INTRAMUSCULAR; INTRAVENOUS; SUBCUTANEOUS at 13:19

## 2018-11-11 RX ADMIN — Medication 5 MILLIGRAM(S): at 05:07

## 2018-11-11 RX ADMIN — Medication 1000 UNIT(S): at 12:08

## 2018-11-11 RX ADMIN — Medication 650 MILLIGRAM(S): at 16:55

## 2018-11-11 RX ADMIN — Medication 100 MILLIGRAM(S): at 05:08

## 2018-11-11 RX ADMIN — Medication 650 MILLIGRAM(S): at 00:54

## 2018-11-11 RX ADMIN — CHLORHEXIDINE GLUCONATE 1 APPLICATION(S): 213 SOLUTION TOPICAL at 12:04

## 2018-11-11 RX ADMIN — Medication 100 MILLIGRAM(S): at 13:55

## 2018-11-11 RX ADMIN — SIMETHICONE 80 MILLIGRAM(S): 80 TABLET, CHEWABLE ORAL at 05:07

## 2018-11-11 RX ADMIN — Medication 5 MILLIGRAM(S): at 13:55

## 2018-11-11 RX ADMIN — SODIUM CHLORIDE 3 MILLILITER(S): 9 INJECTION INTRAMUSCULAR; INTRAVENOUS; SUBCUTANEOUS at 22:00

## 2018-11-11 RX ADMIN — LISINOPRIL 40 MILLIGRAM(S): 2.5 TABLET ORAL at 05:07

## 2018-11-11 RX ADMIN — ONDANSETRON 4 MILLIGRAM(S): 8 TABLET, FILM COATED ORAL at 08:01

## 2018-11-11 RX ADMIN — CARVEDILOL PHOSPHATE 12.5 MILLIGRAM(S): 80 CAPSULE, EXTENDED RELEASE ORAL at 05:07

## 2018-11-11 RX ADMIN — Medication 650 MILLIGRAM(S): at 17:55

## 2018-11-11 RX ADMIN — ISOSORBIDE MONONITRATE 120 MILLIGRAM(S): 60 TABLET, EXTENDED RELEASE ORAL at 12:08

## 2018-11-11 RX ADMIN — Medication 125 MICROGRAM(S): at 05:07

## 2018-11-11 RX ADMIN — PANTOPRAZOLE SODIUM 40 MILLIGRAM(S): 20 TABLET, DELAYED RELEASE ORAL at 17:42

## 2018-11-11 RX ADMIN — SENNA PLUS 2 TABLET(S): 8.6 TABLET ORAL at 22:19

## 2018-11-11 RX ADMIN — SERTRALINE 50 MILLIGRAM(S): 25 TABLET, FILM COATED ORAL at 12:12

## 2018-11-11 RX ADMIN — Medication 30 MILLILITER(S): at 16:55

## 2018-11-11 RX ADMIN — Medication 5 MILLIGRAM(S): at 17:42

## 2018-11-11 RX ADMIN — Medication 10 MILLIGRAM(S): at 12:08

## 2018-11-11 RX ADMIN — SIMETHICONE 80 MILLIGRAM(S): 80 TABLET, CHEWABLE ORAL at 00:54

## 2018-11-11 RX ADMIN — Medication 100 MILLIGRAM(S): at 22:19

## 2018-11-11 RX ADMIN — Medication 100 MILLIGRAM(S): at 22:20

## 2018-11-11 RX ADMIN — Medication 0.3 MILLIGRAM(S): at 13:55

## 2018-11-11 RX ADMIN — Medication 0.5 MILLIGRAM(S): at 16:55

## 2018-11-11 RX ADMIN — SIMETHICONE 80 MILLIGRAM(S): 80 TABLET, CHEWABLE ORAL at 17:42

## 2018-11-11 RX ADMIN — Medication 0.3 MILLIGRAM(S): at 22:21

## 2018-11-11 RX ADMIN — Medication 650 MILLIGRAM(S): at 01:24

## 2018-11-11 RX ADMIN — Medication 0.3 MILLIGRAM(S): at 05:07

## 2018-11-11 NOTE — PROGRESS NOTE ADULT - SUBJECTIVE AND OBJECTIVE BOX
Patient is a 72y old  Female who presents with a chief complaint of chest pain (07 Nov 2018 14:40)    SUBJECTIVE / OVERNIGHT EVENTS: patient seen and examined.  at bedside. Reports worsening nausea overnight. Sleeping comfortable during my visit.     MEDICATIONS  (STANDING):  amLODIPine   Tablet 10 milliGRAM(s) Oral daily  atorvastatin 10 milliGRAM(s) Oral at bedtime  bisacodyl Suppository 10 milliGRAM(s) Rectal once  calcium acetate 667 milliGRAM(s) Oral with lunch  carvedilol 12.5 milliGRAM(s) Oral every 12 hours  chlorhexidine 4% Liquid 1 Application(s) Topical <User Schedule>  cholecalciferol 1000 Unit(s) Oral daily  cloNIDine 0.3 milliGRAM(s) Oral every 8 hours  docusate sodium 100 milliGRAM(s) Oral three times a day  epoetin iam Injectable 4000 Unit(s) IV Push <User Schedule>  hydrALAZINE 100 milliGRAM(s) Oral every 8 hours  isosorbide   mononitrate ER Tablet (IMDUR) 120 milliGRAM(s) Oral daily  lactulose Syrup 20 Gram(s) Oral once  levothyroxine 125 MICROGram(s) Oral daily  lisinopril 40 milliGRAM(s) Oral daily  minoxidil 5 milliGRAM(s) Oral two times a day  pantoprazole    Tablet 40 milliGRAM(s) Oral two times a day  polyethylene glycol 3350 17 Gram(s) Oral daily  QUEtiapine 50 milliGRAM(s) Oral daily  senna 2 Tablet(s) Oral at bedtime  sertraline 50 milliGRAM(s) Oral daily  simethicone 80 milliGRAM(s) Chew every 6 hours  sodium chloride 0.9% lock flush 3 milliLiter(s) IV Push every 8 hours    MEDICATIONS  (PRN):  acetaminophen   Tablet .. 650 milliGRAM(s) Oral every 6 hours PRN Mild Pain (1 - 3), Moderate Pain (4 - 6), Severe Pain (7 - 10)  aluminum hydroxide/magnesium hydroxide/simethicone Suspension 30 milliLiter(s) Oral every 6 hours PRN Dyspepsia  clonazePAM Tablet 0.5 milliGRAM(s) Oral at bedtime PRN Anxiety  ondansetron Injectable 4 milliGRAM(s) IV Push every 6 hours PRN Nausea and/or Vomiting    Vital Signs Last 24 Hrs  T(C): 36.5 (11 Nov 2018 13:17), Max: 37 (10 Nov 2018 19:18)  T(F): 97.7 (11 Nov 2018 13:17), Max: 98.6 (10 Nov 2018 19:18)  HR: 73 (11 Nov 2018 13:17) (61 - 73)  BP: 175/60 (11 Nov 2018 13:17) (132/54 - 185/52)  BP(mean): --  RR: 18 (11 Nov 2018 13:17) (16 - 18)  SpO2: 100% (11 Nov 2018 13:17) (98% - 100%)    PHYSICAL EXAM:  GENERAL: mild distress due to abdominal pain   HEAD:  Atraumatic, Normocephalic  EYES: EOMI, PERRLA, conjunctiva and sclera clear  CHEST/LUNG: bibasilar crackles   HEART: Regular rate and rhythm;   ABDOMEN: Soft, slightly distended; Bowel sounds present  EXTREMITIES:  2+ Peripheral Pulses, No clubbing, cyanosis, or edema, LUE  AVF   PSYCH: awake, Alert answering questions  appropriately   NEUROLOGY: non-focal  SKIN: No rashes or lesions      LABS:                                   9.7    3.49  )-----------( 158      ( 10 Nov 2018 06:45 )             30.5   11-10    140  |  96<L>  |  12  ----------------------------<  99  3.9   |  23  |  2.23<H>    Ca    8.7      10 Nov 2018 06:45          RADIOLOGY & ADDITIONAL TESTS:    Imaging Personally Reviewed:    Consultant(s) Notes Reviewed:      Care Discussed with Consultants/Other Providers:

## 2018-11-11 NOTE — PROGRESS NOTE ADULT - SUBJECTIVE AND OBJECTIVE BOX
INTERVAL HPI/OVERNIGHT EVENTS:  No new overnight event.  No N/V/D.     MEDICATIONS  (STANDING):  amLODIPine   Tablet 10 milliGRAM(s) Oral daily  atorvastatin 10 milliGRAM(s) Oral at bedtime  calcium acetate 667 milliGRAM(s) Oral with lunch  carvedilol 12.5 milliGRAM(s) Oral every 12 hours  chlorhexidine 4% Liquid 1 Application(s) Topical <User Schedule>  cholecalciferol 1000 Unit(s) Oral daily  cloNIDine 0.3 milliGRAM(s) Oral every 8 hours  dicyclomine 10 milliGRAM(s) Oral three times a day before meals  docusate sodium 100 milliGRAM(s) Oral three times a day  epoetin iam Injectable 4000 Unit(s) IV Push <User Schedule>  hydrALAZINE 100 milliGRAM(s) Oral every 8 hours  isosorbide   mononitrate ER Tablet (IMDUR) 120 milliGRAM(s) Oral daily  levothyroxine 125 MICROGram(s) Oral daily  lisinopril 40 milliGRAM(s) Oral daily  minoxidil 5 milliGRAM(s) Oral two times a day  pantoprazole    Tablet 40 milliGRAM(s) Oral two times a day  polyethylene glycol 3350 17 Gram(s) Oral daily  senna 2 Tablet(s) Oral at bedtime  sertraline 50 milliGRAM(s) Oral daily  simethicone 80 milliGRAM(s) Chew every 6 hours  sodium chloride 0.9% lock flush 3 milliLiter(s) IV Push every 8 hours    MEDICATIONS  (PRN):  acetaminophen   Tablet .. 650 milliGRAM(s) Oral every 6 hours PRN Mild Pain (1 - 3), Moderate Pain (4 - 6), Severe Pain (7 - 10)  aluminum hydroxide/magnesium hydroxide/simethicone Suspension 30 milliLiter(s) Oral every 6 hours PRN Dyspepsia  clonazePAM Tablet 0.5 milliGRAM(s) Oral at bedtime PRN Anxiety  ondansetron Injectable 4 milliGRAM(s) IV Push every 6 hours PRN Nausea and/or Vomiting      Allergies    Avelox (Unknown)  fish (Hives; Rash)  shellfish (Unknown)    Intolerances        Review of Systems:    General:  No wt loss, fevers, chills, night sweats,fatigue,   Eyes:  Good vision, no reported pain  ENT:  No sore throat, pain, runny nose, dysphagia  CV:  No pain, palpitatioins, hypo/hypertension  Resp:  No dyspnea, cough, tachypnea, wheezing  GI:  No pain, No nausea, No vomiting, No diarrhea, No constipatiion, No weight loss, No fever, No pruritis, No rectal bleeding, No tarry stools, No dysphagia,  :  No pain, bleeding, incontinence, nocturia  Muscle:  No pain, weakness  Neuro:  No weakness, tingling, memory problems  Psych:  No fatigue, insomnia, mood problems, depression  Endocrine:  No polyuria, polydypsia, cold/heat intolerance  Heme:  No petechiae, ecchymosis, easy bruisability  Skin:  No rash, tattoos, scars, edema      Vital Signs Last 24 Hrs  T(C): 36.8 (11 Nov 2018 17:37), Max: 37 (10 Nov 2018 19:18)  T(F): 98.2 (11 Nov 2018 17:37), Max: 98.6 (10 Nov 2018 19:18)  HR: 60 (11 Nov 2018 17:37) (60 - 73)  BP: 150/54 (11 Nov 2018 17:37) (132/54 - 185/52)  BP(mean): --  RR: 18 (11 Nov 2018 17:37) (16 - 18)  SpO2: 100% (11 Nov 2018 17:37) (98% - 100%)    PHYSICAL EXAM:    Constitutional: NAD, well-developed  HEENT: EOMI, throat clear  Neck: No LAD, supple  Respiratory: CTA and P  Cardiovascular: S1 and S2, RRR, no M  Gastrointestinal: BS+, soft, NT/ND, neg HSM,  Extremities: No peripheral edema, neg clubing, cyanosis  Vascular: 2+ peripheral pulses  Neurological: A/O x 3, no focal deficits  Psychiatric: Normal mood, normal affect  Skin: No rashes      LABS:                        9.7    3.49  )-----------( 158      ( 10 Nov 2018 06:45 )             30.5     11-10    140  |  96<L>  |  12  ----------------------------<  99  3.9   |  23  |  2.23<H>    Ca    8.7      10 Nov 2018 06:45            RADIOLOGY & ADDITIONAL TESTS:

## 2018-11-11 NOTE — PROGRESS NOTE ADULT - PROBLEM SELECTOR PLAN 4
asymptomatic bradycardia on coreg-decreased to 12.5mg BID  Appreciate EP eval: No advanced AV blocks or persistent significant chelsea <40 bpm seen on telemetry, per EP if symptomatic chelsea or advanced AV block occurs, then would reduce Carvedilol dosage    -No acute pacing indication at present time  [id number] score of 6   NO AC given hx of multiple falls and positive FOBT.

## 2018-11-11 NOTE — PROGRESS NOTE ADULT - PROBLEM SELECTOR PLAN 1
Continues to have vomiting and abdominal pain  No acute findings on CT abdomen, patient w/ chronic abdominal pain,  AXR on 11/3 with nonobstructive bowel pattern  S/p BM after initiation of bowel regimen   -Seen by GI, started on bentyl for possible IBS?  -Case discussed with GI, plan for EGD/colon if pain does not improve on tuesday.  - reports that she had EGD/colon few months ago and was unrevealing at that time. Discuss utility of egd/colon with GI. Patient continues to have symptoms at this time.

## 2018-11-12 DIAGNOSIS — R11.2 NAUSEA WITH VOMITING, UNSPECIFIED: ICD-10-CM

## 2018-11-12 DIAGNOSIS — I10 ESSENTIAL (PRIMARY) HYPERTENSION: ICD-10-CM

## 2018-11-12 LAB
BUN SERPL-MCNC: 24 MG/DL — HIGH (ref 7–23)
CALCIUM SERPL-MCNC: 8.6 MG/DL — SIGNIFICANT CHANGE UP (ref 8.4–10.5)
CHLORIDE SERPL-SCNC: 97 MMOL/L — LOW (ref 98–107)
CO2 SERPL-SCNC: 27 MMOL/L — SIGNIFICANT CHANGE UP (ref 22–31)
CREAT SERPL-MCNC: 3.96 MG/DL — HIGH (ref 0.5–1.3)
GLUCOSE SERPL-MCNC: 84 MG/DL — SIGNIFICANT CHANGE UP (ref 70–99)
HCT VFR BLD CALC: 27.5 % — LOW (ref 34.5–45)
HGB BLD-MCNC: 8.5 G/DL — LOW (ref 11.5–15.5)
MCHC RBC-ENTMCNC: 28.1 PG — SIGNIFICANT CHANGE UP (ref 27–34)
MCHC RBC-ENTMCNC: 30.9 % — LOW (ref 32–36)
MCV RBC AUTO: 91.1 FL — SIGNIFICANT CHANGE UP (ref 80–100)
NRBC # FLD: 0 — SIGNIFICANT CHANGE UP
PLATELET # BLD AUTO: 154 K/UL — SIGNIFICANT CHANGE UP (ref 150–400)
PMV BLD: 11.7 FL — SIGNIFICANT CHANGE UP (ref 7–13)
POTASSIUM SERPL-MCNC: 3.9 MMOL/L — SIGNIFICANT CHANGE UP (ref 3.5–5.3)
POTASSIUM SERPL-SCNC: 3.9 MMOL/L — SIGNIFICANT CHANGE UP (ref 3.5–5.3)
RBC # BLD: 3.02 M/UL — LOW (ref 3.8–5.2)
RBC # FLD: 17.3 % — HIGH (ref 10.3–14.5)
SODIUM SERPL-SCNC: 139 MMOL/L — SIGNIFICANT CHANGE UP (ref 135–145)
WBC # BLD: 4.67 K/UL — SIGNIFICANT CHANGE UP (ref 3.8–10.5)
WBC # FLD AUTO: 4.67 K/UL — SIGNIFICANT CHANGE UP (ref 3.8–10.5)

## 2018-11-12 PROCEDURE — 99233 SBSQ HOSP IP/OBS HIGH 50: CPT

## 2018-11-12 PROCEDURE — 99233 SBSQ HOSP IP/OBS HIGH 50: CPT | Mod: GC

## 2018-11-12 PROCEDURE — 71045 X-RAY EXAM CHEST 1 VIEW: CPT | Mod: 26

## 2018-11-12 RX ORDER — SOD SULF/SODIUM/NAHCO3/KCL/PEG
1 SOLUTION, RECONSTITUTED, ORAL ORAL EVERY 4 HOURS
Qty: 0 | Refills: 0 | Status: COMPLETED | OUTPATIENT
Start: 2018-11-12 | End: 2018-11-12

## 2018-11-12 RX ADMIN — Medication 667 MILLIGRAM(S): at 12:30

## 2018-11-12 RX ADMIN — SODIUM CHLORIDE 3 MILLILITER(S): 9 INJECTION INTRAMUSCULAR; INTRAVENOUS; SUBCUTANEOUS at 05:38

## 2018-11-12 RX ADMIN — SODIUM CHLORIDE 3 MILLILITER(S): 9 INJECTION INTRAMUSCULAR; INTRAVENOUS; SUBCUTANEOUS at 22:10

## 2018-11-12 RX ADMIN — CARVEDILOL PHOSPHATE 12.5 MILLIGRAM(S): 80 CAPSULE, EXTENDED RELEASE ORAL at 17:21

## 2018-11-12 RX ADMIN — CHLORHEXIDINE GLUCONATE 1 APPLICATION(S): 213 SOLUTION TOPICAL at 12:31

## 2018-11-12 RX ADMIN — Medication 100 MILLIGRAM(S): at 05:33

## 2018-11-12 RX ADMIN — Medication 0.3 MILLIGRAM(S): at 14:56

## 2018-11-12 RX ADMIN — ERYTHROPOIETIN 4000 UNIT(S): 10000 INJECTION, SOLUTION INTRAVENOUS; SUBCUTANEOUS at 08:28

## 2018-11-12 RX ADMIN — Medication 10 MILLIGRAM(S): at 17:21

## 2018-11-12 RX ADMIN — SIMETHICONE 80 MILLIGRAM(S): 80 TABLET, CHEWABLE ORAL at 14:57

## 2018-11-12 RX ADMIN — Medication 10 MILLIGRAM(S): at 11:14

## 2018-11-12 RX ADMIN — Medication 100 MILLIGRAM(S): at 12:31

## 2018-11-12 RX ADMIN — CARVEDILOL PHOSPHATE 12.5 MILLIGRAM(S): 80 CAPSULE, EXTENDED RELEASE ORAL at 05:33

## 2018-11-12 RX ADMIN — Medication 1 LITER(S): at 17:22

## 2018-11-12 RX ADMIN — Medication 650 MILLIGRAM(S): at 20:16

## 2018-11-12 RX ADMIN — Medication 5 MILLIGRAM(S): at 05:33

## 2018-11-12 RX ADMIN — Medication 1 LITER(S): at 22:06

## 2018-11-12 RX ADMIN — Medication 10 MILLIGRAM(S): at 05:33

## 2018-11-12 RX ADMIN — Medication 125 MICROGRAM(S): at 05:34

## 2018-11-12 RX ADMIN — Medication 1000 UNIT(S): at 11:14

## 2018-11-12 RX ADMIN — SIMETHICONE 80 MILLIGRAM(S): 80 TABLET, CHEWABLE ORAL at 01:30

## 2018-11-12 RX ADMIN — POLYETHYLENE GLYCOL 3350 17 GRAM(S): 17 POWDER, FOR SOLUTION ORAL at 11:14

## 2018-11-12 RX ADMIN — SIMETHICONE 80 MILLIGRAM(S): 80 TABLET, CHEWABLE ORAL at 18:07

## 2018-11-12 RX ADMIN — SIMETHICONE 80 MILLIGRAM(S): 80 TABLET, CHEWABLE ORAL at 05:34

## 2018-11-12 RX ADMIN — Medication 0.3 MILLIGRAM(S): at 05:33

## 2018-11-12 RX ADMIN — SODIUM CHLORIDE 3 MILLILITER(S): 9 INJECTION INTRAMUSCULAR; INTRAVENOUS; SUBCUTANEOUS at 12:31

## 2018-11-12 RX ADMIN — SENNA PLUS 2 TABLET(S): 8.6 TABLET ORAL at 22:05

## 2018-11-12 RX ADMIN — Medication 100 MILLIGRAM(S): at 14:56

## 2018-11-12 RX ADMIN — Medication 650 MILLIGRAM(S): at 20:59

## 2018-11-12 RX ADMIN — PANTOPRAZOLE SODIUM 40 MILLIGRAM(S): 20 TABLET, DELAYED RELEASE ORAL at 05:38

## 2018-11-12 RX ADMIN — ATORVASTATIN CALCIUM 10 MILLIGRAM(S): 80 TABLET, FILM COATED ORAL at 22:05

## 2018-11-12 RX ADMIN — Medication 0.3 MILLIGRAM(S): at 22:03

## 2018-11-12 RX ADMIN — AMLODIPINE BESYLATE 10 MILLIGRAM(S): 2.5 TABLET ORAL at 05:34

## 2018-11-12 RX ADMIN — Medication 100 MILLIGRAM(S): at 22:03

## 2018-11-12 RX ADMIN — Medication 10 MILLIGRAM(S): at 20:16

## 2018-11-12 RX ADMIN — Medication 5 MILLIGRAM(S): at 17:22

## 2018-11-12 RX ADMIN — SERTRALINE 50 MILLIGRAM(S): 25 TABLET, FILM COATED ORAL at 11:14

## 2018-11-12 RX ADMIN — PANTOPRAZOLE SODIUM 40 MILLIGRAM(S): 20 TABLET, DELAYED RELEASE ORAL at 17:21

## 2018-11-12 RX ADMIN — Medication 100 MILLIGRAM(S): at 22:04

## 2018-11-12 RX ADMIN — LISINOPRIL 40 MILLIGRAM(S): 2.5 TABLET ORAL at 05:34

## 2018-11-12 RX ADMIN — Medication 100 MILLIGRAM(S): at 05:32

## 2018-11-12 RX ADMIN — ISOSORBIDE MONONITRATE 120 MILLIGRAM(S): 60 TABLET, EXTENDED RELEASE ORAL at 11:14

## 2018-11-12 NOTE — PROGRESS NOTE ADULT - SUBJECTIVE AND OBJECTIVE BOX
[facility] DIVISION OF KIDNEY DISEASES AND HYPERTENSION -- HEMODIALYSIS NOTE  --------------------------------------------------------------------------------  Chief Complaint: ESRD/Ongoing hemodialysis requirement    24 hour events/subjective:  Seen during HD today, tolerating well.        PAST HISTORY  --------------------------------------------------------------------------------  No significant changes to PMH, PSH, FHx, SHx, unless otherwise noted    ALLERGIES & MEDICATIONS  --------------------------------------------------------------------------------  Allergies    Avelox (Unknown)  fish (Hives; Rash)  shellfish (Unknown)    Intolerances      Standing Inpatient Medications  amLODIPine   Tablet 10 milliGRAM(s) Oral daily  atorvastatin 10 milliGRAM(s) Oral at bedtime  bisacodyl 10 milliGRAM(s) Oral every 4 hours  calcium acetate 667 milliGRAM(s) Oral with lunch  carvedilol 12.5 milliGRAM(s) Oral every 12 hours  chlorhexidine 4% Liquid 1 Application(s) Topical <User Schedule>  cholecalciferol 1000 Unit(s) Oral daily  cloNIDine 0.3 milliGRAM(s) Oral every 8 hours  dicyclomine 10 milliGRAM(s) Oral three times a day before meals  docusate sodium 100 milliGRAM(s) Oral three times a day  epoetin iam Injectable 4000 Unit(s) IV Push <User Schedule>  hydrALAZINE 100 milliGRAM(s) Oral every 8 hours  isosorbide   mononitrate ER Tablet (IMDUR) 120 milliGRAM(s) Oral daily  levothyroxine 125 MICROGram(s) Oral daily  lisinopril 40 milliGRAM(s) Oral daily  minoxidil 5 milliGRAM(s) Oral two times a day  pantoprazole    Tablet 40 milliGRAM(s) Oral two times a day  polyethylene glycol 3350 17 Gram(s) Oral daily  polyethylene glycol/electrolyte Solution 1 Liter(s) Oral every 4 hours  senna 2 Tablet(s) Oral at bedtime  sertraline 50 milliGRAM(s) Oral daily  simethicone 80 milliGRAM(s) Chew every 6 hours  sodium chloride 0.9% lock flush 3 milliLiter(s) IV Push every 8 hours    PRN Inpatient Medications  acetaminophen   Tablet .. 650 milliGRAM(s) Oral every 6 hours PRN  aluminum hydroxide/magnesium hydroxide/simethicone Suspension 30 milliLiter(s) Oral every 6 hours PRN  clonazePAM Tablet 0.5 milliGRAM(s) Oral at bedtime PRN  ondansetron Injectable 4 milliGRAM(s) IV Push every 6 hours PRN      REVIEW OF SYSTEMS  --------------------------------------------------------------------------------  Gen:  + weakness  Skin: No new rashes  Respiratory: No dyspnea,   CV: No chest pain,   GI: + abdominal pain,  MSK: No edema  Psych: No anxiety  All other systems were reviewed and are negative, except as noted.      VITALS/PHYSICAL EXAM  --------------------------------------------------------------------------------  T(C): 36.7 (11-12-18 @ 12:40), Max: 36.8 (11-11-18 @ 17:37)  HR: 64 (11-12-18 @ 12:40) (60 - 65)  BP: 160/62 (11-12-18 @ 12:40) (134/50 - 172/54)  RR: 17 (11-12-18 @ 12:40) (16 - 18)  SpO2: 99% (11-12-18 @ 12:40) (99% - 100%)  Wt(kg): --        11-12-18 @ 07:01  -  11-12-18 @ 16:27  --------------------------------------------------------  IN: 400 mL / OUT: 2400 mL / NET: -2000 mL      Physical Exam:  	         Gen: NAD, frail woman  	HEENT: supple neck, NO JVD   	Pulm: CTA B/L  	CV: RRR, S1S2; no rub  	Abd: +BS, soft, nontender/nondistended  	: No suprapubic tenderness  	UE:  no edema; L  upper AVF   	LE:  no edema      LABS/STUDIES  --------------------------------------------------------------------------------              8.5    4.67  >-----------<  154      [11-12-18 @ 06:45]              27.5     139  |  97  |  24  ----------------------------<  84      [11-12-18 @ 06:45]  3.9   |  27  |  3.96        Ca     8.6     [11-12-18 @ 06:45]            Iron 38, TIBC 130, %sat --      [11-09-18 @ 05:15]  Ferritin 701.9      [11-09-18 @ 05:15]  .3 (Ca --)      [11-05-18 @ 06:35]   --  Vitamin D (25OH) 14.8      [11-03-18 @ 05:10]  HbA1c 5.5      [10-31-18 @ 06:25]  TSH 16.55      [11-02-18 @ 05:50]  Lipid: chol 92, , HDL 35, LDL 39      [10-31-18 @ 06:25]    HBsAb 10.8      [10-19-18 @ 21:45]  HBsAg NEGATIVE      [10-19-18 @ 21:45]  HCV 0.13, Nonreactive Hepatitis C AB  S/CO Ratio                        Interpretation  < 1.0                                     Non-Reactive  1.0 - 4.9                           Weakly-Reactive  > 5.0                                 Reactive  Non-Reactive: Aperson with a non-reactive HCV antibody  result is considered uninfected.  No further action is  needed unless recent infection is suspected.  In these  cases, consider repeat testing later to detect  seroconversion..  Weakly-Reactive: HCV antibody test is abnormal, HCV RNA  Qualitative test will follow.  Reactive: HCV antibody test is abnormal, HCV RNA  Qualitative test will follow.  Note: HCV antibody testing is performed on the [employer]   system.      [10-19-18 @ 21:45]

## 2018-11-12 NOTE — CHART NOTE - NSCHARTNOTEFT_GEN_A_CORE
Kaofeed placement for moviprep.  Pt unable to consume drink on own, pt complaining of it making her nauseous.     -Kaofeed placement  -confirmation upon auscultation of air bolus   -stat xray to check confirmation  -enhanced supervision 2:1 - just until moviprep infusion complete

## 2018-11-12 NOTE — PROGRESS NOTE ADULT - PROBLEM SELECTOR PLAN 4
BP improved, on 7 different agents asymptomatic bradycardia on coreg-decreased to 12.5mg BID  Appreciate EP eval: No advanced AV blocks or persistent significant chelsea <40 bpm seen on telemetry, per EP if symptomatic chelsea or advanced AV block occurs, then would reduce Carvedilol dosage    -No acute pacing indication at present time  [id number] score of 6   NO AC given hx of multiple falls and positive FOBT.

## 2018-11-12 NOTE — PROGRESS NOTE ADULT - SUBJECTIVE AND OBJECTIVE BOX
Patient is a 72y old  Female who presents with a chief complaint of chest pain (12 Nov 2018 16:26)  CC: persistent nausea    SUBJECTIVE / OVERNIGHT EVENTS:  Pt seen earlier with relative at bedside, s/p HD.  Pt c/o persistent nausea, vomiting.  No chest pain, SOB.      MEDICATIONS  (STANDING):  amLODIPine   Tablet 10 milliGRAM(s) Oral daily  atorvastatin 10 milliGRAM(s) Oral at bedtime  bisacodyl 10 milliGRAM(s) Oral every 4 hours  calcium acetate 667 milliGRAM(s) Oral with lunch  carvedilol 12.5 milliGRAM(s) Oral every 12 hours  chlorhexidine 4% Liquid 1 Application(s) Topical <User Schedule>  cholecalciferol 1000 Unit(s) Oral daily  cloNIDine 0.3 milliGRAM(s) Oral every 8 hours  dicyclomine 10 milliGRAM(s) Oral three times a day before meals  docusate sodium 100 milliGRAM(s) Oral three times a day  epoetin iam Injectable 4000 Unit(s) IV Push <User Schedule>  hydrALAZINE 100 milliGRAM(s) Oral every 8 hours  isosorbide   mononitrate ER Tablet (IMDUR) 120 milliGRAM(s) Oral daily  levothyroxine 125 MICROGram(s) Oral daily  lisinopril 40 milliGRAM(s) Oral daily  minoxidil 5 milliGRAM(s) Oral two times a day  pantoprazole    Tablet 40 milliGRAM(s) Oral two times a day  polyethylene glycol 3350 17 Gram(s) Oral daily  polyethylene glycol/electrolyte Solution 1 Liter(s) Oral every 4 hours  senna 2 Tablet(s) Oral at bedtime  sertraline 50 milliGRAM(s) Oral daily  simethicone 80 milliGRAM(s) Chew every 6 hours  sodium chloride 0.9% lock flush 3 milliLiter(s) IV Push every 8 hours    MEDICATIONS  (PRN):  acetaminophen   Tablet .. 650 milliGRAM(s) Oral every 6 hours PRN Mild Pain (1 - 3), Moderate Pain (4 - 6), Severe Pain (7 - 10)  aluminum hydroxide/magnesium hydroxide/simethicone Suspension 30 milliLiter(s) Oral every 6 hours PRN Dyspepsia  clonazePAM Tablet 0.5 milliGRAM(s) Oral at bedtime PRN Anxiety  ondansetron Injectable 4 milliGRAM(s) IV Push every 6 hours PRN Nausea and/or Vomiting    I&O's Summary    12 Nov 2018 07:01  -  12 Nov 2018 17:23  --------------------------------------------------------  IN: 400 mL / OUT: 2400 mL / NET: -2000 mL    Vital Signs Last 24 Hrs  T(C): 36.7 (12 Nov 2018 12:40), Max: 36.8 (11 Nov 2018 17:37)  T(F): 98 (12 Nov 2018 12:40), Max: 98.2 (11 Nov 2018 17:37)  HR: 64 (12 Nov 2018 12:40) (60 - 65)  BP: 160/62 (12 Nov 2018 12:40) (134/50 - 172/54)  RR: 17 (12 Nov 2018 12:40) (16 - 18)  SpO2: 99% (12 Nov 2018 12:40) (99% - 100%)    PHYSICAL EXAM:  GENERAL: very thin, frail F, mild distress d/t nausea  HEAD:  Atraumatic, Normocephalic  EYES: EOMI, PERRLA, conjunctiva and sclera clear  NECK: Supple, No JVD  CHEST/LUNG: Clear to auscultation bilaterally; No wheeze  HEART: Regular rate and rhythm; No murmurs, rubs, or gallops  ABDOMEN: Soft, Nontender, Nondistended; Bowel sounds present  EXTREMITIES:  2+ Peripheral Pulses, No clubbing, cyanosis, or edema  PSYCH: alert, appropriate  NEUROLOGY: non-focal  SKIN: No rashes or lesions  LUE AVF +bruit/thrill    LABS:               8.5    4.67  )-----------( 154      ( 12 Nov 2018 06:45 )             27.5     139  |  97<L>  |  24<H>  ----------------------------<  84  3.9   |  27  |  3.96<H>    Ca    8.6      12 Nov 2018 06:45    RADIOLOGY & ADDITIONAL TESTS:    Imaging Personally Reviewed:    Consultant(s) Notes Reviewed:  renal, GI    Care Discussed with Consultants/Other Providers: RN, SW, CM, ADS re overall care

## 2018-11-12 NOTE — PROGRESS NOTE ADULT - PROBLEM SELECTOR PLAN 1
Continues to have vomiting and abdominal pain, no acute findings on CT abdomen, patient w/ chronic abdominal pain, S/p BM after initiation of bowel regimen   -Seen by GI, started on bentyl for possible IBS?, possible EGD/colon (reportedly unrevealing recently) Continues to have vomiting and abdominal pain, no acute findings on CT abdomen, patient w/ chronic abdominal pain, S/p BM after initiation of bowel regimen   -Seen by GI, suspect d/t underlying diseases - c/w Bentyl, PPI, bowel regimen, antiemetics PRN;  possible EGD/colon (reportedly unrevealing recently)

## 2018-11-12 NOTE — PROGRESS NOTE ADULT - PROBLEM SELECTOR PLAN 2
patient hypothermia to 94 rectally on 10/31 now resolved , bradycardia improved , elevated rpt TSH with low FT4  10/31 Bcx NGTD  CT A/P unremarkable, CXR with mild pulm edema but no evidence of infection, small left infrahilar region opacity seen on CXR, on 11/3/18  will recommend repeating CXR in 1-2 weeks for resolution   Synthroid increased to 125mcg to be given at 6am on empty stomach for better absorption. c/w HD MWF per renal   anemia due to chronic kidney disease - cont SHAY per renal

## 2018-11-12 NOTE — PROGRESS NOTE ADULT - PROBLEM SELECTOR PLAN 8
cont levothyroxine-increase to 125mcg on 10/31 on empty stomach, unclear if was taking appropriately  Will need repeat in 3 weeks outpatient for additional titration patient hypothermia to 94 rectally on 10/31 now resolved , bradycardia improved , elevated rpt TSH with low FT4  10/31 Bcx NGTD  CT A/P unremarkable, CXR with mild pulm edema but no evidence of infection, small left infrahilar region opacity seen on CXR, on 11/3/18  will recommend repeating CXR in 1-2 weeks for resolution   Synthroid increased to 125mcg to be given at 6am on empty stomach for better absorption.

## 2018-11-12 NOTE — PROGRESS NOTE ADULT - PROBLEM SELECTOR PLAN 6
Cont Imdur unstable angina  Patient w/ frequent admissions in the past for similar presentations, complaining of pain in head/chest/abdomen. HST in 400s likely d/t renal failure, no significant delta change  EKG unchanged, troponin persistently 400s  cath 2017 minor luminal irreg, 20% prox LAD and prox circ H/H stable, no gross bleeding noted

## 2018-11-12 NOTE — PROGRESS NOTE ADULT - PROBLEM SELECTOR PLAN 1
- suspect in setting of ESRD/dCHF with Severe Pulmonary HTN as noted on TTE 7/2018  - CT Abd and AXR both unrevealing   - cont Bentyl TID   - protonix 40mg PO BID  - simethicone q6h x 5 days then prn  - cont senna/colace and miralax bid; dulcolax supp prn  - pain control prn/zofran prn   - cont HD per renal with fluid removal   - bowel prep ordered  - clear liquid diet today; NPO p MN for EGD/Colonoscopy; if unable to tolerate prep PO would place NGT to administer

## 2018-11-12 NOTE — PROGRESS NOTE ADULT - PROBLEM SELECTOR PLAN 5
asymptomatic bradycardia on coreg-decreased to 12.5mg BID  Appreciate EP eval: No advanced AV blocks or persistent significant chelsea <40 bpm seen on telemetry, per EP if symptomatic chelsea or advanced AV block occurs, then would reduce Carvedilol dosage    -No acute pacing indication at present time  [id number] score of 6   NO AC given hx of multiple falls and positive FOBT. Cont Imdur unstable angina  Patient w/ frequent admissions in the past for similar presentations, complaining of pain in head/chest/abdomen. HST in 400s likely d/t renal failure, no significant delta change  EKG unchanged, troponin persistently 400s  cath 2017 minor luminal irreg, 20% prox LAD and prox circ

## 2018-11-12 NOTE — PROGRESS NOTE ADULT - PROBLEM SELECTOR PROBLEM 1
Abdominal distension Nausea and vomiting, intractability of vomiting not specified, unspecified vomiting type

## 2018-11-12 NOTE — PROGRESS NOTE ADULT - PROBLEM SELECTOR PLAN 3
c/w HD MWF per renal   anemia due to chronic kidney disease - cont SHAY per renal BP improved, on 7 different agents

## 2018-11-12 NOTE — PROGRESS NOTE ADULT - SUBJECTIVE AND OBJECTIVE BOX
INTERVAL HPI/OVERNIGHT EVENTS:    (+) nausea with spitting up  (+) abdominal pain   continues to pass flatus and have bm     MEDICATIONS  (STANDING):  amLODIPine   Tablet 10 milliGRAM(s) Oral daily  atorvastatin 10 milliGRAM(s) Oral at bedtime  bisacodyl 10 milliGRAM(s) Oral every 4 hours  calcium acetate 667 milliGRAM(s) Oral with lunch  carvedilol 12.5 milliGRAM(s) Oral every 12 hours  chlorhexidine 4% Liquid 1 Application(s) Topical <User Schedule>  cholecalciferol 1000 Unit(s) Oral daily  cloNIDine 0.3 milliGRAM(s) Oral every 8 hours  dicyclomine 10 milliGRAM(s) Oral three times a day before meals  docusate sodium 100 milliGRAM(s) Oral three times a day  epoetin iam Injectable 4000 Unit(s) IV Push <User Schedule>  hydrALAZINE 100 milliGRAM(s) Oral every 8 hours  isosorbide   mononitrate ER Tablet (IMDUR) 120 milliGRAM(s) Oral daily  levothyroxine 125 MICROGram(s) Oral daily  lisinopril 40 milliGRAM(s) Oral daily  minoxidil 5 milliGRAM(s) Oral two times a day  pantoprazole    Tablet 40 milliGRAM(s) Oral two times a day  polyethylene glycol 3350 17 Gram(s) Oral daily  polyethylene glycol/electrolyte Solution 1 Liter(s) Oral every 4 hours  senna 2 Tablet(s) Oral at bedtime  sertraline 50 milliGRAM(s) Oral daily  simethicone 80 milliGRAM(s) Chew every 6 hours  sodium chloride 0.9% lock flush 3 milliLiter(s) IV Push every 8 hours    MEDICATIONS  (PRN):  acetaminophen   Tablet .. 650 milliGRAM(s) Oral every 6 hours PRN Mild Pain (1 - 3), Moderate Pain (4 - 6), Severe Pain (7 - 10)  aluminum hydroxide/magnesium hydroxide/simethicone Suspension 30 milliLiter(s) Oral every 6 hours PRN Dyspepsia  clonazePAM Tablet 0.5 milliGRAM(s) Oral at bedtime PRN Anxiety  ondansetron Injectable 4 milliGRAM(s) IV Push every 6 hours PRN Nausea and/or Vomiting      Allergies    Avelox (Unknown)  fish (Hives; Rash)  shellfish (Unknown)    Intolerances        Review of Systems:    General:  No wt loss, fevers, chills, night sweats, fatigue   Eyes:  Good vision, no reported pain  ENT:  No sore throat, pain, runny nose, dysphagia  CV:  No pain, palpitations, hypo/hypertension  Resp:  No dyspnea, cough, tachypnea, wheezing  GI:  No pain, No nausea, No vomiting, No diarrhea, No constipation, No weight loss, No fever, No pruritis, No rectal bleeding, No melena, No dysphagia  :  No pain, bleeding, incontinence, nocturia  Muscle:  No pain, weakness  Neuro:  No weakness, tingling, memory problems  Psych:  No fatigue, insomnia, mood problems, depression  Endocrine:  No polyuria, polydypsia, cold/heat intolerance  Heme:  No petechiae, ecchymosis, easy bruisability  Skin:  No rash, tattoos, scars, edema      Vital Signs Last 24 Hrs  T(C): 36.7 (12 Nov 2018 09:52), Max: 36.8 (11 Nov 2018 17:37)  T(F): 98.1 (12 Nov 2018 09:52), Max: 98.2 (11 Nov 2018 17:37)  HR: 65 (12 Nov 2018 09:52) (60 - 73)  BP: 162/69 (12 Nov 2018 09:52) (134/50 - 175/60)  BP(mean): --  RR: 16 (12 Nov 2018 09:52) (16 - 18)  SpO2: 100% (12 Nov 2018 05:28) (99% - 100%)    PHYSICAL EXAM:    Constitutional: NAD  HEENT: EOMI, throat clear  Neck: No LAD, supple  Respiratory: CTA and P  Cardiovascular: S1 and S2, RRR, no M  Gastrointestinal: BS+, soft, NT/ND, neg HSM,  Extremities: No peripheral edema, neg clubbing, cyanosis  Vascular: 2+ peripheral pulses  Neurological: A/O x 3, no focal deficits  Psychiatric: Normal mood, normal affect  Skin: No rashes      LABS:                        8.5    4.67  )-----------( 154      ( 12 Nov 2018 06:45 )             27.5     11-12    139  |  97<L>  |  24<H>  ----------------------------<  84  3.9   |  27  |  3.96<H>    Ca    8.6      12 Nov 2018 06:45            RADIOLOGY & ADDITIONAL TESTS:

## 2018-11-13 ENCOUNTER — RESULT REVIEW (OUTPATIENT)
Age: 73
End: 2018-11-13

## 2018-11-13 LAB
BASOPHILS # BLD AUTO: 0.03 K/UL — SIGNIFICANT CHANGE UP (ref 0–0.2)
BASOPHILS NFR BLD AUTO: 0.5 % — SIGNIFICANT CHANGE UP (ref 0–2)
BUN SERPL-MCNC: 13 MG/DL — SIGNIFICANT CHANGE UP (ref 7–23)
CALCIUM SERPL-MCNC: 9.1 MG/DL — SIGNIFICANT CHANGE UP (ref 8.4–10.5)
CHLORIDE SERPL-SCNC: 100 MMOL/L — SIGNIFICANT CHANGE UP (ref 98–107)
CK MB BLD-MCNC: 3.41 NG/ML — SIGNIFICANT CHANGE UP (ref 1–4.7)
CK SERPL-CCNC: 53 U/L — SIGNIFICANT CHANGE UP (ref 25–170)
CO2 SERPL-SCNC: 25 MMOL/L — SIGNIFICANT CHANGE UP (ref 22–31)
CREAT SERPL-MCNC: 2.55 MG/DL — HIGH (ref 0.5–1.3)
EOSINOPHIL # BLD AUTO: 0.13 K/UL — SIGNIFICANT CHANGE UP (ref 0–0.5)
EOSINOPHIL NFR BLD AUTO: 2.1 % — SIGNIFICANT CHANGE UP (ref 0–6)
GLUCOSE BLDC GLUCOMTR-MCNC: 82 MG/DL — SIGNIFICANT CHANGE UP (ref 70–99)
GLUCOSE SERPL-MCNC: 76 MG/DL — SIGNIFICANT CHANGE UP (ref 70–99)
HCT VFR BLD CALC: 31.4 % — LOW (ref 34.5–45)
HGB BLD-MCNC: 9.7 G/DL — LOW (ref 11.5–15.5)
IMM GRANULOCYTES # BLD AUTO: 0.04 # — SIGNIFICANT CHANGE UP
IMM GRANULOCYTES NFR BLD AUTO: 0.7 % — SIGNIFICANT CHANGE UP (ref 0–1.5)
LYMPHOCYTES # BLD AUTO: 0.5 K/UL — LOW (ref 1–3.3)
LYMPHOCYTES # BLD AUTO: 8.3 % — LOW (ref 13–44)
MCHC RBC-ENTMCNC: 28.4 PG — SIGNIFICANT CHANGE UP (ref 27–34)
MCHC RBC-ENTMCNC: 30.9 % — LOW (ref 32–36)
MCV RBC AUTO: 92.1 FL — SIGNIFICANT CHANGE UP (ref 80–100)
MONOCYTES # BLD AUTO: 0.48 K/UL — SIGNIFICANT CHANGE UP (ref 0–0.9)
MONOCYTES NFR BLD AUTO: 7.9 % — SIGNIFICANT CHANGE UP (ref 2–14)
NEUTROPHILS # BLD AUTO: 4.88 K/UL — SIGNIFICANT CHANGE UP (ref 1.8–7.4)
NEUTROPHILS NFR BLD AUTO: 80.5 % — HIGH (ref 43–77)
NRBC # FLD: 0 — SIGNIFICANT CHANGE UP
PLATELET # BLD AUTO: 194 K/UL — SIGNIFICANT CHANGE UP (ref 150–400)
PMV BLD: 11.6 FL — SIGNIFICANT CHANGE UP (ref 7–13)
POTASSIUM SERPL-MCNC: 3.8 MMOL/L — SIGNIFICANT CHANGE UP (ref 3.5–5.3)
POTASSIUM SERPL-SCNC: 3.8 MMOL/L — SIGNIFICANT CHANGE UP (ref 3.5–5.3)
RBC # BLD: 3.41 M/UL — LOW (ref 3.8–5.2)
RBC # FLD: 17.6 % — HIGH (ref 10.3–14.5)
SODIUM SERPL-SCNC: 143 MMOL/L — SIGNIFICANT CHANGE UP (ref 135–145)
TROPONIN T, HIGH SENSITIVITY: 530 NG/L — CRITICAL HIGH (ref ?–14)
WBC # BLD: 6.06 K/UL — SIGNIFICANT CHANGE UP (ref 3.8–10.5)
WBC # FLD AUTO: 6.06 K/UL — SIGNIFICANT CHANGE UP (ref 3.8–10.5)

## 2018-11-13 PROCEDURE — 88305 TISSUE EXAM BY PATHOLOGIST: CPT | Mod: 26

## 2018-11-13 PROCEDURE — 93010 ELECTROCARDIOGRAM REPORT: CPT

## 2018-11-13 PROCEDURE — 88312 SPECIAL STAINS GROUP 1: CPT | Mod: 26

## 2018-11-13 PROCEDURE — 99233 SBSQ HOSP IP/OBS HIGH 50: CPT

## 2018-11-13 RX ORDER — SUCRALFATE 1 G
1 TABLET ORAL
Qty: 0 | Refills: 0 | Status: DISCONTINUED | OUTPATIENT
Start: 2018-11-13 | End: 2018-11-20

## 2018-11-13 RX ORDER — HYDRALAZINE HCL 50 MG
20 TABLET ORAL ONCE
Qty: 0 | Refills: 0 | Status: COMPLETED | OUTPATIENT
Start: 2018-11-13 | End: 2018-11-13

## 2018-11-13 RX ADMIN — SODIUM CHLORIDE 3 MILLILITER(S): 9 INJECTION INTRAMUSCULAR; INTRAVENOUS; SUBCUTANEOUS at 06:07

## 2018-11-13 RX ADMIN — Medication 20 MILLIGRAM(S): at 22:28

## 2018-11-13 RX ADMIN — ONDANSETRON 4 MILLIGRAM(S): 8 TABLET, FILM COATED ORAL at 09:15

## 2018-11-13 RX ADMIN — ONDANSETRON 4 MILLIGRAM(S): 8 TABLET, FILM COATED ORAL at 00:43

## 2018-11-13 RX ADMIN — SODIUM CHLORIDE 3 MILLILITER(S): 9 INJECTION INTRAMUSCULAR; INTRAVENOUS; SUBCUTANEOUS at 22:03

## 2018-11-13 RX ADMIN — Medication 100 MILLIGRAM(S): at 04:17

## 2018-11-13 RX ADMIN — AMLODIPINE BESYLATE 10 MILLIGRAM(S): 2.5 TABLET ORAL at 04:17

## 2018-11-13 RX ADMIN — CARVEDILOL PHOSPHATE 12.5 MILLIGRAM(S): 80 CAPSULE, EXTENDED RELEASE ORAL at 19:54

## 2018-11-13 RX ADMIN — Medication 5 MILLIGRAM(S): at 19:54

## 2018-11-13 RX ADMIN — CHLORHEXIDINE GLUCONATE 1 APPLICATION(S): 213 SOLUTION TOPICAL at 11:33

## 2018-11-13 NOTE — PROGRESS NOTE ADULT - ATTENDING COMMENTS
Spoke with pt's daughter Antonette Chang [phone], who lives in [place], earlier today.  Reviewed pt's overall condition, daughters concurs pt seems to be getting progressively worse.  Reports pt has had similar sx in past when bp meds adjusted.  Daughter very realistic re pt's overall poor prognosis, even asked if she may be Hospice appropriate; when asked about advance directives, she said he mother would want everything done but would like to try to get a team/family meeting together.  She will call her sister in [place] to try to coordinate.  I will reach out to PC to help with GOC and symptom management.

## 2018-11-13 NOTE — DIETITIAN INITIAL EVALUATION ADULT. - NS AS NUTRI INTERV MEALS SNACK
1. Recommend to ADAT to full liquids, consistent carbohydrate, renal diet. 2. Monitor weights, labs, BM's, skin integrity, NPO/clears status

## 2018-11-13 NOTE — DIETITIAN INITIAL EVALUATION ADULT. - PROBLEM SELECTOR PLAN 6
EP consult appreciated   Per EP: May continue current meds; if symptomatic chelsea or advanced AV block occurs, then would reduce Carvedilol dosage    -No acute pacing indication at present time

## 2018-11-13 NOTE — PROGRESS NOTE ADULT - PROBLEM SELECTOR PLAN 5
Cont Imdur unstable angina  Patient w/ frequent admissions in the past for similar presentations, complaining of pain in head/chest/abdomen. HST in 400s likely d/t renal failure, no significant delta change  EKG unchanged, troponin persistently 400s  cath 2017 minor luminal irreg, 20% prox LAD and prox circ

## 2018-11-13 NOTE — PROGRESS NOTE ADULT - PROBLEM SELECTOR PLAN 1
Continues to have vomiting and abdominal pain, no acute findings on CT abdomen, patient w/ chronic abdominal pain, S/p BM after initiation of bowel regimen   -Seen by GI, suspect d/t underlying diseases - c/w Bentyl, PPI, bowel regimen, antiemetics PRN  - pt evaluated by Dr. Tomas for CP, no felt to be active cardiac etiology, now s/p EGD/colonoscopy --> f/u results

## 2018-11-13 NOTE — CHART NOTE - NSCHARTNOTEFT_GEN_A_CORE
Cardiology consult called for cardiac clearance for EGD- DR Tomas aware of pt from previous admission-  Will await Cardiac clearance to proceed with EGD

## 2018-11-13 NOTE — DIETITIAN INITIAL EVALUATION ADULT. - PROBLEM SELECTOR PLAN 3
Chief Complaint: suicide attempt by overdose    Subjective     Patient is a 60year old male presenting to the [facility] [address] [facility] for suicide attempt by overdose  He took 30 tablets of 0 5mg Ativan and 20 tablets of 100mcg Synthroid  The patient was seen after reviewing the chart and discussing the case with caring staff  Today, the patient has no acute concerns  He denies active suicidal or homicidal ideations      Allergies   Allergen Reactions    Hydromorphone      Pt reported reaction to this med     Current Facility-Administered Medications on File Prior to Encounter   Medication    [DISCONTINUED] allopurinol (ZYLOPRIM) tablet 100 mg    [DISCONTINUED] ALPRAZolam Iris [place]) tablet 0 5 mg    [DISCONTINUED] levothyroxine tablet 100 mcg    [DISCONTINUED] lisinopril (ZESTRIL) tablet 5 mg     Current Outpatient Prescriptions on File Prior to Encounter   Medication Sig    allopurinol (ZYLOPRIM) 100 mg tablet Take 100 mg by mouth 2 (two) times a day    Ascorbic Acid (VITAMIN C) 100 MG CHEW Chew 100 mg daily    Cholecalciferol (VITAMIN D3) 1000 units CAPS Take 5,000 Units by mouth daily    cyanocobalamin (VITAMIN B-12) 100 mcg tablet Take 1,000 mcg by mouth daily    levothyroxine 100 mcg tablet Take 100 mcg by mouth daily    lisinopril (ZESTRIL) 5 mg tablet Take 5 mg by mouth daily     Active Ambulatory Problems     Diagnosis Date Noted    Allergic rhinitis 11/11/2013    Essential hypertension 05/31/2012    Excess skin of abdominal wall 08/17/2017    Depressive disorder 05/31/2012    Gout 05/31/2012    Hematuria, microscopic 09/12/2016    Hyperlipidemia 05/31/2012    Hypothyroidism 05/31/2012    Preauricular mass 09/12/2016    Prostate nodule 04/13/2017    Psoriasis and similar disorder 05/01/2013    Routine history and physical examination of adult 05/01/2013    Thrombocytopenia ([facility] [address] ) 08/17/2017    Tiredness 03/12/2018    Vitamin D deficiency 05/01/2013    Weight loss 2017    Overdose, intentional self-harm, subsequent encounter 2018     Resolved Ambulatory Problems     Diagnosis Date Noted    No Resolved Ambulatory Problems     Past Medical History:   Diagnosis Date    Anxiety     B12 deficiency     Depression     Disease of thyroid gland     Gout     Hypertension     Panic attack     Vitamin D deficiency      Past Surgical History:   Procedure Laterality Date    APPENDECTOMY      COSMETIC SURGERY      HERNIA REPAIR      JOINT REPLACEMENT       Patient further specifies that the "joint replacement" was a left hip repair during childhood  Hernia surgery was a right inguinal hernia repair  Cosmetic surgery was a "body lift" following extensive weight loss    Social History     Patient resides at a private residence with his room mate, Delfino Valiente, and Ronaldo's mother  He previously smoked "off and on" for 30 years, but quit 5 years ago  He will have on average 1-2 glasses of wine per day at dinner  He denies illicit drug use  Family History:  Mom, , [de-identified], Breast CA, Heart Disease s/p CABG, hypothyroidism, Type 2 Diabetes Mellitus  Dad, , 74y/o, Alzheimer's dementia  Brother, decreased, 69y/o, alcohol abuse, cardiac arrest  Maternal GM, , 81y/o, stroke  Maternal GF, decreased, 89y/o, no known PMH  Patient unsure of Paternal Grandparents PMH    Review of Systems   Musculoskeletal: Positive for arthralgias  Bilateral knee pain   All other systems reviewed and are negative        Physical Examination:    /79 (BP Location: Right arm)   Pulse 75   Temp (!) 96 6 °F (35 9 °C) (Tympanic)   Resp 18   Ht 6' 2" (1 88 m)   Wt 89 7 kg (197 lb 12 8 oz)   SpO2 97%   BMI 25 40 kg/m²     General Appearance:    Alert, cooperative, no distress, appears stated age   Head:    Normocephalic, without obvious abnormality, atraumatic   Eyes:    PERRL, conjunctiva/corneas clear, EOM's intact, fundi     benign, both eyes        Ears: Normal TM's and external ear canals, both ears       Throat:   Lips, mucosa, and tongue normal; teeth and gums normal   Neck:   Supple, symmetrical, trachea midline, no adenopathy;        thyroid:  No enlargement/tenderness/nodules; no carotid    bruit or JVD   Back:     Symmetric, no curvature, ROM normal, no CVA tenderness   Lungs:     Clear to auscultation bilaterally, respirations unlabored   Chest wall:    No tenderness or deformity   Heart:    Regular rate and rhythm, S1 and S2 normal, no murmur, rub    or gallop   Abdomen:     Soft, non-tender, bowel sounds active all four quadrants,     no masses, no organomegaly           Extremities:   Extremities normal, atraumatic, no cyanosis or edema       Skin:   Skin color, texture, turgor normal, no rashes or lesions   Lymph nodes:   Cervical, supraclavicular, and axillary nodes normal   Neurologic:   CNII-XII intact  Normal strength, sensation and reflexes       Throughout       Assessment/Plan     Mr Cristin Colbert is a 60year old male with depression  1  Cardiac with history of HTN  Patient will continue on lisinopril 5mg for this  2  Gout  Patient will continue on allopurinol  3  History of Vitamin D Deficiency  Vitamin D level is pending  Patient is on Vitamin D3 1000U daily  4  History Vitamin B12 Deficiency  Vitamin B12 level is pending  Patient is on oral Vitamin B12 1000mcg daily  5  Hypothyroidism  Patient will continue on levothyroxine 100mcg daily  TSH is pending  6  DJD/OA of knees  Ibuprofen and Tramadol as needed  7  Depression  This will be managed by the psych team     Prognosis: Fair    Discharge Plan: in progress    Advanced Directives: I have discussed in detail the patient the advanced directives  The patient does have a POA and does have a living will  The POA is his friend Myles Finely  Number for her is not available at this time  Patient will try to obtain this from his cell phone   When inquiring if she would be able to bring this paperwork in, the patient states that it is unlikely  When discussing cardiac and pulmonary resuscitation efforts with the patient, the patient wishes to be a DNR  He states that this is on his living will, which again we do not have in our possession  Patient will remain as a Full Code for now, and I will attempt to talk to his POA about the DNR status once the number is available  I have spent more than 50 minutes gathering data, doing physical examination, and discussing advanced directives with the patient, which was witnessed by caring staff  The patient was discussed with Dr Susan Cavazos and he is in agreement with the above note  restart home meds  renal in AM for possible HD

## 2018-11-13 NOTE — PROGRESS NOTE ADULT - PROBLEM SELECTOR PLAN 4
asymptomatic bradycardia on coreg-decreased to 12.5mg BID  Appreciate EP eval: No advanced AV blocks or persistent significant chelsea <40 bpm seen on telemetry, per EP if symptomatic chelsea or advanced AV block occurs, then would reduce Carvedilol dosage    -No acute pacing indication at present time  [id number] score of 6   NO AC given hx of multiple falls and positive FOBT.

## 2018-11-13 NOTE — CHART NOTE - NSCHARTNOTEFT_GEN_A_CORE
NUTRITION SERVICES     Upon Nutritional Assessment by the Registered Dietitian your patient was determined to meet criteria/ has evidence of the following diagnosis/diagnoses:  [ ] Mild Protein Calorie Malnutrition   [ ] Moderate Protein Calorie Malnutrition   [x ] Severe Protein Calorie Malnutrition   [ ] Unspecified Protein Calorie Malnutrition   [x ] Underweight / BMI <19  [ ] Morbid Obesity / BMI >40    Findings as based on:  •  Comprehensive nutritional assessment and consultation    Please refer to Initial Dietitian Evaluation via documents section of [employer] EMR for further recommendations.

## 2018-11-13 NOTE — CONSULT NOTE ADULT - SUBJECTIVE AND OBJECTIVE BOX
CHIEF COMPLAINT:  nausea and Vomiting     HISTORY OF PRESENT ILLNESS:   72F h/o htn, anemia, pulm HTN, non obstructive CAD, depression, diastolic HF, ESRD on HD[MWF], pulmonary HTN, BIBEMS from [facility] for CP. Found to have hypertensive emergency with acute pulm edema complicated by hypothermia.  With persistent nausea, vomiting, abdominal pain and now being scheduled for EGD and colonoscopy.       PAST MEDICAL & SURGICAL HISTORY:  [place] (hard of hearing)  Cataracts, bilateral: left worse than right  Pleural thickening: s/p pleurodisis left vats 2014  Hip pain: left  LBP radiating to left leg  CHF (congestive heart failure): diagnosed 2-2014  MI (myocardial infarction): 2-2014   cardiac cath done [facility]  Diabetes: diagnosed 2004  no medications in 5 months since hemodialysis  Hypothyroidism  ESRD (end stage renal disease)  HTN (hypertension)  MI (myocardial infarction): may 2016  Pleural effusion: Left side - several times since March 2014  Hypothyroid  Anxiety  HTN (hypertension)  Hyperlipidemia  CHF (congestive heart failure)  CKD (chronic kidney disease)  Anemia  Diabetes: DM 2  Thoracotomy scar of left chest: 4-2014 ? pneumonia/ scarring  S/P myomectomy: abdominal age 52  AV fistula: left upper 8-2014 attempted 12-16-14          MEDICATIONS:  amLODIPine   Tablet 10 milliGRAM(s) Oral daily  carvedilol 12.5 milliGRAM(s) Oral every 12 hours  cloNIDine 0.3 milliGRAM(s) Oral every 8 hours  hydrALAZINE 100 milliGRAM(s) Oral every 8 hours  isosorbide   mononitrate ER Tablet (IMDUR) 120 milliGRAM(s) Oral daily  lisinopril 40 milliGRAM(s) Oral daily  minoxidil 5 milliGRAM(s) Oral two times a day        acetaminophen   Tablet .. 650 milliGRAM(s) Oral every 6 hours PRN  clonazePAM Tablet 0.5 milliGRAM(s) Oral at bedtime PRN  ondansetron Injectable 4 milliGRAM(s) IV Push every 6 hours PRN  sertraline 50 milliGRAM(s) Oral daily    aluminum hydroxide/magnesium hydroxide/simethicone Suspension 30 milliLiter(s) Oral every 6 hours PRN  dicyclomine 10 milliGRAM(s) Oral three times a day before meals  docusate sodium 100 milliGRAM(s) Oral three times a day  pantoprazole    Tablet 40 milliGRAM(s) Oral two times a day  polyethylene glycol 3350 17 Gram(s) Oral daily  senna 2 Tablet(s) Oral at bedtime    atorvastatin 10 milliGRAM(s) Oral at bedtime  levothyroxine 125 MICROGram(s) Oral daily    calcium acetate 667 milliGRAM(s) Oral with lunch  chlorhexidine 4% Liquid 1 Application(s) Topical <User Schedule>  cholecalciferol 1000 Unit(s) Oral daily  epoetin iam Injectable 4000 Unit(s) IV Push <User Schedule>  sodium chloride 0.9% lock flush 3 milliLiter(s) IV Push every 8 hours      FAMILY HISTORY:  No significant family history      SOCIAL HISTORY:    [ ] Non-smoker  [ ] Smoker  [ ] Alcohol    Allergies    Avelox (Unknown)  fish (Hives; Rash)  shellfish (Unknown)    Intolerances    	    REVIEW OF SYSTEMS:  CONSTITUTIONAL: No fever, weight loss, + fatigue  EYES: No eye pain, visual disturbances, or discharge  ENMT:  No difficulty hearing, tinnitus, vertigo; No sinus or throat pain  NECK: No pain or stiffness  RESPIRATORY: No cough, wheezing, chills or hemoptysis; No Shortness of Breath  CARDIOVASCULAR: No chest pain, palpitations, passing out, dizziness, or leg swelling  GASTROINTESTINAL: + abdominal or epigastric pain. + nausea, vomiting, or hematemesis; No diarrhea or constipation. No melena or hematochezia.  GENITOURINARY: No dysuria, frequency, hematuria, or incontinence  NEUROLOGICAL: No headaches, memory loss, loss of strength, numbness, or tremors  SKIN: No itching, burning, rashes, or lesions   LYMPH Nodes: No enlarged glands  ENDOCRINE: No heat or cold intolerance; No hair loss  MUSCULOSKELETAL: + joint pain or swelling; No muscle, back, or extremity pain  PSYCHIATRIC: No depression, anxiety, mood swings, or difficulty sleeping  HEME/LYMPH: No easy bruising, or bleeding gums  ALLERY AND IMMUNOLOGIC: No hives or eczema	    [ ] All others negative	  [ ] Unable to obtain    PHYSICAL EXAM:  T(C): 36.4 (11-13-18 @ 11:11), Max: 37.2 (11-12-18 @ 20:05)  HR: 76 (11-13-18 @ 11:11) (65 - 78)  BP: 175/61 (11-13-18 @ 11:11) (142/93 - 194/63)  RR: 18 (11-13-18 @ 11:11) (16 - 18)  SpO2: 99% (11-13-18 @ 11:11) (99% - 100%)  Wt(kg): --  I&O's Summary    12 Nov 2018 07:01  -  13 Nov 2018 07:00  --------------------------------------------------------  IN: 400 mL / OUT: 2400 mL / NET: -2000 mL        Appearance: Vomiting 	  HEENT:   dry  oral mucosa, PERRL, EOMI	  Lymphatic: No lymphadenopathy  Cardiovascular: Normal S1 S2, No JVD, No murmurs, No edema  Respiratory: decreased bs and effort   Psychiatry: A & O x 3, lethargic   Gastrointestinal:  Soft, +tender, + BS	  Skin: No rashes, No ecchymoses, No cyanosis	  Neurologic: Non-focal  Extremities: Normal range of motion, No clubbing, cyanosis or edema  Vascular: Peripheral pulses palpable 2+ bilaterally    TELEMETRY: 	      < from: Transthoracic Echocardiogram (07.23.18 @ 10:43) >    Patient name: ANGEL LECHUGA  YOB: 1945   Age: 72 (F)   MR#: [id number]  Study Date: 7/23/2018  [facility]  Study quality: Technically fair  Referring Physician: Rajendra Carter MD  Blood Pressure: 163/60 mmHg  Height: 152 cm  Weight: 45 kg  BSA: 1.4 m2  ------------------------------------------------------------------------  PROCEDURE: Transthoracic echocardiogram with 2-D, M-Mode  and complete spectral and color flow Doppler.  INDICATION: Abnormal electrocardiogram (ECG) (EKG) (R94.31)  ------------------------------------------------------------------------  Dimensions:    Normal Values:  LA:     3.4    2.0 - 4.0 cm  Ao:     2.7    2.0 - 3.8 cm  SEPTUM: 1.2    0.6 - 1.2 cm  PWT:    1.2    0.6 - 1.1 cm  LVIDd:  4.4    3.0 - 5.6 cm  LVIDs:  3.2    1.8 - 4.0 cm  Derived variables:  LVMI: 139 g/m2  RWT: 0.54  Fractional short: 27 %  EF (Collins Rule): 56 %Doppler Peak Velocity (m/sec):  AoV=1.3  ------------------------------------------------------------------------  Observations:  Mitral Valve: Mitral annular calcification, otherwise  normal mitral valve. Mild mitral regurgitation.  Aortic Valve/Aorta: Calcified trileaflet aortic valve with  normal opening. Peak transaortic valve gradient equals 7 mm  Hg, mean transaortic valve gradient equals 3 mm Hg, aortic  valve velocity time integral equals 34 cm. Mild aortic  regurgitation.  Peak left ventricular outflow tract  gradient equals 3 mm Hg, mean gradient is equal to 1 mm Hg,  LVOT velocity time integral equals 21 cm.  Aortic Root: 2.7 cm.  LVOT diameter: 1.7 cm.  Left Atrium: Normal left atrium.  LA volume index = 32  cc/m2.  Left Ventricle: Normal left ventricular systolic function.  Septal motion consistent with right ventricular overload.  Moderate concentric left ventricular hypertrophy. Severe  diastolic dysfunction (Stage III).  Right Heart: Right atrial enlargement. Right ventricular  enlargement with normal right ventricular systolic  function. Normal tricuspid valve. Mild tricuspid  regurgitation. Normal pulmonic valve. Minimal pulmonic  regurgitation.  Pericardium/Pleura: Normal pericardium with no pericardial  effusion.  Hemodynamic: Estimated right atrial pressure is 8 mm Hg.  Estimated right ventricular systolic pressure equals 66 mm  Hg, assuming right atrial pressure equals 8 mm Hg,  consistent with severe pulmonary hypertension.  ------------------------------------------------------------------------  Conclusions:  1. Moderate concentric left ventricular hypertrophy.  2. Normal left ventricular systolic function. Septal motion  consistent with right ventricular overload.  3. Severe diastolic dysfunction (Stage III).  4. Right atrial enlargement.  5. Right ventricular enlargement with normal right  ventricular systolic function.  6. Estimated pulmonary artery systolic pressure equals 66  mm Hg, assuming right atrial pressure equals 8 mm Hg,  consistent with severe pulmonary pressures.  *** Compared with echocardiogram of 6/15/2018, no  significant changes noted.  ------------------------------------------------------------------------  Confirmed on  7/23/2018 - 13:55:42 by Ezra Nicholas M.D.  ------------------------------------------------------------------------    < end of copied text >          ECG:  	NSR, Inferior infarct, no acute ischemic stt changes, RBBB  RADIOLOGY:  < from: CT Abdomen and Pelvis w/ IV Cont (10.30.18 @ 19:33) >    EXAM:  CT ABDOMEN AND PELVIS IC        PROCEDURE DATE:  Oct 30 2018         INTERPRETATION:  CLINICAL INFORMATION: Abdominal pain an distention. Last   bowel movement 2 days ago.    COMPARISON: CT 10/19/2018    PROCEDURE:   CT of the Abdomen and Pelvis was performed with intravenous contrast.   Intravenous contrast: 90 ml Omnipaque 350. 10 ml discarded.  Oral contrast: None.  Sagittal and coronal reformats were performed.    FINDINGS:    LOWER CHEST: Small right and trace left pleural effusions.    LIVER: Within normal limits.  BILE DUCTS: Normal caliber.  GALLBLADDER: Within normal limits.  SPLEEN: Within normal limits.  PANCREAS: Within normal limits.  ADRENALS: Within normal limits.  KIDNEYS/URETERS: Atrophic kidneys.    BLADDER: Within normal limits.  REPRODUCTIVE ORGANS: Status post hysterectomy. No adnexal mass.    BOWEL: No bowel obstruction. Colonic without diverticulitis. Appendix is   normal.  PERITONEUM: Small to moderate findings ascites without change.  VESSELS:  Atherosclerotic changes.  RETROPERITONEUM: No lymphadenopathy.    ABDOMINAL WALL: Anasarca.  BONES: Degenerative changes of the spine. Irregularity of the endplates   of L1 and L2 without change from recent prior imaging.    IMPRESSION: No acute pathology.                    NEO KING M.D., RADIOLOGY RESIDENT  This document has been electronically signed.  KECIA VERMA M.D., ATTENDING RADIOLOGIST  This document has been electronically signed. Oct 30 2018  8:29PM                  < end of copied text >    OTHER: 	  	  LABS:	 	    CARDIAC MARKERS:      CKMB: 3.41 ng/mL (11-13 @ 05:58)                              9.7    6.06  )-----------( 194      ( 13 Nov 2018 05:21 )             31.4     11-13    143  |  100  |  13  ----------------------------<  76  3.8   |  25  |  2.55<H>    Ca    9.1      13 Nov 2018 05:21      proBNP:   Lipid Profile:   HgA1c:   TSH:

## 2018-11-13 NOTE — DIETITIAN INITIAL EVALUATION ADULT. - PROBLEM SELECTOR PLAN 5
No acute findings on CT abdomen, patient w/ chronic abdominal pain, unclear as negative workup in the past and patient unable to provide further history. monitor for now.

## 2018-11-13 NOTE — DIETITIAN INITIAL EVALUATION ADULT. - PHYSICAL APPEARANCE
Significant muscle loss in temples, clavicles, shoulders, patellar region. Severe fat loss in buccal, and orbital regions./debilitated

## 2018-11-13 NOTE — DIETITIAN INITIAL EVALUATION ADULT. - OTHER INFO
Pt seen for Length Of Stay initial assessment. Pt is a 71 y/o F admitted from [facility] for chest pain. PMH includes HTN, anemia, pulmonary HTN, CAD, ESRD on HD 3x weekly. Pt is currently NPO for test. She has been on a clear liquid diet 1 day prior 2/2 intractable nausea, vomiting, and abdominal pain. Per pt's family, vomiting began on Monday where she has not been able to keep down anything. Pending EGD and colonoscopy per chart. Pt's admit weight was 126.3 pounds (11/2) and current weight is 94.5 pounds. Per previous RD note June 2018, UBW was 101-105 pounds. Per family, she has noticed true weight loss over the past 2 weeks. Estimated 10% wt loss from UBW in 2 weeks.

## 2018-11-13 NOTE — DIETITIAN INITIAL EVALUATION ADULT. - PERTINENT MEDS FT
MEDICATIONS  (STANDING):  amLODIPine   Tablet 10 milliGRAM(s) Oral daily  atorvastatin 10 milliGRAM(s) Oral at bedtime  calcium acetate 667 milliGRAM(s) Oral with lunch  carvedilol 12.5 milliGRAM(s) Oral every 12 hours  chlorhexidine 4% Liquid 1 Application(s) Topical <User Schedule>  cholecalciferol 1000 Unit(s) Oral daily  cloNIDine 0.3 milliGRAM(s) Oral every 8 hours  dicyclomine 10 milliGRAM(s) Oral three times a day before meals  docusate sodium 100 milliGRAM(s) Oral three times a day  epoetin iam Injectable 4000 Unit(s) IV Push <User Schedule>  hydrALAZINE 100 milliGRAM(s) Oral every 8 hours  isosorbide   mononitrate ER Tablet (IMDUR) 120 milliGRAM(s) Oral daily  levothyroxine 125 MICROGram(s) Oral daily  lisinopril 40 milliGRAM(s) Oral daily  minoxidil 5 milliGRAM(s) Oral two times a day  pantoprazole    Tablet 40 milliGRAM(s) Oral two times a day  polyethylene glycol 3350 17 Gram(s) Oral daily  senna 2 Tablet(s) Oral at bedtime  sertraline 50 milliGRAM(s) Oral daily  sodium chloride 0.9% lock flush 3 milliLiter(s) IV Push every 8 hours

## 2018-11-13 NOTE — PROGRESS NOTE ADULT - SUBJECTIVE AND OBJECTIVE BOX
Patient is a 72y old  Female who presents with a chief complaint of chest pain (13 Nov 2018 12:53)    SUBJECTIVE / OVERNIGHT EVENTS:  Pt seen earlier, c/o chest pain this morning.  Hard to describe it, just sharp, much worse with palpation.  Pt's sister at bedside, said it seems worse after prep.  Persistent nausea.      MEDICATIONS  (STANDING):  amLODIPine   Tablet 10 milliGRAM(s) Oral daily  atorvastatin 10 milliGRAM(s) Oral at bedtime  calcium acetate 667 milliGRAM(s) Oral with lunch  carvedilol 12.5 milliGRAM(s) Oral every 12 hours  chlorhexidine 4% Liquid 1 Application(s) Topical <User Schedule>  cholecalciferol 1000 Unit(s) Oral daily  cloNIDine 0.3 milliGRAM(s) Oral every 8 hours  dicyclomine 10 milliGRAM(s) Oral three times a day before meals  docusate sodium 100 milliGRAM(s) Oral three times a day  epoetin iam Injectable 4000 Unit(s) IV Push <User Schedule>  hydrALAZINE 100 milliGRAM(s) Oral every 8 hours  isosorbide   mononitrate ER Tablet (IMDUR) 120 milliGRAM(s) Oral daily  levothyroxine 125 MICROGram(s) Oral daily  lisinopril 40 milliGRAM(s) Oral daily  minoxidil 5 milliGRAM(s) Oral two times a day  pantoprazole    Tablet 40 milliGRAM(s) Oral two times a day  polyethylene glycol 3350 17 Gram(s) Oral daily  senna 2 Tablet(s) Oral at bedtime  sertraline 50 milliGRAM(s) Oral daily  sodium chloride 0.9% lock flush 3 milliLiter(s) IV Push every 8 hours    MEDICATIONS  (PRN):  acetaminophen   Tablet .. 650 milliGRAM(s) Oral every 6 hours PRN Mild Pain (1 - 3), Moderate Pain (4 - 6), Severe Pain (7 - 10)  clonazePAM Tablet 0.5 milliGRAM(s) Oral at bedtime PRN Anxiety  ondansetron Injectable 4 milliGRAM(s) IV Push every 6 hours PRN Nausea and/or Vomiting    CAPILLARY BLOOD GLUCOSE  POCT Blood Glucose.: 82 mg/dL (13 Nov 2018 15:21)    I&O's Summary    12 Nov 2018 07:01  -  13 Nov 2018 07:00  --------------------------------------------------------  IN: 400 mL / OUT: 2400 mL / NET: -2000 mL    Vital Signs Last 24 Hrs  T(C): 36.4 (13 Nov 2018 11:11), Max: 37.2 (12 Nov 2018 20:05)  T(F): 97.5 (13 Nov 2018 11:11), Max: 98.9 (12 Nov 2018 20:05)  HR: 76 (13 Nov 2018 11:11) (65 - 78)  BP: 175/61 (13 Nov 2018 11:11) (142/93 - 194/63)  RR: 18 (13 Nov 2018 11:11) (16 - 18)  SpO2: 99% (13 Nov 2018 11:11) (99% - 100%)    PHYSICAL EXAM:  GENERAL: very thin, frail F, mild distress d/t nausea  HEAD:  Atraumatic, Normocephalic  EYES: EOMI, PERRLA, conjunctiva and sclera clear  NECK: Supple, No JVD  CHEST/LUNG: Clear to auscultation bilaterally; No wheeze  HEART: Regular rate and rhythm; No murmurs, rubs, or gallops  ABDOMEN: Soft, Nontender, Nondistended; Bowel sounds present  EXTREMITIES:  2+ Peripheral Pulses, No clubbing, cyanosis, or edema  PSYCH: alert, appropriate  NEUROLOGY: non-focal  SKIN: No rashes or lesions  LUE AVF +bruit/thrill    LABS:             9.7    6.06  )-----------( 194      ( 13 Nov 2018 05:21 )             31.4     143  |  100  |  13  ----------------------------<  76  3.8   |  25  |  2.55<H>    Ca    9.1      13 Nov 2018 05:21    CARDIAC MARKERS ( 13 Nov 2018 05:58 )  x     / x     / 53 u/L / 3.41 ng/mL / x        RADIOLOGY & ADDITIONAL TESTS:    Imaging Personally Reviewed:    Consultant(s) Notes Reviewed:  GI    Care Discussed with Consultants/Other Providers: RN, SW, CM, ADS, renal re overall care

## 2018-11-13 NOTE — CHART NOTE - NSCHARTNOTEFT_GEN_A_CORE
Called by RN. Pt c/o chest pain. Pt unable to describe the pain. EKG done without any changes.   -stat cardiac enzymes although low suspicion of ACS given her clinical presentation.     Allison LAFLEUR NP Called by RN. Pt c/o chest pain. Pt unable to describe the pain. EKG done without any changes.   -stat cardiac enzymes although low suspicion of ACS given her clinical presentation.   -troponins elevated   -sent message to Dr. Sofía Loyd ADS NP

## 2018-11-13 NOTE — DIETITIAN INITIAL EVALUATION ADULT. - ENERGY NEEDS
Ht: 60 in Wt: (11/12) 94.5 pounds BMI: 18.5   IBW: 100 pounds  Skin: No edema, no pressure injuries noted

## 2018-11-13 NOTE — DIETITIAN INITIAL EVALUATION ADULT. - PROBLEM SELECTOR PLAN 1
Patient w/ frequent admissions in the past for similar presentations, complaining of pain in head/chest/abdomen. F/u repeat troponin, f/u card, check cbc, bmp, a1c, flp, tsh, trend CE  cont isosorbide

## 2018-11-14 DIAGNOSIS — R54 AGE-RELATED PHYSICAL DEBILITY: ICD-10-CM

## 2018-11-14 DIAGNOSIS — Z51.5 ENCOUNTER FOR PALLIATIVE CARE: ICD-10-CM

## 2018-11-14 DIAGNOSIS — R11.2 NAUSEA WITH VOMITING, UNSPECIFIED: ICD-10-CM

## 2018-11-14 LAB
BUN SERPL-MCNC: 21 MG/DL — SIGNIFICANT CHANGE UP (ref 7–23)
CALCIUM SERPL-MCNC: 9.6 MG/DL — SIGNIFICANT CHANGE UP (ref 8.4–10.5)
CHLORIDE SERPL-SCNC: 96 MMOL/L — LOW (ref 98–107)
CO2 SERPL-SCNC: 21 MMOL/L — LOW (ref 22–31)
CREAT SERPL-MCNC: 3.39 MG/DL — HIGH (ref 0.5–1.3)
GLUCOSE SERPL-MCNC: 90 MG/DL — SIGNIFICANT CHANGE UP (ref 70–99)
HCT VFR BLD CALC: 33.7 % — LOW (ref 34.5–45)
HGB BLD-MCNC: 10.6 G/DL — LOW (ref 11.5–15.5)
MCHC RBC-ENTMCNC: 29 PG — SIGNIFICANT CHANGE UP (ref 27–34)
MCHC RBC-ENTMCNC: 31.5 % — LOW (ref 32–36)
MCV RBC AUTO: 92.3 FL — SIGNIFICANT CHANGE UP (ref 80–100)
NRBC # FLD: 0 — SIGNIFICANT CHANGE UP
PLATELET # BLD AUTO: 246 K/UL — SIGNIFICANT CHANGE UP (ref 150–400)
PMV BLD: 11.6 FL — SIGNIFICANT CHANGE UP (ref 7–13)
POTASSIUM SERPL-MCNC: 3.7 MMOL/L — SIGNIFICANT CHANGE UP (ref 3.5–5.3)
POTASSIUM SERPL-SCNC: 3.7 MMOL/L — SIGNIFICANT CHANGE UP (ref 3.5–5.3)
RBC # BLD: 3.65 M/UL — LOW (ref 3.8–5.2)
RBC # FLD: 18.2 % — HIGH (ref 10.3–14.5)
SODIUM SERPL-SCNC: 142 MMOL/L — SIGNIFICANT CHANGE UP (ref 135–145)
WBC # BLD: 8.13 K/UL — SIGNIFICANT CHANGE UP (ref 3.8–10.5)
WBC # FLD AUTO: 8.13 K/UL — SIGNIFICANT CHANGE UP (ref 3.8–10.5)

## 2018-11-14 PROCEDURE — 99233 SBSQ HOSP IP/OBS HIGH 50: CPT

## 2018-11-14 PROCEDURE — 90935 HEMODIALYSIS ONE EVALUATION: CPT | Mod: GC

## 2018-11-14 RX ORDER — METOCLOPRAMIDE HCL 10 MG
5 TABLET ORAL EVERY 12 HOURS
Qty: 0 | Refills: 0 | Status: DISCONTINUED | OUTPATIENT
Start: 2018-11-14 | End: 2018-11-14

## 2018-11-14 RX ORDER — METOCLOPRAMIDE HCL 10 MG
5 TABLET ORAL ONCE
Qty: 0 | Refills: 0 | Status: COMPLETED | OUTPATIENT
Start: 2018-11-14 | End: 2018-11-14

## 2018-11-14 RX ORDER — ONDANSETRON 8 MG/1
4 TABLET, FILM COATED ORAL EVERY 6 HOURS
Qty: 0 | Refills: 0 | Status: DISCONTINUED | OUTPATIENT
Start: 2018-11-14 | End: 2018-11-15

## 2018-11-14 RX ORDER — METOCLOPRAMIDE HCL 10 MG
5 TABLET ORAL EVERY 12 HOURS
Qty: 0 | Refills: 0 | Status: DISCONTINUED | OUTPATIENT
Start: 2018-11-14 | End: 2018-11-16

## 2018-11-14 RX ADMIN — ONDANSETRON 4 MILLIGRAM(S): 8 TABLET, FILM COATED ORAL at 00:28

## 2018-11-14 RX ADMIN — PANTOPRAZOLE SODIUM 40 MILLIGRAM(S): 20 TABLET, DELAYED RELEASE ORAL at 12:13

## 2018-11-14 RX ADMIN — Medication 100 MILLIGRAM(S): at 14:03

## 2018-11-14 RX ADMIN — Medication 10 MILLIGRAM(S): at 12:13

## 2018-11-14 RX ADMIN — Medication 5 MILLIGRAM(S): at 14:51

## 2018-11-14 RX ADMIN — ERYTHROPOIETIN 4000 UNIT(S): 10000 INJECTION, SOLUTION INTRAVENOUS; SUBCUTANEOUS at 08:57

## 2018-11-14 RX ADMIN — Medication 100 MILLIGRAM(S): at 21:15

## 2018-11-14 RX ADMIN — Medication 1000 UNIT(S): at 12:13

## 2018-11-14 RX ADMIN — Medication 0.3 MILLIGRAM(S): at 21:00

## 2018-11-14 RX ADMIN — Medication 125 MICROGRAM(S): at 12:12

## 2018-11-14 RX ADMIN — SODIUM CHLORIDE 3 MILLILITER(S): 9 INJECTION INTRAMUSCULAR; INTRAVENOUS; SUBCUTANEOUS at 21:15

## 2018-11-14 RX ADMIN — ATORVASTATIN CALCIUM 10 MILLIGRAM(S): 80 TABLET, FILM COATED ORAL at 21:15

## 2018-11-14 RX ADMIN — ISOSORBIDE MONONITRATE 120 MILLIGRAM(S): 60 TABLET, EXTENDED RELEASE ORAL at 12:13

## 2018-11-14 RX ADMIN — PANTOPRAZOLE SODIUM 40 MILLIGRAM(S): 20 TABLET, DELAYED RELEASE ORAL at 18:10

## 2018-11-14 RX ADMIN — SODIUM CHLORIDE 3 MILLILITER(S): 9 INJECTION INTRAMUSCULAR; INTRAVENOUS; SUBCUTANEOUS at 13:56

## 2018-11-14 RX ADMIN — Medication 1 GRAM(S): at 18:10

## 2018-11-14 RX ADMIN — Medication 667 MILLIGRAM(S): at 12:13

## 2018-11-14 RX ADMIN — Medication 10 MILLIGRAM(S): at 16:39

## 2018-11-14 RX ADMIN — Medication 1 GRAM(S): at 23:44

## 2018-11-14 RX ADMIN — CHLORHEXIDINE GLUCONATE 1 APPLICATION(S): 213 SOLUTION TOPICAL at 12:16

## 2018-11-14 RX ADMIN — POLYETHYLENE GLYCOL 3350 17 GRAM(S): 17 POWDER, FOR SOLUTION ORAL at 12:16

## 2018-11-14 RX ADMIN — ONDANSETRON 4 MILLIGRAM(S): 8 TABLET, FILM COATED ORAL at 23:44

## 2018-11-14 RX ADMIN — SENNA PLUS 2 TABLET(S): 8.6 TABLET ORAL at 21:15

## 2018-11-14 RX ADMIN — Medication 5 MILLIGRAM(S): at 18:10

## 2018-11-14 RX ADMIN — SODIUM CHLORIDE 3 MILLILITER(S): 9 INJECTION INTRAMUSCULAR; INTRAVENOUS; SUBCUTANEOUS at 05:24

## 2018-11-14 RX ADMIN — SERTRALINE 50 MILLIGRAM(S): 25 TABLET, FILM COATED ORAL at 12:13

## 2018-11-14 RX ADMIN — Medication 1 GRAM(S): at 12:13

## 2018-11-14 RX ADMIN — CARVEDILOL PHOSPHATE 12.5 MILLIGRAM(S): 80 CAPSULE, EXTENDED RELEASE ORAL at 18:10

## 2018-11-14 RX ADMIN — Medication 100 MILLIGRAM(S): at 21:00

## 2018-11-14 RX ADMIN — Medication 0.3 MILLIGRAM(S): at 14:00

## 2018-11-14 RX ADMIN — Medication 100 MILLIGRAM(S): at 14:00

## 2018-11-14 RX ADMIN — ONDANSETRON 4 MILLIGRAM(S): 8 TABLET, FILM COATED ORAL at 18:10

## 2018-11-14 NOTE — CONSULT NOTE ADULT - PROBLEM SELECTOR RECOMMENDATION 2
well compensated at present time
- avoid nephrotoxins   - HD per renal appreciated
ESRD on HD. Pt missed her last outpatient HD. Pt with chest x-ray findings of pulmonary edema. Labs reviewed. Plan for HD today.
Pt with uncontrolled nausea/vomiting. Unclear etiology. Would recommend atc Zofran, attempt PRN Reglan renally dosed. If no improvement of nausea/vomiting with reglan would recommend low dose haldol 0.25-0.5 mg IV q6hrs PRN for nausea/vomiting. Would avoid concomitant use of Reglan + haldol due to increased risk of dyskinesia. Please page for uncontrolled symptoms.

## 2018-11-14 NOTE — PROGRESS NOTE ADULT - PROBLEM SELECTOR PROBLEM 9
ESRD (end stage renal disease)
Need for prophylactic measure
ESRD (end stage renal disease)

## 2018-11-14 NOTE — CONSULT NOTE ADULT - SUBJECTIVE AND OBJECTIVE BOX
Consult conducted in pts primary language; Greek, shared by provider Dr. Jha.     HPI:  *****Patient unable to provide a subjective history due to underlying dementia, history obtained from medical records, ER************  72 F PMH type 2 DM, HTN, nonobstructive CAD, ESRD on HD TTS, syncope s/p ILR, frequent falls c/b prior nasal fracture and cervical spine fracture, Afib no a/c 2/2 recent fall with scalp laceration/hematoma, BIBEMS from [facility] for CP. Patient reports pain in the head, chest, and abdomen, unable to elaborate any further, answers any questions with, pain in the head, chest, and stomach. Discussed with ED providers, d/w family, this has been patient's baseline for the past month.     On admission: No distress noted. Repeat EKG ordered. Cardiac enzymes sent.     Tried to reach family for collateral, but unable to. (31 Oct 2018 05:24)    Palliative Care consulted for symptom management.     PERTINENT PM/SXH:   [place] (hard of hearing)  Cataracts, bilateral  Pleural thickening  Hip pain  LBP radiating to left leg  CHF (congestive heart failure)  MI (myocardial infarction)  Diabetes  Hypothyroidism  ESRD (end stage renal disease)  HTN (hypertension)  MI (myocardial infarction)  Pleural effusion  Hypothyroid  Anxiety  HTN (hypertension)  Hyperlipidemia  CHF (congestive heart failure)  CKD (chronic kidney disease)  Anemia  Diabetes    Thoracotomy scar of left chest  S/P myomectomy  AV fistula    SOCIAL HISTORY:   Significant other/partner:  [x ] YES  [ ] NO               Children:  [x ] YES  [ ] NO                   Voodoo/Spirituality:  Substance hx:  [ ] YES   [x ] NO                   Tobacco hx:  [ ] YES  [ x] NO                       Alcohol hx: [ ] YES  [x ] NO         Home Opioid hx:  [ ] YES  [x ] NO   Living Situation: [ ] Home  [ ] Long term care  [ x] Rehab [ ] Other  Pt was at rehab [facility], pt lives at home with . Has 3 adult children, 1 daughter lives in [place].  Has sister that traveled from [facility] to help take care of her.     FAMILY HISTORY:  No significant family history    [ ] Family history non-contributory     BASELINE (I)ADLs (prior to admission):  [place]: [ ] total  [ ] moderate [x ] dependent    ADVANCE DIRECTIVES:    Full code  MOLST  [ ] YES [ x] NO                      [ ] Completed  Health Care Proxy [ ] YES  [x ] NO   [ ] Completed  Living Will  [ ] YES [x ] NO             [x ] Surrogate  [ ] HCP  [ ] Guardian:                                                                    Boy Mullen () [phone]    Allergies    Avelox (Unknown)  fish (Hives; Rash)  shellfish (Unknown)    Intolerances        MEDICATIONS  (STANDING):  amLODIPine   Tablet 10 milliGRAM(s) Oral daily  atorvastatin 10 milliGRAM(s) Oral at bedtime  calcium acetate 667 milliGRAM(s) Oral with lunch  carvedilol 12.5 milliGRAM(s) Oral every 12 hours  chlorhexidine 4% Liquid 1 Application(s) Topical <User Schedule>  cholecalciferol 1000 Unit(s) Oral daily  cloNIDine 0.3 milliGRAM(s) Oral every 8 hours  dicyclomine 10 milliGRAM(s) Oral three times a day before meals  docusate sodium 100 milliGRAM(s) Oral three times a day  epoetin iam Injectable 4000 Unit(s) IV Push <User Schedule>  hydrALAZINE 100 milliGRAM(s) Oral every 8 hours  isosorbide   mononitrate ER Tablet (IMDUR) 120 milliGRAM(s) Oral daily  levothyroxine 125 MICROGram(s) Oral daily  lisinopril 40 milliGRAM(s) Oral daily  metoclopramide Injectable 5 milliGRAM(s) IV Push every 12 hours  minoxidil 5 milliGRAM(s) Oral two times a day  ondansetron Injectable 4 milliGRAM(s) IV Push every 6 hours  pantoprazole    Tablet 40 milliGRAM(s) Oral two times a day  polyethylene glycol 3350 17 Gram(s) Oral daily  senna 2 Tablet(s) Oral at bedtime  sertraline 50 milliGRAM(s) Oral daily  sodium chloride 0.9% lock flush 3 milliLiter(s) IV Push every 8 hours  sucralfate suspension 1 Gram(s) Oral four times a day    MEDICATIONS  (PRN):  acetaminophen   Tablet .. 650 milliGRAM(s) Oral every 6 hours PRN Mild Pain (1 - 3), Moderate Pain (4 - 6), Severe Pain (7 - 10)  clonazePAM Tablet 0.5 milliGRAM(s) Oral at bedtime PRN Anxiety      PRESENT SYMPTOMS:  Source: [ x] Patient   [ ] Family   [ ] Team     Pain:                        [x ] No [ ] Yes             [ ] Mild [ ] Moderate [ ] Severe    Onset -  Location -  Duration -  Character -  Alleviating/Aggravating -  Radiation -  Timing -      Dyspnea:                [x ] No [ ] Yes             [ ] Mild [ ] Moderate [ ] Severe    Anxiety:                  [x ] No [ ] Yes             [ ] Mild [ ] Moderate [ ] Severe    Fatigue:                  [ ] No [x ] Yes             [ ] Mild [x ] Moderate [ ] Severe    Nausea:                  [ ] No [ x] Yes             [ ] Mild [ ] Moderate [ x] Severe    Loss of appetite:   [ ] No [x ] Yes             [ ] Mild [x ] Moderate [ ] Severe    Constipation:        [x ] No [ ] Yes             [ ] Mild [ ] Moderate [ ] Severe    Other Symptoms:  [x ] All other review of systems negative   [ ] Unable to obtain due to poor mentation     Karnofsky Performance Score/Palliative Performance Status Version 2: 40 %    PHYSICAL EXAM:  Vital Signs Last 24 Hrs  T(C): 36.6 (14 Nov 2018 20:11), Max: 37 (14 Nov 2018 06:50)  T(F): 97.9 (14 Nov 2018 20:11), Max: 98.6 (14 Nov 2018 06:50)  HR: 79 (14 Nov 2018 20:11) (77 - 89)  BP: 168/73 (14 Nov 2018 20:11) (141/41 - 180/80)  BP(mean): --  RR: 18 (14 Nov 2018 20:11) (16 - 18)  SpO2: 100% (14 Nov 2018 20:11) (97% - 100%) I&O's Summary    14 Nov 2018 07:01  -  14 Nov 2018 20:34  --------------------------------------------------------  IN: 500 mL / OUT: 1576 mL / NET: -1076 mL        General:  [x ] Alert  [ x] Oriented x 3     [ ] Lethargic  [ ] Agitated   [ ] Cachexia   [ ] Unarousable  [x ] Verbal  [ ] Non-Verbal    HEENT:  [ x] Normal   [ ] Dry mouth   [ ] ET Tube    [ ] Trach  [ ] Oral lesions    Lungs:   [x ] Clear [ ] Tachypnea  [ ] Audible excessive secretions   [ ] Rhonchi        [ ] Right [ ] Left [ ] Bilateral  [ ] Crackles        [ ] Right [ ] Left [ ] Bilateral  [ ] Wheezing     [ ] Right [ ] Left [ ] Bilateral    Cardiovascular:  [x ] Regular [ ] Irregular [ ] Tachycardia   [ ] Bradycardia  [ ] Murmur [ ] Other    Abdomen: [x ] Soft  [ ] Distended   [ x] +BS  [ ] Non tender [ ] Tender  [ ]PEG   [ ]OGT/ NGT   Last BM:       Genitourinary: [ ] Normal [ ] Incontinent   [ x] Oliguria/Anuria   [ ] Mendez    Musculoskeletal:  [ ] Normal   [x ] Weakness  [ ] Bedbound/Wheelchair bound [ ] Edema    Neurological: [x ] No focal deficits  [ ] Cognitive impairment  [ ] Dysphagia [ ] Dysarthria [ ] Paresis [ ] Other     Skin: [ ] Normal   [ ] Pressure ulcer(s)                  [ ] Rash  Areas of ecchymosis in the face.     LABS:                        10.6   8.13  )-----------( 246      ( 14 Nov 2018 06:19 )             33.7     11-14    142  |  96<L>  |  21  ----------------------------<  90  3.7   |  21<L>  |  3.39<H>    Ca    9.6      14 Nov 2018 06:19    Shock: [ ] Septic [ ] Cardiogenic [ ] Neurologic [ ] Hypovolemic  Vasopressors x   Inotrophs x     Protein Calorie Malnutrition: [ ] Mild [x ] Moderate [ ] Severe    Oral Intake: [ ] Unable/mouth care only [ ] Minimal [ ] Moderate [x ] Full Capability  Diet: [ ] NPO [ ] Tube feeds [ ] TPN [ x] Other     RADIOLOGY & ADDITIONAL STUDIES: reviewed.     REFERRALS:   [ ] Chaplaincy  [ ] Hospice  [ ] Child Life  [ ] Social Work  [ ] Case management [ ] Holistic Therapy

## 2018-11-14 NOTE — PROGRESS NOTE ADULT - SUBJECTIVE AND OBJECTIVE BOX
Patient is a 72y old  Female who presents with a chief complaint of chest pain (14 Nov 2018 12:37)    SUBJECTIVE / OVERNIGHT EVENTS:  Daughter reports pt had a bad night, pain, nausea, emotional suffering.  Pt seen earlier after HD, pt said she was hungry, sister feeding pt jello.  Soon after vomited.    Given dose Reglan this afternoon and RN reports pt more comfortable, sitting up talking, trying PO.    MEDICATIONS  (STANDING):  amLODIPine   Tablet 10 milliGRAM(s) Oral daily  atorvastatin 10 milliGRAM(s) Oral at bedtime  calcium acetate 667 milliGRAM(s) Oral with lunch  carvedilol 12.5 milliGRAM(s) Oral every 12 hours  chlorhexidine 4% Liquid 1 Application(s) Topical <User Schedule>  cholecalciferol 1000 Unit(s) Oral daily  cloNIDine 0.3 milliGRAM(s) Oral every 8 hours  dicyclomine 10 milliGRAM(s) Oral three times a day before meals  docusate sodium 100 milliGRAM(s) Oral three times a day  epoetin iam Injectable 4000 Unit(s) IV Push <User Schedule>  hydrALAZINE 100 milliGRAM(s) Oral every 8 hours  isosorbide   mononitrate ER Tablet (IMDUR) 120 milliGRAM(s) Oral daily  levothyroxine 125 MICROGram(s) Oral daily  lisinopril 40 milliGRAM(s) Oral daily  metoclopramide Injectable 5 milliGRAM(s) IV Push every 12 hours  minoxidil 5 milliGRAM(s) Oral two times a day  ondansetron Injectable 4 milliGRAM(s) IV Push every 6 hours  pantoprazole    Tablet 40 milliGRAM(s) Oral two times a day  polyethylene glycol 3350 17 Gram(s) Oral daily  senna 2 Tablet(s) Oral at bedtime  sertraline 50 milliGRAM(s) Oral daily  sodium chloride 0.9% lock flush 3 milliLiter(s) IV Push every 8 hours  sucralfate suspension 1 Gram(s) Oral four times a day    MEDICATIONS  (PRN):  acetaminophen   Tablet .. 650 milliGRAM(s) Oral every 6 hours PRN Mild Pain (1 - 3), Moderate Pain (4 - 6), Severe Pain (7 - 10)  clonazePAM Tablet 0.5 milliGRAM(s) Oral at bedtime PRN Anxiety    I&O's Summary    14 Nov 2018 07:01  -  14 Nov 2018 16:19  --------------------------------------------------------  IN: 500 mL / OUT: 1576 mL / NET: -1076 mL    Vital Signs Last 24 Hrs  T(C): 36.5 (14 Nov 2018 13:44), Max: 37 (14 Nov 2018 06:50)  T(F): 97.7 (14 Nov 2018 13:44), Max: 98.6 (14 Nov 2018 06:50)  HR: 82 (14 Nov 2018 13:44) (78 - 89)  BP: 168/82 (14 Nov 2018 13:44) (141/41 - 209/53)  RR: 17 (14 Nov 2018 13:44) (16 - 18)  SpO2: 97% (14 Nov 2018 13:44) (97% - 100%)    PHYSICAL EXAM:  GENERAL: very thin, frail F, sister feeding her jello  HEAD:  Atraumatic, Normocephalic  EYES: EOMI, PERRLA, conjunctiva and sclera clear  NECK: Supple, No JVD  CHEST/LUNG: Clear to auscultation bilaterally; No wheeze  HEART: Regular rate and rhythm; No murmurs, rubs, or gallops  ABDOMEN: Soft, Nontender, Nondistended; Bowel sounds present  EXTREMITIES:  2+ Peripheral Pulses, No clubbing, cyanosis, or edema  PSYCH: alert, appropriate  NEUROLOGY: non-focal  SKIN: No rashes or lesions  LUE AVF +bruit/thrill    LABS:             10.6   8.13  )-----------( 246      ( 14 Nov 2018 06:19 )             33.7     142  |  96<L>  |  21  ----------------------------<  90  3.7   |  21<L>  |  3.39<H>    Ca    9.6      14 Nov 2018 06:19    CARDIAC MARKERS ( 13 Nov 2018 05:58 )  x     / x     / 53 u/L / 3.41 ng/mL / x        RADIOLOGY & ADDITIONAL TESTS:    Imaging Personally Reviewed:    Consultant(s) Notes Reviewed:      Care Discussed with Consultants/Other Providers: RN, SW, CM, ADS, renal, PC re overall care

## 2018-11-14 NOTE — PROGRESS NOTE ADULT - PROBLEM SELECTOR PLAN 1
Continues to have vomiting and abdominal pain, no acute findings on CT abdomen, patient w/ chronic abdominal pain, S/p BM after initiation of bowel regimen   -Seen by GI, suspect d/t underlying diseases - c/w Bentyl, PPI, bowel regimen (s/p bowel prep 11/12/scope 11/13  - d/w PC tried dose Reglan, seems to have worked, will added Reglan 5 IV q12 standing with hopes of changing to PO;  next alternative would be Haldol...

## 2018-11-14 NOTE — CONSULT NOTE ADULT - PROBLEM SELECTOR PROBLEM 2
CHF (congestive heart failure)
ESRD (end stage renal disease)
ESRD (end stage renal disease) on dialysis
Intractable vomiting with nausea, unspecified vomiting type

## 2018-11-14 NOTE — CONSULT NOTE ADULT - PROBLEM SELECTOR PROBLEM 1
Nausea and vomiting, intractability of vomiting not specified, unspecified vomiting type
Chronic abdominal pain
ESRD (end stage renal disease) on dialysis
Hypervolemia, unspecified hypervolemia type

## 2018-11-14 NOTE — PROGRESS NOTE ADULT - SUBJECTIVE AND OBJECTIVE BOX
Subjective: Patient seen and examined. No new events except as noted.   s/p EGD and Colonoscopy  resting in bed     REVIEW OF SYSTEMS:    CONSTITUTIONAL: +weakness, fevers or chills  EYES/ENT: No visual changes;  No vertigo or throat pain   NECK: No pain or stiffness  RESPIRATORY: No cough, wheezing, hemoptysis; No shortness of breath  CARDIOVASCULAR: No chest pain or palpitations  GASTROINTESTINAL: + abdominal or epigastric pain. No nausea, vomiting, or hematemesis; No diarrhea or constipation. No melena or hematochezia.  GENITOURINARY: No dysuria, frequency or hematuria  NEUROLOGICAL: No numbness or weakness  SKIN: No itching, burning, rashes, or lesions   All other review of systems is negative unless indicated above.    MEDICATIONS:  MEDICATIONS  (STANDING):  amLODIPine   Tablet 10 milliGRAM(s) Oral daily  atorvastatin 10 milliGRAM(s) Oral at bedtime  calcium acetate 667 milliGRAM(s) Oral with lunch  carvedilol 12.5 milliGRAM(s) Oral every 12 hours  chlorhexidine 4% Liquid 1 Application(s) Topical <User Schedule>  cholecalciferol 1000 Unit(s) Oral daily  cloNIDine 0.3 milliGRAM(s) Oral every 8 hours  dicyclomine 10 milliGRAM(s) Oral three times a day before meals  docusate sodium 100 milliGRAM(s) Oral three times a day  epoetin iam Injectable 4000 Unit(s) IV Push <User Schedule>  hydrALAZINE 100 milliGRAM(s) Oral every 8 hours  isosorbide   mononitrate ER Tablet (IMDUR) 120 milliGRAM(s) Oral daily  levothyroxine 125 MICROGram(s) Oral daily  lisinopril 40 milliGRAM(s) Oral daily  minoxidil 5 milliGRAM(s) Oral two times a day  pantoprazole    Tablet 40 milliGRAM(s) Oral two times a day  polyethylene glycol 3350 17 Gram(s) Oral daily  senna 2 Tablet(s) Oral at bedtime  sertraline 50 milliGRAM(s) Oral daily  sodium chloride 0.9% lock flush 3 milliLiter(s) IV Push every 8 hours  sucralfate suspension 1 Gram(s) Oral four times a day      PHYSICAL EXAM:  T(C): 37 (11-14-18 @ 06:50), Max: 37 (11-14-18 @ 06:50)  HR: 81 (11-14-18 @ 06:50) (78 - 86)  BP: 161/92 (11-14-18 @ 06:50) (160/60 - 209/53)  RR: 18 (11-14-18 @ 06:50) (16 - 18)  SpO2: 100% (11-14-18 @ 05:20) (100% - 100%)  Wt(kg): --  I&O's Summary        Appearance: NAD lethargic 	  HEENT:   Normal oral mucosa, PERRL, EOMI	  Lymphatic: No lymphadenopathy , no edema  Cardiovascular: Normal S1 S2, No JVD, No murmurs , Peripheral pulses palpable 2+ bilaterally  Respiratory: Lungs clear to auscultation, normal effort 	  Gastrointestinal:  Soft, +tender, + BS	  Skin: No rashes, No ecchymoses, No cyanosis, warm to touch  Musculoskeletal: Normal range of motion, normal strength  Psychiatry:  lethargic   Ext: No edema      LABS:    CARDIAC MARKERS:  CARDIAC MARKERS ( 13 Nov 2018 05:58 )  x     / x     / 53 u/L / 3.41 ng/mL / x                                    10.6   8.13  )-----------( 246      ( 14 Nov 2018 06:19 )             33.7     11-14    142  |  96<L>  |  21  ----------------------------<  90  3.7   |  21<L>  |  3.39<H>    Ca    9.6      14 Nov 2018 06:19      proBNP:   Lipid Profile:   HgA1c:   TSH:             TELEMETRY: 	    ECG:  	  RADIOLOGY:   DIAGNOSTIC TESTING:  [ ] Echocardiogram:  [ ]  Catheterization:  [ ] Stress Test:    OTHER: 	      < from: Upper Endoscopy (11.13.18 @ 14:40) >    [facility]  _______________________________________________________________________________  Patient Name: Lissy Mullen       Procedure Date: 11/13/2018 2:40 PM  MRN: [id number]                     Account Number: [id number]  YOB: 1945             Admit Type: Inpatient  Room: [place] [address]                         Gender: Female  Attending MD: GERMÁN BASHIR DO        _______________________________________________________________________________     Procedure:           Upper GI endoscopy  Indications:         Epigastric abdominal pain  Providers:           GERMÁN BASHIR DO  Medicines:           General Anesthesia  Complications:       No immediate complications.  Procedure:           After obtaining informed consent, the endoscope was                        passed under direct vision. Throughout the procedure,                        the patient's blood pressure, pulse, and oxygen                        saturations were monitored continuously. TheEndoscope                        was introduced through the mouth, and advanced to the                        second part of duodenum. The upper GI endoscopy was                        accomplished without difficulty. The patient tolerated               the procedure well.                                                                                   Findings:       The examined esophagus was normal.       Diffuse severe inflammation was found in the entire examined stomach.        Biopsies were taken with a cold forceps for histology. Estimated blood        loss: none.       The examined duodenum was normal.                                                                                   Impression:          - Normal esophagus.                       - Gastritis. Biopsied.                       - Normal examined duodenum.  Recommendation:      - Await pathology results.                       - Use Protonix (pantoprazole) 40 mg PO BID.                       - Use sucralfate suspension 1 gram PO QID.                       - Await pathology results.                       - Perform a colonoscopy.                                                                                   Attending Participation:       I personally performed the entire procedure.                                                                                     _________________  GERMÁN BASHIR DO  11/13/2018 4:15:18 PM  This report has been signed electronically.  Number of Addenda: 0    Note Initiated On: 11/13/2018 2:40 PM    < from: Colonoscopy (11.13.18 @ 14:39) >    [facility]  _______________________________________________________________________________  Patient Name: Lissy Mullen       Procedure Date: 11/13/2018 2:39 PM  MRN: [id number]                     Account Number: [id number]  YOB: 1945             Admit Type: Inpatient  Room: [place] [address]                         Gender: Female  Attending MD: GERMÁN BASHIR DO        _______________________________________________________________________________     Procedure:           Colonoscopy  Indications:         Epigastric abdominal pain  Providers:           GERMÁN BASHIR DO  Medicines:           Monitored Anesthesia Care  Complications:       No immediate complications.  Procedure:           After I obtainedinformed consent, the scope was passed                        under direct vision. Throughout the procedure, the                        patient's blood pressure, pulse, and oxygen saturations                        were monitored continuously. The Colonoscope was                        introduced through the anus and advanced to the cecum,                        identified by appendiceal orifice and ileocecal valve.                        The colonoscopy was performed without difficulty. The                     patient tolerated the procedure well. The quality of the                        bowel preparation was good.                                                                                   Findings:       A patchy area of mildly nodular mucosa was found in the cecum. Biopsies        were taken with a cold forceps for histology. Estimated blood loss: none.       Multiple medium-mouthed diverticula were found in the sigmoid colon and        in the descending colon.       Internalhemorrhoids were found during retroflexion. The hemorrhoids        were moderate.                                                                                   Impression:          - Nodular mucosa in the cecum. Biopsied.                       - Diverticulosis in the sigmoid colon and in the                        descending colon.                       - Internal hemorrhoids.  Recommendation:      - Return patient to [facility] for [facility] care.                       - Advance diet as tolerated.                       - Await pathology results.                                                                                   Attending Participation:       I personally performed the entire procedure.                                                 _________________  GERMÁN BASHIR DO  11/13/2018 4:17:03 PM  This report has been signed electronically.  Number of Addenda: 0    Note Initiated On: 11/13/2018 2:39 PM    < end of copied text >

## 2018-11-14 NOTE — CONSULT NOTE ADULT - PROBLEM SELECTOR RECOMMENDATION 3
maintaining sinus   NO AC due to anemia and high fall risk
- recent TTE notable for severe pulmonary htn  - monitor I&O's  - low sodium diet
PPSV 40%, dependent on most ADLs. Pt with significant decline in functional status. Given pts co-morbs high risk of further decline.  Pt has had multiple falls in the past.
Pt with elevated BP in the setting of hypervolemia. Plan for UF with HD.

## 2018-11-14 NOTE — PROGRESS NOTE ADULT - PROBLEM SELECTOR PLAN 1
- suspect in setting of ESRD/dCHF with Severe Pulmonary HTN as noted on TTE 7/2018  - CT Abd and AXR both unrevealing   - cont Bentyl TID   - protonix 40mg PO BID  - simethicone q6h x 5 days then prn  - cont senna/colace and miralax bid; dulcolax supp prn  - pain control prn/zofran prn   - cont HD per renal with fluid removal   - s/p EGD (11/13) with gastritis  - s/p Colon (11/13) w/Nodular mucosa in the cecum; pathology testing  - diet as tolerated

## 2018-11-14 NOTE — CONSULT NOTE ADULT - ASSESSMENT
71 yo female admitted with intractable nausea and vomiting
72 F PMH of L pleural thickening s/p pleurodisis with L VATS in 2014, MI, non-obstructive CAD, ESRD-HD, [place], DM, hypothyroidism, chronic diastolic HF, Severe pulm HTN, hypothyroidism, AFib (no a/c dt falls), Anemia and multiple falls ?syncope s/p ILR ([facility] )implantation on June 18 [facility]. GI consulted for abdominal pain
72 F PMH type 2 DM, HTN, nonobstructive CAD, ESRD on HD TTS, syncope s/p ILR, frequent falls c/b prior nasal fracture and cervical spine fracture, Afib no a/c 2/2 recent fall with scalp laceration/hematoma, B[facility]LILLIAM from [facility] for CP. Palliative Care consulted for symptom management.
73 yo F with PMHx of ESRD on HD TIW, non-obstructive CAD, diastolic heart failure, pulmonary hypertension, and HTN admitted for evaluation of chest pain.
72 year old  Frisian speaking female with a PMH of L pleural thickening s/p pleurodisis with L VATS in 2014, MI, non-obstructive CAD, ESRD-HD, [place], DM, hypothyroidism, chronic diastolic HF, HTN, hypothyroidism, anemia and multiple falls ?syncope s/p ILR ([facility] )implantation on June 18 [facility].  She was found bradycardic with HR down to low 40's when dozing off and up to 50's bpm when aroused.  Patient appeared asymptomatic with bradycardia on Carvedilol 25 mg BID and Clonidine 0.3 mg TID for HTN.  No advanced AV blocks or persistent significant chelsea <40 bpm seen on telemetry.    -May continue current meds; if symptomatic chelsea or advanced AV block occurs, then would reduce Carvediolol dosage    -No acute pacing indication at present time  -Continue care per primary team  -Will follow up

## 2018-11-14 NOTE — PROGRESS NOTE ADULT - SUBJECTIVE AND OBJECTIVE BOX
INTERVAL HPI/OVERNIGHT EVENTS:    tolerated egd/colon yesterday well   she has no new gi events; spitting up small amount of phlegm    MEDICATIONS  (STANDING):  amLODIPine   Tablet 10 milliGRAM(s) Oral daily  atorvastatin 10 milliGRAM(s) Oral at bedtime  calcium acetate 667 milliGRAM(s) Oral with lunch  carvedilol 12.5 milliGRAM(s) Oral every 12 hours  chlorhexidine 4% Liquid 1 Application(s) Topical <User Schedule>  cholecalciferol 1000 Unit(s) Oral daily  cloNIDine 0.3 milliGRAM(s) Oral every 8 hours  dicyclomine 10 milliGRAM(s) Oral three times a day before meals  docusate sodium 100 milliGRAM(s) Oral three times a day  epoetin iam Injectable 4000 Unit(s) IV Push <User Schedule>  hydrALAZINE 100 milliGRAM(s) Oral every 8 hours  isosorbide   mononitrate ER Tablet (IMDUR) 120 milliGRAM(s) Oral daily  levothyroxine 125 MICROGram(s) Oral daily  lisinopril 40 milliGRAM(s) Oral daily  minoxidil 5 milliGRAM(s) Oral two times a day  pantoprazole    Tablet 40 milliGRAM(s) Oral two times a day  polyethylene glycol 3350 17 Gram(s) Oral daily  senna 2 Tablet(s) Oral at bedtime  sertraline 50 milliGRAM(s) Oral daily  sodium chloride 0.9% lock flush 3 milliLiter(s) IV Push every 8 hours  sucralfate suspension 1 Gram(s) Oral four times a day    MEDICATIONS  (PRN):  acetaminophen   Tablet .. 650 milliGRAM(s) Oral every 6 hours PRN Mild Pain (1 - 3), Moderate Pain (4 - 6), Severe Pain (7 - 10)  clonazePAM Tablet 0.5 milliGRAM(s) Oral at bedtime PRN Anxiety  ondansetron Injectable 4 milliGRAM(s) IV Push every 6 hours PRN Nausea and/or Vomiting      Allergies    Avelox (Unknown)  fish (Hives; Rash)  shellfish (Unknown)    Intolerances        Review of Systems:    General:  No wt loss, fevers, chills, night sweats, fatigue   Eyes:  Good vision, no reported pain  ENT:  No sore throat, pain, runny nose, dysphagia  CV:  No pain, palpitations, hypo/hypertension  Resp:  No dyspnea, cough, tachypnea, wheezing  GI:  No pain, No nausea, No vomiting, No diarrhea, No constipation, No weight loss, No fever, No pruritis, No rectal bleeding, No melena, No dysphagia  :  No pain, bleeding, incontinence, nocturia  Muscle:  No pain, weakness  Neuro:  No weakness, tingling, memory problems  Psych:  No fatigue, insomnia, mood problems, depression  Endocrine:  No polyuria, polydypsia, cold/heat intolerance  Heme:  No petechiae, ecchymosis, easy bruisability  Skin:  No rash, tattoos, scars, edema      Vital Signs Last 24 Hrs  T(C): 36.7 (14 Nov 2018 11:24), Max: 37 (14 Nov 2018 06:50)  T(F): 98.1 (14 Nov 2018 11:24), Max: 98.6 (14 Nov 2018 06:50)  HR: 85 (14 Nov 2018 12:07) (78 - 89)  BP: 165/89 (14 Nov 2018 12:07) (160/60 - 209/53)  BP(mean): --  RR: 18 (14 Nov 2018 11:24) (16 - 18)  SpO2: 100% (14 Nov 2018 11:24) (100% - 100%)    PHYSICAL EXAM:    Constitutional: NAD  HEENT: EOMI, throat clear  Neck: No LAD, supple  Respiratory: CTA and P  Cardiovascular: S1 and S2, RRR, no M  Gastrointestinal: BS+, soft, NT/ND, neg HSM,  Extremities: No peripheral edema, neg clubbing, cyanosis  Vascular: 2+ peripheral pulses  Neurological: A/O x1-2, no focal deficits  Psychiatric: Normal mood, normal affect  Skin: No rashes      LABS:                        10.6   8.13  )-----------( 246      ( 14 Nov 2018 06:19 )             33.7     11-14    142  |  96<L>  |  21  ----------------------------<  90  3.7   |  21<L>  |  3.39<H>    Ca    9.6      14 Nov 2018 06:19            RADIOLOGY & ADDITIONAL TESTS:    < from: Upper Endoscopy (11.13.18 @ 14:40) >    [facility]  _______________________________________________________________________________  Patient Name: Lissy Mullen       Procedure Date: 11/13/2018 2:40 PM  MRN: [id number]                     Account Number: [id number]  YOB: 1945             Admit Type: Inpatient  Room: [place] [address]                         Gender: Female  Attending MD: GERMÁN BASHIR DO        _______________________________________________________________________________     Procedure:           Upper GI endoscopy  Indications:         Epigastric abdominal pain  Providers:           GERMÁN BASHIR DO  Medicines:           General Anesthesia  Complications:       No immediate complications.  Procedure:           After obtaining informed consent, the endoscope was                        passed under direct vision. Throughout the procedure,                        the patient's blood pressure, pulse, and oxygen                        saturations were monitored continuously. TheEndoscope                        was introduced through the mouth, and advanced to the                        second part of duodenum. The upper GI endoscopy was                        accomplished without difficulty. The patient tolerated               the procedure well.                                                                                   Findings:       The examined esophagus was normal.       Diffuse severe inflammation was found in the entire examined stomach.        Biopsies were taken with a cold forceps for histology. Estimated blood        loss: none.       The examined duodenum was normal.                                                                                   Impression:          - Normal esophagus.                       - Gastritis. Biopsied.                       - Normal examined duodenum.  Recommendation:      - Await pathology results.                       - Use Protonix (pantoprazole) 40 mg PO BID.                       - Use sucralfate suspension 1 gram PO QID.                       - Await pathology results.                       - Perform a colonoscopy.                                                                                   Attending Participation:       I personally performed the entire procedure.                                                                                     _________________  GERMÁN BASHIR DO  11/13/2018 4:15:18 PM  This report has been signed electronically.  Number of Addenda: 0    Note Initiated On: 11/13/2018 2:40 PM    < end of copied text >    < from: Colonoscopy (11.13.18 @ 14:39) >    [facility]  _______________________________________________________________________________  Patient Name: Lissy Mullen       Procedure Date: 11/13/2018 2:39 PM  MRN: [id number]                     Account Number: [id number]  YOB: 1945             Admit Type: Inpatient  Room: [place] [address]                         Gender: Female  Attending MD: GERMÁN BASHIR DO        _______________________________________________________________________________     Procedure:           Colonoscopy  Indications:         Epigastric abdominal pain  Providers:           GERMÁN BASHIR DO  Medicines:           Monitored Anesthesia Care  Complications:       No immediate complications.  Procedure:           After I obtainedinformed consent, the scope was passed                        under direct vision. Throughout the procedure, the                        patient's blood pressure, pulse, and oxygen saturations                        were monitored continuously. The Colonoscope was                        introduced through the anus and advanced to the cecum,                        identified by appendiceal orifice and ileocecal valve.                        The colonoscopy was performed without difficulty. The                     patient tolerated the procedure well. The quality of the                        bowel preparation was good.                                                                                   Findings:       A patchy area of mildly nodular mucosa was found in the cecum. Biopsies        were taken with a cold forceps for histology. Estimated blood loss: none.       Multiple medium-mouthed diverticula were found in the sigmoid colon and        in the descending colon.       Internalhemorrhoids were found during retroflexion. The hemorrhoids        were moderate.                                                                                   Impression:          - Nodular mucosa in the cecum. Biopsied.                       - Diverticulosis in the sigmoid colon and in the                        descending colon.                       - Internal hemorrhoids.  Recommendation:      - Return patient to [facility] for ongoing care.                       - Advance diet as tolerated.                       - Await pathology results.                                                                                   Attending Participation:       I personally performed the entire procedure.                                                 _________________  GERMÁN BASHIR DO  11/13/2018 4:17:03 PM  This report has been signed electronically.  Number of Addenda: 0    Note Initiated On: 11/13/2018 2:39 PM    < end of copied text >

## 2018-11-14 NOTE — PROGRESS NOTE ADULT - PROBLEM SELECTOR PLAN 4
asymptomatic bradycardia on coreg-decreased to 12.5mg BID  Appreciate EP eval: No advanced AV blocks or persistent significant chelsea <40 bpm seen on telemetry, per EP if symptomatic chelsea or advanced AV block occurs, then would reduce Carvedilol dosage    -No acute pacing indication at present time  [id number] score of 6   NO AC given hx of multiple falls and positive FOBT.

## 2018-11-14 NOTE — PROGRESS NOTE ADULT - PROBLEM SELECTOR PLAN 9
on HD  renal in AM For HD
on HD MWF    Outpatient HD reinstated
on HD MWF
on HD MWF
on HD MWF    Outpatient HD reinstated
on HD MWF    Will need to reinstate outpatient HD prior to d/c
scd

## 2018-11-14 NOTE — PROGRESS NOTE ADULT - SUBJECTIVE AND OBJECTIVE BOX
[facility] DIVISION OF KIDNEY DISEASES AND HYPERTENSION -- FOLLOW UP NOTE  --------------------------------------------------------------------------------        24 hour events/subjective: evaluated while at dialysis. on clears now. she admits to abdominal pain.         PAST HISTORY  --------------------------------------------------------------------------------  No significant changes to PMH, PSH, FHx, SHx, unless otherwise noted    ALLERGIES & MEDICATIONS  --------------------------------------------------------------------------------  Allergies    Avelox (Unknown)  fish (Hives; Rash)  shellfish (Unknown)    Intolerances      Standing Inpatient Medications  amLODIPine   Tablet 10 milliGRAM(s) Oral daily  atorvastatin 10 milliGRAM(s) Oral at bedtime  calcium acetate 667 milliGRAM(s) Oral with lunch  carvedilol 12.5 milliGRAM(s) Oral every 12 hours  chlorhexidine 4% Liquid 1 Application(s) Topical <User Schedule>  cholecalciferol 1000 Unit(s) Oral daily  cloNIDine 0.3 milliGRAM(s) Oral every 8 hours  dicyclomine 10 milliGRAM(s) Oral three times a day before meals  docusate sodium 100 milliGRAM(s) Oral three times a day  epoetin iam Injectable 4000 Unit(s) IV Push <User Schedule>  hydrALAZINE 100 milliGRAM(s) Oral every 8 hours  isosorbide   mononitrate ER Tablet (IMDUR) 120 milliGRAM(s) Oral daily  levothyroxine 125 MICROGram(s) Oral daily  lisinopril 40 milliGRAM(s) Oral daily  minoxidil 5 milliGRAM(s) Oral two times a day  pantoprazole    Tablet 40 milliGRAM(s) Oral two times a day  polyethylene glycol 3350 17 Gram(s) Oral daily  senna 2 Tablet(s) Oral at bedtime  sertraline 50 milliGRAM(s) Oral daily  sodium chloride 0.9% lock flush 3 milliLiter(s) IV Push every 8 hours  sucralfate suspension 1 Gram(s) Oral four times a day    PRN Inpatient Medications  acetaminophen   Tablet .. 650 milliGRAM(s) Oral every 6 hours PRN  clonazePAM Tablet 0.5 milliGRAM(s) Oral at bedtime PRN  ondansetron Injectable 4 milliGRAM(s) IV Push every 6 hours PRN      REVIEW OF SYSTEMS  --------------------------------------------------------------------------------  Gen: + weakness  Head/Eyes/Ears/Mouth: No headache  Respiratory: No dyspnea,   GI: + abdominal pain,  All other systems were reviewed and are negative, except as noted.    VITALS/PHYSICAL EXAM  --------------------------------------------------------------------------------  T(C): 37 (11-14-18 @ 06:50), Max: 37 (11-14-18 @ 06:50)  HR: 81 (11-14-18 @ 06:50) (76 - 86)  BP: 161/92 (11-14-18 @ 06:50) (160/60 - 209/53)  RR: 18 (11-14-18 @ 06:50) (16 - 18)  SpO2: 100% (11-14-18 @ 05:20) (99% - 100%)  Wt(kg): --        Physical Exam:  Gen: NAD,thin, frail   	HEENT: supple neck, NO JVD   	Pulm: CTA B/L  	CV: RRR, S1S2; no rub  	Abd: +BS, soft, tender/nondistended  	: No suprapubic tenderness  	UE:  no edema; L upper  AVF   	LE:  no edema  	Neuro: No focal deficits,                skin, ecchymosis at arms         	>>> <<<    LABS/STUDIES  --------------------------------------------------------------------------------              10.6   8.13  >-----------<  246      [11-14-18 @ 06:19]              33.7     142  |  96  |  21  ----------------------------<  90      [11-14-18 @ 06:19]  3.7   |  21  |  3.39        Ca     9.6     [11-14-18 @ 06:19]          CK 53      [11-13-18 @ 05:58]    Creatinine Trend:  SCr 3.39 [11-14 @ 06:19]  SCr 2.55 [11-13 @ 05:21]  SCr 3.96 [11-12 @ 06:45]  SCr 2.23 [11-10 @ 06:45]  SCr 3.16 [11-09 @ 05:15]    Sodium trend:        Iron 38, TIBC 130, %sat --      [11-09-18 @ 05:15]  Ferritin 701.9      [11-09-18 @ 05:15]  .3 (Ca --)      [11-05-18 @ 06:35]   --  Vitamin D (25OH) 14.8      [11-03-18 @ 05:10]  HbA1c 5.5      [10-31-18 @ 06:25]  TSH 16.55      [11-02-18 @ 05:50]  Lipid: chol 92, , HDL 35, LDL 39      [10-31-18 @ 06:25]    HBsAb 10.8      [10-19-18 @ 21:45]  HBsAg NEGATIVE      [10-19-18 @ 21:45]  HCV 0.13, Nonreactive Hepatitis C AB  S/CO Ratio                        Interpretation  < 1.0                                     Non-Reactive  1.0 - 4.9                           Weakly-Reactive  > 5.0                                 Reactive  Non-Reactive: [place] with a non-reactive HCV antibody  result is considered uninfected.  No further action is  needed unless recent infection is suspected.  In these  cases, consider repeat testing later to detect  seroconversion..  Weakly-Reactive: HCV antibody test is abnormal, HCV RNA  Qualitative test will follow.  Reactive: HCV antibody test is abnormal, HCV RNA  Qualitative test will follow.  Note: HCV antibody testing is performed on the [employer]   system.      [10-19-18 @ 21:45]

## 2018-11-14 NOTE — CONSULT NOTE ADULT - PROBLEM SELECTOR RECOMMENDATION 9
Pt on HD for the past 5-6 yrs. Not a candidate for renal transplant. Nephrology following, to continue with HD. Prior conversations with pt and family, want to continue with HD.

## 2018-11-14 NOTE — PROGRESS NOTE ADULT - ATTENDING COMMENTS
Spoke with pt's daughter Antonette Chang [phone], who lives in [place], earlier today.  Reviewed pt's overall condition, daughters concurs pt seems to be getting progressively worse.  Reports pt has had similar sx in past when bp meds adjusted.  Daughter very realistic re pt's overall poor prognosis, even asked if she may be Hospice appropriate; when asked about advance directives, she said he mother would want everything done but would like to try to get a team/family meeting together.  She will call her sister in [facility] to try to coordinate.  I will reach out to PC to help with GOC and symptom management.  --> f/u d/w Antonette Chang, she d/w her family in detail, goal is sx control, asking PC team help, aware withdrawal of dialysis option if sx not able to be adequately controlled.

## 2018-11-14 NOTE — CONSULT NOTE ADULT - CONSULT REASON
Cardiac Clearance
ESRD on HD
Symptom management.
abdominal pain
bradycardia with 1 st degree AV block

## 2018-11-14 NOTE — PROGRESS NOTE ADULT - PROBLEM SELECTOR PLAN 2
c/w HD MWF per renal   - codie N/V related to ESRD, aiming for better symptom control  anemia due to chronic kidney disease - cont SHAY per renal

## 2018-11-15 PROCEDURE — 99233 SBSQ HOSP IP/OBS HIGH 50: CPT

## 2018-11-15 RX ORDER — CLONAZEPAM 1 MG
0.5 TABLET ORAL AT BEDTIME
Qty: 0 | Refills: 0 | Status: DISCONTINUED | OUTPATIENT
Start: 2018-11-15 | End: 2018-11-20

## 2018-11-15 RX ORDER — HEPARIN SODIUM 5000 [USP'U]/ML
5000 INJECTION INTRAVENOUS; SUBCUTANEOUS EVERY 12 HOURS
Qty: 0 | Refills: 0 | Status: DISCONTINUED | OUTPATIENT
Start: 2018-11-15 | End: 2018-11-20

## 2018-11-15 RX ORDER — ONDANSETRON 8 MG/1
4 TABLET, FILM COATED ORAL EVERY 6 HOURS
Qty: 0 | Refills: 0 | Status: DISCONTINUED | OUTPATIENT
Start: 2018-11-15 | End: 2018-11-20

## 2018-11-15 RX ADMIN — AMLODIPINE BESYLATE 10 MILLIGRAM(S): 2.5 TABLET ORAL at 05:10

## 2018-11-15 RX ADMIN — Medication 0.3 MILLIGRAM(S): at 05:10

## 2018-11-15 RX ADMIN — Medication 10 MILLIGRAM(S): at 10:32

## 2018-11-15 RX ADMIN — SERTRALINE 50 MILLIGRAM(S): 25 TABLET, FILM COATED ORAL at 12:08

## 2018-11-15 RX ADMIN — Medication 100 MILLIGRAM(S): at 13:28

## 2018-11-15 RX ADMIN — Medication 5 MILLIGRAM(S): at 02:31

## 2018-11-15 RX ADMIN — Medication 100 MILLIGRAM(S): at 05:32

## 2018-11-15 RX ADMIN — PANTOPRAZOLE SODIUM 40 MILLIGRAM(S): 20 TABLET, DELAYED RELEASE ORAL at 05:32

## 2018-11-15 RX ADMIN — Medication 100 MILLIGRAM(S): at 05:10

## 2018-11-15 RX ADMIN — CHLORHEXIDINE GLUCONATE 1 APPLICATION(S): 213 SOLUTION TOPICAL at 12:08

## 2018-11-15 RX ADMIN — Medication 1 GRAM(S): at 05:33

## 2018-11-15 RX ADMIN — SODIUM CHLORIDE 3 MILLILITER(S): 9 INJECTION INTRAMUSCULAR; INTRAVENOUS; SUBCUTANEOUS at 13:27

## 2018-11-15 RX ADMIN — Medication 1 GRAM(S): at 12:08

## 2018-11-15 RX ADMIN — CARVEDILOL PHOSPHATE 12.5 MILLIGRAM(S): 80 CAPSULE, EXTENDED RELEASE ORAL at 17:59

## 2018-11-15 RX ADMIN — SODIUM CHLORIDE 3 MILLILITER(S): 9 INJECTION INTRAMUSCULAR; INTRAVENOUS; SUBCUTANEOUS at 05:34

## 2018-11-15 RX ADMIN — ONDANSETRON 4 MILLIGRAM(S): 8 TABLET, FILM COATED ORAL at 05:33

## 2018-11-15 RX ADMIN — Medication 1 GRAM(S): at 18:00

## 2018-11-15 RX ADMIN — Medication 10 MILLIGRAM(S): at 05:34

## 2018-11-15 RX ADMIN — Medication 125 MICROGRAM(S): at 05:34

## 2018-11-15 RX ADMIN — ONDANSETRON 4 MILLIGRAM(S): 8 TABLET, FILM COATED ORAL at 12:08

## 2018-11-15 RX ADMIN — CARVEDILOL PHOSPHATE 12.5 MILLIGRAM(S): 80 CAPSULE, EXTENDED RELEASE ORAL at 05:10

## 2018-11-15 RX ADMIN — Medication 100 MILLIGRAM(S): at 21:36

## 2018-11-15 RX ADMIN — POLYETHYLENE GLYCOL 3350 17 GRAM(S): 17 POWDER, FOR SOLUTION ORAL at 12:08

## 2018-11-15 RX ADMIN — Medication 0.3 MILLIGRAM(S): at 13:28

## 2018-11-15 RX ADMIN — SENNA PLUS 2 TABLET(S): 8.6 TABLET ORAL at 21:37

## 2018-11-15 RX ADMIN — HEPARIN SODIUM 5000 UNIT(S): 5000 INJECTION INTRAVENOUS; SUBCUTANEOUS at 18:00

## 2018-11-15 RX ADMIN — Medication 5 MILLIGRAM(S): at 13:28

## 2018-11-15 RX ADMIN — Medication 1000 UNIT(S): at 12:08

## 2018-11-15 RX ADMIN — Medication 0.3 MILLIGRAM(S): at 21:36

## 2018-11-15 RX ADMIN — LISINOPRIL 40 MILLIGRAM(S): 2.5 TABLET ORAL at 05:10

## 2018-11-15 RX ADMIN — PANTOPRAZOLE SODIUM 40 MILLIGRAM(S): 20 TABLET, DELAYED RELEASE ORAL at 17:59

## 2018-11-15 RX ADMIN — ISOSORBIDE MONONITRATE 120 MILLIGRAM(S): 60 TABLET, EXTENDED RELEASE ORAL at 12:08

## 2018-11-15 RX ADMIN — Medication 5 MILLIGRAM(S): at 17:59

## 2018-11-15 RX ADMIN — Medication 5 MILLIGRAM(S): at 05:10

## 2018-11-15 RX ADMIN — ATORVASTATIN CALCIUM 10 MILLIGRAM(S): 80 TABLET, FILM COATED ORAL at 21:37

## 2018-11-15 RX ADMIN — Medication 667 MILLIGRAM(S): at 12:08

## 2018-11-15 RX ADMIN — Medication 100 MILLIGRAM(S): at 13:27

## 2018-11-15 RX ADMIN — Medication 650 MILLIGRAM(S): at 11:30

## 2018-11-15 RX ADMIN — Medication 100 MILLIGRAM(S): at 21:35

## 2018-11-15 RX ADMIN — Medication 650 MILLIGRAM(S): at 10:31

## 2018-11-15 RX ADMIN — SODIUM CHLORIDE 3 MILLILITER(S): 9 INJECTION INTRAMUSCULAR; INTRAVENOUS; SUBCUTANEOUS at 21:37

## 2018-11-15 RX ADMIN — Medication 10 MILLIGRAM(S): at 16:11

## 2018-11-15 NOTE — PROGRESS NOTE ADULT - PROBLEM SELECTOR PLAN 4
asymptomatic bradycardia on coreg-decreased to 12.5mg BID  Appreciate EP eval: No advanced AV blocks or persistent significant chelsea <40 bpm seen on telemetry, per EP if symptomatic chelsea or advanced AV block occurs, then would reduce Carvedilol dosage    -No acute pacing indication at present time  [id number] score of 6   NO AC given hx of multiple falls and positive FOBT.

## 2018-11-15 NOTE — PROGRESS NOTE ADULT - PROBLEM SELECTOR PLAN 1
- suspect in setting of ESRD/dCHF with Severe Pulmonary HTN as noted on TTE 7/2018  - CT Abd and AXR both unrevealing   - cont Bentyl TID   - protonix 40mg PO BID  - simethicone q6h x 5 days then prn  - cont senna/colace and miralax bid; dulcolax supp prn  - pain control prn  - cont HD per renal with fluid removal   - s/p EGD (11/13) with gastritis  - s/p Colon (11/13) w/Nodular mucosa in the cecum; pathology testing  - diet as tolerated

## 2018-11-15 NOTE — PROGRESS NOTE ADULT - PROBLEM SELECTOR PLAN 4
Pt is full code. Surrogate is pts . Per prior discussions with primary team, pt and family want to pursue HD, full code. Palliative Care following for symptom management. At this time symptoms well controlled. Will sign off, reconsult as needed.

## 2018-11-15 NOTE — PROGRESS NOTE ADULT - PROBLEM SELECTOR PLAN 1
Continues to have vomiting and abdominal pain, no acute findings on CT abdomen, patient w/ chronic abdominal pain, S/p BM after initiation of bowel regimen   -Seen by GI, suspect d/t underlying diseases - c/w Bentyl, PPI, bowel regimen (s/p bowel prep 11/12/scope 11/13  - appreciate PC input - improved on Reglan, will change Zofran to PRN, change Reglan to PO and see is tolerates  next alternative would be Haldol...

## 2018-11-15 NOTE — PROGRESS NOTE ADULT - SUBJECTIVE AND OBJECTIVE BOX
Patient is a 72y old  Female who presents with a chief complaint of chest pain (15 Nov 2018 12:18)    SUBJECTIVE / OVERNIGHT EVENTS:  Pt's sister at bedside, said pt not been vomiting since yesterday, tolerating some PO.  But pt may be little confused, weak, sleeping a lot.    MEDICATIONS  (STANDING):  amLODIPine   Tablet 10 milliGRAM(s) Oral daily  atorvastatin 10 milliGRAM(s) Oral at bedtime  calcium acetate 667 milliGRAM(s) Oral with lunch  carvedilol 12.5 milliGRAM(s) Oral every 12 hours  chlorhexidine 4% Liquid 1 Application(s) Topical <User Schedule>  cholecalciferol 1000 Unit(s) Oral daily  cloNIDine 0.3 milliGRAM(s) Oral every 8 hours  dicyclomine 10 milliGRAM(s) Oral three times a day before meals  docusate sodium 100 milliGRAM(s) Oral three times a day  epoetin iam Injectable 4000 Unit(s) IV Push <User Schedule>  hydrALAZINE 100 milliGRAM(s) Oral every 8 hours  isosorbide   mononitrate ER Tablet (IMDUR) 120 milliGRAM(s) Oral daily  levothyroxine 125 MICROGram(s) Oral daily  lisinopril 40 milliGRAM(s) Oral daily  metoclopramide Injectable 5 milliGRAM(s) IV Push every 12 hours  minoxidil 5 milliGRAM(s) Oral two times a day  pantoprazole    Tablet 40 milliGRAM(s) Oral two times a day  polyethylene glycol 3350 17 Gram(s) Oral daily  senna 2 Tablet(s) Oral at bedtime  sertraline 50 milliGRAM(s) Oral daily  sodium chloride 0.9% lock flush 3 milliLiter(s) IV Push every 8 hours  sucralfate suspension 1 Gram(s) Oral four times a day    MEDICATIONS  (PRN):  acetaminophen   Tablet .. 650 milliGRAM(s) Oral every 6 hours PRN Mild Pain (1 - 3), Moderate Pain (4 - 6), Severe Pain (7 - 10)  clonazePAM Tablet 0.5 milliGRAM(s) Oral at bedtime PRN Anxiety  ondansetron Injectable 4 milliGRAM(s) IV Push every 6 hours PRN Nausea and/or Vomiting    I&O's Summary    14 Nov 2018 07:01  -  15 Nov 2018 07:00  --------------------------------------------------------  IN: 500 mL / OUT: 1576 mL / NET: -1076 mL    Vital Signs Last 24 Hrs  T(C): 36.9 (15 Nov 2018 13:21), Max: 37.1 (15 Nov 2018 04:10)  T(F): 98.4 (15 Nov 2018 13:21), Max: 98.7 (15 Nov 2018 04:10)  HR: 68 (15 Nov 2018 13:21) (68 - 86)  BP: 146/54 (15 Nov 2018 13:21) (146/54 - 172/78)  RR: 16 (15 Nov 2018 13:21) (16 - 18)  SpO2: 100% (15 Nov 2018 13:21) (100% - 100%)    PHYSICAL EXAM:  GENERAL: very thin, frail F, resting comfortably  HEAD:  Atraumatic, Normocephalic  EYES: EOMI, PERRLA, conjunctiva and sclera clear  NECK: Supple, No JVD  CHEST/LUNG: Clear to auscultation bilaterally; No wheeze  HEART: Regular rate and rhythm; No murmurs, rubs, or gallops  ABDOMEN: Soft, Nontender, Nondistended; Bowel sounds present  EXTREMITIES:  2+ Peripheral Pulses, No clubbing, cyanosis, or edema  PSYCH: alert, appropriate  NEUROLOGY: non-focal  SKIN: No rashes or lesions  LUE AVF +bruit/thrill    LABS:             10.6   8.13  )-----------( 246      ( 14 Nov 2018 06:19 )             33.7     142  |  96<L>  |  21  ----------------------------<  90  3.7   |  21<L>  |  3.39<H>    Ca    9.6      14 Nov 2018 06:19    RADIOLOGY & ADDITIONAL TESTS:    Imaging Personally Reviewed:    Consultant(s) Notes Reviewed:  renal    Care Discussed with Consultants/Other Providers: RN, SW, CM, ADS, PC re overall care

## 2018-11-15 NOTE — PROGRESS NOTE ADULT - PROBLEM SELECTOR PLAN 3
PPSV 40%, dependent on most ADLs. Pt with significant decline in functional status. Given pts co-morbs high risk of further decline.  Pt has had multiple falls in the past.

## 2018-11-15 NOTE — PROGRESS NOTE ADULT - ATTENDING COMMENTS
D/w daughter realizes pt is very weak and deconditioned, likely will not walk again, goals get her in WC, get her home with her family, who will help make arrangements to get her to HD.  Family declining rehab.

## 2018-11-15 NOTE — PROGRESS NOTE ADULT - SUBJECTIVE AND OBJECTIVE BOX
INTERVAL HPI/OVERNIGHT EVENTS:    Palliative Care Following for symptom management.     Allergies    Avelox (Unknown)  fish (Hives; Rash)  shellfish (Unknown)    Intolerances      ADVANCE DIRECTIVES:    Full code  MOLST [ ] YES [x ] NO     MEDICATIONS  (STANDING):  amLODIPine   Tablet 10 milliGRAM(s) Oral daily  atorvastatin 10 milliGRAM(s) Oral at bedtime  calcium acetate 667 milliGRAM(s) Oral with lunch  carvedilol 12.5 milliGRAM(s) Oral every 12 hours  chlorhexidine 4% Liquid 1 Application(s) Topical <User Schedule>  cholecalciferol 1000 Unit(s) Oral daily  cloNIDine 0.3 milliGRAM(s) Oral every 8 hours  dicyclomine 10 milliGRAM(s) Oral three times a day before meals  docusate sodium 100 milliGRAM(s) Oral three times a day  epoetin iam Injectable 4000 Unit(s) IV Push <User Schedule>  hydrALAZINE 100 milliGRAM(s) Oral every 8 hours  isosorbide   mononitrate ER Tablet (IMDUR) 120 milliGRAM(s) Oral daily  levothyroxine 125 MICROGram(s) Oral daily  lisinopril 40 milliGRAM(s) Oral daily  metoclopramide Injectable 5 milliGRAM(s) IV Push every 12 hours  minoxidil 5 milliGRAM(s) Oral two times a day  ondansetron Injectable 4 milliGRAM(s) IV Push every 6 hours  pantoprazole    Tablet 40 milliGRAM(s) Oral two times a day  polyethylene glycol 3350 17 Gram(s) Oral daily  senna 2 Tablet(s) Oral at bedtime  sertraline 50 milliGRAM(s) Oral daily  sodium chloride 0.9% lock flush 3 milliLiter(s) IV Push every 8 hours  sucralfate suspension 1 Gram(s) Oral four times a day    MEDICATIONS  (PRN):  acetaminophen   Tablet .. 650 milliGRAM(s) Oral every 6 hours PRN Mild Pain (1 - 3), Moderate Pain (4 - 6), Severe Pain (7 - 10)  clonazePAM Tablet 0.5 milliGRAM(s) Oral at bedtime PRN Anxiety      PRESENT SYMPTOMS:  Source: [ x] Patient   [ ] Family   [ ] Team     Pain:                        [ x] No [ ] Yes             [ ] Mild [ ] Moderate [ ] Severe    Onset -  Location -  Duration -  Character -  Alleviating/Aggravating -  Radiation -  Timing -    Dyspnea:                [x ] No [ ] Yes             [ ] Mild [ ] Moderate [ ] Severe    Anxiety:                  [ x] No [ ] Yes             [ ] Mild [ ] Moderate [ ] Severe    Fatigue:                  [ x] No [ ] Yes             [ ] Mild [ ] Moderate [ ] Severe    Nausea:                  [x ] No [ ] Yes             [ ] Mild [ ] Moderate [ ] Severe --> well controlled on current regimen.     Loss of appetite:   [x ] No [ ] Yes             [ ] Mild [ ] Moderate [ ] Severe    Constipation:        [ x] No [ ] Yes             [ ] Mild [ ] Moderate [ ] Severe    Other Symptoms:  [ ] All other review of systems negative   [ x] Unable to obtain due to poor mentation     Karnofsky Performance Score/Palliative Performance Status Version 2:  40 %    PHYSICAL EXAM:  Vital Signs Last 24 Hrs  T(C): 37 (15 Nov 2018 08:10), Max: 37.1 (15 Nov 2018 04:10)  T(F): 98.6 (15 Nov 2018 08:10), Max: 98.7 (15 Nov 2018 04:10)  HR: 78 (15 Nov 2018 08:10) (77 - 86)  BP: 158/78 (15 Nov 2018 08:10) (156/74 - 172/78)  BP(mean): --  RR: 16 (15 Nov 2018 08:10) (16 - 18)  SpO2: 100% (15 Nov 2018 08:10) (97% - 100%) I&O's Summary    14 Nov 2018 07:01  -  15 Nov 2018 07:00  --------------------------------------------------------  IN: 500 mL / OUT: 1576 mL / NET: -1076 mL    General:  [ x] Alert  [ x] Oriented x 3 [ ] Lethargic  [ ] Agitated   [ ] Cachexia   [ ] Unarousable  [x ] Verbal  [ ] Non-Verbal  Hard of hearing.     HEENT:  [ ] Normal   [x ] Dry mouth   [ ] ET Tube    [ ] Trach  [ ] Oral lesions    Lungs:   [x ] Clear [ ] Tachypnea  [ ] Audible excessive secretions   [ ] Rhonchi        [ ] Right [ ] Left [ ] Bilateral  [ ] Crackles        [ ] Right [ ] Left [ ] Bilateral  [ ] Wheezing     [ ] Right [ ] Left [ ] Bilateral    Cardiovascular:  [x ] Regular [ ] Irregular [ ] Tachycardia   [ ] Bradycardia  [ ] Murmur [ ] Other    Abdomen: [ ] Soft  [ ] Distended   [x ] +BS  [ ] Non tender [ ] Tender  [ ]PEG   [ ]OGT/ NGT   Last BM:  11/9    Genitourinary: [ ] Normal [ ] Incontinent   [x ] Oliguria/Anuria   [ ] Mendez    Musculoskeletal:  [ ] Normal   [x ] Weakness  [ ] Bedbound/Wheelchair bound [ ] Edema    Neurological: [x ] No focal deficits  [ ] Cognitive impairment  [ ] Dysphagia [ ] Dysarthria [ ] Paresis [ ] Other     Skin: [ ] Normal   [ ] Pressure ulcer(s)                  [ ] Rash  Ecchymosis in face, arms    LABS:                        10.6   8.13  )-----------( 246      ( 14 Nov 2018 06:19 )             33.7     11-14    142  |  96<L>  |  21  ----------------------------<  90  3.7   |  21<L>  |  3.39<H>    Ca    9.6      14 Nov 2018 06:19    Shock: [ ] Septic [ ] Cardiogenic [ ] Neurologic [ ] Hypovolemic  Vasopressors x   Inotrophs x     Oral Intake: [ ] Unable/mouth care only [ ] Minimal [ ] Moderate [ x] Full Capability    Diet: [ ] NPO [ ] Tube feeds [ ] TPN [x ] Other     RADIOLOGY & ADDITIONAL STUDIES:  reviewed.     REFERRALS:   [ ] Chaplaincy  [ ] Hospice  [ ] Child Life  [ ] Social Work  [ ] Case management [ ] Holistic Therapy

## 2018-11-15 NOTE — PROGRESS NOTE ADULT - PROBLEM SELECTOR PLAN 7
cont levothyroxine-increase to 125mcg on 10/31 on empty stomach, unclear if was taking appropriately --> f/u 4-6 weeks  elevated rpt TSH with low FT4

## 2018-11-15 NOTE — PROGRESS NOTE ADULT - SUBJECTIVE AND OBJECTIVE BOX
Subjective: Patient seen and examined. No new events except as noted.   feeling   neck pain   +nausea after eating    REVIEW OF SYSTEMS:    CONSTITUTIONAL: + weakness, fevers or chills  EYES/ENT: No visual changes;  No vertigo or throat pain   NECK: No pain or stiffness  RESPIRATORY: No cough, wheezing, hemoptysis; No shortness of breath  CARDIOVASCULAR: No chest pain or palpitations  GASTROINTESTINAL: +abdominal or epigastric pain. No nausea, vomiting, or hematemesis; No diarrhea or constipation. No melena or hematochezia.  GENITOURINARY: No dysuria, frequency or hematuria  NEUROLOGICAL: No numbness or weakness  SKIN: No itching, burning, rashes, or lesions   All other review of systems is negative unless indicated above.    MEDICATIONS:  MEDICATIONS  (STANDING):  amLODIPine   Tablet 10 milliGRAM(s) Oral daily  atorvastatin 10 milliGRAM(s) Oral at bedtime  calcium acetate 667 milliGRAM(s) Oral with lunch  carvedilol 12.5 milliGRAM(s) Oral every 12 hours  chlorhexidine 4% Liquid 1 Application(s) Topical <User Schedule>  cholecalciferol 1000 Unit(s) Oral daily  cloNIDine 0.3 milliGRAM(s) Oral every 8 hours  dicyclomine 10 milliGRAM(s) Oral three times a day before meals  docusate sodium 100 milliGRAM(s) Oral three times a day  epoetin iam Injectable 4000 Unit(s) IV Push <User Schedule>  hydrALAZINE 100 milliGRAM(s) Oral every 8 hours  isosorbide   mononitrate ER Tablet (IMDUR) 120 milliGRAM(s) Oral daily  levothyroxine 125 MICROGram(s) Oral daily  lisinopril 40 milliGRAM(s) Oral daily  metoclopramide Injectable 5 milliGRAM(s) IV Push every 12 hours  minoxidil 5 milliGRAM(s) Oral two times a day  ondansetron Injectable 4 milliGRAM(s) IV Push every 6 hours  pantoprazole    Tablet 40 milliGRAM(s) Oral two times a day  polyethylene glycol 3350 17 Gram(s) Oral daily  senna 2 Tablet(s) Oral at bedtime  sertraline 50 milliGRAM(s) Oral daily  sodium chloride 0.9% lock flush 3 milliLiter(s) IV Push every 8 hours  sucralfate suspension 1 Gram(s) Oral four times a day      PHYSICAL EXAM:  T(C): 37 (11-15-18 @ 08:10), Max: 37.1 (11-15-18 @ 04:10)  HR: 78 (11-15-18 @ 08:10) (77 - 86)  BP: 158/78 (11-15-18 @ 08:10) (156/74 - 172/78)  RR: 16 (11-15-18 @ 08:10) (16 - 18)  SpO2: 100% (11-15-18 @ 08:10) (97% - 100%)  Wt(kg): --  I&O's Summary    14 Nov 2018 07:01  -  15 Nov 2018 07:00  --------------------------------------------------------  IN: 500 mL / OUT: 1576 mL / NET: -1076 mL          Appearance: NAD	  HEENT:   Normal oral mucosa, PERRL, EOMI	  Lymphatic: No lymphadenopathy , no edema  Cardiovascular: Normal S1 S2, No JVD, No murmurs , Peripheral pulses palpable 2+ bilaterally  Respiratory: Lungs clear to auscultation, normal effort 	  Gastrointestinal:  Soft, Non-tender, + BS	  Skin: No rashes, No ecchymoses, No cyanosis, warm to touch  Musculoskeletal: Normal range of motion, normal strength  Psychiatry:  Mood & affect appropriate  Ext: No edema      LABS:    CARDIAC MARKERS:  CARDIAC MARKERS ( 13 Nov 2018 05:58 )  x     / x     / 53 u/L / 3.41 ng/mL / x                                    10.6   8.13  )-----------( 246      ( 14 Nov 2018 06:19 )             33.7     11-14    142  |  96<L>  |  21  ----------------------------<  90  3.7   |  21<L>  |  3.39<H>    Ca    9.6      14 Nov 2018 06:19      proBNP:   Lipid Profile:   HgA1c:   TSH:             TELEMETRY: 	    ECG:  	  RADIOLOGY:   DIAGNOSTIC TESTING:  [ ] Echocardiogram:  [ ]  Catheterization:  [ ] Stress Test:    OTHER:

## 2018-11-15 NOTE — PROGRESS NOTE ADULT - PROBLEM SELECTOR PLAN 5
Cont Imdur  Patient w/ frequent admissions in the past for similar presentations, complaining of pain in head/chest/abdomen. HST in 400s likely d/t renal failure, no significant delta change  EKG unchanged, troponin persistently 400s  cath 2017 minor luminal irreg, 20% prox LAD and prox circ

## 2018-11-15 NOTE — PROGRESS NOTE ADULT - PROBLEM SELECTOR PLAN 2
Nausea/vomiting controlled with current regimen. Unclear etiology. Would continue with Reglan as it worked well. Can attempt oral regimen of Reglan, renally dosed. If no improvement of nausea/vomiting with Reglan would recommend low dose haldol 0.25-0.5 mg IV q6hrs PRN for nausea/vomiting. Would avoid concomitant use of Reglan + haldol due to increased risk of dyskinesia. Please page for uncontrolled symptoms.

## 2018-11-15 NOTE — PROGRESS NOTE ADULT - PROBLEM SELECTOR PLAN 2
- s/p EGD (11/13) with gastritis  - palliative input appreciated; recommending Zofran ATC with Haldol prn

## 2018-11-15 NOTE — PROGRESS NOTE ADULT - SUBJECTIVE AND OBJECTIVE BOX
INTERVAL HPI/OVERNIGHT EVENTS:    sleeping this morning  no reported episodes of vomiting; periodic nausea  no constipation     MEDICATIONS  (STANDING):  amLODIPine   Tablet 10 milliGRAM(s) Oral daily  atorvastatin 10 milliGRAM(s) Oral at bedtime  calcium acetate 667 milliGRAM(s) Oral with lunch  carvedilol 12.5 milliGRAM(s) Oral every 12 hours  chlorhexidine 4% Liquid 1 Application(s) Topical <User Schedule>  cholecalciferol 1000 Unit(s) Oral daily  cloNIDine 0.3 milliGRAM(s) Oral every 8 hours  dicyclomine 10 milliGRAM(s) Oral three times a day before meals  docusate sodium 100 milliGRAM(s) Oral three times a day  epoetin iam Injectable 4000 Unit(s) IV Push <User Schedule>  hydrALAZINE 100 milliGRAM(s) Oral every 8 hours  isosorbide   mononitrate ER Tablet (IMDUR) 120 milliGRAM(s) Oral daily  levothyroxine 125 MICROGram(s) Oral daily  lisinopril 40 milliGRAM(s) Oral daily  metoclopramide Injectable 5 milliGRAM(s) IV Push every 12 hours  minoxidil 5 milliGRAM(s) Oral two times a day  ondansetron Injectable 4 milliGRAM(s) IV Push every 6 hours  pantoprazole    Tablet 40 milliGRAM(s) Oral two times a day  polyethylene glycol 3350 17 Gram(s) Oral daily  senna 2 Tablet(s) Oral at bedtime  sertraline 50 milliGRAM(s) Oral daily  sodium chloride 0.9% lock flush 3 milliLiter(s) IV Push every 8 hours  sucralfate suspension 1 Gram(s) Oral four times a day    MEDICATIONS  (PRN):  acetaminophen   Tablet .. 650 milliGRAM(s) Oral every 6 hours PRN Mild Pain (1 - 3), Moderate Pain (4 - 6), Severe Pain (7 - 10)  clonazePAM Tablet 0.5 milliGRAM(s) Oral at bedtime PRN Anxiety      Allergies    Avelox (Unknown)  fish (Hives; Rash)  shellfish (Unknown)    Intolerances        Review of Systems:    General:  No wt loss, fevers, chills, night sweats, fatigue   Eyes:  Good vision, no reported pain  ENT:  No sore throat, pain, runny nose, dysphagia  CV:  No pain, palpitations, hypo/hypertension  Resp:  No dyspnea, cough, tachypnea, wheezing  GI:  No pain, No nausea, No vomiting, No diarrhea, No constipation, No weight loss, No fever, No pruritis, No rectal bleeding, No melena, No dysphagia  :  No pain, bleeding, incontinence, nocturia  Muscle:  No pain, weakness  Neuro:  No weakness, tingling, memory problems  Psych:  No fatigue, insomnia, mood problems, depression  Endocrine:  No polyuria, polydypsia, cold/heat intolerance  Heme:  No petechiae, ecchymosis, easy bruisability  Skin:  No rash, tattoos, scars, edema      Vital Signs Last 24 Hrs  T(C): 37 (15 Nov 2018 08:10), Max: 37.1 (15 Nov 2018 04:10)  T(F): 98.6 (15 Nov 2018 08:10), Max: 98.7 (15 Nov 2018 04:10)  HR: 78 (15 Nov 2018 08:10) (77 - 86)  BP: 158/78 (15 Nov 2018 08:10) (156/74 - 172/78)  BP(mean): --  RR: 16 (15 Nov 2018 08:10) (16 - 18)  SpO2: 100% (15 Nov 2018 08:10) (97% - 100%)    PHYSICAL EXAM:    Constitutional: NAD  HEENT: EOMI, throat clear  Neck: No LAD, supple  Respiratory: CTA and P  Cardiovascular: S1 and S2, RRR, no M  Gastrointestinal: BS+, soft, NT/ND, neg HSM,  Extremities: No peripheral edema, neg clubbing, cyanosis  Vascular: 2+ peripheral pulses  Neurological: A/O x 2, no focal deficits  Psychiatric: Normal mood, normal affect  Skin: No rashes      LABS:                        10.6   8.13  )-----------( 246      ( 14 Nov 2018 06:19 )             33.7     11-14    142  |  96<L>  |  21  ----------------------------<  90  3.7   |  21<L>  |  3.39<H>    Ca    9.6      14 Nov 2018 06:19            RADIOLOGY & ADDITIONAL TESTS:

## 2018-11-16 LAB
BUN SERPL-MCNC: 18 MG/DL — SIGNIFICANT CHANGE UP (ref 7–23)
CALCIUM SERPL-MCNC: 9.3 MG/DL — SIGNIFICANT CHANGE UP (ref 8.4–10.5)
CHLORIDE SERPL-SCNC: 97 MMOL/L — LOW (ref 98–107)
CO2 SERPL-SCNC: 29 MMOL/L — SIGNIFICANT CHANGE UP (ref 22–31)
CREAT SERPL-MCNC: 3.35 MG/DL — HIGH (ref 0.5–1.3)
GLUCOSE SERPL-MCNC: 136 MG/DL — HIGH (ref 70–99)
HCT VFR BLD CALC: 27.9 % — LOW (ref 34.5–45)
HGB BLD-MCNC: 8.9 G/DL — LOW (ref 11.5–15.5)
MCHC RBC-ENTMCNC: 28.5 PG — SIGNIFICANT CHANGE UP (ref 27–34)
MCHC RBC-ENTMCNC: 31.9 % — LOW (ref 32–36)
MCV RBC AUTO: 89.4 FL — SIGNIFICANT CHANGE UP (ref 80–100)
NRBC # FLD: 0 — SIGNIFICANT CHANGE UP
PLATELET # BLD AUTO: 207 K/UL — SIGNIFICANT CHANGE UP (ref 150–400)
PMV BLD: 11.2 FL — SIGNIFICANT CHANGE UP (ref 7–13)
POTASSIUM SERPL-MCNC: 3.4 MMOL/L — LOW (ref 3.5–5.3)
POTASSIUM SERPL-SCNC: 3.4 MMOL/L — LOW (ref 3.5–5.3)
RBC # BLD: 3.12 M/UL — LOW (ref 3.8–5.2)
RBC # FLD: 18.4 % — HIGH (ref 10.3–14.5)
SODIUM SERPL-SCNC: 138 MMOL/L — SIGNIFICANT CHANGE UP (ref 135–145)
WBC # BLD: 4.96 K/UL — SIGNIFICANT CHANGE UP (ref 3.8–10.5)
WBC # FLD AUTO: 4.96 K/UL — SIGNIFICANT CHANGE UP (ref 3.8–10.5)

## 2018-11-16 PROCEDURE — 90935 HEMODIALYSIS ONE EVALUATION: CPT | Mod: GC

## 2018-11-16 PROCEDURE — 99233 SBSQ HOSP IP/OBS HIGH 50: CPT

## 2018-11-16 RX ORDER — POLYETHYLENE GLYCOL 3350 17 G/17G
17 POWDER, FOR SOLUTION ORAL DAILY
Qty: 0 | Refills: 0 | Status: DISCONTINUED | OUTPATIENT
Start: 2018-11-16 | End: 2018-11-20

## 2018-11-16 RX ORDER — METOCLOPRAMIDE HCL 10 MG
5 TABLET ORAL
Qty: 0 | Refills: 0 | Status: DISCONTINUED | OUTPATIENT
Start: 2018-11-16 | End: 2018-11-19

## 2018-11-16 RX ADMIN — PANTOPRAZOLE SODIUM 40 MILLIGRAM(S): 20 TABLET, DELAYED RELEASE ORAL at 05:30

## 2018-11-16 RX ADMIN — Medication 100 MILLIGRAM(S): at 21:54

## 2018-11-16 RX ADMIN — CARVEDILOL PHOSPHATE 12.5 MILLIGRAM(S): 80 CAPSULE, EXTENDED RELEASE ORAL at 21:54

## 2018-11-16 RX ADMIN — Medication 125 MICROGRAM(S): at 05:31

## 2018-11-16 RX ADMIN — ATORVASTATIN CALCIUM 10 MILLIGRAM(S): 80 TABLET, FILM COATED ORAL at 21:54

## 2018-11-16 RX ADMIN — Medication 10 MILLIGRAM(S): at 17:36

## 2018-11-16 RX ADMIN — Medication 0.3 MILLIGRAM(S): at 13:36

## 2018-11-16 RX ADMIN — Medication 1000 UNIT(S): at 11:08

## 2018-11-16 RX ADMIN — ERYTHROPOIETIN 4000 UNIT(S): 10000 INJECTION, SOLUTION INTRAVENOUS; SUBCUTANEOUS at 08:17

## 2018-11-16 RX ADMIN — Medication 100 MILLIGRAM(S): at 05:30

## 2018-11-16 RX ADMIN — Medication 5 MILLIGRAM(S): at 17:36

## 2018-11-16 RX ADMIN — SODIUM CHLORIDE 3 MILLILITER(S): 9 INJECTION INTRAMUSCULAR; INTRAVENOUS; SUBCUTANEOUS at 05:36

## 2018-11-16 RX ADMIN — CARVEDILOL PHOSPHATE 12.5 MILLIGRAM(S): 80 CAPSULE, EXTENDED RELEASE ORAL at 08:17

## 2018-11-16 RX ADMIN — POLYETHYLENE GLYCOL 3350 17 GRAM(S): 17 POWDER, FOR SOLUTION ORAL at 11:09

## 2018-11-16 RX ADMIN — SODIUM CHLORIDE 3 MILLILITER(S): 9 INJECTION INTRAMUSCULAR; INTRAVENOUS; SUBCUTANEOUS at 21:55

## 2018-11-16 RX ADMIN — Medication 1 GRAM(S): at 11:09

## 2018-11-16 RX ADMIN — ISOSORBIDE MONONITRATE 120 MILLIGRAM(S): 60 TABLET, EXTENDED RELEASE ORAL at 11:08

## 2018-11-16 RX ADMIN — PANTOPRAZOLE SODIUM 40 MILLIGRAM(S): 20 TABLET, DELAYED RELEASE ORAL at 17:36

## 2018-11-16 RX ADMIN — Medication 1 GRAM(S): at 17:36

## 2018-11-16 RX ADMIN — Medication 0.3 MILLIGRAM(S): at 21:53

## 2018-11-16 RX ADMIN — Medication 5 MILLIGRAM(S): at 02:00

## 2018-11-16 RX ADMIN — Medication 10 MILLIGRAM(S): at 11:08

## 2018-11-16 RX ADMIN — Medication 0.3 MILLIGRAM(S): at 08:17

## 2018-11-16 RX ADMIN — CHLORHEXIDINE GLUCONATE 1 APPLICATION(S): 213 SOLUTION TOPICAL at 11:08

## 2018-11-16 RX ADMIN — HEPARIN SODIUM 5000 UNIT(S): 5000 INJECTION INTRAVENOUS; SUBCUTANEOUS at 05:36

## 2018-11-16 RX ADMIN — SODIUM CHLORIDE 3 MILLILITER(S): 9 INJECTION INTRAMUSCULAR; INTRAVENOUS; SUBCUTANEOUS at 13:37

## 2018-11-16 RX ADMIN — Medication 100 MILLIGRAM(S): at 13:36

## 2018-11-16 RX ADMIN — Medication 650 MILLIGRAM(S): at 17:37

## 2018-11-16 RX ADMIN — HEPARIN SODIUM 5000 UNIT(S): 5000 INJECTION INTRAVENOUS; SUBCUTANEOUS at 17:37

## 2018-11-16 RX ADMIN — Medication 1 GRAM(S): at 23:56

## 2018-11-16 RX ADMIN — SERTRALINE 50 MILLIGRAM(S): 25 TABLET, FILM COATED ORAL at 11:09

## 2018-11-16 RX ADMIN — Medication 650 MILLIGRAM(S): at 18:22

## 2018-11-16 RX ADMIN — ONDANSETRON 4 MILLIGRAM(S): 8 TABLET, FILM COATED ORAL at 11:08

## 2018-11-16 RX ADMIN — Medication 1 GRAM(S): at 00:33

## 2018-11-16 RX ADMIN — SENNA PLUS 2 TABLET(S): 8.6 TABLET ORAL at 21:54

## 2018-11-16 RX ADMIN — Medication 1 GRAM(S): at 05:31

## 2018-11-16 RX ADMIN — Medication 650 MILLIGRAM(S): at 12:11

## 2018-11-16 RX ADMIN — Medication 650 MILLIGRAM(S): at 11:27

## 2018-11-16 NOTE — PROGRESS NOTE ADULT - SUBJECTIVE AND OBJECTIVE BOX
Patient is a 72y old  Female who presents with a chief complaint of chest pain (16 Nov 2018 12:26)    SUBJECTIVE / OVERNIGHT EVENTS:  Pt seen after HD done.  Dialysis nurses report tolerated HD well, bp runs high, given her PO meds.  Pt c/o dry mouth, nausea, chest/belly pain.      MEDICATIONS  (STANDING):  amLODIPine   Tablet 10 milliGRAM(s) Oral daily  atorvastatin 10 milliGRAM(s) Oral at bedtime  calcium acetate 667 milliGRAM(s) Oral with lunch  carvedilol 12.5 milliGRAM(s) Oral every 12 hours  chlorhexidine 4% Liquid 1 Application(s) Topical <User Schedule>  cholecalciferol 1000 Unit(s) Oral daily  cloNIDine 0.3 milliGRAM(s) Oral every 8 hours  dicyclomine 10 milliGRAM(s) Oral three times a day before meals  docusate sodium 100 milliGRAM(s) Oral three times a day  epoetin iam Injectable 4000 Unit(s) IV Push <User Schedule>  heparin  Injectable 5000 Unit(s) SubCutaneous every 12 hours  hydrALAZINE 100 milliGRAM(s) Oral every 8 hours  isosorbide   mononitrate ER Tablet (IMDUR) 120 milliGRAM(s) Oral daily  levothyroxine 125 MICROGram(s) Oral daily  lisinopril 40 milliGRAM(s) Oral daily  metoclopramide 5 milliGRAM(s) Oral two times a day  minoxidil 5 milliGRAM(s) Oral two times a day  pantoprazole    Tablet 40 milliGRAM(s) Oral two times a day  polyethylene glycol 3350 17 Gram(s) Oral daily  senna 2 Tablet(s) Oral at bedtime  sertraline 50 milliGRAM(s) Oral daily  sodium chloride 0.9% lock flush 3 milliLiter(s) IV Push every 8 hours  sucralfate suspension 1 Gram(s) Oral four times a day    MEDICATIONS  (PRN):  acetaminophen   Tablet .. 650 milliGRAM(s) Oral every 6 hours PRN Mild Pain (1 - 3), Moderate Pain (4 - 6), Severe Pain (7 - 10)  clonazePAM Tablet 0.5 milliGRAM(s) Oral at bedtime PRN Anxiety  ondansetron Injectable 4 milliGRAM(s) IV Push every 6 hours PRN Nausea and/or Vomiting    Vital Signs Last 24 Hrs  T(C): 36.6 (16 Nov 2018 13:11), Max: 37.2 (15 Nov 2018 17:57)  T(F): 97.9 (16 Nov 2018 13:11), Max: 98.9 (15 Nov 2018 17:57)  HR: 58 (16 Nov 2018 13:11) (58 - 95)  BP: 142/51 (16 Nov 2018 13:11) (106/69 - 176/68)  RR: 18 (16 Nov 2018 13:11) (16 - 18)  SpO2: 100% (16 Nov 2018 13:11) (97% - 100%)    PHYSICAL EXAM:  GENERAL: very thin, frail F, resting comfortably  HEAD:  Atraumatic, Normocephalic  EYES: EOMI, PERRLA, conjunctiva and sclera clear  NECK: Supple, No JVD  CHEST/LUNG: Clear to auscultation bilaterally; No wheeze  HEART: Regular rate and rhythm; No murmurs, rubs, or gallops  ABDOMEN: Soft, Nontender, Nondistended; Bowel sounds present  EXTREMITIES:  2+ Peripheral Pulses, No clubbing, cyanosis, or edema  PSYCH: alert, appropriate  NEUROLOGY: non-focal  SKIN: No rashes or lesions  LUE AVF +bruit/thrill    LABS:             8.9    4.96  )-----------( 207      ( 16 Nov 2018 06:45 )             27.9     138  |  97<L>  |  18  ----------------------------<  136<H>  3.4<L>   |  29  |  3.35<H>    Ca    9.3      16 Nov 2018 06:45    RADIOLOGY & ADDITIONAL TESTS:    Imaging Personally Reviewed:    Consultant(s) Notes Reviewed:  GI, renal    Care Discussed with Consultants/Other Providers: RN, SW, CM, ADS re overall care

## 2018-11-16 NOTE — PROGRESS NOTE ADULT - ATTENDING COMMENTS
D/w daughter realizes pt is very weak and deconditioned, likely will not walk again, goals get her in WC, get her home with her family, who will help make arrangements to get her to HD.  Family declining rehab. D/w daughter realizes pt is very weak and deconditioned, likely will not walk again, goals get her in WC, get her home with her family, who will help make arrangements to get her to HD.  Family declining rehab.  Pt firm in her decision for full code, daughter says this is cultural.  Pt at high risk for readmission.  Overall very poor prognosis, daughter well aware.

## 2018-11-16 NOTE — PROGRESS NOTE ADULT - SUBJECTIVE AND OBJECTIVE BOX
INTERVAL HPI/OVERNIGHT EVENTS:    pt appears more comfortable this morning  overnight with no episodes of n/v    MEDICATIONS  (STANDING):  amLODIPine   Tablet 10 milliGRAM(s) Oral daily  atorvastatin 10 milliGRAM(s) Oral at bedtime  calcium acetate 667 milliGRAM(s) Oral with lunch  carvedilol 12.5 milliGRAM(s) Oral every 12 hours  chlorhexidine 4% Liquid 1 Application(s) Topical <User Schedule>  cholecalciferol 1000 Unit(s) Oral daily  cloNIDine 0.3 milliGRAM(s) Oral every 8 hours  dicyclomine 10 milliGRAM(s) Oral three times a day before meals  docusate sodium 100 milliGRAM(s) Oral three times a day  epoetin iam Injectable 4000 Unit(s) IV Push <User Schedule>  heparin  Injectable 5000 Unit(s) SubCutaneous every 12 hours  hydrALAZINE 100 milliGRAM(s) Oral every 8 hours  isosorbide   mononitrate ER Tablet (IMDUR) 120 milliGRAM(s) Oral daily  levothyroxine 125 MICROGram(s) Oral daily  lisinopril 40 milliGRAM(s) Oral daily  metoclopramide 5 milliGRAM(s) Oral two times a day  minoxidil 5 milliGRAM(s) Oral two times a day  pantoprazole    Tablet 40 milliGRAM(s) Oral two times a day  polyethylene glycol 3350 17 Gram(s) Oral daily  senna 2 Tablet(s) Oral at bedtime  sertraline 50 milliGRAM(s) Oral daily  sodium chloride 0.9% lock flush 3 milliLiter(s) IV Push every 8 hours  sucralfate suspension 1 Gram(s) Oral four times a day    MEDICATIONS  (PRN):  acetaminophen   Tablet .. 650 milliGRAM(s) Oral every 6 hours PRN Mild Pain (1 - 3), Moderate Pain (4 - 6), Severe Pain (7 - 10)  clonazePAM Tablet 0.5 milliGRAM(s) Oral at bedtime PRN Anxiety  ondansetron Injectable 4 milliGRAM(s) IV Push every 6 hours PRN Nausea and/or Vomiting      Allergies    Avelox (Unknown)  fish (Hives; Rash)  shellfish (Unknown)    Intolerances        Review of Systems:    General:  No wt loss, fevers, chills, night sweats, fatigue   Eyes:  Good vision, no reported pain  ENT:  No sore throat, pain, runny nose, dysphagia  CV:  No pain, palpitations, hypo/hypertension  Resp:  No dyspnea, cough, tachypnea, wheezing  GI:  No pain, No nausea, No vomiting, No diarrhea, No constipation, No weight loss, No fever, No pruritis, No rectal bleeding, No melena, No dysphagia  :  No pain, bleeding, incontinence, nocturia  Muscle:  No pain, weakness  Neuro:  No weakness, tingling, memory problems  Psych:  No fatigue, insomnia, mood problems, depression  Endocrine:  No polyuria, polydypsia, cold/heat intolerance  Heme:  No petechiae, ecchymosis, easy bruisability  Skin:  No rash, tattoos, scars, edema      Vital Signs Last 24 Hrs  T(C): 36.8 (16 Nov 2018 09:50), Max: 37.2 (15 Nov 2018 17:57)  T(F): 98.2 (16 Nov 2018 09:50), Max: 98.9 (15 Nov 2018 17:57)  HR: 64 (16 Nov 2018 09:50) (62 - 95)  BP: 152/60 (16 Nov 2018 09:50) (106/69 - 176/68)  BP(mean): --  RR: 18 (16 Nov 2018 09:50) (16 - 18)  SpO2: 100% (16 Nov 2018 05:18) (97% - 100%)    PHYSICAL EXAM:    Constitutional: NAD  HEENT: EOMI, throat clear  Neck: No LAD, supple  Respiratory: CTA and P  Cardiovascular: S1 and S2, RRR, no M  Gastrointestinal: BS+, soft, NT/ND, neg HSM,  Extremities: No peripheral edema, neg clubbing, cyanosis  Vascular: 2+ peripheral pulses  Neurological: A/O x 2, no focal deficits  Psychiatric: Normal mood, normal affect  Skin: No rashes      LABS:                        8.9    4.96  )-----------( 207      ( 16 Nov 2018 06:45 )             27.9     11-16    138  |  97<L>  |  18  ----------------------------<  136<H>  3.4<L>   |  29  |  3.35<H>    Ca    9.3      16 Nov 2018 06:45            RADIOLOGY & ADDITIONAL TESTS:

## 2018-11-16 NOTE — PROGRESS NOTE ADULT - SUBJECTIVE AND OBJECTIVE BOX
[facility] DIVISION OF KIDNEY DISEASES AND HYPERTENSION -- FOLLOW UP NOTE  --------------------------------------------------------------------------------        24 hour events/subjective: no complaints evaluated while at HD session         PAST HISTORY  --------------------------------------------------------------------------------  No significant changes to PMH, PSH, FHx, SHx, unless otherwise noted    ALLERGIES & MEDICATIONS  --------------------------------------------------------------------------------  Allergies    Avelox (Unknown)  fish (Hives; Rash)  shellfish (Unknown)    Intolerances      Standing Inpatient Medications  amLODIPine   Tablet 10 milliGRAM(s) Oral daily  atorvastatin 10 milliGRAM(s) Oral at bedtime  calcium acetate 667 milliGRAM(s) Oral with lunch  carvedilol 12.5 milliGRAM(s) Oral every 12 hours  chlorhexidine 4% Liquid 1 Application(s) Topical <User Schedule>  cholecalciferol 1000 Unit(s) Oral daily  cloNIDine 0.3 milliGRAM(s) Oral every 8 hours  dicyclomine 10 milliGRAM(s) Oral three times a day before meals  docusate sodium 100 milliGRAM(s) Oral three times a day  epoetin iam Injectable 4000 Unit(s) IV Push <User Schedule>  heparin  Injectable 5000 Unit(s) SubCutaneous every 12 hours  hydrALAZINE 100 milliGRAM(s) Oral every 8 hours  isosorbide   mononitrate ER Tablet (IMDUR) 120 milliGRAM(s) Oral daily  levothyroxine 125 MICROGram(s) Oral daily  lisinopril 40 milliGRAM(s) Oral daily  metoclopramide 5 milliGRAM(s) Oral two times a day  minoxidil 5 milliGRAM(s) Oral two times a day  pantoprazole    Tablet 40 milliGRAM(s) Oral two times a day  polyethylene glycol 3350 17 Gram(s) Oral daily  senna 2 Tablet(s) Oral at bedtime  sertraline 50 milliGRAM(s) Oral daily  sodium chloride 0.9% lock flush 3 milliLiter(s) IV Push every 8 hours  sucralfate suspension 1 Gram(s) Oral four times a day    PRN Inpatient Medications  acetaminophen   Tablet .. 650 milliGRAM(s) Oral every 6 hours PRN  clonazePAM Tablet 0.5 milliGRAM(s) Oral at bedtime PRN  ondansetron Injectable 4 milliGRAM(s) IV Push every 6 hours PRN      REVIEW OF SYSTEMS  --------------------------------------------------------------------------------  Gen: + weakness  Skin: No new rashes  Head/Eyes/Ears/Mouth: No headache  Respiratory: No dyspnea,   GI: + abdominal pain,  MSK: No joint pain/swelling; edema  Neuro:  no seizures,  Psych: No anxiety        VITALS/PHYSICAL EXAM  --------------------------------------------------------------------------------  T(C): 36.8 (11-16-18 @ 09:50), Max: 37.2 (11-15-18 @ 17:57)  HR: 64 (11-16-18 @ 09:50) (62 - 95)  BP: 152/60 (11-16-18 @ 09:50) (106/69 - 176/68)  RR: 18 (11-16-18 @ 09:50) (16 - 18)  SpO2: 100% (11-16-18 @ 05:18) (97% - 100%)  Wt(kg): --        Physical Exam:          Gen: NAD,   	HEENT: supple neck, NO JVD   	Pulm: CTA B/L          CV: RRR, S1S2; no rub  	Abd: +BS, soft, tender/nondistended  	UE:  no edema; L upper AVf with bruit   	LE:  no edema  	Neuro: No focal deficits,   	Psych: Normal affect and mood          	>>> <<<    LABS/STUDIES  --------------------------------------------------------------------------------              8.9    4.96  >-----------<  207      [11-16-18 @ 06:45]              27.9     138  |  97  |  18  ----------------------------<  136      [11-16-18 @ 06:45]  3.4   |  29  |  3.35        Ca     9.3     [11-16-18 @ 06:45]            Creatinine Trend:  SCr 3.35 [11-16 @ 06:45]  SCr 3.39 [11-14 @ 06:19]  SCr 2.55 [11-13 @ 05:21]  SCr 3.96 [11-12 @ 06:45]  SCr 2.23 [11-10 @ 06:45]    Sodium trend:        Iron 38, TIBC 130, %sat --      [11-09-18 @ 05:15]  Ferritin 701.9      [11-09-18 @ 05:15]  .3 (Ca --)      [11-05-18 @ 06:35]   --  Vitamin D (25OH) 14.8      [11-03-18 @ 05:10]  HbA1c 5.5      [10-31-18 @ 06:25]  TSH 16.55      [11-02-18 @ 05:50]  Lipid: chol 92, , HDL 35, LDL 39      [10-31-18 @ 06:25]    HBsAb 10.8      [10-19-18 @ 21:45]  HBsAg NEGATIVE      [10-19-18 @ 21:45]  HCV 0.13, Nonreactive Hepatitis C AB  S/CO Ratio                        Interpretation  < 1.0                                     Non-Reactive  1.0 - 4.9                           Weakly-Reactive  > 5.0                                 Reactive  Non-Reactive: Aperson with a non-reactive HCV antibody  result is considered uninfected.  No further action is  needed unless recent infection is suspected.  In these  cases, consider repeat testing later to detect  seroconversion..  Weakly-Reactive: HCV antibody test is abnormal, HCV RNA  Qualitative test will follow.  Reactive: HCV antibody test is abnormal, HCV RNA  Qualitative test will follow.  Note: HCV antibody testing is performed on the [employer] system.      [10-19-18 @ 21:45]

## 2018-11-16 NOTE — PROGRESS NOTE ADULT - PROBLEM SELECTOR PLAN 4
asymptomatic bradycardia on coreg-decreased to 12.5mg BID  Appreciate EP eval: No advanced AV blocks or persistent significant chelsea <40 bpm seen on telemetry, per EP if symptomatic chelsea or advanced AV block occurs, then would reduce Carvedilol dosage    -No acute pacing indication at present time  [id number] score of 6   NO AC given hx of multiple falls and positive FOBT.

## 2018-11-16 NOTE — PROGRESS NOTE ADULT - PROBLEM SELECTOR PLAN 1
Continues to have vomiting and abdominal pain, no acute findings on CT abdomen, patient w/ chronic abdominal pain, S/p BM after initiation of bowel regimen   -Seen by GI, suspect d/t underlying diseases - c/w Bentyl, PPI, bowel regimen (s/p bowel prep 11/12/scope 11/13  - appreciate PC input - improved on Reglan, will change Zofran to PRN, change Reglan to PO and see is tolerates  next alternative would be Haldol... With chronic abdominal pain - followed by GI, suspect d/t underlying diseases - c/w Bentyl, PPI, bowel regimen (s/p bowel prep 11/12, scopes 11/13 - EGD gastritis, colon nodular mucosa in cecum, path pending)  - appreciate PC input - seems some improvement on Reglan PO with Zofran PRN...next alternative would be Haldol 0.25 mg q8-12 but would stop Reglan if need to try Haldol  ***This may be as good as we can get her, her sister notes she has had trouble with nausea and vomiting in past and used to only be able to eat only a few times per week.  Pt does have h/o poorly controlled DM, likely underlying gastroparesis as well.  - I d/w pt's daughter in detail, daughter would like to monitor over weekend, see if pt can tolerate any solids such as toast or crackers.  Encouraged family to bring anything pt desires. With chronic abdominal pain - followed by GI, suspect d/t underlying diseases - c/w Bentyl, PPI, bowel regimen (s/p bowel prep 11/12, scopes 11/13 - EGD gastritis, colon nodular mucosa in cecum, path pending)  gastritis - on PPI, sucralfate  - appreciate PC input - seems some improvement on Reglan PO with Zofran PRN...next alternative would be Haldol 0.25 mg q8-12 but would stop Reglan if need to try Haldol  ***This may be as good as we can get her, her sister notes she has had trouble with nausea and vomiting in past and used to only be able to eat only a few times per week.  Pt does have h/o poorly controlled DM, likely underlying gastroparesis as well.  - I d/w pt's daughter in detail, daughter would like to monitor over weekend, see if pt can tolerate any solids such as toast or crackers.  Encouraged family to bring anything pt desires.

## 2018-11-17 PROCEDURE — 99233 SBSQ HOSP IP/OBS HIGH 50: CPT

## 2018-11-17 RX ORDER — HYDRALAZINE HCL 50 MG
10 TABLET ORAL ONCE
Qty: 0 | Refills: 0 | Status: COMPLETED | OUTPATIENT
Start: 2018-11-17 | End: 2018-11-17

## 2018-11-17 RX ADMIN — Medication 100 MILLIGRAM(S): at 22:06

## 2018-11-17 RX ADMIN — CARVEDILOL PHOSPHATE 12.5 MILLIGRAM(S): 80 CAPSULE, EXTENDED RELEASE ORAL at 08:01

## 2018-11-17 RX ADMIN — PANTOPRAZOLE SODIUM 40 MILLIGRAM(S): 20 TABLET, DELAYED RELEASE ORAL at 17:55

## 2018-11-17 RX ADMIN — SODIUM CHLORIDE 3 MILLILITER(S): 9 INJECTION INTRAMUSCULAR; INTRAVENOUS; SUBCUTANEOUS at 22:09

## 2018-11-17 RX ADMIN — Medication 10 MILLIGRAM(S): at 11:13

## 2018-11-17 RX ADMIN — Medication 5 MILLIGRAM(S): at 05:34

## 2018-11-17 RX ADMIN — ATORVASTATIN CALCIUM 10 MILLIGRAM(S): 80 TABLET, FILM COATED ORAL at 22:08

## 2018-11-17 RX ADMIN — ONDANSETRON 4 MILLIGRAM(S): 8 TABLET, FILM COATED ORAL at 02:33

## 2018-11-17 RX ADMIN — Medication 125 MICROGRAM(S): at 05:32

## 2018-11-17 RX ADMIN — SODIUM CHLORIDE 3 MILLILITER(S): 9 INJECTION INTRAMUSCULAR; INTRAVENOUS; SUBCUTANEOUS at 13:41

## 2018-11-17 RX ADMIN — AMLODIPINE BESYLATE 10 MILLIGRAM(S): 2.5 TABLET ORAL at 11:13

## 2018-11-17 RX ADMIN — HEPARIN SODIUM 5000 UNIT(S): 5000 INJECTION INTRAVENOUS; SUBCUTANEOUS at 17:55

## 2018-11-17 RX ADMIN — Medication 0.3 MILLIGRAM(S): at 13:41

## 2018-11-17 RX ADMIN — SERTRALINE 50 MILLIGRAM(S): 25 TABLET, FILM COATED ORAL at 11:13

## 2018-11-17 RX ADMIN — HEPARIN SODIUM 5000 UNIT(S): 5000 INJECTION INTRAVENOUS; SUBCUTANEOUS at 05:32

## 2018-11-17 RX ADMIN — CHLORHEXIDINE GLUCONATE 1 APPLICATION(S): 213 SOLUTION TOPICAL at 11:14

## 2018-11-17 RX ADMIN — Medication 1 GRAM(S): at 11:14

## 2018-11-17 RX ADMIN — ONDANSETRON 4 MILLIGRAM(S): 8 TABLET, FILM COATED ORAL at 11:14

## 2018-11-17 RX ADMIN — Medication 100 MILLIGRAM(S): at 13:41

## 2018-11-17 RX ADMIN — LISINOPRIL 40 MILLIGRAM(S): 2.5 TABLET ORAL at 11:14

## 2018-11-17 RX ADMIN — Medication 10 MILLIGRAM(S): at 08:01

## 2018-11-17 RX ADMIN — Medication 650 MILLIGRAM(S): at 14:22

## 2018-11-17 RX ADMIN — Medication 10 MILLIGRAM(S): at 02:56

## 2018-11-17 RX ADMIN — Medication 0.3 MILLIGRAM(S): at 22:06

## 2018-11-17 RX ADMIN — CARVEDILOL PHOSPHATE 12.5 MILLIGRAM(S): 80 CAPSULE, EXTENDED RELEASE ORAL at 22:06

## 2018-11-17 RX ADMIN — Medication 5 MILLIGRAM(S): at 05:33

## 2018-11-17 RX ADMIN — Medication 0.3 MILLIGRAM(S): at 05:28

## 2018-11-17 RX ADMIN — Medication 10 MILLIGRAM(S): at 16:54

## 2018-11-17 RX ADMIN — Medication 650 MILLIGRAM(S): at 13:41

## 2018-11-17 RX ADMIN — SENNA PLUS 2 TABLET(S): 8.6 TABLET ORAL at 22:08

## 2018-11-17 RX ADMIN — Medication 1000 UNIT(S): at 11:13

## 2018-11-17 RX ADMIN — Medication 5 MILLIGRAM(S): at 17:55

## 2018-11-17 RX ADMIN — ISOSORBIDE MONONITRATE 120 MILLIGRAM(S): 60 TABLET, EXTENDED RELEASE ORAL at 11:13

## 2018-11-17 RX ADMIN — Medication 667 MILLIGRAM(S): at 12:41

## 2018-11-17 RX ADMIN — Medication 100 MILLIGRAM(S): at 05:30

## 2018-11-17 RX ADMIN — SODIUM CHLORIDE 3 MILLILITER(S): 9 INJECTION INTRAMUSCULAR; INTRAVENOUS; SUBCUTANEOUS at 05:38

## 2018-11-17 RX ADMIN — PANTOPRAZOLE SODIUM 40 MILLIGRAM(S): 20 TABLET, DELAYED RELEASE ORAL at 05:34

## 2018-11-17 RX ADMIN — Medication 1 GRAM(S): at 05:28

## 2018-11-17 RX ADMIN — Medication 100 MILLIGRAM(S): at 22:08

## 2018-11-17 NOTE — PROGRESS NOTE ADULT - PROBLEM SELECTOR PLAN 4
asymptomatic bradycardia on coreg-decreased to 12.5mg BID  Appreciate EP eval: No advanced AV blocks or persistent significant chelsea <40 bpm seen on telemetry, per EP if symptomatic chelsea or advanced AV block occurs, then would reduce Carvedilol dosage    -No acute pacing indication at present time  [id number] score of 6   NO AC given hx of multiple falls and positive FOBT.

## 2018-11-17 NOTE — PROGRESS NOTE ADULT - ATTENDING COMMENTS
Per Dr. Gore D/w daughter, she realizes pt is very weak and deconditioned, likely will not walk again, goals get her in WC, get her home with her family, who will help make arrangements to get her to HD.  Family declining rehab.  Pt firm in her decision for full code, daughter says this is cultural.  Pt at high risk for readmission.  Overall very poor prognosis, daughter well aware.

## 2018-11-17 NOTE — PROGRESS NOTE ADULT - PROBLEM SELECTOR PLAN 2
c/w HD MWF per renal   anemia due to chronic kidney disease - cont SHAY per renal  - have d/w family if symptoms become intolerable, they are aware of option of stopping HD

## 2018-11-17 NOTE — PROGRESS NOTE ADULT - PROBLEM SELECTOR PLAN 1
With chronic abdominal pain - followed by GI, suspect d/t underlying diseases - c/w Bentyl, PPI, bowel regimen (s/p bowel prep 11/12, scopes 11/13 - EGD gastritis, colon nodular mucosa in cecum, path pending)  gastritis - on PPI, sucralfate  - appreciate PC input - seems some improvement on Reglan PO with Zofran PRN...next alternative would be Haldol 0.25 mg q8-12 but would stop Reglan if need to try Haldol  ***This may be as good as we can get her, her sister notes she has had trouble with nausea and vomiting in past and used to only be able to eat only a few times per week.  Pt does have h/o poorly controlled DM, likely underlying gastroparesis as well.  - Dr. Gore spoke w pt's daughter in detail, monitoring over weekend, see if pt can tolerate any solids such as toast or crackers.  Encouraged family to bring anything pt desires.

## 2018-11-17 NOTE — PROGRESS NOTE ADULT - SUBJECTIVE AND OBJECTIVE BOX
Patient is a 72y old  Female who presents with a chief complaint of chest pain (16 Nov 2018 13:49)    SUBJECTIVE / OVERNIGHT EVENTS: Pt complaining of abd discomfort. Sister at bedside reports pt has had difficulty eating today. Notes she was eating more while on liquid diet.    MEDICATIONS  (STANDING):  amLODIPine   Tablet 10 milliGRAM(s) Oral daily  atorvastatin 10 milliGRAM(s) Oral at bedtime  calcium acetate 667 milliGRAM(s) Oral with lunch  carvedilol 12.5 milliGRAM(s) Oral every 12 hours  chlorhexidine 4% Liquid 1 Application(s) Topical <User Schedule>  cholecalciferol 1000 Unit(s) Oral daily  cloNIDine 0.3 milliGRAM(s) Oral every 8 hours  dicyclomine 10 milliGRAM(s) Oral three times a day before meals  docusate sodium 100 milliGRAM(s) Oral three times a day  epoetin iam Injectable 4000 Unit(s) IV Push <User Schedule>  heparin  Injectable 5000 Unit(s) SubCutaneous every 12 hours  hydrALAZINE 100 milliGRAM(s) Oral every 8 hours  isosorbide   mononitrate ER Tablet (IMDUR) 120 milliGRAM(s) Oral daily  levothyroxine 125 MICROGram(s) Oral daily  lisinopril 40 milliGRAM(s) Oral daily  metoclopramide 5 milliGRAM(s) Oral two times a day  minoxidil 5 milliGRAM(s) Oral two times a day  pantoprazole    Tablet 40 milliGRAM(s) Oral two times a day  senna 2 Tablet(s) Oral at bedtime  sertraline 50 milliGRAM(s) Oral daily  sodium chloride 0.9% lock flush 3 milliLiter(s) IV Push every 8 hours  sucralfate suspension 1 Gram(s) Oral four times a day    MEDICATIONS  (PRN):  acetaminophen   Tablet .. 650 milliGRAM(s) Oral every 6 hours PRN Mild Pain (1 - 3), Moderate Pain (4 - 6), Severe Pain (7 - 10)  clonazePAM Tablet 0.5 milliGRAM(s) Oral at bedtime PRN Anxiety  ondansetron Injectable 4 milliGRAM(s) IV Push every 6 hours PRN Nausea and/or Vomiting  polyethylene glycol 3350 17 Gram(s) Oral daily PRN Constipation      Vital Signs Last 24 Hrs  T(C): 36.7 (11-17-18 @ 11:02), Max: 36.8 (11-16-18 @ 20:26)  T(F): 98 (11-17-18 @ 11:02), Max: 98.3 (11-17-18 @ 02:54)  HR: 64 (11-17-18 @ 11:02) (58 - 65)  BP: 169/57 (11-17-18 @ 11:02) (137/53 - 190/73)  BP(mean): --  RR: 18 (11-17-18 @ 11:02) (18 - 18)  SpO2: 100% (11-17-18 @ 11:02) (100% - 100%)  CAPILLARY BLOOD GLUCOSE        I&O's Summary    16 Nov 2018 07:01  -  17 Nov 2018 07:00  --------------------------------------------------------  IN: 500 mL / OUT: 2200 mL / NET: -1700 mL        PHYSICAL EXAM:  GENERAL: cachetic  CHEST/LUNG: CTABL anteriorly  HEART: RRR, no m/r/g  ABDOMEN: soft, nd, mild tenderness diffusely  EXTREMITIES:  wwp, no c/c/e  PSYCH: calm  NEUROLOGY: non-focal  SKIN: No rashes or lesions    LABS:                        8.9    4.96  )-----------( 207      ( 16 Nov 2018 06:45 )             27.9     11-16    138  |  97<L>  |  18  ----------------------------<  136<H>  3.4<L>   |  29  |  3.35<H>    Ca    9.3      16 Nov 2018 06:45                RADIOLOGY & ADDITIONAL TESTS:    Imaging Personally Reviewed:    Consultant(s) Notes Reviewed:      Care Discussed with Consultants/Other Providers:

## 2018-11-18 LAB — HBV SURFACE AG SER-ACNC: NEGATIVE — SIGNIFICANT CHANGE UP

## 2018-11-18 PROCEDURE — 90935 HEMODIALYSIS ONE EVALUATION: CPT | Mod: GC

## 2018-11-18 PROCEDURE — 99233 SBSQ HOSP IP/OBS HIGH 50: CPT

## 2018-11-18 RX ADMIN — LISINOPRIL 40 MILLIGRAM(S): 2.5 TABLET ORAL at 11:40

## 2018-11-18 RX ADMIN — HEPARIN SODIUM 5000 UNIT(S): 5000 INJECTION INTRAVENOUS; SUBCUTANEOUS at 05:44

## 2018-11-18 RX ADMIN — Medication 100 MILLIGRAM(S): at 05:44

## 2018-11-18 RX ADMIN — Medication 0.3 MILLIGRAM(S): at 21:22

## 2018-11-18 RX ADMIN — Medication 1000 UNIT(S): at 11:40

## 2018-11-18 RX ADMIN — PANTOPRAZOLE SODIUM 40 MILLIGRAM(S): 20 TABLET, DELAYED RELEASE ORAL at 05:45

## 2018-11-18 RX ADMIN — Medication 100 MILLIGRAM(S): at 21:22

## 2018-11-18 RX ADMIN — PANTOPRAZOLE SODIUM 40 MILLIGRAM(S): 20 TABLET, DELAYED RELEASE ORAL at 17:00

## 2018-11-18 RX ADMIN — Medication 125 MICROGRAM(S): at 05:44

## 2018-11-18 RX ADMIN — AMLODIPINE BESYLATE 10 MILLIGRAM(S): 2.5 TABLET ORAL at 11:40

## 2018-11-18 RX ADMIN — ISOSORBIDE MONONITRATE 120 MILLIGRAM(S): 60 TABLET, EXTENDED RELEASE ORAL at 13:40

## 2018-11-18 RX ADMIN — CARVEDILOL PHOSPHATE 12.5 MILLIGRAM(S): 80 CAPSULE, EXTENDED RELEASE ORAL at 21:22

## 2018-11-18 RX ADMIN — Medication 10 MILLIGRAM(S): at 17:00

## 2018-11-18 RX ADMIN — Medication 100 MILLIGRAM(S): at 13:40

## 2018-11-18 RX ADMIN — SODIUM CHLORIDE 3 MILLILITER(S): 9 INJECTION INTRAMUSCULAR; INTRAVENOUS; SUBCUTANEOUS at 05:40

## 2018-11-18 RX ADMIN — CARVEDILOL PHOSPHATE 12.5 MILLIGRAM(S): 80 CAPSULE, EXTENDED RELEASE ORAL at 10:17

## 2018-11-18 RX ADMIN — Medication 1 GRAM(S): at 17:00

## 2018-11-18 RX ADMIN — Medication 5 MILLIGRAM(S): at 17:00

## 2018-11-18 RX ADMIN — Medication 5 MILLIGRAM(S): at 05:46

## 2018-11-18 RX ADMIN — SERTRALINE 50 MILLIGRAM(S): 25 TABLET, FILM COATED ORAL at 17:00

## 2018-11-18 RX ADMIN — Medication 667 MILLIGRAM(S): at 11:39

## 2018-11-18 RX ADMIN — ATORVASTATIN CALCIUM 10 MILLIGRAM(S): 80 TABLET, FILM COATED ORAL at 21:23

## 2018-11-18 RX ADMIN — Medication 10 MILLIGRAM(S): at 11:39

## 2018-11-18 RX ADMIN — Medication 100 MILLIGRAM(S): at 10:16

## 2018-11-18 RX ADMIN — Medication 1 GRAM(S): at 00:08

## 2018-11-18 RX ADMIN — SENNA PLUS 2 TABLET(S): 8.6 TABLET ORAL at 21:21

## 2018-11-18 RX ADMIN — Medication 100 MILLIGRAM(S): at 21:21

## 2018-11-18 RX ADMIN — Medication 650 MILLIGRAM(S): at 02:26

## 2018-11-18 RX ADMIN — ONDANSETRON 4 MILLIGRAM(S): 8 TABLET, FILM COATED ORAL at 02:19

## 2018-11-18 RX ADMIN — CHLORHEXIDINE GLUCONATE 1 APPLICATION(S): 213 SOLUTION TOPICAL at 11:39

## 2018-11-18 RX ADMIN — SODIUM CHLORIDE 3 MILLILITER(S): 9 INJECTION INTRAMUSCULAR; INTRAVENOUS; SUBCUTANEOUS at 13:39

## 2018-11-18 RX ADMIN — SODIUM CHLORIDE 3 MILLILITER(S): 9 INJECTION INTRAMUSCULAR; INTRAVENOUS; SUBCUTANEOUS at 21:23

## 2018-11-18 RX ADMIN — HEPARIN SODIUM 5000 UNIT(S): 5000 INJECTION INTRAVENOUS; SUBCUTANEOUS at 17:00

## 2018-11-18 RX ADMIN — Medication 0.3 MILLIGRAM(S): at 13:40

## 2018-11-18 RX ADMIN — Medication 650 MILLIGRAM(S): at 02:18

## 2018-11-18 RX ADMIN — Medication 5 MILLIGRAM(S): at 10:17

## 2018-11-18 RX ADMIN — Medication 1 GRAM(S): at 11:40

## 2018-11-18 RX ADMIN — Medication 1 GRAM(S): at 05:44

## 2018-11-18 NOTE — PROGRESS NOTE ADULT - SUBJECTIVE AND OBJECTIVE BOX
[facility] DIVISION OF KIDNEY DISEASES AND HYPERTENSION -- HEMODIALYSIS NOTE  --------------------------------------------------------------------------------  Chief Complaint: ESRD/Ongoing hemodialysis requirement    24 hour events/subjective:  Seen during HD, tolerating well.      PAST HISTORY  --------------------------------------------------------------------------------  No significant changes to PMH, PSH, FHx, SHx, unless otherwise noted    ALLERGIES & MEDICATIONS  --------------------------------------------------------------------------------  Allergies    Avelox (Unknown)  fish (Hives; Rash)  shellfish (Unknown)    Intolerances      Standing Inpatient Medications  amLODIPine   Tablet 10 milliGRAM(s) Oral daily  atorvastatin 10 milliGRAM(s) Oral at bedtime  calcium acetate 667 milliGRAM(s) Oral with lunch  carvedilol 12.5 milliGRAM(s) Oral every 12 hours  chlorhexidine 4% Liquid 1 Application(s) Topical <User Schedule>  cholecalciferol 1000 Unit(s) Oral daily  cloNIDine 0.3 milliGRAM(s) Oral every 8 hours  dicyclomine 10 milliGRAM(s) Oral three times a day before meals  docusate sodium 100 milliGRAM(s) Oral three times a day  epoetin iam Injectable 4000 Unit(s) IV Push <User Schedule>  heparin  Injectable 5000 Unit(s) SubCutaneous every 12 hours  hydrALAZINE 100 milliGRAM(s) Oral every 8 hours  isosorbide   mononitrate ER Tablet (IMDUR) 120 milliGRAM(s) Oral daily  levothyroxine 125 MICROGram(s) Oral daily  lisinopril 40 milliGRAM(s) Oral daily  metoclopramide 5 milliGRAM(s) Oral two times a day  minoxidil 5 milliGRAM(s) Oral two times a day  pantoprazole    Tablet 40 milliGRAM(s) Oral two times a day  senna 2 Tablet(s) Oral at bedtime  sertraline 50 milliGRAM(s) Oral daily  sodium chloride 0.9% lock flush 3 milliLiter(s) IV Push every 8 hours  sucralfate suspension 1 Gram(s) Oral four times a day    PRN Inpatient Medications  acetaminophen   Tablet .. 650 milliGRAM(s) Oral every 6 hours PRN  clonazePAM Tablet 0.5 milliGRAM(s) Oral at bedtime PRN  ondansetron Injectable 4 milliGRAM(s) IV Push every 6 hours PRN  polyethylene glycol 3350 17 Gram(s) Oral daily PRN      REVIEW OF SYSTEMS  unable to obtain      VITALS/PHYSICAL EXAM  --------------------------------------------------------------------------------  T(C): 36.6 (11-18-18 @ 06:25), Max: 36.8 (11-17-18 @ 21:50)  HR: 60 (11-18-18 @ 06:25) (58 - 64)  BP: 172/61 (11-18-18 @ 06:25) (102/54 - 172/61)  RR: 16 (11-18-18 @ 06:25) (16 - 18)  SpO2: 100% (11-18-18 @ 05:41) (100% - 100%)  Wt(kg): --        Physical Exam:  	   Gen: NAD,   	HEENT: supple neck, NO JVD   	Pulm: CTA B/L          CV: RRR, S1S2; no rub  	Abd: +BS, soft, tender/nondistended  	UE:  no edema  	LE:  no edema               Access: LUE AVf with bruit         LABS/STUDIES  --------------------------------------------------------------------------------                Iron 38, TIBC 130, %sat --      [11-09-18 @ 05:15]  Ferritin 701.9      [11-09-18 @ 05:15]  .3 (Ca --)      [11-05-18 @ 06:35]   --  Vitamin D (25OH) 14.8      [11-03-18 @ 05:10]  HbA1c 5.5      [10-31-18 @ 06:25]  TSH 16.55      [11-02-18 @ 05:50]  Lipid: chol 92, , HDL 35, LDL 39      [10-31-18 @ 06:25]    HBsAb 10.8      [10-19-18 @ 21:45]  HBsAg NEGATIVE      [11-18-18 @ 06:12]  HCV 0.13, Nonreactive Hepatitis C AB  S/CO Ratio                        Interpretation  < 1.0                                     Non-Reactive  1.0 - 4.9                           Weakly-Reactive  > 5.0                                 Reactive  Non-Reactive: Aperson with a non-reactive HCV antibody  result is considered uninfected.  No further action is  needed unless recent infection is suspected.  In these  cases, consider repeat testing later to detect  seroconversion..  Weakly-Reactive: HCV antibody test is abnormal, HCV RNA  Qualitative test will follow.  Reactive: HCV antibody test is abnormal, HCV RNA  Qualitative test will follow.  Note: HCV antibody testing is performed on the [employer] system.      [10-19-18 @ 21:45]

## 2018-11-18 NOTE — PROGRESS NOTE ADULT - NSHPATTENDINGPLANDISCUSS_GEN_ALL_CORE
patient, sister: Catrina at bedside
Dr. Natarajan
patient
team
team
patient, sister: Catrina at bedside, Dr. Reynolds, KATH García
family, patient and ADS NP
family, patient and ADS NP
patient, sister: Catrina at bedside, KATH Menard

## 2018-11-18 NOTE — PROGRESS NOTE ADULT - SUBJECTIVE AND OBJECTIVE BOX
Patient is a 72y old  Female who presents with a chief complaint of chest pain (18 Nov 2018 09:28)      SUBJECTIVE / OVERNIGHT EVENTS: Pt without complaints, sister at bedside and pt note she does not like food, would like to get switched back to liquid diet.    MEDICATIONS  (STANDING):  amLODIPine   Tablet 10 milliGRAM(s) Oral daily  atorvastatin 10 milliGRAM(s) Oral at bedtime  calcium acetate 667 milliGRAM(s) Oral with lunch  carvedilol 12.5 milliGRAM(s) Oral every 12 hours  chlorhexidine 4% Liquid 1 Application(s) Topical <User Schedule>  cholecalciferol 1000 Unit(s) Oral daily  cloNIDine 0.3 milliGRAM(s) Oral every 8 hours  dicyclomine 10 milliGRAM(s) Oral three times a day before meals  docusate sodium 100 milliGRAM(s) Oral three times a day  epoetin iam Injectable 4000 Unit(s) IV Push <User Schedule>  heparin  Injectable 5000 Unit(s) SubCutaneous every 12 hours  hydrALAZINE 100 milliGRAM(s) Oral every 8 hours  isosorbide   mononitrate ER Tablet (IMDUR) 120 milliGRAM(s) Oral daily  levothyroxine 125 MICROGram(s) Oral daily  lisinopril 40 milliGRAM(s) Oral daily  metoclopramide 5 milliGRAM(s) Oral two times a day  minoxidil 5 milliGRAM(s) Oral two times a day  pantoprazole    Tablet 40 milliGRAM(s) Oral two times a day  senna 2 Tablet(s) Oral at bedtime  sertraline 50 milliGRAM(s) Oral daily  sodium chloride 0.9% lock flush 3 milliLiter(s) IV Push every 8 hours  sucralfate suspension 1 Gram(s) Oral four times a day    MEDICATIONS  (PRN):  acetaminophen   Tablet .. 650 milliGRAM(s) Oral every 6 hours PRN Mild Pain (1 - 3), Moderate Pain (4 - 6), Severe Pain (7 - 10)  clonazePAM Tablet 0.5 milliGRAM(s) Oral at bedtime PRN Anxiety  ondansetron Injectable 4 milliGRAM(s) IV Push every 6 hours PRN Nausea and/or Vomiting  polyethylene glycol 3350 17 Gram(s) Oral daily PRN Constipation      Vital Signs Last 24 Hrs  T(C): 36.3 (11-18-18 @ 10:11), Max: 36.9 (11-18-18 @ 09:25)  T(F): 97.4 (11-18-18 @ 10:11), Max: 98.4 (11-18-18 @ 09:25)  HR: 64 (11-18-18 @ 11:37) (58 - 64)  BP: 148/51 (11-18-18 @ 11:37) (102/54 - 198/60)  BP(mean): --  RR: 18 (11-18-18 @ 10:11) (16 - 18)  SpO2: 100% (11-18-18 @ 10:11) (100% - 100%)  CAPILLARY BLOOD GLUCOSE        I&O's Summary    18 Nov 2018 07:01  -  18 Nov 2018 12:43  --------------------------------------------------------  IN: 520 mL / OUT: 1900 mL / NET: -1380 mL      PHYSICAL EXAM:  GENERAL: cachetic  CHEST/LUNG: CTABL anteriorly  HEART: RRR, no m/r/g  ABDOMEN: soft, nd, mild tenderness diffusely  EXTREMITIES:  wwp, no c/c/e  PSYCH: calm  NEUROLOGY: non-focal  SKIN: No rashes or lesions    LABS:                    RADIOLOGY & ADDITIONAL TESTS:    Imaging Personally Reviewed:    Consultant(s) Notes Reviewed:      Care Discussed with Consultants/Other Providers:

## 2018-11-18 NOTE — PROGRESS NOTE ADULT - PROBLEM SELECTOR PLAN 4
asymptomatic bradycardia on coreg-decreased to 12.5mg BID  Appreciate EP eval: No advanced AV blocks or persistent significant chelsea <40 bpm seen on telemetry, per EP if symptomatic chelsea or advanced AV block occurs, then would reduce Carvedilol dosage    -No acute pacing indication at present time  [id number] score of 6   NO AC given hx of multiple falls and positive FOBT.

## 2018-11-19 LAB
BUN SERPL-MCNC: 16 MG/DL — SIGNIFICANT CHANGE UP (ref 7–23)
BUN SERPL-MCNC: 19 MG/DL — SIGNIFICANT CHANGE UP (ref 7–23)
CALCIUM SERPL-MCNC: 9 MG/DL — SIGNIFICANT CHANGE UP (ref 8.4–10.5)
CALCIUM SERPL-MCNC: 9.4 MG/DL — SIGNIFICANT CHANGE UP (ref 8.4–10.5)
CHLORIDE SERPL-SCNC: 95 MMOL/L — LOW (ref 98–107)
CHLORIDE SERPL-SCNC: 96 MMOL/L — LOW (ref 98–107)
CO2 SERPL-SCNC: 26 MMOL/L — SIGNIFICANT CHANGE UP (ref 22–31)
CO2 SERPL-SCNC: 28 MMOL/L — SIGNIFICANT CHANGE UP (ref 22–31)
CREAT SERPL-MCNC: 2.31 MG/DL — HIGH (ref 0.5–1.3)
CREAT SERPL-MCNC: 2.64 MG/DL — HIGH (ref 0.5–1.3)
GLUCOSE SERPL-MCNC: 135 MG/DL — HIGH (ref 70–99)
GLUCOSE SERPL-MCNC: 139 MG/DL — HIGH (ref 70–99)
MAGNESIUM SERPL-MCNC: 2.1 MG/DL — SIGNIFICANT CHANGE UP (ref 1.6–2.6)
MAGNESIUM SERPL-MCNC: 2.2 MG/DL — SIGNIFICANT CHANGE UP (ref 1.6–2.6)
PHOSPHATE SERPL-MCNC: 0.4 MG/DL — CRITICAL LOW (ref 2.5–4.5)
PHOSPHATE SERPL-MCNC: 0.5 MG/DL — CRITICAL LOW (ref 2.5–4.5)
POTASSIUM SERPL-MCNC: 3.5 MMOL/L — SIGNIFICANT CHANGE UP (ref 3.5–5.3)
POTASSIUM SERPL-MCNC: 3.5 MMOL/L — SIGNIFICANT CHANGE UP (ref 3.5–5.3)
POTASSIUM SERPL-SCNC: 3.5 MMOL/L — SIGNIFICANT CHANGE UP (ref 3.5–5.3)
POTASSIUM SERPL-SCNC: 3.5 MMOL/L — SIGNIFICANT CHANGE UP (ref 3.5–5.3)
SODIUM SERPL-SCNC: 134 MMOL/L — LOW (ref 135–145)
SODIUM SERPL-SCNC: 136 MMOL/L — SIGNIFICANT CHANGE UP (ref 135–145)

## 2018-11-19 PROCEDURE — 99233 SBSQ HOSP IP/OBS HIGH 50: CPT

## 2018-11-19 RX ORDER — PROCHLORPERAZINE MALEATE 5 MG
10 TABLET ORAL EVERY 6 HOURS
Qty: 0 | Refills: 0 | Status: DISCONTINUED | OUTPATIENT
Start: 2018-11-19 | End: 2018-11-20

## 2018-11-19 RX ORDER — POTASSIUM PHOSPHATE, MONOBASIC POTASSIUM PHOSPHATE, DIBASIC 236; 224 MG/ML; MG/ML
30 INJECTION, SOLUTION INTRAVENOUS ONCE
Qty: 0 | Refills: 0 | Status: DISCONTINUED | OUTPATIENT
Start: 2018-11-19 | End: 2018-11-19

## 2018-11-19 RX ORDER — POTASSIUM PHOSPHATE, MONOBASIC POTASSIUM PHOSPHATE, DIBASIC 236; 224 MG/ML; MG/ML
30 INJECTION, SOLUTION INTRAVENOUS ONCE
Qty: 0 | Refills: 0 | Status: COMPLETED | OUTPATIENT
Start: 2018-11-19 | End: 2018-11-19

## 2018-11-19 RX ORDER — SODIUM,POTASSIUM PHOSPHATES 278-250MG
1 POWDER IN PACKET (EA) ORAL
Qty: 0 | Refills: 0 | Status: DISCONTINUED | OUTPATIENT
Start: 2018-11-19 | End: 2018-11-19

## 2018-11-19 RX ADMIN — Medication 0.3 MILLIGRAM(S): at 21:20

## 2018-11-19 RX ADMIN — Medication 5 MILLIGRAM(S): at 05:48

## 2018-11-19 RX ADMIN — Medication 100 MILLIGRAM(S): at 14:18

## 2018-11-19 RX ADMIN — Medication 100 MILLIGRAM(S): at 21:20

## 2018-11-19 RX ADMIN — SODIUM CHLORIDE 3 MILLILITER(S): 9 INJECTION INTRAMUSCULAR; INTRAVENOUS; SUBCUTANEOUS at 14:15

## 2018-11-19 RX ADMIN — Medication 100 MILLIGRAM(S): at 05:48

## 2018-11-19 RX ADMIN — Medication 5 MILLIGRAM(S): at 05:46

## 2018-11-19 RX ADMIN — SERTRALINE 50 MILLIGRAM(S): 25 TABLET, FILM COATED ORAL at 14:18

## 2018-11-19 RX ADMIN — CARVEDILOL PHOSPHATE 12.5 MILLIGRAM(S): 80 CAPSULE, EXTENDED RELEASE ORAL at 10:29

## 2018-11-19 RX ADMIN — POTASSIUM PHOSPHATE, MONOBASIC POTASSIUM PHOSPHATE, DIBASIC 85 MILLIMOLE(S): 236; 224 INJECTION, SOLUTION INTRAVENOUS at 20:49

## 2018-11-19 RX ADMIN — Medication 10 MILLIGRAM(S): at 10:31

## 2018-11-19 RX ADMIN — Medication 10 MILLIGRAM(S): at 05:47

## 2018-11-19 RX ADMIN — PANTOPRAZOLE SODIUM 40 MILLIGRAM(S): 20 TABLET, DELAYED RELEASE ORAL at 17:13

## 2018-11-19 RX ADMIN — Medication 10 MILLIGRAM(S): at 17:13

## 2018-11-19 RX ADMIN — Medication 1 GRAM(S): at 05:46

## 2018-11-19 RX ADMIN — HEPARIN SODIUM 5000 UNIT(S): 5000 INJECTION INTRAVENOUS; SUBCUTANEOUS at 17:13

## 2018-11-19 RX ADMIN — SODIUM CHLORIDE 3 MILLILITER(S): 9 INJECTION INTRAMUSCULAR; INTRAVENOUS; SUBCUTANEOUS at 05:48

## 2018-11-19 RX ADMIN — SODIUM CHLORIDE 3 MILLILITER(S): 9 INJECTION INTRAMUSCULAR; INTRAVENOUS; SUBCUTANEOUS at 22:45

## 2018-11-19 RX ADMIN — CHLORHEXIDINE GLUCONATE 1 APPLICATION(S): 213 SOLUTION TOPICAL at 14:18

## 2018-11-19 RX ADMIN — ONDANSETRON 4 MILLIGRAM(S): 8 TABLET, FILM COATED ORAL at 05:46

## 2018-11-19 RX ADMIN — Medication 125 MICROGRAM(S): at 05:45

## 2018-11-19 RX ADMIN — Medication 1000 UNIT(S): at 14:18

## 2018-11-19 RX ADMIN — SENNA PLUS 2 TABLET(S): 8.6 TABLET ORAL at 21:20

## 2018-11-19 RX ADMIN — Medication 0.3 MILLIGRAM(S): at 05:48

## 2018-11-19 RX ADMIN — Medication 100 MILLIGRAM(S): at 05:46

## 2018-11-19 RX ADMIN — Medication 10 MILLIGRAM(S): at 14:18

## 2018-11-19 RX ADMIN — Medication 1 GRAM(S): at 17:13

## 2018-11-19 RX ADMIN — HEPARIN SODIUM 5000 UNIT(S): 5000 INJECTION INTRAVENOUS; SUBCUTANEOUS at 05:45

## 2018-11-19 RX ADMIN — ISOSORBIDE MONONITRATE 120 MILLIGRAM(S): 60 TABLET, EXTENDED RELEASE ORAL at 14:18

## 2018-11-19 RX ADMIN — AMLODIPINE BESYLATE 10 MILLIGRAM(S): 2.5 TABLET ORAL at 14:18

## 2018-11-19 RX ADMIN — CARVEDILOL PHOSPHATE 12.5 MILLIGRAM(S): 80 CAPSULE, EXTENDED RELEASE ORAL at 21:20

## 2018-11-19 RX ADMIN — Medication 1 GRAM(S): at 11:13

## 2018-11-19 RX ADMIN — Medication 5 MILLIGRAM(S): at 17:13

## 2018-11-19 RX ADMIN — LISINOPRIL 40 MILLIGRAM(S): 2.5 TABLET ORAL at 14:18

## 2018-11-19 RX ADMIN — Medication 0.3 MILLIGRAM(S): at 14:19

## 2018-11-19 RX ADMIN — ATORVASTATIN CALCIUM 10 MILLIGRAM(S): 80 TABLET, FILM COATED ORAL at 21:20

## 2018-11-19 RX ADMIN — PANTOPRAZOLE SODIUM 40 MILLIGRAM(S): 20 TABLET, DELAYED RELEASE ORAL at 05:46

## 2018-11-19 NOTE — PROGRESS NOTE ADULT - PROBLEM SELECTOR PLAN 1
- suspect in setting of ESRD/dCHF with Severe Pulmonary HTN as noted on TTE 7/2018  - CT Abd and AXR both unrevealing   - cont Bentyl TID   - protonix 40mg PO BID  - simethicone prn  - cont senna/colace and miralax bid; dulcolax supp prn  - pain control prn  - cont HD per renal with fluid removal   - s/p EGD (11/13) with gastritis  - s/p Colon (11/13) w/Nodular mucosa in the cecum; pathology testing  - diet as tolerated

## 2018-11-19 NOTE — PROGRESS NOTE ADULT - PROBLEM SELECTOR PLAN 2
- s/p EGD (11/13) with gastritis  - palliative input appreciated; recommending Zofran ATC with Haldol prn  - trial of Compazine q6h x 3 days (11/19-11/20-11/21)

## 2018-11-19 NOTE — PROGRESS NOTE ADULT - PROBLEM SELECTOR PLAN 2
c/w HD MWF per renal   anemia due to chronic kidney disease - cont SHAY per renal  - family aware if symptoms become intolerable, option is to stop HD

## 2018-11-19 NOTE — PROVIDER CONTACT NOTE (CRITICAL VALUE NOTIFICATION) - ACTION/TREATMENT ORDERED:
ADS Allison made aware.
ADS aware, will continue to monitor closely for changes
phosphorus supplements

## 2018-11-19 NOTE — PROGRESS NOTE ADULT - SUBJECTIVE AND OBJECTIVE BOX
INTERVAL HPI/OVERNIGHT EVENTS:    vomited this morning and overnight  bm prior day, nonbloody    MEDICATIONS  (STANDING):  amLODIPine   Tablet 10 milliGRAM(s) Oral daily  atorvastatin 10 milliGRAM(s) Oral at bedtime  calcium acetate 667 milliGRAM(s) Oral with lunch  carvedilol 12.5 milliGRAM(s) Oral every 12 hours  chlorhexidine 4% Liquid 1 Application(s) Topical <User Schedule>  cholecalciferol 1000 Unit(s) Oral daily  cloNIDine 0.3 milliGRAM(s) Oral every 8 hours  dicyclomine 10 milliGRAM(s) Oral three times a day before meals  docusate sodium 100 milliGRAM(s) Oral three times a day  epoetin iam Injectable 4000 Unit(s) IV Push <User Schedule>  heparin  Injectable 5000 Unit(s) SubCutaneous every 12 hours  hydrALAZINE 100 milliGRAM(s) Oral every 8 hours  isosorbide   mononitrate ER Tablet (IMDUR) 120 milliGRAM(s) Oral daily  levothyroxine 125 MICROGram(s) Oral daily  lisinopril 40 milliGRAM(s) Oral daily  minoxidil 5 milliGRAM(s) Oral two times a day  pantoprazole    Tablet 40 milliGRAM(s) Oral two times a day  prochlorperazine   Tablet 10 milliGRAM(s) Oral every 6 hours  senna 2 Tablet(s) Oral at bedtime  sertraline 50 milliGRAM(s) Oral daily  sodium chloride 0.9% lock flush 3 milliLiter(s) IV Push every 8 hours  sucralfate suspension 1 Gram(s) Oral four times a day    MEDICATIONS  (PRN):  acetaminophen   Tablet .. 650 milliGRAM(s) Oral every 6 hours PRN Mild Pain (1 - 3), Moderate Pain (4 - 6), Severe Pain (7 - 10)  clonazePAM Tablet 0.5 milliGRAM(s) Oral at bedtime PRN Anxiety  ondansetron Injectable 4 milliGRAM(s) IV Push every 6 hours PRN Nausea and/or Vomiting  polyethylene glycol 3350 17 Gram(s) Oral daily PRN Constipation      Allergies    Avelox (Unknown)  fish (Hives; Rash)  shellfish (Unknown)    Intolerances        Review of Systems:    General:  No wt loss, fevers, chills, night sweats, fatigue   Eyes:  Good vision, no reported pain  ENT:  No sore throat, pain, runny nose, dysphagia  CV:  No pain, palpitations, hypo/hypertension  Resp:  No dyspnea, cough, tachypnea, wheezing  GI:  No pain, No nausea, No vomiting, No diarrhea, No constipation, No weight loss, No fever, No pruritis, No rectal bleeding, No melena, No dysphagia  :  No pain, bleeding, incontinence, nocturia  Muscle:  No pain, weakness  Neuro:  No weakness, tingling, memory problems  Psych:  No fatigue, insomnia, mood problems, depression  Endocrine:  No polyuria, polydypsia, cold/heat intolerance  Heme:  No petechiae, ecchymosis, easy bruisability  Skin:  No rash, tattoos, scars, edema      Vital Signs Last 24 Hrs  T(C): 36.8 (19 Nov 2018 09:20), Max: 36.9 (18 Nov 2018 21:20)  T(F): 98.3 (19 Nov 2018 09:20), Max: 98.4 (18 Nov 2018 21:20)  HR: 64 (19 Nov 2018 09:20) (63 - 68)  BP: 144/62 (19 Nov 2018 09:20) (122/51 - 157/52)  BP(mean): --  RR: 18 (19 Nov 2018 09:20) (18 - 18)  SpO2: 98% (19 Nov 2018 09:20) (98% - 100%)    PHYSICAL EXAM:    Constitutional: NAD  HEENT: EOMI, throat clear  Neck: No LAD, supple  Respiratory: CTA and P  Cardiovascular: S1 and S2, RRR, no M  Gastrointestinal: BS+, soft, NT/ND, neg HSM,  Extremities: No peripheral edema, neg clubbing, cyanosis  Vascular: 2+ peripheral pulses  Neurological: A/O x 3, no focal deficits  Psychiatric: Normal mood, normal affect  Skin: No rashes      LABS:    11-19    136  |  96<L>  |  16  ----------------------------<  135<H>  3.5   |  28  |  2.31<H>    Ca    9.4      19 Nov 2018 08:50  Phos  0.5     11-19  Mg     2.1     11-19            RADIOLOGY & ADDITIONAL TESTS:

## 2018-11-19 NOTE — PROGRESS NOTE ADULT - PROBLEM SELECTOR PLAN 1
With chronic abdominal pain - followed by GI, suspect d/t underlying diseases - c/w Bentyl, PPI, bowel regimen (s/p bowel prep 11/12, scopes 11/13 - EGD gastritis, colon nodular mucosa in cecum, path pending)  gastritis - on PPI, sucralfate  - appreciate PC input - seems some improvement on Reglan PO with Zofran PRN, continue liquid diet  - Family encouraged to bring anything pt desires. Family aware these symptoms are chronic and unlikely to resolve.

## 2018-11-19 NOTE — PROGRESS NOTE ADULT - SUBJECTIVE AND OBJECTIVE BOX
Patient is a 72y old  Female who presents with a chief complaint of chest pain (18 Nov 2018 12:43)        SUBJECTIVE / OVERNIGHT EVENTS: Pt intermittently taking small portions of jello or liquids, vomiting scant "phlegm" per pt's sister at bedside.       MEDICATIONS  (STANDING):  amLODIPine   Tablet 10 milliGRAM(s) Oral daily  atorvastatin 10 milliGRAM(s) Oral at bedtime  calcium acetate 667 milliGRAM(s) Oral with lunch  carvedilol 12.5 milliGRAM(s) Oral every 12 hours  chlorhexidine 4% Liquid 1 Application(s) Topical <User Schedule>  cholecalciferol 1000 Unit(s) Oral daily  cloNIDine 0.3 milliGRAM(s) Oral every 8 hours  dicyclomine 10 milliGRAM(s) Oral three times a day before meals  docusate sodium 100 milliGRAM(s) Oral three times a day  epoetin iam Injectable 4000 Unit(s) IV Push <User Schedule>  heparin  Injectable 5000 Unit(s) SubCutaneous every 12 hours  hydrALAZINE 100 milliGRAM(s) Oral every 8 hours  isosorbide   mononitrate ER Tablet (IMDUR) 120 milliGRAM(s) Oral daily  levothyroxine 125 MICROGram(s) Oral daily  lisinopril 40 milliGRAM(s) Oral daily  metoclopramide 5 milliGRAM(s) Oral two times a day  minoxidil 5 milliGRAM(s) Oral two times a day  pantoprazole    Tablet 40 milliGRAM(s) Oral two times a day  senna 2 Tablet(s) Oral at bedtime  sertraline 50 milliGRAM(s) Oral daily  sodium chloride 0.9% lock flush 3 milliLiter(s) IV Push every 8 hours  sucralfate suspension 1 Gram(s) Oral four times a day    MEDICATIONS  (PRN):  acetaminophen   Tablet .. 650 milliGRAM(s) Oral every 6 hours PRN Mild Pain (1 - 3), Moderate Pain (4 - 6), Severe Pain (7 - 10)  clonazePAM Tablet 0.5 milliGRAM(s) Oral at bedtime PRN Anxiety  ondansetron Injectable 4 milliGRAM(s) IV Push every 6 hours PRN Nausea and/or Vomiting  polyethylene glycol 3350 17 Gram(s) Oral daily PRN Constipation      Vital Signs Last 24 Hrs  T(C): 36.8 (19 Nov 2018 09:20), Max: 36.9 (18 Nov 2018 21:20)  T(F): 98.3 (19 Nov 2018 09:20), Max: 98.4 (18 Nov 2018 21:20)  HR: 64 (19 Nov 2018 09:20) (63 - 68)  BP: 144/62 (19 Nov 2018 09:20) (122/51 - 157/52)  BP(mean): --  RR: 18 (19 Nov 2018 09:20) (18 - 18)  SpO2: 98% (19 Nov 2018 09:20) (98% - 100%)  CAPILLARY BLOOD GLUCOSE        I&O's Summary    18 Nov 2018 07:01  -  19 Nov 2018 07:00  --------------------------------------------------------  IN: 620 mL / OUT: 1900 mL / NET: -1280 mL          PHYSICAL EXAM  GENERAL: cachectic older woman, NAD  HEAD:  Atraumatic, Normocephalic  EYES: EOMI, PERRLA, conjunctiva and sclera clear  CHEST/LUNG: Clear to auscultation bilaterally; No wheeze  HEART: Regular rate and rhythm; No murmurs, rubs, or gallops  ABDOMEN: Soft, Nontender, Nondistended; Bowel sounds present  EXTREMITIES:  2+ Peripheral Pulses, No clubbing, cyanosis, or edema    LABS:    11-19    136  |  96<L>  |  16  ----------------------------<  135<H>  3.5   |  28  |  2.31<H>    Ca    9.4      19 Nov 2018 08:50  Phos  0.5     11-19  Mg     2.1     11-19

## 2018-11-19 NOTE — PROGRESS NOTE ADULT - PROBLEM SELECTOR PLAN 4
asymptomatic bradycardia on coreg-decreased to 12.5mg BID  Appreciate EP eval: No advanced AV blocks or persistent significant chelsea <40 bpm seen on telemetry, per EP if symptomatic chelsea or advanced AV block occurs, then would reduce Carvedilol dosage    -No acute pacing indication at present time  [id number] score of 6   NO AC given hx of multiple falls and positive FOBT.

## 2018-11-19 NOTE — PROVIDER CONTACT NOTE (CRITICAL VALUE NOTIFICATION) - SITUATION
Pt admitted for chest pain
Received lab report pt. phosphorus level is 0.5
patient's phosphorus is 0.4
pt's troponin level is 480, drawn after pt was complaining of chest pain

## 2018-11-20 ENCOUNTER — INBOUND DOCUMENT (OUTPATIENT)
Age: 73
End: 2018-11-20

## 2018-11-20 VITALS
RESPIRATION RATE: 18 BRPM | OXYGEN SATURATION: 98 % | TEMPERATURE: 98 F | WEIGHT: 91.27 LBS | HEART RATE: 69 BPM | DIASTOLIC BLOOD PRESSURE: 61 MMHG | SYSTOLIC BLOOD PRESSURE: 156 MMHG

## 2018-11-20 LAB
BUN SERPL-MCNC: 23 MG/DL — SIGNIFICANT CHANGE UP (ref 7–23)
CALCIUM SERPL-MCNC: 8.6 MG/DL — SIGNIFICANT CHANGE UP (ref 8.4–10.5)
CHLORIDE SERPL-SCNC: 95 MMOL/L — LOW (ref 98–107)
CO2 SERPL-SCNC: 25 MMOL/L — SIGNIFICANT CHANGE UP (ref 22–31)
CREAT SERPL-MCNC: 2.94 MG/DL — HIGH (ref 0.5–1.3)
GLUCOSE SERPL-MCNC: 116 MG/DL — HIGH (ref 70–99)
HCT VFR BLD CALC: 33.4 % — LOW (ref 34.5–45)
HGB BLD-MCNC: 10.9 G/DL — LOW (ref 11.5–15.5)
MAGNESIUM SERPL-MCNC: 2.1 MG/DL — SIGNIFICANT CHANGE UP (ref 1.6–2.6)
MCHC RBC-ENTMCNC: 28.6 PG — SIGNIFICANT CHANGE UP (ref 27–34)
MCHC RBC-ENTMCNC: 32.6 % — SIGNIFICANT CHANGE UP (ref 32–36)
MCV RBC AUTO: 87.7 FL — SIGNIFICANT CHANGE UP (ref 80–100)
NRBC # FLD: 0 — SIGNIFICANT CHANGE UP
PHOSPHATE SERPL-MCNC: 3.7 MG/DL — SIGNIFICANT CHANGE UP (ref 2.5–4.5)
PLATELET # BLD AUTO: 272 K/UL — SIGNIFICANT CHANGE UP (ref 150–400)
PMV BLD: 11.4 FL — SIGNIFICANT CHANGE UP (ref 7–13)
POTASSIUM SERPL-MCNC: 4.7 MMOL/L — SIGNIFICANT CHANGE UP (ref 3.5–5.3)
POTASSIUM SERPL-SCNC: 4.7 MMOL/L — SIGNIFICANT CHANGE UP (ref 3.5–5.3)
RBC # BLD: 3.81 M/UL — SIGNIFICANT CHANGE UP (ref 3.8–5.2)
RBC # FLD: 18.9 % — HIGH (ref 10.3–14.5)
SODIUM SERPL-SCNC: 136 MMOL/L — SIGNIFICANT CHANGE UP (ref 135–145)
SURGICAL PATHOLOGY STUDY: SIGNIFICANT CHANGE UP
WBC # BLD: 5.19 K/UL — SIGNIFICANT CHANGE UP (ref 3.8–10.5)
WBC # FLD AUTO: 5.19 K/UL — SIGNIFICANT CHANGE UP (ref 3.8–10.5)

## 2018-11-20 PROCEDURE — 99239 HOSP IP/OBS DSCHRG MGMT >30: CPT

## 2018-11-20 RX ORDER — MINOXIDIL 10 MG
2 TABLET ORAL
Qty: 120 | Refills: 0 | OUTPATIENT
Start: 2018-11-20 | End: 2018-12-19

## 2018-11-20 RX ORDER — CHLORHEXIDINE GLUCONATE 213 G/1000ML
1 SOLUTION TOPICAL
Qty: 0 | Refills: 0 | Status: DISCONTINUED | OUTPATIENT
Start: 2018-11-20 | End: 2018-11-20

## 2018-11-20 RX ORDER — AMLODIPINE BESYLATE 2.5 MG/1
1 TABLET ORAL
Qty: 30 | Refills: 0 | OUTPATIENT
Start: 2018-11-20 | End: 2018-12-19

## 2018-11-20 RX ORDER — LISINOPRIL 2.5 MG/1
1 TABLET ORAL
Qty: 30 | Refills: 0 | OUTPATIENT
Start: 2018-11-20 | End: 2018-12-19

## 2018-11-20 RX ORDER — ONDANSETRON 8 MG/1
1 TABLET, FILM COATED ORAL
Qty: 56 | Refills: 0 | OUTPATIENT
Start: 2018-11-20 | End: 2018-12-03

## 2018-11-20 RX ORDER — PROCHLORPERAZINE MALEATE 5 MG
1 TABLET ORAL
Qty: 56 | Refills: 0 | OUTPATIENT
Start: 2018-11-20 | End: 2018-12-03

## 2018-11-20 RX ORDER — ISOSORBIDE MONONITRATE 60 MG/1
1 TABLET, EXTENDED RELEASE ORAL
Qty: 30 | Refills: 0 | OUTPATIENT
Start: 2018-11-20 | End: 2018-12-19

## 2018-11-20 RX ORDER — CHOLECALCIFEROL (VITAMIN D3) 125 MCG
1 CAPSULE ORAL
Qty: 30 | Refills: 0 | OUTPATIENT
Start: 2018-11-20 | End: 2018-12-19

## 2018-11-20 RX ORDER — HYDRALAZINE HCL 50 MG
1 TABLET ORAL
Qty: 90 | Refills: 0 | OUTPATIENT
Start: 2018-11-20 | End: 2018-12-19

## 2018-11-20 RX ORDER — LEVOTHYROXINE SODIUM 125 MCG
1 TABLET ORAL
Qty: 30 | Refills: 0 | OUTPATIENT
Start: 2018-11-20 | End: 2018-12-19

## 2018-11-20 RX ORDER — SENNA PLUS 8.6 MG/1
2 TABLET ORAL
Qty: 0 | Refills: 0 | COMMUNITY
Start: 2018-11-20

## 2018-11-20 RX ORDER — POLYETHYLENE GLYCOL 3350 17 G/17G
17 POWDER, FOR SOLUTION ORAL
Qty: 0 | Refills: 0 | COMMUNITY
Start: 2018-11-20

## 2018-11-20 RX ORDER — PANTOPRAZOLE SODIUM 20 MG/1
1 TABLET, DELAYED RELEASE ORAL
Qty: 60 | Refills: 0 | OUTPATIENT
Start: 2018-11-20 | End: 2018-12-19

## 2018-11-20 RX ORDER — CARVEDILOL PHOSPHATE 80 MG/1
1 CAPSULE, EXTENDED RELEASE ORAL
Qty: 60 | Refills: 0 | OUTPATIENT
Start: 2018-11-20 | End: 2018-12-19

## 2018-11-20 RX ORDER — SUCRALFATE 1 G
10 TABLET ORAL
Qty: 560 | Refills: 0 | OUTPATIENT
Start: 2018-11-20 | End: 2018-12-03

## 2018-11-20 RX ADMIN — Medication 10 MILLIGRAM(S): at 06:13

## 2018-11-20 RX ADMIN — Medication 100 MILLIGRAM(S): at 06:13

## 2018-11-20 RX ADMIN — Medication 5 MILLIGRAM(S): at 06:12

## 2018-11-20 RX ADMIN — Medication 10 MILLIGRAM(S): at 06:14

## 2018-11-20 RX ADMIN — SODIUM CHLORIDE 3 MILLILITER(S): 9 INJECTION INTRAMUSCULAR; INTRAVENOUS; SUBCUTANEOUS at 06:14

## 2018-11-20 RX ADMIN — Medication 1 GRAM(S): at 06:13

## 2018-11-20 RX ADMIN — PANTOPRAZOLE SODIUM 40 MILLIGRAM(S): 20 TABLET, DELAYED RELEASE ORAL at 06:12

## 2018-11-20 RX ADMIN — CHLORHEXIDINE GLUCONATE 1 APPLICATION(S): 213 SOLUTION TOPICAL at 12:05

## 2018-11-20 RX ADMIN — CARVEDILOL PHOSPHATE 12.5 MILLIGRAM(S): 80 CAPSULE, EXTENDED RELEASE ORAL at 06:13

## 2018-11-20 RX ADMIN — AMLODIPINE BESYLATE 10 MILLIGRAM(S): 2.5 TABLET ORAL at 12:04

## 2018-11-20 RX ADMIN — Medication 1 GRAM(S): at 12:04

## 2018-11-20 RX ADMIN — Medication 10 MILLIGRAM(S): at 12:05

## 2018-11-20 RX ADMIN — LISINOPRIL 40 MILLIGRAM(S): 2.5 TABLET ORAL at 12:04

## 2018-11-20 RX ADMIN — Medication 1000 UNIT(S): at 12:04

## 2018-11-20 RX ADMIN — ISOSORBIDE MONONITRATE 120 MILLIGRAM(S): 60 TABLET, EXTENDED RELEASE ORAL at 12:04

## 2018-11-20 RX ADMIN — Medication 125 MICROGRAM(S): at 06:13

## 2018-11-20 RX ADMIN — Medication 10 MILLIGRAM(S): at 12:04

## 2018-11-20 RX ADMIN — HEPARIN SODIUM 5000 UNIT(S): 5000 INJECTION INTRAVENOUS; SUBCUTANEOUS at 06:14

## 2018-11-20 RX ADMIN — SERTRALINE 50 MILLIGRAM(S): 25 TABLET, FILM COATED ORAL at 12:04

## 2018-11-20 RX ADMIN — Medication 0.3 MILLIGRAM(S): at 06:15

## 2018-11-20 NOTE — CHART NOTE - NSCHARTNOTEFT_GEN_A_CORE
Patient seen for malnutrition follow up    Source: Patient [ ]    Family [x]     other [ ]:Patient sleeping during interview- Sister at bedside- used  phone ([facility] ID number: [id number]), RN, EMR, previous RD note.      Current Diet : Dysphagia 2 Thin liquid + Renal + Low Sodium +  Nepro 3x daily (1,275 kcals, 52.3g protein).   Reported:  Per sister -denies diarrhea/constipation- however some nausea (Patient on Zofran). Denies difficulty chewing and swallowing.  Patient noted with fish and shellfish allergy.    PO intake:  Per sister patient with poor PO intake- just consuming jello and ice cream. RN stated patient is not eating much. Patient previously on Full liquid- diet recently advance during interview with RD. Patient's sister provided food preferences to RDN- stressed patient would like to eat soup, hard cooked eggs and jello. RDN will accommodate patient's food preferences- if compliant with diet order.      Current Weight: 11/20: 91.2 pounds, 11/18: 86.4 pounds, 11/16: 91.7 pounds, 11/9: 100.9 pounds, 11/5: 92.5 pounds, 11/2: 126.3 pounds.   No edema noted.     __________________ Pertinent Medications__________________   MEDICATIONS  (STANDING):  amLODIPine   Tablet 10 milliGRAM(s) Oral daily  atorvastatin 10 milliGRAM(s) Oral at bedtime  carvedilol 12.5 milliGRAM(s) Oral every 12 hours  chlorhexidine 4% Liquid 1 Application(s) Topical <User Schedule>  cholecalciferol 1000 Unit(s) Oral daily  cloNIDine 0.3 milliGRAM(s) Oral every 8 hours  dicyclomine 10 milliGRAM(s) Oral three times a day before meals  docusate sodium 100 milliGRAM(s) Oral three times a day  epoetin iam Injectable 4000 Unit(s) IV Push <User Schedule>  heparin  Injectable 5000 Unit(s) SubCutaneous every 12 hours  hydrALAZINE 100 milliGRAM(s) Oral every 8 hours  isosorbide   mononitrate ER Tablet (IMDUR) 120 milliGRAM(s) Oral daily  levothyroxine 125 MICROGram(s) Oral daily  lisinopril 40 milliGRAM(s) Oral daily  minoxidil 5 milliGRAM(s) Oral two times a day  pantoprazole    Tablet 40 milliGRAM(s) Oral two times a day  prochlorperazine   Tablet 10 milliGRAM(s) Oral every 6 hours  senna 2 Tablet(s) Oral at bedtime  sertraline 50 milliGRAM(s) Oral daily  sodium chloride 0.9% lock flush 3 milliLiter(s) IV Push every 8 hours  sucralfate suspension 1 Gram(s) Oral four times a day    MEDICATIONS  (PRN):  acetaminophen   Tablet .. 650 milliGRAM(s) Oral every 6 hours PRN Mild Pain (1 - 3), Moderate Pain (4 - 6), Severe Pain (7 - 10)  clonazePAM Tablet 0.5 milliGRAM(s) Oral at bedtime PRN Anxiety  ondansetron Injectable 4 milliGRAM(s) IV Push every 6 hours PRN Nausea and/or Vomiting  polyethylene glycol 3350 17 Gram(s) Oral daily PRN Constipation      __________________ Pertinent Labs__________________   11-20 Na136 mmol/L Glu 116 mg/dL<H> K+ 4.7 mmol/L Cr  2.94 mg/dL<H> BUN 23 mg/dL 11-20 Phos 3.7 mg/dL 10-31 [id number] 5.5 % 10-31 Chol 92 mg/dL<L> LDL 39 mg/dL HDL 35 mg/dL<L> Trig 111 mg/dL      Skin: intact- no pressure injuries noted.      Estimated Needs: [x] no change since previous assessment      Previous Nutrition Diagnosis:  [x] Malnutrition     Nutrition Diagnosis is [x] ongoing  [ ] resolved [ ] not applicable     New Nutrition Diagnosis: [x] not applicable      Nutrition Recommendations:  1. Continue with current diet order. Defer nutrition advancement to team.   2. Continue with Nepro 2x daily (850 kcals, 38.2g protein).   3. Monitor weights, labs, BM's, skin integrity, p.o. intake.   4. Reviewed menu selections and alternatives. Encourage adequate PO intake.   5. RDN to remain available. Zulema Hart RDN

## 2018-11-20 NOTE — PROGRESS NOTE ADULT - REASON FOR ADMISSION
chest pain

## 2018-11-20 NOTE — PROGRESS NOTE ADULT - ASSESSMENT
72F h/o htn, anemia, pulm HTN, non obstructive CAD, depression, diastolic HF, ESRD on HD[MWF] BIBEMS from [facility] for CP. Found to have hypertensive emergency with acute pulm edema complicated by hypothermia.
72F h/o htn, anemia, pulm HTN, non obstructive CAD, depression, diastolic HF, ESRD on HD[MWF], pulmonary HTN, BIBEMS from [facility] for CP. Found to have hypertensive emergency with acute pulm edema complicated by hypothermia.  With persistent nausea, vomiting, abdominal pain -somewhat improved on Reglan
71 yo F with PMHx of ESRD on HD TIW, non-obstructive CAD, diastolic heart failure, pulmonary hypertension, and HTN admitted for evaluation of chest pain.
71 yo F with PMHx of ESRD on HD TIW, non-obstructive CAD, diastolic heart failure, pulmonary hypertension, and HTN admitted for evaluation of chest pain. recently abdominal pain
72 F PMH of L pleural thickening s/p pleurodisis with L VATS in 2014, MI, non-obstructive CAD, ESRD-HD, [place], DM, hypothyroidism, chronic diastolic HF, Severe pulm HTN, hypothyroidism, AFib (no a/c dt falls), Anemia and multiple falls ?syncope s/p ILR ([facility] )implantation [facility]. GI consulted for abdominal pain
72 F PMH type 2 DM, HTN, nonobstructive CAD, ESRD on HD TTS, syncope s/p ILR, frequent falls c/b prior nasal fracture and cervical spine fracture, Afib no a/c 2/2 recent fall with scalp laceration/hematoma, BIBLILLIAM from [facility] for CP. Palliative Care consulted for symptom management.
72 year old  Bel[place]ian speaking female with a PMH of L pleural thickening s/p pleurodisis with L VATS in 2014, MI, non-obstructive CAD, ESRD-HD, [place], DM, hypothyroidism, chronic diastolic HF, HTN, hypothyroidism, anemia and multiple falls ?syncope s/p ILR ([facility]implantation on June [facility].  She was found bradycardic with HR down to low 40's during sleeping hours and up to 50's bpm when awake.  Patient appeared asymptomatic with bradycardia on Carvedilol 25 mg BID and Clonidine 0.3 mg TID for HTN.  No advanced AV blocks or persistent significant chelsea <50 bpm seen on telemetry last night.   -May continue current meds; if symptomatic chelsea or advanced AV block occurs, then would reduce Carvediolol dosage    -No acute pacing indication at present time  -Continue care per primary team  -Will follow up
72 year old  Macedonian speaking female with a PMH of L pleural thickening s/p pleurodisis with L VATS in 2014, MI, non-obstructive CAD, ESRD-HD, [place], DM, hypothyroidism, chronic diastolic HF, HTN, hypothyroidism, anemia and multiple falls ?syncope s/p ILR ([facility]implantation on June [facility].  She was found bradycardic with HR down to low 40's during sleeping hours and up to 50's bpm when awake.  Patient appeared asymptomatic with bradycardia on Carvedilol 25 mg BID and Clonidine 0.3 mg TID for HTN.  No advanced AV blocks or persistent significant chelsea <40 bpm seen on telemetry.    -May continue current meds; if symptomatic chelsea or advanced AV block occurs, then would reduce Carvediolol dosage    -No acute pacing indication at present time  -Continue care per primary team  -Will follow up
72F h/o htn, anemia, pulm HTN, non obstructive CAD, depression, diastolic HF, ESRD on HD[MWF] BIBEMS from [facility] for CP. Found to have hypertensive emergency with acute pulm edema complicated by hypothermia.
72F h/o htn, anemia, pulm HTN, non obstructive CAD, depression, diastolic HF, ESRD on HD[MWF] BIBEMS from [facility] for CP. Found to have hypertensive emergency with acute pulm edema complicated by hypothermia.
72F h/o htn, anemia, pulm HTN, non obstructive CAD, depression, diastolic HF, ESRD on HD[MWF] BIBEMS from [facility] for CP. Found to have hypertensive emergency with acute pulm edema complicated by hypothermia.
72F h/o htn, anemia, pulm HTN, non obstructive CAD, depression, diastolic HF, ESRD on HD[MWF] BIBEMS from [facility] for CP. Found to have hypertensive emergency with acute pulm edema complicated by hypothermia.
72F h/o htn, anemia, pulm HTN, non obstructive CAD, depression, diastolic HF, ESRD on HD[MWF] BIBEMS from [facility] for CP. Found to have hypertensive emergency with acute pulm edema complicated by hypothermia.
72F h/o htn, anemia, pulm HTN, non obstructive CAD, depression, diastolic HF, ESRD on HD[MWF] BIBEMS from [facility] for CP. Found to have hypertensive emergency with acute pulm edema complicated by hypothermia.
72F h/o htn, anemia, pulm HTN, non obstructive CAD, depression, diastolic HF, ESRD on HD[MWF] BIBEMS from [facility] for CP. Found to have hypertensive emergency with acute pulm edema complicated by hypothermia.
72F h/o htn, anemia, pulm HTN, non obstructive CAD, depression, diastolic HF, ESRD on HD[MWF] BIBEMS from [facility] for CP. Found to have hypertensive emergency with acute pulm edema complicated by hypothermia.
72F h/o htn, anemia, pulm HTN, non obstructive CAD, depression, diastolic HF, ESRD on HD[MWF], pulmonary HTN, BIBEMS from [facility] for CP. Found to have hypertensive emergency with acute pulm edema complicated by hypothermia.  With persistent nausea, vomiting, abdominal pain -somewhat improved on Reglan
72F h/o htn, anemia, pulm HTN, non obstructive CAD, depression, diastolic HF, ESRD on HD[MWF], pulmonary HTN, BIBEMS from [facility] for CP. Found to have hypertensive emergency with acute pulm edema complicated by hypothermia.  With persistent nausea, vomiting, abdominal pain -somewhat improved on Reglan
72F h/o htn, anemia, pulm HTN, non obstructive CAD, depression, diastolic HF, ESRD on HD[MWF], pulmonary HTN, BIBEMS from [facility] for CP. Found to have hypertensive emergency with acute pulm edema complicated by hypothermia.  With persistent nausea, vomiting, abdominal pain -somewhat improved on Reglan
72F h/o htn, anemia, pulm HTN, non obstructive CAD, depression, diastolic HF, ESRD on HD[MWF], pulmonary HTN, BIBEMS from [facility] for CP. Found to have hypertensive emergency with acute pulm edema complicated by hypothermia.  With persistent nausea, vomiting, abdominal pain
72F h/o htn, anemia, pulm HTN, non obstructive CAD, depression, diastolic HF, ESRD on HD[MWF], pulmonary HTN, BIBEMS from [facility] for CP. Found to have hypertensive emergency with acute pulm edema complicated by hypothermia.  With persistent nausea, vomiting, abdominal pain
72F h/o htn, anemia, pulm HTN, non obstructive CAD, depression, diastolic HF, ESRD on HD[MWF], pulmonary HTN, BIBEMS from [facility] for CP. Found to have hypertensive emergency with acute pulm edema complicated by hypothermia.  With persistent nausea, vomiting, abdominal pain - improved on Reglan
72F h/o htn, anemia, pulm HTN, non obstructive CAD, depression, diastolic HF, ESRD on HD[MWF], pulmonary HTN, BIBEMS from [facility] for CP. Found to have hypertensive emergency with acute pulm edema complicated by hypothermia.  With persistent nausea, vomiting, abdominal pain
72F h/o htn, anemia, pulm HTN, non obstructive CAD, depression, diastolic HF, ESRD on HD[MWF], pulmonary HTN, BIBEMS from [facility] for CP. Found to have hypertensive emergency with acute pulm edema complicated by hypothermia.  With persistent nausea, vomiting, abdominal pain -somewhat improved on Reglan
73 yo F with PMHx of ESRD on HD TIW, non-obstructive CAD, diastolic heart failure, pulmonary hypertension, and HTN admitted for evaluation of chest pain.
73 yo F with PMHx of ESRD on HD TIW, non-obstructive CAD, diastolic heart failure, pulmonary hypertension, and HTN admitted for evaluation of chest pain.
73 yo female admitted with intractable nausea and vomiting
71 yo F with PMHx of ESRD on HD TIW, non-obstructive CAD, diastolic heart failure, pulmonary hypertension, and HTN admitted for evaluation of chest pain. HTN and anemia, recenrly bradycardia on reduced coreg    HTN UF today with HD 3 KG. on max antihypertensives   -recommend starting minoxidil 2.5mg BID       ESRD (end stage renal disease).  Plan: HD today. 2 K bath . 3 hours.        SHAY  for anemia if BP improves.
71 yo F with PMHx of ESRD on HD TIW, non-obstructive CAD, diastolic heart failure, pulmonary hypertension, and HTN admitted for evaluation of chest pain.
72F h/o htn, anemia, pulm HTN, non obstructive CAD, depression, diastolic HF, ESRD on HD[MWF] BIBEMS from [facility] for CP. Found to have hypertensive emergency with acute pulm edema complicated by hypothermia.
73 yo female admitted with intractable nausea and vomiting
72F h/o htn, anemia, pulm HTN, non obstructive CAD, depression, diastolic HF, ESRD on HD[MWF] BIBEMS from [facility] for CP. Found to have hypertensive emergency with acute pulm edema complicated by hypothermia.
72F h/o htn, anemia, pulm HTN, non obstructive CAD, depression, diastolic HF, ESRD on HD[MWF] BIBEMS from [facility] for CP. Found to have hypertensive emergency with acute pulm edema complicated by hypothermia.

## 2018-11-20 NOTE — PROGRESS NOTE ADULT - PROVIDER SPECIALTY LIST ADULT
Cardiology
Electrophysiology
Electrophysiology
Gastroenterology
Hospitalist
Internal Medicine
Internal Medicine
Nephrology
Palliative Care
Gastroenterology
Nephrology
Hospitalist
Cardiology
Hospitalist

## 2018-11-20 NOTE — PROGRESS NOTE ADULT - SUBJECTIVE AND OBJECTIVE BOX
INTERVAL HPI/OVERNIGHT EVENTS:    overnight without episodes of vomiting; had some spitting up  this morning was sleeping and without abdominal complaints; no vomiting episodes     MEDICATIONS  (STANDING):  amLODIPine   Tablet 10 milliGRAM(s) Oral daily  atorvastatin 10 milliGRAM(s) Oral at bedtime  carvedilol 12.5 milliGRAM(s) Oral every 12 hours  chlorhexidine 4% Liquid 1 Application(s) Topical <User Schedule>  cholecalciferol 1000 Unit(s) Oral daily  cloNIDine 0.3 milliGRAM(s) Oral every 8 hours  dicyclomine 10 milliGRAM(s) Oral three times a day before meals  docusate sodium 100 milliGRAM(s) Oral three times a day  epoetin iam Injectable 4000 Unit(s) IV Push <User Schedule>  heparin  Injectable 5000 Unit(s) SubCutaneous every 12 hours  hydrALAZINE 100 milliGRAM(s) Oral every 8 hours  isosorbide   mononitrate ER Tablet (IMDUR) 120 milliGRAM(s) Oral daily  levothyroxine 125 MICROGram(s) Oral daily  lisinopril 40 milliGRAM(s) Oral daily  minoxidil 5 milliGRAM(s) Oral two times a day  pantoprazole    Tablet 40 milliGRAM(s) Oral two times a day  prochlorperazine   Tablet 10 milliGRAM(s) Oral every 6 hours  senna 2 Tablet(s) Oral at bedtime  sertraline 50 milliGRAM(s) Oral daily  sodium chloride 0.9% lock flush 3 milliLiter(s) IV Push every 8 hours  sucralfate suspension 1 Gram(s) Oral four times a day    MEDICATIONS  (PRN):  acetaminophen   Tablet .. 650 milliGRAM(s) Oral every 6 hours PRN Mild Pain (1 - 3), Moderate Pain (4 - 6), Severe Pain (7 - 10)  clonazePAM Tablet 0.5 milliGRAM(s) Oral at bedtime PRN Anxiety  ondansetron Injectable 4 milliGRAM(s) IV Push every 6 hours PRN Nausea and/or Vomiting  polyethylene glycol 3350 17 Gram(s) Oral daily PRN Constipation      Allergies    Avelox (Unknown)  fish (Hives; Rash)  shellfish (Unknown)    Intolerances        Review of Systems:    General:  No wt loss, fevers, chills, night sweats, fatigue   Eyes:  Good vision, no reported pain  ENT:  No sore throat, pain, runny nose, dysphagia  CV:  No pain, palpitations, hypo/hypertension  Resp:  No dyspnea, cough, tachypnea, wheezing  GI:  No pain, No nausea, No vomiting, No diarrhea, No constipation, No weight loss, No fever, No pruritis, No rectal bleeding, No melena, No dysphagia  :  No pain, bleeding, incontinence, nocturia  Muscle:  No pain, weakness  Neuro:  No weakness, tingling, memory problems  Psych:  No fatigue, insomnia, mood problems, depression  Endocrine:  No polyuria, polydypsia, cold/heat intolerance  Heme:  No petechiae, ecchymosis, easy bruisability  Skin:  No rash, tattoos, scars, edema      Vital Signs Last 24 Hrs  T(C): 36.7 (20 Nov 2018 05:19), Max: 36.8 (19 Nov 2018 17:08)  T(F): 98.1 (20 Nov 2018 05:19), Max: 98.2 (19 Nov 2018 17:08)  HR: 69 (20 Nov 2018 05:19) (69 - 72)  BP: 156/61 (20 Nov 2018 05:19) (131/62 - 156/61)  BP(mean): --  RR: 18 (20 Nov 2018 05:19) (18 - 18)  SpO2: 98% (20 Nov 2018 05:19) (98% - 98%)    PHYSICAL EXAM:    Constitutional: NAD  HEENT: EOMI, throat clear  Neck: No LAD, supple  Respiratory: CTA and P  Cardiovascular: S1 and S2, RRR, no M  Gastrointestinal: BS+, soft, NT/ND, neg HSM,  Extremities: No peripheral edema, neg clubbing, cyanosis  Vascular: 2+ peripheral pulses  Neurological: A/O x 1, no focal deficits  Psychiatric: Normal mood, normal affect  Skin: No rashes      LABS:                        10.9   5.19  )-----------( 272      ( 20 Nov 2018 05:40 )             33.4     11-20    136  |  95<L>  |  23  ----------------------------<  116<H>  4.7   |  25  |  2.94<H>    Ca    8.6      20 Nov 2018 05:40  Phos  3.7     11-20  Mg     2.1     11-20            RADIOLOGY & ADDITIONAL TESTS:

## 2018-11-20 NOTE — PROGRESS NOTE ADULT - SUBJECTIVE AND OBJECTIVE BOX
Patient is a 72y old  Female who presents with a chief complaint of chest pain (19 Nov 2018 12:48)        SUBJECTIVE / OVERNIGHT EVENTS: No event overnight. Pt still vomiting small amounts of "phlegm" per sister. Continues to eat jello and other soft foods.      MEDICATIONS  (STANDING):  amLODIPine   Tablet 10 milliGRAM(s) Oral daily  atorvastatin 10 milliGRAM(s) Oral at bedtime  carvedilol 12.5 milliGRAM(s) Oral every 12 hours  chlorhexidine 4% Liquid 1 Application(s) Topical <User Schedule>  cholecalciferol 1000 Unit(s) Oral daily  cloNIDine 0.3 milliGRAM(s) Oral every 8 hours  dicyclomine 10 milliGRAM(s) Oral three times a day before meals  docusate sodium 100 milliGRAM(s) Oral three times a day  epoetin iam Injectable 4000 Unit(s) IV Push <User Schedule>  heparin  Injectable 5000 Unit(s) SubCutaneous every 12 hours  hydrALAZINE 100 milliGRAM(s) Oral every 8 hours  isosorbide   mononitrate ER Tablet (IMDUR) 120 milliGRAM(s) Oral daily  levothyroxine 125 MICROGram(s) Oral daily  lisinopril 40 milliGRAM(s) Oral daily  minoxidil 5 milliGRAM(s) Oral two times a day  pantoprazole    Tablet 40 milliGRAM(s) Oral two times a day  prochlorperazine   Tablet 10 milliGRAM(s) Oral every 6 hours  senna 2 Tablet(s) Oral at bedtime  sertraline 50 milliGRAM(s) Oral daily  sodium chloride 0.9% lock flush 3 milliLiter(s) IV Push every 8 hours  sucralfate suspension 1 Gram(s) Oral four times a day    MEDICATIONS  (PRN):  acetaminophen   Tablet .. 650 milliGRAM(s) Oral every 6 hours PRN Mild Pain (1 - 3), Moderate Pain (4 - 6), Severe Pain (7 - 10)  clonazePAM Tablet 0.5 milliGRAM(s) Oral at bedtime PRN Anxiety  ondansetron Injectable 4 milliGRAM(s) IV Push every 6 hours PRN Nausea and/or Vomiting  polyethylene glycol 3350 17 Gram(s) Oral daily PRN Constipation      Vital Signs Last 24 Hrs  T(C): 36.7 (20 Nov 2018 05:19), Max: 36.8 (19 Nov 2018 17:08)  T(F): 98.1 (20 Nov 2018 05:19), Max: 98.2 (19 Nov 2018 17:08)  HR: 69 (20 Nov 2018 05:19) (69 - 72)  BP: 156/61 (20 Nov 2018 05:19) (131/62 - 156/61)  BP(mean): --  RR: 18 (20 Nov 2018 05:19) (18 - 18)  SpO2: 98% (20 Nov 2018 05:19) (98% - 98%)  CAPILLARY BLOOD GLUCOSE        I&O's Summary        PHYSICAL EXAM  GENERAL: older woman, cachectic, NAD  HEAD:  Atraumatic, Normocephalic  EYES: EOMI, PERRLA, conjunctiva and sclera clear  CHEST/LUNG: Clear to auscultation bilaterally; No wheeze  HEART: Regular rate and rhythm; No murmurs, rubs, or gallops  ABDOMEN: Soft, Nontender, Nondistended; Bowel sounds present  EXTREMITIES:  2+ Peripheral Pulses, No clubbing, cyanosis, or edema      LABS:                        10.9   5.19  )-----------( 272      ( 20 Nov 2018 05:40 )             33.4     11-20    136  |  95<L>  |  23  ----------------------------<  116<H>  4.7   |  25  |  2.94<H>    Ca    8.6      20 Nov 2018 05:40  Phos  3.7     11-20  Mg     2.1     11-20                  RADIOLOGY & ADDITIONAL TESTS:    Imaging Personally Reviewed:  Consultant(s) Notes Reviewed:    Care Discussed with Consultants/Other Providers:

## 2018-11-20 NOTE — PROGRESS NOTE ADULT - PROBLEM SELECTOR PLAN 1
With chronic abdominal pain - followed by GI, suspect d/t ESRD - c/w Bentyl, PPI, bowel regimen (s/p bowel prep 11/12, scopes 11/13 - EGD gastritis, colon nodular mucosa in cecum, path pending)  gastritis - on PPI, sucralfate  - appreciate PC input - seems some improvement on Reglan PO with Zofran PRN, continue liquid diet  - Family encouraged to bring anything pt desires. Family aware these symptoms are chronic and unlikely to resolve.

## 2018-11-20 NOTE — PROGRESS NOTE ADULT - PROBLEM SELECTOR PLAN 1
- suspect in setting of ESRD/dCHF with Severe Pulmonary HTN as noted on TTE 7/2018  - CT Abd and AXR both unrevealing   - cont Bentyl TID   - protonix 40mg PO BID  - simethicone prn  - cont senna/colace and miralax bid; dulcolax supp prn  - pain control prn  - cont HD per renal with fluid removal   - s/p EGD (11/13) with gastritis  - s/p Colon (11/13) w/Nodular mucosa in the cecum; pathology testing  - soft diet as tolerated

## 2018-11-20 NOTE — PROGRESS NOTE ADULT - PROBLEM SELECTOR PLAN 4
asymptomatic bradycardia on coreg-decreased to 12.5mg BID  Appreciate EP eval: No advanced AV blocks or persistent significant chelsea <40 bpm seen on telemetry, per EP if symptomatic chelsea or advanced AV block occurs, then would reduce Carvedilol dosage    -No acute pacing indication at present time  [id number] score of 6   NO AC given hx of multiple falls and positive FOBT.

## 2018-11-26 ENCOUNTER — INPATIENT (INPATIENT)
Facility: HOSPITAL | Age: 73
LOS: 5 days | DRG: 208 | End: 2018-12-02
Attending: INTERNAL MEDICINE | Admitting: INTERNAL MEDICINE
Payer: MEDICARE

## 2018-11-26 ENCOUNTER — OUTPATIENT (OUTPATIENT)
Dept: OUTPATIENT SERVICES | Facility: HOSPITAL | Age: 73
LOS: 1 days | End: 2018-11-26

## 2018-11-26 ENCOUNTER — APPOINTMENT (OUTPATIENT)
Dept: FAMILY MEDICINE | Facility: HOSPITAL | Age: 73
End: 2018-11-26

## 2018-11-26 VITALS
SYSTOLIC BLOOD PRESSURE: 170 MMHG | HEART RATE: 68 BPM | RESPIRATION RATE: 14 BRPM | OXYGEN SATURATION: 100 % | DIASTOLIC BLOOD PRESSURE: 70 MMHG

## 2018-11-26 VITALS
SYSTOLIC BLOOD PRESSURE: 170 MMHG | OXYGEN SATURATION: 100 % | RESPIRATION RATE: 14 BRPM | DIASTOLIC BLOOD PRESSURE: 70 MMHG | WEIGHT: 100.09 LBS | HEIGHT: 60 IN | HEART RATE: 68 BPM | TEMPERATURE: 97 F

## 2018-11-26 DIAGNOSIS — Z00.00 ENCOUNTER FOR GENERAL ADULT MEDICAL EXAMINATION WITHOUT ABNORMAL FINDINGS: ICD-10-CM

## 2018-11-26 DIAGNOSIS — M54.5 LOW BACK PAIN: ICD-10-CM

## 2018-11-26 DIAGNOSIS — J18.9 PNEUMONIA, UNSPECIFIED ORGANISM: ICD-10-CM

## 2018-11-26 LAB
ALBUMIN SERPL ELPH-MCNC: 3.1 G/DL — LOW (ref 3.3–5)
ALP SERPL-CCNC: 183 U/L — HIGH (ref 40–120)
ALT FLD-CCNC: 11 U/L DA — SIGNIFICANT CHANGE UP (ref 10–45)
ANION GAP SERPL CALC-SCNC: 12 MMOL/L — SIGNIFICANT CHANGE UP (ref 5–17)
AST SERPL-CCNC: 22 U/L — SIGNIFICANT CHANGE UP (ref 10–40)
BASOPHILS # BLD AUTO: 0.1 K/UL — SIGNIFICANT CHANGE UP (ref 0–0.2)
BASOPHILS NFR BLD AUTO: 1 % — SIGNIFICANT CHANGE UP (ref 0–2)
BILIRUB SERPL-MCNC: 0.8 MG/DL — SIGNIFICANT CHANGE UP (ref 0.2–1.2)
BUN SERPL-MCNC: 20 MG/DL — SIGNIFICANT CHANGE UP (ref 7–23)
CALCIUM SERPL-MCNC: 8.8 MG/DL — SIGNIFICANT CHANGE UP (ref 8.4–10.5)
CHLORIDE SERPL-SCNC: 96 MMOL/L — SIGNIFICANT CHANGE UP (ref 96–108)
CO2 SERPL-SCNC: 29 MMOL/L — SIGNIFICANT CHANGE UP (ref 22–31)
CREAT SERPL-MCNC: 3.44 MG/DL — HIGH (ref 0.5–1.3)
EOSINOPHIL # BLD AUTO: 0.1 K/UL — SIGNIFICANT CHANGE UP (ref 0–0.5)
EOSINOPHIL NFR BLD AUTO: 1 % — SIGNIFICANT CHANGE UP (ref 0–6)
GLUCOSE SERPL-MCNC: 124 MG/DL — HIGH (ref 70–99)
HCT VFR BLD CALC: 32.1 % — LOW (ref 34.5–45)
HGB BLD-MCNC: 10 G/DL — LOW (ref 11.5–15.5)
LACTATE SERPL-SCNC: 0.8 MMOL/L — SIGNIFICANT CHANGE UP (ref 0.7–2)
LYMPHOCYTES # BLD AUTO: 0.9 K/UL — LOW (ref 1–3.3)
LYMPHOCYTES # BLD AUTO: 8.4 % — LOW (ref 13–44)
MCHC RBC-ENTMCNC: 29.1 PG — SIGNIFICANT CHANGE UP (ref 27–34)
MCHC RBC-ENTMCNC: 31.3 GM/DL — LOW (ref 32–36)
MCV RBC AUTO: 92.9 FL — SIGNIFICANT CHANGE UP (ref 80–100)
MONOCYTES # BLD AUTO: 1 K/UL — HIGH (ref 0–0.9)
MONOCYTES NFR BLD AUTO: 9.2 % — HIGH (ref 1–9)
NEUTROPHILS # BLD AUTO: 8.3 K/UL — HIGH (ref 1.8–7.4)
NEUTROPHILS NFR BLD AUTO: 80.3 % — HIGH (ref 43–77)
PLATELET # BLD AUTO: 216 K/UL — SIGNIFICANT CHANGE UP (ref 150–400)
POTASSIUM SERPL-MCNC: 2.9 MMOL/L — CRITICAL LOW (ref 3.5–5.3)
POTASSIUM SERPL-SCNC: 2.9 MMOL/L — CRITICAL LOW (ref 3.5–5.3)
PROT SERPL-MCNC: 7 G/DL — SIGNIFICANT CHANGE UP (ref 6–8.3)
RBC # BLD: 3.45 M/UL — LOW (ref 3.8–5.2)
RBC # FLD: 17.8 % — HIGH (ref 10.3–14.5)
SODIUM SERPL-SCNC: 137 MMOL/L — SIGNIFICANT CHANGE UP (ref 135–145)
WBC # BLD: 10.4 K/UL — SIGNIFICANT CHANGE UP (ref 3.8–10.5)
WBC # FLD AUTO: 10.4 K/UL — SIGNIFICANT CHANGE UP (ref 3.8–10.5)

## 2018-11-26 PROCEDURE — 99285 EMERGENCY DEPT VISIT HI MDM: CPT

## 2018-11-26 PROCEDURE — 99223 1ST HOSP IP/OBS HIGH 75: CPT

## 2018-11-26 PROCEDURE — 71045 X-RAY EXAM CHEST 1 VIEW: CPT | Mod: 26

## 2018-11-26 PROCEDURE — 93010 ELECTROCARDIOGRAM REPORT: CPT

## 2018-11-26 PROCEDURE — 72128 CT CHEST SPINE W/O DYE: CPT | Mod: 26

## 2018-11-26 PROCEDURE — 72131 CT LUMBAR SPINE W/O DYE: CPT | Mod: 26

## 2018-11-26 RX ORDER — ATORVASTATIN CALCIUM 80 MG/1
20 TABLET, FILM COATED ORAL AT BEDTIME
Qty: 0 | Refills: 0 | Status: DISCONTINUED | OUTPATIENT
Start: 2018-11-26 | End: 2018-11-27

## 2018-11-26 RX ORDER — DEXTROSE 50 % IN WATER 50 %
25 SYRINGE (ML) INTRAVENOUS ONCE
Qty: 0 | Refills: 0 | Status: DISCONTINUED | OUTPATIENT
Start: 2018-11-26 | End: 2018-12-02

## 2018-11-26 RX ORDER — INSULIN GLARGINE 100 [IU]/ML
15 INJECTION, SOLUTION SUBCUTANEOUS
Qty: 0 | Refills: 0 | COMMUNITY

## 2018-11-26 RX ORDER — CEFEPIME 1 G/1
500 INJECTION, POWDER, FOR SOLUTION INTRAMUSCULAR; INTRAVENOUS ONCE
Qty: 0 | Refills: 0 | Status: COMPLETED | OUTPATIENT
Start: 2018-11-26 | End: 2018-11-26

## 2018-11-26 RX ORDER — LABETALOL HCL 100 MG
1 TABLET ORAL
Qty: 0 | Refills: 0 | COMMUNITY

## 2018-11-26 RX ORDER — ACETAMINOPHEN 500 MG
650 TABLET ORAL EVERY 6 HOURS
Qty: 0 | Refills: 0 | Status: DISCONTINUED | OUTPATIENT
Start: 2018-11-26 | End: 2018-11-29

## 2018-11-26 RX ORDER — GLUCAGON INJECTION, SOLUTION 0.5 MG/.1ML
1 INJECTION, SOLUTION SUBCUTANEOUS ONCE
Qty: 0 | Refills: 0 | Status: DISCONTINUED | OUTPATIENT
Start: 2018-11-26 | End: 2018-11-27

## 2018-11-26 RX ORDER — DEXTROSE 50 % IN WATER 50 %
12.5 SYRINGE (ML) INTRAVENOUS ONCE
Qty: 0 | Refills: 0 | Status: DISCONTINUED | OUTPATIENT
Start: 2018-11-26 | End: 2018-12-02

## 2018-11-26 RX ORDER — VANCOMYCIN HCL 1 G
1000 VIAL (EA) INTRAVENOUS ONCE
Qty: 0 | Refills: 0 | Status: COMPLETED | OUTPATIENT
Start: 2018-11-26 | End: 2018-11-27

## 2018-11-26 RX ORDER — HYDRALAZINE HCL 50 MG
25 TABLET ORAL
Qty: 0 | Refills: 0 | COMMUNITY

## 2018-11-26 RX ORDER — SODIUM CHLORIDE 9 MG/ML
1000 INJECTION, SOLUTION INTRAVENOUS
Qty: 0 | Refills: 0 | Status: DISCONTINUED | OUTPATIENT
Start: 2018-11-26 | End: 2018-12-02

## 2018-11-26 RX ORDER — DEXTROSE 50 % IN WATER 50 %
15 SYRINGE (ML) INTRAVENOUS ONCE
Qty: 0 | Refills: 0 | Status: DISCONTINUED | OUTPATIENT
Start: 2018-11-26 | End: 2018-11-27

## 2018-11-26 RX ORDER — SERTRALINE 25 MG/1
50 TABLET, FILM COATED ORAL DAILY
Qty: 0 | Refills: 0 | Status: DISCONTINUED | OUTPATIENT
Start: 2018-11-26 | End: 2018-12-02

## 2018-11-26 RX ORDER — CEFTRIAXONE 500 MG/1
1 INJECTION, POWDER, FOR SOLUTION INTRAMUSCULAR; INTRAVENOUS ONCE
Qty: 0 | Refills: 0 | Status: COMPLETED | OUTPATIENT
Start: 2018-11-26 | End: 2018-11-26

## 2018-11-26 RX ORDER — POTASSIUM CHLORIDE 20 MEQ
10 PACKET (EA) ORAL
Qty: 0 | Refills: 0 | Status: DISCONTINUED | OUTPATIENT
Start: 2018-11-26 | End: 2018-11-27

## 2018-11-26 RX ORDER — INSULIN LISPRO 100/ML
5 VIAL (ML) SUBCUTANEOUS
Qty: 0 | Refills: 0 | Status: DISCONTINUED | OUTPATIENT
Start: 2018-11-26 | End: 2018-11-27

## 2018-11-26 RX ORDER — GABAPENTIN 400 MG/1
1 CAPSULE ORAL
Qty: 0 | Refills: 0 | COMMUNITY

## 2018-11-26 RX ORDER — CEFEPIME 1 G/1
INJECTION, POWDER, FOR SOLUTION INTRAMUSCULAR; INTRAVENOUS
Qty: 0 | Refills: 0 | Status: DISCONTINUED | OUTPATIENT
Start: 2018-11-26 | End: 2018-11-26

## 2018-11-26 RX ORDER — CLONAZEPAM 1 MG
0.5 TABLET ORAL AT BEDTIME
Qty: 0 | Refills: 0 | Status: DISCONTINUED | OUTPATIENT
Start: 2018-11-26 | End: 2018-11-27

## 2018-11-26 RX ORDER — CALCIUM ACETATE 667 MG
667 TABLET ORAL
Qty: 0 | Refills: 0 | Status: DISCONTINUED | OUTPATIENT
Start: 2018-11-26 | End: 2018-11-27

## 2018-11-26 RX ORDER — POTASSIUM CHLORIDE 20 MEQ
40 PACKET (EA) ORAL ONCE
Qty: 0 | Refills: 0 | Status: COMPLETED | OUTPATIENT
Start: 2018-11-26 | End: 2018-11-26

## 2018-11-26 RX ORDER — CEFEPIME 1 G/1
1000 INJECTION, POWDER, FOR SOLUTION INTRAMUSCULAR; INTRAVENOUS ONCE
Qty: 0 | Refills: 0 | Status: DISCONTINUED | OUTPATIENT
Start: 2018-11-26 | End: 2018-11-26

## 2018-11-26 RX ORDER — AMLODIPINE BESYLATE 2.5 MG/1
10 TABLET ORAL DAILY
Qty: 0 | Refills: 0 | Status: DISCONTINUED | OUTPATIENT
Start: 2018-11-26 | End: 2018-11-27

## 2018-11-26 RX ORDER — ASPIRIN/CALCIUM CARB/MAGNESIUM 324 MG
1 TABLET ORAL
Qty: 0 | Refills: 0 | COMMUNITY

## 2018-11-26 RX ORDER — LEVOTHYROXINE SODIUM 125 MCG
100 TABLET ORAL DAILY
Qty: 0 | Refills: 0 | Status: DISCONTINUED | OUTPATIENT
Start: 2018-11-26 | End: 2018-11-28

## 2018-11-26 RX ORDER — HYDRALAZINE HCL 50 MG
25 TABLET ORAL EVERY 8 HOURS
Qty: 0 | Refills: 0 | Status: DISCONTINUED | OUTPATIENT
Start: 2018-11-26 | End: 2018-11-27

## 2018-11-26 RX ORDER — LABETALOL HCL 100 MG
200 TABLET ORAL
Qty: 0 | Refills: 0 | Status: DISCONTINUED | OUTPATIENT
Start: 2018-11-26 | End: 2018-11-27

## 2018-11-26 RX ORDER — SODIUM CHLORIDE 9 MG/ML
500 INJECTION INTRAMUSCULAR; INTRAVENOUS; SUBCUTANEOUS ONCE
Qty: 0 | Refills: 0 | Status: COMPLETED | OUTPATIENT
Start: 2018-11-26 | End: 2018-11-26

## 2018-11-26 RX ORDER — CEFEPIME 1 G/1
500 INJECTION, POWDER, FOR SOLUTION INTRAMUSCULAR; INTRAVENOUS EVERY 12 HOURS
Qty: 0 | Refills: 0 | Status: DISCONTINUED | OUTPATIENT
Start: 2018-11-27 | End: 2018-11-27

## 2018-11-26 RX ORDER — ASPIRIN/CALCIUM CARB/MAGNESIUM 324 MG
81 TABLET ORAL DAILY
Qty: 0 | Refills: 0 | Status: DISCONTINUED | OUTPATIENT
Start: 2018-11-26 | End: 2018-11-28

## 2018-11-26 RX ORDER — GABAPENTIN 400 MG/1
100 CAPSULE ORAL THREE TIMES A DAY
Qty: 0 | Refills: 0 | Status: DISCONTINUED | OUTPATIENT
Start: 2018-11-26 | End: 2018-11-27

## 2018-11-26 RX ORDER — ENOXAPARIN SODIUM 100 MG/ML
30 INJECTION SUBCUTANEOUS EVERY 24 HOURS
Qty: 0 | Refills: 0 | Status: DISCONTINUED | OUTPATIENT
Start: 2018-11-26 | End: 2018-11-27

## 2018-11-26 RX ORDER — ONDANSETRON 8 MG/1
4 TABLET, FILM COATED ORAL ONCE
Qty: 0 | Refills: 0 | Status: COMPLETED | OUTPATIENT
Start: 2018-11-26 | End: 2018-11-26

## 2018-11-26 RX ORDER — MORPHINE SULFATE 50 MG/1
2 CAPSULE, EXTENDED RELEASE ORAL ONCE
Qty: 0 | Refills: 0 | Status: DISCONTINUED | OUTPATIENT
Start: 2018-11-26 | End: 2018-11-26

## 2018-11-26 RX ORDER — AZITHROMYCIN 500 MG/1
500 TABLET, FILM COATED ORAL ONCE
Qty: 0 | Refills: 0 | Status: COMPLETED | OUTPATIENT
Start: 2018-11-26 | End: 2018-11-26

## 2018-11-26 RX ORDER — CEFEPIME 1 G/1
INJECTION, POWDER, FOR SOLUTION INTRAMUSCULAR; INTRAVENOUS
Qty: 0 | Refills: 0 | Status: DISCONTINUED | OUTPATIENT
Start: 2018-11-26 | End: 2018-11-27

## 2018-11-26 RX ORDER — INSULIN GLARGINE 100 [IU]/ML
15 INJECTION, SOLUTION SUBCUTANEOUS AT BEDTIME
Qty: 0 | Refills: 0 | Status: DISCONTINUED | OUTPATIENT
Start: 2018-11-26 | End: 2018-11-27

## 2018-11-26 RX ORDER — CALCIUM ACETATE 667 MG
1 TABLET ORAL
Qty: 0 | Refills: 0 | COMMUNITY

## 2018-11-26 RX ORDER — INSULIN ASPART 100 [IU]/ML
5 INJECTION, SOLUTION SUBCUTANEOUS
Qty: 0 | Refills: 0 | COMMUNITY

## 2018-11-26 RX ORDER — TRAMADOL HYDROCHLORIDE 50 MG/1
50 TABLET ORAL
Qty: 0 | Refills: 0 | COMMUNITY

## 2018-11-26 RX ADMIN — Medication 40 MILLIEQUIVALENT(S): at 16:58

## 2018-11-26 RX ADMIN — Medication 25 MILLIGRAM(S): at 21:34

## 2018-11-26 RX ADMIN — Medication 100 MILLIEQUIVALENT(S): at 17:58

## 2018-11-26 RX ADMIN — ATORVASTATIN CALCIUM 20 MILLIGRAM(S): 80 TABLET, FILM COATED ORAL at 21:34

## 2018-11-26 RX ADMIN — Medication 0.2 MILLIGRAM(S): at 21:34

## 2018-11-26 RX ADMIN — Medication 667 MILLIGRAM(S): at 21:34

## 2018-11-26 RX ADMIN — Medication 100 MILLIEQUIVALENT(S): at 16:58

## 2018-11-26 RX ADMIN — Medication 10 MILLIEQUIVALENT(S): at 17:58

## 2018-11-26 RX ADMIN — MORPHINE SULFATE 2 MILLIGRAM(S): 50 CAPSULE, EXTENDED RELEASE ORAL at 16:07

## 2018-11-26 RX ADMIN — SODIUM CHLORIDE 500 MILLILITER(S): 9 INJECTION INTRAMUSCULAR; INTRAVENOUS; SUBCUTANEOUS at 15:37

## 2018-11-26 RX ADMIN — GABAPENTIN 100 MILLIGRAM(S): 400 CAPSULE ORAL at 21:36

## 2018-11-26 RX ADMIN — CEFTRIAXONE 100 GRAM(S): 500 INJECTION, POWDER, FOR SOLUTION INTRAMUSCULAR; INTRAVENOUS at 17:40

## 2018-11-26 RX ADMIN — Medication 0.5 MILLIGRAM(S): at 21:34

## 2018-11-26 RX ADMIN — CEFEPIME 100 MILLIGRAM(S): 1 INJECTION, POWDER, FOR SOLUTION INTRAMUSCULAR; INTRAVENOUS at 21:36

## 2018-11-26 RX ADMIN — AMLODIPINE BESYLATE 10 MILLIGRAM(S): 2.5 TABLET ORAL at 21:34

## 2018-11-26 RX ADMIN — ONDANSETRON 4 MILLIGRAM(S): 8 TABLET, FILM COATED ORAL at 18:55

## 2018-11-26 RX ADMIN — AZITHROMYCIN 255 MILLIGRAM(S): 500 TABLET, FILM COATED ORAL at 18:10

## 2018-11-26 RX ADMIN — INSULIN GLARGINE 15 UNIT(S): 100 INJECTION, SOLUTION SUBCUTANEOUS at 21:34

## 2018-11-26 RX ADMIN — MORPHINE SULFATE 2 MILLIGRAM(S): 50 CAPSULE, EXTENDED RELEASE ORAL at 15:37

## 2018-11-26 NOTE — PHYSICAL EXAM
[Ill-Appearing] : ill-appearing [Cachectic] : cachexia was observed [No Respiratory Distress] : no respiratory distress  [Clear to Auscultation] : lungs were clear to auscultation bilaterally [No Accessory Muscle Use] : no accessory muscle use [Normal Rate] : normal rate  [Regular Rhythm] : with a regular rhythm [Normal S1, S2] : normal S1 and S2 [No Murmur] : no murmur heard [Soft] : abdomen soft [Non Tender] : non-tender [Non-distended] : non-distended [No Masses] : no abdominal mass palpated [No HSM] : no HSM [Normal Bowel Sounds] : normal bowel sounds [Normal Affect] : the affect was normal [Alert and Oriented x3] : oriented to person, place, and time [Normal Insight/Judgement] : insight and judgment were intact [de-identified] : In severe pain [de-identified] : Unable to do exam pt in extreme pain

## 2018-11-26 NOTE — ED PROVIDER NOTE - OBJECTIVE STATEMENT
72 F PMH type 2 DM, HTN, nonobstructive CAD, ESRD on HD TTS, syncope s/p ILR, frequent falls c/b prior nasal fracture and cervical spine fracture, Afib no a/c  as per pts  pt was recently admitted for an extended length of time at [facility] from [facility] and is currently on dialysis T,Th,Sa. c/o lower back pain whenever she sits in the wheelchair  Patient unable to provide a subjective history due to underlying dementia, history obtained from medical records, pts  and sister.   [id number]. pt also gave permission to speak with

## 2018-11-26 NOTE — HISTORY OF PRESENT ILLNESS
[FreeTextEntry8] : 72 year old female with history of ESRD on HD (thu/ thurs/sat), Anemia of Chronic Disease, Paroxysmal Atrial Fibrillation not on VKA, CAD, Depression, HTN, DM, HFpEF, Hypothyroidism, blindness, recurrent falls is here for a post hospital follow up. Pt was recently admitted to the hospital for Anemia, chest pain and Hypertensive urgency. \par \par Pt presented today with severe lower back pain 10/10 in intensity that started last night and got worse. Denies any recent falls or trauma.\par

## 2018-11-26 NOTE — ED ADULT NURSE NOTE - NSIMPLEMENTINTERV_GEN_ALL_ED
Implemented All Fall with Harm Risk Interventions:  [place] to call system. Call bell, personal items and telephone within reach. Instruct patient to call for assistance. Room bathroom lighting operational. Non-slip footwear when patient is off stretcher. Physically safe environment: no spills, clutter or unnecessary equipment. Stretcher in lowest position, wheels locked, appropriate side rails in place. Provide visual cue, wrist band, yellow gown, etc. Monitor gait and stability. Monitor for mental status changes and reorient to person, place, and time. Review medications for side effects contributing to fall risk. Reinforce activity limits and safety measures with patient and family. Provide visual clues: red socks.

## 2018-11-26 NOTE — H&P ADULT - NSHPLABSRESULTS_GEN_ALL_CORE
10.0   10.4  )-----------( 216      ( 26 Nov 2018 15:20 )             32.1       11-26    137  |  96  |  20  ----------------------------<  124<H>  2.9<LL>   |  29  |  3.44<H>    Ca    8.8      26 Nov 2018 15:20    TPro  7.0  /  Alb  3.1<L>  /  TBili  0.8  /  DBili  x   /  AST  22  /  ALT  11  /  AlkPhos  183<H>  11-26                            < from: CT Thoracic Spine No Cont (11.26.18 @ 15:38) >      IMPRESSION:  No acute thoracic findings. Stable endplate destruction presumed   degenerative in the upper lumbar spine.    Right-sided pulmonary infiltrate consistent with pneumonia or pneumonitis.                  LOI SAHNI M.D., ATTENDING RADIOLOGIST  This document has been electronically signed. Nov 26 2018  3:53PM        < end of copied text >

## 2018-11-26 NOTE — H&P ADULT - PMH
Anemia    Anxiety    Cataracts, bilateral  left worse than right  CHF (congestive heart failure)    CHF (congestive heart failure)  diagnosed 2-2014  CKD (chronic kidney disease)    Diabetes  DM 2  Diabetes  diagnosed 2004  no medications in 5 months since hemodialysis  ESRD (end stage renal disease)    Hip pain  left  [place] (hard of hearing)    HTN (hypertension)    HTN (hypertension)    Hyperlipidemia    Hypothyroid    Hypothyroidism    LBP radiating to left leg    MI (myocardial infarction)  may 2016  MI (myocardial infarction)  2-2014   cardiac cath done [facility]  Pleural effusion  Left side - several times since March 2014  Pleural thickening  s/p pleurodisis left vats 2014

## 2018-11-26 NOTE — ED PROVIDER NOTE - ATTENDING CONTRIBUTION TO CARE
Fabrizio with [facility]. Patient with back pain. Exam with tenderness to back. Also with malaise and weakness. Rectal temp 100. Pallor on exam. Labs and full eval on deck as pt was recently hospitalized. I performed a face to face bedside interview with patient regarding history of present illness, review of symptoms and past medical history. I completed an independent physical exam.  I have discussed the patient's plan of care with Physician Assistant (PA). I agree with note as stated above, having amended the EMR as needed to reflect my findings.   This includes History of Present Illness, HIV, Past Medical/Surgical/Family/Social History, Allergies and Home Medications, Review of Systems, Physical Exam, and any Progress Notes during the time I functioned as the attending physician for this patient.

## 2018-11-26 NOTE — ED PROVIDER NOTE - PMH
Anemia    Anxiety    Cataracts, bilateral  left worse than right  CHF (congestive heart failure)    CHF (congestive heart failure)  diagnosed 2-2014  CKD (chronic kidney disease)    Diabetes  DM 2  Diabetes  diagnosed 2004  no medications in 5 months since hemodialysis  ESRD (end stage renal disease)    Hip pain  left  [place] (hard of hearing)    HTN (hypertension)    HTN (hypertension)    Hyperlipidemia    Hypothyroid    Hypothyroidism    LBP radiating to left leg    MI (myocardial infarction)  may 2016  MI (myocardial infarction)  2-2014   cardiac cath done [facility]  Pleural effusion  Left side - several times since March 2014  Pleural thickening  s/p pleurodisis left vats 2014

## 2018-11-26 NOTE — H&P ADULT - ASSESSMENT
73 yo f pmh t2dm hba1c 5.5, essential htn, CAD, ESRD on HD TTS, hypothyroidism depression chronic afib on no anticoagulant, chf (as per documents diastolic chf), spinal stenosis, diabetic neuropathy dyslipidemia patient is from [place] at [facility].  presents with lower back pain    1. lower back pain: CT show chronic degenerative changes PT consult c/w tylenol for pain management  2. spinal stenosis ct showing moderate spinal stenosis PT and neuro consult (patients baseline unknown seems like chronic as per previous documentation)  3. PNA (HCAP) probable gram neg pna ck procalcitonin f/u bcx check urine legionella start with cefepime and vanco ID consult  4. t2dm: resume lantus 15 units qhs and premeal monitor fs  5. essential htn resume meds hold acei in setting of esrd  6. esrd renal consulted on HD resume HD as per renal  7. hypothyroidism: c/w synthroid  8. hypokalemia: will not replete since patient is on HD repeat bmp in am  9. cad: c/w asa statin and labetolol  10. chronic afib rate controlled c/w bb no a/c due to fall risk  11. dvt ppx: lovenox

## 2018-11-26 NOTE — ED PROVIDER NOTE - PHYSICAL EXAMINATION
General:     NAD, pt thin and chronically ill   Head:     NC/AT, EOMI, oral mucosa moist  Neck:     trachea midline  Lungs:     CTA b/l  CVS:     RRR  Abd:     +BS, s/nt/nd  Ext:   no deformities.    Spine: TTP along lumbar spine. no deformity or eccymosis  Neuro: alert and oriented to name and place. limited exam due to pt having difficulty following directed

## 2018-11-26 NOTE — H&P ADULT - NSHPPHYSICALEXAM_GEN_ALL_CORE
PHYSICAL EXAM:  GENERAL: NAD patient lethargic accusable to sternal rub aao x 1   HEAD:  Atraumatic, Normocephalic  EYES: EOMI, PERRL  ENMT: No tonsillar erythema,   NECK: Supple, No JVD  CHEST/LUNG: Clear to auscultation bilaterally; No rales, rhonchi, wheezing, or rubs  HEART: Regular rate and rhythm; S1/S2, No murmurs, rubs, or gallops  ABDOMEN: Soft, Nontender, Nondistended; Bowel sounds present      NERVOUS SYSTEM; aao x 1

## 2018-11-26 NOTE — ED PROVIDER NOTE - PROGRESS NOTE DETAILS
pt does not meet sepsis criteria therefore sepsis IVF not ordered for pt. treating PNA with abx and ordering lactate and bcx

## 2018-11-26 NOTE — ASSESSMENT
[FreeTextEntry1] : # Severe Back Pain\par - 10/10 in intensity\par - Patient sent to the ED for further evaluation and management\par \par Pt seen and case discussed with Dr. Maier

## 2018-11-26 NOTE — ED ADULT NURSE NOTE - PMH
Anemia    Anxiety    Cataracts, bilateral  left worse than right  CHF (congestive heart failure)    CHF (congestive heart failure)  diagnosed 2-2014  CKD (chronic kidney disease)    Diabetes  DM 2  Diabetes  diagnosed 2004  no medications in 5 months since hemodialysis  ESRD (end stage renal disease)    Hip pain  left  [place] (hard of hearing)    HTN (hypertension)    HTN (hypertension)    Hyperlipidemia    Hypothyroid    Hypothyroidism    LBP radiating to left leg    MI (myocardial infarction)  may 2016  MI (myocardial infarction)  2-2014   cardiac cath done [facility]  Pleural effusion  Left side - several times since March 2014  Pleural thickening  s/p pleurodisis left vats 2014

## 2018-11-26 NOTE — H&P ADULT - HISTORY OF PRESENT ILLNESS
73 yo f pmh t2dm hba1c 5.5, essential htn, CAD, ESRD on HD TTS, hypothyroidism depression chronic afib on no anticoagulant, chf (as per documents diastolic chf), spinal stenosis, diabetic neuropathy dyslipidemia patient is from [place] at [facility]. pt currently c/o lower back pain unable to provide much detail regarding her history due to underlying dementia. as per ed documentation this is non traumatic lower back pain patient had no episodes of syncope or fall at this visit.

## 2018-11-27 ENCOUNTER — INBOUND DOCUMENT (OUTPATIENT)
Age: 73
End: 2018-11-27

## 2018-11-27 LAB
ALBUMIN SERPL ELPH-MCNC: 2 G/DL — LOW (ref 3.3–5)
ALP SERPL-CCNC: 122 U/L — HIGH (ref 40–120)
ALT FLD-CCNC: <6 U/L DA — LOW (ref 10–45)
AMMONIA BLD-MCNC: 12 UMOL/L — SIGNIFICANT CHANGE UP (ref 11–55)
ANION GAP SERPL CALC-SCNC: 13 MMOL/L — SIGNIFICANT CHANGE UP (ref 5–17)
ANION GAP SERPL CALC-SCNC: 14 MMOL/L — SIGNIFICANT CHANGE UP (ref 5–17)
ANION GAP SERPL CALC-SCNC: 8 MMOL/L — SIGNIFICANT CHANGE UP (ref 5–17)
ANION GAP SERPL CALC-SCNC: 9 MMOL/L — SIGNIFICANT CHANGE UP (ref 5–17)
APTT BLD: 31.6 SEC — SIGNIFICANT CHANGE UP (ref 27.5–36.3)
APTT BLD: 33.1 SEC — SIGNIFICANT CHANGE UP (ref 27.5–36.3)
AST SERPL-CCNC: 21 U/L — SIGNIFICANT CHANGE UP (ref 10–40)
BASOPHILS # BLD AUTO: 0 K/UL — SIGNIFICANT CHANGE UP (ref 0–0.2)
BASOPHILS # BLD AUTO: 0.1 K/UL — SIGNIFICANT CHANGE UP (ref 0–0.2)
BASOPHILS # BLD AUTO: 0.1 K/UL — SIGNIFICANT CHANGE UP (ref 0–0.2)
BASOPHILS NFR BLD AUTO: 0.3 % — SIGNIFICANT CHANGE UP (ref 0–2)
BASOPHILS NFR BLD AUTO: 0.8 % — SIGNIFICANT CHANGE UP (ref 0–2)
BASOPHILS NFR BLD AUTO: 0.8 % — SIGNIFICANT CHANGE UP (ref 0–2)
BILIRUB SERPL-MCNC: 0.6 MG/DL — SIGNIFICANT CHANGE UP (ref 0.2–1.2)
BUN SERPL-MCNC: 24 MG/DL — HIGH (ref 7–23)
BUN SERPL-MCNC: 24 MG/DL — HIGH (ref 7–23)
BUN SERPL-MCNC: 28 MG/DL — HIGH (ref 7–23)
BUN SERPL-MCNC: 9 MG/DL — SIGNIFICANT CHANGE UP (ref 7–23)
CALCIUM SERPL-MCNC: 8.1 MG/DL — LOW (ref 8.4–10.5)
CALCIUM SERPL-MCNC: 8.5 MG/DL — SIGNIFICANT CHANGE UP (ref 8.4–10.5)
CALCIUM SERPL-MCNC: 9 MG/DL — SIGNIFICANT CHANGE UP (ref 8.4–10.5)
CALCIUM SERPL-MCNC: 9.1 MG/DL — SIGNIFICANT CHANGE UP (ref 8.4–10.5)
CHLORIDE SERPL-SCNC: 96 MMOL/L — SIGNIFICANT CHANGE UP (ref 96–108)
CHLORIDE SERPL-SCNC: 97 MMOL/L — SIGNIFICANT CHANGE UP (ref 96–108)
CHLORIDE SERPL-SCNC: 98 MMOL/L — SIGNIFICANT CHANGE UP (ref 96–108)
CHLORIDE SERPL-SCNC: 99 MMOL/L — SIGNIFICANT CHANGE UP (ref 96–108)
CO2 BLDA-SCNC: 28 MMOL/L — SIGNIFICANT CHANGE UP (ref 22–30)
CO2 BLDA-SCNC: 29 MMOL/L — SIGNIFICANT CHANGE UP (ref 22–30)
CO2 SERPL-SCNC: 24 MMOL/L — SIGNIFICANT CHANGE UP (ref 22–31)
CO2 SERPL-SCNC: 27 MMOL/L — SIGNIFICANT CHANGE UP (ref 22–31)
CO2 SERPL-SCNC: 27 MMOL/L — SIGNIFICANT CHANGE UP (ref 22–31)
CO2 SERPL-SCNC: 28 MMOL/L — SIGNIFICANT CHANGE UP (ref 22–31)
CREAT SERPL-MCNC: 1.72 MG/DL — HIGH (ref 0.5–1.3)
CREAT SERPL-MCNC: 3.49 MG/DL — HIGH (ref 0.5–1.3)
CREAT SERPL-MCNC: 3.65 MG/DL — HIGH (ref 0.5–1.3)
CREAT SERPL-MCNC: 3.9 MG/DL — HIGH (ref 0.5–1.3)
EOSINOPHIL # BLD AUTO: 0 K/UL — SIGNIFICANT CHANGE UP (ref 0–0.5)
EOSINOPHIL NFR BLD AUTO: 0 % — SIGNIFICANT CHANGE UP (ref 0–6)
EOSINOPHIL NFR BLD AUTO: 0.1 % — SIGNIFICANT CHANGE UP (ref 0–6)
EOSINOPHIL NFR BLD AUTO: 0.1 % — SIGNIFICANT CHANGE UP (ref 0–6)
GAS PNL BLDA: SIGNIFICANT CHANGE UP
GLUCOSE BLDC GLUCOMTR-MCNC: 104 MG/DL — HIGH (ref 70–99)
GLUCOSE BLDC GLUCOMTR-MCNC: 110 MG/DL — HIGH (ref 70–99)
GLUCOSE BLDC GLUCOMTR-MCNC: 160 MG/DL — HIGH (ref 70–99)
GLUCOSE BLDC GLUCOMTR-MCNC: 51 MG/DL — LOW (ref 70–99)
GLUCOSE BLDC GLUCOMTR-MCNC: 65 MG/DL — LOW (ref 70–99)
GLUCOSE BLDC GLUCOMTR-MCNC: 71 MG/DL — SIGNIFICANT CHANGE UP (ref 70–99)
GLUCOSE BLDC GLUCOMTR-MCNC: 78 MG/DL — SIGNIFICANT CHANGE UP (ref 70–99)
GLUCOSE BLDC GLUCOMTR-MCNC: 83 MG/DL — SIGNIFICANT CHANGE UP (ref 70–99)
GLUCOSE BLDC GLUCOMTR-MCNC: 97 MG/DL — SIGNIFICANT CHANGE UP (ref 70–99)
GLUCOSE SERPL-MCNC: 145 MG/DL — HIGH (ref 70–99)
GLUCOSE SERPL-MCNC: 175 MG/DL — HIGH (ref 70–99)
GLUCOSE SERPL-MCNC: 197 MG/DL — HIGH (ref 70–99)
GLUCOSE SERPL-MCNC: 9 MG/DL — CRITICAL LOW (ref 70–99)
HBA1C BLD-MCNC: 5.4 % — SIGNIFICANT CHANGE UP (ref 4–5.6)
HCT VFR BLD CALC: 30.9 % — LOW (ref 34.5–45)
HCT VFR BLD CALC: 31.6 % — LOW (ref 34.5–45)
HCT VFR BLD CALC: 31.8 % — LOW (ref 34.5–45)
HCT VFR BLD CALC: 33.3 % — LOW (ref 34.5–45)
HCT VFR BLD CALC: 34.8 % — SIGNIFICANT CHANGE UP (ref 34.5–45)
HGB BLD-MCNC: 10.2 G/DL — LOW (ref 11.5–15.5)
HGB BLD-MCNC: 10.2 G/DL — LOW (ref 11.5–15.5)
HGB BLD-MCNC: 10.6 G/DL — LOW (ref 11.5–15.5)
HGB BLD-MCNC: 11.3 G/DL — LOW (ref 11.5–15.5)
HGB BLD-MCNC: 9.9 G/DL — LOW (ref 11.5–15.5)
HOROWITZ INDEX BLDA+IHG-RTO: SIGNIFICANT CHANGE UP
HOROWITZ INDEX BLDA+IHG-RTO: SIGNIFICANT CHANGE UP
INR BLD: 1.19 RATIO — HIGH (ref 0.88–1.16)
INR BLD: 1.2 RATIO — HIGH (ref 0.88–1.16)
INR BLD: 1.31 RATIO — HIGH (ref 0.88–1.16)
LACTATE SERPL-SCNC: 1.7 MMOL/L — SIGNIFICANT CHANGE UP (ref 0.7–2)
LACTATE SERPL-SCNC: 2.8 MMOL/L — HIGH (ref 0.7–2)
LACTATE SERPL-SCNC: 4.3 MMOL/L — CRITICAL HIGH (ref 0.7–2)
LYMPHOCYTES # BLD AUTO: 0.3 K/UL — LOW (ref 1–3.3)
LYMPHOCYTES # BLD AUTO: 0.5 K/UL — LOW (ref 1–3.3)
LYMPHOCYTES # BLD AUTO: 0.5 K/UL — LOW (ref 1–3.3)
LYMPHOCYTES # BLD AUTO: 3.8 % — LOW (ref 13–44)
LYMPHOCYTES # BLD AUTO: 4 % — LOW (ref 13–44)
LYMPHOCYTES # BLD AUTO: 5.2 % — LOW (ref 13–44)
MAGNESIUM SERPL-MCNC: 1.7 MG/DL — SIGNIFICANT CHANGE UP (ref 1.6–2.6)
MCHC RBC-ENTMCNC: 29.4 PG — SIGNIFICANT CHANGE UP (ref 27–34)
MCHC RBC-ENTMCNC: 29.7 PG — SIGNIFICANT CHANGE UP (ref 27–34)
MCHC RBC-ENTMCNC: 29.8 PG — SIGNIFICANT CHANGE UP (ref 27–34)
MCHC RBC-ENTMCNC: 29.8 PG — SIGNIFICANT CHANGE UP (ref 27–34)
MCHC RBC-ENTMCNC: 30 PG — SIGNIFICANT CHANGE UP (ref 27–34)
MCHC RBC-ENTMCNC: 31.8 GM/DL — LOW (ref 32–36)
MCHC RBC-ENTMCNC: 31.9 GM/DL — LOW (ref 32–36)
MCHC RBC-ENTMCNC: 32.1 GM/DL — SIGNIFICANT CHANGE UP (ref 32–36)
MCHC RBC-ENTMCNC: 32.2 GM/DL — SIGNIFICANT CHANGE UP (ref 32–36)
MCHC RBC-ENTMCNC: 32.5 GM/DL — SIGNIFICANT CHANGE UP (ref 32–36)
MCV RBC AUTO: 91.4 FL — SIGNIFICANT CHANGE UP (ref 80–100)
MCV RBC AUTO: 92.5 FL — SIGNIFICANT CHANGE UP (ref 80–100)
MCV RBC AUTO: 92.5 FL — SIGNIFICANT CHANGE UP (ref 80–100)
MCV RBC AUTO: 93 FL — SIGNIFICANT CHANGE UP (ref 80–100)
MCV RBC AUTO: 94.1 FL — SIGNIFICANT CHANGE UP (ref 80–100)
MONOCYTES # BLD AUTO: 0.4 K/UL — SIGNIFICANT CHANGE UP (ref 0–0.9)
MONOCYTES # BLD AUTO: 0.5 K/UL — SIGNIFICANT CHANGE UP (ref 0–0.9)
MONOCYTES # BLD AUTO: 0.5 K/UL — SIGNIFICANT CHANGE UP (ref 0–0.9)
MONOCYTES NFR BLD AUTO: 3.6 % — SIGNIFICANT CHANGE UP (ref 2–14)
MONOCYTES NFR BLD AUTO: 5.9 % — SIGNIFICANT CHANGE UP (ref 2–14)
MONOCYTES NFR BLD AUTO: 6 % — SIGNIFICANT CHANGE UP (ref 2–14)
NEUTROPHILS # BLD AUTO: 11 K/UL — HIGH (ref 1.8–7.4)
NEUTROPHILS # BLD AUTO: 7.4 K/UL — SIGNIFICANT CHANGE UP (ref 1.8–7.4)
NEUTROPHILS # BLD AUTO: 7.9 K/UL — HIGH (ref 1.8–7.4)
NEUTROPHILS NFR BLD AUTO: 88 % — HIGH (ref 43–77)
NEUTROPHILS NFR BLD AUTO: 89.4 % — HIGH (ref 43–77)
NEUTROPHILS NFR BLD AUTO: 92 % — HIGH (ref 43–77)
PCO2 BLDA: 16 MMHG — LOW (ref 32–46)
PCO2 BLDA: 20 MMHG — LOW (ref 32–46)
PH BLDA: 7.37 — SIGNIFICANT CHANGE UP (ref 7.35–7.45)
PH BLDA: >7.808 — SIGNIFICANT CHANGE UP (ref 7.35–7.45)
PHOSPHATE SERPL-MCNC: 2.9 MG/DL — SIGNIFICANT CHANGE UP (ref 2.5–4.5)
PLATELET # BLD AUTO: 153 K/UL — SIGNIFICANT CHANGE UP (ref 150–400)
PLATELET # BLD AUTO: 218 K/UL — SIGNIFICANT CHANGE UP (ref 150–400)
PLATELET # BLD AUTO: 228 K/UL — SIGNIFICANT CHANGE UP (ref 150–400)
PLATELET # BLD AUTO: 231 K/UL — SIGNIFICANT CHANGE UP (ref 150–400)
PLATELET # BLD AUTO: 232 K/UL — SIGNIFICANT CHANGE UP (ref 150–400)
PO2 BLDA: 188 MMHG — HIGH (ref 74–108)
PO2 BLDA: >550 MMHG — HIGH (ref 74–108)
POTASSIUM SERPL-MCNC: 2.5 MMOL/L — CRITICAL LOW (ref 3.5–5.3)
POTASSIUM SERPL-MCNC: 4.3 MMOL/L — SIGNIFICANT CHANGE UP (ref 3.5–5.3)
POTASSIUM SERPL-MCNC: 4.5 MMOL/L — SIGNIFICANT CHANGE UP (ref 3.5–5.3)
POTASSIUM SERPL-MCNC: 5.2 MMOL/L — SIGNIFICANT CHANGE UP (ref 3.5–5.3)
POTASSIUM SERPL-SCNC: 2.5 MMOL/L — CRITICAL LOW (ref 3.5–5.3)
POTASSIUM SERPL-SCNC: 4.3 MMOL/L — SIGNIFICANT CHANGE UP (ref 3.5–5.3)
POTASSIUM SERPL-SCNC: 4.5 MMOL/L — SIGNIFICANT CHANGE UP (ref 3.5–5.3)
POTASSIUM SERPL-SCNC: 5.2 MMOL/L — SIGNIFICANT CHANGE UP (ref 3.5–5.3)
PROCALCITONIN SERPL-MCNC: 0.4 NG/ML — HIGH (ref 0.02–0.1)
PROT SERPL-MCNC: 5.3 G/DL — LOW (ref 6–8.3)
PROTHROM AB SERPL-ACNC: 13.4 SEC — HIGH (ref 10–12.9)
PROTHROM AB SERPL-ACNC: 13.5 SEC — HIGH (ref 10–12.9)
PROTHROM AB SERPL-ACNC: 14.8 SEC — HIGH (ref 10–12.9)
RBC # BLD: 3.29 M/UL — LOW (ref 3.8–5.2)
RBC # BLD: 3.41 M/UL — LOW (ref 3.8–5.2)
RBC # BLD: 3.42 M/UL — LOW (ref 3.8–5.2)
RBC # BLD: 3.6 M/UL — LOW (ref 3.8–5.2)
RBC # BLD: 3.81 M/UL — SIGNIFICANT CHANGE UP (ref 3.8–5.2)
RBC # FLD: 17.6 % — HIGH (ref 10.3–14.5)
RBC # FLD: 17.8 % — HIGH (ref 10.3–14.5)
RBC # FLD: 17.8 % — HIGH (ref 10.3–14.5)
RBC # FLD: 18.1 % — HIGH (ref 10.3–14.5)
RBC # FLD: 18.5 % — HIGH (ref 10.3–14.5)
SAO2 % BLDA: 98 % — HIGH (ref 92–96)
SAO2 % BLDA: >99 % — HIGH (ref 92–96)
SODIUM SERPL-SCNC: 131 MMOL/L — LOW (ref 135–145)
SODIUM SERPL-SCNC: 135 MMOL/L — SIGNIFICANT CHANGE UP (ref 135–145)
SODIUM SERPL-SCNC: 137 MMOL/L — SIGNIFICANT CHANGE UP (ref 135–145)
SODIUM SERPL-SCNC: 137 MMOL/L — SIGNIFICANT CHANGE UP (ref 135–145)
T3 SERPL-MCNC: 43 NG/DL — LOW (ref 80–200)
T4 AB SER-ACNC: 6.2 UG/DL — SIGNIFICANT CHANGE UP (ref 4.6–12)
TROPONIN I SERPL-MCNC: 0.27 NG/ML — HIGH (ref 0.02–0.06)
TROPONIN I SERPL-MCNC: 0.36 NG/ML — HIGH (ref 0.02–0.06)
TROPONIN I SERPL-MCNC: 0.37 NG/ML — HIGH (ref 0.02–0.06)
TSH SERPL-MCNC: 26.19 UIU/ML — HIGH (ref 0.27–4.2)
WBC # BLD: 11.9 K/UL — HIGH (ref 3.8–10.5)
WBC # BLD: 7.8 K/UL — SIGNIFICANT CHANGE UP (ref 3.8–10.5)
WBC # BLD: 8.3 K/UL — SIGNIFICANT CHANGE UP (ref 3.8–10.5)
WBC # BLD: 8.4 K/UL — SIGNIFICANT CHANGE UP (ref 3.8–10.5)
WBC # BLD: 8.9 K/UL — SIGNIFICANT CHANGE UP (ref 3.8–10.5)
WBC # FLD AUTO: 11.9 K/UL — HIGH (ref 3.8–10.5)
WBC # FLD AUTO: 7.8 K/UL — SIGNIFICANT CHANGE UP (ref 3.8–10.5)
WBC # FLD AUTO: 8.3 K/UL — SIGNIFICANT CHANGE UP (ref 3.8–10.5)
WBC # FLD AUTO: 8.4 K/UL — SIGNIFICANT CHANGE UP (ref 3.8–10.5)
WBC # FLD AUTO: 8.9 K/UL — SIGNIFICANT CHANGE UP (ref 3.8–10.5)

## 2018-11-27 PROCEDURE — 74174 CTA ABD&PLVS W/CONTRAST: CPT | Mod: 26

## 2018-11-27 PROCEDURE — 99222 1ST HOSP IP/OBS MODERATE 55: CPT

## 2018-11-27 PROCEDURE — 70450 CT HEAD/BRAIN W/O DYE: CPT | Mod: 26

## 2018-11-27 PROCEDURE — 93306 TTE W/DOPPLER COMPLETE: CPT | Mod: 26

## 2018-11-27 PROCEDURE — 71275 CT ANGIOGRAPHY CHEST: CPT | Mod: 26

## 2018-11-27 PROCEDURE — 93010 ELECTROCARDIOGRAM REPORT: CPT

## 2018-11-27 PROCEDURE — 71045 X-RAY EXAM CHEST 1 VIEW: CPT | Mod: 26

## 2018-11-27 RX ORDER — ATORVASTATIN CALCIUM 80 MG/1
20 TABLET, FILM COATED ORAL AT BEDTIME
Qty: 0 | Refills: 0 | Status: DISCONTINUED | OUTPATIENT
Start: 2018-11-27 | End: 2018-12-02

## 2018-11-27 RX ORDER — FENTANYL CITRATE 50 UG/ML
50 INJECTION INTRAVENOUS ONCE
Qty: 0 | Refills: 0 | Status: DISCONTINUED | OUTPATIENT
Start: 2018-11-27 | End: 2018-11-27

## 2018-11-27 RX ORDER — FENTANYL CITRATE 50 UG/ML
0.5 INJECTION INTRAVENOUS
Qty: 2500 | Refills: 0 | Status: DISCONTINUED | OUTPATIENT
Start: 2018-11-27 | End: 2018-11-27

## 2018-11-27 RX ORDER — AMIODARONE HYDROCHLORIDE 400 MG/1
1 TABLET ORAL
Qty: 900 | Refills: 0 | Status: DISCONTINUED | OUTPATIENT
Start: 2018-11-27 | End: 2018-11-29

## 2018-11-27 RX ORDER — CHLORHEXIDINE GLUCONATE 213 G/1000ML
1 SOLUTION TOPICAL
Qty: 0 | Refills: 0 | Status: DISCONTINUED | OUTPATIENT
Start: 2018-11-27 | End: 2018-12-02

## 2018-11-27 RX ORDER — INFLUENZA VIRUS VACCINE 15; 15; 15; 15 UG/.5ML; UG/.5ML; UG/.5ML; UG/.5ML
0.5 SUSPENSION INTRAMUSCULAR ONCE
Qty: 0 | Refills: 0 | Status: DISCONTINUED | OUTPATIENT
Start: 2018-11-27 | End: 2018-12-02

## 2018-11-27 RX ORDER — SODIUM CHLORIDE 9 MG/ML
1000 INJECTION, SOLUTION INTRAVENOUS
Qty: 0 | Refills: 0 | Status: DISCONTINUED | OUTPATIENT
Start: 2018-11-27 | End: 2018-11-28

## 2018-11-27 RX ORDER — HEPARIN SODIUM 5000 [USP'U]/ML
5000 INJECTION INTRAVENOUS; SUBCUTANEOUS EVERY 12 HOURS
Qty: 0 | Refills: 0 | Status: DISCONTINUED | OUTPATIENT
Start: 2018-11-27 | End: 2018-12-02

## 2018-11-27 RX ORDER — NOREPINEPHRINE BITARTRATE/D5W 8 MG/250ML
0.05 PLASTIC BAG, INJECTION (ML) INTRAVENOUS
Qty: 8 | Refills: 0 | Status: DISCONTINUED | OUTPATIENT
Start: 2018-11-27 | End: 2018-11-27

## 2018-11-27 RX ORDER — FENTANYL CITRATE 50 UG/ML
25 INJECTION INTRAVENOUS EVERY 4 HOURS
Qty: 0 | Refills: 0 | Status: DISCONTINUED | OUTPATIENT
Start: 2018-11-27 | End: 2018-12-02

## 2018-11-27 RX ORDER — AMIODARONE HYDROCHLORIDE 400 MG/1
0.5 TABLET ORAL
Qty: 900 | Refills: 0 | Status: DISCONTINUED | OUTPATIENT
Start: 2018-11-27 | End: 2018-11-29

## 2018-11-27 RX ORDER — EPINEPHRINE 0.3 MG/.3ML
1 INJECTION INTRAMUSCULAR; SUBCUTANEOUS ONCE
Qty: 0 | Refills: 0 | Status: COMPLETED | OUTPATIENT
Start: 2018-11-27 | End: 2018-11-27

## 2018-11-27 RX ORDER — NOREPINEPHRINE BITARTRATE/D5W 8 MG/250ML
0.05 PLASTIC BAG, INJECTION (ML) INTRAVENOUS
Qty: 8 | Refills: 0 | Status: DISCONTINUED | OUTPATIENT
Start: 2018-11-27 | End: 2018-11-28

## 2018-11-27 RX ORDER — NICARDIPINE HYDROCHLORIDE 30 MG/1
5 CAPSULE, EXTENDED RELEASE ORAL
Qty: 40 | Refills: 0 | Status: DISCONTINUED | OUTPATIENT
Start: 2018-11-27 | End: 2018-11-28

## 2018-11-27 RX ORDER — MEPERIDINE HYDROCHLORIDE 50 MG/ML
12.5 INJECTION INTRAMUSCULAR; INTRAVENOUS; SUBCUTANEOUS
Qty: 0 | Refills: 0 | Status: DISCONTINUED | OUTPATIENT
Start: 2018-11-27 | End: 2018-11-27

## 2018-11-27 RX ORDER — POTASSIUM CHLORIDE 20 MEQ
10 PACKET (EA) ORAL
Qty: 0 | Refills: 0 | Status: COMPLETED | OUTPATIENT
Start: 2018-11-27 | End: 2018-11-27

## 2018-11-27 RX ORDER — SODIUM CHLORIDE 9 MG/ML
1000 INJECTION INTRAMUSCULAR; INTRAVENOUS; SUBCUTANEOUS
Qty: 0 | Refills: 0 | Status: DISCONTINUED | OUTPATIENT
Start: 2018-11-27 | End: 2018-11-29

## 2018-11-27 RX ORDER — PANTOPRAZOLE SODIUM 20 MG/1
40 TABLET, DELAYED RELEASE ORAL DAILY
Qty: 0 | Refills: 0 | Status: DISCONTINUED | OUTPATIENT
Start: 2018-11-27 | End: 2018-11-28

## 2018-11-27 RX ORDER — DEXMEDETOMIDINE HYDROCHLORIDE IN 0.9% SODIUM CHLORIDE 4 UG/ML
0.3 INJECTION INTRAVENOUS
Qty: 200 | Refills: 0 | Status: DISCONTINUED | OUTPATIENT
Start: 2018-11-27 | End: 2018-11-29

## 2018-11-27 RX ORDER — PROPOFOL 10 MG/ML
10 INJECTION, EMULSION INTRAVENOUS
Qty: 1000 | Refills: 0 | Status: DISCONTINUED | OUTPATIENT
Start: 2018-11-27 | End: 2018-11-29

## 2018-11-27 RX ORDER — CEFEPIME 1 G/1
500 INJECTION, POWDER, FOR SOLUTION INTRAMUSCULAR; INTRAVENOUS DAILY
Qty: 0 | Refills: 0 | Status: DISCONTINUED | OUTPATIENT
Start: 2018-11-28 | End: 2018-12-02

## 2018-11-27 RX ADMIN — CEFEPIME 100 MILLIGRAM(S): 1 INJECTION, POWDER, FOR SOLUTION INTRAMUSCULAR; INTRAVENOUS at 15:12

## 2018-11-27 RX ADMIN — AMIODARONE HYDROCHLORIDE 33.33 MG/MIN: 400 TABLET ORAL at 10:58

## 2018-11-27 RX ADMIN — Medication 100 MILLIEQUIVALENT(S): at 21:26

## 2018-11-27 RX ADMIN — PROPOFOL 2.72 MICROGRAM(S)/KG/MIN: 10 INJECTION, EMULSION INTRAVENOUS at 10:51

## 2018-11-27 RX ADMIN — HEPARIN SODIUM 5000 UNIT(S): 5000 INJECTION INTRAVENOUS; SUBCUTANEOUS at 20:29

## 2018-11-27 RX ADMIN — SODIUM CHLORIDE 50 MILLILITER(S): 9 INJECTION, SOLUTION INTRAVENOUS at 22:46

## 2018-11-27 RX ADMIN — Medication 100 MILLIEQUIVALENT(S): at 20:30

## 2018-11-27 RX ADMIN — PANTOPRAZOLE SODIUM 40 MILLIGRAM(S): 20 TABLET, DELAYED RELEASE ORAL at 15:13

## 2018-11-27 RX ADMIN — FENTANYL CITRATE 50 MICROGRAM(S): 50 INJECTION INTRAVENOUS at 23:19

## 2018-11-27 RX ADMIN — NICARDIPINE HYDROCHLORIDE 25 MG/HR: 30 CAPSULE, EXTENDED RELEASE ORAL at 10:49

## 2018-11-27 RX ADMIN — FENTANYL CITRATE 50 MICROGRAM(S): 50 INJECTION INTRAVENOUS at 23:04

## 2018-11-27 RX ADMIN — DEXMEDETOMIDINE HYDROCHLORIDE IN 0.9% SODIUM CHLORIDE 3.12 MICROGRAM(S)/KG/HR: 4 INJECTION INTRAVENOUS at 23:11

## 2018-11-27 RX ADMIN — Medication 250 MILLIGRAM(S): at 17:42

## 2018-11-27 RX ADMIN — CHLORHEXIDINE GLUCONATE 1 APPLICATION(S): 213 SOLUTION TOPICAL at 17:41

## 2018-11-27 RX ADMIN — EPINEPHRINE 1 MILLIGRAM(S): 0.3 INJECTION INTRAMUSCULAR; SUBCUTANEOUS at 13:10

## 2018-11-27 NOTE — CHART NOTE - NSCHARTNOTEFT_GEN_A_CORE
RRT called approx 0615, immediately turned to code blue. Per report, PCT was with pt when she became unresponsive, RN called when pt noted not to have pulse and chest compressions were started immediately. Upon writer arrival, pt without pulse, asystole on monitor. ACLS continued, pt with fingerstick of 27, 2 amps of d50 given. Pt given total of 2 amps bicarb, 2 epis, 300mg amio, 1 g mag and shocked twice for vfib with rosc after approx 8-10 minutes of chest compressions (see RRT called approx 0615, immediately turned to code blue. Per report, PCT was with pt when she became unresponsive, RN called when pt noted not to have pulse and chest compressions were started immediately. Upon writer arrival, pt without pulse, asystole on monitor. Chest compressions continued, pt with fingerstick of 27, 2 amps of d50 given. Pt given total of 2 amps bicarb, multiple epis, 300mg amio, 1 g mag and shocked twice for vfib with rosc at 0629. See code sheet for details. The underlying cause of the arrest is unclear however pt with copious thick secretions noted in airway, making aspiration -->respiratory arrest possible, underlying history of acidosis given ESRD coupled with profound hypoglycemia.

## 2018-11-27 NOTE — CONSULT NOTE ADULT - ASSESSMENT
71 yo woman with multiple medical problems admitted with low back pain and s/p cardiac arrest with hypoglycemia.  She is now comatose with findings of an anoxic ischemic encephalopathy.  There is a right gaze preference and right cerebral focality should be ruled out, however the oculocephalics are bilaterally brisk.  The rest of the exam is non focal and consistent with encephalopathy.    REC:  CT head non contrast, EEG, CCU care.

## 2018-11-27 NOTE — CONSULT NOTE ADULT - ASSESSMENT
Physical Examination:  GENERAL:                 Intubated, Sedated, Unresponsive  HEENT:                     Eyes dilated, Reactive, right gaze preference dry MM, NO JVD   PULM:                       Bilateral air entry, no significant sputum production, No Rales, +Rhonchi, No Wheezing  CVS:                         S1, S2,  +Murmur  ABD:                        Soft, nondistended, nontender, normoactive bowel sounds,   EXT:                         No edema, nontender, No Cyanosis or Clubbing   Vascular:                 Cool Extremities, Normal Capillary refill, Normal Distal Pulses  SKIN:                       Warm and well perfused, no rashes noted.   NEURO:                  unresponsive, not following commands, not localizing.        Assessment  1. S/P Cardiac arrest, VF 11/27  2. Severe Hypoglycemia in patient with DM2  3. Acute respiratory failure  4. Physical Examination:  GENERAL:                 Intubated, Sedated, Unresponsive  HEENT:                     Eyes dilated, Reactive, right gaze preference dry MM, NO JVD   PULM:                       Bilateral air entry, no significant sputum production, No Rales, +Rhonchi, No Wheezing  CVS:                         S1, S2,  +Murmur  ABD:                        Soft, nondistended, nontender, normoactive bowel sounds,   EXT:                         No edema, nontender, No Cyanosis or Clubbing   Vascular:                 Cool Extremities, Normal Capillary refill, Normal Distal Pulses  SKIN:                       Warm and well perfused, no rashes noted.   NEURO:                  unresponsive, not following commands, not localizing.        Assessment  1. S/P Cardiac arrest, VF 11/27  2. Severe Hypoglycemia in patient with DM2  3. Acute respiratory failure  4. HTN uncontrolled   4. ESRD on HD  5. Chronic Diastolic CHF  6. CAD s/p MI,   7. Hypothyroidism,   8. back pain chronic spinal stenosis  9. ? Dementia  10. Lactic acidosis    Plan  Mechanical ventilation  Cooling protocol  empiric abx to control for aspiration  monitor f/s hold insulin  check echo  amiodarone   cardio eval  Check CT head  CTA chest abd pelvis r/o Dissection  F/u echo   Amio for VF, Cadio eval, monitor labs  no a/c while on cooling blanket  sedation for vent synchrony  D/w neuro,   Noted lactic acidosis 4.3 suspect cardiogenic from cardiac arrest, doubt from sepsis at this time. will trend.    PMD:				                   Notified(Date):  Family Updated: 		                                 Date: 11/27      Sedation & Analgesia:	prop  Diet/Nutrition:		npo  GI PPx:			ppi    DVT Ppx:		hep sq  	RISK                                                          Points  	[ ] Previous VTE                                           	3  	[ ] Thrombophilia                                        	2  	[ ] Lower limb paralysis                              	2   	[ ] Current Cancer                                       	2   	[x ] Immobilization > 24 hrs                        	1  	[ x] ICU/CCU stay > 24 hours                       	1  	[ x] Age > 60                                                   	1  		Total:[ 3]      Activity:		    bedrest             Head of Bed:               35-45 deg  Glycemic Control:        on hold    Lines:  CENTRAL LINE: 	[x ] YES [ ] NO	                    LOCATION:   [facility]	                       DATE INSERTED:   11/27	                    REMOVE:  [ ] YES [x ] NO    A-LINE:  	                [x ] YES [ ] NO                      LOCATION:   	   R Rad[facility]                    DATE INSERTED: 	11/27	            REMOVE:  [ ] YES [x ] NO    COYLE: 		        [x ] YES [ ] NO  		                                       DATE INSERTED:	11/27	            REMOVE:  [ ] YES [ x] NO      Restraints were deemed necessary to prevent removal of life-sustaining devices [  ] YES   [    ]  NO    Disposition: ICU care    Goals of Care: Full code  Prognosis - Guarded/.

## 2018-11-27 NOTE — PROVIDER CONTACT NOTE (CHANGE IN STATUS NOTIFICATION) - BACKGROUND
pt has a history of ESRD, DM2, CAD, HTN. pt admitted to telemetry with a chief complaint of back pain and a diagnosis of Pneumonia

## 2018-11-27 NOTE — PROGRESS NOTE ADULT - ASSESSMENT
Pt is Pt is 72yr old woman with PMHx as above, now in the ICU post cardiac arrest.    Writer called Boy Mullen at [phone] at approx [phone]. Boy verified pt's name and birthday but has limited English language skills. Boy expressed understanding that his wife was in the ICU, had a change in status and agreed to aggressive interventions. Given urgent nature of the situation, a  was unable to be used at the time. Boy states he will be coming to the hospital immediately.    --Vent 24/375/100/5 for likely acidosis given ESRD  --f/u ABG  --STAT labs  --Amio drip  --Levo on standby in anticipation of hypotension  --Light sedation and pain management with Fentanyl to be able to assess mental status, add precedex prn  --Pads to remain on chest  --Goals of care discussion  --ECHO  --Cardiac consult  --Maintain head of bed >30 degrees   --Suctioning PRN   --PPI    Shortly after arriving to the ICU, pt bradying down to the 40s, additional amp of bicarb and atropine given emergently with improvement to HR. Witnessed moving extremities, appears purposeful in responsive to stimuli and will defer cooling protocol for now.     Pt endorsed to daytime LARISA Walker.     CC time independent from procedures and ACLS 60 minutes Pt is 72yr old woman with PMHx as above, now in the ICU post arrest.    Writer called Boy Mullen at [phone] at approx [phone]. Boy verified pt's name and birthday but has limited English language skills. Boy expressed understanding that his wife was in the ICU, had a change in status and agreed to aggressive interventions. Given urgent nature of the situation, a  was unable to be used at the time. Boy states he will be coming to the hospital immediately.    --Vent 24/375/100/5 for likely acidosis given ESRD  --Chest xray (my read) concerning for effusions, pneumonia, possible aspiration event -- continue broad spectrum antibiotics with cefepime/vanc, renally dosed   --f/u ABG  --STAT labs  --Amio drip  --Levo on standby in anticipation of hypotension  --Light sedation and pain management with Fentanyl to be able to assess mental status, add precedex prn  --Pads to remain on chest  --Goals of care discussion  --ECHO  --Cardiac consult  --Maintain head of bed >30 degrees   --Suctioning PRN   --PPI    Shortly after arriving to the ICU, pt bradying down to the 40s, additional amp of bicarb and atropine given emergently with improvement to HR. Witnessed moving extremities, appears purposeful in responsive to stimuli and will defer cooling protocol for now.     Pt endorsed to daytime LARISA Walker.     CC time independent from procedures and ACLS 45 minutes

## 2018-11-27 NOTE — CONSULT NOTE ADULT - ATTENDING COMMENTS
vf arrest  presumably on the basis of sever hypoglycemia. would continue to trend troponin. rve right ventricular enlargement  of concern. would compare with prior study. when compared with a prior study 1/17, there is [id number] in the RV dimension. rasing the possibility of acute or chronic lung disease.     elevated tropin  consider a demand ischemia

## 2018-11-27 NOTE — PROCEDURE NOTE - PROCEDURE
<<-----Click on this checkbox to enter Procedure Arterial line placement  11/27/2018    Active  MMALAVET  Central line placement  11/27/2018    Active  MMALAVET

## 2018-11-27 NOTE — PROVIDER CONTACT NOTE (EICU) - BACKGROUND
71yo admitted for workup of back pain; Pt s/p cardiac arrest on the floor,  ROSC. Of note pt noted to be hypoglycemic with glucose of 27 during event.     PMH ESRD on HD, CAD, HTN < DM, afib

## 2018-11-27 NOTE — PROCEDURE NOTE - NSTRACHPOSTINTU_RESP_A_CORE
Chest excursion noted/Chest X-Ray/Positive end tidal Co2 noted/Breath sounds bilateral/ett condensation/Breath sounds equal

## 2018-11-27 NOTE — PROVIDER CONTACT NOTE (EICU) - RECOMMENDATIONS
-- s/p ROSC  -- continue supportive care  -- CT head when able  -- hypoglycemia management  -- concern about neuro status - avoid sedation if possible  -- ABG and lactate

## 2018-11-27 NOTE — PROCEDURE NOTE - NSPROCDETAILS_GEN_ALL_CORE
positive blood return obtained via catheter/Seldinger technique/sutured in place/location identified, draped/prepped, sterile technique used, needle inserted/introduced/connected to a pressurized flush line/all materials/supplies accounted for at end of procedure
patient pre-oxygenated, tube inserted, placement confirmed
sterile technique, catheter placed/guidewire recovered/Ultrasound assessment/lumen(s) aspirated and flushed/sterile dressing applied/ultrasound guidance

## 2018-11-27 NOTE — CONSULT NOTE ADULT - ASSESSMENT
71 yo F, mult problems as above, including DM, ESRD  At [facility] and [facility]  Admitted here yest with low back pain, FTT  Spine CT as reviewed, in c/w 10/30 study, stable endplate erosion L1-L2, presumed degenerative  Surveillance Bld Cxs previously negative, afebrile  Now s/p Vtach arrest, intubated, on Amio  CT imaging today, possible pneumonia, and on abdominal imaging, colitis, along with severe erosive changes around L2-L3, concerning for osteomyelitis/discitis  Difficult to correlate Ct findings; despite pt's risk for vertebral osteo- some discrepancy in interpretation of today's imaging and yest's dedicated scan of spine- degenerative change vs infection    Plan:  With today's arrest and any question of aspiration pneumonia, agree with empiric coverage- Vanco x 1, Cefepime- this will also address any ? of ischemic colitis  Would check stool Cdiff if diarrhea   will order nasal MRSA screen  Bld Cxs repeated  Follow temps and CBC/diff   If pt improves, dedicated MRI may help clarify CT findings  D/w Dr Zuniga and ICU team

## 2018-11-27 NOTE — PATIENT PROFILE ADULT - ANY IMPAIRED UPPER EXTREMITY FUNCTION WITHIN A WEEK PRIOR TO ADMISSION RELATED TO?
HISTORY OF PRESENT ILLNESS  Arjun Gant is a 54 y.o. male. HPI  Recent antibiotics for groin rash  Was noticed to have a urethral d/c  Review of Systems   All other systems reviewed and are negative. Past Medical History:   Diagnosis Date    Cerebral palsy ([address]     Scoliosis     Seizures (HCC)     Thyroid disease        Current Outpatient Prescriptions:     calcitonin, salmon, (MIACALCIN) nasal, USE 1 SPRAY IN ONE NOSTRIL DAILY FOR OSTEOPOROSIS - ALTERNATE NOSTRILS DAILY - **REFRIGERATE UNTIL OPENED;THEN STORE AT ROOM TEMP.;DISCARD 3, Disp: 6 mL, Rfl: 0    baclofen (LIORESAL) 20 mg tablet, TAKE 1 TAB VIA G-TUBE 3 TIMES DAILY FOR SPASTICITY - CRUSH AND DISSOLVE IN H2O, Disp: 90 Tab, Rfl: 0    levETIRAcetam (KEPPRA) 100 mg/mL solution, 5 ml twice a day, Disp: 1000 mL, Rfl: 3    levothyroxine (SYNTHROID) 25 mcg tablet, Take 1 tab via g-tube once daily  Crush and dissolve in h20, Disp: 30 Tab, Rfl: 0    multivit-min-FA-lycopen-lutein (CENTRUM SILVER) 0.4-300-250 mg-mcg-mcg tab, Take 1 Tab by mouth daily. Crushed via G-tube, Disp: 100 Tab, Rfl: 3    calcium-vitamin D (OYSTER SHELL) 500 mg(1,250mg) -200 unit per tablet, Take 1 Tab by mouth two (2) times daily (with meals). , Disp: 60 Tab, Rfl: 0    sorbitol 70 % solution, Take 15 ml via g-tube in the morning to maintain bowel regularity, Disp: 474 mL, Rfl: 0    loratadine (CLARITIN) 10 mg tablet, Take 1 Tab by mouth daily as needed for Allergies. , Disp: 90 Tab, Rfl: 3    diazePAM (VALIUM) 10 mg tablet, Take 1 hour before eye exam Crushed and administered through G-tube, Disp: 1 Tab, Rfl: 3    multivit-min-FA-lycopen-lutein (CERTAVITE SENIOR-ANTIOXIDANT) 0.4-300-250 mg-mcg-mcg tab, Certavite Senior Tab Quantity 30 Crush 1 tablet and give via g-tube once daily, Disp: 30 Tab, Rfl: 0    OTHER, ACO as needed, Disp: 1 Each, Rfl: 0    OTHER, Stander for spine alignment Strap for toe straightening, Disp: 1 Each, Rfl: 0    MULTIVITAMIN,THERAPEUTIC (THERAVITE PO), Take  by mouth., Disp: , Rfl:     phenytoin (DILANTIN) 50 mg tablet, Take  by mouth three (3) times daily. , Disp: , Rfl:     folic acid-vit J8-WMQ O96 (FOLTX) 2.5-25-2 mg tablet, Take 1 Tab by mouth daily. , Disp: , Rfl:     lacosamide (VIMPAT) 200 mg tab tablet, Take 200 mg by mouth two (2) times a day., Disp: , Rfl:     LORazepam (INTENSOL) 2 mg/mL concentrated solution, Take 1 mg by mouth every eight (8) hours as needed for Anxiety. , Disp: , Rfl:     Menthol-Zinc Oxide (CALMOSEPTINE) 0.44-20.6 % oint, Apply  to affected area., Disp: , Rfl:     EUCALYP/ME-SALICYLATE/MEN/THYM (LISTERINE MM), by Mucous Membrane route., Disp: , Rfl:     BABY OIL, MINERAL OIL, EX, by Apply Externally route., Disp: , Rfl:   Visit Vitals    BP (!) 104/98 (BP 1 Location: Left arm, BP Patient Position: Sitting)    Pulse 93    Temp 96.9 °F (36.1 °C) (Axillary)    Resp 20    SpO2 93%       Physical Exam   Constitutional: He appears well-developed and well-nourished. No distress. Genitourinary: Penis normal. No penile tenderness. Genitourinary Comments: Slight d/c urethra  cx taken   Skin: He is not diaphoretic. Vitals reviewed.       ASSESSMENT and PLAN  urehtritis   Doubt STD  Plan  Aerobic cx  F/u prn no

## 2018-11-27 NOTE — PROGRESS NOTE ADULT - SUBJECTIVE AND OBJECTIVE BOX
Pt is a 72yr old woman with PMHx including HTN, CAD, afib, CHF ESRD on HD (t/th/sat), DM and hypothyroidism. Pt presented on 11/26 with chief complaint of back pain and was admitted for further workup.     Called to bedside for code blue.    Vital Signs Last 24 Hrs  T(C): 36.3 (27 Nov 2018 05:41), Max: 37.8 (26 Nov 2018 15:58)  T(F): 97.3 (27 Nov 2018 05:41), Max: 100 (26 Nov 2018 15:58)  HR: 70 (27 Nov 2018 05:41) (68 - 85)  BP: 159/56 (27 Nov 2018 05:41) (159/56 - 176/68)  BP(mean): --  RR: 16 (27 Nov 2018 05:41) (14 - 18)  SpO2: 100% (27 Nov 2018 05:41) (99% - 100%)    CBC Full  -  ( 26 Nov 2018 15:20 )  WBC Count : 10.4 K/uL  Hemoglobin : 10.0 g/dL  Hematocrit : 32.1 %  Platelet Count - Automated : 216 K/uL  Mean Cell Volume : 92.9 fl  Mean Cell Hemoglobin : 29.1 pg  Mean Cell Hemoglobin Concentration : 31.3 gm/dL  Auto Neutrophil # : 8.3 K/uL  Auto Lymphocyte # : 0.9 K/uL  Auto Monocyte # : 1.0 K/uL  Auto Eosinophil # : 0.1 K/uL  Auto Basophil # : 0.1 K/uL  Auto Neutrophil % : 80.3 %  Auto Lymphocyte % : 8.4 %  Auto Monocyte % : 9.2 %  Auto Eosinophil % : 1.0 %  Auto Basophil % : 1.0 %    11-26    137  |  96  |  20  ----------------------------<  124<H>  2.9<LL>   |  29  |  3.44<H>    Ca    8.8      26 Nov 2018 15:20    TPro  7.0  /  Alb  3.1<L>  /  TBili  0.8  /  DBili  x   /  AST  22  /  ALT  11  /  AlkPhos  183<H>  11-26        LIVER FUNCTIONS - ( 26 Nov 2018 15:20 )  Alb: 3.1 g/dL / Pro: 7.0 g/dL / ALK PHOS: 183 U/L / ALT: 11 U/L DA / AST: 22 U/L / GGT: x             MEDICATIONS  (STANDING):  amLODIPine   Tablet 10 milliGRAM(s) Oral daily  aspirin enteric coated 81 milliGRAM(s) Oral daily  atorvastatin 20 milliGRAM(s) Oral at bedtime  calcium acetate 667 milliGRAM(s) Oral three times a day with meals  cefepime   IVPB 500 milliGRAM(s) IV Intermittent every 12 hours  cefepime   IVPB      clonazePAM Tablet 0.5 milliGRAM(s) Oral at bedtime  cloNIDine 0.2 milliGRAM(s) Oral every 8 hours  dextrose 5%. 1000 milliLiter(s) (50 mL/Hr) IV Continuous <Continuous>  dextrose 50% Injectable 12.5 Gram(s) IV Push once  dextrose 50% Injectable 25 Gram(s) IV Push once  dextrose 50% Injectable 25 Gram(s) IV Push once  enoxaparin Injectable 30 milliGRAM(s) SubCutaneous every 24 hours  gabapentin 100 milliGRAM(s) Oral three times a day  hydrALAZINE 25 milliGRAM(s) Oral every 8 hours  influenza   Vaccine 0.5 milliLiter(s) IntraMuscular once  insulin glargine Injectable (LANTUS) 15 Unit(s) SubCutaneous at bedtime  insulin lispro Injectable (HumaLOG) 5 Unit(s) SubCutaneous three times a day before meals  labetalol 200 milliGRAM(s) Oral two times a day  levothyroxine 100 MICROGram(s) Oral daily  potassium chloride  10 mEq/100 mL IVPB 10 milliEquivalent(s) IV Intermittent every 1 hour  sertraline 50 milliGRAM(s) Oral daily  vancomycin  IVPB 1000 milliGRAM(s) IV Intermittent once    MEDICATIONS  (PRN):  acetaminophen   Tablet .. 650 milliGRAM(s) Oral every 6 hours PRN Temp greater or equal to 38C (100.4F), Mild Pain (1 - 3)  dextrose 40% Gel 15 Gram(s) Oral once PRN Blood Glucose LESS THAN 70 milliGRAM(s)/deciliter  glucagon  Injectable 1 milliGRAM(s) IntraMuscular once PRN Glucose LESS THAN 70 milligrams/deciliter

## 2018-11-28 LAB
ALBUMIN SERPL ELPH-MCNC: 2.5 G/DL — LOW (ref 3.3–5)
ALBUMIN SERPL ELPH-MCNC: 2.5 G/DL — LOW (ref 3.3–5)
ALP SERPL-CCNC: 153 U/L — HIGH (ref 40–120)
ALP SERPL-CCNC: 159 U/L — HIGH (ref 40–120)
ALT FLD-CCNC: 11 U/L DA — SIGNIFICANT CHANGE UP (ref 10–45)
ALT FLD-CCNC: 12 U/L DA — SIGNIFICANT CHANGE UP (ref 10–45)
AMMONIA BLD-MCNC: <10 UMOL/L — LOW (ref 11–55)
ANION GAP SERPL CALC-SCNC: 10 MMOL/L — SIGNIFICANT CHANGE UP (ref 5–17)
ANION GAP SERPL CALC-SCNC: 10 MMOL/L — SIGNIFICANT CHANGE UP (ref 5–17)
ANION GAP SERPL CALC-SCNC: 9 MMOL/L — SIGNIFICANT CHANGE UP (ref 5–17)
APTT BLD: 32.2 SEC — SIGNIFICANT CHANGE UP (ref 27.5–36.3)
AST SERPL-CCNC: 30 U/L — SIGNIFICANT CHANGE UP (ref 10–40)
AST SERPL-CCNC: 31 U/L — SIGNIFICANT CHANGE UP (ref 10–40)
BASOPHILS # BLD AUTO: 0 K/UL — SIGNIFICANT CHANGE UP (ref 0–0.2)
BASOPHILS NFR BLD AUTO: 0.5 % — SIGNIFICANT CHANGE UP (ref 0–2)
BILIRUB SERPL-MCNC: 0.7 MG/DL — SIGNIFICANT CHANGE UP (ref 0.2–1.2)
BILIRUB SERPL-MCNC: 1 MG/DL — SIGNIFICANT CHANGE UP (ref 0.2–1.2)
BUN SERPL-MCNC: 12 MG/DL — SIGNIFICANT CHANGE UP (ref 7–23)
BUN SERPL-MCNC: 12 MG/DL — SIGNIFICANT CHANGE UP (ref 7–23)
BUN SERPL-MCNC: 15 MG/DL — SIGNIFICANT CHANGE UP (ref 7–23)
CALCIUM SERPL-MCNC: 7.9 MG/DL — LOW (ref 8.4–10.5)
CALCIUM SERPL-MCNC: 8 MG/DL — LOW (ref 8.4–10.5)
CALCIUM SERPL-MCNC: 8.5 MG/DL — SIGNIFICANT CHANGE UP (ref 8.4–10.5)
CHLORIDE SERPL-SCNC: 94 MMOL/L — LOW (ref 96–108)
CHLORIDE SERPL-SCNC: 97 MMOL/L — SIGNIFICANT CHANGE UP (ref 96–108)
CHLORIDE SERPL-SCNC: 97 MMOL/L — SIGNIFICANT CHANGE UP (ref 96–108)
CK MB BLD-MCNC: 1.8 % — SIGNIFICANT CHANGE UP (ref 0–3.5)
CK MB CFR SERPL CALC: 1.9 NG/ML — SIGNIFICANT CHANGE UP (ref 0.5–10)
CK SERPL-CCNC: 103 U/L — SIGNIFICANT CHANGE UP (ref 25–170)
CO2 BLDA-SCNC: 28 MMOL/L — SIGNIFICANT CHANGE UP (ref 22–30)
CO2 BLDA-SCNC: 29 MMOL/L — SIGNIFICANT CHANGE UP (ref 22–30)
CO2 BLDA-SCNC: 29 MMOL/L — SIGNIFICANT CHANGE UP (ref 22–30)
CO2 SERPL-SCNC: 29 MMOL/L — SIGNIFICANT CHANGE UP (ref 22–31)
CO2 SERPL-SCNC: 29 MMOL/L — SIGNIFICANT CHANGE UP (ref 22–31)
CO2 SERPL-SCNC: 30 MMOL/L — SIGNIFICANT CHANGE UP (ref 22–31)
CREAT SERPL-MCNC: 1.98 MG/DL — HIGH (ref 0.5–1.3)
CREAT SERPL-MCNC: 2.03 MG/DL — HIGH (ref 0.5–1.3)
CREAT SERPL-MCNC: 2.29 MG/DL — HIGH (ref 0.5–1.3)
EOSINOPHIL # BLD AUTO: 0 K/UL — SIGNIFICANT CHANGE UP (ref 0–0.5)
EOSINOPHIL NFR BLD AUTO: 0.2 % — SIGNIFICANT CHANGE UP (ref 0–6)
GAS PNL BLDA: SIGNIFICANT CHANGE UP
GAS PNL BLDA: SIGNIFICANT CHANGE UP
GLUCOSE BLDC GLUCOMTR-MCNC: 102 MG/DL — HIGH (ref 70–99)
GLUCOSE BLDC GLUCOMTR-MCNC: 120 MG/DL — HIGH (ref 70–99)
GLUCOSE BLDC GLUCOMTR-MCNC: 125 MG/DL — HIGH (ref 70–99)
GLUCOSE BLDC GLUCOMTR-MCNC: 131 MG/DL — HIGH (ref 70–99)
GLUCOSE BLDC GLUCOMTR-MCNC: 132 MG/DL — HIGH (ref 70–99)
GLUCOSE BLDC GLUCOMTR-MCNC: 148 MG/DL — HIGH (ref 70–99)
GLUCOSE BLDC GLUCOMTR-MCNC: 162 MG/DL — HIGH (ref 70–99)
GLUCOSE BLDC GLUCOMTR-MCNC: 168 MG/DL — HIGH (ref 70–99)
GLUCOSE BLDC GLUCOMTR-MCNC: 191 MG/DL — HIGH (ref 70–99)
GLUCOSE BLDC GLUCOMTR-MCNC: 191 MG/DL — HIGH (ref 70–99)
GLUCOSE BLDC GLUCOMTR-MCNC: 210 MG/DL — HIGH (ref 70–99)
GLUCOSE BLDC GLUCOMTR-MCNC: 99 MG/DL — SIGNIFICANT CHANGE UP (ref 70–99)
GLUCOSE SERPL-MCNC: 145 MG/DL — HIGH (ref 70–99)
GLUCOSE SERPL-MCNC: 165 MG/DL — HIGH (ref 70–99)
GLUCOSE SERPL-MCNC: 96 MG/DL — SIGNIFICANT CHANGE UP (ref 70–99)
GRAM STN FLD: SIGNIFICANT CHANGE UP
HCT VFR BLD CALC: 27.6 % — LOW (ref 34.5–45)
HCT VFR BLD CALC: 29.3 % — LOW (ref 34.5–45)
HGB BLD-MCNC: 9 G/DL — LOW (ref 11.5–15.5)
HGB BLD-MCNC: 9.5 G/DL — LOW (ref 11.5–15.5)
HOROWITZ INDEX BLDA+IHG-RTO: SIGNIFICANT CHANGE UP
INR BLD: 1.29 RATIO — HIGH (ref 0.88–1.16)
LACTATE SERPL-SCNC: 1 MMOL/L — SIGNIFICANT CHANGE UP (ref 0.7–2)
LYMPHOCYTES # BLD AUTO: 0.8 K/UL — LOW (ref 1–3.3)
LYMPHOCYTES # BLD AUTO: 9 % — LOW (ref 13–44)
MAGNESIUM SERPL-MCNC: 1.5 MG/DL — LOW (ref 1.6–2.6)
MAGNESIUM SERPL-MCNC: 1.9 MG/DL — SIGNIFICANT CHANGE UP (ref 1.6–2.6)
MCHC RBC-ENTMCNC: 29.6 PG — SIGNIFICANT CHANGE UP (ref 27–34)
MCHC RBC-ENTMCNC: 29.6 PG — SIGNIFICANT CHANGE UP (ref 27–34)
MCHC RBC-ENTMCNC: 32.4 GM/DL — SIGNIFICANT CHANGE UP (ref 32–36)
MCHC RBC-ENTMCNC: 32.7 GM/DL — SIGNIFICANT CHANGE UP (ref 32–36)
MCV RBC AUTO: 90.6 FL — SIGNIFICANT CHANGE UP (ref 80–100)
MCV RBC AUTO: 91.6 FL — SIGNIFICANT CHANGE UP (ref 80–100)
MONOCYTES # BLD AUTO: 0.2 K/UL — SIGNIFICANT CHANGE UP (ref 0–0.9)
MONOCYTES NFR BLD AUTO: 2.9 % — SIGNIFICANT CHANGE UP (ref 1–9)
MRSA PCR RESULT.: SIGNIFICANT CHANGE UP
NEUTROPHILS # BLD AUTO: 7.4 K/UL — SIGNIFICANT CHANGE UP (ref 1.8–7.4)
NEUTROPHILS NFR BLD AUTO: 87.4 % — HIGH (ref 43–77)
PCO2 BLDA: 23 MMHG — LOW (ref 32–46)
PCO2 BLDA: 27 MMHG — LOW (ref 32–46)
PCO2 BLDA: 36 MMHG — SIGNIFICANT CHANGE UP (ref 32–46)
PH BLDA: 7.5 — HIGH (ref 7.35–7.45)
PH BLDA: 7.6 — CRITICAL HIGH (ref 7.35–7.45)
PH BLDA: 7.69 — CRITICAL HIGH (ref 7.35–7.45)
PHOSPHATE SERPL-MCNC: 1.5 MG/DL — LOW (ref 2.5–4.5)
PHOSPHATE SERPL-MCNC: 3.9 MG/DL — SIGNIFICANT CHANGE UP (ref 2.5–4.5)
PLATELET # BLD AUTO: 196 K/UL — SIGNIFICANT CHANGE UP (ref 150–400)
PLATELET # BLD AUTO: 263 K/UL — SIGNIFICANT CHANGE UP (ref 150–400)
PO2 BLDA: 156 MMHG — HIGH (ref 74–108)
PO2 BLDA: 170 MMHG — HIGH (ref 74–108)
PO2 BLDA: 187 MMHG — HIGH (ref 74–108)
POTASSIUM SERPL-MCNC: 3.3 MMOL/L — LOW (ref 3.5–5.3)
POTASSIUM SERPL-MCNC: 3.7 MMOL/L — SIGNIFICANT CHANGE UP (ref 3.5–5.3)
POTASSIUM SERPL-MCNC: 3.9 MMOL/L — SIGNIFICANT CHANGE UP (ref 3.5–5.3)
POTASSIUM SERPL-SCNC: 3.3 MMOL/L — LOW (ref 3.5–5.3)
POTASSIUM SERPL-SCNC: 3.7 MMOL/L — SIGNIFICANT CHANGE UP (ref 3.5–5.3)
POTASSIUM SERPL-SCNC: 3.9 MMOL/L — SIGNIFICANT CHANGE UP (ref 3.5–5.3)
PROT SERPL-MCNC: 6.4 G/DL — SIGNIFICANT CHANGE UP (ref 6–8.3)
PROT SERPL-MCNC: 6.4 G/DL — SIGNIFICANT CHANGE UP (ref 6–8.3)
PROTHROM AB SERPL-ACNC: 14.6 SEC — HIGH (ref 10–12.9)
RBC # BLD: 3.05 M/UL — LOW (ref 3.8–5.2)
RBC # BLD: 3.2 M/UL — LOW (ref 3.8–5.2)
RBC # FLD: 17.7 % — HIGH (ref 10.3–14.5)
RBC # FLD: 17.9 % — HIGH (ref 10.3–14.5)
S AUREUS DNA NOSE QL NAA+PROBE: DETECTED
SAO2 % BLDA: 98 % — HIGH (ref 92–96)
SAO2 % BLDA: 98 % — HIGH (ref 92–96)
SAO2 % BLDA: 99 % — HIGH (ref 92–96)
SODIUM SERPL-SCNC: 133 MMOL/L — LOW (ref 135–145)
SODIUM SERPL-SCNC: 135 MMOL/L — SIGNIFICANT CHANGE UP (ref 135–145)
SODIUM SERPL-SCNC: 137 MMOL/L — SIGNIFICANT CHANGE UP (ref 135–145)
SPECIMEN SOURCE: SIGNIFICANT CHANGE UP
TROPONIN I SERPL-MCNC: 0.39 NG/ML — HIGH (ref 0.02–0.06)
TROPONIN I SERPL-MCNC: 0.4 NG/ML — HIGH (ref 0.02–0.06)
WBC # BLD: 12.4 K/UL — HIGH (ref 3.8–10.5)
WBC # BLD: 8.5 K/UL — SIGNIFICANT CHANGE UP (ref 3.8–10.5)
WBC # FLD AUTO: 12.4 K/UL — HIGH (ref 3.8–10.5)
WBC # FLD AUTO: 8.5 K/UL — SIGNIFICANT CHANGE UP (ref 3.8–10.5)

## 2018-11-28 PROCEDURE — 99233 SBSQ HOSP IP/OBS HIGH 50: CPT

## 2018-11-28 PROCEDURE — 71045 X-RAY EXAM CHEST 1 VIEW: CPT | Mod: 26

## 2018-11-28 PROCEDURE — 99223 1ST HOSP IP/OBS HIGH 75: CPT

## 2018-11-28 PROCEDURE — 93010 ELECTROCARDIOGRAM REPORT: CPT

## 2018-11-28 RX ORDER — PANTOPRAZOLE SODIUM 20 MG/1
40 TABLET, DELAYED RELEASE ORAL DAILY
Qty: 0 | Refills: 0 | Status: DISCONTINUED | OUTPATIENT
Start: 2018-11-28 | End: 2018-12-02

## 2018-11-28 RX ORDER — LEVOTHYROXINE SODIUM 125 MCG
50 TABLET ORAL
Qty: 0 | Refills: 0 | Status: DISCONTINUED | OUTPATIENT
Start: 2018-11-28 | End: 2018-12-02

## 2018-11-28 RX ORDER — FENTANYL CITRATE 50 UG/ML
0.5 INJECTION INTRAVENOUS
Qty: 2500 | Refills: 0 | Status: DISCONTINUED | OUTPATIENT
Start: 2018-11-28 | End: 2018-11-29

## 2018-11-28 RX ORDER — CHLORHEXIDINE GLUCONATE 213 G/1000ML
15 SOLUTION TOPICAL
Qty: 0 | Refills: 0 | Status: DISCONTINUED | OUTPATIENT
Start: 2018-11-28 | End: 2018-12-02

## 2018-11-28 RX ORDER — NOREPINEPHRINE BITARTRATE/D5W 8 MG/250ML
0.05 PLASTIC BAG, INJECTION (ML) INTRAVENOUS
Qty: 8 | Refills: 0 | Status: DISCONTINUED | OUTPATIENT
Start: 2018-11-28 | End: 2018-11-29

## 2018-11-28 RX ORDER — FENTANYL CITRATE 50 UG/ML
50 INJECTION INTRAVENOUS ONCE
Qty: 0 | Refills: 0 | Status: DISCONTINUED | OUTPATIENT
Start: 2018-11-28 | End: 2018-11-28

## 2018-11-28 RX ORDER — AMIODARONE HYDROCHLORIDE 400 MG/1
0.5 TABLET ORAL
Qty: 900 | Refills: 0 | Status: DISCONTINUED | OUTPATIENT
Start: 2018-11-28 | End: 2018-11-29

## 2018-11-28 RX ORDER — MAGNESIUM SULFATE 500 MG/ML
1 VIAL (ML) INJECTION ONCE
Qty: 0 | Refills: 0 | Status: COMPLETED | OUTPATIENT
Start: 2018-11-28 | End: 2018-11-28

## 2018-11-28 RX ORDER — NICARDIPINE HYDROCHLORIDE 30 MG/1
5 CAPSULE, EXTENDED RELEASE ORAL
Qty: 40 | Refills: 0 | Status: DISCONTINUED | OUTPATIENT
Start: 2018-11-28 | End: 2018-12-02

## 2018-11-28 RX ORDER — ASPIRIN/CALCIUM CARB/MAGNESIUM 324 MG
81 TABLET ORAL DAILY
Qty: 0 | Refills: 0 | Status: DISCONTINUED | OUTPATIENT
Start: 2018-11-28 | End: 2018-12-02

## 2018-11-28 RX ORDER — CISATRACURIUM BESYLATE 2 MG/ML
3 INJECTION INTRAVENOUS
Qty: 200 | Refills: 0 | Status: DISCONTINUED | OUTPATIENT
Start: 2018-11-28 | End: 2018-11-28

## 2018-11-28 RX ORDER — VECURONIUM BROMIDE 20 MG/1
4 INJECTION, POWDER, FOR SOLUTION INTRAVENOUS ONCE
Qty: 0 | Refills: 0 | Status: COMPLETED | OUTPATIENT
Start: 2018-11-28 | End: 2018-11-28

## 2018-11-28 RX ADMIN — FENTANYL CITRATE 50 MICROGRAM(S): 50 INJECTION INTRAVENOUS at 01:36

## 2018-11-28 RX ADMIN — Medication 100 GRAM(S): at 05:46

## 2018-11-28 RX ADMIN — NICARDIPINE HYDROCHLORIDE 25 MG/HR: 30 CAPSULE, EXTENDED RELEASE ORAL at 21:07

## 2018-11-28 RX ADMIN — Medication 62.5 MILLIMOLE(S): at 05:46

## 2018-11-28 RX ADMIN — VECURONIUM BROMIDE 4 MILLIGRAM(S): 20 INJECTION, POWDER, FOR SOLUTION INTRAVENOUS at 06:45

## 2018-11-28 RX ADMIN — FENTANYL CITRATE 2.08 MICROGRAM(S)/KG/HR: 50 INJECTION INTRAVENOUS at 02:22

## 2018-11-28 RX ADMIN — CHLORHEXIDINE GLUCONATE 15 MILLILITER(S): 213 SOLUTION TOPICAL at 05:40

## 2018-11-28 RX ADMIN — CHLORHEXIDINE GLUCONATE 1 APPLICATION(S): 213 SOLUTION TOPICAL at 05:40

## 2018-11-28 RX ADMIN — PANTOPRAZOLE SODIUM 40 MILLIGRAM(S): 20 TABLET, DELAYED RELEASE ORAL at 11:34

## 2018-11-28 RX ADMIN — HEPARIN SODIUM 5000 UNIT(S): 5000 INJECTION INTRAVENOUS; SUBCUTANEOUS at 17:39

## 2018-11-28 RX ADMIN — SERTRALINE 50 MILLIGRAM(S): 25 TABLET, FILM COATED ORAL at 11:34

## 2018-11-28 RX ADMIN — Medication 50 MICROGRAM(S): at 06:53

## 2018-11-28 RX ADMIN — CHLORHEXIDINE GLUCONATE 15 MILLILITER(S): 213 SOLUTION TOPICAL at 17:39

## 2018-11-28 RX ADMIN — PROPOFOL 2.72 MICROGRAM(S)/KG/MIN: 10 INJECTION, EMULSION INTRAVENOUS at 06:04

## 2018-11-28 RX ADMIN — Medication 650 MILLIGRAM(S): at 22:43

## 2018-11-28 RX ADMIN — HEPARIN SODIUM 5000 UNIT(S): 5000 INJECTION INTRAVENOUS; SUBCUTANEOUS at 05:40

## 2018-11-28 RX ADMIN — FENTANYL CITRATE 50 MICROGRAM(S): 50 INJECTION INTRAVENOUS at 01:21

## 2018-11-28 RX ADMIN — ATORVASTATIN CALCIUM 20 MILLIGRAM(S): 80 TABLET, FILM COATED ORAL at 21:11

## 2018-11-28 NOTE — CONSULT NOTE ADULT - CONSULT REQUESTED DATE/TIME
27-Nov-2018 08:30
27-Nov-2018 08:38
27-Nov-2018 09:48
27-Nov-2018 17:36
28-Nov-2018 13:15
27-Nov-2018 17:09

## 2018-11-28 NOTE — PROGRESS NOTE ADULT - ASSESSMENT
72 year old woman s/p VF/VT cardiac arrest in setting of severe hypoglycemia.  ESRD on HD.  She has completed hypothermia  protocol and is intubated on pressors and IV amiodorone.  EKG reveals prolonged QT    Plan  1. Continue IV amiodorone  2. Pressors as needed.  3. Avoid QT prolonging medications and follow daily EKGs  4. HD per Renal  5. Overall prognosis is poor    Discussed with ICU team

## 2018-11-28 NOTE — PROGRESS NOTE ADULT - SUBJECTIVE AND OBJECTIVE BOX
SUBJ:  Patient is a 72y old  Female who presents with a chief complaint of back pain (28 Nov 2018 00:32)  case discussed with Dr. Parker....s/p cardiac arrest   patient is intubated, sedated.     PAST MEDICAL & SURGICAL HISTORY:  [place] ([place] of hearing)  Cataracts, bilateral: left worse than right  Pleural thickening: s/p pleurodisis left vats 2014  Hip pain: left  LBP radiating to left leg  CHF (congestive heart failure): diagnosed 2-2014  MI (myocardial infarction): 2-2014   cardiac cath done [facility]  Diabetes: diagnosed 2004  no medications in 5 months since hemodialysis  Hypothyroidism  ESRD (end stage renal disease)  HTN (hypertension)  MI (myocardial infarction): may 2016  Pleural effusion: Left side - several times since March 2014  Hypothyroid  Anxiety  HTN (hypertension)  Hyperlipidemia  CHF (congestive heart failure)  CKD (chronic kidney disease)  Anemia  Diabetes: DM 2  Thoracotomy scar of left chest: 4-2014 ? pneumonia/ scarring  S/P myomectomy: abdominal age 52  AV fistula: left upper 8-2014 attempted 12-16-14      MEDICATIONS  (STANDING):  amiodarone Infusion 1 mG/Min (33.333 mL/Hr) IV Continuous <Continuous>  amiodarone Infusion 0.5 mG/Min (16.667 mL/Hr) IV Continuous <Continuous>  aspirin enteric coated 81 milliGRAM(s) Oral daily  atorvastatin 20 milliGRAM(s) Oral at bedtime  cefepime   IVPB 500 milliGRAM(s) IV Intermittent daily  chlorhexidine 0.12% Liquid 15 milliLiter(s) Oral Mucosa two times a day  chlorhexidine 4% Liquid 1 Application(s) Topical <User Schedule>  cisatracurium Infusion 3 MICROgram(s)/kG/Min (7.488 mL/Hr) IV Continuous <Continuous>  dexmedetomidine Infusion 0.3 MICROgram(s)/kG/Hr (3.12 mL/Hr) IV Continuous <Continuous>  dextrose 5%. 1000 milliLiter(s) (50 mL/Hr) IV Continuous <Continuous>  dextrose 5%. 1000 milliLiter(s) (50 mL/Hr) IV Continuous <Continuous>  dextrose 50% Injectable 12.5 Gram(s) IV Push once  dextrose 50% Injectable 25 Gram(s) IV Push once  dextrose 50% Injectable 25 Gram(s) IV Push once  fentaNYL   Infusion 0.5 MICROgram(s)/kG/Hr (2.08 mL/Hr) IV Continuous <Continuous>  heparin  Injectable 5000 Unit(s) SubCutaneous every 12 hours  influenza   Vaccine 0.5 milliLiter(s) IntraMuscular once  levothyroxine Injectable 50 MICROGram(s) IV Push <User Schedule>  niCARdipine Infusion 5 mG/Hr (25 mL/Hr) IV Continuous <Continuous>  norepinephrine Infusion 0.05 MICROgram(s)/kG/Min (3.9 mL/Hr) IV Continuous <Continuous>  pantoprazole  Injectable 40 milliGRAM(s) IV Push daily  propofol Infusion 10 MICROgram(s)/kG/Min (2.724 mL/Hr) IV Continuous <Continuous>  sertraline 50 milliGRAM(s) Oral daily  sodium chloride 0.9%. 1000 milliLiter(s) (999 mL/Hr) IV Continuous <Continuous>    MEDICATIONS  (PRN):  acetaminophen   Tablet .. 650 milliGRAM(s) Oral every 6 hours PRN Temp greater or equal to 38C (100.4F), Mild Pain (1 - 3)  fentaNYL    Injectable 25 MICROGram(s) IV Push every 4 hours PRN Mild Pain (1 - 3)          Vital Signs Last 24 Hrs  T(C): 34.3 (28 Nov 2018 06:00), Max: 36.6 (28 Nov 2018 04:00)  T(F): 93.7 (28 Nov 2018 06:00), Max: 97.9 (28 Nov 2018 04:00)  HR: 58 (28 Nov 2018 09:00) (50 - 93)  BP: --  BP(mean): --  RR: 19 (28 Nov 2018 09:00) (14 - 35)  SpO2: 100% (28 Nov 2018 09:00) (81% - 100%)    REVIEW OF SYSTEMS:  CONSTITUTIONAL: No fever, weight loss, or fatigue  RESPIRATORY: No cough, wheezing, chills or hemoptysis; No shortness of breath  CARDIOVASCULAR: No chest pain or chest pressure.  No shortness of breath or dyspnea on exertion.  No palpitations, dizziness, light headedness, syncope or near syncope.  No edema, no orthopnea.   NEUROLOGICAL: No headaches, memory loss, loss of strength, numbness, or tremors      PHYSICAL EXAM  Constitutional:  WDWN. No acute distress  HEENT: normocephalic, atraumatic.  PERRLA. EOMI  Neck : No JVD. no carotid bruits  Lungs:  clear to auscultation bilaterally. no rhonchi. no wheezing  Heart:  S1 and S2. No S3, S4. II/VI systolic murmur.  Abdomen:  soft, non tender.  Extremities: No clubbing, cyanoisis or edema  Nuerologic:  A+O x 3. No focal deficits  Skin:  no rashes    TELEMETRY SR    ECG < from: 12 Lead ECG (11.28.18 @ 04:51) >  Sinus rhythm with 1st degree AV block with premature atrial complexes with aberrant conduction  Incomplete right bundle branch block  ST changes, consider anterior ischemia   Prolonged QT  Abnormal ECG  When compared with ECG of 27-NOV-2018 21:05,  Sinus rhythm has replaced Junctional rhythm  QT has shortened    < end of copied text >      TTE:    LABS:                        9.0    8.5   )-----------( 196      ( 28 Nov 2018 04:15 )             27.6     11-28    135  |  97  |  12  ----------------------------<  145<H>  3.7   |  29  |  2.03<H>    Ca    7.9<L>      28 Nov 2018 04:15  < from: TTE Echo Complete w/Doppler (11.27.18 @ 10:13) >  1. Normal global left ventricular systolic function.   2. The left ventricular diastolic function could not be assessed in this   study.   3. Thickening and calcification of the anterior and posterior mitral   valve leaflets.   4. Mild tricuspid regurgitation.   5. Mild aortic valve stenosis.   6. Mild to moderate aortic regurgitation.   7. Trace pulmonic valve regurgitation.   8. Estimated pulmonary artery systolic pressure is 53.2 mmHg assuming a   right atrial pressure of 10 mmHg, which is consistent with moderate   pulmonary hypertension.   9. LA volume Index is 31.7 ml/m² ml/m2.  10. The aortic valve mean gradient is 4.2 mmHg consistent with normally   opening aortic valve.    < end of copied text >  Phos  1.5     11-28  Mg     1.5     11-28    TPro  6.4  /  Alb  2.5<L>  /  TBili  1.0  /  DBili  x   /  AST  31  /  ALT  12  /  AlkPhos  159<H>  11-27    CARDIAC MARKERS ( 28 Nov 2018 02:50 )  .386 ng/mL / x     / x     / x     / x      CARDIAC MARKERS ( 27 Nov 2018 23:30 )  .404 ng/mL / x     / 103 U/L / x     / 1.9 ng/mL  CARDIAC MARKERS ( 27 Nov 2018 17:25 )  .369 ng/mL / x     / x     / x     / x      CARDIAC MARKERS ( 27 Nov 2018 10:00 )  .363 ng/mL / x     / x     / x     / x      CARDIAC MARKERS ( 27 Nov 2018 06:40 )  .265 ng/mL / x     / x     / x     / x          CARDIAC MARKERS ( 28 Nov 2018 02:50 )  .386 ng/mL / x     / x     / x     / x      CARDIAC MARKERS ( 27 Nov 2018 23:30 )  .404 ng/mL / x     / 103 U/L / x     / 1.9 ng/mL  CARDIAC MARKERS ( 27 Nov 2018 17:25 )  .369 ng/mL / x     / x     / x     / x      CARDIAC MARKERS ( 27 Nov 2018 10:00 )  .363 ng/mL / x     / x     / x     / x      CARDIAC MARKERS ( 27 Nov 2018 06:40 )  .265 ng/mL / x     / x     / x     / x          acetaminophen   Tablet .. 650 milliGRAM(s) Oral every 6 hours PRN  amiodarone Infusion 1 mG/Min IV Continuous <Continuous>  amiodarone Infusion 0.5 mG/Min IV Continuous <Continuous>  aspirin enteric coated 81 milliGRAM(s) Oral daily  atorvastatin 20 milliGRAM(s) Oral at bedtime  cefepime   IVPB 500 milliGRAM(s) IV Intermittent daily  chlorhexidine 0.12% Liquid 15 milliLiter(s) Oral Mucosa two times a day  chlorhexidine 4% Liquid 1 Application(s) Topical <User Schedule>  cisatracurium Infusion 3 MICROgram(s)/kG/Min IV Continuous <Continuous>  dexmedetomidine Infusion 0.3 MICROgram(s)/kG/Hr IV Continuous <Continuous>  dextrose 5%. 1000 milliLiter(s) IV Continuous <Continuous>  dextrose 5%. 1000 milliLiter(s) IV Continuous <Continuous>  dextrose 50% Injectable 12.5 Gram(s) IV Push once  dextrose 50% Injectable 25 Gram(s) IV Push once  dextrose 50% Injectable 25 Gram(s) IV Push once  fentaNYL    Injectable 25 MICROGram(s) IV Push every 4 hours PRN  fentaNYL   Infusion 0.5 MICROgram(s)/kG/Hr IV Continuous <Continuous>  heparin  Injectable 5000 Unit(s) SubCutaneous every 12 hours  influenza   Vaccine 0.5 milliLiter(s) IntraMuscular once  levothyroxine Injectable 50 MICROGram(s) IV Push <User Schedule>  niCARdipine Infusion 5 mG/Hr IV Continuous <Continuous>  norepinephrine Infusion 0.05 MICROgram(s)/kG/Min IV Continuous <Continuous>  pantoprazole  Injectable 40 milliGRAM(s) IV Push daily  propofol Infusion 10 MICROgram(s)/kG/Min IV Continuous <Continuous>  sertraline 50 milliGRAM(s) Oral daily  sodium chloride 0.9%. 1000 milliLiter(s) IV Continuous <Continuous>    I&O's Summary    27 Nov 2018 07:01  -  28 Nov 2018 07:00  --------------------------------------------------------  IN: 1461.2 mL / OUT: 1500 mL / NET: -38.8 mL      BNP  Troponin I, Serum: .386 ng/mL (11-28-18 @ 02:50)  Troponin I, Serum: .404 ng/mL (11-27-18 @ 23:30)  Troponin I, Serum: .369 ng/mL (11-27-18 @ 17:25)  Troponin I, Serum: .363 ng/mL (11-27-18 @ 10:00)      Troponin I, Serum: .386 ng/mL (11-28-18 @ 02:50)  Troponin I, Serum: .404 ng/mL (11-27-18 @ 23:30)  Troponin I, Serum: .369 ng/mL (11-27-18 @ 17:25)  Troponin I, Serum: .363 ng/mL (11-27-18 @ 10:00)

## 2018-11-28 NOTE — PROGRESS NOTE ADULT - SUBJECTIVE AND OBJECTIVE BOX
CC: f/u for cardiac arrest    Patient reports: she is obtunded on the vent, goals of care being addressed.Neurology input appreciated,possible anoxic event.She is on pressers.    REVIEW OF SYSTEMS:  All other review of systems negative (Comprehensive ROS): limited by condition    Antimicrobials Day #  :day 2  cefepime   IVPB 500 milliGRAM(s) IV Intermittent daily    Other Medications Reviewed    T(F): 98.3 (11-28-18 @ 12:00), Max: 98.8 (11-28-18 @ 09:00)  HR: 75 (11-28-18 @ 12:00)  BP: --  RR: 20 (11-28-18 @ 12:00)  SpO2: 100% (11-28-18 @ 12:00)  Wt(kg): --    PHYSICAL EXAM:  General: lethargic, no acute distress  Eyes:  anicteric, no conjunctival injection, no discharge  Oropharynx: ETT 	  Neck: supple, without adenopathy  Lungs: coarse BS  Heart: regular rate and rhythm; no murmur, rubs or gallops  Abdomen: soft, nondistended, nontender, without mass or organomegaly  Skin: no lesions  Extremities: no clubbing, cyanosis, or edema  Neurologic: obtunded    LAB RESULTS:                        9.0    8.5   )-----------( 196      ( 28 Nov 2018 04:15 )             27.6     11-28    135  |  97  |  12  ----------------------------<  145<H>  3.7   |  29  |  2.03<H>    Ca    7.9<L>      28 Nov 2018 04:15  Phos  1.5     11-28  Mg     1.5     11-28    TPro  6.4  /  Alb  2.5<L>  /  TBili  1.0  /  DBili  x   /  AST  31  /  ALT  12  /  AlkPhos  159<H>  11-27    LIVER FUNCTIONS - ( 27 Nov 2018 23:30 )  Alb: 2.5 g/dL / Pro: 6.4 g/dL / ALK PHOS: 159 U/L / ALT: 12 U/L DA / AST: 31 U/L / GGT: x             MICROBIOLOGY:  RECENT CULTURES:  11-28 @ 01:00 .Sputum Sputum       Few polymorphonuclear leukocytes per low power field  No Squamous epithelial cells per low power field  No organisms seen per oil power field    11-26 @ 16:15 .Blood Blood-Peripheral     No growth to date.          RADIOLOGY REVIEWED:  < from: Xray Chest 1 View AP/PA (11.28.18 @ 06:56) >    IMPRESSION:     Cannot exclude a patchy left basilar infiltrate in view of clinical   concern as described above.    < end of copied text >  < from: CT Angio Chest w/ IV Cont (11.27.18 @ 12:40) >  IMPRESSION:      No evidence of thoracic or abdominal aortic aneurysm or dissection. No   evidence of acute pulmonary embolism.    Patchy opacities in the bilateral lower lobes may represent atelectasis   with or pneumonia with consideration for aspiration pneumonia.    Colitis involving the descending colon and rectosigmoid colon,   differential including infectious versus inflammatory versus ischemic   etiology new from the prior exam    Small residual ascites decreased from prior exam.    Severe erosive changes centered around L2 on L3 disc space concerning for   osteomyelitis discitis of indeterminate age, correlate with dedicated   thoracic and lumbar spine CT of same day    < end of copied text >  < from: CT Head No Cont (11.27.18 @ 12:38) >  Impression:  1. Unremarkable noncontrast CT scan of the brain.     < end of copied text >

## 2018-11-28 NOTE — PROGRESS NOTE ADULT - SUBJECTIVE AND OBJECTIVE BOX
Follow-up Critical Care Progress Note  Chief Complaint : Pneumonia    patient finished hypothermia  overnight had severe respiratory alkalosis while on hypothermia, required paralytic for vent support and managment or resp status.  currently on isolation for MRSA swab pending results.    at this time pt intubated, sedated on propofol/fentanyl and unresponsive.  + overbreathing vent  does not respond to any noxious stimuli.     blood pressure has been laibile and now currently on levo    Allergies :Avelox (Unknown)  fish (Hives; Rash)  shellfish (Unknown)      PAST MEDICAL & SURGICAL HISTORY:  [place]  Cataracts, bilateral: left worse than right  Pleural thickening: s/p pleurodisis left vats 2014  Hip pain: left  LBP radiating to left leg  CHF (congestive heart failure): diagnosed 2-2014  MI (myocardial infarction): 2-2014   cardiac cath done [facility]  Diabetes: diagnosed 2004  no medications in 5 months since hemodialysis  Hypothyroidism  ESRD (end stage renal disease)  HTN (hypertension)  MI (myocardial infarction): may 2016  Pleural effusion: Left side - several times since March 2014  Hypothyroid  Anxiety  HTN (hypertension)  Hyperlipidemia  CHF (congestive heart failure)  CKD (chronic kidney disease)  Anemia  Diabetes: DM 2  Thoracotomy scar of left chest: 4-2014 ? pneumonia/ scarring  S/P myomectomy: abdominal age 52  AV fistula: left upper 8-2014 attempted 12-16-14      Medications:  MEDICATIONS  (STANDING):  amiodarone Infusion 1 mG/Min (33.333 mL/Hr) IV Continuous <Continuous>  amiodarone Infusion 0.5 mG/Min (16.667 mL/Hr) IV Continuous <Continuous>  aspirin enteric coated 81 milliGRAM(s) Oral daily  atorvastatin 20 milliGRAM(s) Oral at bedtime  cefepime   IVPB 500 milliGRAM(s) IV Intermittent daily  chlorhexidine 0.12% Liquid 15 milliLiter(s) Oral Mucosa two times a day  chlorhexidine 4% Liquid 1 Application(s) Topical <User Schedule>  cisatracurium Infusion 3 MICROgram(s)/kG/Min (7.488 mL/Hr) IV Continuous <Continuous>  dexmedetomidine Infusion 0.3 MICROgram(s)/kG/Hr (3.12 mL/Hr) IV Continuous <Continuous>  dextrose 5%. 1000 milliLiter(s) (50 mL/Hr) IV Continuous <Continuous>  dextrose 5%. 1000 milliLiter(s) (50 mL/Hr) IV Continuous <Continuous>  dextrose 50% Injectable 12.5 Gram(s) IV Push once  dextrose 50% Injectable 25 Gram(s) IV Push once  dextrose 50% Injectable 25 Gram(s) IV Push once  fentaNYL   Infusion 0.5 MICROgram(s)/kG/Hr (2.08 mL/Hr) IV Continuous <Continuous>  heparin  Injectable 5000 Unit(s) SubCutaneous every 12 hours  influenza   Vaccine 0.5 milliLiter(s) IntraMuscular once  levothyroxine Injectable 50 MICROGram(s) IV Push <User Schedule>  niCARdipine Infusion 5 mG/Hr (25 mL/Hr) IV Continuous <Continuous>  norepinephrine Infusion 0.05 MICROgram(s)/kG/Min (3.9 mL/Hr) IV Continuous <Continuous>  pantoprazole  Injectable 40 milliGRAM(s) IV Push daily  propofol Infusion 10 MICROgram(s)/kG/Min (2.724 mL/Hr) IV Continuous <Continuous>  sertraline 50 milliGRAM(s) Oral daily  sodium chloride 0.9%. 1000 milliLiter(s) (999 mL/Hr) IV Continuous <Continuous>    MEDICATIONS  (PRN):  acetaminophen   Tablet .. 650 milliGRAM(s) Oral every 6 hours PRN Temp greater or equal to 38C (100.4F), Mild Pain (1 - 3)  fentaNYL    Injectable 25 MICROGram(s) IV Push every 4 hours PRN Mild Pain (1 - 3)      LABS:                        9.0    8.5   )-----------( 196      ( 28 Nov 2018 04:15 )             27.6     11-28    135  |  97  |  12  ----------------------------<  145<H>  3.7   |  29  |  2.03<H>    Ca    7.9<L>      28 Nov 2018 04:15  Phos  1.5     11-28  Mg     1.5     11-28    TPro  6.4  /  Alb  2.5<L>  /  TBili  1.0  /  DBili  x   /  AST  31  /  ALT  12  /  AlkPhos  159<H>  11-27      CARDIAC MARKERS ( 28 Nov 2018 02:50 )  .386 ng/mL / x     / x     / x     / x      CARDIAC MARKERS ( 27 Nov 2018 23:30 )  .404 ng/mL / x     / 103 U/L / x     / 1.9 ng/mL  CARDIAC MARKERS ( 27 Nov 2018 17:25 )  .369 ng/mL / x     / x     / x     / x      CARDIAC MARKERS ( 27 Nov 2018 10:00 )  .363 ng/mL / x     / x     / x     / x      CARDIAC MARKERS ( 27 Nov 2018 06:40 )  .265 ng/mL / x     / x     / x     / x            PT/INR - ( 28 Nov 2018 04:15 )   PT: 14.6 sec;   INR: 1.29 ratio         PTT - ( 28 Nov 2018 04:15 )  PTT:32.2 sec    Procalcitonin, Serum: 0.40 ng/mL (11-27-18 @ 05:30)          CULTURES: (if applicable)    Culture - Blood (collected 11-26-18 @ 16:15)  Source: .Blood Blood-Venous  Preliminary Report (11-27-18 @ 19:02):    No growth to date.    Culture - Blood (collected 11-26-18 @ 16:15)  Source: .Blood Blood-Peripheral  Preliminary Report (11-27-18 @ 19:02):    No growth to date.          ABG - ( 28 Nov 2018 05:20 )  pH, Arterial: 7.60  pH, Blood: x     /  pCO2: 27    /  pO2: 170   / HCO3: x     / Base Excess: x     /  SaO2: 98                CAPILLARY BLOOD GLUCOSE      POCT Blood Glucose.: 191 mg/dL (28 Nov 2018 07:20)      RADIOLOGY  CXR:  < from: Xray Chest 1 View AP/PA (11.28.18 @ 06:56) >    Cannot exclude a patchy left basilar infiltrate in view of clinical   concern as described above.    < end of copied text >      CT:  < from: CT Angio Chest w/ IV Cont (11.27.18 @ 12:40) >  INTERPRETATION:  Exam Date: 11/27/2018 12:40 PM  Clinical Information: Status post cardiac arrest  Technique:       CT angiography of the chest , abdomen and pelvis was performed with axial images obtained from the thoracic inlet to the pubic symphysis following administration of IV contrast    90 cc's of Omnipaque 350 was administered.  Oral contrast was not administered. Noncontrast images of the chest were also obtained. Maximal intensity projection images were also obtained.  Comparison:  CT thoracic and lumbar spine same day, CT chest abdomen pelvis 10/19/2018, CT abdomen 10/30/2018    FINDINGS:    Chest:    Lungs, Airways and Pleura: Endotracheal tube just above the david. No endobronchial lesions. Mild bronchiectasis. Patchy opacities in the bilateral lower lobes may represent atelectasis versus pneumonia with   consideration for aspiration. Minimal biapical pleural parenchymal scarring. No pneumothorax. No pleural effusions. Left-sided calcified pleural plaques unchanged.    Heart and Great Vessels: Atherosclerotic changes of the aorta and coronary vasculature. No aortic aneurysm or dissection. No periaortic hematoma. Left ventricular hypertrophy. No pericardial effusion. No   evidence of acute pulmonary embolism. Right jugular line. No evidence of acute pulmonary embolism.    Mediastinum and Janae: NG tube into the stomach. Subcentimeter mediastinal lymph nodes.    Neck and Chest Wall: Moderate anasarca.    Abdomen and pelvis:    Liver: Subcentimeter hypodensity left hepatic lobe too small to characterize. Liver is enlarged.  Gallbladder: Mildly distended gallbladder..  Bile ducts: No intrahepatic or extrahepatic biliary dilatation  Pancreas: Tiny hypodensities too small to characterize likely representing cysts.    Spleen: within normal limits  Adrenal: within normal limits  Kidneys, Ureters and Bladder: Moderate bilateral renal atrophy without hydronephrosis. No renal mass or renal calculus. The ureters are unremarkable. The bladder is collapsed around a Mendez catheter.    Pelvis: Resolution of previously noted pelvic ascites with suggestion of soft tissue thickening in the deep pelvis in retrospect unchanged of unclear etiology. Mild presacral stranding.    Bowel: NG tube into the stomach. Rectal tube is visualized. Thickening of the descending colon and rectosigmoid colon compatible with colitis, differential including infectious versus inflammatory versus ischemic etiology. Colonic diverticulosis without diverticulitis. No definite bowel obstruction. Normal appendix.    Peritoneum: Small upper quadrant ascites markedly decreased from prior   exam. Presacral stranding.    Retroperitoneum:     Subcentimeter retroperitoneal lymph nodes non-specific in nature Vessels: Atherosclerotic changes. No abdominal aortic aneurysm or abdominal aortic dissection.    Abdominal wall: Anasarca  Bones: Degenerative changes. No lytic or blastic lesions. Severe erosive changes surrounding L2-L3 disc space as seen on prior exams, correlate with dedicated CT of the thoracic and lumbar spine of same day. Marked central canal and neural foraminal narrowing at this level.    IMPRESSION:      No evidence of thoracic or abdominal aortic aneurysm or dissection. No evidence of acute pulmonary embolism.    Patchy opacities in the bilateral lower lobes may represent atelectasis with or pneumonia with consideration for aspiration pneumonia.    Colitis involving the descending colon and rectosigmoid colon, differential including infectious versus inflammatory versus ischemic etiology new from the prior exam    Small residual ascites decreased from prior exam.    Severe erosive changes centered around L2 on L3 disc space concerning for osteomyelitis discitis of indeterminate age, correlate with dedicated thoracic and lumbar spine CT of same day    < end of copied text >    ECHO:  < from: TTE Echo Complete w/Doppler (11.27.18 @ 10:13) >       Summary:   1. Normal global left ventricular systolic function.   2. The left ventricular diastolic function could not be assessed in this study.   3. Thickening and calcification of the anterior and posterior mitral valve leaflets.   4. Mild tricuspid regurgitation.   5. Mild aortic valve stenosis.   6. Mild to moderate aortic regurgitation.   7. Trace pulmonic valve regurgitation.   8. Estimated pulmonary artery systolic pressure is 53.2 mmHg assuming a right atrial pressure of 10 mmHg, which is consistent with moderate pulmonary hypertension.   9. LA volume Index is 31.7 ml/m² ml/m2.  10. The aortic valve mean gradient is 4.2 mmHg consistent with normally opening aortic valve.    < end of copied text >      VITALS:  T(C): 37.1 (11-28-18 @ 09:00), Max: 37.1 (11-28-18 @ 09:00)  T(F): 98.8 (11-28-18 @ 09:00), Max: 98.8 (11-28-18 @ 09:00)  HR: 58 (11-28-18 @ 09:00) (50 - 93)  BP: --  BP(mean): --  ABP: 137/45 (11-28-18 @ 09:00) (67/33 - 218/87)  ABP(mean): 74 (11-28-18 @ 09:00) (0 - 150)  RR: 19 (11-28-18 @ 09:00) (14 - 35)  SpO2: 100% (11-28-18 @ 09:00) (81% - 100%)  CVP(mm Hg): 15 (11-28-18 @ 09:00) (-5 - 298)  CVP(cm H2O): --    Ins and Outs     11-27-18 @ 07:01  -  11-28-18 @ 07:00  --------------------------------------------------------  IN: 1590.4 mL / OUT: 2100 mL / NET: -509.6 mL    11-28-18 @ 07:01  -  11-28-18 @ 10:11  --------------------------------------------------------  IN: 187.6 mL / OUT: 0 mL / NET: 187.6 mL        Height (cm): 144.78 (11-28-18 @ 09:37)  Weight (kg): 41.6 (11-28-18 @ 09:37)  BMI (kg/m2): 19.8 (11-28-18 @ 09:37)    Device: 840, Mode: AC/ CMV (Assist Control/ Continuous Mandatory Ventilation), RR (machine): 12, RR (patient): 15, TV (machine): 0.3, TV (patient): 350, FiO2: 35, PEEP: 5, ITime: 0.8, MAP: 15, PIP: 19

## 2018-11-28 NOTE — PROGRESS NOTE ADULT - ASSESSMENT
73 yo f pmhx dementia, DM2, HTN, CAD, HLD. ESRD on HD (TTS), hypothyroidism, chronic afib, diastolic chf, spinal stenosis, depression and diabetic neuropathy admitted with low back pain now s/p vfib cardiac arrest (downtim ~14 minutes), Severe respiratory alkalosis, JOSE.      NEURO:  CV:  RESP:  RENAL:  GI:  ENDO:  ID:  HEME:   DISPO: Full code.      Critical Care time: 70 mins assessing presenting problems of acute illness that poses high probability of life threatening deterioration or end organ damage/dysfunction.  Medical decision making including Initiating plan of care, reviewing data, reviewing radiology, discussing with multidisciplinary team, non inclusive of procedures, discussing goals of care with patient/family 73 yo f pmhx dementia, DM2, HTN, CAD, HLD. ESRD on HD (TTS), hypothyroidism, chronic afib, diastolic chf, spinal stenosis, depression and diabetic neuropathy admitted with low back pain now s/p vfib cardiac arrest (downtim ~14 minutes), Severe respiratory alkalosis, JOSE.  Management plan discussed with eICU attending Dr. Thomas.     NEURO: Patient was experiencing facial twitching, now improved s/p improvement of pH and increase in sedation.  Propofol and precedex on.  Fentanyl added for pain control and improvement in RR.  Depression on zoloft.   CV: Vfib Cardiac arrest, temperature targeted therapy, amiodarone infusion.  Shock state now resoled, off levophed.  Patient now hypertensive restarted on nicardipine gtt.  Goal SBP <150. HLD on statin therapy.  CAD on aspirin  RESP: Respiratory failure with severe respiratory alkalosis.  Vent settings changed to volume control, tv of 400, rr 14, fio2 35%, peep 5.  Titrate FiO2 for sPO2 >92%.  Keep HOB >30 degrees.  Peridex for VAP ppx.  Pantoprazole for stress ulcer ppx.  ABG ordered for 0500.  Fentanyl added for compliance with vent settings.    RENAL: ESRD on HD, received HD today.  Renally dose medications.  Mendez in place.    GI: NPO.  OG tube to low intermittent suctioning.    ENDO: DM.  Hypoglycemic, started D5 at 50.  POCT q1 hour while on temperature targeted protocol. Hypothyroidism on synthroid.    ID: On cefepime for coverage; ID following.    HEME: Heparin for VTE ppx.    DISPO: Full code.  Patient's prognosis poor.     Critical Care time: 70 mins assessing presenting problems of acute illness that poses high probability of life threatening deterioration or end organ damage/dysfunction.  Medical decision making including Initiating plan of care, reviewing data, reviewing radiology, discussing with multidisciplinary team, non inclusive of procedures, discussing goals of care with patient/family 73 yo f pmhx dementia, DM2, HTN, CAD, HLD. ESRD on HD (TTS), hypothyroidism, chronic afib, diastolic chf, spinal stenosis, depression and diabetic neuropathy admitted with low back pain now s/p vfib cardiac arrest (downtim ~14 minutes), Severe respiratory alkalosis, JOSE.  Management plan discussed with eICU attending Dr. Thomas.     NEURO: Patient was experiencing facial twitching, now improved s/p improvement of pH and increase in sedation.  Propofol and precedex on.  Fentanyl added for pain control and improvement in RR.  Depression on zoloft.   CV: Vfib Cardiac arrest, temperature targeted therapy, amiodarone infusion.  Shock state now resoled, off levophed.  Patient now hypertensive restarted on nicardipine gtt.  Goal SBP <150. HLD on statin therapy.  CAD on aspirin  RESP: Respiratory failure with severe respiratory alkalosis.  Vent settings changed to volume control, tv of 400, rr 14, fio2 35%, peep 5.  Titrate FiO2 for sPO2 >92%.  Keep HOB >30 degrees.  Peridex for VAP ppx.  Pantoprazole for stress ulcer ppx.  ABG ordered for 0500.  Fentanyl added for compliance with vent settings.    RENAL: ESRD on HD, received HD today.  Renally dose medications.  Mendez in place.  Patient hypokalemic s/p HD with K of 2.5.  Patient received KCl 10mg IV x2 and repeat K is 3.3  GI: NPO.  OG tube to low intermittent suctioning.    ENDO: DM.  Hypoglycemic, started D5 at 50.  POCT q1 hour while on temperature targeted protocol. Hypothyroidism on synthroid.    ID: On cefepime for coverage; ID following.    HEME: Heparin for VTE ppx.    DISPO: Full code.  Patient's prognosis poor.     Critical Care time: 70 mins assessing presenting problems of acute illness that poses high probability of life threatening deterioration or end organ damage/dysfunction.  Medical decision making including Initiating plan of care, reviewing data, reviewing radiology, discussing with multidisciplinary team, non inclusive of procedures, discussing goals of care with patient/family

## 2018-11-28 NOTE — PROGRESS NOTE ADULT - SUBJECTIVE AND OBJECTIVE BOX
Neurology Progress Note    Subjective & Objective:  Patient remains unresponsive to voice and noxious stimuli on vent.  Eyes open with right and downward gaze preference, positive oculocephalics and ? ocular  bobbing.  No response to visual threat, poorly reactive small pupils ?aphakic, quadriplegic hypotonic with bilateral Babinski signs, no seizures.  EEG is consistent with encephalopathy but no seizure activity seen.  CT head is negative for ischemia etc.                      EEG:< from: EEG Extended Monitoring 41-60 Min (11.28.18 @ 12:20) >    EXAM:  EEG EXTENDED 41-60 MIN      PROCEDURE DATE:  11/28/2018        INTERPRETATION:  Background Pattern and Specific Features: This is a   bedside recording in the CCU with the patient on a ventilator. The   background is generally slow and disorganized consisting of low to medium   voltage theta and delta activity. Electrode artifacts are seen throughout   the record. Epileptogenic  features are not seen and there are no focal   findings.    Stimulation: Stimulation studies were not performed this is a bedside   recording in the CCU.    Impression: This is an abnormal record on the basis of generalized   background disorganization and diffuse theta and delta slowing. The   recording is consistent with a diffuse cerebral dysfunction but there are   no focal findings persistent asymmetries or epileptiform discharges.        < end of copied text >            EXAM:  CT BRAIN      PROCEDURE DATE:  11/27/2018        INTERPRETATION:  HISTORY: Cardiac arrest    Evaluation demonstrates no evidence of mass-effect or midline shift. No   intraparenchymal mass lesions or hemorrhage is identified.     There is   no evidence of hydrocephalus. No extra-axial collections are noted.    The bone windows demonstrate no gross osseous abnormalities.        Impression:  1. Unremarkable noncontrast CT scan of the brain.          < end of copied text >

## 2018-11-28 NOTE — PROGRESS NOTE ADULT - ASSESSMENT
Presentation remains consistent with an anoxic encephalopathy s/p CP arrest in the setting of profound hypoglycemia.  CT head is not remarkable and the EEG is diffusely slow and disorganized but there are no epileptogenic or focal findings. Concerned about the vegetative state.    REC:  CCU support, palliative care consult., neurological f/u.

## 2018-11-28 NOTE — PROGRESS NOTE ADULT - ASSESSMENT
71 yo F, mult problems as above, including DM, ESRD  At [facility]  Admitted here yest with low back pain, FTT  Spine CT as reviewed, in c/w 10/30 study, stable endplate erosion L1-L2, presumed degenerative  Surveillance Bld Cxs previously negative, afebrile  Now s/p Vtach arrest, intubated, on Amio  CT imaging today, possible pneumonia, and on abdominal imaging, colitis, along with severe erosive changes around L2-L3, concerning for osteomyelitis/discitis  Difficult to correlate Ct findings; despite pt's risk for vertebral osteo- some discrepancy in interpretation of today's imaging and yest's dedicated scan of spine- degenerative change vs infection  Her abdominal exam is benign  neurology note appreciated, ?anoxic event.  Preliminary micro is negative  Plan:  With 11/27  arrest and any question of aspiration pneumonia, agree with empiric coverage- Vanco x 1, Cefepime- this will also address any ? of ischemic colitis  Would check stool Cdiff if diarrhea   MRSA screen is pending  [place] is guarded, will consider additional spine imaging if she improves

## 2018-11-28 NOTE — CONSULT NOTE ADULT - ASSESSMENT
palliative:  Overall prognosis extremely poor. Case has been reviewed with healthcare proxy Antonette along with Dr. aKssi madera and Dr. Fernandez. Patient has no chance of recovering meaningful life and will never be able to return home. Family where we'll review in 24 hours.      Assessment  1. S/P Cardiac arrest, VF 11/27 with prolonged QT s/p Therapeutic hypothermia  , on amiodarone  2. Severe Hypoglycemia in patient with DM2 - improved.   3. Acute respiratory failure   4. Laibile BP - either on cardene drip vs Levophed drip to maintain BP  4. ESRD on HD  5. Chronic Diastolic CHF  6. CAD s/p MI,   7. Hypothyroidism,   8. Back Pain - ? Osteo of Spine,   9. ? Dementia  10. Lactic acidosis Resolved    Plan  Mechanical ventilation  Sedation vacation eval neuro status, check EEG  warming phase of targeted temperature therapy initiated  empiric abx to control for aspiration risk and Osteo of spine as per ID  start tube feeding, Insulin ss, Hold Lantus at this time  amiodarone IV to continue  daily ekg  d/w Cardio this am  Neuro f/u   Palliative care f/u    PMD:				                   Notified(Date):  Family Updated: 		                                 Date: 11/27      Sedation & Analgesia:	prop/fent - required paralytic for resp alkalsosi  Diet/Nutrition:		npo  GI PPx:			ppi    DVT Ppx:		hep sq        Activity:		    bedrest             Head of Bed:               35-45 deg  Glycemic Control:        on hold    Lines:  CENTRAL LINE: 	[x ] YES [ ] NO	                    LOCATION:   [facility]	                       DATE INSERTED:   11/27	                    REMOVE:  [ ] YES [x ] NO    A-LINE:  	                [x ] YES [ ] NO                      LOCATION:   	   R Radial                    DATE INSERTED: 	11/27	            REMOVE:  [ ] YES [x ] NO    COYLE: 		        [x ] YES [ ] NO  		                                       DATE INSERTED:	11/27	            REMOVE:  [x ] YES [ ] NO      Restraints may be deemed necessary to prevent removal of life-sustaining devices [ x ] YES   [    ]  NO    Disposition: ICU care    Goals of Care: Full code  Prognosis - Poor.

## 2018-11-28 NOTE — CONSULT NOTE ADULT - SUBJECTIVE AND OBJECTIVE BOX
HPI:   Patient is a 72y female with DM, ESRD (AVF), CAD, HTN, depression, recent prolonged stay at [place], hosp at [facility] for hypertensive urgency and hypothermia (10/19, 10/31 Bld Cxs negative), who was home only briefly, when she presented here yest with back pain.  As of this AM, cardiac arrest, Vtach, resuscitated, now intubated in ICU on Amio drip, along with hypothermia protocol post arrest.  Minimal ET secretions.  Anuric- roman inserted, minimal output.     REVIEW OF SYSTEMS:  Not provided on Vent    PAST MEDICAL & SURGICAL HISTORY:  [place] (hard of hearing)  Cataracts, bilateral: left worse than right  Pleural thickening: s/p pleurodisis left vats 2014  Hip pain: left  LBP radiating to left leg  CHF (congestive heart failure): diagnosed 2-2014  MI (myocardial infarction): 2-2014   cardiac cath done [facility]  Diabetes: diagnosed 2004  no medications in 5 months since hemodialysis  Hypothyroidism  ESRD (end stage renal disease)  HTN (hypertension)  MI (myocardial infarction): may 2016  Pleural effusion: Left side - several times since March 2014  Hypothyroid  Anxiety  HTN (hypertension)  Hyperlipidemia  CHF (congestive heart failure)  CKD (chronic kidney disease)  Anemia  Diabetes: DM 2  Thoracotomy scar of left chest: 4-2014 ? pneumonia/ scarring  S/P myomectomy: abdominal age 52  AV fistula: left upper 8-2014 attempted 12-16-14      Allergies  Avelox (Unknown)  fish (Hives; Rash)  shellfish (Unknown)    Antimicrobials Day # 1   vancomycin  IVPB 1000 milliGRAM(s) IV Intermittent once    Other Medications:  acetaminophen   Tablet .. 650 milliGRAM(s) Oral every 6 hours PRN  amiodarone Infusion 1 mG/Min IV Continuous <Continuous>  amiodarone Infusion 0.5 mG/Min IV Continuous <Continuous>  aspirin enteric coated 81 milliGRAM(s) Oral daily  atorvastatin 20 milliGRAM(s) Oral at bedtime  chlorhexidine 4% Liquid 1 Application(s) Topical <User Schedule>  dextrose 5%. 1000 milliLiter(s) IV Continuous <Continuous>  dextrose 50% Injectable 12.5 Gram(s) IV Push once  dextrose 50% Injectable 25 Gram(s) IV Push once  dextrose 50% Injectable 25 Gram(s) IV Push once  fentaNYL    Injectable 25 MICROGram(s) IV Push every 4 hours PRN  heparin  Injectable 5000 Unit(s) SubCutaneous every 12 hours  influenza   Vaccine 0.5 milliLiter(s) IntraMuscular once  levothyroxine 100 MICROGram(s) Oral daily  niCARdipine Infusion 5 mG/Hr IV Continuous <Continuous>  norepinephrine Infusion 0.05 MICROgram(s)/kG/Min IV Continuous <Continuous>  pantoprazole  Injectable 40 milliGRAM(s) IV Push daily  propofol Infusion 10 MICROgram(s)/kG/Min IV Continuous <Continuous>  sertraline 50 milliGRAM(s) Oral daily  sodium chloride 0.9%. 1000 milliLiter(s) IV Continuous <Continuous>      FAMILY HISTORY:  No significant family history    SOCIAL HISTORY:  not provided on Vent    T(F): 94.3 (11-27-18 @ 15:45), Max: 98.3 (11-26-18 @ 19:40)  HR: 82 (11-27-18 @ 16:00)  BP: 153/64 (11-27-18 @ 08:15)  RR: 28 (11-27-18 @ 16:00)  SpO2: 100% (11-27-18 @ 16:00)  Wt(kg): --    PHYSICAL EXAM:  General: no acute distress on Vent- AC14/35%  Eyes:  anicteric, no conjunctival injection, no discharge  Oropharynx: ETT	  Neck: RIJ CVL  Lungs: clear to auscultation anteriorly  Heart: regular rate and rhythm; no murmur, rubs or gallops  Abdomen: soft, nondistended, nontender, without mass or organomegaly  Skin: no lesions  Extremities: no edema  Neurologic: sedated    LAB RESULTS:                        10.6   11.9  )-----------( 218      ( 27 Nov 2018 10:00 )             33.3     11-27    137  |  96  |  28<H>  ----------------------------<  145<H>  4.3   |  27  |  3.90<H>    Ca    9.1      27 Nov 2018 10:00  Phos  2.9     11-27  Mg     1.7     11-27    TPro  5.3<L>  /  Alb  2.0<L>  /  TBili  0.6  /  DBili  x   /  AST  21  /  ALT  <6<L>  /  AlkPhos  122<H>  11-27    MICROBIOLOGY:  RECENT CULTURES:  Culture - Blood (10.31.18 @ 18:55)    Culture - Blood:   NO ORGANISMS ISOLATED    Specimen Source: BLOOD VENOUS    RADIOLOGY REVIEWED:  CT Angio Chest w/ IV Cont (11.27.18 @ 12:40) >  No evidence of thoracic or abdominal aortic aneurysm or dissection. No   evidence of acute pulmonary embolism.    Patchy opacities in the bilateral lower lobes may represent atelectasis   with or pneumonia with consideration for aspiration pneumonia.    Colitis involving the descending colon and rectosigmoid colon,   differential including infectious versus inflammatory versus ischemic   etiology new from the prior exam    Small residual ascites decreased from prior exam.    Severe erosive changes centered around L2 on L3 disc space concerning for   osteomyelitis discitis of indeterminate age, correlate with dedicated   thoracic and lumbar spine CT of same day      CT  T/L Spine No Cont (11.26.18 @ 15:38) >  Comparison 10/30/18    Severe space narrowing and endplate erosion persists at L1-L2, unchanged   with moderate broad dorsal osteophyte complex and overall mild spinal   stenosis. There is no paraspinal collection.    No acute thoracic findings. Stable endplate destruction presumed   degenerative in the upper lumbar spine.    Right-sided pulmonary infiltrate consistent with pneumonia or pneumonitis.
Chief Complaint:     71 yo f pmh t2dm hba1c 5.5, essential htn, CAD, ESRD on HD TTS, hypothyroidism depression chronic afib on no anticoagulant, chf (as per documents diastolic chf), spinal stenosis, diabetic neuropathy dyslipidemia patient is from [place] at [facility]. pt currently c/o lower back pain unable to provide much detail regarding her history due to underlying dementia. as per ed documentation this is non traumatic lower back pain patient had no episodes of syncope or fall at this visit.     HPI:    PMH:   [place] (hard of hearing)  Cataracts, bilateral  Pleural thickening  Hip pain  LBP radiating to left leg  CHF (congestive heart failure)  MI (myocardial infarction)  Diabetes  Hypothyroidism  ESRD (end stage renal disease)  HTN (hypertension)  MI (myocardial infarction)  Pleural effusion  Hypothyroid  Anxiety  HTN (hypertension)  Hyperlipidemia  CHF (congestive heart failure)  CKD (chronic kidney disease)  Anemia  Diabetes    PSH:   Thoracotomy scar of left chest  S/P myomectomy  AV fistula    Family History:  FAMILY HISTORY:  No significant family history      Social History:  Smoking:  Alcohol:  Drugs:    Allergies:  Avelox (Unknown)  fish (Hives; Rash)  shellfish (Unknown)      Medications:  acetaminophen   Tablet .. 650 milliGRAM(s) Oral every 6 hours PRN  amiodarone Infusion 1 mG/Min IV Continuous <Continuous>  amiodarone Infusion 0.5 mG/Min IV Continuous <Continuous>  aspirin enteric coated 81 milliGRAM(s) Oral daily  atorvastatin 20 milliGRAM(s) Oral at bedtime  chlorhexidine 4% Liquid 1 Application(s) Topical <User Schedule>  dextrose 5%. 1000 milliLiter(s) IV Continuous <Continuous>  dextrose 50% Injectable 12.5 Gram(s) IV Push once  dextrose 50% Injectable 25 Gram(s) IV Push once  dextrose 50% Injectable 25 Gram(s) IV Push once  fentaNYL    Injectable 25 MICROGram(s) IV Push every 4 hours PRN  heparin  Injectable 5000 Unit(s) SubCutaneous every 12 hours  influenza   Vaccine 0.5 milliLiter(s) IntraMuscular once  levothyroxine 100 MICROGram(s) Oral daily  niCARdipine Infusion 5 mG/Hr IV Continuous <Continuous>  norepinephrine Infusion 0.05 MICROgram(s)/kG/Min IV Continuous <Continuous>  pantoprazole  Injectable 40 milliGRAM(s) IV Push daily  propofol Infusion 10 MICROgram(s)/kG/Min IV Continuous <Continuous>  sertraline 50 milliGRAM(s) Oral daily  sodium chloride 0.9%. 1000 milliLiter(s) IV Continuous <Continuous>  vancomycin  IVPB 1000 milliGRAM(s) IV Intermittent once      REVIEW OF SYSTEMS:  CONSTITUTIONAL: No fever, weight loss, or fatigue  EYES: No eye pain, visual disturbances, or discharge  ENMT:  No difficulty hearing, tinnitus, vertigo; No sinus or throat pain  NECK: No pain or stiffness  BREASTS: No pain, masses, or nipple discharge  RESPIRATORY: No cough, wheezing, chills or hemoptysis; No shortness of breath  CARDIOVASCULAR: No chest pain, palpitations, dizziness, or leg swelling  GASTROINTESTINAL: No abdominal or epigastric pain. No nausea, vomiting, or hematemesis; No diarrhea or constipation. No melena or hematochezia.  GENITOURINARY: No dysuria, frequency, hematuria, or incontinence  NEUROLOGICAL: No headaches, memory loss, loss of strength, numbness, or tremors  SKIN: No itching, burning, rashes, or lesions   LYMPH NODES: No enlarged glands  ENDOCRINE: No heat or cold intolerance; No hair loss  MUSCULOSKELETAL: No joint pain or swelling; No muscle, back, or extremity pain  PSYCHIATRIC: No depression, anxiety, mood swings, or difficulty sleeping  HEME/LYMPH: No easy bruising, or bleeding gums  ALLERY AND IMMUNOLOGIC: No hives or eczema    Physical Exam:  T(C): 35.4 (11-27-18 @ 17:00), Max: 36.8 (11-26-18 @ 19:40)  HR: 78 (11-27-18 @ 17:15) (61 - 93)  BP: 153/64 (11-27-18 @ 08:15) (153/64 - 176/68)  RR: 18 (11-27-18 @ 17:15) (16 - 41)  SpO2: 100% (11-27-18 @ 17:15) (81% - 100%)  Wt(kg): --    GENERAL: thin frail on a vent  HEAD:  Atraumatic, Normocephalic  EYES: EOMI, conjunctiva and sclera clear  ENT: Moist mucous membranes,  NECK: Supple, No JVD, no bruits  CHEST/LUNG: Clear to percussion bilaterally; No rales, rhonchi, wheezing, or rubs  HEART: Regular rate and rhythm; No murmurs, rubs, or gallops PMI non displaced.  ABDOMEN: Soft, Nontender, Nondistended; Bowel sounds present  EXTREMITIES:  2+ Peripheral Pulses, No clubbing, cyanosis, or edema  SKIN: No rashes or lesions  NERVOUS SYSTEM:  Cranial Nerves II-XII intact     Cardiovascular Diagnostic Testing:  ECG:    < from: 12 Lead ECG (11.26.18 @ 17:48) >  Ventricular Rate 78 BPM    Atrial Rate 78 BPM    P-R Interval 270 ms    QRS Duration 96 ms    Q-T Interval 458 ms    QTC Calculation(Bezet) 522 ms    P Axis 62 degrees    R Axis -52 degrees    T Axis 122 degrees    Diagnosis Line Sinus rhythm with 1st degree AV block  Incomplete right bundle branch block  Left anterior fascicular block  Poor R wave progression  Left ventricular hypertrophy with repolarization abnormality  Cannot rule out Inferior infarct (cited on or before 16-APR-2018)  Abnormal ECG  When compared with ECG of 16-APR-2018 11:25,  Nonspecific T wave abnormality now evident in Anterior leads  QT has lengthened    Confirmed by WILNER MCCONNELL MD ([id number]) on 11/27/2018 8:37:31 AM    < end of copied text >      ECHO:    Labs:                        10.6   11.9  )-----------( 218      ( 27 Nov 2018 10:00 )             33.3     11-27    137  |  96  |  28<H>  ----------------------------<  145<H>  4.3   |  27  |  3.90<H>    Ca    9.1      27 Nov 2018 10:00  Phos  2.9     11-27  Mg     1.7     11-27    TPro  5.3<L>  /  Alb  2.0<L>  /  TBili  0.6  /  DBili  x   /  AST  21  /  ALT  <6<L>  /  AlkPhos  122<H>  11-27    PT/INR - ( 27 Nov 2018 10:00 )   PT: 13.5 sec;   INR: 1.20 ratio         PTT - ( 27 Nov 2018 10:00 )  PTT:33.1 sec  CARDIAC MARKERS ( 27 Nov 2018 10:00 )  .363 ng/mL / x     / x     / x     / x      CARDIAC MARKERS ( 27 Nov 2018 06:40 )  .265 ng/mL / x     / x     / x     / x              Hemoglobin A1C, Whole Blood: 5.4 % (11-27 @ 06:35)        Imaging:
HPI:  73 yo f pmh t2dm hba1c 5.5, essential htn, CAD, ESRD on HD TTS, hypothyroidism depression chronic afib on no anticoagulant, chf (as per documents diastolic chf), spinal stenosis, diabetic neuropathy dyslipidemia patient is from [place] at [facility]. pt currently c/o lower back pain unable to provide much detail regarding her history due to underlying dementia. as per ed documentation this is non traumatic lower back pain patient had no episodes of syncope or fall at this visit. (26 Nov 2018 18:03)  Pall:  Pt had cardiopulmonary  arrest and is now comatose.      PAST MEDICAL & SURGICAL HISTORY:  [place] (hard of hearing)  Cataracts, bilateral: left worse than right  Pleural thickening: s/p pleurodisis left vats 2014  Hip pain: left  LBP radiating to left leg  CHF (congestive heart failure): diagnosed 2-2014  MI (myocardial infarction): 2-2014   cardiac cath done [facility]  Diabetes: diagnosed 2004  no medications in 5 months since hemodialysis  Hypothyroidism  ESRD (end stage renal disease)  HTN (hypertension)  MI (myocardial infarction): may 2016  Pleural effusion: Left side - several times since March 2014  Hypothyroid  Anxiety  HTN (hypertension)  Hyperlipidemia  CHF (congestive heart failure)  CKD (chronic kidney disease)  Anemia  Diabetes: DM 2  Thoracotomy scar of left chest: 4-2014 ? pneumonia/ scarring  S/P myomectomy: abdominal age 52  AV fistula: left upper 8-2014 attempted 12-16-14      SOCIAL HISTORY:    Admitted from:   [facility]       Surrogate/HCP/Guardian:  daughter Antonette              FAMILY HISTORY:  No significant family history    Baseline ADLs (prior to admission):    Allergies    Avelox (Unknown)  fish (Hives; Rash)  shellfish (Unknown)    Intolerances      Present Symptoms:    Unable to obtain due to poor mentation]    MEDICATIONS  (STANDING):  amiodarone Infusion 1 mG/Min (33.333 mL/Hr) IV Continuous <Continuous>  amiodarone Infusion 0.5 mG/Min (16.667 mL/Hr) IV Continuous <Continuous>  amiodarone Infusion 0.5 mG/Min (16.667 mL/Hr) IV Continuous <Continuous>  aspirin  chewable 81 milliGRAM(s) Oral daily  atorvastatin 20 milliGRAM(s) Oral at bedtime  cefepime   IVPB 500 milliGRAM(s) IV Intermittent daily  chlorhexidine 0.12% Liquid 15 milliLiter(s) Oral Mucosa two times a day  chlorhexidine 4% Liquid 1 Application(s) Topical <User Schedule>  dexmedetomidine Infusion 0.3 MICROgram(s)/kG/Hr (3.12 mL/Hr) IV Continuous <Continuous>  dextrose 5%. 1000 milliLiter(s) (50 mL/Hr) IV Continuous <Continuous>  dextrose 50% Injectable 12.5 Gram(s) IV Push once  dextrose 50% Injectable 25 Gram(s) IV Push once  dextrose 50% Injectable 25 Gram(s) IV Push once  fentaNYL   Infusion 0.5 MICROgram(s)/kG/Hr (2.08 mL/Hr) IV Continuous <Continuous>  heparin  Injectable 5000 Unit(s) SubCutaneous every 12 hours  influenza   Vaccine 0.5 milliLiter(s) IntraMuscular once  levothyroxine Injectable 50 MICROGram(s) IV Push <User Schedule>  norepinephrine Infusion 0.05 MICROgram(s)/kG/Min (3.9 mL/Hr) IV Continuous <Continuous>  pantoprazole   Suspension 40 milliGRAM(s) Oral daily  propofol Infusion 10 MICROgram(s)/kG/Min (2.724 mL/Hr) IV Continuous <Continuous>  sertraline 50 milliGRAM(s) Oral daily  sodium chloride 0.9%. 1000 milliLiter(s) (999 mL/Hr) IV Continuous <Continuous>    MEDICATIONS  (PRN):  acetaminophen   Tablet .. 650 milliGRAM(s) Oral every 6 hours PRN Temp greater or equal to 38C (100.4F), Mild Pain (1 - 3)  fentaNYL    Injectable 25 MICROGram(s) IV Push every 4 hours PRN Mild Pain (1 - 3)      PHYSICAL EXAM:    Vital Signs Last 24 Hrs  T(C): 36.8 (28 Nov 2018 12:00), Max: 37.1 (28 Nov 2018 09:00)  T(F): 98.3 (28 Nov 2018 12:00), Max: 98.8 (28 Nov 2018 09:00)  HR: 75 (28 Nov 2018 12:00) (50 - 90)  BP: --  BP(mean): --  RR: 20 (28 Nov 2018 12:00) (14 - 31)  SpO2: 100% (28 Nov 2018 12:00) (97% - 100%)    General: comatose  HEENT: ET tube  Lungs: comfortable on ventilatorCV: normal  tachycardia  GI: normalGU: normal  incontinent  oliguria/anuria  roman  Musculoskeletal:   bedbound/wheelchair bound  Skin: normal:  Neuro:comatose  Oral intake ability: npo  Diet: [NPO]    LABS:                        9.0    8.5   )-----------( 196      ( 28 Nov 2018 04:15 )             27.6     11-28    135  |  97  |  12  ----------------------------<  145<H>  3.7   |  29  |  2.03<H>    Ca    7.9<L>      28 Nov 2018 04:15  Phos  1.5     11-28  Mg     1.5     11-28    TPro  6.4  /  Alb  2.5<L>  /  TBili  1.0  /  DBili  x   /  AST  31  /  ALT  12  /  AlkPhos  159<H>  11-27        RADIOLOGY & ADDITIONAL STUDIES:    ADVANCE DIRECTIVES: reviewed with health care proxy Antonette  Advanced Care Planning discussion total time spent:  one half hour
ICU CONSULT  Location of Patient :  [facility]U1 [id number] ( [facility]U1)  Attending requesting Consult:Raul Zuniga  Chief Complaint : back pain  Reason For consult : Abd pain      Initial HPI on admission:  HPI: as per medical records.   72 year old female with Dementia, DM2, HTN, CAD, ESRD on HD, Hypothyroidism Chronic afib bi ib a.cm Diastolic CHF, Spinal stenosis, diabetic neuropathy, high chol who is here for  c/o lower back pain unable to provide much detail regarding her history due to underlying dementia. as per ed documentation this is non traumatic lower back pain patient had no episodes of syncope or fall at this visit. (26 Nov 2018 18:03)    BRIEF HOSPITAL COURSE:   This am pt had cardiac arrest - VF on monitor when connected. FS at time also significantly low.   Patient was intubated and brought to ICU. CVP line placed/[place] placed, hypothermia protocol started.   at this patient unresponsive.   noted to be hypertensive post procedure  requiring sedation for vent asynchrony  Case d/w  bedside, states patient takes lantus 15 units qhs, states this is all acute, explained to him risk of neurological damaged. at this time pt full code      PAST MEDICAL & SURGICAL HISTORY:  [place] (hard of hearing)  Cataracts, bilateral: left worse than right  Pleural thickening: s/p pleurodisis left vats 2014  Hip pain: left  LBP radiating to left leg  CHF (congestive heart failure): diagnosed 2-2014  MI (myocardial infarction): 2-2014   [facility]  Diabetes: diagnosed 2004  no medications in 5 months since hemodialysis  Hypothyroidism  ESRD (end stage renal disease)  HTN (hypertension)  MI (myocardial infarction): may 2016  Pleural effusion: Left side - several times since March 2014  Hypothyroid  Anxiety  HTN (hypertension)  Hyperlipidemia  CHF (congestive heart failure)  CKD (chronic kidney disease)  Anemia  Diabetes: DM 2  Thoracotomy scar of left chest: 4-2014 ? pneumonia/ scarring  S/P myomectomy: abdominal age 52  AV fistula: left upper 8-2014 attempted 12-16-14    Allergies    Avelox (Unknown)  fish (Hives; Rash)  shellfish (Unknown)    Intolerances    Family history/ROS/Social history unable to assess     Medications:  MEDICATIONS  (STANDING):  aspirin enteric coated 81 milliGRAM(s) Oral daily  atorvastatin 20 milliGRAM(s) Oral at bedtime  calcium acetate 667 milliGRAM(s) Oral three times a day with meals  cefepime   IVPB 500 milliGRAM(s) IV Intermittent every 12 hours  cefepime   IVPB      chlorhexidine 4% Liquid 1 Application(s) Topical <User Schedule>  clonazePAM Tablet 0.5 milliGRAM(s) Oral at bedtime  cloNIDine 0.2 milliGRAM(s) Oral every 8 hours  dextrose 5%. 1000 milliLiter(s) (50 mL/Hr) IV Continuous <Continuous>  dextrose 50% Injectable 12.5 Gram(s) IV Push once  dextrose 50% Injectable 25 Gram(s) IV Push once  dextrose 50% Injectable 25 Gram(s) IV Push once  gabapentin 100 milliGRAM(s) Oral three times a day  heparin  Injectable 5000 Unit(s) SubCutaneous every 12 hours  hydrALAZINE 25 milliGRAM(s) Oral every 8 hours  influenza   Vaccine 0.5 milliLiter(s) IntraMuscular once  labetalol 200 milliGRAM(s) Oral two times a day  levothyroxine 100 MICROGram(s) Oral daily  niCARdipine Infusion 5 mG/Hr (25 mL/Hr) IV Continuous <Continuous>  pantoprazole  Injectable 40 milliGRAM(s) IV Push daily  potassium chloride  10 mEq/100 mL IVPB 10 milliEquivalent(s) IV Intermittent every 1 hour  propofol Infusion 10 MICROgram(s)/kG/Min (2.724 mL/Hr) IV Continuous <Continuous>  sertraline 50 milliGRAM(s) Oral daily  vancomycin  IVPB 1000 milliGRAM(s) IV Intermittent once    MEDICATIONS  (PRN):  acetaminophen   Tablet .. 650 milliGRAM(s) Oral every 6 hours PRN Temp greater or equal to 38C (100.4F), Mild Pain (1 - 3)  fentaNYL    Injectable 25 MICROGram(s) IV Push every 4 hours PRN Mild Pain (1 - 3)      Home Medications: as per H&P   Last Order Reconciliation Date: 11-26-18 @ 18:28 (Admission Reconciliation)  acetaminophen 325 mg oral tablet: 2 tab(s) orally every 6 hours, As needed, Moderate Pain (02-18-15 @ 12:48)  acetaminophen 325 mg oral tablet: 2 tab(s) orally every 6 hours, As needed, Mild Pain (1 - 3), Moderate Pain (4 - 6), Severe Pain (7 - 10) (11-14-18 @ 14:54)  acetaminophen-codeine 300 mg-15 mg oral tablet: 1 tab(s) orally every 6 hours MDD:4 tabs prn pain (04-05-17 @ 09:46)  acetaminophen-oxyCODONE 325 mg-5 mg oral tablet: 1 tab(s) orally every 6 hours, As needed, Mild Pain (02-18-15 @ 12:48)  amlodipine 10 mg oral tablet: 1 tab(s) orally once a day [facility]/hosp (06-20-18 @ 08:30)  amLODIPine 10 mg oral tablet: 1 tab(s) orally once a day (02-18-15 @ 12:48)  amLODIPine 10 mg oral tablet: 1 tab(s) orally once a day (11-20-18 @ 12:18)  amlodipine 10 mg oral tablet: 1 tab(s) orally once a day  (07-22-18 @ 10:41)  amLODIPine 10 mg oral tablet: 1 tab(s) orally once a day (11-20-18 @ 12:19)  Aspir 81 oral delayed release tablet: 1 tab(s) orally once a day (11-26-18 @ 18:26)  aspirin 81 mg oral delayed release tablet: 1 tab(s) orally once a day Home/hosp (07-28-18 @ 12:44)  aspirin 81 mg oral tablet: 1 tab(s) orally once a day (07-05-14 @ 13:49)  aspirin 81 mg oral tablet: 1 tab(s) orally once a day  HOME/HOSP (02-18-15 @ 12:48)  atorvastatin 10 mg oral tablet: 1 tab(s) orally once a day (at bedtime) (11-14-18 @ 14:54)  atorvastatin 20 mg oral tablet: 1 tab(s) orally once a day (at bedtime) (10-19-18 @ 05:30)  atorvastatin 20 mg oral tablet: 0.5 tab(s) orally once a day (at bedtime) (10-23-18 @ 13:51)  atorvastatin 20 mg oral tablet: 1 tab(s) orally once a day (07-28-18 @ 12:44)  bacitracin 500 units/g topical ointment: 1 application topically 2 times a day to facial lacerations (07-22-18 @ 10:41)  calcium acetate 667 mg oral tablet: 1 cap(s) orally 3 times a day (07-22-18 @ 10:52)  calcium acetate 667 mg oral tablet: 1 cap(s) orally 2 times a day (07-28-18 @ 13:02)  calcium acetate 667 mg oral tablet: 1 tab(s) orally once a day  (11-20-18 @ 12:10)  calcium acetate 667 mg oral tablet: 1 tab(s) orally 3 times a day (11-26-18 @ 18:23)  Cardura 2 mg oral tablet: 1 tab(s) orally once a day [facility] (01-29-16 @ 17:05)  carvedilol 12.5 mg oral tablet: 1 tab(s) orally every 12 hours (11-20-18 @ 12:18)  carvedilol 12.5 mg oral tablet: 1 tab(s) orally every 12 hours (11-26-18 @ 18:22)  carvedilol 25 mg oral tablet: 1 tab(s) orally every 12 hours (11-20-18 @ 12:10)  cholecalciferol oral tablet: 1 tab(s) orally once a day  (11-26-18 @ 18:28)  clonazePAM 0.5 mg oral tablet: 1 tab(s) orally once a day (at bedtime) (11-14-18 @ 14:54)  clonazePAM 0.5 mg oral tablet: 1 tab(s) orally once a day (at bedtime) (07-28-18 @ 12:44)  clonazePAM 0.5 mg oral tablet: 1 tab(s) orally once a day (at bedtime) Home (06-25-18 @ 16:18)  clonazepam 0.5 mg oral tablet: 0.5 milligram(s) orally once a day (at bedtime) (02-18-15 @ 12:48)  cloNIDine 0.1 mg oral tablet: 1 tab(s) orally 2 times a day (10-23-18 @ 13:51)  cloNIDine 0.2 mg oral tablet: 1 tab(s) orally 3 times a day (07-28-18 @ 12:44)  cloNIDine 0.2 mg oral tablet: 1 tab(s) orally 3 times a day (06-25-18 @ 16:18)  cloNIDine 0.2 mg oral tablet: orally every 8 hours (11-26-18 @ 18:20)  cloNIDine 0.3 mg oral tablet: 1 tab(s) orally every 8 hours (11-26-18 @ 18:19)  cloNIDine 0.3 mg oral tablet: 1 tab(s) orally every 8 hours (07-22-18 @ 10:41)  cloNIDine 0.3 mg oral tablet: 1 tab(s) orally every 8 hours (11-20-18 @ 12:18)  Colace 100 mg oral capsule: 1 cap(s) orally once a day Home (04-05-17 @ 09:46)  Colace sodium 100 mg oral capsule:  orally once a day (07-05-14 @ 13:49)  Cymbalta 20 mg oral delayed release capsule: 1 cap(s) orally once a day Hosp/home (01-29-16 @ 17:05)  dicyclomine 10 mg oral capsule: 1 cap(s) orally 3 times a day (before meals) (11-20-18 @ 12:18)  dicyclomine 10 mg oral capsule: 1 cap(s) orally 3 times a day (before meals) (11-26-18 @ 18:27)  docusate sodium 100 mg oral capsule: 1 cap(s) orally 3 times a day (11-26-18 @ 18:27)  docusate sodium 100 mg oral capsule: 1 cap(s) orally 2 times a day (11-20-18 @ 12:10)  docusate sodium 100 mg oral capsule: 1 cap(s) orally 3 times a day (07-22-18 @ 10:41)  docusate sodium 100 mg oral capsule: 1 cap(s) orally once a day (02-18-15 @ 12:48)  DuoNeb 0.5 mg-2.5 mg/3 mL inhalation solution: 3 milliliter(s) inhaled every 6 hours, As Needed Hosp (01-29-16 @ 17:05)  epoetin iam 69908 units/mL injectable solution:  injectable     on dialysis days Home/hosp     (07-25-16 @ 09:49)  epoetin iam 88687 units/mL injectable solution:  injectable     on dialysis days    Hospital (12-31-14 @ 12:07)  famotidine 20 mg oral tablet: 1 tab(s) orally once a day (10-31-18 @ 06:02)  ferrous gluconate 325 mg oral tablet: 1 tab(s) orally once a day (07-05-14 @ 13:49)  ferrous sulfate: 325 milligram(s) orally once a day (02-18-15 @ 12:48)  ferrous sulfate 325 mg (65 mg elemental iron) oral tablet: 1 tab(s) orally 2 times a day (07-25-16 @ 09:49)  gabapentin 100 mg oral capsule: 1 cap(s) orally every 12 hours    Take after hemodialysis performed  on your HD days (12-31-14 @ 12:07)  gabapentin 100 mg oral capsule: 1 cap(s) orally 3 times a day Home (07-28-18 @ 12:44)  gabapentin 100 mg oral capsule: 1 cap(s) orally once a day (at bedtime)      MEDICATIONS  (STANDING):  aspirin enteric coated 81 milliGRAM(s) Oral daily  atorvastatin 20 milliGRAM(s) Oral at bedtime  calcium acetate 667 milliGRAM(s) Oral three times a day with meals  cefepime   IVPB 500 milliGRAM(s) IV Intermittent every 12 hours  cefepime   IVPB      chlorhexidine 4% Liquid 1 Application(s) Topical <User Schedule>  clonazePAM Tablet 0.5 milliGRAM(s) Oral at bedtime  cloNIDine 0.2 milliGRAM(s) Oral every 8 hours  dextrose 5%. 1000 milliLiter(s) (50 mL/Hr) IV Continuous <Continuous>  dextrose 50% Injectable 12.5 Gram(s) IV Push once  dextrose 50% Injectable 25 Gram(s) IV Push once  dextrose 50% Injectable 25 Gram(s) IV Push once  gabapentin 100 milliGRAM(s) Oral three times a day  heparin  Injectable 5000 Unit(s) SubCutaneous every 12 hours  hydrALAZINE 25 milliGRAM(s) Oral every 8 hours  influenza   Vaccine 0.5 milliLiter(s) IntraMuscular once  labetalol 200 milliGRAM(s) Oral two times a day  levothyroxine 100 MICROGram(s) Oral daily  niCARdipine Infusion 5 mG/Hr (25 mL/Hr) IV Continuous <Continuous>  pantoprazole  Injectable 40 milliGRAM(s) IV Push daily  potassium chloride  10 mEq/100 mL IVPB 10 milliEquivalent(s) IV Intermittent every 1 hour  propofol Infusion 10 MICROgram(s)/kG/Min (2.724 mL/Hr) IV Continuous <Continuous>  sertraline 50 milliGRAM(s) Oral daily  vancomycin  IVPB 1000 milliGRAM(s) IV Intermittent once    MEDICATIONS  (PRN):  acetaminophen   Tablet .. 650 milliGRAM(s) Oral every 6 hours PRN Temp greater or equal to 38C (100.4F), Mild Pain (1 - 3)  fentaNYL    Injectable 25 MICROGram(s) IV Push every 4 hours PRN Mild Pain (1 - 3)      LABS:                        9.9    8.4   )-----------( 153      ( 27 Nov 2018 06:40 )             30.9     11-27    131<L>  |  99  |  24<H>  ----------------------------<  197<H>  5.2   |  24  |  3.49<H>    Ca    8.1<L>      27 Nov 2018 06:40  Phos  2.9     11-27  Mg     1.7     11-27    TPro  5.3<L>  /  Alb  2.0<L>  /  TBili  0.6  /  DBili  x   /  AST  21  /  ALT  <6<L>  /  AlkPhos  122<H>  11-27      CARDIAC MARKERS ( 27 Nov 2018 06:40 )  .265 ng/mL / x     / x     / x     / x                        CULTURES: (if applicable)        ABG - ( 27 Nov 2018 08:32 )  pH, Arterial: 7.37  pH, Blood: x     /  pCO2: 20    /  pO2: >550  / HCO3: x     / Base Excess: x     /  SaO2: >99               CAPILLARY BLOOD GLUCOSE      POCT Blood Glucose.: 157 mg/dL (27 Nov 2018 09:21)      RADIOLOGY  CXR:  < from: Xray Chest 1 View-PORTABLE IMMEDIATE (11.27.18 @ 07:36) >    INTERPRETATION:  Views:1  Comparison: 6:35 AM  History: Line placement    The lungs are clear of infiltrates and effusions. A new right jugular   venous catheter tip projectsover the superior vena cava. There is no   pneumothorax. The endotracheal tube is in slightly low in the trachea.   Transthoracic pacer leads remain in place. There is slightly decreased   gastric insufflation. The cardiac and mediastinal contours appear   unremarkable.         < end of copied text >      CT:  < from: CT Thoracic Spine No Cont (11.26.18 @ 15:38) >  IMPRESSION:  No acute thoracic findings. Stable endplate destruction presumed degenerative in the upper lumbar spine.    Right-sided pulmonary infiltrate consistent with pneumonia or pneumonitis.    < end of copied text >    ECHO:  < from: Transthoracic Echocardiogram (07.23.18 @ 10:43) >  Conclusions:  1. Moderate concentric left ventricular hypertrophy.  2. Normal left ventricular systolic function. Septal motion consistent with right ventricular overload.  3. Severe diastolic dysfunction (Stage III).  4. Right atrial enlargement.  5. Right ventricular enlargement with normal right ventricular systolic function.  6. Estimated pulmonary artery systolic pressure equals 66 mm Hg, assuming right atrial pressure equals 8 mm Hg,consistent with severe pulmonary pressures.  *** Compared with echocardiogram of 6/15/2018, no significant changes noted.    < end of copied text >      VITALS:  T(C): 36.3 (11-27-18 @ 05:41), Max: 37.8 (11-26-18 @ 15:58)  T(F): 97.3 (11-27-18 @ 05:41), Max: 100 (11-26-18 @ 15:58)  HR: 81 (11-27-18 @ 08:44) (68 - 85)  BP: 159/56 (11-27-18 @ 05:41) (159/56 - 176/68)  BP(mean): --  ABP: --  ABP(mean): --  RR: 16 (11-27-18 @ 05:41) (14 - 18)  SpO2: 100% (11-27-18 @ 08:44) (99% - 100%)  CVP(mm Hg): --  CVP(cm H2O): --    Ins and Outs     11-26-18 @ 07:01  -  11-27-18 @ 07:00  --------------------------------------------------------  IN: 300 mL / OUT: 0 mL / NET: 300 mL        Height (cm): 152.4 (11-26-18 @ 19:40)  Weight (kg): 45.4 (11-26-18 @ 19:40)  BMI (kg/m2): 19.5 (11-26-18 @ 19:40)    Device: 840, Mode: AC/ CMV (Assist Control/ Continuous Mandatory Ventilation), RR (machine): 24, RR (patient): 32, TV (machine): 350, TV (patient): 373, FiO2: 100, PEEP: 5, ITime: 1, MAP: 13, PIP: 26
NEPHROLOGY CONSULTATION    CHIEF COMPLAINT:     HPI:  Pt is 73 yo f w/pmh t2dm, essential htn, CAD, ESRD on HD TTS, hypothyroidism, depression, chronic afib on no anticoagulation due to frequent falls, chf, spinal stenosis, diabetic neuropathy, dyslipidemia recently at [facility]. Adm 11/26 w/lower back pain was unable to provide much detail regarding her history in ER due to underlying dementia. As per ed documentation patient had no episodes of syncope or fall recently. S/p cardiac arrest this am, now intubated in ICU, on pressor. Due for HD today. Hx from chart as patient is unable to provide hx or ROS. S/p CT C/A/P w/IV dye, no PE, possibly asp PNA, + colitis, possibly osteo of LS spine. Anuric.    ROS:  unable    Allergies:  Avelox (Unknown)  fish (Hives; Rash)  shellfish (Unknown)    PAST MEDICAL & SURGICAL HISTORY:  [place] (hard of hearing)  Cataracts, bilateral: left worse than right  Pleural thickening: s/p pleurodesis, left vats 2014  LBP radiating to left leg  CHF (congestive heart failure)  MI (myocardial infarction): 2014, 2016, cardiac cath done at [facility]  Diabetes: diagnosed 2004  no medications since hemodialysis  Hypothyroidism  ESRD (end stage renal disease)  HTN (hypertension)  Anxiety  Hyperlipidemia  Anemia  AV fistula, LUE    SOCIAL HISTORY:  negative    FAMILY HISTORY:  No significant family history    MEDICATIONS  (STANDING):  amiodarone Infusion 1 mG/Min (33.333 mL/Hr) IV Continuous <Continuous>  amiodarone Infusion 0.5 mG/Min (16.667 mL/Hr) IV Continuous <Continuous>  aspirin enteric coated 81 milliGRAM(s) Oral daily  atorvastatin 20 milliGRAM(s) Oral at bedtime  chlorhexidine 4% Liquid 1 Application(s) Topical <User Schedule>  dextrose 5%. 1000 milliLiter(s) (50 mL/Hr) IV Continuous <Continuous>  dextrose 50% Injectable 12.5 Gram(s) IV Push once  dextrose 50% Injectable 25 Gram(s) IV Push once  dextrose 50% Injectable 25 Gram(s) IV Push once  EPINEPHrine    Injectable 1 milliGRAM(s) IV Push once  heparin  Injectable 5000 Unit(s) SubCutaneous every 12 hours  influenza   Vaccine 0.5 milliLiter(s) IntraMuscular once  levothyroxine 100 MICROGram(s) Oral daily  niCARdipine Infusion 5 mG/Hr (25 mL/Hr) IV Continuous <Continuous>  norepinephrine Infusion 0.05 MICROgram(s)/kG/Min (3.9 mL/Hr) IV Continuous <Continuous>  pantoprazole  Injectable 40 milliGRAM(s) IV Push daily  propofol Infusion 10 MICROgram(s)/kG/Min (2.724 mL/Hr) IV Continuous <Continuous>  sertraline 50 milliGRAM(s) Oral daily  sodium chloride 0.9%. 1000 milliLiter(s) (999 mL/Hr) IV Continuous <Continuous>  vancomycin  IVPB 1000 milliGRAM(s) IV Intermittent once    Vital Signs Last 24 Hrs  T(C): 32.4 (11-27-18 @ 13:15), Max: 37.8 (11-26-18 @ 15:58)  T(F): 90.3 (11-27-18 @ 13:15), Max: 100 (11-26-18 @ 15:58)  HR: 65 (11-27-18 @ 13:45) (62 - 93)  BP: 153/64 (11-27-18 @ 08:15) (153/64 - 176/68)  BP(mean): 86 (11-27-18 @ 08:15) (86 - 99)  RR: 26 (11-27-18 @ 13:45) (16 - 41)  SpO2: 100% (11-27-18 @ 13:45) (81% - 100%)    Intubated in ICU  Card S1S2  Lungs coarse BS b/l  Abd soft, ND  Extr no edema, LUE AVF patent    LABS:                        10.6   11.9  )-----------( 218      ( 27 Nov 2018 10:00 )             33.3     11-27    137  |  96  |  28<H>  ----------------------------<  145<H>  4.3   |  27  |  3.90<H>    Ca    9.1      27 Nov 2018 10:00  Phos  2.9     11-27  Mg     1.7     11-27    TPro  5.3<L>  /  Alb  2.0<L>  /  TBili  0.6  /  DBili  x   /  AST  21  /  ALT  <6<L>  /  AlkPhos  122<H>  11-27    LIVER FUNCTIONS - ( 27 Nov 2018 06:40 )  Alb: 2.0 g/dL / Pro: 5.3 g/dL / ALK PHOS: 122 U/L / ALT: <6 U/L DA / AST: 21 U/L / GGT: x           PT/INR - ( 27 Nov 2018 10:00 )   PT: 13.5 sec;   INR: 1.20 ratio      PTT - ( 27 Nov 2018 10:00 )  PTT:33.1 sec    A/P:    MOSF: shock, resp failure, r/o sepsis, MI  Colitis, PNA, CT suspicious for osteo of LS spine  ESRD on HD TTS  S/p CT w/dye  HD today  TMP as able  Goal SBP > 90  Abx per ID  Will f/u blood cx  Overall options are limited and prognosis is guarded  D/w ICU team  D/w  over the phone  Consent for HD obtained from , Boy Mullen, over the phone
Neurology consult    ANGEL [id number]     Patient is a 72y old  Female who presents with a chief complaint of back pain (27 Nov 2018 06:38)      HPI:  73 yo f pmh t2dm hba1c 5.5, essential htn, CAD, ESRD on HD TTS, hypothyroidism depression chronic afib on no anticoagulant, chf (as per documents diastolic chf), spinal stenosis, diabetic neuropathy dyslipidemia patient is from [place] at [facility]. pt currently c/o lower back pain unable to provide much detail regarding her history due to underlying dementia. as per ed documentation this is non traumatic lower back pain patient had no episodes of syncope or fall at this visit. (26 Nov 2018 18:03)    Patient is s/p cardiac arrest on floor to CCU intubated on the vent.  Severe hypoglycemia noted at time of the code.  No seizure or focal CNS signs.        MEDICATIONS    acetaminophen   Tablet .. 650 milliGRAM(s) Oral every 6 hours PRN  amLODIPine   Tablet 10 milliGRAM(s) Oral daily  aspirin enteric coated 81 milliGRAM(s) Oral daily  atorvastatin 20 milliGRAM(s) Oral at bedtime  calcium acetate 667 milliGRAM(s) Oral three times a day with meals  cefepime   IVPB 500 milliGRAM(s) IV Intermittent every 12 hours  cefepime   IVPB      clonazePAM Tablet 0.5 milliGRAM(s) Oral at bedtime  cloNIDine 0.2 milliGRAM(s) Oral every 8 hours  dextrose 40% Gel 15 Gram(s) Oral once PRN  dextrose 5%. 1000 milliLiter(s) IV Continuous <Continuous>  dextrose 50% Injectable 12.5 Gram(s) IV Push once  dextrose 50% Injectable 25 Gram(s) IV Push once  dextrose 50% Injectable 25 Gram(s) IV Push once  enoxaparin Injectable 30 milliGRAM(s) SubCutaneous every 24 hours  fentaNYL   Infusion 0.5 MICROgram(s)/kG/Hr IV Continuous <Continuous>  gabapentin 100 milliGRAM(s) Oral three times a day  glucagon  Injectable 1 milliGRAM(s) IntraMuscular once PRN  hydrALAZINE 25 milliGRAM(s) Oral every 8 hours  influenza   Vaccine 0.5 milliLiter(s) IntraMuscular once  insulin glargine Injectable (LANTUS) 15 Unit(s) SubCutaneous at bedtime  insulin lispro Injectable (HumaLOG) 5 Unit(s) SubCutaneous three times a day before meals  labetalol 200 milliGRAM(s) Oral two times a day  levothyroxine 100 MICROGram(s) Oral daily  norepinephrine Infusion 0.05 MICROgram(s)/kG/Min IV Continuous <Continuous>  pantoprazole  Injectable 40 milliGRAM(s) IV Push daily  potassium chloride  10 mEq/100 mL IVPB 10 milliEquivalent(s) IV Intermittent every 1 hour  sertraline 50 milliGRAM(s) Oral daily  vancomycin  IVPB 1000 milliGRAM(s) IV Intermittent once      PMH: [place] (hard of hearing)  Cataracts, bilateral  Pleural thickening  Hip pain  LBP radiating to left leg  CHF (congestive heart failure)  MI (myocardial infarction)  Diabetes  Hypothyroidism  ESRD (end stage renal disease)  HTN (hypertension)  MI (myocardial infarction)  Pleural effusion  Hypothyroid  Anxiety  HTN (hypertension)  Hyperlipidemia  CHF (congestive heart failure)  CKD (chronic kidney disease)  Anemia  Diabetes       PSH: Thoracotomy scar of left chest  S/P myomectomy  AV fistula      Family history:   FAMILY HISTORY:  No significant family history      SOCIAL HISTORY:  No history of tobacco or alcohol use     Allergies    Avelox (Unknown)  fish (Hives; Rash)  shellfish (Unknown)    Intolerances        Height (cm): 152.4 (11-26 @ 19:40)  Weight (kg): 45.4 (11-26 @ 19:40)  BMI (kg/m2): 19.5 (11-26 @ 19:40)    Vital Signs Last 24 Hrs  T(C): 36.3 (27 Nov 2018 05:41), Max: 37.8 (26 Nov 2018 15:58)  T(F): 97.3 (27 Nov 2018 05:41), Max: 100 (26 Nov 2018 15:58)  HR: 70 (27 Nov 2018 05:41) (68 - 85)  BP: 159/56 (27 Nov 2018 05:41) (159/56 - 176/68)  BP(mean): --  RR: 16 (27 Nov 2018 05:41) (14 - 18)  SpO2: 100% (27 Nov 2018 05:41) (99% - 100%)      On Neurological Examination:  Patient is comatose in CCU s/p arrest.  Pupils small irregular and poorly reactive, right gaze preference and brisk dolls eyes, decreased bilateral corneals.  Motor exam reveals quadriplegic not responsive to noxious stimuli and no posturing hypotonic.  DTRs equal hypo and toes silent.                                                  LABS:  CBC Full  -  ( 27 Nov 2018 06:40 )  WBC Count : 8.4 K/uL  Hemoglobin : 9.9 g/dL  Hematocrit : 30.9 %  Platelet Count - Automated : 153 K/uL  Mean Cell Volume : 94.1 fl  Mean Cell Hemoglobin : 30.0 pg  Mean Cell Hemoglobin Concentration : 31.9 gm/dL  Auto Neutrophil # : x  Auto Lymphocyte # : x  Auto Monocyte # : x  Auto Eosinophil # : x  Auto Basophil # : x  Auto Neutrophil % : x  Auto Lymphocyte % : x  Auto Monocyte % : x  Auto Eosinophil % : x  Auto Basophil % : x      11-27    131<L>  |  99  |  24<H>  ----------------------------<  197<H>  5.2   |  24  |  3.49<H>    Ca    8.1<L>      27 Nov 2018 06:40  Phos  2.9     11-27  Mg     1.7     11-27    TPro  5.3<L>  /  Alb  2.0<L>  /  TBili  0.6  /  DBili  x   /  AST  21  /  ALT  <6<L>  /  AlkPhos  122<H>  11-27    LIVER FUNCTIONS - ( 27 Nov 2018 06:40 )  Alb: 2.0 g/dL / Pro: 5.3 g/dL / ALK PHOS: 122 U/L / ALT: <6 U/L DA / AST: 21 U/L / GGT: x

## 2018-11-28 NOTE — PROGRESS NOTE ADULT - ASSESSMENT
Physical Examination:  GENERAL:                 Intubated, Sedated, Unresponsive  HEENT:                     Eyes constricted, Reactive, right gaze preference dry MM, NO JVD   PULM:                       Bilateral air entry, no significant sputum production, No Rales, traceRhonchi, No Wheezing  CVS:                         S1, S2,  +Murmur  ABD:                        Soft, nondistended, nontender, normoactive bowel sounds,   EXT:                         No edema, nontender, No Cyanosis or Clubbing   Vascular:                 Cool Extremities, Normal Capillary refill, Normal Distal Pulses  SKIN:                       Warm and well perfused, no rashes noted.   NEURO:                  unresponsive, not following commands, not localizing.        Assessment  1. S/P Cardiac arrest, VF 11/27 with prolonged QT s/p Therapeutic hypothermia  , on amiodarone  2. Severe Hypoglycemia in patient with DM2 - improved.   3. Acute respiratory failure   4. La[place] BP - either on cardene drip vs Levophed drip to maintain BP  4. ESRD on HD  5. Chronic Diastolic CHF  6. CAD s/p MI,   7. Hypothyroidism,   8. Back Pain - ? Osteo of Spine,   9. ? Dementia  10. Lactic acidosis Resolved    Plan  Mechanical ventilation  Sedation vacation eval neuro status, check EEG  warming phase of targeted temperature therapy initiated  empiric abx to control for aspiration risk and Osteo of spine as per ID  start tube feeding, Insulin ss, Hold Lantus at this time  amiodarone IV to continue  daily ekg  d/w Cardio this am  Neuro f/u   Palliative care f/u    PMD:				                   Notified(Date):  Family Updated: 		                                 Date: 11/27      Sedation & Analgesia:	prop/fent - required paralytic for resp alkalsosi  Diet/Nutrition:		npo  GI PPx:			ppi    DVT Ppx:		hep sq        Activity:		    bedrest             Head of Bed:               35-45 deg  Glycemic Control:        on hold    Lines:  CENTRAL LINE: 	[x ] YES [ ] NO	                    LOCATION:   [facility]	                       DATE INSERTED:   11/27	                    REMOVE:  [ ] YES [x ] NO    A-LINE:  	                [x ] YES [ ] NO                      LOCATION:   	   [facility]                    DATE INSERTED: 	11/27	            REMOVE:  [ ] YES [x ] NO    COYLE: 		        [x ] YES [ ] NO  		                                       DATE INSERTED:	11/27	            REMOVE:  [x ] YES [ ] NO      Restraints may be deemed necessary to prevent removal of life-sustaining devices [ x ] YES   [    ]  NO    Disposition: ICU care    Goals of Care: Full code  Prognosis - Poor.

## 2018-11-28 NOTE — PROGRESS NOTE ADULT - SUBJECTIVE AND OBJECTIVE BOX
Patient is a 72y old  Female who presents with a chief complaint of back pain (27 Nov 2018 17:36)      BRIEF HOSPITAL COURSE:   73 yo f pmhx dementia, DM2, HTN, CAD, HLD. ESRD on HD (TTS), hypothyroidism, chronic afib, diastolic chf, spinal stenosis, depression and diabetic neuropathy admitted with low back pain.      Events last 24 hours:   RRT called this am after patient found unresponsive.  As per nursing staff the aide was in with the patient's roommate when they heard her say help me and then went unresponsive.  Upon arrival to patient's bedside patient found pulseless and CPR was initiated.  Patient hooked up to monitor that showed initially showed asystole and then vfib arrest.  ACLS initiated, intubated and transferred to ICU for further management.  Patient with total downtime of approximately 14 minutes.  Of note code labs revealed serum glucose level of 9.  Patient started on amiodarone infusion for vfib arrest.  Started on hypothermia protocol.  Patient initially hypertensive on nicardipine infusion that was discontinued upon patient becoming hypotensive that continued requiring vasopressor therapy.      Since 1900 11/27/2018:  ABG ordered found to have pH >7.81, pCO2 16, pO2 188, HCO3 29.  Patient on pressure support pulling tidal volumes of ~600-700 and breathing with rates into the high 20s.  Settings changed to volume control, rate 14, tidal volume 400, fio2 35 and peep 5.  Propofol increased to 50mcg/kg/hr, fentanyl 50mcg given, RR decreased to 14.  Repeat abg ordered for ~0000, pH 7.69, pCO2 22.7, pO2 187, HCO3 28.5.  Twitching movements/eye movements seem to  have dissipated.        PAST MEDICAL & SURGICAL HISTORY:  [place] (hard of hearing)  Cataracts, bilateral: left worse than right  Pleural thickening: s/p pleurodisis left vats 2014  Hip pain: left  LBP radiating to left leg  CHF (congestive heart failure): diagnosed 2-2014  MI (myocardial infarction): 2-2014   cardiac cath done [facility]  Diabetes: diagnosed 2004  no medications in 5 months since hemodialysis  Hypothyroidism  ESRD (end stage renal disease)  HTN (hypertension)  MI (myocardial infarction): may 2016  Pleural effusion: Left side - several times since March 2014  Hypothyroid  Anxiety  HTN (hypertension)  Hyperlipidemia  CHF (congestive heart failure)  CKD (chronic kidney disease)  Anemia  Diabetes: DM 2  Thoracotomy scar of left chest: 4-2014 ? pneumonia/ scarring  S/P myomectomy: abdominal age 52  AV fistula: left upper 8-2014 attempted 12-16-14    Allergies  Avelox (Unknown)  fish (Hives; Rash)  shellfish (Unknown)      FAMILY HISTORY:  No significant family history      Social History:   Unable to obtain.       Review of Systems:  Unable to obtain secondary to patient's mental status.        Vitals During Exam:   HR: 65  BP: 142/43 mmHg  RR: 16  sPO2: 100%    Physical Examination:    General: No acute distress.      HEENT: Pupils equal, reactive to light.  Symmetric.    PULM: Symmetrical thorax expansion upon respiration.  Clear to auscultation bilaterally, no significant sputum production appreciated.     CVS: 1st degree, no murmurs, rubs, or gallops appreciated.      ABD: Soft, nondistended, nontender, normoactive bowel sounds, no masses appreciated.      EXT: No edema, nontender, DP pulses symmetrical.      SKIN: Cool but well perfused, no rashes noted.     NEURO: Sedated with propofol.  Intermittent facial twitching.      POCUS Exam:   Lung: paramjit predominant   Cardiac: LV contraction grossly normal   IVC: 1.12, varies with respiration.        Medications:  cefepime   IVPB 500 milliGRAM(s) IV Intermittent daily  amiodarone Infusion 1 mG/Min IV Continuous <Continuous>  amiodarone Infusion 0.5 mG/Min IV Continuous <Continuous>  niCARdipine Infusion 5 mG/Hr IV Continuous <Continuous>  norepinephrine Infusion 0.05 MICROgram(s)/kG/Min IV Continuous <Continuous>  acetaminophen   Tablet .. 650 milliGRAM(s) Oral every 6 hours PRN  dexmedetomidine Infusion 0.3 MICROgram(s)/kG/Hr IV Continuous <Continuous>  fentaNYL    Injectable 25 MICROGram(s) IV Push every 4 hours PRN  propofol Infusion 10 MICROgram(s)/kG/Min IV Continuous <Continuous>  sertraline 50 milliGRAM(s) Oral daily  aspirin enteric coated 81 milliGRAM(s) Oral daily  heparin  Injectable 5000 Unit(s) SubCutaneous every 12 hours  pantoprazole  Injectable 40 milliGRAM(s) IV Push daily  atorvastatin 20 milliGRAM(s) Oral at bedtime  dextrose 50% Injectable 12.5 Gram(s) IV Push once  dextrose 50% Injectable 25 Gram(s) IV Push once  dextrose 50% Injectable 25 Gram(s) IV Push once  levothyroxine 100 MICROGram(s) Oral daily  dextrose 5%. 1000 milliLiter(s) IV Continuous <Continuous>  dextrose 5%. 1000 milliLiter(s) IV Continuous <Continuous>  sodium chloride 0.9%. 1000 milliLiter(s) IV Continuous <Continuous>  influenza   Vaccine 0.5 milliLiter(s) IntraMuscular once  chlorhexidine 4% Liquid 1 Application(s) Topical <User Schedule>      Mode: AC/ CMV (Assist Control/ Continuous Mandatory Ventilation)  RR (machine): 14  TV (machine): 0.4  FiO2: 35  PEEP: 5  ITime: 0.8  MAP: 15  PIP: 20      ICU Vital Signs Last 24 Hrs  T(C): 34.7 (28 Nov 2018 00:00), Max: 36.3 (27 Nov 2018 05:41)  T(F): 94.5 (28 Nov 2018 00:00), Max: 97.3 (27 Nov 2018 05:41)  HR: 64 (28 Nov 2018 00:08) (61 - 93)  BP: 153/64 (27 Nov 2018 08:15) (153/64 - 169/61)  BP(mean): 86 (27 Nov 2018 08:15) (86 - 99)  ABP: 169/49 (28 Nov 2018 00:00) (67/33 - 218/87)  ABP(mean): 95 (28 Nov 2018 00:00) (0 - 150)  RR: 16 (28 Nov 2018 00:00) (16 - 41)  SpO2: 100% (28 Nov 2018 00:08) (81% - 100%)    Vital Signs Last 24 Hrs  T(C): 34.7 (28 Nov 2018 00:00), Max: 36.3 (27 Nov 2018 05:41)  T(F): 94.5 (28 Nov 2018 00:00), Max: 97.3 (27 Nov 2018 05:41)  HR: 64 (28 Nov 2018 00:08) (61 - 93)  BP: 153/64 (27 Nov 2018 08:15) (153/64 - 169/61)  BP(mean): 86 (27 Nov 2018 08:15) (86 - 99)  RR: 16 (28 Nov 2018 00:00) (16 - 41)  SpO2: 100% (28 Nov 2018 00:08) (81% - 100%)    ABG - ( 27 Nov 2018 22:20 )  pH, Arterial: >7.81 pH, Blood: x     /  pCO2: 16    /  pO2: 188   / HCO3: x     / Base Excess: x     /  SaO2: 98          I&O's Detail    26 Nov 2018 07:01  -  27 Nov 2018 07:00  --------------------------------------------------------  IN:    Solution: 50 mL    Solution: 250 mL  Total IN: 300 mL    OUT:  Total OUT: 0 mL  Total NET: 300 mL      27 Nov 2018 07:01  -  28 Nov 2018 00:32  --------------------------------------------------------  IN:    amiodarone Infusion: 200.1 mL    dexmedetomidine Infusion: 6.2 mL    dextrose 5%.: 150 mL    fentaNYL  Infusion: 2.1 mL    niCARdipine Infusion: 50 mL    norepinephrine Infusion: 2 mL    propofol Infusion: 60 mL    Solution: 200 mL  Total IN: 670.4 mL    OUT:    Other: 1500 mL  Total OUT: 1500 mL  Total NET: -829.6 mL      LABS:                        10.2   8.9   )-----------( 228      ( 27 Nov 2018 23:30 )             31.6     11-27    137  |  97  |  12  ----------------------------<  96  3.3<L>   |  30  |  1.98<H>    Ca    8.5      27 Nov 2018 23:30  Phos  2.9     11-27  Mg     1.7     11-27    TPro  6.4  /  Alb  2.5<L>  /  TBili  1.0  /  DBili  x   /  AST  31  /  ALT  12  /  AlkPhos  159<H>  11-27      CARDIAC MARKERS ( 27 Nov 2018 23:30 )  .404 ng/mL / x     / 103 U/L / x     / 1.9 ng/mL  CARDIAC MARKERS ( 27 Nov 2018 17:25 )  .369 ng/mL / x     / x     / x     / x      CARDIAC MARKERS ( 27 Nov 2018 10:00 )  .363 ng/mL / x     / x     / x     / x      CARDIAC MARKERS ( 27 Nov 2018 06:40 )  .265 ng/mL / x     / x     / x     / x          CAPILLARY BLOOD GLUCOSE  POCT Blood Glucose.: 102 mg/dL (28 Nov 2018 00:06)      PT/INR - ( 27 Nov 2018 23:30 )   PT: 14.8 sec;   INR: 1.31 ratio    PTT - ( 27 Nov 2018 17:25 )  PTT:31.6 sec      CULTURES:  Culture Results:   No growth to date. (11-26 @ 16:15)  Culture Results:   No growth to date. (11-26 @ 16:15)      RADIOLOGY:   < from: CT Angio Chest w/ IV Cont (11.27.18 @ 12:40) >  EXAM:  CT ANGIO ABD PELV (W)AW IC    EXAM:  CT ANGIO CHEST (W)AW IC      PROCEDURE DATE:  11/27/2018        INTERPRETATION:  Exam Date: 11/27/2018 12:40 PM  Clinical Information: Status post cardiac arrest  Technique:       CT angiography of the chest , abdomen and pelvis was   performed with axial images obtained from the thoracic inlet to the pubic   symphysis following administration of IV contrast    90 cc's of Omnipaque   350 was administered.  Oral contrast was not administered. Noncontrast   images of the chest were also obtained. Maximal intensity projection   images were also obtained.  Comparison:  CT thoracic and lumbar spine same day, CT chest abdomen   pelvis 10/19/2018, CT abdomen 10/30/2018    FINDINGS:    Chest:    Lungs, Airways and Pleura: Endotracheal tube just above the david. No   endobronchial lesions. Mild bronchiectasis. Patchy opacities in the   bilateral lower lobes may represent atelectasis versus pneumonia with   consideration for aspiration. Minimal biapical pleural parenchymal   scarring. No pneumothorax. No pleural effusions. Left-sided calcified   pleural plaques unchanged.    Heart and Great Vessels: Atherosclerotic changes of the aorta and   coronary vasculature. No aortic aneurysm or dissection. No periaortic   hematoma. Left ventricular hypertrophy. No pericardial effusion. No   evidence of acute pulmonary embolism. Right jugular line. No evidence of   acute pulmonary embolism.    Mediastinum and Janae: NG tube into the stomach. Subcentimeter mediastinal   lymph nodes.    Neck and Chest Wall: Moderate anasarca.    Abdomen and pelvis:    Liver: Subcentimeter hypodensity left hepatic lobe too small to   characterize. Liver is enlarged.  Gallbladder: Mildly distended gallbladder..  Bile ducts: No intrahepatic or extrahepatic biliary dilatation  Pancreas: Tiny hypodensities too small to characterize likely   representing cysts.    Spleen: within normal limits  Adrenal: within normal limits  Kidneys, Ureters and Bladder: Moderate bilateral renal atrophy without   hydronephrosis. No renal mass or renal calculus. The ureters are   unremarkable. The bladder is collapsed around a Coyle catheter.    Pelvis: Resolution of previously noted pelvic ascites with suggestion of   soft tissue thickening in the deep pelvis in retrospect unchanged of   unclear etiology. Mild presacral stranding.    Bowel: NG tube into the stomach. Rectal tube is visualized. Thickening of   the descending colon and rectosigmoid colon compatible with colitis,   differential including infectious versus inflammatory versus ischemic   etiology. Colonic diverticulosis without diverticulitis. No definite   bowel obstruction. Normal appendix.      Peritoneum: Small upper quadrant ascites markedly decreased from prior   exam. Presacral stranding.    Retroperitoneum:     Subcentimeter retroperitoneal lymph nodes   non-specific in nature   Vessels: Atherosclerotic changes. No abdominal aortic aneurysm or   abdominal aortic dissection.    Abdominal wall: Anasarca  Bones: Degenerative changes. No lytic or blastic lesions. Severe erosive   changes surrounding L2-L3 disc space as seen on prior exams, correlate   with dedicated CT of the thoracic and lumbar spine of same day. Marked   central canal and neural foraminal narrowing at this level.    IMPRESSION:      No evidence of thoracic or abdominal aortic aneurysm or dissection. No   evidence of acute pulmonary embolism.    Patchy opacities in the bilateral lower lobes may represent atelectasis   with or pneumonia with consideration for aspiration pneumonia.    Colitis involving the descending colon and rectosigmoid colon,   differential including infectious versus inflammatory versus ischemic   etiology new from the prior exam    Small residual ascites decreased from prior exam.    Severe erosive changes centered around L2 on L3 disc space concerning for   osteomyelitis discitis of indeterminate age, correlate with dedicated   thoracic and lumbar spine CT of same day    < end of copied text >      < from: CT Head No Cont (11.27.18 @ 12:38) >  EXAM:  CT BRAIN      PROCEDURE DATE:  11/27/2018        INTERPRETATION:  HISTORY: Cardiac arrest    Evaluation demonstrates no evidence of mass-effect or midline shift. No   intraparenchymal mass lesions or hemorrhage is identified.     There is   no evidence of hydrocephalus. No extra-axial collections are noted.    The bone windows demonstrate no gross osseous abnormalities.        Impression:  1. Unremarkable noncontrast CT scan of the brain.        < end of copied text >      SUPPLEMENTAL O2: AC/VC  LINES: Peripheral, RIJ CVC, R radial paramjit.   COYLE: Y  PPx: Heparin, pantoprazole, Peridex  CONTACT: N

## 2018-11-28 NOTE — PROGRESS NOTE ADULT - SUBJECTIVE AND OBJECTIVE BOX
NEPHROLOGY PROGRESS NOTE    CHIEF COMPLAINT: back pain    Intubated in MICU    Vital Signs Last 24 Hrs  T(C): 36.8 (11-28-18 @ 12:00), Max: 37.1 (11-28-18 @ 09:00)  T(F): 98.3 (11-28-18 @ 12:00), Max: 98.8 (11-28-18 @ 09:00)  HR: 73 (11-28-18 @ 15:23) (50 - 90)  RR: 20 (11-28-18 @ 12:00) (14 - 31)  SpO2: 100% (11-28-18 @ 15:23) (97% - 100%)    Lungs - coarse BS bilaterally  Heart - S1S2  Abd - soft, ND, + BS  Extr - no peripheral edema, AVF patent         LABS                             9.0    8.5   )-----------( 196      ( 28 Nov 2018 04:15 )             27.6          11-28    135  |  97  |  12  ----------------------------<  145<H>  3.7   |  29  |  2.03<H>    Ca    7.9<L>      28 Nov 2018 04:15  Phos  1.5     11-28  Mg     1.5     11-28    TPro  6.4  /  Alb  2.5<L>  /  TBili  1.0  /  DBili  x   /  AST  31  /  ALT  12  /  AlkPhos  159<H>  11-27    acetaminophen   Tablet .. 650 milliGRAM(s) Oral every 6 hours PRN  amiodarone Infusion 1 mG/Min IV Continuous <Continuous>  amiodarone Infusion 0.5 mG/Min IV Continuous <Continuous>  amiodarone Infusion 0.5 mG/Min IV Continuous <Continuous>  aspirin  chewable 81 milliGRAM(s) Oral daily  atorvastatin 20 milliGRAM(s) Oral at bedtime  cefepime   IVPB 500 milliGRAM(s) IV Intermittent daily  chlorhexidine 0.12% Liquid 15 milliLiter(s) Oral Mucosa two times a day  chlorhexidine 4% Liquid 1 Application(s) Topical <User Schedule>  dexmedetomidine Infusion 0.3 MICROgram(s)/kG/Hr IV Continuous <Continuous>  dextrose 5%. 1000 milliLiter(s) IV Continuous <Continuous>  dextrose 50% Injectable 12.5 Gram(s) IV Push once  dextrose 50% Injectable 25 Gram(s) IV Push once  dextrose 50% Injectable 25 Gram(s) IV Push once  fentaNYL    Injectable 25 MICROGram(s) IV Push every 4 hours PRN  fentaNYL   Infusion 0.5 MICROgram(s)/kG/Hr IV Continuous <Continuous>  heparin  Injectable 5000 Unit(s) SubCutaneous every 12 hours  influenza   Vaccine 0.5 milliLiter(s) IntraMuscular once  levothyroxine Injectable 50 MICROGram(s) IV Push <User Schedule>  niCARdipine Infusion 5 mG/Hr IV Continuous <Continuous>  norepinephrine Infusion 0.05 MICROgram(s)/kG/Min IV Continuous <Continuous>  pantoprazole   Suspension 40 milliGRAM(s) Oral daily  propofol Infusion 10 MICROgram(s)/kG/Min IV Continuous <Continuous>  sertraline 50 milliGRAM(s) Oral daily  sodium chloride 0.9%. 1000 milliLiter(s) IV Continuous <Continuous>      A/P:    MOSF: shock, resp failure, r/o sepsis, MI  Colitis, PNA, CT suspicious for osteo of LS spine  ESRD on HD TTS  TMP as able  Goal SBP > 90  Abx per ID  Overall options are limited and prognosis is guarded  D/w ICU team

## 2018-11-29 LAB
ANION GAP SERPL CALC-SCNC: 13 MMOL/L — SIGNIFICANT CHANGE UP (ref 5–17)
BUN SERPL-MCNC: 19 MG/DL — SIGNIFICANT CHANGE UP (ref 7–23)
CALCIUM SERPL-MCNC: 8.6 MG/DL — SIGNIFICANT CHANGE UP (ref 8.4–10.5)
CHLORIDE SERPL-SCNC: 94 MMOL/L — LOW (ref 96–108)
CO2 BLDA-SCNC: 29 MMOL/L — SIGNIFICANT CHANGE UP (ref 22–30)
CO2 SERPL-SCNC: 28 MMOL/L — SIGNIFICANT CHANGE UP (ref 22–31)
CREAT SERPL-MCNC: 2.73 MG/DL — HIGH (ref 0.5–1.3)
GAS PNL BLDA: SIGNIFICANT CHANGE UP
GLUCOSE BLDC GLUCOMTR-MCNC: 128 MG/DL — HIGH (ref 70–99)
GLUCOSE BLDC GLUCOMTR-MCNC: 143 MG/DL — HIGH (ref 70–99)
GLUCOSE BLDC GLUCOMTR-MCNC: 149 MG/DL — HIGH (ref 70–99)
GLUCOSE BLDC GLUCOMTR-MCNC: 152 MG/DL — HIGH (ref 70–99)
GLUCOSE BLDC GLUCOMTR-MCNC: 161 MG/DL — HIGH (ref 70–99)
GLUCOSE SERPL-MCNC: 150 MG/DL — HIGH (ref 70–99)
HCT VFR BLD CALC: 27.8 % — LOW (ref 34.5–45)
HGB BLD-MCNC: 9 G/DL — LOW (ref 11.5–15.5)
HOROWITZ INDEX BLDA+IHG-RTO: SIGNIFICANT CHANGE UP
MAGNESIUM SERPL-MCNC: 2 MG/DL — SIGNIFICANT CHANGE UP (ref 1.6–2.6)
MCHC RBC-ENTMCNC: 29.4 PG — SIGNIFICANT CHANGE UP (ref 27–34)
MCHC RBC-ENTMCNC: 32.3 GM/DL — SIGNIFICANT CHANGE UP (ref 32–36)
MCV RBC AUTO: 91 FL — SIGNIFICANT CHANGE UP (ref 80–100)
PCO2 BLDA: 33 MMHG — SIGNIFICANT CHANGE UP (ref 32–46)
PH BLDA: 7.54 — HIGH (ref 7.35–7.45)
PHOSPHATE SERPL-MCNC: 3.6 MG/DL — SIGNIFICANT CHANGE UP (ref 2.5–4.5)
PLATELET # BLD AUTO: 274 K/UL — SIGNIFICANT CHANGE UP (ref 150–400)
PO2 BLDA: 168 MMHG — HIGH (ref 74–108)
POTASSIUM SERPL-MCNC: 3.5 MMOL/L — SIGNIFICANT CHANGE UP (ref 3.5–5.3)
POTASSIUM SERPL-SCNC: 3.5 MMOL/L — SIGNIFICANT CHANGE UP (ref 3.5–5.3)
RBC # BLD: 3.06 M/UL — LOW (ref 3.8–5.2)
RBC # FLD: 17.6 % — HIGH (ref 10.3–14.5)
SAO2 % BLDA: >99 % — HIGH (ref 92–96)
SODIUM SERPL-SCNC: 135 MMOL/L — SIGNIFICANT CHANGE UP (ref 135–145)
WBC # BLD: 14 K/UL — HIGH (ref 3.8–10.5)
WBC # FLD AUTO: 14 K/UL — HIGH (ref 3.8–10.5)

## 2018-11-29 PROCEDURE — 99232 SBSQ HOSP IP/OBS MODERATE 35: CPT

## 2018-11-29 PROCEDURE — 99233 SBSQ HOSP IP/OBS HIGH 50: CPT

## 2018-11-29 PROCEDURE — 71045 X-RAY EXAM CHEST 1 VIEW: CPT | Mod: 26

## 2018-11-29 PROCEDURE — 93010 ELECTROCARDIOGRAM REPORT: CPT

## 2018-11-29 RX ORDER — NOREPINEPHRINE BITARTRATE/D5W 8 MG/250ML
0.05 PLASTIC BAG, INJECTION (ML) INTRAVENOUS
Qty: 8 | Refills: 0 | Status: DISCONTINUED | OUTPATIENT
Start: 2018-11-29 | End: 2018-12-02

## 2018-11-29 RX ORDER — ACETAMINOPHEN 500 MG
650 TABLET ORAL EVERY 8 HOURS
Qty: 0 | Refills: 0 | Status: DISCONTINUED | OUTPATIENT
Start: 2018-11-29 | End: 2018-11-30

## 2018-11-29 RX ADMIN — CHLORHEXIDINE GLUCONATE 1 APPLICATION(S): 213 SOLUTION TOPICAL at 05:09

## 2018-11-29 RX ADMIN — PANTOPRAZOLE SODIUM 40 MILLIGRAM(S): 20 TABLET, DELAYED RELEASE ORAL at 11:20

## 2018-11-29 RX ADMIN — ATORVASTATIN CALCIUM 20 MILLIGRAM(S): 80 TABLET, FILM COATED ORAL at 21:11

## 2018-11-29 RX ADMIN — HEPARIN SODIUM 5000 UNIT(S): 5000 INJECTION INTRAVENOUS; SUBCUTANEOUS at 05:11

## 2018-11-29 RX ADMIN — HEPARIN SODIUM 5000 UNIT(S): 5000 INJECTION INTRAVENOUS; SUBCUTANEOUS at 17:30

## 2018-11-29 RX ADMIN — Medication 1 DROP(S): at 13:16

## 2018-11-29 RX ADMIN — AMIODARONE HYDROCHLORIDE 16.67 MG/MIN: 400 TABLET ORAL at 13:16

## 2018-11-29 RX ADMIN — NICARDIPINE HYDROCHLORIDE 25 MG/HR: 30 CAPSULE, EXTENDED RELEASE ORAL at 13:15

## 2018-11-29 RX ADMIN — SERTRALINE 50 MILLIGRAM(S): 25 TABLET, FILM COATED ORAL at 11:20

## 2018-11-29 RX ADMIN — Medication 650 MILLIGRAM(S): at 22:07

## 2018-11-29 RX ADMIN — Medication 50 MICROGRAM(S): at 05:47

## 2018-11-29 RX ADMIN — FENTANYL CITRATE 25 MICROGRAM(S): 50 INJECTION INTRAVENOUS at 17:30

## 2018-11-29 RX ADMIN — CHLORHEXIDINE GLUCONATE 15 MILLILITER(S): 213 SOLUTION TOPICAL at 17:30

## 2018-11-29 RX ADMIN — Medication 650 MILLIGRAM(S): at 22:06

## 2018-11-29 RX ADMIN — Medication 1 DROP(S): at 21:11

## 2018-11-29 RX ADMIN — Medication 650 MILLIGRAM(S): at 05:13

## 2018-11-29 RX ADMIN — NICARDIPINE HYDROCHLORIDE 25 MG/HR: 30 CAPSULE, EXTENDED RELEASE ORAL at 23:08

## 2018-11-29 RX ADMIN — NICARDIPINE HYDROCHLORIDE 25 MG/HR: 30 CAPSULE, EXTENDED RELEASE ORAL at 02:03

## 2018-11-29 RX ADMIN — CEFEPIME 100 MILLIGRAM(S): 1 INJECTION, POWDER, FOR SOLUTION INTRAMUSCULAR; INTRAVENOUS at 15:04

## 2018-11-29 RX ADMIN — Medication 81 MILLIGRAM(S): at 11:21

## 2018-11-29 RX ADMIN — CHLORHEXIDINE GLUCONATE 15 MILLILITER(S): 213 SOLUTION TOPICAL at 05:09

## 2018-11-29 RX ADMIN — FENTANYL CITRATE 25 MICROGRAM(S): 50 INJECTION INTRAVENOUS at 18:00

## 2018-11-29 NOTE — PROGRESS NOTE ADULT - SUBJECTIVE AND OBJECTIVE BOX
Patient is a 72y old  Female who presents with a chief complaint of back pain (28 Nov 2018 15:38)      BRIEF HOSPITAL COURSE:   73 yo f pmhx dementia, DM2, HTN, CAD, HLD. ESRD on HD (TTS), hypothyroidism, chronic afib, diastolic chf, spinal stenosis, depression and diabetic neuropathy admitted with low back pain. RRT called this am after patient found unresponsive.  As per nursing staff the aide was in with the patient's roommate when they heard her say help me and then went unresponsive.  Upon arrival to patient's bedside patient found pulseless and CPR was initiated.  Patient hooked up to monitor that showed initially showed asystole and then vfib arrest.  ACLS initiated, intubated and transferred to ICU for further management.  Patient with total downtime of approximately 14 minutes.  Of note code labs revealed serum glucose level of 9.  Patient started on amiodarone infusion for vfib arrest.  Started on hypothermia protocol.  Patient initially hypertensive on nicardipine infusion that was discontinued upon patient becoming hypotensive that continued requiring vasopressor therapy.  ABG ordered found to have pH >7.81, pCO2 16, pO2 188, HCO3 29.  Patient on pressure support pulling tidal volumes of ~600-700 and breathing with rates into the high 20s.  Settings changed to volume control, rate 14, tidal volume 400, fio2 35 and peep 5.  Propofol increased to 50mcg/kg/hr, fentanyl 50mcg given, RR decreased to 14.  Repeat abg ordered for ~0000, pH 7.69, pCO2 22.7, pO2 187, HCO3 28.5.  Twitching movements/eye movements seem to  have dissipated.      Events last 24 hours:   Patient briefly on levoped, now back on nicardipine.  BP up and down, ?autonomic dysfunction? Patient remains unresponsive, non purposeful movements.        PAST MEDICAL & SURGICAL HISTORY:  [place] (hard of hearing)  Cataracts, bilateral: left worse than right  Pleural thickening: s/p pleurodisis left vats 2014  Hip pain: left  LBP radiating to left leg  CHF (congestive heart failure): diagnosed 2-2014  MI (myocardial infarction): 2-2014   cardiac [facility] done [facility]  Diabetes: diagnosed 2004  no medications in 5 months since hemodialysis  Hypothyroidism  ESRD (end stage renal disease)  HTN (hypertension)  MI (myocardial infarction): may 2016  Pleural effusion: Left side - several times since March 2014  Hypothyroid  Anxiety  HTN (hypertension)  Hyperlipidemia  CHF (congestive heart failure)  CKD (chronic kidney disease)  Anemia  Diabetes: DM 2  Thoracotomy scar of left chest: 4-2014 ? pneumonia/ scarring  S/P myomectomy: abdominal age 52  AV fistula: left upper 8-2014 attempted 12-16-14    Allergies  Avelox (Unknown)  fish (Hives; Rash)  shellfish (Unknown)      FAMILY HISTORY:  No significant family history      Social History:   Unable to obtain.       Review of Systems:  Unable to obtain secondary to patient's mental status/sedation/intubated.       Vitals During Exam:   HR: 80  BP: 147/48 mmHg  RR: 22  sPO2: 100%    Physical Examination:    General: Elderly female, lying in bed, NAD      HEENT: Pupils 2mm equal, reactive to light.  Symmetric. ET tube in place.  OG tube in place.     PULM: Symmetrical thorax expansion upon respiration.  Coarse to auscultation bilaterally, +sputum production upon ET tube suctioning.      CVS: Irregular rate and rhythm, no murmurs, rubs, or gallops appreciated.     ABD: Soft, nondistended, nontender, normoactive bowel sounds, no masses appreciated.     EXT: No edema, nontender, DP pulses symmetrical.     SKIN: Warm and well perfused, no rashes noted.    NEURO: With draws to pain, non purposeful movements, not following commands.       Medications:  cefepime   IVPB 500 milliGRAM(s) IV Intermittent daily  amiodarone Infusion 1 mG/Min IV Continuous <Continuous>  amiodarone Infusion 0.5 mG/Min IV Continuous <Continuous>  amiodarone Infusion 0.5 mG/Min IV Continuous <Continuous>  niCARdipine Infusion 5 mG/Hr IV Continuous <Continuous>  norepinephrine Infusion 0.05 MICROgram(s)/kG/Min IV Continuous <Continuous>  acetaminophen   Tablet .. 650 milliGRAM(s) Oral every 6 hours PRN  dexmedetomidine Infusion 0.3 MICROgram(s)/kG/Hr IV Continuous <Continuous>  fentaNYL    Injectable 25 MICROGram(s) IV Push every 4 hours PRN  fentaNYL   Infusion 0.5 MICROgram(s)/kG/Hr IV Continuous <Continuous>  propofol Infusion 10 MICROgram(s)/kG/Min IV Continuous <Continuous>  sertraline 50 milliGRAM(s) Oral daily  aspirin  chewable 81 milliGRAM(s) Oral daily  heparin  Injectable 5000 Unit(s) SubCutaneous every 12 hours  pantoprazole   Suspension 40 milliGRAM(s) Oral daily  atorvastatin 20 milliGRAM(s) Oral at bedtime  dextrose 50% Injectable 12.5 Gram(s) IV Push once  dextrose 50% Injectable 25 Gram(s) IV Push once  dextrose 50% Injectable 25 Gram(s) IV Push once  levothyroxine Injectable 50 MICROGram(s) IV Push <User Schedule>  dextrose 5%. 1000 milliLiter(s) IV Continuous <Continuous>  sodium chloride 0.9%. 1000 milliLiter(s) IV Continuous <Continuous>  influenza   Vaccine 0.5 milliLiter(s) IntraMuscular once  chlorhexidine 0.12% Liquid 15 milliLiter(s) Oral Mucosa two times a day  chlorhexidine 4% Liquid 1 Application(s) Topical <User Schedule>      Mode: AC/ CMV (Assist Control/ Continuous Mandatory Ventilation)  RR (machine): 12  TV (machine): 300  FiO2: 35  PEEP: 5  P-Low: 35  PIP: 18      ICU Vital Signs Last 24 Hrs  T(C): 38.3 (28 Nov 2018 23:00), Max: 38.3 (28 Nov 2018 23:00)  T(F): 100.9 (28 Nov 2018 23:00), Max: 100.9 (28 Nov 2018 23:00)  HR: 80 (28 Nov 2018 23:00) (50 - 94)  BP: --  BP(mean): --  ABP: 147/48 (28 Nov 2018 23:00) (75/36 - 181/64)  ABP(mean): 83 (28 Nov 2018 23:00) (48 - 108)  RR: 22 (28 Nov 2018 23:00) (14 - 34)  SpO2: 100% (28 Nov 2018 23:00) (100% - 100%)    Vital Signs Last 24 Hrs  T(C): 38.3 (28 Nov 2018 23:00), Max: 38.3 (28 Nov 2018 23:00)  T(F): 100.9 (28 Nov 2018 23:00), Max: 100.9 (28 Nov 2018 23:00)  HR: 80 (28 Nov 2018 23:00) (50 - 94)  BP: --  BP(mean): --  RR: 22 (28 Nov 2018 23:00) (14 - 34)  SpO2: 100% (28 Nov 2018 23:00) (100% - 100%)    ABG - ( 28 Nov 2018 16:44 )  pH, Arterial: 7.50  pH, Blood: x     /  pCO2: 36    /  pO2: 156   / HCO3: x     / Base Excess: x     /  SaO2: 99          I&O's Detail    27 Nov 2018 07:01  -  28 Nov 2018 07:00  --------------------------------------------------------  IN:    amiodarone Infusion: 317 mL    dexmedetomidine Infusion: 21.7 mL    dextrose 5%.: 500 mL    fentaNYL  Infusion: 2.1 mL    fentaNYL  Infusion: 12.6 mL    niCARdipine Infusion: 175 mL    norepinephrine Infusion: 2 mL    propofol Infusion: 135 mL    Solution: 200 mL    Solution: 100 mL    Solution: 125 mL  Total IN: 1590.4 mL    OUT:    Nasoenteral Tube: 600 mL    Other: 1500 mL  Total OUT: 2100 mL  Total NET: -509.6 mL      28 Nov 2018 07:01  -  29 Nov 2018 00:07  --------------------------------------------------------  IN:    amiodarone Infusion: 250.5 mL    dextrose 5%.: 150 mL    fentaNYL  Infusion: 8.4 mL    Nepro: 300 mL    niCARdipine Infusion: 300 mL    Solution: 187.5 mL  Total IN: 1196.4 mL    OUT:    Nasoenteral Tube: 300 mL  Total OUT: 300 mL  Total NET: 896.4 mL      LABS:                        9.5    12.4  )-----------( 263      ( 28 Nov 2018 16:20 )             29.3     11-28    133<L>  |  94<L>  |  15  ----------------------------<  165<H>  3.9   |  29  |  2.29<H>    Ca    8.0<L>      28 Nov 2018 16:20  Phos  3.9     11-28  Mg     1.9     11-28    TPro  6.4  /  Alb  2.5<L>  /  TBili  0.7  /  DBili  x   /  AST  30  /  ALT  11  /  AlkPhos  153<H>  11-28      CARDIAC MARKERS ( 28 Nov 2018 02:50 )  .386 ng/mL / x     / x     / x     / x      CARDIAC MARKERS ( 27 Nov 2018 23:30 )  .404 ng/mL / x     / 103 U/L / x     / 1.9 ng/mL  CARDIAC MARKERS ( 27 Nov 2018 17:25 )  .369 ng/mL / x     / x     / x     / x      CARDIAC MARKERS ( 27 Nov 2018 10:00 )  .363 ng/mL / x     / x     / x     / x      CARDIAC MARKERS ( 27 Nov 2018 06:40 )  .265 ng/mL / x     / x     / x     / x          CAPILLARY BLOOD GLUCOSE  POCT Blood Glucose.: 191 mg/dL (28 Nov 2018 22:46)      PT/INR - ( 28 Nov 2018 04:15 )   PT: 14.6 sec;   INR: 1.29 ratio    PTT - ( 28 Nov 2018 04:15 )  PTT:32.2 sec      CULTURES:  Culture Results:   No growth to date. (11-26 @ 16:15)  Culture Results:   No growth to date. (11-26 @ 16:15)      RADIOLOGY:   < from: Xray Chest 1 View AP/PA (11.28.18 @ 06:56) >  EXAM:  XR CHEST AP OR PA 1V      PROCEDURE DATE:  11/28/2018        INTERPRETATION:  EXAM: PORTABLE CHEST.     CLINICAL INDICATION: Abnormal chest sounds. Infiltrate.     TECHNIQUE: Upright AP view.     PRIOR EXAM: 11/27/2018.     FINDINGS:      There may be a patchy infiltrate in the left lung base. Evaluation   however is limited due to an overlying monitoring lead. Rest of the   visualized lung fields are unremarkable. Magnified cardiac and   mediastinal silhouette is stable. A central line,endotracheal and   nasogastric tubes remain in place. There is a left axillary vascular   stent and a loop recorder.      IMPRESSION:     Cannot exclude a patchy left basilar infiltrate in view of clinical   concern as described above.    < end of copied text >      SUPPLEMENTAL O2: AC/VC  LINES: Peripheral   COYLE: N  PPx: Heparin, Peridex, Pantoprazole   CONTACT: Y, +MRSA PCR Swab Patient is a 72y old  Female who presents with a chief complaint of back pain (28 Nov 2018 15:38)      BRIEF HOSPITAL COURSE:   71 yo f pmhx dementia, DM2, HTN, CAD, HLD. ESRD on HD (TTS), hypothyroidism, chronic afib, diastolic chf, spinal stenosis, depression and diabetic neuropathy admitted with low back pain. RRT called this am after patient found unresponsive.  As per nursing staff the aide was in with the patient's roommate when they heard her say help me and then went unresponsive.  Upon arrival to patient's bedside patient found pulseless and CPR was initiated.  Patient hooked up to monitor that showed initially showed asystole and then vfib arrest.  ACLS initiated, intubated and transferred to ICU for further management.  Patient with total downtime of approximately 14 minutes.  Of note code labs revealed serum glucose level of 9.  Patient started on amiodarone infusion for vfib arrest.  Started on hypothermia protocol.  Patient initially hypertensive on nicardipine infusion that was discontinued upon patient becoming hypotensive that continued requiring vasopressor therapy.  ABG ordered found to have pH >7.81, pCO2 16, pO2 188, HCO3 29.  Patient on pressure support pulling tidal volumes of ~600-700 and breathing with rates into the high 20s.  Settings changed to volume control, rate 14, tidal volume 400, fio2 35 and peep 5.  Propofol increased to 50mcg/kg/hr, fentanyl 50mcg given, RR decreased to 14.  Repeat abg ordered for ~0000, pH 7.69, pCO2 22.7, pO2 187, HCO3 28.5.  Twitching movements/eye movements seem to  have dissipated.      Events last 24 hours:   Patient briefly on levoped, now back on nicardipine.  BP up and down, ?autonomic dysfunction? Patient remains unresponsive, non purposeful movements.        PAST MEDICAL & SURGICAL HISTORY:  [place] (hard of hearing)  Cataracts, bilateral: left worse than right  Pleural thickening: s/p pleurodisis left vats 2014  Hip pain: left  LBP radiating to left leg  CHF (congestive heart failure): diagnosed 2-2014  MI (myocardial infarction): 2-2014   cardiac cath done [facility]  Diabetes: diagnosed 2004  no medications in 5 months since hemodialysis  Hypothyroidism  ESRD (end stage renal disease)  HTN (hypertension)  MI (myocardial infarction): may 2016  Pleural effusion: Left side - several times since March 2014  Hypothyroid  Anxiety  HTN (hypertension)  Hyperlipidemia  CHF (congestive heart failure)  CKD (chronic kidney disease)  Anemia  Diabetes: DM 2  Thoracotomy scar of left chest: 4-2014 ? pneumonia/ scarring  S/P myomectomy: abdominal age 52  AV fistula: left upper 8-2014 attempted 12-16-14    Allergies  Avelox (Unknown)  fish (Hives; Rash)  shellfish (Unknown)      FAMILY HISTORY:  No significant family history      Social History:   Unable to obtain.       Review of Systems:  Unable to obtain secondary to patient's mental status/sedation/intubated.       Vitals During Exam:   HR: 80  BP: 147/48 mmHg  RR: 22  sPO2: 100%    Physical Examination:    General: Elderly female, lying in bed, NAD      HEENT: Pupils 2mm equal, sluggish.  Symmetric. ET tube in place.  OG tube in place.     PULM: Symmetrical thorax expansion upon respiration.  Coarse to auscultation bilaterally, +sputum production upon ET tube suctioning.      CVS: Irregular rate and rhythm, no murmurs, rubs, or gallops appreciated.     ABD: Soft, nondistended, nontender, normoactive bowel sounds, no masses appreciated.     EXT: No edema, nontender, DP pulses symmetrical.     SKIN: Warm and well perfused, no rashes noted.    NEURO: With draws to pain, non purposeful movements, not following commands. Poor corneal reflex.  Does not track with eyes.       Medications:  cefepime   IVPB 500 milliGRAM(s) IV Intermittent daily  amiodarone Infusion 1 mG/Min IV Continuous <Continuous>  amiodarone Infusion 0.5 mG/Min IV Continuous <Continuous>  amiodarone Infusion 0.5 mG/Min IV Continuous <Continuous>  niCARdipine Infusion 5 mG/Hr IV Continuous <Continuous>  norepinephrine Infusion 0.05 MICROgram(s)/kG/Min IV Continuous <Continuous>  acetaminophen   Tablet .. 650 milliGRAM(s) Oral every 6 hours PRN  dexmedetomidine Infusion 0.3 MICROgram(s)/kG/Hr IV Continuous <Continuous>  fentaNYL    Injectable 25 MICROGram(s) IV Push every 4 hours PRN  fentaNYL   Infusion 0.5 MICROgram(s)/kG/Hr IV Continuous <Continuous>  propofol Infusion 10 MICROgram(s)/kG/Min IV Continuous <Continuous>  sertraline 50 milliGRAM(s) Oral daily  aspirin  chewable 81 milliGRAM(s) Oral daily  heparin  Injectable 5000 Unit(s) SubCutaneous every 12 hours  pantoprazole   Suspension 40 milliGRAM(s) Oral daily  atorvastatin 20 milliGRAM(s) Oral at bedtime  dextrose 50% Injectable 12.5 Gram(s) IV Push once  dextrose 50% Injectable 25 Gram(s) IV Push once  dextrose 50% Injectable 25 Gram(s) IV Push once  levothyroxine Injectable 50 MICROGram(s) IV Push <User Schedule>  dextrose 5%. 1000 milliLiter(s) IV Continuous <Continuous>  sodium chloride 0.9%. 1000 milliLiter(s) IV Continuous <Continuous>  influenza   Vaccine 0.5 milliLiter(s) IntraMuscular once  chlorhexidine 0.12% Liquid 15 milliLiter(s) Oral Mucosa two times a day  chlorhexidine 4% Liquid 1 Application(s) Topical <User Schedule>      Mode: AC/ CMV (Assist Control/ Continuous Mandatory Ventilation)  RR (machine): 12  TV (machine): 300  FiO2: 35  PEEP: 5  P-Low: 35  PIP: 18      ICU Vital Signs Last 24 Hrs  T(C): 38.3 (28 Nov 2018 23:00), Max: 38.3 (28 Nov 2018 23:00)  T(F): 100.9 (28 Nov 2018 23:00), Max: 100.9 (28 Nov 2018 23:00)  HR: 80 (28 Nov 2018 23:00) (50 - 94)  BP: --  BP(mean): --  ABP: 147/48 (28 Nov 2018 23:00) (75/36 - 181/64)  ABP(mean): 83 (28 Nov 2018 23:00) (48 - 108)  RR: 22 (28 Nov 2018 23:00) (14 - 34)  SpO2: 100% (28 Nov 2018 23:00) (100% - 100%)    Vital Signs Last 24 Hrs  T(C): 38.3 (28 Nov 2018 23:00), Max: 38.3 (28 Nov 2018 23:00)  T(F): 100.9 (28 Nov 2018 23:00), Max: 100.9 (28 Nov 2018 23:00)  HR: 80 (28 Nov 2018 23:00) (50 - 94)  BP: --  BP(mean): --  RR: 22 (28 Nov 2018 23:00) (14 - 34)  SpO2: 100% (28 Nov 2018 23:00) (100% - 100%)    ABG - ( 28 Nov 2018 16:44 )  pH, Arterial: 7.50  pH, Blood: x     /  pCO2: 36    /  pO2: 156   / HCO3: x     / Base Excess: x     /  SaO2: 99          I&O's Detail    27 Nov 2018 07:01  -  28 Nov 2018 07:00  --------------------------------------------------------  IN:    amiodarone Infusion: 317 mL    dexmedetomidine Infusion: 21.7 mL    dextrose 5%.: 500 mL    fentaNYL  Infusion: 2.1 mL    fentaNYL  Infusion: 12.6 mL    niCARdipine Infusion: 175 mL    norepinephrine Infusion: 2 mL    propofol Infusion: 135 mL    Solution: 200 mL    Solution: 100 mL    Solution: 125 mL  Total IN: 1590.4 mL    OUT:    Nasoenteral Tube: 600 mL    Other: 1500 mL  Total OUT: 2100 mL  Total NET: -509.6 mL      28 Nov 2018 07:01  -  29 Nov 2018 00:07  --------------------------------------------------------  IN:    amiodarone Infusion: 250.5 mL    dextrose 5%.: 150 mL    fentaNYL  Infusion: 8.4 mL    Nepro: 300 mL    niCARdipine Infusion: 300 mL    Solution: 187.5 mL  Total IN: 1196.4 mL    OUT:    Nasoenteral Tube: 300 mL  Total OUT: 300 mL  Total NET: 896.4 mL      LABS:                        9.5    12.4  )-----------( 263      ( 28 Nov 2018 16:20 )             29.3     11-28    133<L>  |  94<L>  |  15  ----------------------------<  165<H>  3.9   |  29  |  2.29<H>    Ca    8.0<L>      28 Nov 2018 16:20  Phos  3.9     11-28  Mg     1.9     11-28    TPro  6.4  /  Alb  2.5<L>  /  TBili  0.7  /  DBili  x   /  AST  30  /  ALT  11  /  AlkPhos  153<H>  11-28      CARDIAC MARKERS ( 28 Nov 2018 02:50 )  .386 ng/mL / x     / x     / x     / x      CARDIAC MARKERS ( 27 Nov 2018 23:30 )  .404 ng/mL / x     / 103 U/L / x     / 1.9 ng/mL  CARDIAC MARKERS ( 27 Nov 2018 17:25 )  .369 ng/mL / x     / x     / x     / x      CARDIAC MARKERS ( 27 Nov 2018 10:00 )  .363 ng/mL / x     / x     / x     / x      CARDIAC MARKERS ( 27 Nov 2018 06:40 )  .265 ng/mL / x     / x     / x     / x          CAPILLARY BLOOD GLUCOSE  POCT Blood Glucose.: 191 mg/dL (28 Nov 2018 22:46)      PT/INR - ( 28 Nov 2018 04:15 )   PT: 14.6 sec;   INR: 1.29 ratio    PTT - ( 28 Nov 2018 04:15 )  PTT:32.2 sec      CULTURES:  Culture Results:   No growth to date. (11-26 @ 16:15)  Culture Results:   No growth to date. (11-26 @ 16:15)      RADIOLOGY:   < from: Xray Chest 1 View AP/PA (11.28.18 @ 06:56) >  EXAM:  XR CHEST AP OR PA 1V      PROCEDURE DATE:  11/28/2018        INTERPRETATION:  EXAM: PORTABLE CHEST.     CLINICAL INDICATION: Abnormal chest sounds. Infiltrate.     TECHNIQUE: Upright AP view.     PRIOR EXAM: 11/27/2018.     FINDINGS:      There may be a patchy infiltrate in the left lung base. Evaluation   however is limited due to an overlying monitoring lead. Rest of the   visualized lung fields are unremarkable. Magnified cardiac and   mediastinal silhouette is stable. A central line,endotracheal and   nasogastric tubes remain in place. There is a left axillary vascular   stent and a loop recorder.      IMPRESSION:     Cannot exclude a patchy left basilar infiltrate in view of clinical   concern as described above.    < end of copied text >      SUPPLEMENTAL O2: AC/VC  LINES: Peripheral   COYLE: N  PPx: Heparin, Peridex, Pantoprazole   CONTACT: Y, +MRSA PCR Swab

## 2018-11-29 NOTE — PROGRESS NOTE ADULT - SUBJECTIVE AND OBJECTIVE BOX
Follow up for vt vf arrest  SUBJ:    on dialysis      PMH  [place] (hard of hearing)  Cataracts, bilateral  Pleural thickening  Hip pain  LBP radiating to left leg  CHF (congestive heart failure)  MI (myocardial infarction)  Diabetes  Hypothyroidism  ESRD (end stage renal disease)  HTN (hypertension)  MI (myocardial infarction)  Pleural effusion  Hypothyroid  Anxiety  HTN (hypertension)  Hyperlipidemia  CHF (congestive heart failure)  CKD (chronic kidney disease)  Anemia  Diabetes      MEDICATIONS  (STANDING):  amiodarone Infusion 1 mG/Min (33.333 mL/Hr) IV Continuous <Continuous>  amiodarone Infusion 0.5 mG/Min (16.667 mL/Hr) IV Continuous <Continuous>  amiodarone Infusion 0.5 mG/Min (16.667 mL/Hr) IV Continuous <Continuous>  artificial  tears Solution 1 Drop(s) Both EYES three times a day  aspirin  chewable 81 milliGRAM(s) Oral daily  atorvastatin 20 milliGRAM(s) Oral at bedtime  cefepime   IVPB 500 milliGRAM(s) IV Intermittent daily  chlorhexidine 0.12% Liquid 15 milliLiter(s) Oral Mucosa two times a day  chlorhexidine 4% Liquid 1 Application(s) Topical <User Schedule>  dextrose 5%. 1000 milliLiter(s) (50 mL/Hr) IV Continuous <Continuous>  dextrose 50% Injectable 12.5 Gram(s) IV Push once  dextrose 50% Injectable 25 Gram(s) IV Push once  dextrose 50% Injectable 25 Gram(s) IV Push once  heparin  Injectable 5000 Unit(s) SubCutaneous every 12 hours  influenza   Vaccine 0.5 milliLiter(s) IntraMuscular once  levothyroxine Injectable 50 MICROGram(s) IV Push <User Schedule>  niCARdipine Infusion 5 mG/Hr (25 mL/Hr) IV Continuous <Continuous>  norepinephrine Infusion 0.05 MICROgram(s)/kG/Min (3.9 mL/Hr) IV Continuous <Continuous>  pantoprazole   Suspension 40 milliGRAM(s) Oral daily  sertraline 50 milliGRAM(s) Oral daily    MEDICATIONS  (PRN):  acetaminophen   Tablet .. 650 milliGRAM(s) Oral every 6 hours PRN Temp greater or equal to 38C (100.4F), Mild Pain (1 - 3)  fentaNYL    Injectable 25 MICROGram(s) IV Push every 4 hours PRN Mild Pain (1 - 3)        PHYSICAL EXAM:  Vital Signs Last 24 Hrs  T(C): 36.6 (29 Nov 2018 11:10), Max: 38.8 (29 Nov 2018 05:00)  T(F): 97.9 (29 Nov 2018 11:10), Max: 101.9 (29 Nov 2018 05:00)  HR: 77 (29 Nov 2018 12:00) (69 - 101)  BP: --  BP(mean): --  RR: 19 (29 Nov 2018 12:00) (15 - 34)  SpO2: 100% (29 Nov 2018 12:00) (98% - 100%)    GENERAL: NAD, well-groomed, well-developed  HEAD:  Atraumatic, Normocephalic  EYES: EOMI, PERRLA, conjunctiva and sclera clear  ENT: Moist mucous membranes,  NECK: Supple, No JVD, no bruits  CHEST/LUNG: Clear to percussion bilaterally; No rales, rhonchi, wheezing, or rubs  HEART: Regular rate and rhythm; No murmurs, rubs, or gallops PMI non displaced.  ABDOMEN: Soft, Nontender, Nondistended; Bowel sounds present  EXTREMITIES:  2+ Peripheral Pulses, No clubbing, cyanosis, or edema  SKIN: No rashes or lesions  NERVOUS SYSTEM:  Cranial Nerves II-XII intact      TELEMETRY:    ECG:    < from: 12 Lead ECG (11.29.18 @ 04:49) >  Ventricular Rate 94 BPM    Atrial Rate 94 BPM    P-R Interval 240 ms    QRS Duration 100 ms    Q-T Interval 446 ms    QTC Calculation(Bezet) 557 ms    R Axis -57 degrees    T Axis 209 degrees    Diagnosis Line Sinus rhythm with 1st degree AV block  Left axis deviation  Left anterior fascicular block  Inferior infarct , possibly acute  Possible Anterior infarct , age undetermined  Poor R wave progression  ST and T wave abnormality, consider lateral ischemia  Prolonged QT    Consider right ventricular involvement in acute inferior infarct  non specific ST  t wave appearance  Abnormal ECG  When compared with ECG of 28-NOV-2018 04:51,  aberrant conduction is no longer present  Vent. rate has increased BY  32 BPM  QT has shortened  Confirmed by WILNER MCCONNELL MD ([id number]) on 11/29/2018 9:26:12 AM    < end of copied text >    ECHO:    LABS:                        9.0    14.0  )-----------( 274      ( 29 Nov 2018 05:30 )             27.8     11-29    135  |  94<L>  |  19  ----------------------------<  150<H>  3.5   |  28  |  2.73<H>    Ca    8.6      29 Nov 2018 05:30  Phos  3.6     11-29  Mg     2.0     11-29    TPro  6.4  /  Alb  2.5<L>  /  TBili  0.7  /  DBili  x   /  AST  30  /  ALT  11  /  AlkPhos  153<H>  11-28    CARDIAC MARKERS ( 28 Nov 2018 02:50 )  .386 ng/mL / x     / x     / x     / x      CARDIAC MARKERS ( 27 Nov 2018 23:30 )  .404 ng/mL / x     / 103 U/L / x     / 1.9 ng/mL  CARDIAC MARKERS ( 27 Nov 2018 17:25 )  .369 ng/mL / x     / x     / x     / x          PT/INR - ( 28 Nov 2018 04:15 )   PT: 14.6 sec;   INR: 1.29 ratio         PTT - ( 28 Nov 2018 04:15 )  PTT:32.2 sec    I&O's Summary    28 Nov 2018 07:01  -  29 Nov 2018 07:00  --------------------------------------------------------  IN: 1660 mL / OUT: 325 mL / NET: 1335 mL    29 Nov 2018 07:01  -  29 Nov 2018 12:35  --------------------------------------------------------  IN: 325.2 mL / OUT: 61 mL / NET: 264.2 mL      BNP    RADIOLOGY & ADDITIONAL STUDIES:      < from: Xray Chest 1 View- PORTABLE-Routine (11.29.18 @ 08:54) >  EXAM:  XR CHEST PORTABLE ROUTINE 1V      PROCEDURE DATE:  11/29/2018        INTERPRETATION:  CLINICAL STATEMENT: Follow-up chest pain.    TECHNIQUE: AP view of the chest.    COMPARISON: 11/28/2018    FINDINGS/  IMPRESSION:  Right central line, ET tube and feeding tube again noted. No pneumothorax.    Mild increased interstitial lung markings without significant change. No   new consolidation or pleural effusion.    Heart size cannot be accurately assessed in this projection.    Left Subclavian/axillary vascular stent again noted.      JOAN LAMBERT M.D., ATTENDING RADIOLOGIST  This document has been electronically signed. Nov 29 2018 10:02AM    < end of copied text >    ECHO:

## 2018-11-29 NOTE — PROGRESS NOTE ADULT - ASSESSMENT
73 yo f pmhx dementia, DM2, HTN, CAD, HLD. ESRD on HD (TTS), hypothyroidism, chronic afib, diastolic chf, spinal stenosis, depression and diabetic neuropathy admitted with low back pain now s/p vfib cardiac arrest.     NEURO: Propofol and precedex on.   Depression on zoloft.   CV: Vfib Cardiac arrest, temperature targeted therapy completed. Amiodarone infusion continued.  Shock state now resoled, off levophed.  Patient now hypertensive restarted on nicardipine gtt.  Goal SBP <150. HLD on statin therapy.  CAD on aspirin  RESP: Respiratory failure with respiratory alkalosis.  AC/VC 6cc/kg tidal volume lung protective strategy.  Titrate FiO2 and PEEP for sPO2 >92%.  Keep HOB >30 degrees.  Peridex for VAP ppx.  Pantoprazole for stress ulcer ppx.   RENAL: ESRD on HD, received HD today.  Renally dose medications.  Mendez in place.  Plan for dialysis today.    GI: NPO with tube feeds.  Patient with residual of ~200cc.  Tube feeds held.  Now restarted at 10cc/hour.    ENDO: DM. On ISS and POCT q6 hours.  Hypothyroidism on synthroid.    ID: On cefepime for coverage; ID following.    HEME: Heparin for VTE ppx.    DISPO: Full code.  Patient's prognosis poor.  Patient's family here this evening.  They had many questions about events of hospitalization and about patient's prognosis.  Prognosis endorsed as poor.   The patient's daughter endorses that despite all that she has experienced that she her mind was always "sharp" and that her mother valued her mind.  I explained that with the patient's downtime of ~14 minutes that she is at risk for having brain injury and that she might not be who she was prior.  I further endorsed that she might not "wake up" at all.   The patient's daughter was upset, but seems to understand the patient's prognosis.  She endorses that her two sisters are coming in over the next 2 days and that once they both arrive they will make their decision as to what they will do next.  Patient's daughter seems to be leaning a DNR/DNI and possibly withdrawal of current medical management to transition to comfort care pending further family discussions.      Critical Care time: 70 mins assessing presenting problems of acute illness that poses high probability of life threatening deterioration or end organ damage/dysfunction.  Medical decision making including Initiating plan of care, reviewing data, reviewing radiology, discussing with multidisciplinary team, non inclusive of procedures, discussing goals of care with patient/family

## 2018-11-29 NOTE — PROGRESS NOTE ADULT - SUBJECTIVE AND OBJECTIVE BOX
CC: f/u for cardiac arrest, resp failure, possible aspiration, possible lumbar osteo    Patient unresponsive on Vent, no weaning, remains off pressors, small ET secretions    REVIEW OF SYSTEMS:  not provided on Vent    Antimicrobials Day # 3   cefepime   IVPB 500 milliGRAM(s) IV Intermittent daily    Other Medications Reviewed    T(F): 99.7 (11-29-18 @ 15:00), Max: 101.9 (11-29-18 @ 05:00)  HR: 80 (11-29-18 @ 15:26)  BP: --  RR: 24 (11-29-18 @ 15:00)  SpO2: 100% (11-29-18 @ 15:26)  Wt(kg): --    PHYSICAL EXAM:  General: no acute distress  Eyes:  anicteric, no conjunctival injection, no discharge  Oropharynx: ETT	  Neck: RIJ site clean  Lungs: coarse BSs  Heart: regular rate and rhythm; no murmur, rubs or gallops  Abdomen: soft, nondistended, nontender, without mass or organomegaly  Skin: no lesions  Extremities: no edema  Neurologic: unresponsive     LAB RESULTS:                        9.0    14.0  )-----------( 274      ( 29 Nov 2018 05:30 )             27.8     11-29    135  |  94<L>  |  19  ----------------------------<  150<H>  3.5   |  28  |  2.73<H>    Ca    8.6      29 Nov 2018 05:30  Phos  3.6     11-29  Mg     2.0     11-29    TPro  6.4  /  Alb  2.5<L>  /  TBili  0.7  /  DBili  x   /  AST  30  /  ALT  11  /  AlkPhos  153<H>  11-28    LIVER FUNCTIONS - ( 28 Nov 2018 16:20 )  Alb: 2.5 g/dL / Pro: 6.4 g/dL / ALK PHOS: 153 U/L / ALT: 11 U/L DA / AST: 30 U/L / GGT: x             MICROBIOLOGY:  RECENT CULTURES:  11-28 @ 01:00 .Sputum Sputum     Normal Respiratory Sania present    Few polymorphonuclear leukocytes per low power field  No Squamous epithelial cells per low power field  No organisms seen per oil power field    11-26 @ 16:15 .Blood Blood-Peripheral     No growth to date.    MRSA/MSSA PCR (11.28.18 @ 01:00)    MRSA PCR Result.: NotDetec; Staph Aureus PCR Result: Detected      RADIOLOGY REVIEWED:  Xray Chest 1 View- PORTABLE-Routine (11.29.18 @ 08:54) >  Mild increased interstitial lung markings without significant change. No   new consolidation or pleural effusion.

## 2018-11-29 NOTE — PROGRESS NOTE ADULT - ASSESSMENT
73 yo F, mult problems, including DM, ESRD  At [facility]  Admitted 11/26 with low back pain, FTT  Spine CT on arrival, in c/w 10/30 study, stable endplate erosion L1-L2, presumed degenerative  Surveillance Bld Cxs previously negative, afebrile  S/p Vtach arrest 11/27, intubated, on Amio  F/u CT imaging possible pneumonia, colitis, along with severe erosive changes around L2-L3, concerning for osteomyelitis/discitis  Difficult to correlate Ct findings- some discrepancy in interpretation of f/u imaging and dedicated scan of spine- degenerative change vs infection  Her abdominal exam remains benign  Unresponsive on Vent, ?anoxic event  Afebrile past 24 hrs  Bld Cxs negative, sputum normal be only, colonized with MSSA    Plan:  Question of aspiration pneumonia, normal be, possibly MSSA, continue Cefepime  Vent support as outlined  Prognosis poor  If recovers, likely would require MRI to better clarify spine findings

## 2018-11-29 NOTE — PROGRESS NOTE ADULT - ASSESSMENT
ROS: Unable to perform due to clinical status    Gen: In bed, unresponsive  Neuro: Bilateral pupil constriction, round and unresponsive to light. Decerebrate posturing, awake, blinking, No sedation.  Pulm: Bilateral rhonchi, 12/300/30/5  Cardiac: Labile BP per report, at time of writing 160/45, HR 70s sinus on tele on 10mg/hr of Cardene  GI: NGT with bilious output, per report not tolerating tube feeds now NGT to low intermittent suction, hypoactive bowel sounds, soft.  Ex: Hyperpigmented BLE, +thrill/bruit LUE fistula, very weak bilateral pedal pulses    Family at bedside    --Cefepime to continue per ID for possible aspiration pneumonia/MRSA/MSSA  --Will discontinue amiodarone as QTc on 11/29 557 and has been on for over 24hrs with HR at time of writing in 70s and sinus on tele. Discusssed with eICU attending Christiano who agrees.   --Will reinitiate rate controlling agent as needed  --Continue Cardene for SBP less than 160, per report pt with extremely labile BP, fluctuating between cardene and levophed requirements. will reinitiate levophed prn for map greater than 65.   --Fentanyl prn per family request for CPOT 0  --Titrate FiO2 prn for sat greater than 94%  --Pending further goals of care discussion, palliative care following ROS: Unable to perform due to clinical status    Gen: In bed, unresponsive  Neuro: Bilateral pupil constriction, round and unresponsive to light. Decerebrate posturing, awake, blinking, No sedation.  Pulm: Bilateral rhonchi, 12/300/30/5  Cardiac: Labile BP per report, at time of writing 160/45, HR 70s sinus on tele on 10mg/hr of Cardene  GI: NGT with bilious output, per report not tolerating tube feeds now NGT to low intermittent suction, hypoactive bowel sounds, soft.  Ex: Hyperpigmented BLE, +thrill/bruit LUE fistula, very weak bilateral pedal pulses    Family at bedside    --Cefepime to continue per ID for possible aspiration pneumonia/MRSA/MSSA  --Will discontinue amiodarone as QTc on 11/29 557 and has been on for over 24hrs with HR at time of writing in 70s and sinus on tele. Discusssed with eICU attending Christiano who agrees.   --Will reinitiate rate controlling agent as needed  --Continue Cardene for SBP less than 160, per report pt with extremely labile BP, fluctuating between cardene and levophed requirements. will reinitiate levophed prn for map greater than 65.   --Fentanyl prn per family request for CPOT 0  --Titrate FiO2 prn for sat greater than 94%  --Pending further goals of care discussion, palliative care following  --Continue glucose monitoring, glycemic control, dextrose as needed

## 2018-11-29 NOTE — CHART NOTE - NSCHARTNOTEFT_GEN_A_CORE
NUTRITION FOLLOW UP    SOURCE;  Medical record/ MD/PA    DIET: Nepro @ 40 cc/hr x 18hrs.    Patient remains intubated, Tube feeding on hold due to vomiting as per RN , No BM noted since PTA. Skin intact, no edema noted. Patient to receive HD today.       CURRENT WEIGHT:99.6/45.2kg, weight fluctuating due to renal status /HD.    PERTINENT MEDS:   Pertinent Medications: MEDICATIONS  (STANDING):  amiodarone Infusion 1 mG/Min (33.333 mL/Hr) IV Continuous <Continuous>  amiodarone Infusion 0.5 mG/Min (16.667 mL/Hr) IV Continuous <Continuous>  amiodarone Infusion 0.5 mG/Min (16.667 mL/Hr) IV Continuous <Continuous>  artificial  tears Solution 1 Drop(s) Both EYES three times a day  aspirin  chewable 81 milliGRAM(s) Oral daily  atorvastatin 20 milliGRAM(s) Oral at bedtime  cefepime   IVPB 500 milliGRAM(s) IV Intermittent daily  chlorhexidine 0.12% Liquid 15 milliLiter(s) Oral Mucosa two times a day  chlorhexidine 4% Liquid 1 Application(s) Topical <User Schedule>  dextrose 5%. 1000 milliLiter(s) (50 mL/Hr) IV Continuous <Continuous>  dextrose 50% Injectable 12.5 Gram(s) IV Push once  dextrose 50% Injectable 25 Gram(s) IV Push once  dextrose 50% Injectable 25 Gram(s) IV Push once  heparin  Injectable 5000 Unit(s) SubCutaneous every 12 hours  influenza   Vaccine 0.5 milliLiter(s) IntraMuscular once  levothyroxine Injectable 50 MICROGram(s) IV Push <User Schedule>  niCARdipine Infusion 5 mG/Hr (25 mL/Hr) IV Continuous <Continuous>  pantoprazole   Suspension 40 milliGRAM(s) Oral daily  sertraline 50 milliGRAM(s) Oral daily  sodium chloride 0.9%. 1000 milliLiter(s) (999 mL/Hr) IV Continuous <Continuous>    MEDICATIONS  (PRN):  acetaminophen   Tablet .. 650 milliGRAM(s) Oral every 6 hours PRN Temp greater or equal to 38C (100.4F), Mild Pain (1 - 3)  fentaNYL    Injectable 25 MICROGram(s) IV Push every 4 hours PRN Mild Pain (1 - 3)      PERTINENT LABS:  Date: 29 Nov 2018 05:30  Hemoglobin 9.0 (L)   Hematocrit 27.8 (L)    Date: 11-29  Sodium 135  Potassium 3.5  Glucose Serum 150<H>  BUN 19  Creatinine 2.37(H)    ACCUCHECK  POCT Blood Glucose.: 161 mg/dL (29 Nov 2018 07:19)  POCT Blood Glucose.: 149 mg/dL (29 Nov 2018 02:44)  POCT Blood Glucose.: 191 mg/dL (28 Nov 2018 22:46)  POCT Blood Glucose.: 162 mg/dL (28 Nov 2018 19:00)        ESTIMATED NEEDS:   [X] no change since previous assessment  [ ] recalculated:     PREVIOUS NUTRITION DIAGNOSIS: addressed    NUTRITION DIAGNOSIS is   [X] on going, RD will follow as per nutrition department protocol.        MONITORING AND EVALUATION:    Due to episode of vomiting & tube feeding on hold , suggest NPO order on chart. Will follow clinical course prognosis noted poor as per MD.        NUTRITION RECOMMENDATIONS:NPO order

## 2018-11-29 NOTE — PROGRESS NOTE ADULT - SUBJECTIVE AND OBJECTIVE BOX
Pt is a 72yr old woman with PMhx including HTN, HLD, CAD, afib, CHF, ESRD (HD t/th/sat), DM with associated neuropathy, dementia and hypothyroidism. Pt presented to the ER on 11/26 with chief complaint of back pain and was admitted for further workup. Pt with arrest on 11/27 in the setting of hypoglycemia with monitor later reading vfib.     24hr: Pt completed cooling protocol, poor neurologic status, off sedation for over 24hrs.    ICU Vital Signs Last 24 Hrs  T(C): 37.6 (29 Nov 2018 15:00), Max: 38.8 (29 Nov 2018 05:00)  T(F): 99.7 (29 Nov 2018 15:00), Max: 101.9 (29 Nov 2018 05:00)  HR: 71 (29 Nov 2018 19:00) (69 - 101)  BP: --  BP(mean): --  ABP: 160/44 (29 Nov 2018 19:00) (125/83 - 178/62)  ABP(mean): 86 (29 Nov 2018 19:00) (83 - 109)  RR: 23 (29 Nov 2018 19:00) (16 - 30)  SpO2: 100% (29 Nov 2018 19:00) (98% - 100%)    CBC Full  -  ( 29 Nov 2018 05:30 )  WBC Count : 14.0 K/uL  Hemoglobin : 9.0 g/dL  Hematocrit : 27.8 %  Platelet Count - Automated : 274 K/uL  Mean Cell Volume : 91.0 fl  Mean Cell Hemoglobin : 29.4 pg  Mean Cell Hemoglobin Concentration : 32.3 gm/dL  Auto Neutrophil # : x  Auto Lymphocyte # : x  Auto Monocyte # : x  Auto Eosinophil # : x  Auto Basophil # : x  Auto Neutrophil % : x  Auto Lymphocyte % : x  Auto Monocyte % : x  Auto Eosinophil % : x  Auto Basophil % : x    11-29    135  |  94<L>  |  19  ----------------------------<  150<H>  3.5   |  28  |  2.73<H>    Ca    8.6      29 Nov 2018 05:30  Phos  3.6     11-29  Mg     2.0     11-29    TPro  6.4  /  Alb  2.5<L>  /  TBili  0.7  /  DBili  x   /  AST  30  /  ALT  11  /  AlkPhos  153<H>  11-28    CARDIAC MARKERS ( 28 Nov 2018 02:50 )  .386 ng/mL / x     / x     / x     / x      CARDIAC MARKERS ( 27 Nov 2018 23:30 )  .404 ng/mL / x     / 103 U/L / x     / 1.9 ng/mL      LIVER FUNCTIONS - ( 28 Nov 2018 16:20 )  Alb: 2.5 g/dL / Pro: 6.4 g/dL / ALK PHOS: 153 U/L / ALT: 11 U/L DA / AST: 30 U/L / GGT: x             Culture - Sputum (collected 28 Nov 2018 01:00)  Source: .Sputum Sputum  Gram Stain (28 Nov 2018 12:26):    Few polymorphonuclear leukocytes per low power field    No Squamous epithelial cells per low power field    No organisms seen per oil power field  Preliminary Report (29 Nov 2018 12:04):    Normal Respiratory Sania present      MEDICATIONS  (STANDING):  amiodarone Infusion 1 mG/Min (33.333 mL/Hr) IV Continuous <Continuous>  amiodarone Infusion 0.5 mG/Min (16.667 mL/Hr) IV Continuous <Continuous>  amiodarone Infusion 0.5 mG/Min (16.667 mL/Hr) IV Continuous <Continuous>  artificial  tears Solution 1 Drop(s) Both EYES three times a day  aspirin  chewable 81 milliGRAM(s) Oral daily  atorvastatin 20 milliGRAM(s) Oral at bedtime  cefepime   IVPB 500 milliGRAM(s) IV Intermittent daily  chlorhexidine 0.12% Liquid 15 milliLiter(s) Oral Mucosa two times a day  chlorhexidine 4% Liquid 1 Application(s) Topical <User Schedule>  dextrose 5%. 1000 milliLiter(s) (50 mL/Hr) IV Continuous <Continuous>  dextrose 50% Injectable 12.5 Gram(s) IV Push once  dextrose 50% Injectable 25 Gram(s) IV Push once  dextrose 50% Injectable 25 Gram(s) IV Push once  heparin  Injectable 5000 Unit(s) SubCutaneous every 12 hours  influenza   Vaccine 0.5 milliLiter(s) IntraMuscular once  levothyroxine Injectable 50 MICROGram(s) IV Push <User Schedule>  niCARdipine Infusion 5 mG/Hr (25 mL/Hr) IV Continuous <Continuous>  norepinephrine Infusion 0.05 MICROgram(s)/kG/Min (3.9 mL/Hr) IV Continuous <Continuous>  pantoprazole   Suspension 40 milliGRAM(s) Oral daily  sertraline 50 milliGRAM(s) Oral daily    MEDICATIONS  (PRN):  acetaminophen   Tablet .. 650 milliGRAM(s) Oral every 6 hours PRN Temp greater or equal to 38C (100.4F), Mild Pain (1 - 3)  fentaNYL    Injectable 25 MICROGram(s) IV Push every 4 hours PRN Mild Pain (1 - 3) Pt is a 72yr old woman with PMhx including HTN, HLD, CAD, afib, CHF, ESRD (HD t/th/sat), DM with associated neuropathy, dementia and hypothyroidism. Pt presented to the ER on 11/26 with chief complaint of back pain and was admitted for further workup. Pt with arrest on 11/27 in the setting of hypoglycemia with monitor later reading vfib.     24hr: Pt completed cooling protocol, poor neurologic status, off sedation for over 24hrs. HD 11/29 -2L per report    ICU Vital Signs Last 24 Hrs  T(C): 37.6 (29 Nov 2018 15:00), Max: 38.8 (29 Nov 2018 05:00)  T(F): 99.7 (29 Nov 2018 15:00), Max: 101.9 (29 Nov 2018 05:00)  HR: 71 (29 Nov 2018 19:00) (69 - 101)  BP: --  BP(mean): --  ABP: 160/44 (29 Nov 2018 19:00) (125/83 - 178/62)  ABP(mean): 86 (29 Nov 2018 19:00) (83 - 109)  RR: 23 (29 Nov 2018 19:00) (16 - 30)  SpO2: 100% (29 Nov 2018 19:00) (98% - 100%)    CBC Full  -  ( 29 Nov 2018 05:30 )  WBC Count : 14.0 K/uL  Hemoglobin : 9.0 g/dL  Hematocrit : 27.8 %  Platelet Count - Automated : 274 K/uL  Mean Cell Volume : 91.0 fl  Mean Cell Hemoglobin : 29.4 pg  Mean Cell Hemoglobin Concentration : 32.3 gm/dL  Auto Neutrophil # : x  Auto Lymphocyte # : x  Auto Monocyte # : x  Auto Eosinophil # : x  Auto Basophil # : x  Auto Neutrophil % : x  Auto Lymphocyte % : x  Auto Monocyte % : x  Auto Eosinophil % : x  Auto Basophil % : x    11-29    135  |  94<L>  |  19  ----------------------------<  150<H>  3.5   |  28  |  2.73<H>    Ca    8.6      29 Nov 2018 05:30  Phos  3.6     11-29  Mg     2.0     11-29    TPro  6.4  /  Alb  2.5<L>  /  TBili  0.7  /  DBili  x   /  AST  30  /  ALT  11  /  AlkPhos  153<H>  11-28    CARDIAC MARKERS ( 28 Nov 2018 02:50 )  .386 ng/mL / x     / x     / x     / x      CARDIAC MARKERS ( 27 Nov 2018 23:30 )  .404 ng/mL / x     / 103 U/L / x     / 1.9 ng/mL      LIVER FUNCTIONS - ( 28 Nov 2018 16:20 )  Alb: 2.5 g/dL / Pro: 6.4 g/dL / ALK PHOS: 153 U/L / ALT: 11 U/L DA / AST: 30 U/L / GGT: x             Culture - Sputum (collected 28 Nov 2018 01:00)  Source: .Sputum Sputum  Gram Stain (28 Nov 2018 12:26):    Few polymorphonuclear leukocytes per low power field    No Squamous epithelial cells per low power field    No organisms seen per oil power field  Preliminary Report (29 Nov 2018 12:04):    Normal Respiratory Sania present      MEDICATIONS  (STANDING):  amiodarone Infusion 1 mG/Min (33.333 mL/Hr) IV Continuous <Continuous>  amiodarone Infusion 0.5 mG/Min (16.667 mL/Hr) IV Continuous <Continuous>  amiodarone Infusion 0.5 mG/Min (16.667 mL/Hr) IV Continuous <Continuous>  artificial  tears Solution 1 Drop(s) Both EYES three times a day  aspirin  chewable 81 milliGRAM(s) Oral daily  atorvastatin 20 milliGRAM(s) Oral at bedtime  cefepime   IVPB 500 milliGRAM(s) IV Intermittent daily  chlorhexidine 0.12% Liquid 15 milliLiter(s) Oral Mucosa two times a day  chlorhexidine 4% Liquid 1 Application(s) Topical <User Schedule>  dextrose 5%. 1000 milliLiter(s) (50 mL/Hr) IV Continuous <Continuous>  dextrose 50% Injectable 12.5 Gram(s) IV Push once  dextrose 50% Injectable 25 Gram(s) IV Push once  dextrose 50% Injectable 25 Gram(s) IV Push once  heparin  Injectable 5000 Unit(s) SubCutaneous every 12 hours  influenza   Vaccine 0.5 milliLiter(s) IntraMuscular once  levothyroxine Injectable 50 MICROGram(s) IV Push <User Schedule>  niCARdipine Infusion 5 mG/Hr (25 mL/Hr) IV Continuous <Continuous>  norepinephrine Infusion 0.05 MICROgram(s)/kG/Min (3.9 mL/Hr) IV Continuous <Continuous>  pantoprazole   Suspension 40 milliGRAM(s) Oral daily  sertraline 50 milliGRAM(s) Oral daily    MEDICATIONS  (PRN):  acetaminophen   Tablet .. 650 milliGRAM(s) Oral every 6 hours PRN Temp greater or equal to 38C (100.4F), Mild Pain (1 - 3)  fentaNYL    Injectable 25 MICROGram(s) IV Push every 4 hours PRN Mild Pain (1 - 3)

## 2018-11-29 NOTE — PROGRESS NOTE ADULT - SUBJECTIVE AND OBJECTIVE BOX
Follow-up Critical Care Progress Note  Chief Complaint : Pneumonia    no new overnight events  pt off presssers now on cardene  pt off sedation, pt remains grossly unresponsive  respiratory alkalosis improving    Family bedside this am,  updated, dtr updated  plan for family meeting with rest of family in am when everyone is available  suspect will go towards palliative measures .    failed CPAP ps 10 today    Allergies :Avelox (Unknown)  fish (Hives; Rash)  shellfish (Unknown)      PAST MEDICAL & SURGICAL HISTORY:  [place] ([place] of [place])  Cataracts, bilateral: left worse than right  Pleural thickening: s/p pleurodisis left vats 2014  Hip pain: left  LBP radiating to left leg  CHF (congestive heart failure): diagnosed 2-2014  MI (myocardial infarction): 2-2014   cardiac cath done [facility]  Diabetes: diagnosed 2004  no medications in 5 months since hemodialysis  Hypothyroidism  ESRD (end stage renal disease)  HTN (hypertension)  MI (myocardial infarction): may 2016  Pleural effusion: Left side - several times since March 2014  Hypothyroid  Anxiety  HTN (hypertension)  Hyperlipidemia  CHF (congestive heart failure)  CKD (chronic kidney disease)  Anemia  Diabetes: DM 2  Thoracotomy scar of left chest: 4-2014 ? pneumonia/ scarring  S/P myomectomy: abdominal age 52  AV fistula: left upper 8-2014 attempted 12-16-14      Medications:  MEDICATIONS  (STANDING):  amiodarone Infusion 1 mG/Min (33.333 mL/Hr) IV Continuous <Continuous>  amiodarone Infusion 0.5 mG/Min (16.667 mL/Hr) IV Continuous <Continuous>  amiodarone Infusion 0.5 mG/Min (16.667 mL/Hr) IV Continuous <Continuous>  artificial  tears Solution 1 Drop(s) Both EYES three times a day  aspirin  chewable 81 milliGRAM(s) Oral daily  atorvastatin 20 milliGRAM(s) Oral at bedtime  cefepime   IVPB 500 milliGRAM(s) IV Intermittent daily  chlorhexidine 0.12% Liquid 15 milliLiter(s) Oral Mucosa two times a day  chlorhexidine 4% Liquid 1 Application(s) Topical <User Schedule>  dextrose 5%. 1000 milliLiter(s) (50 mL/Hr) IV Continuous <Continuous>  dextrose 50% Injectable 12.5 Gram(s) IV Push once  dextrose 50% Injectable 25 Gram(s) IV Push once  dextrose 50% Injectable 25 Gram(s) IV Push once  heparin  Injectable 5000 Unit(s) SubCutaneous every 12 hours  influenza   Vaccine 0.5 milliLiter(s) IntraMuscular once  levothyroxine Injectable 50 MICROGram(s) IV Push <User Schedule>  niCARdipine Infusion 5 mG/Hr (25 mL/Hr) IV Continuous <Continuous>  pantoprazole   Suspension 40 milliGRAM(s) Oral daily  sertraline 50 milliGRAM(s) Oral daily  sodium chloride 0.9%. 1000 milliLiter(s) (999 mL/Hr) IV Continuous <Continuous>    MEDICATIONS  (PRN):  acetaminophen   Tablet .. 650 milliGRAM(s) Oral every 6 hours PRN Temp greater or equal to 38C (100.4F), Mild Pain (1 - 3)  fentaNYL    Injectable 25 MICROGram(s) IV Push every 4 hours PRN Mild Pain (1 - 3)      LABS:                        9.0    14.0  )-----------( 274      ( 29 Nov 2018 05:30 )             27.8     11-29    135  |  94<L>  |  19  ----------------------------<  150<H>  3.5   |  28  |  2.73<H>    Ca    8.6      29 Nov 2018 05:30  Phos  3.6     11-29  Mg     2.0     11-29    TPro  6.4  /  Alb  2.5<L>  /  TBili  0.7  /  DBili  x   /  AST  30  /  ALT  11  /  AlkPhos  153<H>  11-28      CARDIAC MARKERS ( 28 Nov 2018 02:50 )  .386 ng/mL / x     / x     / x     / x      CARDIAC MARKERS ( 27 Nov 2018 23:30 )  .404 ng/mL / x     / 103 U/L / x     / 1.9 ng/mL  CARDIAC MARKERS ( 27 Nov 2018 17:25 )  .369 ng/mL / x     / x     / x     / x      CARDIAC MARKERS ( 27 Nov 2018 10:00 )  .363 ng/mL / x     / x     / x     / x            PT/INR - ( 28 Nov 2018 04:15 )   PT: 14.6 sec;   INR: 1.29 ratio         PTT - ( 28 Nov 2018 04:15 )  PTT:32.2 sec    Procalcitonin, Serum: 0.40 ng/mL (11-27-18 @ 05:30)          CULTURES: (if applicable)    Culture - Sputum (collected 11-28-18 @ 01:00)  Source: .Sputum Sputum  Gram Stain (11-28-18 @ 12:26):    Few polymorphonuclear leukocytes per low power field    No Squamous epithelial cells per low power field    No organisms seen per oil power field    Culture - Blood (collected 11-26-18 @ 16:15)  Source: .Blood Blood-Venous  Preliminary Report (11-27-18 @ 19:02):    No growth to date.    Culture - Blood (collected 11-26-18 @ 16:15)  Source: .Blood Blood-Peripheral  Preliminary Report (11-27-18 @ 19:02):    No growth to date.          ABG - ( 29 Nov 2018 05:00 )  pH, Arterial: 7.54  pH, Blood: x     /  pCO2: 33    /  pO2: 168   / HCO3: x     / Base Excess: x     /  SaO2: >99               CAPILLARY BLOOD GLUCOSE      POCT Blood Glucose.: 161 mg/dL (29 Nov 2018 07:19)      RADIOLOGY  CXR:  11/29 no gross infiltrate      < from: EEG Extended Monitoring 41-60 Min (11.28.18 @ 12:20) >  Impression: This is an abnormal record on the basis of generalized background disorganization and diffuse theta and delta slowing. The recording is consistent with a diffuse cerebral dysfunction but there are   no focal findings persistent asymmetries or epileptiform discharges.  < end of copied text >        VITALS:  T(C): 36.1 (11-29-18 @ 06:00), Max: 38.8 (11-29-18 @ 05:00)  T(F): 97 (11-29-18 @ 06:00), Max: 101.9 (11-29-18 @ 05:00)  HR: 72 (11-29-18 @ 08:00) (59 - 101)  BP: --  BP(mean): --  ABP: 158/57 (11-29-18 @ 08:00) (117/40 - 181/64)  ABP(mean): 96 (11-29-18 @ 08:00) (64 - 108)  RR: 22 (11-29-18 @ 08:00) (15 - 34)  SpO2: 100% (11-29-18 @ 08:00) (98% - 100%)  CVP(mm Hg): 19 (11-29-18 @ 08:00) (8 - 24)  CVP(cm H2O): --    Ins and Outs     11-28-18 @ 07:01  -  11-29-18 @ 07:00  --------------------------------------------------------  IN: 1660 mL / OUT: 325 mL / NET: 1335 mL    11-29-18 @ 07:01  -  11-29-18 @ 09:34  --------------------------------------------------------  IN: 108.4 mL / OUT: 20 mL / NET: 88.4 mL        Height (cm): 144.78 (11-28-18 @ 09:37)  Weight (kg): 41.6 (11-28-18 @ 09:37)  BMI (kg/m2): 19.8 (11-28-18 @ 09:37)    Mode: AC/ CMV (Assist Control/ Continuous Mandatory Ventilation), RR (machine): 12, RR (patient): 19, TV (machine): 300, TV (patient): 307, FiO2: 30, PEEP: 5, P-Low: , PIP: 18

## 2018-11-29 NOTE — PROGRESS NOTE ADULT - ASSESSMENT
Physical Examination:  GENERAL:                 Intubated,  Unresponsive  HEENT:                     Eyes constricted, Reactive, right gaze preference dry MM, NO JVD   PULM:                       Bilateral air entry, no significant sputum production, No Rales, traceRhonchi, No Wheezing  CVS:                         S1, S2,  +Murmur  ABD:                        Soft, nondistended, nontender, normoactive bowel sounds,   EXT:                         mild edema, nontender, No Cyanosis or Clubbing   Vascular:                 Cool Extremities, Normal Capillary refill, Normal Distal Pulses  SKIN:                       Warm and well perfused, no rashes noted.   NEURO:                  unresponsive, not following commands, not localizing.        Assessment  1. S/P Cardiac arrest, VF 11/27 with prolonged QT s/p Therapeutic hypothermia  , on amiodarone drip suspecting anoxic encephelopathy  2. Severe Hypoglycemia in patient with DM2 - improved.   3. Acute respiratory failure   4. Laibile BP - either on cardene drip vs Levophed drip to maintain BP  4. ESRD on HD  5. Chronic Diastolic CHF  6. CAD s/p MI,   7. Hypothyroidism,   8. Back Pain - ? Osteo of Spine,   9. ? Dementia  10. Lactic acidosis Resolved    Plan  Mechanical ventilation  Pressers / Cardine to manage BP  empiric abx to control for aspiration risk and Osteo of spine as per ID  start tube feeding, Insulin ss, Hold Lantus at this time  amiodarone IV to continue  daily ekg  family meeting in am.    PMD:				                   Notified(Date):  Family Updated: 		                                 Date: 11/27      Sedation & Analgesia:	prop/fent - required paralytic for resp alkalsosi  Diet/Nutrition:		npo  GI PPx:			ppi    DVT Ppx:		hep sq        Activity:		    bedrest             Head of Bed:               35-45 deg  Glycemic Control:        on hold    Lines:  CENTRAL LINE: 	[x ] YES [ ] NO	                    LOCATION:   [facility]	                       DATE INSERTED:   11/27	                    REMOVE:  [ ] YES [x ] NO    A-LINE:  	                [x ] YES [ ] NO                      LOCATION:   	   [facility]                    DATE INSERTED: 	11/27	            REMOVE:  [ ] YES [x ] NO    COYLE: 		        [ ] YES [x ] NO  		                                       DATE INSERTED:	            REMOVE:  [ ] YES [ ] NO      Restraints may be deemed necessary to prevent removal of life-sustaining devices [ x ] YES   [    ]  NO    Disposition: ICU care    Goals of Care: Full code  Prognosis - Poor.

## 2018-11-29 NOTE — PROGRESS NOTE ADULT - SUBJECTIVE AND OBJECTIVE BOX
Follow-up Pall Progress Note    Inder Wright MD  [phone]    Remains comatose on ventilator    Medications:  Vital Signs Last 24 Hrs  T(C): 37.6 (29 Nov 2018 15:00), Max: 38.8 (29 Nov 2018 05:00)  T(F): 99.7 (29 Nov 2018 15:00), Max: 101.9 (29 Nov 2018 05:00)  HR: 80 (29 Nov 2018 15:26) (69 - 101)  BP: --  BP(mean): --  RR: 24 (29 Nov 2018 15:00) (16 - 34)  SpO2: 100% (29 Nov 2018 15:26) (98% - 100%)    ABG - ( 29 Nov 2018 05:00 )  pH, Arterial: 7.54  pH, Blood: x     /  pCO2: 33    /  pO2: 168   / HCO3: x     / Base Excess: x     /  SaO2: >99                   11-28 @ 07:01  -  11-29 @ 07:00  --------------------------------------------------------  IN: 1660 mL / OUT: 325 mL / NET: 1335 mL        LABS:                        9.0    14.0  )-----------( 274      ( 29 Nov 2018 05:30 )             27.8     11-29    135  |  94<L>  |  19  ----------------------------<  150<H>  3.5   |  28  |  2.73<H>    Ca    8.6      29 Nov 2018 05:30  Phos  3.6     11-29  Mg     2.0     11-29    TPro  6.4  /  Alb  2.5<L>  /  TBili  0.7  /  DBili  x   /  AST  30  /  ALT  11  /  AlkPhos  153<H>  11-28      PT/INR - ( 28 Nov 2018 04:15 )   PT: 14.6 sec;   INR: 1.29 ratio         PTT - ( 28 Nov 2018 04:15 )  PTT:32.2 sec  Procalcitonin, Serum: 0.40 ng/mL (11-27-18 @ 05:30)        CULTURES:  Culture Results:   Normal Respiratory Sania present (11-28 @ 01:00)  Culture Results:   No growth to date. (11-26 @ 16:15)  Culture Results:   No growth to date. (11-26 @ 16:15)    Most recent blood culture -- 11-28 @ 01:00   -- -- .Sputum Sputum 11-28 @ 01:00  Most recent blood culture -- 11-26 @ 16:15   -- -- .Blood Blood-Peripheral 11-26 @ 16:15      Physical Examination:  PULM: Clear to auscultation bilaterally, no significant sputum production  CVS: Regular rate and rhythm, no murmurs, rubs, or gallops  ABD: Soft, non-tender  EXT:  No clubbing, cyanosis, or edema    RADIOLOGY REVIEWED  CXR:    CT chest:    TTE:

## 2018-11-29 NOTE — PROGRESS NOTE ADULT - SUBJECTIVE AND OBJECTIVE BOX
NEPHROLOGY PROGRESS NOTE    CHIEF COMPLAINT: back pain    Intubated in MICU    Vital Signs Last 24 Hrs  T(C): 36.8 (11-28-18 @ 12:00), Max: 37.1 (11-28-18 @ 09:00)  T(F): 98.3 (11-28-18 @ 12:00), Max: 98.8 (11-28-18 @ 09:00)  HR: 73 (11-28-18 @ 15:23) (50 - 90)  RR: 20 (11-28-18 @ 12:00) (14 - 31)  SpO2: 100% (11-28-18 @ 15:23) (97% - 100%)    Lungs - coarse BS bilaterally  Heart - S1S2  Abd - soft, ND, + BS  Extr - no peripheral edema, AVF patent         LABS                             9.0    8.5   )-----------( 196      ( 28 Nov 2018 04:15 )             27.6          11-28    135  |  97  |  12  ----------------------------<  145<H>  3.7   |  29  |  2.03<H>    Ca    7.9<L>      28 Nov 2018 04:15  Phos  1.5     11-28  Mg     1.5     11-28    TPro  6.4  /  Alb  2.5<L>  /  TBili  1.0  /  DBili  x   /  AST  31  /  ALT  12  /  AlkPhos  159<H>  11-27    acetaminophen   Tablet .. 650 milliGRAM(s) Oral every 6 hours PRN  amiodarone Infusion 1 mG/Min IV Continuous <Continuous>  amiodarone Infusion 0.5 mG/Min IV Continuous <Continuous>  amiodarone Infusion 0.5 mG/Min IV Continuous <Continuous>  aspirin  chewable 81 milliGRAM(s) Oral daily  atorvastatin 20 milliGRAM(s) Oral at bedtime  cefepime   IVPB 500 milliGRAM(s) IV Intermittent daily  chlorhexidine 0.12% Liquid 15 milliLiter(s) Oral Mucosa two times a day  chlorhexidine 4% Liquid 1 Application(s) Topical <User Schedule>  dexmedetomidine Infusion 0.3 MICROgram(s)/kG/Hr IV Continuous <Continuous>  dextrose 5%. 1000 milliLiter(s) IV Continuous <Continuous>  dextrose 50% Injectable 12.5 Gram(s) IV Push once  dextrose 50% Injectable 25 Gram(s) IV Push once  dextrose 50% Injectable 25 Gram(s) IV Push once  fentaNYL    Injectable 25 MICROGram(s) IV Push every 4 hours PRN  fentaNYL   Infusion 0.5 MICROgram(s)/kG/Hr IV Continuous <Continuous>  heparin  Injectable 5000 Unit(s) SubCutaneous every 12 hours  influenza   Vaccine 0.5 milliLiter(s) IntraMuscular once  levothyroxine Injectable 50 MICROGram(s) IV Push <User Schedule>  niCARdipine Infusion 5 mG/Hr IV Continuous <Continuous>  norepinephrine Infusion 0.05 MICROgram(s)/kG/Min IV Continuous <Continuous>  pantoprazole   Suspension 40 milliGRAM(s) Oral daily  propofol Infusion 10 MICROgram(s)/kG/Min IV Continuous <Continuous>  sertraline 50 milliGRAM(s) Oral daily  sodium chloride 0.9%. 1000 milliLiter(s) IV Continuous <Continuous>      A/P:    MOSF: shock, resp failure, r/o sepsis, MI  Colitis, PNA, CT suspicious for osteo of LS spine  ESRD on HD TTS  TMP as able  Goal SBP > 90  Abx per ID  Overall options are limited and prognosis is guarded  D/w ICU team NEPHROLOGY PROGRESS NOTE    CHIEF COMPLAINT: back pain    Intubated in MICU, s/p HD today w/2kg fluid removal    Vital Signs Last 24 Hrs  T(C): 37.6 (11-29-18 @ 15:00), Max: 38.8 (11-29-18 @ 05:00)  T(F): 99.7 (11-29-18 @ 15:00), Max: 101.9 (11-29-18 @ 05:00)  HR: 92 (11-29-18 @ 15:00) (69 - 101)  RR: 24 (11-29-18 @ 15:00) (16 - 34)  SpO2: 100% (11-29-18 @ 15:00) (98% - 100%)    Lungs - coarse BS bilaterally  Heart - S1S2  Abd - soft, ND, + BS  Extr - no peripheral edema, AVF patent                        9.0    14.0  )-----------( 274      ( 29 Nov 2018 05:30 )             27.8     29 Nov 2018 05:30    135    |  94     |  19     ----------------------------<  150    3.5     |  28     |  2.73     Ca    8.6        29 Nov 2018 05:30  Phos  3.6       29 Nov 2018 05:30  Mg     2.0       29 Nov 2018 05:30    TPro  6.4    /  Alb  2.5    /  TBili  0.7    /  DBili  x      /  AST  30     /  ALT  11     /  AlkPhos  153    28 Nov 2018 16:20    LIVER FUNCTIONS - ( 28 Nov 2018 16:20 )  Alb: 2.5 g/dL / Pro: 6.4 g/dL / ALK PHOS: 153 U/L / ALT: 11 U/L DA / AST: 30 U/L / GGT: x           PT/INR - ( 28 Nov 2018 04:15 )   PT: 14.6 sec;   INR: 1.29 ratio      acetaminophen   Tablet .. 650 milliGRAM(s) Oral every 6 hours PRN  amiodarone Infusion 1 mG/Min IV Continuous <Continuous>  amiodarone Infusion 0.5 mG/Min IV Continuous <Continuous>  amiodarone Infusion 0.5 mG/Min IV Continuous <Continuous>  artificial  tears Solution 1 Drop(s) Both EYES three times a day  aspirin  chewable 81 milliGRAM(s) Oral daily  atorvastatin 20 milliGRAM(s) Oral at bedtime  cefepime   IVPB 500 milliGRAM(s) IV Intermittent daily  chlorhexidine 0.12% Liquid 15 milliLiter(s) Oral Mucosa two times a day  chlorhexidine 4% Liquid 1 Application(s) Topical <User Schedule>  dextrose 5%. 1000 milliLiter(s) IV Continuous <Continuous>  dextrose 50% Injectable 12.5 Gram(s) IV Push once  dextrose 50% Injectable 25 Gram(s) IV Push once  dextrose 50% Injectable 25 Gram(s) IV Push once  fentaNYL    Injectable 25 MICROGram(s) IV Push every 4 hours PRN  heparin  Injectable 5000 Unit(s) SubCutaneous every 12 hours  influenza   Vaccine 0.5 milliLiter(s) IntraMuscular once  levothyroxine Injectable 50 MICROGram(s) IV Push <User Schedule>  niCARdipine Infusion 5 mG/Hr IV Continuous <Continuous>  norepinephrine Infusion 0.05 MICROgram(s)/kG/Min IV Continuous <Continuous>  pantoprazole   Suspension 40 milliGRAM(s) Oral daily  sertraline 50 milliGRAM(s) Oral daily      A/P:    S/p VT/VF arrest, anoxic brain injury  MOSF: shock, resp failure, r/o sepsis, MI  Colitis, PNA, CT suspicious for osteo of LS spine  ESRD on HD TTS  TMP as able  Abx per ID  Overall options are limited and prognosis is poor  D/w daughter at bedside  Palliative following, most appropriate  D/w ICU team

## 2018-11-29 NOTE — PROGRESS NOTE ADULT - ASSESSMENT
Pall:  Family meeting tomorrow to determine goals of care        Assessment  1. S/P Cardiac arrest, VF 11/27 with prolonged QT s/p Therapeutic hypothermia  , on amiodarone  2. Severe Hypoglycemia in patient with DM2 - improved.   3. Acute respiratory failure   4. Laibile BP - either on cardene drip vs Levophed drip to maintain BP  4. ESRD on HD  5. Chronic Diastolic CHF  6. CAD s/p MI,   7. Hypothyroidism,   8. Back Pain - ? Osteo of Spine,   9. ? Dementia  10. Lactic acidosis Resolved    Plan  Mechanical ventilation  Sedation vacation eval neuro status, check EEG  warming phase of targeted temperature therapy initiated  empiric abx to control for aspiration risk and Osteo of spine as per ID  start tube feeding, Insulin ss, Hold Lantus at this time  amiodarone IV to continue  daily ekg  d/w Cardio this am  Neuro f/u   Palliative care f/u    PMD:				                   Notified(Date):  Family Updated: 		                                 Date: 11/27      Sedation & Analgesia:	prop/fent - required paralytic for resp alkalsosi  Diet/Nutrition:		npo  GI PPx:			ppi    DVT Ppx:		hep sq        Activity:		    bedrest             Head of Bed:               35-45 deg  Glycemic Control:        on hold    Lines:  CENTRAL LINE: 	[x ] YES [ ] NO	                    LOCATION:   [facility]	                       DATE INSERTED:   11/27	                    REMOVE:  [ ] YES [x ] NO    A-LINE:  	                [x ] YES [ ] NO                      LOCATION:   	   [facility]                    DATE INSERTED: 	11/27	            REMOVE:  [ ] YES [x ] NO    COYLE: 		        [x ] YES [ ] NO  		                                       DATE INSERTED:	11/27	            REMOVE:  [x ] YES [ ] NO      Restraints may be deemed necessary to prevent removal of life-sustaining devices [ x ] YES   [    ]  NO    Disposition: ICU care    Goals of Care: Full code  Prognosis - Poor.

## 2018-11-30 DIAGNOSIS — M54.5 LOW BACK PAIN: ICD-10-CM

## 2018-11-30 LAB
ANION GAP SERPL CALC-SCNC: 8 MMOL/L — SIGNIFICANT CHANGE UP (ref 5–17)
BUN SERPL-MCNC: 11 MG/DL — SIGNIFICANT CHANGE UP (ref 7–23)
CALCIUM SERPL-MCNC: 8.6 MG/DL — SIGNIFICANT CHANGE UP (ref 8.4–10.5)
CHLORIDE SERPL-SCNC: 98 MMOL/L — SIGNIFICANT CHANGE UP (ref 96–108)
CO2 SERPL-SCNC: 32 MMOL/L — HIGH (ref 22–31)
CREAT SERPL-MCNC: 1.84 MG/DL — HIGH (ref 0.5–1.3)
CULTURE RESULTS: SIGNIFICANT CHANGE UP
GLUCOSE BLDC GLUCOMTR-MCNC: 106 MG/DL — HIGH (ref 70–99)
GLUCOSE BLDC GLUCOMTR-MCNC: 130 MG/DL — HIGH (ref 70–99)
GLUCOSE BLDC GLUCOMTR-MCNC: 132 MG/DL — HIGH (ref 70–99)
GLUCOSE BLDC GLUCOMTR-MCNC: 132 MG/DL — HIGH (ref 70–99)
GLUCOSE BLDC GLUCOMTR-MCNC: 140 MG/DL — HIGH (ref 70–99)
GLUCOSE BLDC GLUCOMTR-MCNC: 140 MG/DL — HIGH (ref 70–99)
GLUCOSE SERPL-MCNC: 123 MG/DL — HIGH (ref 70–99)
HCT VFR BLD CALC: 27.2 % — LOW (ref 34.5–45)
HGB BLD-MCNC: 8.6 G/DL — LOW (ref 11.5–15.5)
MAGNESIUM SERPL-MCNC: 1.9 MG/DL — SIGNIFICANT CHANGE UP (ref 1.6–2.6)
MCHC RBC-ENTMCNC: 29 PG — SIGNIFICANT CHANGE UP (ref 27–34)
MCHC RBC-ENTMCNC: 31.6 GM/DL — LOW (ref 32–36)
MCV RBC AUTO: 91.8 FL — SIGNIFICANT CHANGE UP (ref 80–100)
PHOSPHATE SERPL-MCNC: 2.2 MG/DL — LOW (ref 2.5–4.5)
PLATELET # BLD AUTO: 248 K/UL — SIGNIFICANT CHANGE UP (ref 150–400)
POTASSIUM SERPL-MCNC: 2.9 MMOL/L — CRITICAL LOW (ref 3.5–5.3)
POTASSIUM SERPL-SCNC: 2.9 MMOL/L — CRITICAL LOW (ref 3.5–5.3)
RBC # BLD: 2.97 M/UL — LOW (ref 3.8–5.2)
RBC # FLD: 17.9 % — HIGH (ref 10.3–14.5)
SODIUM SERPL-SCNC: 138 MMOL/L — SIGNIFICANT CHANGE UP (ref 135–145)
SPECIMEN SOURCE: SIGNIFICANT CHANGE UP
WBC # BLD: 16.5 K/UL — HIGH (ref 3.8–10.5)
WBC # FLD AUTO: 16.5 K/UL — HIGH (ref 3.8–10.5)

## 2018-11-30 PROCEDURE — 71045 X-RAY EXAM CHEST 1 VIEW: CPT | Mod: 26

## 2018-11-30 PROCEDURE — 99232 SBSQ HOSP IP/OBS MODERATE 35: CPT

## 2018-11-30 PROCEDURE — 93010 ELECTROCARDIOGRAM REPORT: CPT

## 2018-11-30 RX ORDER — DEXTROSE 10 % IN WATER 10 %
250 INTRAVENOUS SOLUTION INTRAVENOUS
Qty: 0 | Refills: 0 | Status: DISCONTINUED | OUTPATIENT
Start: 2018-11-30 | End: 2018-11-30

## 2018-11-30 RX ORDER — ACETAMINOPHEN 500 MG
650 TABLET ORAL EVERY 6 HOURS
Qty: 0 | Refills: 0 | Status: DISCONTINUED | OUTPATIENT
Start: 2018-11-30 | End: 2018-12-02

## 2018-11-30 RX ORDER — DEXTROSE 10 % IN WATER 10 %
1000 INTRAVENOUS SOLUTION INTRAVENOUS
Qty: 0 | Refills: 0 | Status: DISCONTINUED | OUTPATIENT
Start: 2018-11-30 | End: 2018-11-30

## 2018-11-30 RX ORDER — POTASSIUM CHLORIDE 20 MEQ
10 PACKET (EA) ORAL ONCE
Qty: 0 | Refills: 0 | Status: DISCONTINUED | OUTPATIENT
Start: 2018-11-30 | End: 2018-11-30

## 2018-11-30 RX ORDER — SODIUM CHLORIDE 9 MG/ML
1000 INJECTION, SOLUTION INTRAVENOUS
Qty: 0 | Refills: 0 | Status: DISCONTINUED | OUTPATIENT
Start: 2018-11-30 | End: 2018-11-30

## 2018-11-30 RX ORDER — POTASSIUM PHOSPHATE, MONOBASIC POTASSIUM PHOSPHATE, DIBASIC 236; 224 MG/ML; MG/ML
15 INJECTION, SOLUTION INTRAVENOUS ONCE
Qty: 0 | Refills: 0 | Status: COMPLETED | OUTPATIENT
Start: 2018-11-30 | End: 2018-11-30

## 2018-11-30 RX ADMIN — Medication 1 DROP(S): at 05:29

## 2018-11-30 RX ADMIN — Medication 50 MICROGRAM(S): at 05:59

## 2018-11-30 RX ADMIN — PANTOPRAZOLE SODIUM 40 MILLIGRAM(S): 20 TABLET, DELAYED RELEASE ORAL at 11:39

## 2018-11-30 RX ADMIN — CHLORHEXIDINE GLUCONATE 15 MILLILITER(S): 213 SOLUTION TOPICAL at 17:03

## 2018-11-30 RX ADMIN — HEPARIN SODIUM 5000 UNIT(S): 5000 INJECTION INTRAVENOUS; SUBCUTANEOUS at 17:03

## 2018-11-30 RX ADMIN — Medication 30 MILLILITER(S): at 06:24

## 2018-11-30 RX ADMIN — CEFEPIME 100 MILLIGRAM(S): 1 INJECTION, POWDER, FOR SOLUTION INTRAMUSCULAR; INTRAVENOUS at 11:39

## 2018-11-30 RX ADMIN — Medication 1 DROP(S): at 22:24

## 2018-11-30 RX ADMIN — FENTANYL CITRATE 25 MICROGRAM(S): 50 INJECTION INTRAVENOUS at 00:33

## 2018-11-30 RX ADMIN — POTASSIUM PHOSPHATE, MONOBASIC POTASSIUM PHOSPHATE, DIBASIC 62.5 MILLIMOLE(S): 236; 224 INJECTION, SOLUTION INTRAVENOUS at 08:11

## 2018-11-30 RX ADMIN — Medication 650 MILLIGRAM(S): at 04:38

## 2018-11-30 RX ADMIN — SODIUM CHLORIDE 30 MILLILITER(S): 9 INJECTION, SOLUTION INTRAVENOUS at 08:12

## 2018-11-30 RX ADMIN — FENTANYL CITRATE 25 MICROGRAM(S): 50 INJECTION INTRAVENOUS at 00:49

## 2018-11-30 RX ADMIN — NICARDIPINE HYDROCHLORIDE 25 MG/HR: 30 CAPSULE, EXTENDED RELEASE ORAL at 04:38

## 2018-11-30 RX ADMIN — Medication 81 MILLIGRAM(S): at 11:39

## 2018-11-30 RX ADMIN — ATORVASTATIN CALCIUM 20 MILLIGRAM(S): 80 TABLET, FILM COATED ORAL at 22:24

## 2018-11-30 RX ADMIN — NICARDIPINE HYDROCHLORIDE 25 MG/HR: 30 CAPSULE, EXTENDED RELEASE ORAL at 08:12

## 2018-11-30 RX ADMIN — CHLORHEXIDINE GLUCONATE 15 MILLILITER(S): 213 SOLUTION TOPICAL at 05:29

## 2018-11-30 RX ADMIN — HEPARIN SODIUM 5000 UNIT(S): 5000 INJECTION INTRAVENOUS; SUBCUTANEOUS at 05:29

## 2018-11-30 RX ADMIN — SERTRALINE 50 MILLIGRAM(S): 25 TABLET, FILM COATED ORAL at 11:39

## 2018-11-30 RX ADMIN — CHLORHEXIDINE GLUCONATE 1 APPLICATION(S): 213 SOLUTION TOPICAL at 05:28

## 2018-11-30 NOTE — PROGRESS NOTE ADULT - SUBJECTIVE AND OBJECTIVE BOX
Follow-up Pall Progress Note    Inder Wright MD  [phone]    No new  events overnight.  Denies SOB/CP. Awaiting all family members to review continued aggressive medical care.    Medications:  Vital Signs Last 24 Hrs  T(C): 37.2 (30 Nov 2018 08:00), Max: 38.1 (30 Nov 2018 04:00)  T(F): 99 (30 Nov 2018 08:00), Max: 100.5 (30 Nov 2018 04:00)  HR: 72 (30 Nov 2018 14:13) (70 - 92)  BP: --  BP(mean): --  RR: 24 (30 Nov 2018 11:00) (12 - 30)  SpO2: 100% (30 Nov 2018 14:13) (99% - 100%)    ABG - ( 29 Nov 2018 05:00 )  pH, Arterial: 7.54  pH, Blood: x     /  pCO2: 33    /  pO2: 168   / HCO3: x     / Base Excess: x     /  SaO2: >99                   11-29 @ 07:01  -  11-30 @ 07:00  --------------------------------------------------------  IN: 1292.6 mL / OUT: 2464 mL / NET: -1171.4 mL        LABS:                        8.6    16.5  )-----------( 248      ( 30 Nov 2018 05:30 )             27.2     11-30    138  |  98  |  11  ----------------------------<  123<H>  2.9<LL>   |  32<H>  |  1.84<H>    Ca    8.6      30 Nov 2018 05:30  Phos  2.2     11-30  Mg     1.9     11-30    TPro  6.4  /  Alb  2.5<L>  /  TBili  0.7  /  DBili  x   /  AST  30  /  ALT  11  /  AlkPhos  153<H>  11-28              CULTURES:  Culture Results:   Normal Respiratory Sania present (11-28 @ 01:00)  Culture Results:   No growth to date. (11-26 @ 16:15)  Culture Results:   No growth to date. (11-26 @ 16:15)    Most recent blood culture -- 11-28 @ 01:00   -- -- .Sputum Sputum 11-28 @ 01:00  Most recent blood culture -- 11-26 @ 16:15   -- -- .Blood Blood-Peripheral 11-26 @ 16:15      Physical Examination:  PULM: Clear to auscultation bilaterally, no significant sputum production  CVS: Regular rate and rhythm, no murmurs, rubs, or gallops  ABD: Soft, non-tender  EXT:  No clubbing, cyanosis, or edema  Neuro: Comatose  RADIOLOGY REVIEWED  CXR:    CT chest:    TTE:

## 2018-11-30 NOTE — PROGRESS NOTE ADULT - ASSESSMENT
73 yo F, mult problems, including DM, ESRD  At [facility]  Admitted 11/26 with low back pain, FTT  Spine CT on arrival, in c/w 10/30 study, stable endplate erosion L1-L2, presumed degenerative  Surveillance Bld Cxs previously negative, afebrile  S/p Vtach arrest 11/27, intubated, on Amio, Cardene   F/u CT imaging possible pneumonia, colitis, along with severe erosive changes around L2-L3, concerning for osteomyelitis/discitis  Difficult to correlate Ct findings- some discrepancy in interpretation of f/u imaging and dedicated scan of spine- degenerative change vs infection  Her abdominal exam remains benign  Still unresponsive on Vent, ?anoxic event, posturing noted- poor prognosis  Bld Cxs negative, sputum normal be only, colonized with MSSA  CXR clear    Plan:  Question of aspiration pneumonia, normal be, possibly MSSA, continue Cefepime  Vent support as outlined  Prognosis poor- d/w family at bedside- understand and likely will d/c further dialysis

## 2018-11-30 NOTE — PROGRESS NOTE ADULT - SUBJECTIVE AND OBJECTIVE BOX
Follow up for   cardiac arrest    SUBJ:   Patient intubated, unable to communicate. Pressors alternating with antihypertensive therapy, nicardipine.  Family at bedside.    PMH  [place] (hard of hearing)  Cataracts, bilateral  Pleural thickening  Hip pain  LBP radiating to left leg  CHF (congestive heart failure)  MI (myocardial infarction)  Diabetes  Hypothyroidism  ESRD (end stage renal disease)  HTN (hypertension)  MI (myocardial infarction)  Pleural effusion  Hypothyroid  Anxiety  HTN (hypertension)  Hyperlipidemia  CHF (congestive heart failure)  CKD (chronic kidney disease)  Anemia  Diabetes      MEDICATIONS  (STANDING):  artificial  tears Solution 1 Drop(s) Both EYES three times a day  aspirin  chewable 81 milliGRAM(s) Oral daily  atorvastatin 20 milliGRAM(s) Oral at bedtime  cefepime   IVPB 500 milliGRAM(s) IV Intermittent daily  chlorhexidine 0.12% Liquid 15 milliLiter(s) Oral Mucosa two times a day  chlorhexidine 4% Liquid 1 Application(s) Topical <User Schedule>  dextrose 5%. 1000 milliLiter(s) (50 mL/Hr) IV Continuous <Continuous>  dextrose 5%. 1000 milliLiter(s) (30 mL/Hr) IV Continuous <Continuous>  dextrose 50% Injectable 12.5 Gram(s) IV Push once  dextrose 50% Injectable 25 Gram(s) IV Push once  dextrose 50% Injectable 25 Gram(s) IV Push once  heparin  Injectable 5000 Unit(s) SubCutaneous every 12 hours  influenza   Vaccine 0.5 milliLiter(s) IntraMuscular once  levothyroxine Injectable 50 MICROGram(s) IV Push <User Schedule>  niCARdipine Infusion 5 mG/Hr (25 mL/Hr) IV Continuous <Continuous>  norepinephrine Infusion 0.05 MICROgram(s)/kG/Min (3.9 mL/Hr) IV Continuous <Continuous>  pantoprazole   Suspension 40 milliGRAM(s) Oral daily  sertraline 50 milliGRAM(s) Oral daily    MEDICATIONS  (PRN):  acetaminophen    Suspension .. 650 milliGRAM(s) Oral every 6 hours PRN Temp greater or equal to 38C (100.4F)  fentaNYL    Injectable 25 MICROGram(s) IV Push every 4 hours PRN Mild Pain (1 - 3)        PHYSICAL EXAM:  Vital Signs Last 24 Hrs  T(C): 37.2 (30 Nov 2018 08:00), Max: 38.1 (30 Nov 2018 04:00)  T(F): 99 (30 Nov 2018 08:00), Max: 100.5 (30 Nov 2018 04:00)  HR: 82 (30 Nov 2018 11:11) (70 - 92)  /49  RR: 24 (30 Nov 2018 11:00) (12 - 30)  SpO2: 100% (30 Nov 2018 11:11) (99% - 100%)    GENERAL: NAD, well-groomed, well-developed  HEAD:  Atraumatic, Normocephalic  Patient with ET tube  NECK:  No JVD  CHEST/LUNG: Clear to percussion and auscultation bilaterally; No rales, rhonchi, wheezing, or rubs  HEART: Regular rate and rhythm; No murmurs, rubs, or gallops PMI non displaced.  ABDOMEN: Soft, Nontender, Nondistended  EXTREMITIES:   No clubbing, cyanosis, or edema  SKIN: No rashes or lesions  NERVOUS SYSTEM:  Eyes opened, deviated left, unresponsive to commands      TELEMETRY:  sinus    ECG:  < from: 12 Lead ECG (11.30.18 @ 05:53) >    Ventricular Rate 69 BPM    Atrial Rate 69 BPM    P-R Interval 264 ms    QRS Duration 104 ms    Q-T Interval 552 ms    QTC Calculation(Bezet) 591 ms    P Axis 61 degrees    R Axis -58 degrees    T Axis 166 degrees    Diagnosis Line Sinus rhythm [id number] degree AV block with premature atrial complexes with aberrant conduction  Incomplete right bundle branch block  Left anterior fascicular block  Cannot rule out Inferior infarct (cited on or before 29-NOV-2018)  ST and T wave abnormality, considerlateral ischemia  Prolonged QT  Abnormal ECG  When compared with ECG of 29-NOV-2018 04:49,  aberrant conduction is now present  Nonspecific T wave abnormality has replaced inverted T waves in Inferior leads  Confirmed by HAYDEN SALAMANCA, DENIA SKINNER ([id number]) on 11/30/2018 8:46:44 AM    < end of copied text >      LABS:                        8.6    16.5  )-----------( 248      ( 30 Nov 2018 05:30 )             27.2     11-30    138  |  98  |  11  ----------------------------<  123<H>  2.9<LL>   |  32<H>  |  1.84<H>    Ca    8.6      30 Nov 2018 05:30  Phos  2.2     11-30  Mg     1.9     11-30    TPro  6.4  /  Alb  2.5<L>  /  TBili  0.7  /  DBili  x   /  AST  30  /  ALT  11  /  AlkPhos  153<H>  11-28            I&O's Summary    29 Nov 2018 07:01  -  30 Nov 2018 07:00  --------------------------------------------------------  IN: 1292.6 mL / OUT: 2464 mL / NET: -1171.4 mL    30 Nov 2018 07:01  -  30 Nov 2018 11:45  --------------------------------------------------------  IN: 530 mL / OUT: 51 mL / NET: 479 mL      RADIOLOGY & ADDITIONAL STUDIES:  < from: Xray Chest 1 View- PORTABLE-Routine (11.30.18 @ 09:25) >    EXAM:  XR CHEST PORTABLE ROUTINE 1V      PROCEDURE DATE:  11/30/2018        INTERPRETATION:  INDICATION: Abnormal Chest Sounds    PRIORS: November 29, 2018    VIEWS: Portable AP radiography of the chest performed.    FINDINGS: Heart size appears within normal limits. There is no   significant interval change in position of the indwelling ETT, right IJ   CVC or NGT. Note is made of an implanted cardiac loop monitor. No   definite hilar or superior mediastinal abnormalities are identified.   There is no evidence for interval development of focal infiltrate, lobar   consolidation or pulmonary edema. No pleural effusion or pneumothorax   demonstrated. No mediastinal shift is noted. Vascular stent material   overlies the left axillary region.    IMPRESSION: No evidence for interval development of focal infiltrate or   lobar consolidation. No pulmonary edema identified.    < end of copied text >

## 2018-11-30 NOTE — PROGRESS NOTE ADULT - ASSESSMENT
This patient has an anoxic encephalopathy and her exam reveals bilateral cerebral and brain stem dysfunction.    Her prognosis is hopeless for any reasonable recovery of brain function.    REC:  I favor a terminal weaning from vent and  comfort care only.  Spoke to patient's daughter.

## 2018-11-30 NOTE — PROGRESS NOTE ADULT - SUBJECTIVE AND OBJECTIVE BOX
CC: f/u for cardiac arrest, resp failure, possible aspiration, possible lumbar osteo    Patient remains unresponsive- not following commands, off sedation, seems awake, but nothing purposeful, decerebrate posturing, on CPAP    REVIEW OF SYSTEMS:  not provided on Vent, unresponsive    Antimicrobials Day # 4    cefepime   IVPB 500 milliGRAM(s) IV Intermittent daily    Other Medications Reviewed    T(F): 97.8 (11-30-18 @ 12:00), Max: 100.5 (11-30-18 @ 04:00)  HR: 72 (11-30-18 @ 16:23)  BP: --  RR: 19 (11-30-18 @ 15:00)  SpO2: 100% (11-30-18 @ 16:23)  Wt(kg): --    PHYSICAL EXAM:  General: no acute distress  Eyes:  anicteric, no conjunctival injection, no discharge  Oropharynx: ETT	  Neck: RIJ site clean  Lungs: coarse BSs  Heart: regular rate and rhythm; no murmur, rubs or gallops  Abdomen: soft, nondistended, nontender, without mass or organomegaly  Skin: no lesions  Extremities: no edema  Neurologic: unresponsive     LAB RESULTS:                        8.6    16.5  )-----------( 248      ( 30 Nov 2018 05:30 )             27.2     11-30    138  |  98  |  11  ----------------------------<  123<H>  2.9<LL>   |  32<H>  |  1.84<H>    Ca    8.6      30 Nov 2018 05:30  Phos  2.2     11-30  Mg     1.9     11-30    MICROBIOLOGY:  RECENT CULTURES:  11-28 @ 01:00 .Sputum Sputum     Normal Respiratory Sania present    11-26 @ 16:15 .Blood Blood-Peripheral     No growth to date.    RADIOLOGY REVIEWED:  Xray Chest 1 View- PORTABLE-Routine (11.30.18 @ 09:25) >  No evidence for interval development of focal infiltrate or   lobar consolidation. No pulmonary edema identified.

## 2018-11-30 NOTE — PROVIDER CONTACT NOTE (MEDICATION) - ACTION/TREATMENT ORDERED:
D10 at 30mL/hr ordered, discussed with bedside RN. Fingersticks to continue.
10mEq IV Potassium ordered for potassium leveo 2.9 in ESRD on HD. Discussed with bedside XAVIER Dodson.

## 2018-11-30 NOTE — PROGRESS NOTE ADULT - ASSESSMENT
Physical Examination:  GENERAL:                 Intubated,  eyes open, not responding to external stimuli, not tracking  HEENT:                     Eyes , Reactive, right gaze preference dry MM, NO JVD , cachectic   PULM:                       Bilateral air entry, no significant sputum production, No Rales, trace Rhonchi, No Wheezing  CVS:                         S1, S2,  +Murmur  ABD:                        Soft, nondistended, nontender, normoactive bowel sounds,   EXT:                         mild edema, nontender, No Cyanosis or Clubbing   Vascular:                 Cool Extremities, Normal Capillary refill, Normal Distal Pulses  SKIN:                       Warm and well perfused, no rashes noted.   NEURO:                  unresponsive, not following commands, not localizing.        Assessment  1. S/P Cardiac arrest, VF 11/27 with prolonged QT s/p Therapeutic hypothermia  , s/p amiodarone drip suspecting anoxic encephelopathy -> appears like vegetative state  2. Severe Hypoglycemia in patient with DM2 - improved.   3. Acute respiratory failure   4. Laibile BP - either on cardene drip vs Levophed drip to maintain BP  4. ESRD on HD  5. Chronic Diastolic CHF  6. CAD s/p MI,   7. Hypothyroidism,   8. Back Pain - ? Osteo of Spine,   9. ? Dementia  10. Lactic acidosis Resolved    Plan  Mechanical ventilation  Pressers / Cardine to manage BP  empiric abx to control for aspiration risk and Osteo of spine as per ID  start tube feeding, Insulin ss, Hold Lantus at this time  will d/w Cardio regarding amiodarone use, changing to oral.   daily ekg  family meeting in am.      Family Updated: 	Dtr	                                 Date: 11/30      Sedation & Analgesia:	minimize as tolerated  Diet/Nutrition:		npo  GI PPx:			ppi    DVT Ppx:		hep sq        Activity:		    bedrest             Head of Bed:               35-45 deg  Glycemic Control:        on hold    Lines:  CENTRAL LINE: 	[x ] YES [ ] NO	                    LOCATION:   [facility]	                       DATE INSERTED:   11/27	                    REMOVE:  [ ] YES [x ] NO    A-LINE:  	                [x ] YES [ ] NO                      LOCATION:   	   R Radial                    DATE INSERTED: 	11/27	            REMOVE:  [ ] YES [x ] NO    COYLE: 		        [ ] YES [x ] NO  		                                       DATE INSERTED:	            REMOVE:  [ ] YES [ ] NO      Restraints may be deemed necessary to prevent removal of life-sustaining devices [ x ] YES   [    ]  NO    Disposition: ICU care    Goals of Care: Full code  Prognosis - Poor.

## 2018-11-30 NOTE — PROGRESS NOTE ADULT - SUBJECTIVE AND OBJECTIVE BOX
Patient is a 72y old  Female who presents with a chief complaint of back pain (01 Dec 2018 05:33)      BRIEF HOSPITAL COURSE:  73 y/o female pmhx HTN, HLD, CAD, afib, CHF, ESRD, DM2, dementia and hypothyroidism. Admitted on 11/26 with back pain. Pt had vfib arrest on 11/27 related to hypoglycemia.     Events last 24 hours: Post cooling. Off sedation. Remains on cardiene @ 10mcg.  Has right preference gaze. Last HD 11/29     PAST MEDICAL & SURGICAL HISTORY:  [place] (hard of hearing)  Cataracts, bilateral: left worse than right  Pleural thickening: s/p pleurodisis left vats 2014  Hip pain: left  LBP radiating to left leg  CHF (congestive heart failure): diagnosed 2-2014  MI (myocardial infarction): 2-2014   cardiac cath done [facility]  Diabetes: diagnosed 2004  no medications in 5 months since hemodialysis  Hypothyroidism  ESRD (end stage renal disease)  HTN (hypertension)  MI (myocardial infarction): may 2016  Pleural effusion: Left side - several times since March 2014  Hypothyroid  Anxiety  HTN (hypertension)  Hyperlipidemia  CHF (congestive heart failure)  CKD (chronic kidney disease)  Anemia  Diabetes: DM 2  Thoracotomy scar of left chest: 4-2014 ? pneumonia/ scarring  S/P myomectomy: abdominal age 52  AV fistula: left upper 8-2014 attempted 12-16-14      Review of Systems:  Unable to obtain due to clinical condition.       Medications:  cefepime   IVPB 500 milliGRAM(s) IV Intermittent daily  niCARdipine Infusion 5 mG/Hr IV Continuous <Continuous>  norepinephrine Infusion 0.05 MICROgram(s)/kG/Min IV Continuous <Continuous>  acetaminophen    Suspension .. 650 milliGRAM(s) Oral every 6 hours PRN  fentaNYL    Injectable 25 MICROGram(s) IV Push every 4 hours PRN  sertraline 50 milliGRAM(s) Oral daily  aspirin  chewable 81 milliGRAM(s) Oral daily  heparin  Injectable 5000 Unit(s) SubCutaneous every 12 hours  pantoprazole   Suspension 40 milliGRAM(s) Oral daily  atorvastatin 20 milliGRAM(s) Oral at bedtime  dextrose 50% Injectable 12.5 Gram(s) IV Push once  dextrose 50% Injectable 25 Gram(s) IV Push once  dextrose 50% Injectable 25 Gram(s) IV Push once  levothyroxine Injectable 50 MICROGram(s) IV Push <User Schedule>  dextrose 5%. 1000 milliLiter(s) IV Continuous <Continuous>  influenza   Vaccine 0.5 milliLiter(s) IntraMuscular once  artificial  tears Solution 1 Drop(s) Both EYES three times a day  chlorhexidine 0.12% Liquid 15 milliLiter(s) Oral Mucosa two times a day  chlorhexidine 4% Liquid 1 Application(s) Topical <User Schedule>        Mode: AC/ CMV (Assist Control/ Continuous Mandatory Ventilation)  RR (machine): 12  TV (machine): 300  FiO2: 35  PEEP: 5  PIP: 21      ICU Vital Signs Last 24 Hrs  T(C): 36.6 (01 Dec 2018 04:00), Max: 37.2 (30 Nov 2018 08:00)  T(F): 97.9 (01 Dec 2018 04:00), Max: 99 (30 Nov 2018 08:00)  HR: 78 (01 Dec 2018 05:00) (72 - 86)  ABP: 154/105 (01 Dec 2018 05:00) (119/72 - 173/55)  ABP(mean): 126 (01 Dec 2018 05:00) (78 - 135)  RR: 13 (01 Dec 2018 05:00) (11 - 24)  SpO2: 100% (01 Dec 2018 06:19) (93% - 100%)          I&O's Detail    29 Nov 2018 07:01  -  30 Nov 2018 07:00  --------------------------------------------------------  IN:    amiodarone Infusion: 225.1 mL    dextrose 10%: 30 mL    niCARdipine Infusion: 937.5 mL    Solution: 100 mL  Total IN: 1292.6 mL    OUT:    Nasoenteral Tube: 460 mL    Other: 2000 mL    Stool: 4 mL  Total OUT: 2464 mL    Total NET: -1171.4 mL      30 Nov 2018 07:01  -  01 Dec 2018 06:31  --------------------------------------------------------  IN:    dextrose 10%: 30 mL    dextrose 5%.: 360 mL    Nepro: 330 mL    niCARdipine Infusion: 1150 mL    Solution: 100 mL  Total IN: 1970 mL    OUT:    Nasoenteral Tube: 50 mL    Stool: 3 mL  Total OUT: 53 mL    Total NET: 1917 mL            LABS:                        8.6    16.5  )-----------( 248      ( 30 Nov 2018 05:30 )             27.2     11-30    138  |  98  |  11  ----------------------------<  123<H>  2.9<LL>   |  32<H>  |  1.84<H>    Ca    8.6      30 Nov 2018 05:30  Phos  2.2     11-30  Mg     1.9     11-30            CAPILLARY BLOOD GLUCOSE      POCT Blood Glucose.: 126 mg/dL (01 Dec 2018 04:35)        CULTURES:  Culture Results:   Normal Respiratory Sania present (11-28-18 @ 01:00)  Culture Results:   No growth to date. (11-26-18 @ 16:15)  Culture Results:   No growth to date. (11-26-18 @ 16:15)      Physical Examination:    General: Intubated. does not appear in distress     HEENT: Pupils equal, reactive to light.  right gaze preference.     PULM: Clear to auscultation bilaterally, no significant sputum production. No wheeze/rales/rhonchi.     CVS: Regular rate and rhythm, no murmurs, rubs, or gallops. + S1/S2     ABD: Soft, nondistended, nontender, normoactive bowel sounds, no masses    EXT: No edema, nontender    SKIN: Warm and well perfused, no rashes noted.    NEURO: Eyes open, unresponsive. Does not track. Does not follow commands or withrdaw. +gag reflex     RADIOLOGY: < from: Xray Chest 1 View- PORTABLE-Routine (11.30.18 @ 09:25) >    INTERPRETATION:  INDICATION: Abnormal Chest Sounds    PRIORS: November 29, 2018    VIEWS: Portable AP radiography of the chest performed.    FINDINGS: Heart size appears within normal limits. There is no   significant interval change in position of the indwelling ETT, right IJ   CVC or NGT. Note is made of an implanted cardiac loop monitor. No   definite hilar or superior mediastinal abnormalities are identified.   There is no evidence for interval development of focal infiltrate, lobar   consolidation or pulmonary edema. No pleural effusion or pneumothorax   demonstrated. No mediastinal shift is noted. Vascular stent material   overlies the left axillary region.    IMPRESSION: No evidence for interval development of focal infiltrate or   lobar consolidation. No pulmonary edema identified.    < end of copied text >    CRITICAL CARE TIME SPENT: 34 min   Evaluating/treating patient, reviewing data/labs/imaging, discussing case with multidisciplinary team, discussing plan/goals of care with patient/family. Non-inclusive of procedure time.

## 2018-11-30 NOTE — PROGRESS NOTE ADULT - SUBJECTIVE AND OBJECTIVE BOX
NEPHROLOGY PROGRESS NOTE    CHIEF COMPLAINT: back pain    Intubated in MICU    Vital Signs Last 24 Hrs  T(C): 37.2 (11-30-18 @ 08:00), Max: 38.1 (11-30-18 @ 04:00)  T(F): 99 (11-30-18 @ 08:00), Max: 100.5 (11-30-18 @ 04:00)  HR: 82 (11-30-18 @ 11:11) (70 - 92)  RR: 24 (11-30-18 @ 11:00) (12 - 30)  SpO2: 100% (11-30-18 @ 11:11) (99% - 100%)    Lungs - coarse BS bilaterally  Heart - S1S2  Abd - soft, ND, + BS  Extr - no peripheral edema, AVF patent                                8.6    16.5  )-----------( 248      ( 30 Nov 2018 05:30 )             27.2     30 Nov 2018 05:30    138    |  98     |  11     ----------------------------<  123    2.9     |  32     |  1.84     Ca    8.6        30 Nov 2018 05:30  Phos  2.2       30 Nov 2018 05:30  Mg     1.9       30 Nov 2018 05:30    TPro  6.4    /  Alb  2.5    /  TBili  0.7    /  DBili  x      /  AST  30     /  ALT  11     /  AlkPhos  153    28 Nov 2018 16:20    LIVER FUNCTIONS - ( 28 Nov 2018 16:20 )  Alb: 2.5 g/dL / Pro: 6.4 g/dL / ALK PHOS: 153 U/L / ALT: 11 U/L DA / AST: 30 U/L / GGT: x           acetaminophen    Suspension .. 650 milliGRAM(s) Oral every 6 hours PRN  artificial  tears Solution 1 Drop(s) Both EYES three times a day  aspirin  chewable 81 milliGRAM(s) Oral daily  atorvastatin 20 milliGRAM(s) Oral at bedtime  cefepime   IVPB 500 milliGRAM(s) IV Intermittent daily  chlorhexidine 0.12% Liquid 15 milliLiter(s) Oral Mucosa two times a day  chlorhexidine 4% Liquid 1 Application(s) Topical <User Schedule>  dextrose 5%. 1000 milliLiter(s) IV Continuous <Continuous>  dextrose 5%. 1000 milliLiter(s) IV Continuous <Continuous>  dextrose 50% Injectable 12.5 Gram(s) IV Push once  dextrose 50% Injectable 25 Gram(s) IV Push once  dextrose 50% Injectable 25 Gram(s) IV Push once  fentaNYL    Injectable 25 MICROGram(s) IV Push every 4 hours PRN  heparin  Injectable 5000 Unit(s) SubCutaneous every 12 hours  influenza   Vaccine 0.5 milliLiter(s) IntraMuscular once  levothyroxine Injectable 50 MICROGram(s) IV Push <User Schedule>  niCARdipine Infusion 5 mG/Hr IV Continuous <Continuous>  norepinephrine Infusion 0.05 MICROgram(s)/kG/Min IV Continuous <Continuous>  pantoprazole   Suspension 40 milliGRAM(s) Oral daily  sertraline 50 milliGRAM(s) Oral daily      A/P:    S/p VT/VF arrest, anoxic brain injury  MOSF: shock, resp failure, r/o sepsis, MI  Colitis, PNA, CT suspicious for osteo of LS spine  ESRD on HD TTS  TMP as able  Abx per ID  Overall options are limited and prognosis is poor  Palliative following, most appropriate NEPHROLOGY PROGRESS NOTE    CHIEF COMPLAINT: back pain    Intubated in MICU, unresponsive    Vital Signs Last 24 Hrs  T(C): 37.2 (11-30-18 @ 08:00), Max: 38.1 (11-30-18 @ 04:00)  T(F): 99 (11-30-18 @ 08:00), Max: 100.5 (11-30-18 @ 04:00)  HR: 82 (11-30-18 @ 11:11) (70 - 92)  RR: 24 (11-30-18 @ 11:00) (12 - 30)  SpO2: 100% (11-30-18 @ 11:11) (99% - 100%)    Lungs - coarse BS bilaterally  Heart - S1S2  Abd - soft, ND, + BS  Extr - no peripheral edema, AVF patent                                8.6    16.5  )-----------( 248      ( 30 Nov 2018 05:30 )             27.2     30 Nov 2018 05:30    138    |  98     |  11     ----------------------------<  123    2.9     |  32     |  1.84     Ca    8.6        30 Nov 2018 05:30  Phos  2.2       30 Nov 2018 05:30  Mg     1.9       30 Nov 2018 05:30    TPro  6.4    /  Alb  2.5    /  TBili  0.7    /  DBili  x      /  AST  30     /  ALT  11     /  AlkPhos  153    28 Nov 2018 16:20    LIVER FUNCTIONS - ( 28 Nov 2018 16:20 )  Alb: 2.5 g/dL / Pro: 6.4 g/dL / ALK PHOS: 153 U/L / ALT: 11 U/L DA / AST: 30 U/L / GGT: x           acetaminophen    Suspension .. 650 milliGRAM(s) Oral every 6 hours PRN  artificial  tears Solution 1 Drop(s) Both EYES three times a day  aspirin  chewable 81 milliGRAM(s) Oral daily  atorvastatin 20 milliGRAM(s) Oral at bedtime  cefepime   IVPB 500 milliGRAM(s) IV Intermittent daily  chlorhexidine 0.12% Liquid 15 milliLiter(s) Oral Mucosa two times a day  chlorhexidine 4% Liquid 1 Application(s) Topical <User Schedule>  dextrose 5%. 1000 milliLiter(s) IV Continuous <Continuous>  dextrose 5%. 1000 milliLiter(s) IV Continuous <Continuous>  dextrose 50% Injectable 12.5 Gram(s) IV Push once  dextrose 50% Injectable 25 Gram(s) IV Push once  dextrose 50% Injectable 25 Gram(s) IV Push once  fentaNYL    Injectable 25 MICROGram(s) IV Push every 4 hours PRN  heparin  Injectable 5000 Unit(s) SubCutaneous every 12 hours  influenza   Vaccine 0.5 milliLiter(s) IntraMuscular once  levothyroxine Injectable 50 MICROGram(s) IV Push <User Schedule>  niCARdipine Infusion 5 mG/Hr IV Continuous <Continuous>  norepinephrine Infusion 0.05 MICROgram(s)/kG/Min IV Continuous <Continuous>  pantoprazole   Suspension 40 milliGRAM(s) Oral daily  sertraline 50 milliGRAM(s) Oral daily      A/P:    S/p VT/VF arrest, anoxic brain injury  MOSF: shock, resp failure, r/o sepsis, MI  Colitis, PNA, CT suspicious for osteo of LS spine  ESRD on HD TTS  Abx per ID  Overall options are limited and prognosis is poor  Neuro, Palliative input appr  D/w family at bedside  Family leaning towards comfort care/terminal wean  No further HD per family wishes

## 2018-11-30 NOTE — PROGRESS NOTE ADULT - PROBLEM SELECTOR PLAN 1
likely related to hypoglycemia w/ prolonged down time.   Post hypothermia protocol.   Now appearing anoxic

## 2018-11-30 NOTE — PROGRESS NOTE ADULT - SUBJECTIVE AND OBJECTIVE BOX
Neurology Progress Note    Subjective & Objective:  patient remains unresponsive on vent.  On exam she is comatose and not responsive to voice commands.  She decerebrates bilaterally spontaneously and to noxious stimuli.  Dolls eyes are brisk , pupils small and sluggish.  Toes upgoing.

## 2018-11-30 NOTE — PROGRESS NOTE ADULT - ASSESSMENT
S/p VF arrest. ASHD, ESRD. Labile BP. Poor mental status/anoxic encephalopathy.  QT prolongation, may be from amiodarone, possibly neurologic.  Prognosis poor  Would not continue amiodarone at this time.  If BP permits, b blocker.    Discussed with daughter at bedside and Dr. Zuniga.

## 2018-11-30 NOTE — PROGRESS NOTE ADULT - PROBLEM SELECTOR PLAN 2
related to cardiac arrest. C/w mechanical ventilation for now. titrate fiO2 to maintain O2 sat >92%.   SBT in am   VAP ppx w/ chlorhexidine   Stress ulcer ppx w/ Protonix

## 2018-11-30 NOTE — PROGRESS NOTE ADULT - SUBJECTIVE AND OBJECTIVE BOX
Follow-up Critical Care Progress Note  Chief Complaint : Pneumonia      pt hypertensive on cardine drip  did not tolerate tube feeding.  family came by bedside, state Antonette not able to make it today, and want to wait till kelsey.  today pt placed on CPAP Ps 10, comfortable  eyes now open, not tracking,         Allergies :Avelox (Unknown)  fish (Hives; Rash)  shellfish (Unknown)      PAST MEDICAL & SURGICAL HISTORY:  [place] (hard of hearing)  Cataracts, bilateral: left worse than right  Pleural thickening: s/p pleurodisis left vats 2014  Hip pain: left  LBP radiating to left leg  CHF (congestive heart failure): diagnosed 2-2014  MI (myocardial infarction): 2-2014   cardiac cath done [facility]  Diabetes: diagnosed 2004  no medications in 5 months since hemodialysis  Hypothyroidism  ESRD (end stage renal disease)  HTN (hypertension)  MI (myocardial infarction): may 2016  Pleural effusion: Left side - several times since March 2014  Hypothyroid  Anxiety  HTN (hypertension)  Hyperlipidemia  CHF (congestive heart failure)  CKD (chronic kidney disease)  Anemia  Diabetes: DM 2  Thoracotomy scar of left chest: 4-2014 ? pneumonia/ scarring  S/P myomectomy: abdominal age 52  AV fistula: left upper 8-2014 attempted 12-16-14      Medications:  MEDICATIONS  (STANDING):  artificial  tears Solution 1 Drop(s) Both EYES three times a day  aspirin  chewable 81 milliGRAM(s) Oral daily  atorvastatin 20 milliGRAM(s) Oral at bedtime  cefepime   IVPB 500 milliGRAM(s) IV Intermittent daily  chlorhexidine 0.12% Liquid 15 milliLiter(s) Oral Mucosa two times a day  chlorhexidine 4% Liquid 1 Application(s) Topical <User Schedule>  dextrose 5%. 1000 milliLiter(s) (50 mL/Hr) IV Continuous <Continuous>  dextrose 5%. 1000 milliLiter(s) (30 mL/Hr) IV Continuous <Continuous>  dextrose 50% Injectable 12.5 Gram(s) IV Push once  dextrose 50% Injectable 25 Gram(s) IV Push once  dextrose 50% Injectable 25 Gram(s) IV Push once  heparin  Injectable 5000 Unit(s) SubCutaneous every 12 hours  influenza   Vaccine 0.5 milliLiter(s) IntraMuscular once  levothyroxine Injectable 50 MICROGram(s) IV Push <User Schedule>  niCARdipine Infusion 5 mG/Hr (25 mL/Hr) IV Continuous <Continuous>  norepinephrine Infusion 0.05 MICROgram(s)/kG/Min (3.9 mL/Hr) IV Continuous <Continuous>  pantoprazole   Suspension 40 milliGRAM(s) Oral daily  sertraline 50 milliGRAM(s) Oral daily    MEDICATIONS  (PRN):  acetaminophen    Suspension .. 650 milliGRAM(s) Oral every 6 hours PRN Temp greater or equal to 38C (100.4F)  fentaNYL    Injectable 25 MICROGram(s) IV Push every 4 hours PRN Mild Pain (1 - 3)      LABS:                        8.6    16.5  )-----------( 248      ( 30 Nov 2018 05:30 )             27.2     11-30    138  |  98  |  11  ----------------------------<  123<H>  2.9<LL>   |  32<H>  |  1.84<H>    Ca    8.6      30 Nov 2018 05:30  Phos  2.2     11-30  Mg     1.9     11-30    TPro  6.4  /  Alb  2.5<L>  /  TBili  0.7  /  DBili  x   /  AST  30  /  ALT  11  /  AlkPhos  153<H>  11-28        CULTURES: (if applicable)    Culture - Sputum (collected 11-28-18 @ 01:00)  Source: .Sputum Sputum  Gram Stain (11-28-18 @ 12:26):    Few polymorphonuclear leukocytes per low power field    No Squamous epithelial cells per low power field    No organisms seen per oil power field  Final Report (11-30-18 @ 08:39):    Normal Respiratory Sania present    Culture - Blood (collected 11-26-18 @ 16:15)  Source: .Blood Blood-Venous  Preliminary Report (11-27-18 @ 19:02):    No growth to date.    Culture - Blood (collected 11-26-18 @ 16:15)  Source: .Blood Blood-Peripheral  Preliminary Report (11-27-18 @ 19:02):    No growth to date.          ABG - ( 29 Nov 2018 05:00 )  pH, Arterial: 7.54  pH, Blood: x     /  pCO2: 33    /  pO2: 168   / HCO3: x     / Base Excess: x     /  SaO2: >99               CAPILLARY BLOOD GLUCOSE      POCT Blood Glucose.: 140 mg/dL (30 Nov 2018 08:16)      RADIOLOGY  CXR:      CT:    ECHO:      VITALS:  T(C): 38.1 (11-30-18 @ 04:00), Max: 38.1 (11-30-18 @ 04:00)  T(F): 100.5 (11-30-18 @ 04:00), Max: 100.5 (11-30-18 @ 04:00)  HR: 72 (11-30-18 @ 09:00) (70 - 92)  BP: --  BP(mean): --  ABP: 151/42 (11-30-18 @ 09:00) (125/83 - 173/54)  ABP(mean): 78 (11-30-18 @ 09:00) (78 - 118)  RR: 18 (11-30-18 @ 09:00) (13 - 30)  SpO2: 100% (11-30-18 @ 09:00) (99% - 100%)  CVP(mm Hg): 13 (11-30-18 @ 09:00) (4 - 17)  CVP(cm H2O): --    Ins and Outs     11-29-18 @ 07:01  -  11-30-18 @ 07:00  --------------------------------------------------------  IN: 1292.6 mL / OUT: 2464 mL / NET: -1171.4 mL    11-30-18 @ 07:01  -  11-30-18 @ 09:26  --------------------------------------------------------  IN: 220 mL / OUT: 51 mL / NET: 169 mL        Height (cm): 144.78 (11-28-18 @ 09:37)  Weight (kg): 41.6 (11-28-18 @ 09:37)  BMI (kg/m2): 19.8 (11-28-18 @ 09:37)    Device: 840, Mode: AC/ CMV (Assist Control/ Continuous Mandatory Ventilation), RR (machine): 12, RR (patient): 31, TV (machine): 300, TV (patient): 336, FiO2: 30, PEEP: 5, ITime: 1, MAP: 9.1, PIP: 18

## 2018-11-30 NOTE — PROGRESS NOTE ADULT - ASSESSMENT
73 y/o female pmhx HTN, HLD, CAD, afib, CHF, ESRD, DM2, dementia and hypothyroidism. Admitted on 11/26 with back pain. Pt had vfib arrest on 11/27 related to hypoglycemia. Now patient post cooling period, appearing anoxic.

## 2018-11-30 NOTE — PROGRESS NOTE ADULT - ASSESSMENT
Palliatve:  Awaiting family meeting regarding continued care and goals of care.        Assessment  1. S/P Cardiac arrest, VF 11/27 with prolonged QT s/p Therapeutic hypothermia  , s/p amiodarone drip suspecting anoxic encephelopathy -> appears like vegetative state  2. Severe Hypoglycemia in patient with DM2 - improved.   3. Acute respiratory failure   4. Laibile BP - either on cardene drip vs Levophed drip to maintain BP  4. ESRD on HD  5. Chronic Diastolic CHF  6. CAD s/p MI,   7. Hypothyroidism,   8. Back Pain - ? Osteo of Spine,   9. ? Dementia  10. Lactic acidosis Resolved    Plan  Mechanical ventilation  Pressers / Cardine to manage BP  empiric abx to control for aspiration risk and Osteo of spine as per ID  start tube feeding, Insulin ss, Hold Lantus at this time  will d/w Cardio regarding amiodarone use, changing to oral.   daily ekg  family meeting in am.      Family Updated: 	Dtr	                                 Date: 11/30      Sedation & Analgesia:	minimize as tolerated  Diet/Nutrition:		npo  GI PPx:			ppi    DVT Ppx:		hep sq        Activity:		    bedrest             Head of Bed:               35-45 deg  Glycemic Control:        on hold    Lines:  CENTRAL LINE: 	[x ] YES [ ] NO	                    LOCATION:   [facility]	                       DATE INSERTED:   11/27	                    REMOVE:  [ ] YES [x ] NO    A-LINE:  	                [x ] YES [ ] NO                      LOCATION:   	   [facility]                    DATE INSERTED: 	11/27	            REMOVE:  [ ] YES [x ] NO    COYLE: 		        [ ] YES [x ] NO  		                                       DATE INSERTED:	            REMOVE:  [ ] YES [ ] NO      Restraints may be deemed necessary to prevent removal of life-sustaining devices [ x ] YES   [    ]  NO    Disposition: ICU care    Goals of Care: Full code  Prognosis - Poor.

## 2018-12-01 DIAGNOSIS — I46.9 CARDIAC ARREST, CAUSE UNSPECIFIED: ICD-10-CM

## 2018-12-01 DIAGNOSIS — J96.90 RESPIRATORY FAILURE, UNSPECIFIED, UNSPECIFIED WHETHER WITH HYPOXIA OR HYPERCAPNIA: ICD-10-CM

## 2018-12-01 DIAGNOSIS — E11.9 TYPE 2 DIABETES MELLITUS WITHOUT COMPLICATIONS: ICD-10-CM

## 2018-12-01 DIAGNOSIS — G93.1 ANOXIC BRAIN DAMAGE, NOT ELSEWHERE CLASSIFIED: ICD-10-CM

## 2018-12-01 DIAGNOSIS — I10 ESSENTIAL (PRIMARY) HYPERTENSION: ICD-10-CM

## 2018-12-01 DIAGNOSIS — E03.9 HYPOTHYROIDISM, UNSPECIFIED: ICD-10-CM

## 2018-12-01 DIAGNOSIS — N18.6 END STAGE RENAL DISEASE: ICD-10-CM

## 2018-12-01 LAB
ALBUMIN SERPL ELPH-MCNC: 2.5 G/DL — LOW (ref 3.3–5)
ALP SERPL-CCNC: 188 U/L — HIGH (ref 40–120)
ALT FLD-CCNC: 8 U/L DA — LOW (ref 10–45)
ANION GAP SERPL CALC-SCNC: 12 MMOL/L — SIGNIFICANT CHANGE UP (ref 5–17)
AST SERPL-CCNC: 21 U/L — SIGNIFICANT CHANGE UP (ref 10–40)
BASOPHILS # BLD AUTO: 0.1 K/UL — SIGNIFICANT CHANGE UP (ref 0–0.2)
BASOPHILS NFR BLD AUTO: 0.4 % — SIGNIFICANT CHANGE UP (ref 0–2)
BILIRUB SERPL-MCNC: 0.8 MG/DL — SIGNIFICANT CHANGE UP (ref 0.2–1.2)
BUN SERPL-MCNC: 20 MG/DL — SIGNIFICANT CHANGE UP (ref 7–23)
CALCIUM SERPL-MCNC: 8.4 MG/DL — SIGNIFICANT CHANGE UP (ref 8.4–10.5)
CHLORIDE SERPL-SCNC: 99 MMOL/L — SIGNIFICANT CHANGE UP (ref 96–108)
CO2 SERPL-SCNC: 26 MMOL/L — SIGNIFICANT CHANGE UP (ref 22–31)
CREAT SERPL-MCNC: 2.39 MG/DL — HIGH (ref 0.5–1.3)
CULTURE RESULTS: SIGNIFICANT CHANGE UP
CULTURE RESULTS: SIGNIFICANT CHANGE UP
EOSINOPHIL # BLD AUTO: 0.1 K/UL — SIGNIFICANT CHANGE UP (ref 0–0.5)
EOSINOPHIL NFR BLD AUTO: 0.4 % — SIGNIFICANT CHANGE UP (ref 0–6)
GLUCOSE BLDC GLUCOMTR-MCNC: 111 MG/DL — HIGH (ref 70–99)
GLUCOSE BLDC GLUCOMTR-MCNC: 126 MG/DL — HIGH (ref 70–99)
GLUCOSE BLDC GLUCOMTR-MCNC: 130 MG/DL — HIGH (ref 70–99)
GLUCOSE SERPL-MCNC: 128 MG/DL — HIGH (ref 70–99)
HCT VFR BLD CALC: 28.3 % — LOW (ref 34.5–45)
HGB BLD-MCNC: 8.9 G/DL — LOW (ref 11.5–15.5)
LYMPHOCYTES # BLD AUTO: 0.5 K/UL — LOW (ref 1–3.3)
LYMPHOCYTES # BLD AUTO: 2.3 % — LOW (ref 13–44)
MAGNESIUM SERPL-MCNC: 1.7 MG/DL — SIGNIFICANT CHANGE UP (ref 1.6–2.6)
MCHC RBC-ENTMCNC: 29.2 PG — SIGNIFICANT CHANGE UP (ref 27–34)
MCHC RBC-ENTMCNC: 31.3 GM/DL — LOW (ref 32–36)
MCV RBC AUTO: 93 FL — SIGNIFICANT CHANGE UP (ref 80–100)
MONOCYTES # BLD AUTO: 1 K/UL — HIGH (ref 0–0.9)
MONOCYTES NFR BLD AUTO: 4.8 % — SIGNIFICANT CHANGE UP (ref 1–9)
NEUTROPHILS # BLD AUTO: 18.5 K/UL — HIGH (ref 1.8–7.4)
NEUTROPHILS NFR BLD AUTO: 92.1 % — HIGH (ref 43–77)
PHOSPHATE SERPL-MCNC: 3.2 MG/DL — SIGNIFICANT CHANGE UP (ref 2.5–4.5)
PLATELET # BLD AUTO: 252 K/UL — SIGNIFICANT CHANGE UP (ref 150–400)
POTASSIUM SERPL-MCNC: 3.2 MMOL/L — LOW (ref 3.5–5.3)
POTASSIUM SERPL-SCNC: 3.2 MMOL/L — LOW (ref 3.5–5.3)
PROT SERPL-MCNC: 6.5 G/DL — SIGNIFICANT CHANGE UP (ref 6–8.3)
RBC # BLD: 3.04 M/UL — LOW (ref 3.8–5.2)
RBC # FLD: 17.4 % — HIGH (ref 10.3–14.5)
SODIUM SERPL-SCNC: 137 MMOL/L — SIGNIFICANT CHANGE UP (ref 135–145)
SPECIMEN SOURCE: SIGNIFICANT CHANGE UP
SPECIMEN SOURCE: SIGNIFICANT CHANGE UP
WBC # BLD: 20.1 K/UL — HIGH (ref 3.8–10.5)
WBC # FLD AUTO: 20.1 K/UL — HIGH (ref 3.8–10.5)

## 2018-12-01 PROCEDURE — 87640 STAPH A DNA AMP PROBE: CPT

## 2018-12-01 PROCEDURE — 82140 ASSAY OF AMMONIA: CPT

## 2018-12-01 PROCEDURE — 80053 COMPREHEN METABOLIC PANEL: CPT

## 2018-12-01 PROCEDURE — 70450 CT HEAD/BRAIN W/O DYE: CPT

## 2018-12-01 PROCEDURE — 84484 ASSAY OF TROPONIN QUANT: CPT

## 2018-12-01 PROCEDURE — 74174 CTA ABD&PLVS W/CONTRAST: CPT

## 2018-12-01 PROCEDURE — 99285 EMERGENCY DEPT VISIT HI MDM: CPT | Mod: 25

## 2018-12-01 PROCEDURE — 72131 CT LUMBAR SPINE W/O DYE: CPT

## 2018-12-01 PROCEDURE — 83735 ASSAY OF MAGNESIUM: CPT

## 2018-12-01 PROCEDURE — 93306 TTE W/DOPPLER COMPLETE: CPT

## 2018-12-01 PROCEDURE — 87040 BLOOD CULTURE FOR BACTERIA: CPT

## 2018-12-01 PROCEDURE — 80048 BASIC METABOLIC PNL TOTAL CA: CPT

## 2018-12-01 PROCEDURE — 95812 EEG 41-60 MINUTES: CPT

## 2018-12-01 PROCEDURE — 82553 CREATINE MB FRACTION: CPT

## 2018-12-01 PROCEDURE — 82803 BLOOD GASES ANY COMBINATION: CPT

## 2018-12-01 PROCEDURE — 83605 ASSAY OF LACTIC ACID: CPT

## 2018-12-01 PROCEDURE — 84443 ASSAY THYROID STIM HORMONE: CPT

## 2018-12-01 PROCEDURE — 83036 HEMOGLOBIN GLYCOSYLATED A1C: CPT

## 2018-12-01 PROCEDURE — 87641 MR-STAPH DNA AMP PROBE: CPT

## 2018-12-01 PROCEDURE — 84100 ASSAY OF PHOSPHORUS: CPT

## 2018-12-01 PROCEDURE — 71275 CT ANGIOGRAPHY CHEST: CPT

## 2018-12-01 PROCEDURE — 87070 CULTURE OTHR SPECIMN AEROBIC: CPT

## 2018-12-01 PROCEDURE — 36600 WITHDRAWAL OF ARTERIAL BLOOD: CPT

## 2018-12-01 PROCEDURE — 71045 X-RAY EXAM CHEST 1 VIEW: CPT

## 2018-12-01 PROCEDURE — 99261: CPT

## 2018-12-01 PROCEDURE — 93010 ELECTROCARDIOGRAM REPORT: CPT

## 2018-12-01 PROCEDURE — 82962 GLUCOSE BLOOD TEST: CPT

## 2018-12-01 PROCEDURE — 85610 PROTHROMBIN TIME: CPT

## 2018-12-01 PROCEDURE — G0463: CPT

## 2018-12-01 PROCEDURE — 85730 THROMBOPLASTIN TIME PARTIAL: CPT

## 2018-12-01 PROCEDURE — 84145 PROCALCITONIN (PCT): CPT

## 2018-12-01 PROCEDURE — 93005 ELECTROCARDIOGRAM TRACING: CPT

## 2018-12-01 PROCEDURE — 94003 VENT MGMT INPAT SUBQ DAY: CPT

## 2018-12-01 PROCEDURE — 85027 COMPLETE CBC AUTOMATED: CPT

## 2018-12-01 PROCEDURE — 84480 ASSAY TRIIODOTHYRONINE (T3): CPT

## 2018-12-01 PROCEDURE — 96375 TX/PRO/DX INJ NEW DRUG ADDON: CPT

## 2018-12-01 PROCEDURE — 96374 THER/PROPH/DIAG INJ IV PUSH: CPT

## 2018-12-01 PROCEDURE — 72128 CT CHEST SPINE W/O DYE: CPT

## 2018-12-01 PROCEDURE — 82550 ASSAY OF CK (CPK): CPT

## 2018-12-01 PROCEDURE — 92950 HEART/LUNG RESUSCITATION CPR: CPT

## 2018-12-01 PROCEDURE — 99233 SBSQ HOSP IP/OBS HIGH 50: CPT

## 2018-12-01 PROCEDURE — 84436 ASSAY OF TOTAL THYROXINE: CPT

## 2018-12-01 PROCEDURE — 94002 VENT MGMT INPAT INIT DAY: CPT

## 2018-12-01 RX ORDER — MORPHINE SULFATE 50 MG/1
2 CAPSULE, EXTENDED RELEASE ORAL
Qty: 100 | Refills: 0 | Status: DISCONTINUED | OUTPATIENT
Start: 2018-12-01 | End: 2018-12-02

## 2018-12-01 RX ADMIN — NICARDIPINE HYDROCHLORIDE 25 MG/HR: 30 CAPSULE, EXTENDED RELEASE ORAL at 02:29

## 2018-12-01 RX ADMIN — Medication 650 MILLIGRAM(S): at 14:26

## 2018-12-01 RX ADMIN — MORPHINE SULFATE 2 MG/HR: 50 CAPSULE, EXTENDED RELEASE ORAL at 18:06

## 2018-12-01 RX ADMIN — MORPHINE SULFATE 2 MG/HR: 50 CAPSULE, EXTENDED RELEASE ORAL at 14:43

## 2018-12-01 RX ADMIN — CEFEPIME 100 MILLIGRAM(S): 1 INJECTION, POWDER, FOR SOLUTION INTRAMUSCULAR; INTRAVENOUS at 12:41

## 2018-12-01 RX ADMIN — Medication 1 PATCH: at 19:50

## 2018-12-01 RX ADMIN — ATORVASTATIN CALCIUM 20 MILLIGRAM(S): 80 TABLET, FILM COATED ORAL at 22:42

## 2018-12-01 RX ADMIN — Medication 1 DROP(S): at 06:04

## 2018-12-01 RX ADMIN — Medication 50 MICROGRAM(S): at 06:03

## 2018-12-01 RX ADMIN — PANTOPRAZOLE SODIUM 40 MILLIGRAM(S): 20 TABLET, DELAYED RELEASE ORAL at 12:42

## 2018-12-01 RX ADMIN — Medication 650 MILLIGRAM(S): at 18:47

## 2018-12-01 RX ADMIN — Medication 650 MILLIGRAM(S): at 20:32

## 2018-12-01 RX ADMIN — CHLORHEXIDINE GLUCONATE 1 APPLICATION(S): 213 SOLUTION TOPICAL at 06:02

## 2018-12-01 RX ADMIN — SERTRALINE 50 MILLIGRAM(S): 25 TABLET, FILM COATED ORAL at 12:41

## 2018-12-01 RX ADMIN — Medication 650 MILLIGRAM(S): at 12:42

## 2018-12-01 RX ADMIN — Medication 1 DROP(S): at 22:41

## 2018-12-01 RX ADMIN — Medication 1 PATCH: at 14:47

## 2018-12-01 RX ADMIN — Medication 81 MILLIGRAM(S): at 12:41

## 2018-12-01 RX ADMIN — CHLORHEXIDINE GLUCONATE 15 MILLILITER(S): 213 SOLUTION TOPICAL at 06:03

## 2018-12-01 RX ADMIN — HEPARIN SODIUM 5000 UNIT(S): 5000 INJECTION INTRAVENOUS; SUBCUTANEOUS at 06:02

## 2018-12-01 NOTE — PROGRESS NOTE ADULT - ASSESSMENT
71 yo F, mult problems, including DM, ESRD  At [place] [facility]  Admitted 11/26 with low back pain, FTT  Spine CT on arrival, in c/w 10/30 study, stable endplate erosion L1-L2, presumed degenerative  Surveillance Bld Cxs previously negative, afebrile  S/p Vtach arrest 11/27, intubated, on Amio, Cardene   F/u CT imaging possible pneumonia, colitis, along with severe erosive changes around L2-L3, concerning for osteomyelitis/discitis  Difficult to correlate Ct findings- some discrepancy in interpretation of f/u imaging and dedicated scan of spine- degenerative change vs infection  Her abdominal exam remains benign  Still unresponsive on Vent, ?anoxic event, posturing noted- poor prognosis  Bld Cxs negative, sputum normal be only, colonized with MSSA  CXR clear    Plan:  Question of aspiration pneumonia, normal be, possibly MSSA, continue Cefepime, favor limiting to 5-7 days, today is day 5  Vent support as outlined  Prognosis poor, goals of care being adressed

## 2018-12-01 NOTE — PROGRESS NOTE ADULT - ASSESSMENT
Progress Note Adult-Cardiology Attending [Charted Location:  [id number] [id number]] [Authored: 30-Nov-2018 11:44]- for Visit: [id number], Complete, Entered, Signed in Full, General    Progress Note:   · Provider Specialty	Cardiology	    Reason for Admission:    Reason for Admission:  · Reason for Admission	back pain	      · Subjective and Objective: 	  Follow up for   cardiac arrest    SUBJ:   Patient intubated, unable to communicate. Pressors alternating with antihypertensive therapy, nicardipine.  Family at bedside.    PMH  [place] (hard of hearing)  Cataracts, bilateral  Pleural thickening  Hip pain  LBP radiating to left leg  CHF (congestive heart failure)  MI (myocardial infarction)  Diabetes  Hypothyroidism  ESRD (end stage renal disease)  HTN (hypertension)  MI (myocardial infarction)  Pleural effusion  Hypothyroid  Anxiety  HTN (hypertension)  Hyperlipidemia  CHF (congestive heart failure)  CKD (chronic kidney disease)  Anemia  Diabetes      MEDICATIONS  (STANDING):  artificial  tears Solution 1 Drop(s) Both EYES three times a day  aspirin  chewable 81 milliGRAM(s) Oral daily  atorvastatin 20 milliGRAM(s) Oral at bedtime  cefepime   IVPB 500 milliGRAM(s) IV Intermittent daily  chlorhexidine 0.12% Liquid 15 milliLiter(s) Oral Mucosa two times a day  chlorhexidine 4% Liquid 1 Application(s) Topical <User Schedule>  dextrose 5%. 1000 milliLiter(s) (50 mL/Hr) IV Continuous <Continuous>  dextrose 5%. 1000 milliLiter(s) (30 mL/Hr) IV Continuous <Continuous>  dextrose 50% Injectable 12.5 Gram(s) IV Push once  dextrose 50% Injectable 25 Gram(s) IV Push once  dextrose 50% Injectable 25 Gram(s) IV Push once  heparin  Injectable 5000 Unit(s) SubCutaneous every 12 hours  influenza   Vaccine 0.5 milliLiter(s) IntraMuscular once  levothyroxine Injectable 50 MICROGram(s) IV Push <User Schedule>  niCARdipine Infusion 5 mG/Hr (25 mL/Hr) IV Continuous <Continuous>  norepinephrine Infusion 0.05 MICROgram(s)/kG/Min (3.9 mL/Hr) IV Continuous <Continuous>  pantoprazole   Suspension 40 milliGRAM(s) Oral daily  sertraline 50 milliGRAM(s) Oral daily    MEDICATIONS  (PRN):  acetaminophen    Suspension .. 650 milliGRAM(s) Oral every 6 hours PRN Temp greater or equal to 38C (100.4F)  fentaNYL    Injectable 25 MICROGram(s) IV Push every 4 hours PRN Mild Pain (1 - 3)        PHYSICAL EXAM:  Vital Signs Last 24 Hrs  T(C): 37.2 (30 Nov 2018 08:00), Max: 38.1 (30 Nov 2018 04:00)  T(F): 99 (30 Nov 2018 08:00), Max: 100.5 (30 Nov 2018 04:00)  HR: 82 (30 Nov 2018 11:11) (70 - 92)  /49  RR: 24 (30 Nov 2018 11:00) (12 - 30)  SpO2: 100% (30 Nov 2018 11:11) (99% - 100%)    GENERAL: NAD, well-groomed, well-developed  HEAD:  Atraumatic, Normocephalic  Patient with ET tube  NECK:  No JVD  CHEST/LUNG: Clear to percussion and auscultation bilaterally; No rales, rhonchi, wheezing, or rubs  HEART: Regular rate and rhythm; No murmurs, rubs, or gallops PMI non displaced.  ABDOMEN: Soft, Nontender, Nondistended  EXTREMITIES:   No clubbing, cyanosis, or edema  SKIN: No rashes or lesions  NERVOUS SYSTEM:  Eyes opened, deviated left, unresponsive to commands      TELEMETRY:  sinus    ECG:  < from: 12 Lead ECG (11.30.18 @ 05:53) >    Ventricular Rate 69 BPM    Atrial Rate 69 BPM    P-R Interval 264 ms    QRS Duration 104 ms    Q-T Interval 552 ms    QTC Calculation(Bezet) 591 ms    P Axis 61 degrees    R Axis -58 degrees    T Axis 166 degrees    Diagnosis Line Sinus rhythm [id number] degree AV block with premature atrial complexes with aberrant conduction  Incomplete right bundle branch block  Left anterior fascicular block  Cannot rule out Inferior infarct (cited on or before 29-NOV-2018)  ST and T wave abnormality, considerlateral ischemia  Prolonged QT  Abnormal ECG  When compared with ECG of 29-NOV-2018 04:49,  aberrant conduction is now present  Nonspecific T wave abnormality has replaced inverted T waves in Inferior leads  Confirmed by HAYDEN SALAMANCA, DENIA SKINNER ([id number]) on 11/30/2018 8:46:44 AM    < end of copied text >      LABS:                        8.6    16.5  )-----------( 248      ( 30 Nov 2018 05:30 )             27.2     11-30    138  |  98  |  11  ----------------------------<  123<H>  2.9<LL>   |  32<H>  |  1.84<H>    Ca    8.6      30 Nov 2018 05:30  Phos  2.2     11-30  Mg     1.9     11-30    TPro  6.4  /  Alb  2.5<L>  /  TBili  0.7  /  DBili  x   /  AST  30  /  ALT  11  /  AlkPhos  153<H>  11-28            I&O's Summary    29 Nov 2018 07:01  -  30 Nov 2018 07:00  --------------------------------------------------------  IN: 1292.6 mL / OUT: 2464 mL / NET: -1171.4 mL    30 Nov 2018 07:01  -  30 Nov 2018 11:45  --------------------------------------------------------  IN: 530 mL / OUT: 51 mL / NET: 479 mL      RADIOLOGY & ADDITIONAL STUDIES:  < from: Xray Chest 1 View- PORTABLE-Routine (11.30.18 @ 09:25) >    EXAM:  XR CHEST PORTABLE ROUTINE 1V      PROCEDURE DATE:  11/30/2018        INTERPRETATION:  INDICATION: Abnormal Chest Sounds    PRIORS: November 29, 2018    VIEWS: Portable AP radiography of the chest performed.    FINDINGS: Heart size appears within normal limits. There is no   significant interval change in position of the indwelling ETT, right IJ   CVC or NGT. Note is made of an implanted cardiac loop monitor. No   definite hilar or superior mediastinal abnormalities are identified.   There is no evidence for interval development of focal infiltrate, lobar   consolidation or pulmonary edema. No pleural effusion or pneumothorax   demonstrated. No mediastinal shift is noted. Vascular stent material   overlies the left axillary region.    IMPRESSION: No evidence for interval development of focal infiltrate or   lobar consolidation. No pulmonary edema identified.    < end of copied text >              Review of Systems:   · Additional ROS	Unobtainable ROS/intuabed, poor mental status	    Assessment and Plan:   · Assessment		  S/p VF arrest. ASHD, ESRD. Labile BP. Poor mental status/anoxic encephalopathy.  QT prolongation, may be from amiodarone, possibly neurologic.  Prognosis poor  Would not continue amiodarone at this time.  If BP permits, b blocker.

## 2018-12-01 NOTE — PROGRESS NOTE ADULT - SUBJECTIVE AND OBJECTIVE BOX
Follow up for: VT arrest    SUBJ:  pt not responsive    PMH  [place] (hard of hearing)  Cataracts, bilateral  Pleural thickening  Hip pain  LBP radiating to left leg  CHF (congestive heart failure)  MI (myocardial infarction)  Diabetes  Hypothyroidism  ESRD (end stage renal disease)  HTN (hypertension)  MI (myocardial infarction)  Pleural effusion  Hypothyroid  Anxiety  HTN (hypertension)  Hyperlipidemia  CHF (congestive heart failure)  CKD (chronic kidney disease)  Anemia  Diabetes      MEDICATIONS  (STANDING):  artificial  tears Solution 1 Drop(s) Both EYES three times a day  aspirin  chewable 81 milliGRAM(s) Oral daily  atorvastatin 20 milliGRAM(s) Oral at bedtime  cefepime   IVPB 500 milliGRAM(s) IV Intermittent daily  chlorhexidine 0.12% Liquid 15 milliLiter(s) Oral Mucosa two times a day  chlorhexidine 4% Liquid 1 Application(s) Topical <User Schedule>  cloNIDine Patch 0.2 mG/24Hr(s) 1 patch Transdermal every 7 days  dextrose 5%. 1000 milliLiter(s) (50 mL/Hr) IV Continuous <Continuous>  dextrose 50% Injectable 12.5 Gram(s) IV Push once  dextrose 50% Injectable 25 Gram(s) IV Push once  dextrose 50% Injectable 25 Gram(s) IV Push once  heparin  Injectable 5000 Unit(s) SubCutaneous every 12 hours  influenza   Vaccine 0.5 milliLiter(s) IntraMuscular once  levothyroxine Injectable 50 MICROGram(s) IV Push <User Schedule>  morphine  Infusion 2 mG/Hr (2 mL/Hr) IV Continuous <Continuous>  niCARdipine Infusion 5 mG/Hr (25 mL/Hr) IV Continuous <Continuous>  norepinephrine Infusion 0.05 MICROgram(s)/kG/Min (3.9 mL/Hr) IV Continuous <Continuous>  pantoprazole   Suspension 40 milliGRAM(s) Oral daily  sertraline 50 milliGRAM(s) Oral daily    MEDICATIONS  (PRN):  acetaminophen    Suspension .. 650 milliGRAM(s) Oral every 6 hours PRN Temp greater or equal to 38C (100.4F)  fentaNYL    Injectable 25 MICROGram(s) IV Push every 4 hours PRN Mild Pain (1 - 3)        PHYSICAL EXAM:  Vital Signs Last 24 Hrs  T(C): 39.2 (01 Dec 2018 12:00), Max: 39.2 (01 Dec 2018 12:00)  T(F): 102.6 (01 Dec 2018 12:00), Max: 102.6 (01 Dec 2018 12:00)  HR: 93 (01 Dec 2018 16:00) (73 - 99)  BP: 111/31 (01 Dec 2018 14:00) (106/50 - 111/31)  BP(mean): 53 (01 Dec 2018 14:00) (51 - 65)  RR: 37 (01 Dec 2018 16:00) (4 - 37)  SpO2: 100% (01 Dec 2018 16:00) (93% - 100%)    GENERAL: NAD, well-groomed, well-developed  HEAD:  Atraumatic, Normocephalic  EYES: EOMI, PERRLA, conjunctiva and sclera clear  ENT: Moist mucous membranes,  NECK: Supple, No JVD, no bruits  CHEST/LUNG: Clear to percussion bilaterally; No rales, rhonchi, wheezing, or rubs  HEART: Regular rate and rhythm; No murmurs, rubs, or gallops PMI non displaced.  ABDOMEN: Soft, Nontender, Nondistended; Bowel sounds present  EXTREMITIES:  2+ Peripheral Pulses, No clubbing, cyanosis, or edema  SKIN: No rashes or lesions  NERVOUS SYSTEM:  Alert & Oriented X3, Good concentration; Motor Strength 5/5 B/L upper and lower extremities; DTRs 2+ intact and symmetric      LABS:                        8.9    20.1  )-----------( 252      ( 01 Dec 2018 05:45 )             28.3     12-01    137  |  99  |  20  ----------------------------<  128<H>  3.2<L>   |  26  |  2.39<H>    Ca    8.4      01 Dec 2018 05:45  Phos  3.2     12-01  Mg     1.7     12-01    TPro  6.5  /  Alb  2.5<L>  /  TBili  0.8  /  DBili  x   /  AST  21  /  ALT  8<L>  /  AlkPhos  188<H>  12-01            I&O's Summary    30 Nov 2018 07:01  -  01 Dec 2018 07:00  --------------------------------------------------------  IN: 1970 mL / OUT: 53 mL / NET: 1917 mL    01 Dec 2018 07:01  -  01 Dec 2018 17:38  --------------------------------------------------------  IN: 9 mL / OUT: 1 mL / NET: 8 mL      BNP    RADIOLOGY & ADDITIONAL STUDIES:    ECHO:    ASSESMENT AND PLAN:    Continue current management

## 2018-12-01 NOTE — PROGRESS NOTE ADULT - ASSESSMENT
Physical Examination:  GENERAL:                 Intubated,  eyes open, responding to external stimuli verbal/tactile , not tracking  HEENT:                     Eyes , Reactive, right gaze preference dry MM, NO JVD , cachectic   PULM:                       Bilateral air entry, no significant sputum production, No Rales, trace Rhonchi, No Wheezing  CVS:                         S1, S2,  + Murmur  ABD:                        Soft, nondistended, nontender, normoactive bowel sounds,   EXT:                         mild edema, nontender, No Cyanosis or Clubbing   Vascular:                 Cool Extremities, Normal Capillary refill, Normal Distal Pulses  SKIN:                       Warm and well perfused, no rashes noted.   NEURO:                  unresponsive, not following commands, not localizing.        Assessment  1. S/P Cardiac arrest, VF 11/27 with prolonged QT s/p Therapeutic hypothermia  , s/p amiodarone drip suspecting anoxic encephelopathy -> appears like vegetative state  2. Severe Hypoglycemia in patient with DM2 - improved.   3. Acute respiratory failure   4. Laibile BP - either on cardene drip vs Levophed drip to maintain BP  4. ESRD on HD  5. Chronic Diastolic CHF  6. CAD s/p MI,   7. Hypothyroidism,   8. Back Pain - ? Osteo of Spine,   9. ? Dementia  10. Lactic acidosis Resolved    Plan  Mechanical ventilation, cpap trial,   Cardene for bp, will start clonidine patch for bp management to taper Cardene .  empiric abx to control for aspiration risk and Osteo of spine as per ID  Tube feeding  FS q4 hrs  cardio ok to hold amio at this time.  daily ekg   family meeting today      Family Updated: 	Dtr	                                 Date: 11/30      Sedation & Analgesia:	minimize as tolerated  Diet/Nutrition:		npo  GI PPx:			ppi    DVT Ppx:		hep sq        Activity:		    bedrest             Head of Bed:               35-45 deg  Glycemic Control:        on hold    Lines:  CENTRAL LINE: 	[x ] YES [ ] NO	                    LOCATION:   [facility]	                       DATE INSERTED:   11/27	                    REMOVE:  [ ] YES [x ] NO    A-LINE:  	                [x ] YES [ ] NO                      LOCATION:   	   R Radial                    DATE INSERTED: 	11/27	            REMOVE:  [ ] YES [x ] NO    COYLE: 		        [ ] YES [x ] NO  		                                       DATE INSERTED:	            REMOVE:  [ ] YES [ ] NO      Restraints may be deemed necessary to prevent removal of life-sustaining devices [ x ] YES   [    ]  NO    Disposition: ICU care    Goals of Care: Full code  Prognosis - Poor.

## 2018-12-01 NOTE — PROGRESS NOTE ADULT - SUBJECTIVE AND OBJECTIVE BOX
Follow-up Critical Care Progress Note  Chief Complaint : Pneumonia      pt intubated off sedation  eyes open to touch and voice  not follows commands  family bedside, state rest of family come in few hrs.      Allergies :Avelox (Unknown)  fish (Hives; Rash)  shellfish (Unknown)      PAST MEDICAL & SURGICAL HISTORY:  [place] (hard of hearing)  Cataracts, bilateral: left worse than right  Pleural thickening: s/p pleurodisis left vats 2014  Hip pain: left  LBP radiating to left leg  CHF (congestive heart failure): diagnosed 2-2014  MI (myocardial infarction): 2-2014   [facility]  Diabetes: diagnosed 2004  no medications in 5 months since hemodialysis  Hypothyroidism  ESRD (end stage renal disease)  HTN (hypertension)  MI (myocardial infarction): may 2016  Pleural effusion: Left side - several times since March 2014  Hypothyroid  Anxiety  HTN (hypertension)  Hyperlipidemia  CHF (congestive heart failure)  CKD (chronic kidney disease)  Anemia  Diabetes: DM 2  Thoracotomy scar of left chest: 4-2014 ? pneumonia/ scarring  S/P myomectomy: abdominal age 52  AV fistula: left upper 8-2014 attempted 12-16-14      Medications:  MEDICATIONS  (STANDING):  artificial  tears Solution 1 Drop(s) Both EYES three times a day  aspirin  chewable 81 milliGRAM(s) Oral daily  atorvastatin 20 milliGRAM(s) Oral at bedtime  cefepime   IVPB 500 milliGRAM(s) IV Intermittent daily  chlorhexidine 0.12% Liquid 15 milliLiter(s) Oral Mucosa two times a day  chlorhexidine 4% Liquid 1 Application(s) Topical <User Schedule>  dextrose 5%. 1000 milliLiter(s) (50 mL/Hr) IV Continuous <Continuous>  dextrose 50% Injectable 12.5 Gram(s) IV Push once  dextrose 50% Injectable 25 Gram(s) IV Push once  dextrose 50% Injectable 25 Gram(s) IV Push once  heparin  Injectable 5000 Unit(s) SubCutaneous every 12 hours  influenza   Vaccine 0.5 milliLiter(s) IntraMuscular once  levothyroxine Injectable 50 MICROGram(s) IV Push <User Schedule>  niCARdipine Infusion 5 mG/Hr (25 mL/Hr) IV Continuous <Continuous>  norepinephrine Infusion 0.05 MICROgram(s)/kG/Min (3.9 mL/Hr) IV Continuous <Continuous>  pantoprazole   Suspension 40 milliGRAM(s) Oral daily  sertraline 50 milliGRAM(s) Oral daily    MEDICATIONS  (PRN):  acetaminophen    Suspension .. 650 milliGRAM(s) Oral every 6 hours PRN Temp greater or equal to 38C (100.4F)  fentaNYL    Injectable 25 MICROGram(s) IV Push every 4 hours PRN Mild Pain (1 - 3)      LABS:                        8.9    20.1  )-----------( 252      ( 01 Dec 2018 05:45 )             28.3     12-01    137  |  99  |  20  ----------------------------<  128<H>  3.2<L>   |  26  |  2.39<H>    Ca    8.4      01 Dec 2018 05:45  Phos  3.2     12-01  Mg     1.7     12-01    TPro  6.5  /  Alb  2.5<L>  /  TBili  0.8  /  DBili  x   /  AST  21  /  ALT  8<L>  /  AlkPhos  188<H>  12-01                        CULTURES: (if applicable)    Culture - Sputum (collected 11-28-18 @ 01:00)  Source: .Sputum Sputum  Gram Stain (11-28-18 @ 12:26):    Few polymorphonuclear leukocytes per low power field    No Squamous epithelial cells per low power field    No organisms seen per oil power field  Final Report (11-30-18 @ 08:39):    Normal Respiratory Sania present    Culture - Blood (collected 11-26-18 @ 16:15)  Source: .Blood Blood-Venous  Preliminary Report (11-27-18 @ 19:02):    No growth to date.    Culture - Blood (collected 11-26-18 @ 16:15)  Source: .Blood Blood-Peripheral  Preliminary Report (11-27-18 @ 19:02):    No growth to date.            CAPILLARY BLOOD GLUCOSE      POCT Blood Glucose.: 126 mg/dL (01 Dec 2018 04:35)      RADIOLOGY  CXR:  < from: Xray Chest 1 View- PORTABLE-Routine (11.30.18 @ 09:25) >  IMPRESSION: No evidence for interval development of focal infiltrate or lobar consolidation. No pulmonary edema identified.    < end of copied text >      CT:    ECHO:      VITALS:  T(C): 36.6 (12-01-18 @ 04:00), Max: 36.9 (11-30-18 @ 23:00)  T(F): 97.9 (12-01-18 @ 04:00), Max: 98.5 (11-30-18 @ 23:00)  HR: 78 (12-01-18 @ 05:00) (72 - 86)  BP: --  BP(mean): --  ABP: 154/105 (12-01-18 @ 05:00) (119/72 - 173/55)  ABP(mean): 126 (12-01-18 @ 05:00) (80 - 135)  RR: 13 (12-01-18 @ 05:00) (11 - 24)  SpO2: 100% (12-01-18 @ 06:19) (93% - 100%)  CVP(mm Hg): 9 (12-01-18 @ 05:00) (9 - 20)  CVP(cm H2O): --    Ins and Outs     11-30-18 @ 07:01  -  12-01-18 @ 07:00  --------------------------------------------------------  IN: 1970 mL / OUT: 53 mL / NET: 1917 mL        Height (cm): 144.78 (11-28-18 @ 09:37)  Weight (kg): 41.6 (11-28-18 @ 09:37)  BMI (kg/m2): 19.8 (11-28-18 @ 09:37)    Device: 840, Mode: AC/ CMV (Assist Control/ Continuous Mandatory Ventilation), RR (machine): 12, RR (patient): 24, TV (machine): 300, TV (patient): 337, FiO2: 35, PEEP: 5, PIP: 21

## 2018-12-01 NOTE — PROGRESS NOTE ADULT - SUBJECTIVE AND OBJECTIVE BOX
CC: f/u for possible aspiration post cardiac arrest, possible spine infection    Patient reports    REVIEW OF SYSTEMS:  All other review of systems negative (Comprehensive ROS)    Antimicrobials Day #  :day 5  cefepime   IVPB 500 milliGRAM(s) IV Intermittent daily    Other Medications Reviewed    T(F): 97.9 (12-01-18 @ 04:00), Max: 99 (11-30-18 @ 08:00)  HR: 77 (12-01-18 @ 04:00)  BP: --  RR: 18 (12-01-18 @ 04:00)  SpO2: 100% (12-01-18 @ 04:00)  Wt(kg): --    PHYSICAL EXAM:  General: poorly interactiveno acute distress  Eyes:  anicteric, no conjunctival injection, no discharge  Oropharynx: ETT	  Neck: supple, without adenopathy  Lungs: few ronchi  Heart: regular rate and rhythm; no murmur, rubs or gallops  Abdomen: soft, nondistended, nontender, without mass or organomegaly  Skin: no lesions  Extremities: no clubbing, cyanosis, or edema  Neurologic: poorly interactive    LAB RESULTS:                        8.6    16.5  )-----------( 248      ( 30 Nov 2018 05:30 )             27.2     11-30    138  |  98  |  11  ----------------------------<  123<H>  2.9<LL>   |  32<H>  |  1.84<H>    Ca    8.6      30 Nov 2018 05:30  Phos  2.2     11-30  Mg     1.9     11-30          MICROBIOLOGY:  RECENT CULTURES:  11-28 @ 01:00 .Sputum Sputum     Normal Respiratory Sania present    Few polymorphonuclear leukocytes per low power field  No Squamous epithelial cells per low power field  No organisms seen per oil power field    11-26 @ 16:15 .Blood Blood-Peripheral     No growth to date.          RADIOLOGY REVIEWED:  < from: Xray Chest 1 View- PORTABLE-Routine (11.30.18 @ 09:25) >  IMPRESSION: No evidence for interval development of focal infiltrate or   lobar consolidation. No pulmonary edema identified.    < end of copied text >

## 2018-12-02 VITALS
OXYGEN SATURATION: 92 % | TEMPERATURE: 100 F | RESPIRATION RATE: 25 BRPM | SYSTOLIC BLOOD PRESSURE: 118 MMHG | DIASTOLIC BLOOD PRESSURE: 32 MMHG | HEART RATE: 88 BPM

## 2018-12-02 RX ADMIN — MORPHINE SULFATE 2 MG/HR: 50 CAPSULE, EXTENDED RELEASE ORAL at 05:25

## 2018-12-02 NOTE — DISCHARGE NOTE FOR THE EXPIRED PATIENT - HOSPITAL COURSE
72F initially admitted ith back pain, possible spinal infection. Suffered prolonged carfdiac arrest while on tele floor. Anoxic post arrest. S/P cooling protocol, no improvement in mental status. Family elected to amke patient DNR/DNI and remove mechanical ventilator support, start comfort care.  -patient without radial, carotid, femoral pulses and no spontaneous respiration. TOD 0582  -ICU attending notified at 0540 72F initially admitted with back pain, possible spinal infection. Suffered prolonged cardiac arrest while on tele floor associated with VF and hypoglycemia. Anoxic post arrest. S/P cooling protocol, no improvement in mental status. Family elected to amke patient DNR/DNI and remove mechanical ventilator support, start comfort care.  no autopsy desired.  -patient without radial, carotid, femoral pulses and no spontaneous respiration. TOD 0534  -ICU attending notified at 0540    For full account of hospital course please refer to actual medical records. As this is a brief summery.

## 2019-06-19 NOTE — PHYSICAL THERAPY INITIAL EVALUATION ADULT - SITTING BALANCE: STATIC
Lab: [id number]
Add [id number] To Bill?: No
Billing Type: Third-Party Bill
Detail Level: Detailed
Lab Facility: [id number]
good balance

## 2019-07-13 NOTE — PROGRESS NOTE ADULT - NSHPATTENDINGPLANDISCUSS_GEN_ALL_CORE
<<<RESIDENT DISCHARGE NOTE>>>     DENISSE GALVAN  MRN-[id number]    VITAL SIGNS:  T(F): 96.8 (07-13-19 @ 05:18), Max: 97.5 (07-12-19 @ 17:00)  HR: 67 (07-13-19 @ 05:18)  BP: 157/92 (07-13-19 @ 05:18)  SpO2: --      PHYSICAL EXAMINATION:  General: NAD AAOX3  Head & Neck: NC AT  Pulmonary: bilateral wheezes, no crackles  Cardiovascular: RRR, s1, s2  Gastrointestinal/Abdomen & Pelvis: soft, nontender, nondistended        FINAL DISCHARGE INTERVIEW:  Resident(s) Present: Name: Dr. Cole Costa, RN Present: Name: Aren NELSON MEDICATION RECONCILIATION  reviewed with Attending Name: Dr. Willie Rosa    DISPOSITION:   [ X ] Home,    [  ] Home with Visiting Nursing Services,   [    ]  SNF/ NH,    [   ] Acute Rehab (4A),   [   ] Other (Specify:_________)
DO Efrain, and viktoriya.
On site NP.

## 2019-07-25 NOTE — CONSULT NOTE ADULT - PROBLEM SELECTOR RECOMMENDATION 4
- Hgb appears to be at baseline from previous admissions  - no overt gi bleeding with bowel movements  - suspect in setting of AOCKD  - iron studies pendning  - gi ppx with protonix  - cont epogen with hd per renal  - consider heme eval
Pt is full code. Surrogate is pts . Per prior discussions with primary team, pt and family want to pursue HD, full code. Palliative Care following for symptom management.
14-Apr-2019

## 2019-09-30 NOTE — H&P ADULT - PROBLEM SELECTOR PROBLEM 5
[FreeTextEntry1] : 70 yr F, hx of microhematuria and kidney stones, presenting for f/u of UTI. Patient culture 3 weeks ago positive for E. Coli, given cipro 3 days and pyridamine 2 days, felt urinary symptoms improved slightly when on medication, but now endorsing dysuria and cloudy urine. Patient also reported calf tenderness while taking cipro. Denies fevers, chills, hematuria, urinary frequency, urinary urgency. On last visit, patient had reported mild stress incontinence and was asked to drink slightly less water (prev at 4 large bottles of water per day), continues to endorse mild incontinence only when coughing severely.\par \par Patient has 8 mm L intrarenal stone seen on CT that continues to be asymptomatic, denies flank pain or gross hematuria. Negative cytology\par \par In regards to vaginal atrophy seen on last exam, patient was not able to obtain estrogen cream because it was too expensive. Does not have complaints at this time.\par \par Patient is [place] speaking with limited english, hx obtained from patient and daughter in room HTN (hypertension)

## 2020-05-04 NOTE — PATIENT PROFILE ADULT - NSTOBACCONEVERSMOKERY/N_GEN_A
No Topical Clindamycin Pregnancy And Lactation Text: This medication is Pregnancy Category B and is considered safe during pregnancy. It is unknown if it is excreted in breast milk.

## 2020-08-28 NOTE — PROGRESS NOTE ADULT - PROBLEM SELECTOR PROBLEM 2
ESRD (end stage renal disease)
Falls frequently
[Joint Pain] : joint pain

## 2020-09-21 NOTE — ED ADULT NURSE NOTE - CADM POA CENTRAL LINE
Detail Level: Detailed Quality 130: Documentation Of Current Medications In The Medical Record: Current Medications Documented No

## 2020-12-16 PROBLEM — Z87.09 HISTORY OF INFLUENZA: Status: RESOLVED | Noted: 2018-01-19 | Resolved: 2020-12-16

## 2020-12-21 NOTE — ED ADULT NURSE NOTE - CHIEF COMPLAINT QUOTE
s/p mechanical fall/hematoma to left eye on blood thinner Comment: She has had this for close to 10 years. Currently no erythema or flaring around the follicles. She denies that it every was and it is asymptomatic. We discussed trying a topical for a few months to help stabalize. RTC in 3 month Detail Level: Simple

## 2021-03-25 NOTE — PROGRESS NOTE ADULT - REASON FOR ADMISSION
back pain
72.5

## 2021-05-04 NOTE — CONSULT NOTE ADULT - PROBLEM SELECTOR RECOMMENDATION 3
Patient with anemia in the setting of ESRD. Hemoglobin below target range. Will reconcile SHAY from HD facility. Consider blood transfusion if patient is symptomatic. Monitor hemoglobin Instructions: This plan will send the code FBSD to the PM system.  DO NOT or CHANGE the price. Detail Level: Simple Price (Do Not Change): 0.00

## 2021-06-24 NOTE — DISCHARGE NOTE ADULT - PROVIDER RX CONTACT NUMBER
Assessment/Plan:    Rash  Most likely urticaria of unknown origin, reports similar rash about a year ago with no provoking factors  Continue and complete Prednisolone as prescribed  May discontinue Benadryl, but take 2nd generations antihistamines, mother reports that she has [facility] at home  Continue Hydrocortisone cream on the body (avoid face), may use emollients such as Vaseline, Aquaphor on the face  Return if no improvement  May consider allergologist referral if similar rash appear again       Diagnoses and all orders for this visit:    Rash          Subjective:      Patient ID: Nidia Zuniga is a 12 y o  female  Patient presented due to the rash that has been going on for about 5 days, was seen in ED yesterday, prescribed Prednisolone, Hydrocortizone cream and Benadryl  Mother reports given just 1 dose of Benadryl and feels that the rash got worse (more raised) after Benadryl; patient got only 1 dose of Prednisolone this am   No known exposure to allergens  The following portions of the patient's history were reviewed and updated as appropriate: allergies, current medications, past family history, past medical history, past social history, past surgical history and problem list     Review of Systems   Constitutional: Negative  HENT: Negative  Eyes: Negative  Respiratory: Negative  Skin: Positive for rash  Objective:      BP (!) 92/64 (BP Location: Left arm, Patient Position: Sitting, Cuff Size: Child)   Pulse 88   Temp 98 3 °F (36 8 °C) (Temporal)   Resp 18   Ht 3' 10 8" (1 189 m)   Wt 21 4 kg (47 lb 3 2 oz)   BMI 15 15 kg/m²          Physical Exam  Constitutional:       General: She is active  Cardiovascular:      Rate and Rhythm: Normal rate and regular rhythm  Pulmonary:      Effort: Pulmonary effort is normal  No respiratory distress  Musculoskeletal:      Cervical back: Normal range of motion     Skin:     Findings: Rash (multiple papulas on the abdomen, hands, lower extremities; erymatous raised patches on the face  See images) present  Neurological:      Mental Status: She is alert  [phone]

## 2021-08-15 NOTE — PATIENT PROFILE ADULT - NSPROMEDSADMININFO_GEN_A_NUR
Patient is a 72-year-old female who was referred by Dr. Branden Nava to be seen for fatty liver and elevated liver enzymes. A copy of the note will be sent to the referring provider. Patient is listed as having had a fatty liver on prior imaging. Patient noted to be overweight with a BMI of 26.47 back on May 19, 2021 visit. Prior laboratories done in May 2021 showed a glucose elevated 130 BUN of 14 creatinine 0.76 BUN of 14 creatinine 0.76 sodium 140 potassium 4.3 chloride 105 CO2 of 20 calcium 9.3 albumin 4.0 bilirubin 0.4 alkaline phosphatase 72 AST 60 ALT 43.  Patient had a fibrosure test done showing a 0.33 fibrosis score which would be F1 F2 and a steatosis score of 0.31 and a Belle score of 0.25.  The steatosis score was as 0 to S1 which was minimal steatosis and the Belel score of 0.25 would suggest that the patient is not Belle the least by this noninvasive system. Important to note that the triglycerides were up on that test of 183 glucose was up at 126 and again the AST and ALT were 61 and 46. Other labs from that May 2021 show white count 8.1 hemoglobin 14.4 plate count low at 60,000 MCV 91 neutrophils 6.0 INR 1.0. We saw colonoscopy done by Dr. Salvador Fritz on the patient in February 2021 the mention that she had a polyp 8 mm in the sigmoid colon normal mucosa in the colon and grade 2 internal hemorrhoids there were noted. Prior January 2021 labs show sodium 141 potassium 4.0 chloride 107 CO2 of 23 BUN of 12 Kren 0.7 glucose 130 calcium 9.5 alk phos 74 AST 51 ALT 37 total bili 0.5 W count 7.5 hemoglobin 13.9 platelet count 89 neutrophils were 6.2 April 2021 ultrasound done locally showed portal vein to be patent.  Hepatic artery is patent.  Hepatic veins are patent.  Liver was fatty at 15.9 cm with no lesions.  Overall they felt the liver was fatty.
no concerns

## 2021-08-17 NOTE — ED ADULT NURSE NOTE - NSHISCREENINGQ1_ED_A_ED
SUBJECTIVE:Pt reporting she has been sleeping well. CAREGIVER ASSISTANCE NEEDED FOR: family assisting as needed  MEDICATIONS REVIEWED AND UPDATED: pt Indep with med management  Pippa Joyce changed, healing incision site is clean and dry  ROM: 0-102 degrees  BED MOBILITY:Indep  TRANSFERS:Mod Indep using B UE's and AD  GAIT TRAINING performed in order to reduce gait impairments and reduce the risk for falls: pt amb with RW in and out of the home 350 feet with supervision and VC to increase heel strike and postural adjustments. STAIRS:pt demonstrates safe technique using 1 handrail and same step sequence 14 steps several times a day. handrail and same step 4 steps to enter and exit the the home Supervision and VC to carry folded RW safely up and down steps  THERAPEUTIC EXERCISE instructed to improve ROM and strength TKR ptotocol 20 reps 3x's daily  Supine; AP, QS, HS, SAQ, Heel Slides, SLR  Seated; TKE, Knee Flexion Stretch  Standing; Mini Squats, Heel Riases, Hip Flexion, Hip Abd/Add, Hip Ext, Hs Curls  ASSESSMENT AND PROGRESS TOWARD GOALS:  Patient demonstrated good technique and compliance with HEP, needs cont cueing for ease and quality of gait. CONTINUED NEED FOR THE FOLLOWING SKILLS: Home Health PT is medically necessary to address pain, decreased ROM, decreased strength, increased swelling, impaired bed mobility, decreased independence with functional transfers, impaired gait, impaired stair negotiation, and impaired balance in order to improve functional independence, quality of life, return to PLOF, reduce the risk for falls, and reduce pain.   PLAN: pln 2w1,1w1 and progression to [facility] next treatment per pt request   DISCHARGE PLANNING DISCUSSED: Discharge to self and family under MD supervision once all goals have been met or patient has reached maximum potential. No

## 2021-10-07 NOTE — BEHAVIORAL HEALTH ASSESSMENT NOTE - NSBHCONSULTMEDPRNREASON_PSY_A_CORE
[facility] follow up call with Angie to review her plan for surgery and answer any questions she might have. Angie stated she had a hard time making up her mind, but after talking to Dr Diallo this week she feels better about moving forward with the partial mastectomy alone and not having the reduction. She said \"it just didn't feel right\" so she elected not to have the additional surgery.  I reinforced that this was her decision to make and we would support her in that.   Surgery is scheduled for Nov 1 and we reviewed appt with PCP, labs and EKG orders, clear liquids morning of surgery and carbohydrate drink.  Angie stated she had the Hibiclens soap and the instructions.  We reviewed post op activity restrictions and supportive bra use.  All questions answered and emotional support provided.  Angie verbalized understanding of information discussed and was encouraged to call back with any future questions.  I will continue to be a resource as needed.    anxiety...

## 2022-02-03 NOTE — PROVIDER CONTACT NOTE (CHANGE IN STATUS NOTIFICATION) - SITUATION
Pt found unresponsive by PCA. 64 y/o male with PMHx of cervical myelopathy with cervical radiculopathy, cervical spine stenosis, empyema s/p decortication thrombocytosis, HTN, PNA presents to the ED BIBA from home for evaluation s/p syncopal episode. Pt states he was in the kitchen at home with family, when had suddenly had a syncopal episode. States he had a similar episode many years ago. No PMD. +Recreational marijuana use. 64 y/o male with PMHx of cervical myelopathy with cervical radiculopathy, cervical spine stenosis, empyema s/p decortication thrombocytosis, HTN, PNA presents to the ED BIBA from home for evaluation s/p syncopal episode. Pt states he was in the kitchen at home with family, when had suddenly had a syncopal episode. Pt also found to have urinated himself during transport. States he had a similar episode many years ago. No PMD. +Recreational marijuana use.

## 2022-06-01 NOTE — PROGRESS NOTE ADULT - PROBLEM SELECTOR PROBLEM 3
Hypertension, unspecified type I, Reagan Tanner, performed a history and physical exam of the patient and discussed their management with the resident and/or advanced care provider. I reviewed the resident and/or advanced care provider's note and agree with the documented findings and plan of care. I was present and available for all procedures.    67 y/o gentlemen w/ pmhx of HLD, mediterranean anemia p/w epigastric for 2-3 weeks. Abdominal pain is intermittent sharp and episodes last for 1 hour at time occasionally with associated mild nausea. The pain is not related to activity/exertion or eating. He describes the pain as a sensation of bloating. He denies any chest pain, sob, ARTEAGA, vomiting, diarrhea or recent illness. He has never experienced this before.  He was seen on May 20th at which time his work up was unremarkable. RUQ sonogram was negative. EKG at that time NSR w/ poor R wave progression. History of 40-50 pack years and active smoker.  denies other drug use    During assessment, patients EKG showed ST elevations in V2-V5 without reciprocal depression. Code STEMI called. Cardiology contacted immediately. Aspirin 325mg given stat. Atorvastatin 80mg ordered.    Well appearing and in NAD, head normal appearing atraumatic, trachea midline, no respiratory distress, lungs cta bilaterally, rrr no murmurs, soft NT slightly distended abdomen, no visible extremity deformities, Alert and oriented, non focal neuro exam, skin warm and dry, normal affect and mood    concerning for abd distention in the setting of a neg w/u recently concerning for other etiologys of bloating and abd distention will broadly eval, enzymes, ekg, screening labs, analgesia, poss ctap if other w/u neg and remaining with pain or worsening exam, discussed with patient and family at bedside agreeable with plan, unlikely dissection aaa or pe, will eval for acs I, Reagan Tanner, performed a history and physical exam of the patient and discussed their management with the resident and/or advanced care provider. I reviewed the resident and/or advanced care provider's note and agree with the documented findings and plan of care. I was present and available for all procedures.    67 y/o gentlemen w/ pmhx of HLD, mediterranean anemia p/w epigastric for 2-3 weeks. Abdominal pain is intermittent sharp and episodes last for 1 hour at time occasionally with associated mild nausea. The pain is not related to activity/exertion or eating. He describes the pain as a sensation of bloating. He denies any chest pain, sob, ARTEAGA, vomiting, diarrhea or recent illness. He has never experienced this before.  He was seen on May 20th at which time his work up was unremarkable. RUQ sonogram was negative. EKG at that time NSR w/ poor R wave progression. History of 40-50 pack years and active smoker.  denies other drug use    Well appearing and in NAD, head normal appearing atraumatic, trachea midline, no respiratory distress, lungs cta bilaterally, rrr no murmurs, soft NT slightly distended abdomen, no visible extremity deformities, Alert and oriented, non focal neuro exam, skin warm and dry, normal affect and mood    During assessment, EKG completed and showed ST elevations in V2-V5 without reciprocal depression. Code STEMI called. Cardiology contacted immediately. Aspirin 325mg given stat.     concerning for abd distention in the setting of a neg w/u recently concerning for other etiology's of bloating and abd distention will broadly eval, enzymes, ekg, screening labs, analgesia, poss ctap if other w/u neg and remaining with pain or worsening exam, discussed with patient and family at bedside agreeable with plan, unlikely dissection aaa or pe, will eval for acs    patient does not have a cardiologist

## 2022-06-07 NOTE — PATIENT PROFILE ADULT - NSPROALCOHOLUSE2_GEN_A_NUR
never Azithromycin Pregnancy And Lactation Text: This medication is considered safe during pregnancy and is also secreted in breast milk.

## 2023-01-13 NOTE — PHYSICAL THERAPY INITIAL EVALUATION ADULT - ASR WT BEARING STATUS EVAL
no weight-bearing restrictions Clofazimine Pregnancy And Lactation Text: This medication is Pregnancy Category C and isn't considered safe during pregnancy. It is excreted in breast milk.

## 2023-04-26 NOTE — PATIENT PROFILE ADULT - BRADEN MOISTURE
(3) occasionally moist Topical Clindamycin Pregnancy And Lactation Text: This medication is Pregnancy Category B and is considered safe during pregnancy. It is unknown if it is excreted in breast milk.

## 2023-05-30 NOTE — CONSULT NOTE ADULT - CONSULT REASON
-Reviewed hospitalization with patient including labs, imaging, surgical path, documentation  -Recommend scheduling follow-up with general surgeon  -Healing well postoperatively.  Continue analgesia with Ibuprofen and Tylenol.  -Continue to avoid constipation with increased water intake and a fiber rich diet  -Recheck metabolic panel given mild hyponatremia during hospitalization which suspect related to dehydration  
Advanced directives
Cardiac arrest
Cardiac arrest
ESRD
arrest
possible lumbar osteo, s/p cardiac arrest

## 2023-06-15 NOTE — H&P CARDIOLOGY - NEGATIVE RESPIRATORY AND THORAX SYMPTOMS
FeNo was 7  Refilled Albuterol  [place] next visit  UTD immunisations   no wheezing/no pleuritic chest pain/no hemoptysis/no cough

## 2023-06-22 NOTE — PROGRESS NOTE ADULT - ATTENDING COMMENTS
Performing Laboratory: -835 Bill For Surgical Tray: no vt vf arrest  would continue to follow qt interval in the setting of hypoglycemia which may have been a precipitant. follow electrolytes. k calcium magnesium etc. Billing Type: Third-Party Bill Expected Date Of Service: 06/22/2023

## 2023-08-09 NOTE — PATIENT PROFILE ADULT - NSPROPTRIGHTREPNAME_GEN_A__NUR
Daily Note     Today's date: 2023  Patient name: Bryan Siddiqui  : 1959  MRN: [id number]  Referring provider: Anna Starks PA-C  Dx:   Encounter Diagnosis     ICD-10-CM    1. Ambulatory dysfunction  R26.2           Start Time:   Stop Time:   Total time in clinic (min): 91 minutes    Subjective: Pt denies any medical changes since previous session. Objective: See treatment diary below      Assessment: Jhony tolerated treatment fair with consistent cuing throughout. TE's were performed with the same resistance and reps and steps were increased to 6inch this session and was tolerated well. . No new TE's were performed today. Following treatment, the patient demonstrated fatigue post treatment and exhibited good technique with therapeutic exercises. Plan: Continue per plan of care.       Precautions: fall risk, scoliosis, HTN      Manuals                                                                 Neuro Re-Ed             sidestepping On balance beam x8 On balance beam x8           sls             FT EC foam 1' 1' EC foam           Tandem walking 4 laps 4 laps           Scapular squeezes no monies  x10 with tactile cues                                     Ther Ex             Sit to stand             slr x3             rows Green tb x20 Green tb x20           Lateral step up and over 4"x20 6'' x20           Heel walk 4 laps 4 laps           Toe walk 4 laps 4 laps           laq 3#x20 3#x20           Hs curls 3#x20 3#[id number] 3# x20           Biceps curls 3#x20 3#x20           Ther Activity             Recumbent bike                          Gait Training                                       Modalities Boy

## 2023-10-02 NOTE — ED ADULT NURSE NOTE - RESPIRATORY WDL
Breathing spontaneous and unlabored. Breath sounds clear and equal bilaterally with regular rhythm. Ftsg Text: The defect edges were debeveled with a #15 scalpel blade.  Given the location of the defect, shape of the defect and the proximity to free margins a full thickness skin graft was deemed most appropriate.  Using a sterile surgical marker, the primary defect shape was transferred to the donor site. The area thus outlined was incised deep to adipose tissue with a #15 scalpel blade.  The harvested graft was then trimmed of adipose tissue until only dermis and epidermis was left.  The skin margins of the secondary defect were undermined to an appropriate distance in all directions utilizing iris scissors.  The secondary defect was closed with interrupted buried subcutaneous sutures.  The skin edges were then re-apposed with running  sutures.  The skin graft was then placed in the primary defect and oriented appropriately.

## 2023-10-19 NOTE — ED ADULT NURSE NOTE - NS ED NOTE  TALK SOMEONE YN
You have received an injection of local anesthetic and corticosteroids. The local anesthetic effect typically last 4-6 hours. It may take 3-7 days for the corticosteroids to reach optimal effect. Please pay close attention to the following information/instructions: You may not drive for 12 hours after your injection if you had sedation or epidural injection. It is common to experience mild soreness at the injection site(s) for 24-48 hours. Ice is the best remedy. You may apply ice for 20 minutes at a time several times a day as needed. Avoid heat to the injection area for 72 hours. No hot packs, saunas, or steam rooms during this time. A regular shower is OK. You may immediately restart your regular medication regimen, including pain medications, anti-inflammatory, and blood thinners. Please remove the sterile dressing/band-aid tonight or tomorrow morning. Do not leave it on after you have taken a shower or gotten it wet. Corticosteroid side effects can occur after this injection, but they usually resolve after several days. These side effects can include flushing, hot flashes, mild palpitations, insomnia, water retention, feeling anxious/restless, or headaches. While it is extremely rare to get an infection after a spinal procedure, please call the office if you develop any signs of infection. These signs include fevers/chills, severely increased pain, redness at the injections site, or any drainage from the injection site. Remember that the corticosteroid benefit (long-term pain relief) from an injection can take as long as 10 days to occur. You may have a period of slightly increased pain after your injection before the cortisone takes effect. You may resume all of your normal daily activities 24 hours after your injection. It is OK to restart your exercise or physical therapy program as soon as you feel comfortable doing so.   Please complete the pain diary given to you after your
No

## 2024-01-18 NOTE — PROGRESS NOTE ADULT - PROBLEM SELECTOR PLAN 8
cont levothyroxine-increase to 125mcg on 10/31 on empty stomach, unclear if was taking appropriately  Will need repeat in 3 weeks outpatient for additional titration No

## 2024-03-06 NOTE — DISCHARGE NOTE ADULT - HOSPITAL COURSE
Hello Team,  Pt has a lab appt on 3/13 and needs transportation.  His brother also has an appointment the same day. I will send you a separate encounter.   Thank you!  Suellen    72 F PMH type 2 DM, HTN, nonobstructive CAD, ESRD on HD TTS, syncope s/p ILR, frequent falls c/b prior nasal fracture and cervical spine fracture, Afib no a/c 2/2 recent fall with scalp laceration/hematoma, now p/w repeat fall at HD center with resultant dehiscence of scalp wound c/b transient symptomatic bradycardia.       Scalp laceration, subsequent encounter.  -holding a/c given recent hematoma/dehiscence, To be restarted as OTP.   reportedly has hx of pAF would eventually need a/c given high CHADSVASC  -wound care cs.  surgical follow     Anemia post hemorrhagic s/p Transfusion PRBC, follow Hct.    Cervical spine anterolisthesis Neurosurgery evaluation noted. Continue Cervical collar.     MRI L spine refused.    Symptomatic bradycardia resolved.   -transient, responded to atropine  -continue telemetry  -avoid reina blockade  -cardiology follow    Other chronic pain.   -pain control.     ESRD on hemodialysis. - Nephrology follow.       Type 2 diabetes mellitus with hyperglycemia, with long-term current use of insulin.  -c/w prior basal/bolus dosing from last admission in June      Essential hypertension.   -hold reina blockers  -resume hydralazine, clonidine, norvasc  -holding labetalol in setting of bradycardia  -cards f/u.    Coronary artery disease without angina pectoris, unspecified vessel or lesion type, unspecified whether native or transplanted heart.   -no CP  c/w statin, hold aspirin because of bleed.      Pt stable discharged to rehab, with outpatient follow up with Nephrologist and PMD.

## 2024-04-19 NOTE — CONSULT NOTE ADULT - PROBLEM/RECOMMENDATION-4
[facility]   Gastroenterology, Hepatology, &  Advanced Endoscopy      ASSESSMENT AND PLAN:  -Recommend Hematology consult  - Negative GI workup.  - Monitor Hgb  - Hgb 6.8, currently being transfused to keep >7.0  - IV iron x 1 ordered  -much time spent with patient reviewing GI work up to this point and other possibilities of anemia      Pt states he had a \"darker but not black\" stool x 1,  2 weeks ago.  -Denies any further episodes since then.  - Pt and spouse both state that patient has been seen and evaluated by Dr. Hou for anemia.  - Neither could state exact dates, they both agree that patient has had a panendoscopy (several) which had been unrevealing for source of bleed within the last year.  - Colon polyps per colonoscopy \"2 months ago\" Dr. Santillan   - Pt also underwent a capsule endoscopy after the panendoscopy which again failed to demonstrate any source of bleed.    Acute on Chronic anemia in the setting of A. Fib. (Patient was not an anticoagulation candidate due to low hemoglobin levels requiring blood transfusions.)  -Hgb  6.8 , 7.2, 7.6, 7.1, 9.2  - Await Hemoccult stool  - INR 1.1  - Transfuse to keep Hgb >7  - peripheral smear ordered to review. (Pending)   - Iron studies ordered, no TRAVIS noted though readings altered with transfusions.  - MCV 89.2  - RDW 13.2  - No current plans for repeat scopes  - Monitor for further episodes of melena, hematochezia     - Hematuria  -Urology consulted    HISTORY OF PRESENT ILLNESS:      69-year-old male past medical history of alcohol abuse, hematuria, paroxymal atrial fibrillation, prostate cancer, GI bleed requiring outpatient capsule endoscopy, coronary artery disease presents ED due to chest pain.  He was found to be in atrial fibrillation with RVR on presentation with heart rate of 122.  CT angiogram of the chest was negative for pulm embolism.  Hemoglobin 9.2.  Respiratory panel was negative.     Past Medical History:    No past medical history on  DISPLAY PLAN FREE TEXT

## 2024-06-27 NOTE — CHART NOTE - NSCHARTNOTESELECT_GEN_ALL_CORE
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 fall risk with his unsteady gait, fatigue.     Anxiety  Lexapro 5 mg once a day.      HTN Screen: BP at goal  DM Screen: Check A1c/fasting sugar  HLD Screen: Check fasting lipid panel  HCV Screen: Considered low risk  HIV Screen: considered low risk  G/C/Syphilis: Considered low risk     Colon Cancer Screening (45-70): Last colonoscopy: As inpatient with Dr. Pickens most recently  Prostate Cancer Screening: (50-70): Check PSA  Lung Cancer Screening (55-79 with 30 p/year and active < 15 years): Not indicated  AAA Screening (65-75 Hx of smoking): Not indicated     Influenza: Annually   Td/Tdap: Last Tdap 2011; may be due  Zoster (50+): UTD  HPV (19-26): Not indicated  Pneumococcal: Up-to-date    Immunization History   Administered Date(s) Administered    Covid-19 Vaccine Pfizer 30 mcg/0.3 ml 02/15/2021, 03/08/2021, 09/28/2021, 04/19/2022    Covid-19 Vaccine Pfizer Bivalent 30mcg/0.3mL 11/01/2022    Covid-19 Vaccine Pfizer Fabián-Sucrose 30 mcg/0.3 ml 04/19/2022    FLU VAC High Dose 65 YRS & Older PRSV Free (73010) 10/06/2016, 10/09/2017, 10/30/2018, 10/24/2019, 10/13/2020, 10/21/2022, 10/26/2023    FLU VAC QIV SPLIT 3 YRS AND OLDER (36752) 10/20/2021    Fluvirin, 3 Years & >, Im 11/13/2007, 10/21/2010, 11/11/2011, 12/26/2012, 11/18/2013, 10/10/2014, 08/01/2019    Fluzone Vaccine Medicare () 10/06/2016, 10/09/2017,  10/30/2018, 10/24/2019, 10/13/2020    Influenza 10/29/2005, 12/11/2006, 10/10/2014, 10/14/2015, 10/06/2016    Novavax Covid-19 Vaccine 5mcg/0.5ml 12yrs+ ([id number]) 01/11/2024    Pneumococcal (Prevnar 13) 04/24/2015, 06/15/2017    Pneumovax 23 08/05/2008, 07/10/2018    RSV, recombinant, RSVpreF, adjuvanted (Arexvy) 01/11/2024    TDAP 03/10/2011    Zoster Vaccine Recombinant Adjuvanted (Shingrix) 08/19/2021, 11/17/2021   Deferred Date(s) Deferred    Pneumococcal (Prevnar 13) 03/10/2017           Diet assessment: good     PLAN:  The patient indicates understanding of these issues and agrees to the plan.  Reinforced healthy diet, lifestyle, and exercise.    No follow-ups on file.     Wili Parmar MD, 6/11/2023     General Health     In the past six months, have you lost more than 10 pounds without trying?: 2 - No  Has your appetite been poor?: No  How does the patient maintain a good energy level?: Appropriate Exercise  How would you describe your daily physical activity?: Light  How would you describe your current health state?: Poor  How do you maintain positive mental well-being?: Social Interaction;Visiting Friends;Visiting Family;Puzzles;Games (read his bible)     Supplementary Documentation:   Ollie Hernández's SCREENING SCHEDULE   Tests on this list are recommended by your physician but may not be covered, or covered at this frequency, by your insurer.   Please check with your insurance carrier before scheduling to verify coverage.   PREVENTATIVE SERVICES FREQUENCY &  COVERAGE DETAILS LAST COMPLETION DATE   Diabetes Screening    Fasting Blood Sugar / Glucose    One screening every 12 months if never tested or if previously tested but not diagnosed with pre-diabetes   One screening every 6 months if diagnosed with pre-diabetes Lab Results   Component Value Date     (H) 06/18/2024        Cardiovascular Disease Screening    Lipid Panel  Cholesterol  Lipoprotein (HDL)  Triglycerides Covered every 5 years  for all Medicare beneficiaries without apparent signs or symptoms of cardiovascular disease Lab Results   Component Value Date    CHOLEST 170 06/23/2023    HDL 43 06/23/2023     (H) 06/23/2023    LDLD 161 (H) 10/26/2021    TRIG 132 06/23/2023         Electrocardiogram (EKG)   Covered if needed at Welcome to Medicare, and non-screening if indicated for medical reasons 05/21/2024      Ultrasound Screening for Abdominal Aortic Aneurysm (AAA) Covered once in a lifetime for one of the following risk factors    Men who are 65-75 years old and have ever smoked    Anyone with a family history -     Colorectal Cancer Screening  Covered for ages 50-85; only need ONE of the following:    Colonoscopy   Covered every 10 years    Covered every 2 years if patient is at high risk or previous colonoscopy was abnormal 06/15/2024    Health Maintenance   Topic Date Due    Colorectal Cancer Screening  06/15/2031       Flexible Sigmoidoscopy   Covered every 4 years -    Fecal Occult Blood Test Covered annually -   Prostate Cancer Screening    Prostate-Specific Antigen (PSA) Annually Lab Results   Component Value Date    PSA 2.94 10/20/2021     There are no preventive care reminders to display for this patient.   Immunizations    Influenza Covered once per flu season  Please get every year 10/26/2023  No recommendations at this time    Pneumococcal Each vaccine ([id number] & [id number]) covered once after 65 Prevnar 13: 06/15/2017    [id number]: 07/10/2018     No recommendations at this time    Hepatitis B One screening covered for patients with certain risk factors   -  No recommendations at this time    Tetanus Toxoid Not covered by Medicare Part B unless medically necessary (cut with metal); may be covered with your pharmacy prescription benefits -    Tetanus, Diptheria and Pertusis TD and TDaP Not covered by Medicare Part B -  No recommendations at this time    Zoster Not covered by Medicare Part B; may be covered with your  pharmacy  prescription benefits -  No recommendations at this time       Diabetes      Hemoglobin A1C Annually; if last result is elevated, may be asked to retest more frequently.    Medicare covers every 3 months Lab Results   Component Value Date     (H) 06/23/2023    A1C 5.3 04/02/2024       No recommendations at this time    Creat/alb ratio Annually Lab Results   Component Value Date    MICROALBCREA 1,203.8 (H) 06/23/2023       LDL Annually Lab Results   Component Value Date     (H) 06/23/2023       Dilated Eye Exam Annually Last Diabetic Eye Exam:  No data recorded  No data recorded          Chronic Obstructive Pulmonary Disease (COPD)    Spirometry Annually Spirometry date:

## 2024-08-14 NOTE — ED ADULT TRIAGE NOTE - BP NONINVASIVE SYSTOLIC (MM HG)
----- Message from Yisel Aguilar PA-C sent at 8/13/2024 12:59 PM CDT -----  Hi! I see you scheduled this patient and to be seen the same day as the mother. They have an appointment in between them so that will not work how it is currently scheduled. Any patients who need to be seen together need to be in appointment slots that are one right after the other. You could see if the 9 am virtual will switch to 930 and then put the mother at 9 am.  
Patients are always scheduled together with family members unless there are no available slots side by side.   Patients are informed  before scheduling    I informed Mrs Mccarthy of this during her  mothers visit, Mrs Mccarthy was ok with this.  I was unable to contact the 9;00 v v patient so I am unable to switch the patient's mother at this time.    LVM for Mrs Mcacrthy to contact the office back to reschedule her appointment .  Office number provided.  
[phone]

## 2024-10-23 NOTE — PROGRESS NOTE ADULT - PROBLEM SELECTOR PLAN 7
No acute findings on CT abdomen, patient w/ chronic abdominal pain,  bowel regimen for constipation, add dulcolax negative